# Patient Record
Sex: MALE | Race: WHITE | NOT HISPANIC OR LATINO | Employment: OTHER | URBAN - METROPOLITAN AREA
[De-identification: names, ages, dates, MRNs, and addresses within clinical notes are randomized per-mention and may not be internally consistent; named-entity substitution may affect disease eponyms.]

---

## 2017-01-06 ENCOUNTER — OFFICE VISIT (OUTPATIENT)
Dept: DERMATOLOGY | Facility: CLINIC | Age: 72
End: 2017-01-06
Payer: MEDICARE

## 2017-01-06 VITALS — WEIGHT: 227 LBS | BODY MASS INDEX: 29.13 KG/M2 | HEIGHT: 74 IN

## 2017-01-06 DIAGNOSIS — L57.0 ACTINIC KERATOSES: ICD-10-CM

## 2017-01-06 DIAGNOSIS — L81.4 SOLAR LENTIGO: ICD-10-CM

## 2017-01-06 DIAGNOSIS — D48.9 NEOPLASM OF UNCERTAIN BEHAVIOR: Primary | ICD-10-CM

## 2017-01-06 DIAGNOSIS — L82.1 SEBORRHEIC KERATOSES: ICD-10-CM

## 2017-01-06 DIAGNOSIS — R23.8 VENOUS LAKE OF LIP: ICD-10-CM

## 2017-01-06 PROCEDURE — 1160F RVW MEDS BY RX/DR IN RCRD: CPT | Mod: S$GLB,,, | Performed by: DERMATOLOGY

## 2017-01-06 PROCEDURE — 17003 DESTRUCT PREMALG LES 2-14: CPT | Mod: S$GLB,,, | Performed by: DERMATOLOGY

## 2017-01-06 PROCEDURE — 99203 OFFICE O/P NEW LOW 30 MIN: CPT | Mod: 25,S$GLB,, | Performed by: DERMATOLOGY

## 2017-01-06 PROCEDURE — 1159F MED LIST DOCD IN RCRD: CPT | Mod: S$GLB,,, | Performed by: DERMATOLOGY

## 2017-01-06 PROCEDURE — 99999 PR PBB SHADOW E&M-EST. PATIENT-LVL III: CPT | Mod: PBBFAC,,, | Performed by: DERMATOLOGY

## 2017-01-06 PROCEDURE — 1157F ADVNC CARE PLAN IN RCRD: CPT | Mod: S$GLB,,, | Performed by: DERMATOLOGY

## 2017-01-06 PROCEDURE — 88305 TISSUE EXAM BY PATHOLOGIST: CPT | Performed by: PATHOLOGY

## 2017-01-06 PROCEDURE — 17000 DESTRUCT PREMALG LESION: CPT | Mod: S$GLB,,, | Performed by: DERMATOLOGY

## 2017-01-06 PROCEDURE — 11100 PR BIOPSY OF SKIN LESION: CPT | Mod: 59,S$GLB,, | Performed by: DERMATOLOGY

## 2017-01-06 PROCEDURE — 1126F AMNT PAIN NOTED NONE PRSNT: CPT | Mod: S$GLB,,, | Performed by: DERMATOLOGY

## 2017-01-06 NOTE — PATIENT INSTRUCTIONS
CRYOSURGERY      Your doctor has used a method called cryosurgery to treat your skin condition. Cryosurgery refers to the use of very cold substances to treat a variety of skin conditions such as warts, pre-skin cancers, molluscum contagiosum, sun spots, and several benign growths. The substance we use in cryosurgery is liquid nitrogen and is so cold (-195 degrees Celsius) that is burns when administered.     Following treatment in the office, the skin may immediately burn and become red. You may find the area around the lesion is affected as well. It is sometimes necessary to treat not only the lesion, but a small area of the surrounding normal skin to achieve a good response.     A blister, and even a blood filled blister, may form after treatment.   This is a normal response. If the blister is painful, it is acceptable to sterilize a needle and with rubbing alcohol and gently pop the blister. It is important that you gently wash the area with soap and warm water as the blister fluid may contain wart virus if a wart was treated. Do no remove the roof of the blister.     The area treated can take anywhere from 1-3 weeks to heal. Healing time depends on the kind skin lesion treated, the location, and how aggressively the lesion was treated. It is recommended that the areas treated are covered with Vaseline or bacitracin ointment and a band-aid. If a band-aid is not practical, just ointment applied several times per day will do. Keeping these areas moist will speed the healing time.    Treatment with liquid nitrogen can leave a scar. In dark skin, it may be a light or dark scar, in light skin it may be a white or pink scar. These will generally fade with time, but may never go away completely.     If you have any concerns after your treatment, please feel free to call the office.         [facility]  [facility]  [address] [place] [address]  Dept: [phone]   Shave Biopsy Wound  Care    Your doctor has performed a shave biopsy today.  A band aid and vaseline ointment has been placed over the site.  This should remain in place for 24 hours.  It is recommended that you keep the area dry for the first 24 hours.  After 24 hours, you may remove the band aid and wash the area with warm soap and water and apply Vaseline jelly.  Many patients prefer to use Neosporin or Bacitracin ointment.  This is acceptable; however, know that you can develop an allergy to this medication even if you have used it safely for years.  It is important to keep the area moist.  Letting it dry out and get air slows healing time, and will worsen the scar.  Band aid is optional after first 24 hours.      If you notice increasing redness, tenderness, pain, or yellow drainage at the biopsy site, please notify your doctor.  These are signs of an infection.    If your biopsy site is bleeding, apply firm pressure for 15 minutes straight.  Repeat for another 15 minutes, if it is still bleeding.   If the surgical site continues to bleed, then please contact your doctor.       [facility]  [facility]  [address] [place] [address]  Dept: [phone]

## 2017-01-06 NOTE — PROGRESS NOTES
Subjective:       Patient ID:  Roosevelt Alejandro is a 71 y.o. male who presents for   Chief Complaint   Patient presents with    Mole     Lip, left side, and left buttock, 71 years, no tx     HPI Comments: Initial visit  Describes a long history of occupational and recreational sun exposure with and without hats or sunscreen use. [employer]; [employer]  No h/o skin cancer  Mole on R upper cut lip traumatized with shaving, bleeds often          Mole  - Initial  Affected locations: lips (Left side and left buttock)  Duration: 71 years  Signs / symptoms: asymptomatic  Severity: mild  Timing: constant  Aggravated by: nothing  Relieving factors/Treatments tried: nothing        Review of Systems   Constitutional: Negative for fever and chills.   Skin: Negative for daily sunscreen use, activity-related sunscreen use (protective clothing) and recent sunburn.   Hematologic/Lymphatic: Does not bruise/bleed easily.        Objective:    Physical Exam   Constitutional: He appears well-developed and well-nourished. No distress.   Neurological: He is alert and oriented to person, place, and time. He is not disoriented.   Psychiatric: He has a normal mood and affect.   Skin:   Areas Examined (abnormalities noted in diagram):   Scalp / Hair Palpated and Inspected  Head / Face Inspection Performed  Neck Inspection Performed  Chest / Axilla Inspection Performed  Abdomen Inspection Performed  Genitals / Buttocks / Groin Inspection Performed  Back Inspection Performed  RUE Inspected  LUE Inspection Performed  RLE Inspected  LLE Inspection Performed  Nails and Digits Inspection Performed                       Diagram Legend     Erythematous scaling macule/papule c/w actinic keratosis       Vascular papule c/w angioma      Pigmented verrucoid papule/plaque c/w seborrheic keratosis      Yellow umbilicated papule c/w sebaceous hyperplasia      Irregularly shaped tan macule c/w lentigo     1-2 mm smooth white papules consistent with  Milia      Movable subcutaneous cyst with punctum c/w epidermal inclusion cyst      Subcutaneous movable cyst c/w pilar cyst      Firm pink to brown papule c/w dermatofibroma      Pedunculated fleshy papule(s) c/w skin tag(s)      Evenly pigmented macule c/w junctional nevus     Mildly variegated pigmented, slightly irregular-bordered macule c/w mildly atypical nevus      Flesh colored to evenly pigmented papule c/w intradermal nevus       Pink pearly papule/plaque c/w basal cell carcinoma      Erythematous hyperkeratotic cursted plaque c/w SCC      Surgical scar with no sign of skin cancer recurrence      Open and closed comedones      Inflammatory papules and pustules      Verrucoid papule consistent consistent with wart     Erythematous eczematous patches and plaques     Dystrophic onycholytic nail with subungual debris c/w onychomycosis     Umbilicated papule    Erythematous-base heme-crusted tan verrucoid plaque consistent with inflamed seborrheic keratosis     Erythematous Silvery Scaling Plaque c/w Psoriasis     See annotation          Assessment / Plan:      Pathology Orders:      Normal Orders This Visit    Tissue Specimen To Pathology, Dermatology     Questions:    Directional Terms:  Other(comment)    Clinical information:  R upper cutaneous lip, IDN vs other    Specific Site:  R upper cut lip        Neoplasm of uncertain behavior  -     Tissue Specimen To Pathology, Dermatology  Shave biopsy procedure note:    Shave biopsy performed after verbal consent including risk of infection, scar, recurrence, need for additional treatment of site. Area prepped with alcohol, anesthetized with approximately 1.0cc of 1% lidocaine with epinephrine. Lesional tissue shaved with razor blade. Hemostasis achieved with application of aluminum chloride followed by hyfrecation. No complications. Dressing applied. Wound care explained.    Actinic keratoses  Cryosurgery Procedure Note    Verbal consent from the patient is  obtained and the patient is aware of the precancerous quality and need for treatment of these lesions. Liquid nitrogen cryosurgery is applied to the 5 actinic keratoses, as detailed in the physical exam, to produce a freeze injury. The patient is aware that blisters may form and is instructed on wound care with gentle cleansing and use of vaseline ointment to keep moist until healed. The patient is supplied a handout on cryosurgery and is instructed to call if lesions do not completely resolve.    Seborrheic keratoses  These are benign inherited growths without a malignant potential. Reassurance given to patient. No treatment is necessary.     Venous lake of lip  Reassurance    Solar lentigo  This is a benign hyperpigmented sun induced lesion. Daily sun protection will reduce the number of new lesions. Treatment of these benign lesions are considered cosmetic.    Patient instructed in importance in daily sun protection of at least spf 30. Sun avoidance and topical protection discussed.   Recommend Elta MD (physician office) COTZ sensitive (available online) for daily use on face and neck.  Patient encouraged to wear hat for all outdoor exposure.   Also discussed sun protective clothing.           Return in about 1 year (around 1/6/2018).

## 2017-02-07 ENCOUNTER — DOCUMENTATION ONLY (OUTPATIENT)
Dept: FAMILY MEDICINE | Facility: CLINIC | Age: 72
End: 2017-02-07

## 2017-02-07 NOTE — PROGRESS NOTES
Pre-Visit Chart Review  For Appointment Scheduled on 2/10/17    Health Maintenance Due   Topic Date Due    Zoster Vaccine  11/24/2004

## 2017-02-10 ENCOUNTER — LAB VISIT (OUTPATIENT)
Dept: LAB | Facility: HOSPITAL | Age: 72
End: 2017-02-10
Attending: FAMILY MEDICINE
Payer: MEDICARE

## 2017-02-10 ENCOUNTER — OFFICE VISIT (OUTPATIENT)
Dept: FAMILY MEDICINE | Facility: CLINIC | Age: 72
End: 2017-02-10
Payer: MEDICARE

## 2017-02-10 VITALS
BODY MASS INDEX: 30.38 KG/M2 | RESPIRATION RATE: 14 BRPM | WEIGHT: 236.75 LBS | SYSTOLIC BLOOD PRESSURE: 134 MMHG | HEART RATE: 92 BPM | OXYGEN SATURATION: 95 % | DIASTOLIC BLOOD PRESSURE: 86 MMHG | HEIGHT: 74 IN

## 2017-02-10 DIAGNOSIS — Z00.00 ANNUAL PHYSICAL EXAM: ICD-10-CM

## 2017-02-10 DIAGNOSIS — N40.0 BENIGN NON-NODULAR PROSTATIC HYPERPLASIA WITHOUT LOWER URINARY TRACT SYMPTOMS: Primary | ICD-10-CM

## 2017-02-10 DIAGNOSIS — I10 ESSENTIAL HYPERTENSION: ICD-10-CM

## 2017-02-10 LAB
ALBUMIN SERPL BCP-MCNC: 4.4 G/DL
ALP SERPL-CCNC: 80 U/L
ALT SERPL W/O P-5'-P-CCNC: 18 U/L
ANION GAP SERPL CALC-SCNC: 7 MMOL/L
AST SERPL-CCNC: 18 U/L
BILIRUB SERPL-MCNC: 0.5 MG/DL
BUN SERPL-MCNC: 20 MG/DL
CALCIUM SERPL-MCNC: 9.7 MG/DL
CHLORIDE SERPL-SCNC: 104 MMOL/L
CO2 SERPL-SCNC: 29 MMOL/L
CREAT SERPL-MCNC: 1.3 MG/DL
EST. GFR  (AFRICAN AMERICAN): >60 ML/MIN/1.73 M^2
EST. GFR  (NON AFRICAN AMERICAN): 54.5 ML/MIN/1.73 M^2
GLUCOSE SERPL-MCNC: 116 MG/DL
POTASSIUM SERPL-SCNC: 4.2 MMOL/L
PROT SERPL-MCNC: 7.7 G/DL
SODIUM SERPL-SCNC: 140 MMOL/L

## 2017-02-10 PROCEDURE — 99213 OFFICE O/P EST LOW 20 MIN: CPT | Mod: S$GLB,,, | Performed by: PHYSICIAN ASSISTANT

## 2017-02-10 PROCEDURE — 1157F ADVNC CARE PLAN IN RCRD: CPT | Mod: S$GLB,,, | Performed by: PHYSICIAN ASSISTANT

## 2017-02-10 PROCEDURE — 3075F SYST BP GE 130 - 139MM HG: CPT | Mod: S$GLB,,, | Performed by: PHYSICIAN ASSISTANT

## 2017-02-10 PROCEDURE — 99999 PR PBB SHADOW E&M-EST. PATIENT-LVL III: CPT | Mod: PBBFAC,,, | Performed by: PHYSICIAN ASSISTANT

## 2017-02-10 PROCEDURE — 3079F DIAST BP 80-89 MM HG: CPT | Mod: S$GLB,,, | Performed by: PHYSICIAN ASSISTANT

## 2017-02-10 PROCEDURE — 1159F MED LIST DOCD IN RCRD: CPT | Mod: S$GLB,,, | Performed by: PHYSICIAN ASSISTANT

## 2017-02-10 PROCEDURE — 1126F AMNT PAIN NOTED NONE PRSNT: CPT | Mod: S$GLB,,, | Performed by: PHYSICIAN ASSISTANT

## 2017-02-10 PROCEDURE — 1160F RVW MEDS BY RX/DR IN RCRD: CPT | Mod: S$GLB,,, | Performed by: PHYSICIAN ASSISTANT

## 2017-02-10 RX ORDER — NIFEDIPINE 30 MG/1
30 TABLET, EXTENDED RELEASE ORAL NIGHTLY
Qty: 90 TABLET | Refills: 3 | Status: SHIPPED | OUTPATIENT
Start: 2017-02-10 | End: 2017-11-28 | Stop reason: SDUPTHER

## 2017-02-10 RX ORDER — DOXAZOSIN 2 MG/1
2 TABLET ORAL NIGHTLY
Qty: 90 TABLET | Refills: 3 | Status: SHIPPED | OUTPATIENT
Start: 2017-02-10 | End: 2017-11-28 | Stop reason: SINTOL

## 2017-02-10 NOTE — PATIENT INSTRUCTIONS
Established High Blood Pressure    High blood pressure (hypertension) is a chronic disease. Often health care providers dont know what causes it. But it can be caused by certain health conditions and medicines.  If you have high blood pressure, you may not have any symptoms. If you do have symptoms, they may include headache, dizziness, changes in your vision, chest pain, and shortness of breath. But even without symptoms, high blood pressure thats not treated raises your risk for heart attack and stroke. High blood pressure is a serious health risk and shouldnt be ignored.  A blood pressure reading is made up of two numbers: a higher number over a lower number. The top number is the systolic pressure. The bottom number is the diastolic pressure. A normal blood pressure is less than 120 over less than 80.  High blood pressure is when either the top number is 140 or higher, or the bottom number is 90 or higher. This must be the result when taking your blood pressure a number of times. The blood pressures between normal and high are called prehypertension.  Home care  If you have high blood pressure, you should do what is listed below to lower your blood pressure. If you are taking medicines for high blood pressure, these methods may reduce or end your need for medicines in the future.  · Begin a weight-loss program if you are overweight.  · Cut back on how much salt you get in your diet. Heres how to do this:  ¨ Dont eat foods that have a lot of salt. These include olives, pickles, smoked meats, and salted potato chips.  ¨ Dont add salt to your food at the table.  ¨ Use only small amounts of salt when cooking.  · Begin an exercise program. Talk with your health care provider about the type of exercise program that would be best for you. It doesn't have to be hard. Even brisk walking for 20 minutes 3 times a week is a good form of exercise.  · Dont take medicines that have heart stimulants. This includes many  cold and sinus decongestant pills and sprays, as well as diet pills. Check the warnings about hypertension on the label. Stimulants such as amphetamine or cocaine could be lethal for someone with high blood pressure. Never take these.  · Limit how much caffeine you get in your diet. Switch to caffeine-free products.  · Stop smoking. If you are a long-time smoker, this can be hard. Enroll in a stop-smoking program to make it more likely that you will quit for good.  · Learn how to handle stress. This is an important part of any program to lower blood pressure. Learn about relaxation methods like meditation, yoga, or biofeedback.  · If your provider prescribed medicines, take them exactly as directed. Missing doses may cause your blood pressure get out of control.  · Consider buying an automatic blood pressure machine. You can get one of these at most pharmacies. Use this to watch your blood pressure at home. Give the results to your provider.  Follow-up care  You will need to make regular visits to your health care provider. This is to check your blood pressure and to make changes to your medicines. Make a follow-up appointment as directed.  When to seek medical advice  Call your health care provider right away if any of these occur:  · Chest pain or shortness of breath  · Severe headache  · Throbbing or rushing sound in the ears  · Nosebleed  · Sudden severe pain in your belly (abdomen)  · Extreme drowsiness, confusion, or fainting  · Dizziness or dizziness with a spinning sensation (vertigo)  · Weakness of an arm or leg or one side of the face  · You have problems speaking or seeing   Date Last Reviewed: 11/25/2014  © 0394-7550 [employer]. [address] [place] [address], [place] [address]. All rights reserved. This information is not intended as a substitute for professional medical care. Always follow your healthcare professional's instructions.

## 2017-02-10 NOTE — MR AVS SNAPSHOT
[facility]  [address]  [place] [address]  Phone: [phone]  Fax: [phone]                  Roosevelt Alejandro   2/10/2017 8:00 AM   Office Visit    Description:  Male : 1945   Provider:  Ruth Rich PA-C   Department:  [facility]           Reason for Visit     Follow-up           Diagnoses this Visit        Comments    Benign non-nodular prostatic hyperplasia without lower urinary tract symptoms         Essential hypertension                To Do List           Future Appointments        Provider Department Dept Phone    2/10/2017 10:15 AM LAB, [facility] - Lab [phone]    2017 9:30 AM Greg Johnson MD [facility] [phone]      Goals (5 Years of Data)     None       These Medications        Disp Refills Start End    doxazosin (CARDURA) 2 MG tablet 90 tablet 3 2/10/2017 2/10/2018    Take 1 tablet (2 mg total) by mouth every evening. - Oral    Pharmacy: [facility] - [place] [id number] [facility] Ph #: [phone]       nifedipine 30 MG ORAL TR24 (PROCARDIA-XL) 30 MG (OSM) 24 hr tablet 90 tablet 3 2/10/2017 2/10/2018    Take 1 tablet (30 mg total) by mouth every evening. - Oral    Pharmacy: [facility] - [address] Ph #: [phone]         [facility] On [facility]     [facility] On Call Nurse Care Line -  Assistance  Registered nurses in the [facility] provide clinical advisement, health education, appointment booking, and other advisory services.  Call for this free service at [phone].             Medications           Message regarding Medications     Verify the changes and/or additions to your medication regime listed below are the same as discussed with your clinician today.  If any of these changes or additions are incorrect, please notify your healthcare provider.             Verify that the below list of medications is  "an accurate representation of the medications you are currently taking.  If none reported, the list may be blank. If incorrect, please contact your healthcare provider. Carry this list with you in case of emergency.           Current Medications     carvedilol (COREG) 6.25 MG tablet Take 1 tablet (6.25 mg total) by mouth 2 (two) times daily with meals. Take if blood pressure remains over 140/90    doxazosin (CARDURA) 2 MG tablet Take 1 tablet (2 mg total) by mouth every evening.    nifedipine 30 MG ORAL TR24 (PROCARDIA-XL) 30 MG (OSM) 24 hr tablet Take 1 tablet (30 mg total) by mouth every evening.           Clinical Reference Information           Your Vitals Were     BP Pulse Resp Height Weight SpO2    134/86 92 14 6' 2" (1.88 m) 107.4 kg (236 lb 12.4 oz) 95%    BMI                30.4 kg/m2          Blood Pressure          Most Recent Value    BP  134/86      Allergies as of 2/10/2017     Aspirin    Lisinopril (Bulk)    Sulfa (Sulfonamide Antibiotics)      Immunizations Administered on Date of Encounter - 2/10/2017     None      Language Assistance Services     ATTENTION: Language assistance services are available, free of charge. Please call [phone].      ATENCIÓN: Si habla darrius, tiene a silva disposición servicios gratuitos de asistencia lingüística. Llame al [phone].     YUDELKA Ý: N?u b?n nói Ti?ng Vi?t, có các d?ch v? h? tr? ngôn ng? mi?n phí dành cho b?n. G?i s? [phone].         [facility] complies with applicable Federal civil rights laws and does not discriminate on the basis of race, color, national origin, age, disability, or sex.        "

## 2017-02-10 NOTE — PROGRESS NOTES
Subjective:       Patient ID: Roosevelt Alejandro is a 71 y.o. male.    Chief Complaint: Follow-up (4mth f/u hypertension)    HPI Comments: Mr. Alejandro comes to clinic today for follow up. He had blood work drawn today though the results are not yet available. The patient reports he has been monitoring his blood pressure at home with well controlled readings. The patient is due for the shingles vaccine but he refuses to get this. The patient has no complaints today and reports he has been feeling well.     Review of Systems   Constitutional: Negative for activity change, appetite change and fever.   HENT: Negative for postnasal drip, rhinorrhea and sinus pressure.    Eyes: Negative for visual disturbance.   Respiratory: Negative for cough and shortness of breath.    Cardiovascular: Negative for chest pain.   Gastrointestinal: Negative for abdominal distention and abdominal pain.   Genitourinary: Negative for difficulty urinating and dysuria.   Musculoskeletal: Negative for arthralgias and myalgias.   Neurological: Negative for headaches.   Hematological: Negative for adenopathy.   Psychiatric/Behavioral: The patient is not nervous/anxious.        Objective:      Physical Exam   Constitutional: He is oriented to person, place, and time.   HENT:   Mouth/Throat: Oropharynx is clear and moist. No oropharyngeal exudate.   Eyes: Conjunctivae are normal. Pupils are equal, round, and reactive to light.   Cardiovascular: Normal rate and regular rhythm.    Pulmonary/Chest: Effort normal and breath sounds normal. He has no wheezes.   Abdominal: Soft. Bowel sounds are normal. There is no tenderness.   Musculoskeletal: He exhibits no edema.   Lymphadenopathy:     He has no cervical adenopathy.   Neurological: He is alert and oriented to person, place, and time.   Skin: No erythema.   Psychiatric: His behavior is normal.       Assessment:       1. Obesity (BMI 30.0-34.9)    2. Benign non-nodular prostatic hyperplasia without lower  urinary tract symptoms    3. Essential hypertension        Plan:       Max was seen today for follow-up.    Diagnoses and all orders for this visit:    Benign non-nodular prostatic hyperplasia without lower urinary tract symptoms  -     doxazosin (CARDURA) 2 MG tablet; Take 1 tablet (2 mg total) by mouth every evening.    Essential hypertension  -     nifedipine 30 MG ORAL TR24 (PROCARDIA-XL) 30 MG (OSM) 24 hr tablet; Take 1 tablet (30 mg total) by mouth every evening.  Controlled  Low salt diet  Continue current medication    Patient readiness: acceptance and barriers:none    During the course of the visit the patient was educated and counseled about the following:     Hypertension:   Medication: no change.  Dietary sodium restriction.  Regular aerobic exercise.    Goals: Hypertension: Reduce Blood Pressure    Did patient meet goals/outcomes: Yes    The following self management tools provided: declined    Patient Instructions (the written plan) was given to the patient/family.     Time spent with patient: 30 minutes

## 2017-02-16 LAB — VZV IGG SER IA-ACNC: <0.91 INDEX

## 2017-06-21 ENCOUNTER — DOCUMENTATION ONLY (OUTPATIENT)
Dept: FAMILY MEDICINE | Facility: CLINIC | Age: 72
End: 2017-06-21

## 2017-06-21 NOTE — PROGRESS NOTES
Pre-Visit Chart Review  For Appointment Scheduled on 06/23/2017    Health Maintenance Due   Topic Date Due    Zoster Vaccine  11/24/2004

## 2017-06-23 ENCOUNTER — OFFICE VISIT (OUTPATIENT)
Dept: FAMILY MEDICINE | Facility: CLINIC | Age: 72
End: 2017-06-23
Payer: MEDICARE

## 2017-06-23 VITALS
HEIGHT: 74 IN | SYSTOLIC BLOOD PRESSURE: 130 MMHG | DIASTOLIC BLOOD PRESSURE: 80 MMHG | HEART RATE: 83 BPM | WEIGHT: 227.06 LBS | TEMPERATURE: 97 F | BODY MASS INDEX: 29.14 KG/M2

## 2017-06-23 DIAGNOSIS — R35.1 NOCTURIA: ICD-10-CM

## 2017-06-23 DIAGNOSIS — R73.02 GLUCOSE INTOLERANCE (IMPAIRED GLUCOSE TOLERANCE): ICD-10-CM

## 2017-06-23 DIAGNOSIS — E78.5 HYPERLIPIDEMIA, UNSPECIFIED HYPERLIPIDEMIA TYPE: ICD-10-CM

## 2017-06-23 DIAGNOSIS — J61 PULMONARY ASBESTOSIS: ICD-10-CM

## 2017-06-23 DIAGNOSIS — Z23 NEED FOR ZOSTER VACCINE: Primary | ICD-10-CM

## 2017-06-23 DIAGNOSIS — R97.20 ABNORMAL PSA: ICD-10-CM

## 2017-06-23 PROCEDURE — 99999 PR PBB SHADOW E&M-EST. PATIENT-LVL III: CPT | Mod: PBBFAC,,, | Performed by: FAMILY MEDICINE

## 2017-06-23 PROCEDURE — 1159F MED LIST DOCD IN RCRD: CPT | Mod: S$GLB,,, | Performed by: FAMILY MEDICINE

## 2017-06-23 PROCEDURE — 99214 OFFICE O/P EST MOD 30 MIN: CPT | Mod: S$GLB,,, | Performed by: FAMILY MEDICINE

## 2017-06-23 PROCEDURE — 1126F AMNT PAIN NOTED NONE PRSNT: CPT | Mod: S$GLB,,, | Performed by: FAMILY MEDICINE

## 2017-06-23 NOTE — PATIENT INSTRUCTIONS
Established High Blood Pressure    High blood pressure (hypertension) is a chronic disease. Often health care providers dont know what causes it. But it can be caused by certain health conditions and medicines.  If you have high blood pressure, you may not have any symptoms. If you do have symptoms, they may include headache, dizziness, changes in your vision, chest pain, and shortness of breath. But even without symptoms, high blood pressure thats not treated raises your risk for heart attack and stroke. High blood pressure is a serious health risk and shouldnt be ignored.  A blood pressure reading is made up of two numbers: a higher number over a lower number. The top number is the systolic pressure. The bottom number is the diastolic pressure. A normal blood pressure is less than 120 over less than 80.  High blood pressure is when either the top number is 140 or higher, or the bottom number is 90 or higher. This must be the result when taking your blood pressure a number of times. The blood pressures between normal and high are called prehypertension.  Home care  If you have high blood pressure, you should do what is listed below to lower your blood pressure. If you are taking medicines for high blood pressure, these methods may reduce or end your need for medicines in the future.  · Begin a weight-loss program if you are overweight.  · Cut back on how much salt you get in your diet. Heres how to do this:  ¨ Dont eat foods that have a lot of salt. These include olives, pickles, smoked meats, and salted potato chips.  ¨ Dont add salt to your food at the table.  ¨ Use only small amounts of salt when cooking.  · Begin an exercise program. Talk with your health care provider about the type of exercise program that would be best for you. It doesn't have to be hard. Even brisk walking for 20 minutes 3 times a week is a good form of exercise.  · Dont take medicines that have heart stimulants. This includes many  cold and sinus decongestant pills and sprays, as well as diet pills. Check the warnings about hypertension on the label. Stimulants such as amphetamine or cocaine could be lethal for someone with high blood pressure. Never take these.  · Limit how much caffeine you get in your diet. Switch to caffeine-free products.  · Stop smoking. If you are a long-time smoker, this can be hard. Enroll in a stop-smoking program to make it more likely that you will quit for good.  · Learn how to handle stress. This is an important part of any program to lower blood pressure. Learn about relaxation methods like meditation, yoga, or biofeedback.  · If your provider prescribed medicines, take them exactly as directed. Missing doses may cause your blood pressure get out of control.  · Consider buying an automatic blood pressure machine. You can get one of these at most pharmacies. Use this to watch your blood pressure at home. Give the results to your provider.  Follow-up care  You will need to make regular visits to your health care provider. This is to check your blood pressure and to make changes to your medicines. Make a follow-up appointment as directed.  When to seek medical advice  Call your health care provider right away if any of these occur:  · Chest pain or shortness of breath  · Severe headache  · Throbbing or rushing sound in the ears  · Nosebleed  · Sudden severe pain in your belly (abdomen)  · Extreme drowsiness, confusion, or fainting  · Dizziness or dizziness with a spinning sensation (vertigo)  · Weakness of an arm or leg or one side of the face  · You have problems speaking or seeing   Date Last Reviewed: 11/25/2014  © 1529-5011 [employer]. [address] [place] [address], [place] [address]. All rights reserved. This information is not intended as a substitute for professional medical care. Always follow your healthcare professional's instructions.        A Sample Walking Program  Experts recommend walking  briskly on most days. Aim for a target of 30 minutes on most days, or 150 or more minutes a week. Walking programs can help you reach this goal by slowly increasing the frequency and the amount of  time you walk. Try this walking program:    First week  · Walk 3 times a week.  · Walk for 5 minutes each time.  Second week  · Walk 3 times a week.  · Walk for 10 minutes each time.  Third week  · Walk 3 times a week.  · Walk for 13 minutes each time.  Fourth week  · Walk 3 times a week.  · Walk for 15 minutes each time.  Fifth week  · Walk 4 times a week.  · Walk for 15 minutes each time.  Sixth week and beyond  Gradually increase your minutes of walking each time, and your number of times each week, until you reach 30 minutes, 5-7 days of the week.  Tips for getting the most from your walking program  · Walk briskly. If you can sing, speed up. If you cant talk easily, slow down.  · Choose good walking shoes with padded soles and good arch support.  · Dont use hand or ankle weights. They can cause injuries.  · Walk indoors if the weather is bad. Use a treadmill or walk inside a shopping mall  Before you start walking, check with your healthcare provider if you are new to exercise, over 40, overweight, or a smoker. Also check with your provider  if you have heart disease, high blood pressure, diabetes, arthritis, asthma, or any other health problem that concerns you. Your provider can help you get started and help you stay safe.   Date Last Reviewed: 8/13/2015  © 2184-2854 [employer]. [address] [place] [address], [place] [address]. All rights reserved. This information is not intended as a substitute for professional medical care. Always follow your healthcare professional's instructions.        A Sample Walking Program  Experts recommend walking briskly on most days. Aim for a target of 30 minutes on most days, or 150 or more minutes a week. Walking programs can help you reach this goal by slowly increasing  the frequency and the amount of  time you walk. Try this walking program:    First week  · Walk 3 times a week.  · Walk for 5 minutes each time.  Second week  · Walk 3 times a week.  · Walk for 10 minutes each time.  Third week  · Walk 3 times a week.  · Walk for 13 minutes each time.  Fourth week  · Walk 3 times a week.  · Walk for 15 minutes each time.  Fifth week  · Walk 4 times a week.  · Walk for 15 minutes each time.  Sixth week and beyond  Gradually increase your minutes of walking each time, and your number of times each week, until you reach 30 minutes, 5-7 days of the week.  Tips for getting the most from your walking program  · Walk briskly. If you can sing, speed up. If you cant talk easily, slow down.  · Choose good walking shoes with padded soles and good arch support.  · Dont use hand or ankle weights. They can cause injuries.  · Walk indoors if the weather is bad. Use a treadmill or walk inside a shopping mall  Before you start walking, check with your healthcare provider if you are new to exercise, over 40, overweight, or a smoker. Also check with your provider  if you have heart disease, high blood pressure, diabetes, arthritis, asthma, or any other health problem that concerns you. Your provider can help you get started and help you stay safe.   Date Last Reviewed: 8/13/2015 © 2000-2016 [employer]. [address] [place] [address]. All rights reserved. This information is not intended as a substitute for professional medical care. Always follow your healthcare professional's instructions.        Weight Management: Getting Started  Healthy bodies come in all shapes and sizes. Not all bodies are made to be thin. For some people, a healthy weight is higher than the average weight listed on weight charts. Your healthcare provider can help you decide on a healthy weight for you.    Reasons to lose weight  Losing weight can help with some health problems, such as high blood  pressure, heart disease, diabetes, sleep apnea, and arthritis. You may also feel more energy.  Set your long-term goal  Your goal doesn't even have to be a specific weight. You may decide on a fitness goal (such as being able to walk 10 miles a week), or a health goal (such as lowering your blood pressure). Choose a goal that is measurable and reasonable, so you know when you've reached it. A goal of reaching a BMI of less than 25 is not always reasonable (or possible).   Make an action plan  Habits dont change overnight. Setting your goals too high can leave you feeling discouraged if you cant reach them. Be realistic. Choose one or two small changes you can make now. Set an action plan for how you are going to make these changes. When you can stick to this plan, keep making a few more small changes. Taking small steps will help you stay on the path to success.  Track your progress  Write down your goals. Then, keep a daily record of your progress. Write down what you eat and how active you are. This record lets you look back on how much youve done. It may also help when youre feeling frustrated. Reward yourself for success. Even if you dont reach every goal, give yourself credit for what you do get done.  Get support  Encouragement from others can help make losing weight easier. Ask your family members and friends for support. They may even want to join you. Also look to your healthcare provider, registered dietitian, and  for help. Your local hospital can give you more information about nutrition, exercise, and weight loss.  Date Last Reviewed: 1/31/2016 © 2000-2016 [employer]. [address] [place] [address], [place] [address]. All rights reserved. This information is not intended as a substitute for professional medical care. Always follow your healthcare professional's instructions.

## 2017-07-05 NOTE — PROGRESS NOTES
"[place] 10-year CHD Risk Score: 20% (15 Total Points)   Values used to calculate score:     Age: 69 years -- Points: 12     Total Cholesterol: 195 mg/dL -- Points: 0     HDL Cholesterol: 39 mg/dL -- Points: 2     Systolic BP (treated): 127 mmHg -- Points: 1     The patient is not a smoker. -- Points: 0     The patient has not been diagnosed with diabetes. -- Points: 0  SUBJECTIVE:   Max LUISITO Alejandro is a 71 y.o. male seen Hypertension: Patient here for follow-up of elevated blood pressure. He is exercising and is adherent to low salt diet.  Blood pressure is well controlled at home. Cardiac symptoms none. Patient denies chest pain, chest pressure/discomfort, claudication, dyspnea and exertional chest pressure/discomfort.  Cardiovascular risk factors: advanced age (older than 55 for men, 65 for women), hypertension and sedentary lifestyle. Use of agents associated with hypertension: none. History of target organ damage: none.    OBJECTIVE:   /80 (BP Location: Right arm, Patient Position: Sitting, BP Method: Manual)   Pulse 83   Temp 97.1 °F (36.2 °C) (Oral)   Ht 6' 2" (1.88 m)   Wt 103 kg (227 lb 1.2 oz)   BMI 29.15 kg/m²   Colon polyp.adenoma.  The patient appears alert, well appearing, and in no distress, oriented to person, place, and time, overweight, acyanotic, in no respiratory distress, playful, active and well hydrated.  ENT: ENT exam normal, no neck nodes or sinus tenderness, neck without nodes, pharynx erythematous without exudate and post nasal drip noted  CHEST:clear to auscultation, no wheezes, rales or rhonchi, symmetric air entry, no tachypnea, retractions or cyanosis    ASSESSMENT:   Need for zoster vaccine  -     zoster vaccine live, PF, (ZOSTAVAX, PF,) 19,400 unit/0.65 mL injection; Inject 19,400 Units into the skin once.  Dispense: 1 vial; Refill: 0    Abnormal PSA  -     PROSTATE SPECIFIC ANTIGEN, DIAGNOSTIC; Future    Glucose intolerance (impaired glucose tolerance)  -     " Comprehensive metabolic panel; Future  -     HEMOGLOBIN A1C; Future    Hyperlipidemia, unspecified hyperlipidemia type  -     Lipid panel; Future    Nocturia  -     PROSTATE SPECIFIC ANTIGEN, DIAGNOSTIC; Future    Pulmonary asbestosis          PLAN:   Patient readiness: acceptance and barriers:readiness and social stressors    During the course of the visit the patient was educated and counseled about the following:     Hypertension:   Dietary sodium restriction.  Regular aerobic exercise.  Check blood pressures daily and record.    Goals: Hypertension: Reduce Blood Pressure    Did patient meet goals/outcomes: Yes    The following self management tools provided: blood pressure log  excercise log    Patient Instructions (the written plan) was given to the patient/family.     Time spent with patient: 30 minutes              Additional suggestions to patient: push fluids, rest and return if not improved or if worse.

## 2017-07-19 DIAGNOSIS — M25.532 LEFT WRIST PAIN: Primary | ICD-10-CM

## 2017-07-20 ENCOUNTER — OFFICE VISIT (OUTPATIENT)
Dept: ORTHOPEDICS | Facility: CLINIC | Age: 72
End: 2017-07-20
Payer: MEDICARE

## 2017-07-20 ENCOUNTER — HOSPITAL ENCOUNTER (OUTPATIENT)
Dept: RADIOLOGY | Facility: HOSPITAL | Age: 72
Discharge: HOME | End: 2017-07-20
Attending: ORTHOPAEDIC SURGERY
Payer: MEDICARE

## 2017-07-20 VITALS
HEART RATE: 95 BPM | BODY MASS INDEX: 29.13 KG/M2 | WEIGHT: 227 LBS | HEIGHT: 74 IN | DIASTOLIC BLOOD PRESSURE: 79 MMHG | SYSTOLIC BLOOD PRESSURE: 165 MMHG

## 2017-07-20 DIAGNOSIS — M25.532 LEFT WRIST PAIN: ICD-10-CM

## 2017-07-20 DIAGNOSIS — M65.4 DE QUERVAIN'S TENOSYNOVITIS, LEFT: Primary | ICD-10-CM

## 2017-07-20 PROCEDURE — 99203 OFFICE O/P NEW LOW 30 MIN: CPT | Mod: 25,S$GLB,, | Performed by: ORTHOPAEDIC SURGERY

## 2017-07-20 PROCEDURE — 73110 X-RAY EXAM OF WRIST: CPT | Mod: TC,PN,LT

## 2017-07-20 PROCEDURE — 1159F MED LIST DOCD IN RCRD: CPT | Mod: S$GLB,,, | Performed by: ORTHOPAEDIC SURGERY

## 2017-07-20 PROCEDURE — 99999 PR PBB SHADOW E&M-EST. PATIENT-LVL III: CPT | Mod: PBBFAC,,, | Performed by: ORTHOPAEDIC SURGERY

## 2017-07-20 PROCEDURE — 73110 X-RAY EXAM OF WRIST: CPT | Mod: 26,LT,, | Performed by: RADIOLOGY

## 2017-07-20 PROCEDURE — 1125F AMNT PAIN NOTED PAIN PRSNT: CPT | Mod: S$GLB,,, | Performed by: ORTHOPAEDIC SURGERY

## 2017-07-21 PROCEDURE — 20550 NJX 1 TENDON SHEATH/LIGAMENT: CPT | Mod: LT,S$GLB,, | Performed by: ORTHOPAEDIC SURGERY

## 2017-07-21 RX ORDER — TRIAMCINOLONE ACETONIDE 40 MG/ML
40 INJECTION, SUSPENSION INTRA-ARTICULAR; INTRAMUSCULAR
Status: DISCONTINUED | OUTPATIENT
Start: 2017-07-21 | End: 2017-07-21 | Stop reason: HOSPADM

## 2017-07-21 RX ADMIN — TRIAMCINOLONE ACETONIDE 40 MG: 40 INJECTION, SUSPENSION INTRA-ARTICULAR; INTRAMUSCULAR at 11:07

## 2017-07-21 NOTE — PROCEDURES
L first doral compartmentTendon Sheath  Date/Time: 7/21/2017 11:51 AM  Performed by: KRZYSZTOF ARANGO  Authorized by: KRZYSZTOF ARANGO     Consent Done?: Yes (Verbal)  Timeout: prior to procedure the correct patient, procedure, and site was verified    Indications:  Pain  Site marked: the procedure site was marked    Timeout: prior to procedure the correct patient, procedure, and site was verified    Location:  Wrist  Prep: patient was prepped and draped in usual sterile fashion    Needle size:  22 G  Approach:  Dorsal  Medications:  40 mg triamcinolone acetonide 40 mg/mL  Patient tolerance:  Patient tolerated the procedure well with no immediate complications

## 2017-07-21 NOTE — PROGRESS NOTES
Past Medical History:   Diagnosis Date    Arthritis     back pain    Asbestosis     BPH (benign prostatic hyperplasia)     Hypertension        Past Surgical History:   Procedure Laterality Date    COLONOSCOPY      2009    COLONOSCOPY N/A 1/21/2016    Procedure: COLONOSCOPY;  Surgeon: Toby Maciel MD;  Location: [facility];  Service: Endoscopy;  Laterality: N/A;    EYE SURGERY      cataracts, retinal detachment    PROSTATE BIOPSY      ROTATOR CUFF REPAIR      left       Current Outpatient Prescriptions   Medication Sig    doxazosin (CARDURA) 2 MG tablet Take 1 tablet (2 mg total) by mouth every evening.    nifedipine 30 MG ORAL TR24 (PROCARDIA-XL) 30 MG (OSM) 24 hr tablet Take 1 tablet (30 mg total) by mouth every evening.     No current facility-administered medications for this visit.        Review of patient's allergies indicates:   Allergen Reactions    Aspirin Other (See Comments)     Internal bleeding    Lisinopril (bulk)     Sulfa (sulfonamide antibiotics) Swelling and Rash       Family History   Problem Relation Age of Onset    Cancer Father      lung/ asbestos    Melanoma Neg Hx     Psoriasis Neg Hx     Lupus Neg Hx     Eczema Neg Hx        Social History     Social History    Marital status:      Spouse name: N/A    Number of children: N/A    Years of education: N/A     Occupational History    Not on file.     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  Patient comes  in with left wrist pain.  Patient states it started a couple months above but gotten worse over the last month.  Patient denies an acute injury or trauma.  Pain along the radial aspect of his wrist and into the forearm.  Slowly getting worse.  Symptoms are moderate.  No prior treatment.  Rates the pain as a 5 out of 10.  Tried a brace.      Review of Systems:    Constitution: Negative for chills, fever, and sweats.  Negative for unexplained weight loss.    HENT:  Negative for headaches and blurry vision.    Cardiovascular:Negative for chest pain or irregular heart beat. Negative for hypertension.    Respiratory:  Negative for cough and shortness of breath.    Gastrointestinal: Negative for abdominal pain, heartburn, melena, nausea, and vomitting.    Genitourinary:  Negative bladder incontinence and dysuria.    Musculoskeletal:  See HPI    Neurological: Negative for numbness.    Psychiatric/Behavioral: Negative for depression.  The patient is not nervous/anxious.      Endocrine: Negative for polyuria    Hematologic/Lymphatic: Negative for bleeding problem.  Does not bruise/bleed easily.    Skin: Negative for poor would healing and rash      Physical Examination:    Vital Signs:    Vitals:    07/20/17 1524   BP: (!) 165/79   Pulse: 95       Body mass index is 29.15 kg/m².    This a well-developed, well nourished patient in no acute distress.  They are alert and oriented and cooperative to examination.  Pt. walks without an antalgic gait.      Examination of the left hand and wrist shows no signs of rashes or erythema. Patient has no masses ecchymosis or effusions. Patient has full range of motion of the wrist in flexion and extension as well as ulnar and radial deviation. The patient also has full range of motion of all joints in the hand. There are 2+ radial pulse and intact light touch sensation in all 5 digits. Nontender over the anatomic snuffbox. Negative Tinel's. Negative positive Finkelstein's test.      Examination of the right hand and wrist shows no signs of rashes or erythema. Patient has no masses ecchymosis or effusions. Patient has full range of motion of the wrist in flexion and extension as well as ulnar and radial deviation. The patient also has full range of motion of all joints in the hand. There are 2+ radial pulse and intact light touch sensation in all 5 digits. Nontender over the anatomic snuffbox. Negative Tinel's. Negative Finkelstein's test.     X-rays: X-rays of the left wrist are ordered and reviewed which show no sign of significant arthritis or fracture     Assessment:: Left de Quervain's tenosynovitis    Plan:  I reviewed the diagnosis and the findings with him today.  Reviewed his x-rays.  I offer the patient a cortisone injection which she agreed to.  He will continue to use the brace as needed.  Follow-up if it continues to become an issue.    This note was created using Dragon voice recognition software that occasionally misinterpreted phrases or words.    Consult note is delivered via Epic messaging service.

## 2017-11-27 ENCOUNTER — DOCUMENTATION ONLY (OUTPATIENT)
Dept: FAMILY MEDICINE | Facility: CLINIC | Age: 72
End: 2017-11-27

## 2017-11-27 NOTE — PROGRESS NOTES
Pre-Visit Chart Review  For Appointment Scheduled on 11/28/2017    Health Maintenance Due   Topic Date Due    Zoster Vaccine  11/24/2004    Influenza Vaccine  08/01/2017

## 2017-11-28 ENCOUNTER — OFFICE VISIT (OUTPATIENT)
Dept: FAMILY MEDICINE | Facility: CLINIC | Age: 72
End: 2017-11-28
Payer: MEDICARE

## 2017-11-28 ENCOUNTER — HOSPITAL ENCOUNTER (OUTPATIENT)
Dept: RADIOLOGY | Facility: CLINIC | Age: 72
Discharge: HOME | End: 2017-11-28
Attending: FAMILY MEDICINE
Payer: MEDICARE

## 2017-11-28 VITALS
SYSTOLIC BLOOD PRESSURE: 136 MMHG | DIASTOLIC BLOOD PRESSURE: 77 MMHG | HEIGHT: 74 IN | BODY MASS INDEX: 28.86 KG/M2 | WEIGHT: 224.88 LBS | HEART RATE: 87 BPM | TEMPERATURE: 98 F

## 2017-11-28 DIAGNOSIS — R00.2 HEART PALPITATIONS: ICD-10-CM

## 2017-11-28 DIAGNOSIS — J61 PULMONARY ASBESTOSIS: ICD-10-CM

## 2017-11-28 DIAGNOSIS — I10 ESSENTIAL HYPERTENSION: Primary | ICD-10-CM

## 2017-11-28 DIAGNOSIS — T73.3XXA FATIGUE DUE TO EXCESSIVE EXERTION, INITIAL ENCOUNTER: ICD-10-CM

## 2017-11-28 PROCEDURE — 71020 XR CHEST PA AND LATERAL: CPT | Mod: 26,,, | Performed by: RADIOLOGY

## 2017-11-28 PROCEDURE — 71020 XR CHEST PA AND LATERAL: CPT | Mod: TC,PO

## 2017-11-28 PROCEDURE — 99213 OFFICE O/P EST LOW 20 MIN: CPT | Mod: S$GLB,,, | Performed by: FAMILY MEDICINE

## 2017-11-28 PROCEDURE — 93010 ELECTROCARDIOGRAM REPORT: CPT | Mod: S$GLB,,, | Performed by: INTERNAL MEDICINE

## 2017-11-28 PROCEDURE — 99999 PR PBB SHADOW E&M-EST. PATIENT-LVL III: CPT | Mod: PBBFAC,,, | Performed by: FAMILY MEDICINE

## 2017-11-28 PROCEDURE — 93005 ELECTROCARDIOGRAM TRACING: CPT | Mod: S$GLB,,, | Performed by: FAMILY MEDICINE

## 2017-11-28 RX ORDER — OMEGA-3-ACID ETHYL ESTERS 1 G/1
2 CAPSULE, LIQUID FILLED ORAL 2 TIMES DAILY
Status: ON HOLD | COMMUNITY
End: 2017-12-20 | Stop reason: CLARIF

## 2017-11-28 RX ORDER — NIFEDIPINE 30 MG/1
30 TABLET, EXTENDED RELEASE ORAL NIGHTLY
Qty: 90 TABLET | Refills: 3 | Status: SHIPPED | OUTPATIENT
Start: 2017-11-28 | End: 2018-12-12 | Stop reason: SDUPTHER

## 2017-11-28 RX ORDER — GLUCOSAMINE/CHONDRO SU A 500-400 MG
1 TABLET ORAL 3 TIMES DAILY
COMMUNITY
End: 2019-02-24 | Stop reason: ALTCHOICE

## 2017-11-28 RX ORDER — NIFEDIPINE 30 MG/1
30 TABLET, EXTENDED RELEASE ORAL NIGHTLY
Qty: 90 TABLET | Refills: 3 | Status: SHIPPED | OUTPATIENT
Start: 2017-11-28 | End: 2017-11-28 | Stop reason: SDUPTHER

## 2017-11-28 NOTE — PATIENT INSTRUCTIONS
Taking Your Blood Pressure  Blood pressure is the force of blood against the artery wall as it moves from the heart through the blood vessels. You can take your own blood pressure reading using a digital monitor. Take your readings the same each time, using the same arm. Take readings as often as your healthcare provider instructs.  About blood pressure monitors  Blood pressure monitors are designed for certain ages and cases. You can find monitors for older adults, for pregnant women, and for children. Make sure the one you choose is the right one for your age and situation.  The American Heart Association recommends an automatic cuff monitor that fits on your upper arm (bicep). The cuff should fit your arm size. A cuff thats too large or too small will not give an accurate reading. Measure around your upper arm to find your size.  Monitors that attach to your finger or wrist are not as accurate as monitors for your upper arm.  Ask your healthcare provider for help in choosing a monitor. Bring your monitor to your next provider visit if you need help in using it the correct way.  The steps below are general instructions for using an automatic digital monitor.  Step 1. Relax    · Take your blood pressure at the same time every day, such as in the morning or evening, or at the time your healthcare provider recommends.  · Wait at least a half-hour after smoking, eating, or exercising. Don't drink coffee, tea, soda, or other caffeinated beverages before checking your blood pressure.  · Sit comfortably at a table with both feet on the floor. Do not cross your legs or feet. Place the monitor near you.  · Rest for a few minutes before you begin.  Step 2. Wrap the cuff    · Place your arm on the table, palm up. Your arm should be at the level of your heart. Wrap the cuff around your upper arm, just above your elbow. Its best done on bare skin, not over clothing. Most cuffs will indicate where the brachial artery (the  blood vessel in the middle of the arm at the inner side of the elbow) should line up with the cuff. Look in your monitor's instruction booklet for an illustration. You can also bring your cuff to your healthcare provider and have them show you how to correctly place the cuff.  Step 3. Inflate the cuff    · Push the button that starts the pump.  · The cuff will tighten, then loosen.  · The numbers will change. When they stop changing, your blood pressure reading will appear.  · Take 2 or 3 readings one minute apart.  Step 4. Write down the results of each reading    · Write down your blood pressure numbers for each reading. Note the date and time. Keep your results in one place, such as a notebook. Even if your monitor has a built-in memory, keep a hard copy of the readings.  · Remove the cuff from your arm. Turn off the machine.  · Bring your blood pressure records with your healthcare providers at each visit.  · If you start a new blood pressure medicine, note the day you started the new medicine. Also note the day if you change the dose of your medicine. This information goes on your blood pressure recording sheet. This will help your healthcare provider monitor how well the medicine changes are working.  · Ask your healthcare provider what numbers should prompt you to call him or her. Also ask what numbers should prompt you to get help right away.  Date Last Reviewed: 11/1/2016 © 2000-2017 [employer]. [address] [place] [address]. All rights reserved. This information is not intended as a substitute for professional medical care. Always follow your healthcare professional's instructions.        Degenerative Disk Disease    Spinal disks are gel-filled cushions between the bones, or vertebrae, of the spine. The disks act like shock absorbers. Over time, the disks may break down. This is called degenerative disk disease.  This condition can affect the neck or back. It is one of the most  common causes of low back pain. It is the leading cause of disability in people under age 45 in the [place]. The pain often remains localized to the lower back or neck. Muscle spasm is often present and adds to the pain.  Disk degeneration is a natural part of aging. But it is not painful for most people. It may also occur as a result of repeated minor injuries due to daily activities, sports, or accidents. It may lead to osteoarthritis of the spine. Back pain related to disk disease may come and go. Or it may become chronic and last for months or years. The disk may bulge or rupture. This is called a slipped disk or herniated disk. That can put pressure on a nearby spinal nerve and cause neck or back pain that spreads down one arm or leg.  X-rays or an MRI may help to diagnose this condition. For acute pain, treatment includes anti-inflammatory medicines, muscle relaxants, rest, ice, or heat. Strong prescription pain medicines, called opioids, may be needed for short-term treatment if pain suddenly gets worse. Opioid medicines can be addictive. So they are not advised for long-term pain management. Other types of medicines are preferred. Surgery is generally not used to treat this condition unless there is a complication.    Home care  · For neck pain: Use a comfortable pillow that supports the head and keeps the spine in a neutral position. Your head should not be tilted forward or backward.  · For back pain: Avoid sitting for long periods of time. This puts more stress on the lower back than standing or walking. Starting a regular exercise program to strengthen the supporting muscles of the spine will make it easier to live with degenerative disk disease.  · Apply an ice pack over the injured area for no more than 15 to 20 minutes. Do this every 3 to 6 hours for the first 24 to 48 hours. To make an ice pack, put ice cubes in a plastic bag that seals at the top. Wrap the bag in a clean, thin towel or cloth.  Never put ice or an ice pack directly on the skin. Keep using ice packs to ease pain and swelling as needed. After 48 hours, apply heat (warm shower or warm bath) for 20 minutes several times a day, or switch between ice and heat.   · You may use over-the-counter pain medicine to control pain, unless another pain medicine was prescribed. If you have chronic liver or kidney disease or ever had a stomach ulcer or GI bleeding, talk with your provider before using these medicines.  Follow-up care  Follow up with your healthcare provider, or as directed.  If X-rays, a CT scan or an MRI were done, you will be notified of any new findings that may affect your care.  When to seek medical advice  Contact your healthcare provider right away if any of these occur:  · Increasing back pain  · Your foot drags when you walk, a condition called foot drop  · You have new weakness, numbness, or pain in one or both arms or legs  · Loss of bowel or bladder control  · Numbness or tingling in the buttock or groin area  Date Last Reviewed: 11/23/2015  © 4655-5575 [employer]. [address] [place] [address]. All rights reserved. This information is not intended as a substitute for professional medical care. Always follow your healthcare professional's instructions.        Constipation (Adult)  Constipation means that you have bowel movements that are less frequent than usual. Stools often become very hard and difficult to pass.  Constipation is very common. At some point in life it affects almost everyone. Since everyone's bowel habits are different, what is constipation to one person may not be to another. Your healthcare provider may do tests to diagnose constipation. It depends on what he or she finds when evaluating you.    Symptoms of constipation include:  · Abdominal pain  · Bloating  · Vomiting  · Painful bowel movements  · Itching, swelling, bleeding, or pain around the anus  Causes  Constipation can have  many causes. These include:  · Diet low in fiber  · Too much dairy  · Not drinking enough liquids  · Lack of exercise or physical activity. This is especially true for older adults.  · Changes in lifestyle or daily routine, including pregnancy, aging, work, and travel  · Frequent use or misuse of laxatives  · Ignoring the urge to have a bowel movement or delaying it until later  · Medicines, such as certain prescription pain medicines, iron supplements, antacids, certain antidepressants, and calcium supplements  · Diseases like irritable bowel syndrome, bowel obstructions, stroke, diabetes, thyroid disease, Parkinson disease, hemorrhoids, and colon cancer  Complications  Potential complications of constipation can include:  · Hemorrhoids  · Rectal bleeding from hemorrhoids or anal fissures (skin tears)  · Hernias  · Dependency on laxatives  · Chronic constipation  · Fecal impaction  · Bowel obstruction or perforation  Home care  All treatment should be done after talking with your healthcare provider. This is especially true if you have another medical problems, are taking prescription medicines, or are an older adult. Treatment most often involves lifestyle changes. You may also need medicines. Your healthcare provider will tell you which will work best for you. Follow the advice below to help avoid this problem in the future.  Lifestyle changes  These lifestyle changes can help prevent constipation:  · Diet. Eat a high-fiber diet, with fresh fruit and vegetables, and reduce dairy intake, meats, and processed foods  · Fluids. It's important to get enough fluids each day. Drink plenty of water when you eat more fiber. If you are on diet that limits the amount of fluid you can have, talk about this with your healthcare provider.  · Regular exercise. Check with your healthcare provider first.  Medications  Take any medicines as directed. Some laxatives are safe to use only every now and then. Others can be taken on a  regular basis. Talk with your doctor or pharmacist if you have questions.  Prescription pain medicines can cause constipation. If you are taking this kind of medicine, ask your healthcare provider if you should also take a stool softener.  Medicines you may take to treat constipation include:  · Fiber supplements  · Stool softeners  · Laxatives  · Enemas  · Rectal suppositories  Follow-up care  Follow up with your healthcare provider if symptoms don't get better in the next few days. You may need to have more tests or see a specialist.  Call 911  Call 911 if any of these occur:  · Trouble breathing  · Stiff, rigid abdomen that is severely painful to touch  · Confusion  · Fainting or loss of consciousness  · Rapid heart rate  · Chest pain  When to seek medical advice  Call your healthcare provider right away if any of these occur:  · Fever over 100.4°F (38°C)  · Failure to resume normal bowel movements  · Pain in your abdomen or back gets worse  · Nausea or vomiting  · Swelling in your abdomen  · Blood in the stool  · Black, tarry stool  · Involuntary weight loss  · Weakness  Date Last Reviewed: 12/30/2015  © 8859-1460 [employer]. [address] [place] [address]. All rights reserved. This information is not intended as a substitute for professional medical care. Always follow your healthcare professional's instructions.

## 2017-11-28 NOTE — PROGRESS NOTES
Subjective:       Patient ID: Roosevelt Alejandro is a 72 y.o. male.    Chief Complaint: Hypertension    He feels fatigue and has been constipated, Vague sx, no fevers, no cough, no rashes.      Hypertension   The problem has been waxing and waning since onset. The problem is controlled. Associated symptoms include malaise/fatigue and peripheral edema. Pertinent negatives include no chest pain, headaches, palpitations or shortness of breath. There are no associated agents to hypertension. Risk factors for coronary artery disease include male gender. Past treatments include lifestyle changes. The current treatment provides moderate improvement. There are no compliance problems.      Review of Systems   Constitutional: Positive for fatigue and malaise/fatigue. Negative for unexpected weight change.   Respiratory: Negative for chest tightness and shortness of breath.    Cardiovascular: Negative for chest pain, palpitations and leg swelling.   Gastrointestinal: Negative for abdominal pain.   Musculoskeletal: Positive for arthralgias.   Neurological: Negative for dizziness, syncope, light-headedness and headaches.       Patient Active Problem List   Diagnosis    Hypertension    BPH (benign prostatic hyperplasia)    HTN (hypertension)    DDD (degenerative disc disease), lumbar    Chronic allergic rhinitis    ACE inhibitor-aggravated angioedema    Pulmonary asbestosis    H/O arteriovenous malformation (AVM)       Objective:      Physical Exam   Constitutional: He is oriented to person, place, and time. He appears well-developed and well-nourished. No distress.   HENT:   Head: Normocephalic and atraumatic.   Right Ear: External ear normal.   Left Ear: External ear normal.   Nose: Nose normal.   Mouth/Throat: Oropharynx is clear and moist. No oropharyngeal exudate.   Eyes: Conjunctivae and EOM are normal. Pupils are equal, round, and reactive to light. Right eye exhibits no discharge. Left eye exhibits no discharge. No  scleral icterus.   Neck: Normal range of motion. Neck supple. No JVD present. No tracheal deviation present. No thyromegaly present.   Cardiovascular: Normal rate, normal heart sounds and intact distal pulses.    No murmur heard.  Pulmonary/Chest: Effort normal and breath sounds normal. No respiratory distress. He has no wheezes. He has no rales.   Abdominal: Soft. Bowel sounds are normal. He exhibits no distension and no mass. There is no tenderness. There is no rebound and no guarding.   Musculoskeletal: He exhibits no edema or tenderness.          Lymphadenopathy:     He has no cervical adenopathy.   Neurological: He is alert and oriented to person, place, and time. No cranial nerve deficit. Coordination normal.   Skin: Skin is warm and dry. No rash noted. He is not diaphoretic. No erythema. No pallor.   Psychiatric: He has a normal mood and affect. His behavior is normal. Judgment and thought content normal.   Vitals reviewed.      Lab Results   Component Value Date    WBC 7.80 10/21/2016    HGB 13.9 (L) 10/21/2016    HCT 40.9 10/21/2016     10/21/2016    CHOL 195 10/16/2015    TRIG 110 10/16/2015    HDL 39 (L) 10/16/2015    ALT 18 02/10/2017    AST 18 02/10/2017     02/10/2017    K 4.2 02/10/2017     02/10/2017    CREATININE 1.3 02/10/2017    BUN 20 02/10/2017    CO2 29 02/10/2017    PSA 5.7 (H) 10/25/2013    GLUF 109 07/22/2004    HGBA1C 5.9 01/07/2011     The 10-year ASCVD risk score ([place] ADAM Jr., et al., 2013) is: 29.8%    Values used to calculate the score:      Age: 72 years      Sex: Male      Is Non- : No      Diabetic: No      Tobacco smoker: No      Systolic Blood Pressure: 136 mmHg      Is BP treated: Yes      HDL Cholesterol: 39 mg/dL      Total Cholesterol: 195 mg/dL  CXR clear A/P, ECG NSR.  Assessment:       1. Essential hypertension    2. Pulmonary asbestosis        Plan:       Essential hypertension  -     CBC auto differential; Future; Expected date:  11/28/2017  -     URINALYSIS; Future; Expected date: 11/28/2017  -     Basic metabolic panel; Future; Expected date: 11/28/2017  -     IN OFFICE EKG 12-LEAD (to Muse)  -     NIFEdipine (PROCARDIA-XL) 30 MG (OSM) 24 hr tablet; Take 1 tablet (30 mg total) by mouth every evening.  Dispense: 90 tablet; Refill: 3    Pulmonary asbestosis  -     X-Ray Chest PA And Lateral; Future; Expected date: 11/28/2017      Patient readiness: acceptance and barriers:readiness    During the course of the visit the patient was educated and counseled about the following:     Diabetes:  Discussed general issues about diabetes pathophysiology and management.    Goals: Hypertension: Reduce Blood Pressure and Obesity: Reduce calorie intake and BMI    Did patient meet goals/outcomes: Yes    The following self management tools provided: blood pressure log  excercise log    Patient Instructions (the written plan) was given to the patient/family.     Time spent with patient: 30 minutes          This has been fully explained to the patient, who indicates understanding.

## 2017-11-29 ENCOUNTER — LAB VISIT (OUTPATIENT)
Dept: LAB | Facility: HOSPITAL | Age: 72
End: 2017-11-29
Attending: FAMILY MEDICINE
Payer: MEDICARE

## 2017-11-29 DIAGNOSIS — I10 ESSENTIAL HYPERTENSION: ICD-10-CM

## 2017-11-29 LAB
ANION GAP SERPL CALC-SCNC: 10 MMOL/L
BASOPHILS # BLD AUTO: 0.03 K/UL
BASOPHILS NFR BLD: 0.4 %
BUN SERPL-MCNC: 20 MG/DL
CALCIUM SERPL-MCNC: 9.1 MG/DL
CHLORIDE SERPL-SCNC: 107 MMOL/L
CO2 SERPL-SCNC: 25 MMOL/L
CREAT SERPL-MCNC: 1.2 MG/DL
DIFFERENTIAL METHOD: ABNORMAL
EOSINOPHIL # BLD AUTO: 0.2 K/UL
EOSINOPHIL NFR BLD: 2.5 %
ERYTHROCYTE [DISTWIDTH] IN BLOOD BY AUTOMATED COUNT: 12.4 %
EST. GFR  (AFRICAN AMERICAN): >60 ML/MIN/1.73 M^2
EST. GFR  (NON AFRICAN AMERICAN): 59.6 ML/MIN/1.73 M^2
GLUCOSE SERPL-MCNC: 100 MG/DL
HCT VFR BLD AUTO: 38.3 %
HGB BLD-MCNC: 12.9 G/DL
LYMPHOCYTES # BLD AUTO: 1.5 K/UL
LYMPHOCYTES NFR BLD: 17.2 %
MCH RBC QN AUTO: 32.3 PG
MCHC RBC AUTO-ENTMCNC: 33.7 G/DL
MCV RBC AUTO: 96 FL
MONOCYTES # BLD AUTO: 0.7 K/UL
MONOCYTES NFR BLD: 7.7 %
NEUTROPHILS # BLD AUTO: 6.1 K/UL
NEUTROPHILS NFR BLD: 72.2 %
PLATELET # BLD AUTO: 225 K/UL
PMV BLD AUTO: 10.4 FL
POTASSIUM SERPL-SCNC: 4 MMOL/L
RBC # BLD AUTO: 3.99 M/UL
SODIUM SERPL-SCNC: 142 MMOL/L
WBC # BLD AUTO: 8.42 K/UL

## 2017-11-29 PROCEDURE — 80048 BASIC METABOLIC PNL TOTAL CA: CPT

## 2017-11-29 PROCEDURE — 36415 COLL VENOUS BLD VENIPUNCTURE: CPT | Mod: PO

## 2017-11-29 PROCEDURE — 85025 COMPLETE CBC W/AUTO DIFF WBC: CPT | Mod: PO

## 2017-12-02 ENCOUNTER — HOSPITAL ENCOUNTER (EMERGENCY)
Facility: HOSPITAL | Age: 72
Discharge: HOME | End: 2017-12-02
Attending: EMERGENCY MEDICINE
Payer: MEDICARE

## 2017-12-02 VITALS
HEART RATE: 91 BPM | TEMPERATURE: 97 F | SYSTOLIC BLOOD PRESSURE: 182 MMHG | RESPIRATION RATE: 16 BRPM | BODY MASS INDEX: 27.59 KG/M2 | HEIGHT: 74 IN | OXYGEN SATURATION: 97 % | DIASTOLIC BLOOD PRESSURE: 86 MMHG | WEIGHT: 215 LBS

## 2017-12-02 DIAGNOSIS — R33.8 URINARY RETENTION DUE TO BENIGN PROSTATIC HYPERPLASIA: Primary | ICD-10-CM

## 2017-12-02 DIAGNOSIS — N40.1 URINARY RETENTION DUE TO BENIGN PROSTATIC HYPERPLASIA: Primary | ICD-10-CM

## 2017-12-02 LAB
BILIRUB UR QL STRIP: NEGATIVE
CLARITY UR: CLEAR
COLOR UR: YELLOW
GLUCOSE UR QL STRIP: NEGATIVE
HGB UR QL STRIP: NEGATIVE
KETONES UR QL STRIP: NEGATIVE
LEUKOCYTE ESTERASE UR QL STRIP: NEGATIVE
NITRITE UR QL STRIP: NEGATIVE
PH UR STRIP: 6 [PH] (ref 5–8)
PROT UR QL STRIP: NEGATIVE
SP GR UR STRIP: 1.01 (ref 1–1.03)
URN SPEC COLLECT METH UR: NORMAL
UROBILINOGEN UR STRIP-ACNC: NEGATIVE EU/DL

## 2017-12-02 PROCEDURE — 81003 URINALYSIS AUTO W/O SCOPE: CPT

## 2017-12-02 PROCEDURE — 99284 EMERGENCY DEPT VISIT MOD MDM: CPT | Mod: 25

## 2017-12-02 PROCEDURE — 25000003 PHARM REV CODE 250: Performed by: EMERGENCY MEDICINE

## 2017-12-02 PROCEDURE — 51702 INSERT TEMP BLADDER CATH: CPT

## 2017-12-02 RX ORDER — LIDOCAINE HYDROCHLORIDE 20 MG/ML
JELLY TOPICAL
Status: COMPLETED | OUTPATIENT
Start: 2017-12-02 | End: 2017-12-02

## 2017-12-02 RX ORDER — LIDOCAINE HYDROCHLORIDE 20 MG/ML
JELLY TOPICAL
Status: DISCONTINUED
Start: 2017-12-02 | End: 2017-12-02 | Stop reason: HOSPADM

## 2017-12-02 RX ADMIN — LIDOCAINE HYDROCHLORIDE 10 ML: 20 JELLY TOPICAL at 03:12

## 2017-12-02 NOTE — ED PROVIDER NOTES
Chief complaint:  Urinary Retention (pt reports only able to urinate sm drops since noon today)      HPI:  Roosevelt Alejandro is a 72 y.o. male presenting with gradual onset of difficulty urinating marked by lower abdominal distention and discomfort.  He has history of BPH but denies significant difficulty urinating similar to this in the past.  No recent dysuria or hematuria.  No fever or emesis.  He denies new medications.  No rectal pain.    ROS: As per HPI and below:  No fever, dysuria, hematuria, numbness, weakness.    Review of patient's allergies indicates:   Allergen Reactions    Aspirin Other (See Comments)     Internal bleeding    Lisinopril (bulk)     Sulfa (sulfonamide antibiotics) Swelling and Rash       Discharge Medication List as of 12/2/2017  4:11 AM      CONTINUE these medications which have NOT CHANGED    Details   NIFEdipine (PROCARDIA-XL) 30 MG (OSM) 24 hr tablet Take 1 tablet (30 mg total) by mouth every evening., Starting Tue 11/28/2017, Until Wed 11/28/2018, Normal      FOLIC ACID/MULTIVIT-MIN/LUTEIN (CENTRUM SILVER ORAL) Take by mouth., Historical Med      glucosamine-chondroitin 500-400 mg tablet Take 1 tablet by mouth 3 (three) times daily., Historical Med      omega-3 acid ethyl esters (LOVAZA) 1 gram capsule Take 2 g by mouth 2 (two) times daily., Historical Med             PMH:  As per HPI and below:  Past Medical History:   Diagnosis Date    Arthritis     back pain    Asbestosis     BPH (benign prostatic hyperplasia)     Hypertension      Past Surgical History:   Procedure Laterality Date    COLONOSCOPY      2009    COLONOSCOPY N/A 1/21/2016    Procedure: COLONOSCOPY;  Surgeon: Toby Maciel MD;  Location: [facility];  Service: Endoscopy;  Laterality: N/A;    EYE SURGERY      cataracts, retinal detachment    PROSTATE BIOPSY      ROTATOR CUFF REPAIR      left       Social History     Social History    Marital status:      Spouse name: N/A    Number of children: N/A     Years of education: N/A     Social History Main Topics    Smoking status: Former Smoker     Quit date: 3/30/2000    Smokeless tobacco: Never Used    Alcohol use 0.6 oz/week     1 Cans of beer per week      Comment: rare    Drug use: No    Sexual activity: Yes     Partners: Female     Other Topics Concern    None     Social History Narrative    None       Family History   Problem Relation Age of Onset    Cancer Father      lung/ asbestos    Melanoma Neg Hx     Psoriasis Neg Hx     Lupus Neg Hx     Eczema Neg Hx        Physical Exam:    Vitals:    12/02/17 0334   BP: (!) 182/86   Pulse: 91   Resp: 16   Temp: 97 °F (36.1 °C)     GENERAL:  No apparent distress.  Alert.    HEENT:  Moist mucous membranes.  Normocephalic and atraumatic.    NECK:  No swelling.  Midline trachea.   CARDIOVASCULAR:  Regular rate and rhythm.  2+ radial pulses.    PULMONARY:  Lungs clear to auscultation bilaterally.  No wheezes, rales, or rhonci.    ABDOMEN:  Mild lower abdominal distention with mild bilateral lower quadrant tenderness without guarding.  EXTREMITIES:  Warm and well perfused.  Brisk capillary refill.  No peripheral edema.  NEUROLOGICAL:  Normal mental status.  Appropriate and conversant.  5/5 strength and sensation.  Normal gait.    SKIN:  No rashes or ecchymoses.    BACK:  Atraumatic.  No CVA tenderness to palpation.      Labs Reviewed   URINALYSIS       Discharge Medication List as of 12/2/2017  4:11 AM      CONTINUE these medications which have NOT CHANGED    Details   NIFEdipine (PROCARDIA-XL) 30 MG (OSM) 24 hr tablet Take 1 tablet (30 mg total) by mouth every evening., Starting Tue 11/28/2017, Until Wed 11/28/2018, Normal      FOLIC ACID/MULTIVIT-MIN/LUTEIN (CENTRUM SILVER ORAL) Take by mouth., Historical Med      glucosamine-chondroitin 500-400 mg tablet Take 1 tablet by mouth 3 (three) times daily., Historical Med      omega-3 acid ethyl esters (LOVAZA) 1 gram capsule Take 2 g by mouth 2 (two) times daily.,  Historical Med             Orders Placed This Encounter   Procedures    Urinalysis    Ramires to Wood    Ramires Catheter Care every 12 hours    Nurse to discontinue ramires when patient no longer meets criteria    Post Ramires Catheter Removal Protocol       Imaging Results    None         ED Course        MDM:    72 y.o. male with urinary retention relieved by Ramires catheter insertion with patient asymptomatic following insertion with no further abdominal pain or fullness.  Low suspicion for other emergent intra-abdominal process.  Urinalysis is not consistent with infection.  I do not think antibiotics are indicated.  Ramires catheter to remain in place pending outpatient urology follow-up.  I suspect etiology secondary to BPH.  I do not think other labs are indicated.  I doubt acute kidney injury.  Return precautions reviewed.    Diagnoses:    1. Urinary retention     Gaston Owen MD  12/02/17 0433

## 2017-12-02 NOTE — ED NOTES
Patient identifiers for Roosevelt Alejandro checked and correct.  LOC:  Patient is awake, alert, and aware of environment with an appropriate affect. Patient is oriented x 3 and speaking appropriately.  APPEARANCE:  Patient is slightly uncomfortable and in mild distress. Patient is clean and well groomed, patient's clothing is properly fastened.  SKIN:  The skin is warm and dry. Patient has normal skin turgor and moist mucus membranes. Skin is intact; no bruising or breakdown noted.  MUSCULOSKELETAL:  Patient is moving all extremities well, no obvious deformities noted. Pulses intact.   RESPIRATORY:  Airway is open and patent. Respirations are spontaneous and non-labored with normal effort and rate.  CARDIAC:  Patient has a normal rate and rhythm. No peripheral edema noted. Capillary refill < 3 seconds.  ABDOMEN:  No distention noted but distended over bladder and tympanic with percussion.   NEUROLOGICAL:  PERRL. Facial expression is symmetrical.   Allergies reported:    Review of patient's allergies indicates:   Allergen Reactions    Aspirin Other (See Comments)     Internal bleeding    Lisinopril (bulk)     Sulfa (sulfonamide antibiotics) Swelling and Rash     OTHER NOTES:  Patient here with urinary retention, has voided only scant amount since noon today.

## 2017-12-04 ENCOUNTER — TELEPHONE (OUTPATIENT)
Dept: UROLOGY | Facility: CLINIC | Age: 72
End: 2017-12-04

## 2017-12-04 NOTE — TELEPHONE ENCOUNTER
Returned call. Pt was given a weeks worth of flomax. PA needs to be done. Will notify pt when approved.

## 2017-12-04 NOTE — TELEPHONE ENCOUNTER
----- Message from Chandrika Sanchez sent at 12/4/2017 11:09 AM CST -----  Contact: Wife Edith  Patients wife is requesting the prior authorization of his flowmax get sent insurance asap.  He only has a couple and then he out so please take care of this right away.  Please call [phone] (home).  Thanks    [place] [address] [facility], [place] [address] [place] [address] [address] [facility] [address]  Phone: [phone] Fax: [phone]    [facility] [facility] [place], [place] - [address] [place] [address] [address] [facility] [address]  Phone: [phone] Fax: [phone]

## 2017-12-04 NOTE — TELEPHONE ENCOUNTER
----- Message from So Branham sent at 12/4/2017  8:12 AM CST -----  Contact: wife, Edith Alejandro  Patient needs same day appointment due to remove catherta. Please call patient's wife, Edith Alejandro at [phone]. Thanks!

## 2017-12-04 NOTE — TELEPHONE ENCOUNTER
Spoke with pt/wife. Pt was Saturday in the [facility] due to urinary retention. Pt had ramires cath put in with leg bag. and sent home. Pt to call and have f/u with urology. Pt was to call and make appt with Dr Abdi. Wife called this morning and pt had koolaid colored red urine in leg bag. Ramires cath to stay in until appt. Pt scheduled for 12/11/17. Pt to empty leg bag and drink plenty of water daily.  Pt to call if bleeding gets thicker and develops clots. Pt to take Flomax daily. Flomax 0.4mg po daily to be called into pharmacy. Wife verbalized understanding.

## 2017-12-07 ENCOUNTER — HOSPITAL ENCOUNTER (EMERGENCY)
Facility: HOSPITAL | Age: 72
Discharge: HOME | End: 2017-12-08
Attending: EMERGENCY MEDICINE
Payer: MEDICARE

## 2017-12-07 DIAGNOSIS — N39.0 COMPLICATED UTI (URINARY TRACT INFECTION): ICD-10-CM

## 2017-12-07 DIAGNOSIS — R31.9 HEMATURIA, UNSPECIFIED TYPE: Primary | ICD-10-CM

## 2017-12-07 LAB
AMORPH CRY URNS QL MICRO: ABNORMAL
BACTERIA #/AREA URNS HPF: ABNORMAL /HPF
BILIRUB UR QL STRIP: ABNORMAL
CLARITY UR: ABNORMAL
COLOR UR: ABNORMAL
GLUCOSE UR QL STRIP: ABNORMAL
HGB UR QL STRIP: ABNORMAL
HYALINE CASTS #/AREA URNS LPF: 0 /LPF
KETONES UR QL STRIP: ABNORMAL
LEUKOCYTE ESTERASE UR QL STRIP: ABNORMAL
MICROSCOPIC COMMENT: ABNORMAL
NITRITE UR QL STRIP: ABNORMAL
PH UR STRIP: ABNORMAL [PH] (ref 5–8)
PROT UR QL STRIP: ABNORMAL
RBC #/AREA URNS HPF: >100 /HPF (ref 0–4)
SP GR UR STRIP: ABNORMAL (ref 1–1.03)
SQUAMOUS #/AREA URNS HPF: 1 /HPF
URN SPEC COLLECT METH UR: ABNORMAL
UROBILINOGEN UR STRIP-ACNC: ABNORMAL EU/DL
WBC #/AREA URNS HPF: 20 /HPF (ref 0–5)

## 2017-12-07 PROCEDURE — 25000003 PHARM REV CODE 250

## 2017-12-07 PROCEDURE — 99283 EMERGENCY DEPT VISIT LOW MDM: CPT | Mod: 25

## 2017-12-07 PROCEDURE — 51702 INSERT TEMP BLADDER CATH: CPT

## 2017-12-07 PROCEDURE — 87086 URINE CULTURE/COLONY COUNT: CPT

## 2017-12-07 PROCEDURE — 81000 URINALYSIS NONAUTO W/SCOPE: CPT

## 2017-12-07 RX ORDER — LIDOCAINE HYDROCHLORIDE 20 MG/ML
JELLY TOPICAL
Status: COMPLETED
Start: 2017-12-07 | End: 2017-12-07

## 2017-12-07 RX ADMIN — LIDOCAINE HYDROCHLORIDE 10 ML: 20 JELLY TOPICAL at 10:12

## 2017-12-08 ENCOUNTER — TELEPHONE (OUTPATIENT)
Dept: FAMILY MEDICINE | Facility: CLINIC | Age: 72
End: 2017-12-08

## 2017-12-08 VITALS
HEART RATE: 80 BPM | OXYGEN SATURATION: 97 % | HEIGHT: 74 IN | SYSTOLIC BLOOD PRESSURE: 165 MMHG | RESPIRATION RATE: 16 BRPM | TEMPERATURE: 99 F | BODY MASS INDEX: 26.56 KG/M2 | DIASTOLIC BLOOD PRESSURE: 81 MMHG | WEIGHT: 207 LBS

## 2017-12-08 PROCEDURE — 96365 THER/PROPH/DIAG IV INF INIT: CPT

## 2017-12-08 PROCEDURE — 63600175 PHARM REV CODE 636 W HCPCS: Performed by: EMERGENCY MEDICINE

## 2017-12-08 PROCEDURE — 25000003 PHARM REV CODE 250: Performed by: EMERGENCY MEDICINE

## 2017-12-08 RX ORDER — CEPHALEXIN 500 MG/1
500 CAPSULE ORAL 4 TIMES DAILY
Qty: 20 CAPSULE | Refills: 0 | Status: SHIPPED | OUTPATIENT
Start: 2017-12-08 | End: 2017-12-13

## 2017-12-08 RX ADMIN — CEFTRIAXONE 1 G: 1 INJECTION, POWDER, FOR SOLUTION INTRAMUSCULAR; INTRAVENOUS at 12:12

## 2017-12-08 NOTE — ED NOTES
Patient identifiers for Roosevelt Alejandro checked and correct.  LOC:  Patient is awake, alert, and aware of environment with an appropriate affect. Patient is oriented x 3 and speaking appropriately.  APPEARANCE:  Patient resting comfortably and in no acute distress. Patient is clean and well groomed, patient's clothing is properly fastened.  SKIN:  The skin is warm and dry. Patient has normal skin turgor and moist mucus membranes. Skin is intact; no bruising or breakdown noted.  MUSCULOSKELETAL:  Patient is moving all extremities well, no obvious deformities noted. Pulses intact.   RESPIRATORY:  Airway is open and patent. Respirations are spontaneous and non-labored with normal effort and rate.  CARDIAC:  Patient has a normal rate and rhythm. No peripheral edema noted. Capillary refill < 3 seconds.  ABDOMEN:  No distention noted.  Soft and non-tender upon palpation.  NEUROLOGICAL:  PERRL. Facial expression is symmetrical. Hand grasps are equal bilaterally. Normal sensation in all extremities when touched with finger.  Allergies reported:    Review of patient's allergies indicates:   Allergen Reactions    Aspirin Other (See Comments)     Internal bleeding    Lisinopril (bulk)     Sulfa (sulfonamide antibiotics) Swelling and Rash     OTHER NOTES:  Pt reports passing blood in catheter tonight.

## 2017-12-08 NOTE — ED NOTES
Light red colored urine noted in ramires tubing. Ramires irrigated with 240cc sterile water. 1 small stringy blood clot noted upon return.  Clear return after 240cc irrigation. Will continue to monitor.

## 2017-12-08 NOTE — ED NOTES
200 cc pink colored urine emptied from ramires drainage bag with small blood clot noted. Resp even and unlabored. Family at bedside. Will continue to monitor.

## 2017-12-08 NOTE — TELEPHONE ENCOUNTER
----- Message from Bryce Henry sent at 12/8/2017  9:05 AM CST -----  Contact: Wife- Edith  Calling to change holter and Dr. Johnson appointment for week after next. She said he has not been feeling well. He had a catheter put in at ER and went in again last night for a clot in the urine bag. He does not want to take any more time from work. Please [phone] (home)   Thanks!

## 2017-12-08 NOTE — ED PROVIDER NOTES
Encounter Date: 12/7/2017    SCRIBE #1 NOTE: I, Tri Cervantes, am scribing for, and in the presence of, Dr. Keenan.       History     Chief Complaint   Patient presents with    Hematuria     pt has catheter and has been passing blood in urine x 2 hours; catheter placed last Saturday for urinary retention       12/07/2017  9:43 PM    Chief Complaint: Hematuria      The patient is a 72 y.o. male who presents with intermittent hematuria x a few hours with associated. Pt had ramires catheter placed 12/02/2017 for urinary retention. Pt has an appointment with Dr. Abdi in 4 days. Denies nausea, chills, hx of kidney stones. PMHx includes arthritis, asbestosis, BPH, and hypertension. PSHx includes colonoscopy, prostate biopsy, and colonoscopy.        The history is provided by the patient and the spouse.     Review of patient's allergies indicates:   Allergen Reactions    Aspirin Other (See Comments)     Internal bleeding    Lisinopril (bulk)     Sulfa (sulfonamide antibiotics) Swelling and Rash     Past Medical History:   Diagnosis Date    Arthritis     back pain    Asbestosis     BPH (benign prostatic hyperplasia)     Hypertension      Past Surgical History:   Procedure Laterality Date    COLONOSCOPY      2009    COLONOSCOPY N/A 1/21/2016    Procedure: COLONOSCOPY;  Surgeon: Toby Maciel MD;  Location: [facility];  Service: Endoscopy;  Laterality: N/A;    EYE SURGERY      cataracts, retinal detachment    PROSTATE BIOPSY      ROTATOR CUFF REPAIR      left     Family History   Problem Relation Age of Onset    Cancer Father      lung/ asbestos    Melanoma Neg Hx     Psoriasis Neg Hx     Lupus Neg Hx     Eczema Neg Hx      Social History   Substance Use Topics    Smoking status: Former Smoker     Quit date: 3/30/2000    Smokeless tobacco: Never Used    Alcohol use 0.6 oz/week     1 Cans of beer per week      Comment: rare     Review of Systems   Constitutional: Negative for chills and fever.   HENT:  Negative for congestion.    Eyes: Negative for visual disturbance.   Respiratory: Negative for wheezing.    Cardiovascular: Negative for chest pain.   Gastrointestinal: Negative for abdominal pain and nausea.   Genitourinary: Positive for hematuria. Negative for dysuria.   Musculoskeletal: Negative for joint swelling.   Skin: Negative for rash.   Neurological: Negative for syncope.   Hematological: Does not bruise/bleed easily.   Psychiatric/Behavioral: Negative for confusion.       Physical Exam     Initial Vitals [12/07/17 2132]   BP Pulse Resp Temp SpO2   (!) 174/80 97 16 99.1 °F (37.3 °C) 97 %      MAP       111.33         Physical Exam    Nursing note and vitals reviewed.  Constitutional: He appears well-nourished.   HENT:   Head: Normocephalic and atraumatic.   Eyes: Conjunctivae and EOM are normal.   Neck: Normal range of motion. Neck supple. No thyroid mass present.   Cardiovascular: Normal rate and regular rhythm.   Pulmonary/Chest: Breath sounds normal. No respiratory distress.   Abdominal: Soft. Normal appearance and bowel sounds are normal. There is no tenderness.   Genitourinary:   Genitourinary Comments: Zelaya catheter in place draining pink tinged urine.   Neurological: He is alert and oriented to person, place, and time. He has normal strength. No cranial nerve deficit or sensory deficit.   Skin: Skin is warm and dry. No rash noted. No erythema.   Psychiatric: He has a normal mood and affect. His speech is normal. Cognition and memory are normal.         ED Course   Procedures  Labs Reviewed   URINALYSIS - Abnormal; Notable for the following:        Result Value    Color, UA Red (*)     Appearance, UA Cloudy (*)     All other components within normal limits   URINALYSIS MICROSCOPIC - Abnormal; Notable for the following:     RBC, UA >100 (*)     WBC, UA 20 (*)     Amorphous, UA Many (*)     All other components within normal limits   CULTURE, URINE             Medical Decision Making:   History:   Old  Medical Records: I decided to obtain old medical records.  Initial Assessment:   This patient was interviewed and examined in the presence of family and is noted to be in no acute distress.  Vital signs are stable.  He denies recent fevers or past history of complicated urinary tract infections.  He denies ongoing pain and the Zelaya will be replaced to assess for appropriate drainage.  Urinalysis will be obtained to rule out evidence of infection as a contributing cause.  Clinical Tests:   Lab Tests: Reviewed and Ordered  ED Management:  Patient had an unremarkable period of ED observation and did not require bladder irrigation for improvement in hematuria.  Urinalysis indicates 20 RBCs with occasional bacteria.  No comment is made on the presence of leukocytes or nitrite.  I do think the patient warrants empiric coverage for underlying urinary tract infection and he was provided IV Rocephin.  He'll be discharged with a prescription for Keflex and will be asked to stay well-hydrated and to monitor his Zelaya output closely.  He is asked to return to the ER for any new, concerning, or worsening symptoms, or otherwise see his urologist, Dr. Abdi, at his appointment on Monday.  Patient was agreeable with this plan for follow-up and he was discharged in stable condition.            Scribe Attestation:   Scribe #1: I performed the above scribed service and the documentation accurately describes the services I performed. I attest to the accuracy of the note.    I, Dr. Richard Keenan, personally performed the services described in this documentation. All medical record entries made by the scribe were at my direction and in my presence.  I have reviewed the chart and agree that the record reflects my personal performance and is accurate and complete. Richard Keenan MD.  4:38 AM 12/08/2017          ED Course      Clinical Impression:     1. Hematuria, unspecified type    2. Complicated UTI (urinary tract infection)           Disposition:   Disposition: Discharged  Condition: Stable                        Richard Keenan MD  12/08/17 0438

## 2017-12-09 LAB — BACTERIA UR CULT: NO GROWTH

## 2017-12-10 NOTE — PROGRESS NOTES
[facility] Urology:    Roosevelt Alejandro is a 72 y.o. male who presents for evaluation of urinary retention.    He presented to the emergency department on 12/2/17 with progressive difficulty urinating and abdominal distention was found to be in urinary retention and a Zelaya catheter was placed. UA neg.  History of BPH, on Cardura 2 mg daily prior to ER visit without any voiding difficulty.  He was started on Flomax.  He did return to the ER on 12/7/17 secondary to gross hematuria per Zelaya.  Catheter was manually irrigated and noted to be draining clear pink, and he was discharged with Keflex. Ucx negative.  He did recently see Dr. Johnson on 11/28/17 for constipation. Cr 1.2 at that time with UA neg.    He has previously been followed by Dr. Carrera for elevated PSA and BPH, last seen in June 2015.  PSA 5/8/15 5.2 (34.8% free). Had a reportedly negative prostate biopsy but outside physician in 2012. Noted to be voiding well.  In terms of BPH, he is voiding well and does not need to be on any medication.  PSA 4.2 (37.6% free) 11/14, 4.0 (37.3% free) 5/14  PSA 7.98 on 02/20/13, which came down to 2.87 on 04/22/13 after 20d course of cipro  Biopsy was in [id number] was when psa reportedly 3.1.     He presents today noting that prior to emergency department presentation and catheter placement he did have an AUA symptom score of 12/5 (unhappy)  Individual symptom scores: 2: Emptying, frequency, urgency, weak stream, straining, nocturia; 0: Intermittency  Has been on Flomax since ER discharge and presents today for voiding trial and evaluation.  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  He also has a 40 year history of riding motorcycles  At approximately 10 PM on day of ER presentation, he reported he just couldn't urinate.  Volume of retention exactly unknown, though does note he medially filled up a couple of leg bags full  He is here today with his daughter Sarah, who is a nurse  [facility], just omega3.  Has had some resolution of his constipation        Past Medical History:   Diagnosis Date    Arthritis     back pain    Asbestosis     BPH (benign prostatic hyperplasia)     Hypertension        Past Surgical History:   Procedure Laterality Date    COLONOSCOPY      2009    COLONOSCOPY N/A 1/21/2016    Procedure: COLONOSCOPY;  Surgeon: Toby Maciel MD;  Location[facility];  Service: Endoscopy;  Laterality: N/A;    EYE SURGERY      cataracts, retinal detachment    PROSTATE BIOPSY      ROTATOR CUFF REPAIR      left       Family History   Problem Relation Age of Onset    Cancer Father      lung/ asbestos    Melanoma Neg Hx     Psoriasis Neg Hx     Lupus Neg Hx     Eczema Neg Hx        Social History     Social History    Marital status:      Spouse name: N/A    Number of children: N/A    Years of education: N/A     Occupational History    Not on file.     Social History Main Topics    Smoking status: Former Smoker     Quit date: 3/30/2000    Smokeless tobacco: Never Used    Alcohol use 0.6 oz/week     1 Cans of beer per week      Comment: rare    Drug use: No    Sexual activity: Yes     Partners: Female     Other Topics Concern    Not on file     Social History Narrative    No narrative on file       Review of patient's allergies indicates:   Allergen Reactions    Aspirin Other (See Comments)     Internal bleeding    Lisinopril (bulk)     Sulfa (sulfonamide antibiotics) Swelling and Rash       Medications Reviewed: see MAR    ROS:    Constitutional: denies fevers, chills,  "night sweats, fatigue, malaise  Respiratory: negative for cough, shortness of breath, wheezing, dyspnea.  Cardiovascular: + for high blood pressure, negative for chest pain, varicose veins, ankle swelling, palpitations, syncope.  GI: negative for abdominal pain, heartburn, indigestion, nausea, vomiting, constipation, diarrhea, blood in stool.   Urology: as noted above in HPI  Endocrinology: negative for cold intolerance, excessive thirst, not feeling tired/sluggish, no heat intolerance.   Hematology/Lymph: negative for easy bleeding, easy bruising, swollen glands.  Musculoskeletal: negative for back pain, joint pain, joint swelling, neck pain.  Allergy-Immunology: negative for seasonal allergies, negative for unusual infections.   Skin: negative for boils, breast lumps, hives, itching, rash.   Neurology: negative for, dizziness, headache, tingling/numbness, tremors.   Psych: satisfied with life; negative for, anxiety, depression, suicidal thoughts.     PHYSICAL EXAM:    Vitals:    12/11/17 0923   BP: 133/81   Pulse: 97   Resp: 18     Body mass index is 26.58 kg/m². Weight: 93.9 kg (207 lb 0.2 oz) Height: 6' 2" (188 cm)       General: Alert, cooperative, no distress, appears stated age  Head: Normocephalic, without obvious abnormality, atraumatic  Neck: no masses, no thyromegaly, no lymphadenopathy  Eyes: PERRL, conjunctiva/corneas clear  Lungs: Respirations unlabored, normal effort, no accessory muscle use  CV: Warm and well perfused extremities  Abdomen: Soft, non-tender, no CVA tenderness, no hepatosplenomegaly, no hernia  Penis: phallus normal, well cared for, no plaques or lesions.   Scrotum: no cysts, no lesions, no rash, no hydrocele.   Epididymes: normal, nontender, symmetrical, no masses or cysts.   Testes: normal, both descended, no masses.   Urethra: palpably normal with orthotopic meatus of normal size, 16 Tamazight Zelaya catheter draining clear yellow urine to leg bag   EDMUNDO: normal sphincter tone, no " masses, no hemmorrhoids   PROSTATE: 40g, no nodules, non-tender, symmetrical, round.   Extremities: Extremities normal, atraumatic, no cyanosis or edema  Skin: Normal color, texture, and turgor, no rashes or lesions  Psych: Appropriate, well oriented, normal affect, normal mood  Neuro: Non-focal    Lab Results   Component Value Date    PSA 5.7 (H) 10/25/2013    PSA 7.98 (H) 02/20/2013    PSA 5.57 (H) 05/05/2012       LABS:    Recent Results (from the past 336 hour(s))   Urinalysis Catheterized    Collection Time: 12/07/17 11:00 PM   Result Value Ref Range    Specimen UA Urine, Catheterized     Color, UA Red (A) Yellow, Straw, Afsaneh    Appearance, UA Cloudy (A) Clear    pH, UA SEE COMMENT 5.0 - 8.0    Specific Gravity, UA SEE COMMENT 1.005 - 1.030    Protein, UA SEE COMMENT Negative    Glucose, UA SEE COMMENT Negative    Ketones, UA SEE COMMENT Negative    Bilirubin (UA) SEE COMMENT Negative    Occult Blood UA SEE COMMENT Negative    Nitrite, UA SEE COMMENT Negative    Urobilinogen, UA SEE COMMENT <2.0 EU/dL    Leukocytes, UA SEE COMMENT Negative   Urine culture    Collection Time: 12/07/17 11:00 PM   Result Value Ref Range    Urine Culture, Routine No growth    Urinalysis Microscopic    Collection Time: 12/07/17 11:00 PM   Result Value Ref Range    RBC, UA >100 (H) 0 - 4 /hpf    WBC, UA 20 (H) 0 - 5 /hpf    Bacteria, UA Occasional None-Occ /hpf    Squam Epithel, UA 1 /hpf    Hyaline Casts, UA 0 0-1/lpf /lpf    Amorphous, UA Many (A) None-Moderate    Microscopic Comment SEE COMMENT    POCT Bladder Scan    Collection Time: 12/11/17  6:18 PM   Result Value Ref Range    POC Residual Urine Volume 173 (A) 0 - 100 mL         Assessment/Diagnosis:    1. Urinary retention  POCT Bladder Scan    Case Request Operating Room: TRANSRECTAL ULTRASOUND, CYSTOSCOPY    Place in Outpatient    Vital Signs    2. Gross hematuria  CT Urogram Abd Pelvis W WO       Plans:    As he had been on Flomax with Zelaya catheter decompression for  greater than 1 week and resolution of his constipation, did discuss voiding trial with patient.  Given history of advised fill and pull voiding trial to ensure he could urinate prior to leaving clinic.  We did discuss that even if he passed a voiding trial he should return with any difficulty urinating as this is a supraphysiologic filling.  He did however fail a voiding trial and after the bladder was filled with 180 cc through the Zelaya catheter, of which he could not tolerate more and urine began to reflux, he was only able to void 50 cc with a PVR of 1 73 cc and inability to avoid further.  The Zelaya catheter was then replaced.  He does have an enlarged prostate and is experiencing urinary retention.  He also has been experiencing gross hematuria.  We did discuss etiologies and workup of gross hematuria.  He does have a significant smoking history.  Discussed diagnostic flexible cystoscopy in detail with patient, which has been scheduled at the [facility] for 12/26/17.  Prior to cystoscopy he'll get a CT urogram to evaluate upper tracts.  Cystoscopy will not only evaluate bladder for lower tract sources of gross hematuria, but also level of prostatic obstruction, which I expect given his retention.  At the time of procedure, can also perform transrectal ultrasound to accurately measure prostate volume to assist in further treatment recommendations.  Should only prostatic obstruction be found, with no other concerning findings for gross hematuria, will likely need TURP but can discuss further at that time.  All questions answered at this time and patient and his daughter were agreeable to the treatment plan.

## 2017-12-11 ENCOUNTER — OFFICE VISIT (OUTPATIENT)
Dept: UROLOGY | Facility: CLINIC | Age: 72
End: 2017-12-11
Payer: MEDICARE

## 2017-12-11 VITALS
RESPIRATION RATE: 18 BRPM | HEIGHT: 74 IN | DIASTOLIC BLOOD PRESSURE: 81 MMHG | HEART RATE: 97 BPM | BODY MASS INDEX: 26.56 KG/M2 | SYSTOLIC BLOOD PRESSURE: 133 MMHG | WEIGHT: 207 LBS

## 2017-12-11 DIAGNOSIS — R31.0 GROSS HEMATURIA: ICD-10-CM

## 2017-12-11 DIAGNOSIS — R33.9 URINARY RETENTION: Primary | ICD-10-CM

## 2017-12-11 LAB — POC RESIDUAL URINE VOLUME: 173 ML (ref 0–100)

## 2017-12-11 PROCEDURE — 99999 PR PBB SHADOW E&M-EST. PATIENT-LVL IV: CPT | Mod: PBBFAC,,, | Performed by: UROLOGY

## 2017-12-11 PROCEDURE — 51700 IRRIGATION OF BLADDER: CPT | Mod: S$GLB,,, | Performed by: UROLOGY

## 2017-12-11 PROCEDURE — 99215 OFFICE O/P EST HI 40 MIN: CPT | Mod: 25,S$GLB,, | Performed by: UROLOGY

## 2017-12-11 PROCEDURE — 51798 US URINE CAPACITY MEASURE: CPT | Mod: S$GLB,,, | Performed by: UROLOGY

## 2017-12-11 RX ORDER — TAMSULOSIN HYDROCHLORIDE 0.4 MG/1
1 CAPSULE ORAL DAILY
Refills: 0 | COMMUNITY
Start: 2017-12-04 | End: 2017-12-28 | Stop reason: SDUPTHER

## 2017-12-11 NOTE — PROGRESS NOTES
Pt to have fill and pull done. Pt's leg bag disconnected. 180ml of sterile water inserted into Pt's bladder. Pt could not hold anymore. Pt's 16FR ramires cath with 10ml balloon d'cd. Pt urinated back 50ml of urine. Pt's PVR = 173. Another 16FR ramires cath with 10ml balloon and leg bag was placed into pt's bladder. 10ml of sterile water used to inflate balloon. Statlock in place.Pt tolerated procedure well. Ramires cath patent and draining clear yellow urine. Pt given night time bag and extra statlock.

## 2017-12-12 ENCOUNTER — TELEPHONE (OUTPATIENT)
Dept: FAMILY MEDICINE | Facility: CLINIC | Age: 72
End: 2017-12-12

## 2017-12-12 RX ORDER — LIDOCAINE HYDROCHLORIDE 20 MG/ML
JELLY TOPICAL ONCE
Status: CANCELLED | OUTPATIENT
Start: 2017-12-13 | End: 2017-12-13

## 2017-12-12 RX ORDER — GENTAMICIN SULFATE 40 MG/ML
80 INJECTION, SOLUTION INTRAMUSCULAR; INTRAVENOUS
Status: CANCELLED | OUTPATIENT
Start: 2017-12-26

## 2017-12-12 NOTE — TELEPHONE ENCOUNTER
----- Message from Flory Carter sent at 12/12/2017 10:12 AM CST -----  Patient needs to reschedule his 24 hr holter monitor that is scheduled for tomorrow. Please call back to reschedule at [phone].

## 2017-12-12 NOTE — TELEPHONE ENCOUNTER
Received message from patient requesting to reschedule 24 hour holter monitor that is scheduled for tomorrow 12-13-17.  Call placed to patient to assist with rescheduling. Appointment rescheduled for first available appointment on 1-9-18.  Patient's wife (Edith) states she was under the impression patient's appointment was rescheduled by someone previously to 1-2-18.  No appointment for holter monitor for that date. Writer advised Mrs. Sharma of above. Patient agreed to new rescheduled appointment on 1-9-18.

## 2017-12-18 ENCOUNTER — APPOINTMENT (OUTPATIENT)
Dept: LAB | Facility: HOSPITAL | Age: 72
End: 2017-12-18
Attending: UROLOGY
Payer: MEDICARE

## 2017-12-18 ENCOUNTER — OFFICE VISIT (OUTPATIENT)
Dept: UROLOGY | Facility: CLINIC | Age: 72
End: 2017-12-18
Payer: MEDICARE

## 2017-12-18 VITALS
BODY MASS INDEX: 26.56 KG/M2 | HEART RATE: 100 BPM | SYSTOLIC BLOOD PRESSURE: 136 MMHG | DIASTOLIC BLOOD PRESSURE: 80 MMHG | HEIGHT: 74 IN | WEIGHT: 207 LBS | RESPIRATION RATE: 18 BRPM

## 2017-12-18 DIAGNOSIS — R33.9 URINARY RETENTION: ICD-10-CM

## 2017-12-18 DIAGNOSIS — R31.0 GROSS HEMATURIA: Primary | ICD-10-CM

## 2017-12-18 PROCEDURE — 88305 TISSUE EXAM BY PATHOLOGIST: CPT | Performed by: PATHOLOGY

## 2017-12-18 PROCEDURE — 96372 THER/PROPH/DIAG INJ SC/IM: CPT | Mod: 59,S$GLB,, | Performed by: UROLOGY

## 2017-12-18 PROCEDURE — 88305 TISSUE EXAM BY PATHOLOGIST: CPT | Mod: 26,,, | Performed by: PATHOLOGY

## 2017-12-18 PROCEDURE — 99999 PR PBB SHADOW E&M-EST. PATIENT-LVL III: CPT | Mod: PBBFAC,,, | Performed by: UROLOGY

## 2017-12-18 PROCEDURE — 99215 OFFICE O/P EST HI 40 MIN: CPT | Mod: 25,S$GLB,, | Performed by: UROLOGY

## 2017-12-18 PROCEDURE — 51700 IRRIGATION OF BLADDER: CPT | Mod: S$GLB,,, | Performed by: UROLOGY

## 2017-12-18 RX ORDER — GENTAMICIN SULFATE 40 MG/ML
80 INJECTION, SOLUTION INTRAMUSCULAR; INTRAVENOUS
Status: DISCONTINUED | OUTPATIENT
Start: 2017-12-18 | End: 2017-12-18

## 2017-12-18 RX ADMIN — GENTAMICIN SULFATE 80 MG: 40 INJECTION, SOLUTION INTRAMUSCULAR; INTRAVENOUS at 03:12

## 2017-12-19 ENCOUNTER — TELEPHONE (OUTPATIENT)
Dept: UROLOGY | Facility: CLINIC | Age: 72
End: 2017-12-19

## 2017-12-19 ENCOUNTER — HOSPITAL ENCOUNTER (OUTPATIENT)
Facility: AMBULARY SURGERY CENTER | Age: 72
Discharge: HOME | End: 2017-12-19
Attending: UROLOGY | Admitting: UROLOGY
Payer: MEDICARE

## 2017-12-19 DIAGNOSIS — R33.8 URINARY RETENTION DUE TO BENIGN PROSTATIC HYPERPLASIA: ICD-10-CM

## 2017-12-19 DIAGNOSIS — R33.9 URINARY RETENTION: Primary | ICD-10-CM

## 2017-12-19 DIAGNOSIS — R33.9 URINARY RETENTION: ICD-10-CM

## 2017-12-19 DIAGNOSIS — R58 BLEEDING: ICD-10-CM

## 2017-12-19 DIAGNOSIS — N40.1 URINARY RETENTION DUE TO BENIGN PROSTATIC HYPERPLASIA: ICD-10-CM

## 2017-12-19 PROCEDURE — 52000 CYSTOURETHROSCOPY: CPT | Performed by: UROLOGY

## 2017-12-19 PROCEDURE — 51700 IRRIGATION OF BLADDER: CPT | Mod: 59,,, | Performed by: UROLOGY

## 2017-12-19 PROCEDURE — 51702 INSERT TEMP BLADDER CATH: CPT | Performed by: UROLOGY

## 2017-12-19 PROCEDURE — 52001 CYSTO W/IRRG&EVAC MLT CLOTS: CPT | Performed by: UROLOGY

## 2017-12-19 PROCEDURE — 52000 CYSTOURETHROSCOPY: CPT | Mod: ,,, | Performed by: UROLOGY

## 2017-12-19 RX ORDER — GENTAMICIN SULFATE 40 MG/ML
INJECTION, SOLUTION INTRAMUSCULAR; INTRAVENOUS
Status: DISPENSED
Start: 2017-12-19 | End: 2017-12-20

## 2017-12-19 RX ORDER — CIPROFLOXACIN 500 MG/1
500 TABLET ORAL 2 TIMES DAILY
Qty: 14 TABLET | Refills: 0 | Status: ON HOLD | OUTPATIENT
Start: 2017-12-19 | End: 2018-01-11

## 2017-12-19 RX ORDER — WATER 1 ML/ML
IRRIGANT IRRIGATION
Status: DISCONTINUED | OUTPATIENT
Start: 2017-12-19 | End: 2017-12-19 | Stop reason: HOSPADM

## 2017-12-19 RX ORDER — GENTAMICIN SULFATE 40 MG/ML
80 INJECTION, SOLUTION INTRAMUSCULAR; INTRAVENOUS
Status: DISCONTINUED | OUTPATIENT
Start: 2017-12-26 | End: 2017-12-19 | Stop reason: HOSPADM

## 2017-12-19 RX ORDER — CIPROFLOXACIN 2 MG/ML
400 INJECTION, SOLUTION INTRAVENOUS
Status: CANCELLED | OUTPATIENT
Start: 2017-12-19

## 2017-12-19 RX ORDER — LIDOCAINE HYDROCHLORIDE 20 MG/ML
JELLY TOPICAL ONCE
Status: COMPLETED | OUTPATIENT
Start: 2017-12-19 | End: 2017-12-19

## 2017-12-19 RX ADMIN — LIDOCAINE HYDROCHLORIDE 5 ML: 20 JELLY TOPICAL at 03:12

## 2017-12-19 RX ADMIN — GENTAMICIN SULFATE 80 MG: 40 INJECTION, SOLUTION INTRAMUSCULAR; INTRAVENOUS at 02:12

## 2017-12-19 NOTE — PLAN OF CARE
Dr Abdi spoke with patient and daughter about upcoming procedure. Consents signed. Questions answered. Patient does not want anything to drink at this time.

## 2017-12-19 NOTE — TELEPHONE ENCOUNTER
----- Message from Dominick Nunes sent at 12/19/2017 11:49 AM CST -----  Contact: wife   Wife Veronica want to speak with a nurse regarding patient has a procedure scheduled today and patient is bleeding, please call back at [phone] (home)

## 2017-12-19 NOTE — TELEPHONE ENCOUNTER
Bleeding a lot this morning.  Patient considered going to the ED during the early morning hours.  Not sure if MD wants to proceed with procedure.   Please advise.

## 2017-12-19 NOTE — INTERVAL H&P NOTE
The patient has been examined and the H&P has been reviewed:    Since yesterday, patient did have recurrence of GH in middle of night which he noted to be light pink, and then cleared, but by late morning was dark red again. I did advise he still come in and can irrigate to clear again and evaluate cystoscopically as planned    Procedure risks, benefits and alternative options discussed and understood by patient/family.  He is appropriate candidate for procedure at [facility]          Active Hospital Problems    Diagnosis  POA    Urinary retention [R33.9]  Yes      Resolved Hospital Problems    Diagnosis Date Resolved POA   No resolved problems to display.

## 2017-12-19 NOTE — H&P (VIEW-ONLY)
[facility] Urology Progress Note    Roosevelt Alejandro is a 72 y.o. male with recent urinary retention with Ramires catheter in place who presents with gross hematuria with clots    He presented to the emergency department on 12/2/17 with progressive difficulty urinating and abdominal distention was found to be in urinary retention and a Ramires catheter was placed. UA neg.  History of BPH, on Cardura 2 mg daily prior to ER visit with AUA SS pre-ramires 12/5. He was started on Flomax.  He did return to the ER on 12/7/17 secondary to gross hematuria per Ramires.  Catheter was manually irrigated and noted to be draining clear pink, and he was discharged with Keflex. Ucx negative.    On 12/11/17, he failed fill and pull voiding trial, voiding only 50 cc after bladder filling with postvoid residual 173 cc and inability to pass remainder of urine.  He noted that his gross hematuria did not develop at time of Ramires catheter placement but only day later.  No UTI history, no family history  malignancy, no personal or family history of kidney stones, no blood thinners, no UTI history  He works as a [employer], though previously was a on a [employer] with asbestos exposure and has pulmonary asbestosis.  He quit smoking in 2000 after 40+ years of 3 pack per day smoking since age 13  He also has a 40 year history of riding motorcycles  At approximately 10 PM on day of ER presentation, he reported he just couldn't urinate.  Does have past history of borderline elevated PSA with negative past prostate biopsy and subsequent age-adjusted normal PSAs, all with free PSA greater than 30%    At last visit on 12/11/17, he had a 40 g round enlarged benign EDMUNDO and Ramires catheter was replaced when he failed his voiding trial.  We made plans for cystoscopic evaluation of obstruction with concurrent prostate ultrasound for accurate measurement of prostate to guide future recommendations, suspicious patient may need transurethral resection of  "prostate.  As he also had gross hematuria, cystoscopy would evaluate that and CT urogram was ordered prior to cystoscopy on 12/26/17.  He does have a present today after recurrence of spontaneous gross hematuria last night, which continued into this morning and he started to notice blood clots in the bag.  He denies any catheter related pain.  He denies any strenuous physical activity.  He denies any Zelaya trauma such as pulling, yanking, catching on something.  Presents called in to clinic this morning noting this new onset gross hematuria per his Zelaya catheter requesting evaluation, and was added on to the clinic schedule.      ROS: A comprehensive 10 system review was performed and is negative except as noted above in HPI    PHYSICAL EXAM:    Vitals:    12/18/17 1333   BP: 136/80   Pulse: 100   Resp: 18     Body mass index is 26.58 kg/m². Weight: 93.9 kg (207 lb 0.2 oz) Height: 6' 2" (188 cm)       General: Alert, cooperative, no distress, appears stated age   Head: Normocephalic, without obvious abnormality, atraumatic   Eyes: PERRL, conjunctiva/corneas clear   Lungs: Respirations unlabored   Heart: Warm and well perfused   Abdomen: Soft, nontender, nondistended  : Uncircumcised phallus, normal bilaterally descended testes, 16 Korean Zelaya catheter draining red urine to leg bag with blood clots  Extremities: Extremities normal, atraumatic, no cyanosis or edema   Skin: Skin color, texture, turgor normal, no rashes or lesions   Psych: Appropriate   Neurologic: Non-focal     Procedure: Bladder irrigation/evacuation of clots  Patient's indwelling 16 Korean Zelaya catheter was removed and noted to have a small blood clot at the tip.  With Betadine prep using aseptic technique, a 20 Korean coudé Zelaya was placed which did drain bloody urine without clots.  60 cc catheter tip syringe with sterile water was then used to manually irrigate the bladder after instilling 60 cc into the bladder, and then flushing and " "irrigating with subsequent syringe-fuls.  A significant burden of large dark blood clots was evacuated and approximately 3-4 Liters of manual irrigation were necessary to clear the clot burden.  After a few syringe-fuls of clear urine, some clots were still aspirated within clear irrigation fluid and the syringe after which some resistance was met, and therefore the 20 Icelandic coudé Zelaya was exchanged for a 22 Icelandic straight two-way Zelaya with 15 cc sterile water used to inflate the 30 cc balloon.  During irrigation of clots, one small piece of tissue was aspirated which may have been a discolored fragment, there was suspicious for tissue, so was placed in a formalin cup and sent to pathology as "bladder debris".  Once he remained crystal clear irrigating through the 22 Icelandic Zelaya catheter, it was placed to leg bag which is secured to patient's thigh.  He tolerated this well without pain or complication, and given significant irrigation and manipulation, 80 mg IM gentamicin were administered.  Approximately 1 hour was spent performing bedside manual irrigation and Zelaya catheter changes to get the patient's urine clear      ASSESSMENT   1. Gross hematuria  Tissue Specimen To Pathology, Urology    gentamicin injection 80 mg   2. Urinary retention       Plan    patient's bladder irrigated to clear.  Encouraged increased by mouth hydration.   We will reschedule cystoscopy/transrectal ultrasound to tomorrow afternoon, 12/19/17, at the ASC today for more urgent cystoscopic evaluation in attendance to determine cause of his recurrent gross hematuria since his Zelaya catheter has been placed.  Did advise that if there are findings needing urgent operative intervention, could add on for the following week.  As well, regardless of findings, should keep CT scan appointment on Wednesday, 12/20/17, for CT urogram for upper tract evaluation.  Patient and daughter are agreeable to plan.    "

## 2017-12-19 NOTE — PROGRESS NOTES
[facility] Urology Progress Note    Roosevelt Alejandro is a 72 y.o. male with recent urinary retention with Ramires catheter in place who presents with gross hematuria with clots    He presented to the emergency department on 12/2/17 with progressive difficulty urinating and abdominal distention was found to be in urinary retention and a Ramires catheter was placed. UA neg.  History of BPH, on Cardura 2 mg daily prior to ER visit with AUA SS pre-ramires 12/5. He was started on Flomax.  He did return to the ER on 12/7/17 secondary to gross hematuria per Ramires.  Catheter was manually irrigated and noted to be draining clear pink, and he was discharged with Keflex. Ucx negative.    On 12/11/17, he failed fill and pull voiding trial, voiding only 50 cc after bladder filling with postvoid residual 173 cc and inability to pass remainder of urine.  He noted that his gross hematuria did not develop at time of Ramires catheter placement but only day later.  No UTI history, no family history  malignancy, no personal or family history of kidney stones, no blood thinners, no UTI history  He works as a [employer], though previously was a on a [employer] aircraft [employer] with asbestos exposure and has pulmonary asbestosis.  He quit smoking in 2000 after 40+ years of 3 pack per day smoking since age 13  He also has a 40 year history of riding motorcycles  At approximately 10 PM on day of ER presentation, he reported he just couldn't urinate.  Does have past history of borderline elevated PSA with negative past prostate biopsy and subsequent age-adjusted normal PSAs, all with free PSA greater than 30%    At last visit on 12/11/17, he had a 40 g round enlarged benign EDMUNDO and Ramires catheter was replaced when he failed his voiding trial.  We made plans for cystoscopic evaluation of obstruction with concurrent prostate ultrasound for accurate measurement of prostate to guide future recommendations, suspicious patient may need transurethral resection of  "prostate.  As he also had gross hematuria, cystoscopy would evaluate that and CT urogram was ordered prior to cystoscopy on 12/26/17.  He does have a present today after recurrence of spontaneous gross hematuria last night, which continued into this morning and he started to notice blood clots in the bag.  He denies any catheter related pain.  He denies any strenuous physical activity.  He denies any Zelaya trauma such as pulling, yanking, catching on something.  Presents called in to clinic this morning noting this new onset gross hematuria per his Zelaya catheter requesting evaluation, and was added on to the clinic schedule.      ROS: A comprehensive 10 system review was performed and is negative except as noted above in HPI    PHYSICAL EXAM:    Vitals:    12/18/17 1333   BP: 136/80   Pulse: 100   Resp: 18     Body mass index is 26.58 kg/m². Weight: 93.9 kg (207 lb 0.2 oz) Height: 6' 2" (188 cm)       General: Alert, cooperative, no distress, appears stated age   Head: Normocephalic, without obvious abnormality, atraumatic   Eyes: PERRL, conjunctiva/corneas clear   Lungs: Respirations unlabored   Heart: Warm and well perfused   Abdomen: Soft, nontender, nondistended  : Uncircumcised phallus, normal bilaterally descended testes, 16 Wolof Zelaya catheter draining red urine to leg bag with blood clots  Extremities: Extremities normal, atraumatic, no cyanosis or edema   Skin: Skin color, texture, turgor normal, no rashes or lesions   Psych: Appropriate   Neurologic: Non-focal     Procedure: Bladder irrigation/evacuation of clots  Patient's indwelling 16 Wolof Zelaya catheter was removed and noted to have a small blood clot at the tip.  With Betadine prep using aseptic technique, a 20 Wolof coudé Zelaya was placed which did drain bloody urine without clots.  60 cc catheter tip syringe with sterile water was then used to manually irrigate the bladder after instilling 60 cc into the bladder, and then flushing and " "irrigating with subsequent syringe-fuls.  A significant burden of large dark blood clots was evacuated and approximately 3-4 Liters of manual irrigation were necessary to clear the clot burden.  After a few syringe-fuls of clear urine, some clots were still aspirated within clear irrigation fluid and the syringe after which some resistance was met, and therefore the 20 Nauruan coudé Zelaya was exchanged for a 22 Nauruan straight two-way Zelaya with 15 cc sterile water used to inflate the 30 cc balloon.  During irrigation of clots, one small piece of tissue was aspirated which may have been a discolored fragment, there was suspicious for tissue, so was placed in a formalin cup and sent to pathology as "bladder debris".  Once he remained crystal clear irrigating through the 22 Nauruan Zelaya catheter, it was placed to leg bag which is secured to patient's thigh.  He tolerated this well without pain or complication, and given significant irrigation and manipulation, 80 mg IM gentamicin were administered.  Approximately 1 hour was spent performing bedside manual irrigation and Zelaya catheter changes to get the patient's urine clear      ASSESSMENT   1. Gross hematuria  Tissue Specimen To Pathology, Urology    gentamicin injection 80 mg   2. Urinary retention       Plan    patient's bladder irrigated to clear.  Encouraged increased by mouth hydration.   We will reschedule cystoscopy/transrectal ultrasound to tomorrow afternoon, 12/19/17, at the ASC today for more urgent cystoscopic evaluation in attendance to determine cause of his recurrent gross hematuria since his Zelaya catheter has been placed.  Did advise that if there are findings needing urgent operative intervention, could add on for the following week.  As well, regardless of findings, should keep CT scan appointment on Wednesday, 12/20/17, for CT urogram for upper tract evaluation.  Patient and daughter are agreeable to plan.    "

## 2017-12-20 ENCOUNTER — ANESTHESIA EVENT (OUTPATIENT)
Dept: SURGERY | Facility: HOSPITAL | Age: 72
End: 2017-12-20
Payer: MEDICARE

## 2017-12-20 ENCOUNTER — TELEPHONE (OUTPATIENT)
Dept: UROLOGY | Facility: CLINIC | Age: 72
End: 2017-12-20

## 2017-12-20 ENCOUNTER — HOSPITAL ENCOUNTER (OUTPATIENT)
Dept: RADIOLOGY | Facility: HOSPITAL | Age: 72
Discharge: HOME | End: 2017-12-20
Attending: UROLOGY
Payer: MEDICARE

## 2017-12-20 ENCOUNTER — HOSPITAL ENCOUNTER (OUTPATIENT)
Facility: HOSPITAL | Age: 72
Discharge: HOME | End: 2017-12-22
Attending: INTERNAL MEDICINE | Admitting: INTERNAL MEDICINE
Payer: MEDICARE

## 2017-12-20 ENCOUNTER — HOSPITAL ENCOUNTER (OUTPATIENT)
Dept: PREADMISSION TESTING | Facility: HOSPITAL | Age: 72
Discharge: HOME | End: 2017-12-20
Attending: UROLOGY
Payer: MEDICARE

## 2017-12-20 DIAGNOSIS — N40.0 BENIGN PROSTATIC HYPERPLASIA, UNSPECIFIED WHETHER LOWER URINARY TRACT SYMPTOMS PRESENT: ICD-10-CM

## 2017-12-20 DIAGNOSIS — R33.9 URINARY RETENTION: Primary | ICD-10-CM

## 2017-12-20 DIAGNOSIS — R33.8 URINARY RETENTION DUE TO BENIGN PROSTATIC HYPERPLASIA: ICD-10-CM

## 2017-12-20 DIAGNOSIS — R31.0 GROSS HEMATURIA: ICD-10-CM

## 2017-12-20 DIAGNOSIS — R33.9 URINARY RETENTION: ICD-10-CM

## 2017-12-20 DIAGNOSIS — R58 BLEEDING: ICD-10-CM

## 2017-12-20 DIAGNOSIS — N40.1 URINARY RETENTION DUE TO BENIGN PROSTATIC HYPERPLASIA: ICD-10-CM

## 2017-12-20 DIAGNOSIS — I10 ESSENTIAL HYPERTENSION: ICD-10-CM

## 2017-12-20 DIAGNOSIS — J61 PULMONARY ASBESTOSIS: ICD-10-CM

## 2017-12-20 LAB
ABO + RH BLD: NORMAL
ANION GAP SERPL CALC-SCNC: 13 MMOL/L
APTT BLDCRRT: 30.1 SEC
BASOPHILS # BLD AUTO: 0 K/UL
BASOPHILS NFR BLD: 0.1 %
BLD GP AB SCN CELLS X3 SERPL QL: NORMAL
BUN SERPL-MCNC: 18 MG/DL
CALCIUM SERPL-MCNC: 9.9 MG/DL
CHLORIDE SERPL-SCNC: 101 MMOL/L
CO2 SERPL-SCNC: 20 MMOL/L
COMPLEXED PSA SERPL-MCNC: 28.6 NG/ML
CREAT SERPL-MCNC: 1.2 MG/DL
DIFFERENTIAL METHOD: ABNORMAL
EOSINOPHIL # BLD AUTO: 0.1 K/UL
EOSINOPHIL NFR BLD: 0.6 %
ERYTHROCYTE [DISTWIDTH] IN BLOOD BY AUTOMATED COUNT: 12.8 %
EST. GFR  (AFRICAN AMERICAN): >60 ML/MIN/1.73 M^2
EST. GFR  (NON AFRICAN AMERICAN): 60 ML/MIN/1.73 M^2
GLUCOSE SERPL-MCNC: 118 MG/DL
HCT VFR BLD AUTO: 40.2 %
HGB BLD-MCNC: 13.7 G/DL
INR PPP: 1.1
INR PPP: 1.1
LYMPHOCYTES # BLD AUTO: 1.4 K/UL
LYMPHOCYTES NFR BLD: 9.9 %
MCH RBC QN AUTO: 32.6 PG
MCHC RBC AUTO-ENTMCNC: 34.1 G/DL
MCV RBC AUTO: 96 FL
MONOCYTES # BLD AUTO: 0.4 K/UL
MONOCYTES NFR BLD: 3.1 %
NEUTROPHILS # BLD AUTO: 12.2 K/UL
NEUTROPHILS NFR BLD: 86.3 %
PLATELET # BLD AUTO: 234 K/UL
PMV BLD AUTO: 8.5 FL
POTASSIUM SERPL-SCNC: 4 MMOL/L
PROTHROMBIN TIME: 11 SEC
PROTHROMBIN TIME: 11.2 SEC
RBC # BLD AUTO: 4.21 M/UL
SODIUM SERPL-SCNC: 134 MMOL/L
WBC # BLD AUTO: 14.1 K/UL

## 2017-12-20 PROCEDURE — 99220 PR INITIAL OBSERVATION CARE,LEVL III: CPT | Mod: 57,25,, | Performed by: UROLOGY

## 2017-12-20 PROCEDURE — 99900104 DSU ONLY-NO CHARGE-EA ADD'L HR (STAT)

## 2017-12-20 PROCEDURE — 85610 PROTHROMBIN TIME: CPT

## 2017-12-20 PROCEDURE — 86901 BLOOD TYPING SEROLOGIC RH(D): CPT

## 2017-12-20 PROCEDURE — 71020 XR CHEST PA AND LATERAL: CPT | Mod: TC

## 2017-12-20 PROCEDURE — 71020 XR CHEST PA AND LATERAL: CPT | Mod: 26,,, | Performed by: RADIOLOGY

## 2017-12-20 PROCEDURE — G0379 DIRECT REFER HOSPITAL OBSERV: HCPCS

## 2017-12-20 PROCEDURE — 99900103 DSU ONLY-NO CHARGE-INITIAL HR (STAT)

## 2017-12-20 PROCEDURE — 25500020 PHARM REV CODE 255

## 2017-12-20 PROCEDURE — 74178 CT ABD&PLV WO CNTR FLWD CNTR: CPT | Mod: 26,,, | Performed by: RADIOLOGY

## 2017-12-20 PROCEDURE — 25000003 PHARM REV CODE 250: Performed by: INTERNAL MEDICINE

## 2017-12-20 PROCEDURE — 80048 BASIC METABOLIC PNL TOTAL CA: CPT

## 2017-12-20 PROCEDURE — 63600175 PHARM REV CODE 636 W HCPCS: Performed by: INTERNAL MEDICINE

## 2017-12-20 PROCEDURE — 84153 ASSAY OF PSA TOTAL: CPT

## 2017-12-20 PROCEDURE — 99219 PR INITIAL OBSERVATION CARE,LEVL II: CPT | Mod: ,,, | Performed by: INTERNAL MEDICINE

## 2017-12-20 PROCEDURE — G0378 HOSPITAL OBSERVATION PER HR: HCPCS

## 2017-12-20 PROCEDURE — 85025 COMPLETE CBC W/AUTO DIFF WBC: CPT

## 2017-12-20 PROCEDURE — 85730 THROMBOPLASTIN TIME PARTIAL: CPT

## 2017-12-20 PROCEDURE — 51700 IRRIGATION OF BLADDER: CPT | Mod: ,,, | Performed by: UROLOGY

## 2017-12-20 PROCEDURE — 36415 COLL VENOUS BLD VENIPUNCTURE: CPT

## 2017-12-20 PROCEDURE — 86900 BLOOD TYPING SEROLOGIC ABO: CPT

## 2017-12-20 PROCEDURE — 85610 PROTHROMBIN TIME: CPT | Mod: 91

## 2017-12-20 PROCEDURE — 74178 CT ABD&PLV WO CNTR FLWD CNTR: CPT | Mod: TC

## 2017-12-20 RX ORDER — IBUPROFEN 200 MG
24 TABLET ORAL
Status: DISCONTINUED | OUTPATIENT
Start: 2017-12-20 | End: 2017-12-22 | Stop reason: HOSPADM

## 2017-12-20 RX ORDER — ACETAMINOPHEN 325 MG/1
650 TABLET ORAL EVERY 4 HOURS PRN
Status: DISCONTINUED | OUTPATIENT
Start: 2017-12-20 | End: 2017-12-21

## 2017-12-20 RX ORDER — MORPHINE SULFATE 2 MG/ML
3 INJECTION, SOLUTION INTRAMUSCULAR; INTRAVENOUS EVERY 4 HOURS PRN
Status: DISCONTINUED | OUTPATIENT
Start: 2017-12-20 | End: 2017-12-21

## 2017-12-20 RX ORDER — ACETAMINOPHEN 325 MG/1
650 TABLET ORAL EVERY 6 HOURS PRN
Status: DISCONTINUED | OUTPATIENT
Start: 2017-12-20 | End: 2017-12-21

## 2017-12-20 RX ORDER — HYDROCODONE BITARTRATE AND ACETAMINOPHEN 5; 325 MG/1; MG/1
1 TABLET ORAL EVERY 6 HOURS PRN
Status: DISCONTINUED | OUTPATIENT
Start: 2017-12-20 | End: 2017-12-21

## 2017-12-20 RX ORDER — TAMSULOSIN HYDROCHLORIDE 0.4 MG/1
1 CAPSULE ORAL DAILY
Status: DISCONTINUED | OUTPATIENT
Start: 2017-12-20 | End: 2017-12-22 | Stop reason: HOSPADM

## 2017-12-20 RX ORDER — NIFEDIPINE 30 MG/1
30 TABLET, EXTENDED RELEASE ORAL NIGHTLY
Status: DISCONTINUED | OUTPATIENT
Start: 2017-12-20 | End: 2017-12-22 | Stop reason: HOSPADM

## 2017-12-20 RX ORDER — IBUPROFEN 200 MG
16 TABLET ORAL
Status: DISCONTINUED | OUTPATIENT
Start: 2017-12-20 | End: 2017-12-22 | Stop reason: HOSPADM

## 2017-12-20 RX ORDER — SODIUM CHLORIDE 0.9 % (FLUSH) 0.9 %
5 SYRINGE (ML) INJECTION
Status: DISCONTINUED | OUTPATIENT
Start: 2017-12-20 | End: 2017-12-22 | Stop reason: HOSPADM

## 2017-12-20 RX ORDER — PANTOPRAZOLE SODIUM 40 MG/1
40 TABLET, DELAYED RELEASE ORAL DAILY
Status: DISCONTINUED | OUTPATIENT
Start: 2017-12-21 | End: 2017-12-22 | Stop reason: HOSPADM

## 2017-12-20 RX ORDER — SODIUM CHLORIDE 9 MG/ML
INJECTION, SOLUTION INTRAVENOUS CONTINUOUS
Status: DISCONTINUED | OUTPATIENT
Start: 2017-12-20 | End: 2017-12-21

## 2017-12-20 RX ORDER — GLUCAGON 1 MG
1 KIT INJECTION
Status: DISCONTINUED | OUTPATIENT
Start: 2017-12-20 | End: 2017-12-22 | Stop reason: HOSPADM

## 2017-12-20 RX ORDER — MORPHINE SULFATE 4 MG/ML
3 INJECTION, SOLUTION INTRAMUSCULAR; INTRAVENOUS EVERY 4 HOURS PRN
Status: DISCONTINUED | OUTPATIENT
Start: 2017-12-20 | End: 2017-12-20 | Stop reason: RX

## 2017-12-20 RX ADMIN — Medication 3 MG: at 01:12

## 2017-12-20 RX ADMIN — NIFEDIPINE 30 MG: 30 TABLET, FILM COATED, EXTENDED RELEASE ORAL at 08:12

## 2017-12-20 RX ADMIN — HYDROCODONE BITARTRATE AND ACETAMINOPHEN 1 TABLET: 5; 325 TABLET ORAL at 03:12

## 2017-12-20 RX ADMIN — IOHEXOL 100 ML: 350 INJECTION, SOLUTION INTRAVENOUS at 08:12

## 2017-12-20 RX ADMIN — SODIUM CHLORIDE: 0.9 INJECTION, SOLUTION INTRAVENOUS at 02:12

## 2017-12-20 NOTE — TELEPHONE ENCOUNTER
Lexi at preop stated when she sent him to lab he left telling the tech he had labs done already. Pt came back to get labs done. Lexi stated he only has about 30ml of bloody urine with no clots since ER visit. Pt told her feels like it is not draining properly. Dr Abdi to be advised.

## 2017-12-20 NOTE — TELEPHONE ENCOUNTER
Spoke with Pt's wife. Wife stated she did not see him before he left to do labs and imaging this morning. Pt had to go back and do labs because he left before having them done.  She wanted to Dr Abdi to know before his sx  that when he has received blood transfusion in the past/approx 10yrs ago.  When levels were rechecked his blood levels never changed after 1 pint of blood. Pt was given 3 pints and still never change.

## 2017-12-20 NOTE — NURSING
Pt was a direct admit, ramires catheter to leg bag with hematuria urine noted, VS noted, pt is oriented to room call light within reach bed in lowest position, Dr Abdi consulted and noted will cont to monitor

## 2017-12-20 NOTE — H&P
PCP: Greg Johnson MD    History & Physical    Chief Complaint: Gross hematuria    History of Present Illness:  Patient is a 72 y.o. male admitted to Hospitalist Service from Radiology department admitted directly at directions by Dr. Abdi with complaint of gross hematuria. Patient reportedly has past medical history significant for BPH, history of asbestosis, hypertension and OA. Patient reported, he has been having of and urine retention problems for the past 3 weeks. Patient is closely following with Dr. Abdi. Patient has been having intermittent grossly hematuria and has had 2 ER visits in last 2-3 days. Patient had cystoscopy performed 2 days ago. Zelaya catheter again placed last night in the ER for retention and gross hematuria. Patient is having frequent supra-pubic discomfort. No associated nausea or vomiting or changes in bowel habits reported. No fever or chills present. Patient denied chest pain, shortness of breath, nausea, vomiting, headache, vision changes, focal neuro-deficits, cough or fever.    Past Medical History:   Diagnosis Date    Arthritis     back pain    Asbestosis     BPH (benign prostatic hyperplasia)     Hypertension      Past Surgical History:   Procedure Laterality Date    COLONOSCOPY      2009    COLONOSCOPY N/A 1/21/2016    Procedure: COLONOSCOPY;  Surgeon: Toby Maciel MD;  Location: [facility];  Service: Endoscopy;  Laterality: N/A;    EYE SURGERY      cataracts, retinal detachment    PROSTATE BIOPSY      ROTATOR CUFF REPAIR      left     Family History   Problem Relation Age of Onset    Cancer Father      lung/ asbestos    Melanoma Neg Hx     Psoriasis Neg Hx     Lupus Neg Hx     Eczema Neg Hx      Social History   Substance Use Topics    Smoking status: Former Smoker     Quit date: 3/30/2000    Smokeless tobacco: Never Used    Alcohol use 0.6 oz/week     1 Cans of beer per week      Comment: rare      Review of patient's allergies indicates:   Allergen  Reactions    Aspirin Other (See Comments)     Internal bleeding    Lisinopril (bulk)     Sulfa (sulfonamide antibiotics) Swelling and Rash     PTA Medications   Medication Sig    ciprofloxacin HCl (CIPRO) 500 MG tablet Take 1 tablet (500 mg total) by mouth 2 (two) times daily.    FOLIC ACID/MULTIVIT-MIN/LUTEIN (CENTRUM SILVER ORAL) Take by mouth.    glucosamine-chondroitin 500-400 mg tablet Take 1 tablet by mouth 3 (three) times daily.    NIFEdipine (PROCARDIA-XL) 30 MG (OSM) 24 hr tablet Take 1 tablet (30 mg total) by mouth every evening.    tamsulosin (FLOMAX) 0.4 mg Cp24 Take 1 capsule by mouth once daily.     Review of Systems:  Constitutional: no fever or chills  Eyes: no visual changes  Ears, nose, mouth, throat, and face: no nasal congestion or sore throat  Respiratory: no cough or shorness of breath  Cardiovascular: no chest pain or palpitations  Gastrointestinal: no nausea or vomiting, no abdominal pain or change in bowel habits  Genitourinary: see HPI  Integument/breast: no rash or pruritis  Hematologic/lymphatic: no easy bruising or lymphadenopathy  Musculoskeletal: no arthralgias or myalgias  Neurological: no seizures or tremors.  Behavioral/Psych: no auditory or visual hallucinations  Endocrine: no heat or cold intolerance     OBJECTIVE:     Vital Signs (Most Recent)  Temp: 98.7 °F (37.1 °C) (12/20/17 1229)  Pulse: 99 (12/20/17 1229)  Resp: 18 (12/20/17 1229)  BP: (!) 168/81 (12/20/17 1229)  SpO2: 99 % (12/20/17 1229)    Physical Exam:  General appearance: well developed, appears stated age, In distress  Head: normocephalic, atraumatic  Eyes:  conjunctivae/corneas clear. PERRL.  Nose: Nares normal. Septum midline.  Throat: lips, mucosa, and tongue normal; teeth and gums normal, no throat erythema.  Neck: supple, symmetrical, trachea midline, no JVD and thyroid not enlarged, symmetric, no tenderness/mass/nodules  Lungs:  clear to auscultation bilaterally and normal respiratory effort  Chest wall:  no tenderness  Heart: regular rate and rhythm, S1, S2 normal, no murmur, click, rub or gallop  Abdomen: soft, tenderness sin supra-pubic area, non-distented; bowel sounds normal; no masses,  no organomegaly  Extremities: no cyanosis, clubbing or edema.   Pulses: 2+ and symmetric  Skin: Skin color, texture, turgor normal. No rashes or lesions.  Lymph nodes: Cervical, supraclavicular, and axillary nodes normal.  Neurologic: Normal strength and tone. No focal numbness or weakness. CNII-XII intact.      Laboratory:   CBC:   Recent Labs  Lab 12/20/17  1143   WBC 14.10*   RBC 4.21*   HGB 13.7*   HCT 40.2      MCV 96   MCH 32.6*   MCHC 34.1     CMP:   Recent Labs  Lab 12/20/17  1143   *   CALCIUM 9.9   *   K 4.0   CO2 20*      BUN 18   CREATININE 1.2     Coagulation:   Recent Labs  Lab 12/20/17  1143   LABPROT 11.0   INR 1.1     Hemoglobin A1C   Date Value Ref Range Status   01/07/2011 5.9 4.0 - 6.2 % Final   08/20/2009 5.9 4.0 - 6.2 % Final     Microbiology Results (last 7 days)     ** No results found for the last 168 hours. **        Diagnostic Results:  Chest X-Ray: No acute findings. Calcified pleural plaque formation suggesting prior asbestos exposure.    CT urogram: Significant prostate hypertrophy. There is marked trabeculation and several diverticuli of the bladder with thickening of the bladder wall. The Zelaya balloon has been inflated within the prostate and should be repositioned. Several cysts of each kidney without solid mass, stone or hydronephrosis seen. Diverticulosis coli of the descending and sigmoid colon. Multiple liver cysts. Hiatal hernia and possible esophagitis. Coronary artery calcification.    US bladder: Mild prostate hypertrophy. Trabeculated bladder wall with several bladder diverticuli noted posteriorly. The volume at the present time is 166 cc with internal debris    Assessment/Plan:     Active Hospital Problems    Diagnosis  POA    *Gross hematuria [R31.0]  Yes     Urinary retention [R33.9]  BPH (benign prostatic hyperplasia) [N40.0]  Discussed with Dr. Abdi. Decision to initiate CBI is per Dr. Abdi.  Maintain NPO.  Start IVF hydration.  Follow hemoglobin and hematocrit closely.  Pain control with IV narcotics and antiemetics as needed.    Yes    Pulmonary asbestosis [J61]  Use prn supplemental oxygen.    Yes    HTN (hypertension) [I10]  Chronic problem. Will continue chronic medications and monitor for any changes, adjusting as needed.    Yes            DVT prophylaxis: Use SCD and TEDs. No anticoagulation due to hematuria.    Cresencio Salazar MD  Department of Hospital Medicine   [facility]

## 2017-12-20 NOTE — BRIEF OP NOTE
[facility] Urology  Brief Operative/Discharge Note    Date: 12/19/2017    Staff Surgeon: Alejandro Abdi MD    Pre-Op Diagnosis:   Urinary retention  Gross hematuria    Post-Op Diagnosis:   Urinary retention secondary to enlarged obstructing prostate  Gross hematuria  Bladder tumor vs ramires catheter edema    Procedure(s) Performed:   Cystoscopy, flexible  Manual irrigation of bladder/evacuation of clots  Exchange of ramires catheter     Specimen(s): none    Anesthesia: local, urojet intraurethral instillation 2% lidocaine jelly    Findings:   1. Very large obstructing prostate with high grade obstruction, kissing lateral lobes, enlarged obstructing median lobe with asymm enlargement of L>R obstructing at bladder neck with element of anterior obstruction as well  2. Very hypervascular median lobe with prominent vessels streaking across entire median lobe projection  3. Severely trabeculated bladder with diffuse cellules and moderate scattered diverticulae  4. All clots removed with manual irrigation, no active bleeding, some blood staining in dep. Bladder  5. Posterior bladder wall bullous ramires edema vs. Papillary bladder tumor - difficult to distinguish, but large area on posterior wall extending towards left lateral wall    Estimated Blood Loss: 100cc    Drains: 22fr coude ramires    Complications: none    Disposition: home    Discharge home today status post uncomplicated procedure as above  Diet - resume home diet  Follow up: TURP/TURBT in OR on 12/21/17. CT urogram and PAT preop appt on 12/20/17  Instructions: drink plenty of water, may see blood in urine - light pink/clear red ok if draining well. If dark red/clots should go to ER and will need manual evacuation of all clots before CBI even considered as I was able to clear his clot burden with 3-5L manual irrigation the last 2 days  - stop fish oil  - will repeat ekg with pat visit - will message whiting as reportedly ordered holter monitor for Jan 2018 due to prior  ekg finding. Has never seen cardiology. Advised procedure is urgent given his recurrent GH with clots and near clot retention despite ramires due to obstructive retention  - post ppx abx given, though full week course given so has post TURP/TURBT    Meds:     Medication List      START taking these medications    ciprofloxacin HCl 500 MG tablet  Commonly known as:  CIPRO  Take 1 tablet (500 mg total) by mouth 2 (two) times daily.        CONTINUE taking these medications    CENTRUM SILVER ORAL     glucosamine-chondroitin 500-400 mg tablet     NIFEdipine 30 MG (OSM) 24 hr tablet  Commonly known as:  PROCARDIA-XL  Take 1 tablet (30 mg total) by mouth every evening.     omega-3 acid ethyl esters 1 gram capsule  Commonly known as:  LOVAZA     tamsulosin 0.4 mg Cp24  Commonly known as:  FLOMAX           Where to Get Your Medications      These medications were sent to [facility] [place] [facility] [address]  [facility] [address], [place] [address]    Phone:  [phone]   · ciprofloxacin HCl 500 MG tablet

## 2017-12-20 NOTE — PLAN OF CARE
S/c Dr Abdi re direct admit.  Orders to change ramires to 3-way 22 or 24 fr.  Plug CBI port with cath plug.  Manually irrigate with sterile water until flowing freely.  Instill 60cc of irrigant into bladder and leave then instill 60cc x 2 and remove, leaving initial 60cc in bladder.  Inflate balloon with 10cc only.  Dr Abdi will round after surgery.    1230:  Pt arrived to floor and settled in room.  Dr Salazar made aware of pt arrival.

## 2017-12-20 NOTE — TELEPHONE ENCOUNTER
----- Message from Emilee Dee sent at 12/20/2017 10:39 AM CST -----  Contact: Edith Sharma patient's wife calling regarding blood work. When patient recieves blood and then the levels are check it doesn't reflect that blood was given. Requesting a call back at [phone]. Thanks,

## 2017-12-21 ENCOUNTER — ANESTHESIA (OUTPATIENT)
Dept: SURGERY | Facility: HOSPITAL | Age: 72
End: 2017-12-21
Payer: MEDICARE

## 2017-12-21 ENCOUNTER — SURGERY (OUTPATIENT)
Age: 72
End: 2017-12-21

## 2017-12-21 PROBLEM — R33.8 URINARY RETENTION DUE TO BENIGN PROSTATIC HYPERPLASIA: Status: ACTIVE | Noted: 2017-12-21

## 2017-12-21 PROBLEM — N40.1 URINARY RETENTION DUE TO BENIGN PROSTATIC HYPERPLASIA: Status: ACTIVE | Noted: 2017-12-21

## 2017-12-21 LAB
ANION GAP SERPL CALC-SCNC: 11 MMOL/L
ANION GAP SERPL CALC-SCNC: 8 MMOL/L
BASOPHILS # BLD AUTO: 0 K/UL
BASOPHILS # BLD AUTO: 0 K/UL
BASOPHILS NFR BLD: 0.1 %
BASOPHILS NFR BLD: 0.4 %
BUN SERPL-MCNC: 11 MG/DL
BUN SERPL-MCNC: 14 MG/DL
CALCIUM SERPL-MCNC: 7.8 MG/DL
CALCIUM SERPL-MCNC: 8.7 MG/DL
CHLORIDE SERPL-SCNC: 103 MMOL/L
CHLORIDE SERPL-SCNC: 106 MMOL/L
CO2 SERPL-SCNC: 24 MMOL/L
CO2 SERPL-SCNC: 25 MMOL/L
CREAT SERPL-MCNC: 0.9 MG/DL
CREAT SERPL-MCNC: 1 MG/DL
DIFFERENTIAL METHOD: ABNORMAL
DIFFERENTIAL METHOD: ABNORMAL
EOSINOPHIL # BLD AUTO: 0 K/UL
EOSINOPHIL # BLD AUTO: 0.4 K/UL
EOSINOPHIL NFR BLD: 0.3 %
EOSINOPHIL NFR BLD: 3.9 %
ERYTHROCYTE [DISTWIDTH] IN BLOOD BY AUTOMATED COUNT: 13 %
ERYTHROCYTE [DISTWIDTH] IN BLOOD BY AUTOMATED COUNT: 13 %
EST. GFR  (AFRICAN AMERICAN): >60 ML/MIN/1.73 M^2
EST. GFR  (AFRICAN AMERICAN): >60 ML/MIN/1.73 M^2
EST. GFR  (NON AFRICAN AMERICAN): >60 ML/MIN/1.73 M^2
EST. GFR  (NON AFRICAN AMERICAN): >60 ML/MIN/1.73 M^2
GLUCOSE SERPL-MCNC: 112 MG/DL
GLUCOSE SERPL-MCNC: 136 MG/DL
HCT VFR BLD AUTO: 32.3 %
HCT VFR BLD AUTO: 34.6 %
HGB BLD-MCNC: 11.1 G/DL
HGB BLD-MCNC: 11.8 G/DL
LYMPHOCYTES # BLD AUTO: 0.8 K/UL
LYMPHOCYTES # BLD AUTO: 1.6 K/UL
LYMPHOCYTES NFR BLD: 16.9 %
LYMPHOCYTES NFR BLD: 6.7 %
MCH RBC QN AUTO: 32.4 PG
MCH RBC QN AUTO: 32.6 PG
MCHC RBC AUTO-ENTMCNC: 34 G/DL
MCHC RBC AUTO-ENTMCNC: 34.3 G/DL
MCV RBC AUTO: 95 FL
MCV RBC AUTO: 95 FL
MONOCYTES # BLD AUTO: 0.2 K/UL
MONOCYTES # BLD AUTO: 0.6 K/UL
MONOCYTES NFR BLD: 1.5 %
MONOCYTES NFR BLD: 6.6 %
NEUTROPHILS # BLD AUTO: 10.8 K/UL
NEUTROPHILS # BLD AUTO: 7 K/UL
NEUTROPHILS NFR BLD: 72.2 %
NEUTROPHILS NFR BLD: 91.4 %
PLATELET # BLD AUTO: 172 K/UL
PLATELET # BLD AUTO: 182 K/UL
PMV BLD AUTO: 8.3 FL
PMV BLD AUTO: 8.5 FL
POTASSIUM SERPL-SCNC: 3.7 MMOL/L
POTASSIUM SERPL-SCNC: 3.9 MMOL/L
RBC # BLD AUTO: 3.4 M/UL
RBC # BLD AUTO: 3.63 M/UL
SODIUM SERPL-SCNC: 138 MMOL/L
SODIUM SERPL-SCNC: 139 MMOL/L
WBC # BLD AUTO: 11.9 K/UL
WBC # BLD AUTO: 9.8 K/UL

## 2017-12-21 PROCEDURE — 37000008 HC ANESTHESIA 1ST 15 MINUTES: Performed by: UROLOGY

## 2017-12-21 PROCEDURE — 25000003 PHARM REV CODE 250: Performed by: UROLOGY

## 2017-12-21 PROCEDURE — D9220A PRA ANESTHESIA: Mod: ANES,,, | Performed by: ANESTHESIOLOGY

## 2017-12-21 PROCEDURE — 63600175 PHARM REV CODE 636 W HCPCS: Performed by: UROLOGY

## 2017-12-21 PROCEDURE — 88305 TISSUE EXAM BY PATHOLOGIST: CPT | Performed by: PATHOLOGY

## 2017-12-21 PROCEDURE — 36000707: Performed by: UROLOGY

## 2017-12-21 PROCEDURE — 71000033 HC RECOVERY, INTIAL HOUR: Performed by: UROLOGY

## 2017-12-21 PROCEDURE — 80048 BASIC METABOLIC PNL TOTAL CA: CPT | Mod: 91

## 2017-12-21 PROCEDURE — 76000 FLUOROSCOPY <1 HR PHYS/QHP: CPT | Mod: 26,59,, | Performed by: UROLOGY

## 2017-12-21 PROCEDURE — 36415 COLL VENOUS BLD VENIPUNCTURE: CPT

## 2017-12-21 PROCEDURE — 99225 PR SUBSEQUENT OBSERVATION CARE,LEVEL II: CPT | Mod: ,,, | Performed by: INTERNAL MEDICINE

## 2017-12-21 PROCEDURE — 27201423 OPTIME MED/SURG SUP & DEVICES STERILE SUPPLY: Performed by: UROLOGY

## 2017-12-21 PROCEDURE — C1769 GUIDE WIRE: HCPCS | Performed by: UROLOGY

## 2017-12-21 PROCEDURE — 27200651 HC AIRWAY, LMA: Performed by: NURSE ANESTHETIST, CERTIFIED REGISTERED

## 2017-12-21 PROCEDURE — 63600175 PHARM REV CODE 636 W HCPCS: Performed by: INTERNAL MEDICINE

## 2017-12-21 PROCEDURE — 63600175 PHARM REV CODE 636 W HCPCS: Performed by: NURSE ANESTHETIST, CERTIFIED REGISTERED

## 2017-12-21 PROCEDURE — C1758 CATHETER, URETERAL: HCPCS | Performed by: UROLOGY

## 2017-12-21 PROCEDURE — 25000003 PHARM REV CODE 250: Performed by: ANESTHESIOLOGY

## 2017-12-21 PROCEDURE — G0378 HOSPITAL OBSERVATION PER HR: HCPCS

## 2017-12-21 PROCEDURE — 36000706: Performed by: UROLOGY

## 2017-12-21 PROCEDURE — D9220A PRA ANESTHESIA: Mod: CRNA,,, | Performed by: NURSE ANESTHETIST, CERTIFIED REGISTERED

## 2017-12-21 PROCEDURE — 99900103 DSU ONLY-NO CHARGE-INITIAL HR (STAT): Performed by: UROLOGY

## 2017-12-21 PROCEDURE — 52601 PROSTATECTOMY (TURP): CPT | Mod: 22,,, | Performed by: UROLOGY

## 2017-12-21 PROCEDURE — 80048 BASIC METABOLIC PNL TOTAL CA: CPT

## 2017-12-21 PROCEDURE — 99900104 DSU ONLY-NO CHARGE-EA ADD'L HR (STAT): Performed by: UROLOGY

## 2017-12-21 PROCEDURE — 85025 COMPLETE CBC W/AUTO DIFF WBC: CPT | Mod: 91

## 2017-12-21 PROCEDURE — 25000003 PHARM REV CODE 250: Performed by: NURSE ANESTHETIST, CERTIFIED REGISTERED

## 2017-12-21 PROCEDURE — 71000039 HC RECOVERY, EACH ADD'L HOUR: Performed by: UROLOGY

## 2017-12-21 PROCEDURE — 37000009 HC ANESTHESIA EA ADD 15 MINS: Performed by: UROLOGY

## 2017-12-21 PROCEDURE — 25000003 PHARM REV CODE 250: Performed by: INTERNAL MEDICINE

## 2017-12-21 RX ORDER — FENTANYL CITRATE 50 UG/ML
25 INJECTION, SOLUTION INTRAMUSCULAR; INTRAVENOUS EVERY 5 MIN PRN
Status: DISCONTINUED | OUTPATIENT
Start: 2017-12-21 | End: 2017-12-21 | Stop reason: HOSPADM

## 2017-12-21 RX ORDER — HYDROCODONE BITARTRATE AND ACETAMINOPHEN 5; 325 MG/1; MG/1
1 TABLET ORAL EVERY 6 HOURS PRN
Status: DISCONTINUED | OUTPATIENT
Start: 2017-12-21 | End: 2017-12-22

## 2017-12-21 RX ORDER — PHENYLEPHRINE HYDROCHLORIDE 10 MG/ML
INJECTION INTRAVENOUS
Status: DISCONTINUED | OUTPATIENT
Start: 2017-12-21 | End: 2017-12-21

## 2017-12-21 RX ORDER — ACETAMINOPHEN 10 MG/ML
INJECTION, SOLUTION INTRAVENOUS
Status: DISCONTINUED | OUTPATIENT
Start: 2017-12-21 | End: 2017-12-21

## 2017-12-21 RX ORDER — LIDOCAINE HYDROCHLORIDE 20 MG/ML
JELLY TOPICAL
Status: DISCONTINUED | OUTPATIENT
Start: 2017-12-21 | End: 2017-12-21 | Stop reason: HOSPADM

## 2017-12-21 RX ORDER — LIDOCAINE HCL/PF 100 MG/5ML
SYRINGE (ML) INTRAVENOUS
Status: DISCONTINUED | OUTPATIENT
Start: 2017-12-21 | End: 2017-12-21

## 2017-12-21 RX ORDER — FENTANYL CITRATE 50 UG/ML
INJECTION, SOLUTION INTRAMUSCULAR; INTRAVENOUS
Status: DISCONTINUED | OUTPATIENT
Start: 2017-12-21 | End: 2017-12-21

## 2017-12-21 RX ORDER — PROPOFOL 10 MG/ML
VIAL (ML) INTRAVENOUS
Status: DISCONTINUED | OUTPATIENT
Start: 2017-12-21 | End: 2017-12-21

## 2017-12-21 RX ORDER — SODIUM CHLORIDE 9 MG/ML
INJECTION, SOLUTION INTRAVENOUS CONTINUOUS
Status: DISCONTINUED | OUTPATIENT
Start: 2017-12-21 | End: 2017-12-22 | Stop reason: HOSPADM

## 2017-12-21 RX ORDER — CIPROFLOXACIN 2 MG/ML
400 INJECTION, SOLUTION INTRAVENOUS
Status: COMPLETED | OUTPATIENT
Start: 2017-12-21 | End: 2017-12-21

## 2017-12-21 RX ORDER — MORPHINE SULFATE 2 MG/ML
2 INJECTION, SOLUTION INTRAMUSCULAR; INTRAVENOUS EVERY 4 HOURS PRN
Status: DISCONTINUED | OUTPATIENT
Start: 2017-12-21 | End: 2017-12-22 | Stop reason: HOSPADM

## 2017-12-21 RX ORDER — ACETAMINOPHEN 325 MG/1
650 TABLET ORAL EVERY 4 HOURS PRN
Status: DISCONTINUED | OUTPATIENT
Start: 2017-12-21 | End: 2017-12-22 | Stop reason: HOSPADM

## 2017-12-21 RX ORDER — CIPROFLOXACIN 500 MG/1
500 TABLET ORAL 2 TIMES DAILY
Status: DISCONTINUED | OUTPATIENT
Start: 2017-12-21 | End: 2017-12-22 | Stop reason: HOSPADM

## 2017-12-21 RX ORDER — OXYCODONE HYDROCHLORIDE 5 MG/1
5 TABLET ORAL ONCE AS NEEDED
Status: COMPLETED | OUTPATIENT
Start: 2017-12-21 | End: 2017-12-21

## 2017-12-21 RX ORDER — DEXAMETHASONE SODIUM PHOSPHATE 4 MG/ML
INJECTION, SOLUTION INTRA-ARTICULAR; INTRALESIONAL; INTRAMUSCULAR; INTRAVENOUS; SOFT TISSUE
Status: DISCONTINUED | OUTPATIENT
Start: 2017-12-21 | End: 2017-12-21

## 2017-12-21 RX ORDER — FUROSEMIDE 10 MG/ML
INJECTION INTRAMUSCULAR; INTRAVENOUS
Status: DISCONTINUED | OUTPATIENT
Start: 2017-12-21 | End: 2017-12-21

## 2017-12-21 RX ORDER — ONDANSETRON HYDROCHLORIDE 2 MG/ML
INJECTION, SOLUTION INTRAMUSCULAR; INTRAVENOUS
Status: DISCONTINUED | OUTPATIENT
Start: 2017-12-21 | End: 2017-12-21

## 2017-12-21 RX ORDER — LIDOCAINE HYDROCHLORIDE 10 MG/ML
1 INJECTION, SOLUTION EPIDURAL; INFILTRATION; INTRACAUDAL; PERINEURAL ONCE
Status: COMPLETED | OUTPATIENT
Start: 2017-12-21 | End: 2017-12-21

## 2017-12-21 RX ORDER — MIDAZOLAM HYDROCHLORIDE 1 MG/ML
INJECTION INTRAMUSCULAR; INTRAVENOUS
Status: DISCONTINUED | OUTPATIENT
Start: 2017-12-21 | End: 2017-12-21

## 2017-12-21 RX ADMIN — Medication 3 MG: at 08:12

## 2017-12-21 RX ADMIN — DEXTROSE MONOHYDRATE 1 G: 5 INJECTION, SOLUTION INTRAVENOUS at 11:12

## 2017-12-21 RX ADMIN — FUROSEMIDE 10 MG: 10 INJECTION, SOLUTION INTRAMUSCULAR; INTRAVENOUS at 01:12

## 2017-12-21 RX ADMIN — TAMSULOSIN HYDROCHLORIDE 0.4 MG: 0.4 CAPSULE ORAL at 08:12

## 2017-12-21 RX ADMIN — SODIUM CHLORIDE, SODIUM GLUCONATE, SODIUM ACETATE, POTASSIUM CHLORIDE, MAGNESIUM CHLORIDE, SODIUM PHOSPHATE, DIBASIC, AND POTASSIUM PHOSPHATE: .53; .5; .37; .037; .03; .012; .00082 INJECTION, SOLUTION INTRAVENOUS at 01:12

## 2017-12-21 RX ADMIN — FENTANYL CITRATE 50 MCG: 50 INJECTION, SOLUTION INTRAMUSCULAR; INTRAVENOUS at 02:12

## 2017-12-21 RX ADMIN — ONDANSETRON 4 MG: 2 INJECTION, SOLUTION INTRAMUSCULAR; INTRAVENOUS at 12:12

## 2017-12-21 RX ADMIN — EPHEDRINE SULFATE 25 MG: 50 INJECTION, SOLUTION INTRAMUSCULAR; INTRAVENOUS; SUBCUTANEOUS at 12:12

## 2017-12-21 RX ADMIN — PHENYLEPHRINE HYDROCHLORIDE 200 MCG: 10 INJECTION INTRAVENOUS at 12:12

## 2017-12-21 RX ADMIN — FENTANYL CITRATE 50 MCG: 50 INJECTION, SOLUTION INTRAMUSCULAR; INTRAVENOUS at 01:12

## 2017-12-21 RX ADMIN — FENTANYL CITRATE 50 MCG: 50 INJECTION, SOLUTION INTRAMUSCULAR; INTRAVENOUS at 12:12

## 2017-12-21 RX ADMIN — EPHEDRINE SULFATE 25 MG: 50 INJECTION, SOLUTION INTRAMUSCULAR; INTRAVENOUS; SUBCUTANEOUS at 01:12

## 2017-12-21 RX ADMIN — CIPROFLOXACIN 500 MG: 500 TABLET, FILM COATED ORAL at 09:12

## 2017-12-21 RX ADMIN — LIDOCAINE HYDROCHLORIDE 50 MG: 20 INJECTION, SOLUTION INTRAVENOUS at 12:12

## 2017-12-21 RX ADMIN — LIDOCAINE HYDROCHLORIDE 20 ML: 20 JELLY TOPICAL at 02:12

## 2017-12-21 RX ADMIN — SODIUM CHLORIDE, SODIUM GLUCONATE, SODIUM ACETATE, POTASSIUM CHLORIDE, MAGNESIUM CHLORIDE, SODIUM PHOSPHATE, DIBASIC, AND POTASSIUM PHOSPHATE: .53; .5; .37; .037; .03; .012; .00082 INJECTION, SOLUTION INTRAVENOUS at 02:12

## 2017-12-21 RX ADMIN — OXYCODONE HYDROCHLORIDE 5 MG: 5 TABLET ORAL at 04:12

## 2017-12-21 RX ADMIN — INDIGO CARMINE 5 ML: 8 INJECTION, SOLUTION INTRAMUSCULAR; INTRAVENOUS at 01:12

## 2017-12-21 RX ADMIN — SODIUM CHLORIDE, SODIUM GLUCONATE, SODIUM ACETATE, POTASSIUM CHLORIDE, MAGNESIUM CHLORIDE, SODIUM PHOSPHATE, DIBASIC, AND POTASSIUM PHOSPHATE: .53; .5; .37; .037; .03; .012; .00082 INJECTION, SOLUTION INTRAVENOUS at 10:12

## 2017-12-21 RX ADMIN — DEXAMETHASONE SODIUM PHOSPHATE 4 MG: 4 INJECTION, SOLUTION INTRAMUSCULAR; INTRAVENOUS at 12:12

## 2017-12-21 RX ADMIN — ACETAMINOPHEN 1000 MG: 10 INJECTION, SOLUTION INTRAVENOUS at 01:12

## 2017-12-21 RX ADMIN — LIDOCAINE HYDROCHLORIDE 10 MG: 10 INJECTION, SOLUTION EPIDURAL; INFILTRATION; INTRACAUDAL; PERINEURAL at 11:12

## 2017-12-21 RX ADMIN — SODIUM CHLORIDE, SODIUM GLUCONATE, SODIUM ACETATE, POTASSIUM CHLORIDE, MAGNESIUM CHLORIDE, SODIUM PHOSPHATE, DIBASIC, AND POTASSIUM PHOSPHATE: .53; .5; .37; .037; .03; .012; .00082 INJECTION, SOLUTION INTRAVENOUS at 03:12

## 2017-12-21 RX ADMIN — SODIUM CHLORIDE: 0.9 INJECTION, SOLUTION INTRAVENOUS at 12:12

## 2017-12-21 RX ADMIN — NIFEDIPINE 30 MG: 30 TABLET, FILM COATED, EXTENDED RELEASE ORAL at 09:12

## 2017-12-21 RX ADMIN — PROPOFOL 150 MG: 10 INJECTION, EMULSION INTRAVENOUS at 12:12

## 2017-12-21 RX ADMIN — DEXTROSE MONOHYDRATE 1 G: 5 INJECTION, SOLUTION INTRAVENOUS at 12:12

## 2017-12-21 RX ADMIN — SODIUM CHLORIDE: 0.9 INJECTION, SOLUTION INTRAVENOUS at 09:12

## 2017-12-21 RX ADMIN — CIPROFLOXACIN 400 MG: 2 INJECTION, SOLUTION INTRAVENOUS at 12:12

## 2017-12-21 RX ADMIN — PHENYLEPHRINE HYDROCHLORIDE 200 MCG: 10 INJECTION INTRAVENOUS at 01:12

## 2017-12-21 RX ADMIN — MIDAZOLAM HYDROCHLORIDE 2 MG: 1 INJECTION, SOLUTION INTRAMUSCULAR; INTRAVENOUS at 12:12

## 2017-12-21 NOTE — PLAN OF CARE
Problem: Patient Care Overview  Goal: Plan of Care Review  Outcome: Ongoing (interventions implemented as appropriate)  Pt resting in bed, easily aroused. AAOx3. Dx: gross hematuria w/clots & urinary retention. Plan of care reviewed. Questions answered. Verbalized understanding. IVFs infusing. 3-way ramires intact. Conts light CBI. Clear, yellow urine noted. No clots noted. Manual irrigate ramires as needed. NPO for TURP today per MD. Started on Rocephin IV. No complaints noted at this time. Call light in reach. Bed low. Will cont to monitor.

## 2017-12-21 NOTE — CONSULTS
[facility] Urology Progress Note    Roosevelt Alejandro is a 72 y.o. male who presents for evaluation of urinary retention and gross hematuria.    He presented to the emergency department on 12/2/17 with progressive difficulty urinating and abdominal distention was found to be in urinary retention and a Ramires catheter was placed. UA neg. He did return to the ER on 12/7/17 secondary to gross hematuria per Ramires.  Catheter was manually irrigated and noted to be draining clear pink, and he was discharged with Keflex. Ucx negative.     On 12/11/17, he failed fill and pull voiding trial, voiding only 50 cc after bladder filling with postvoid residual 173 cc and inability to pass remainder of urine. No GH prior to ramires, nor immediately after. No UTI history, no family history  malignancy, no personal or family history of kidney stones, no blood thinners, no UTI history. He quit smoking in 2000 after 40+ years of 3 pack per day smoking since age 13  Does have past history of borderline elevated PSA with negative past prostate biopsy and subsequent age-adjusted normal PSAs, all with free PSA greater than 30%     At timed of failed voiding trial on 12/11/17, he had a 40 g round enlarged benign EDMUNDO and Ramires catheter was replaced and planned CT urogram prior to cystoscopy on 12/26/17. He did then present to clinic Mon 12/18/17 after recurrence of spontaneous gross hematuria in near clot retention requiring significant manual irrigation to clear, so procedure r/s to 12/19 at [facility]. He did have GH recur just prior, and after irrigating was cystoscoped and found to have:    1. Very large obstructing prostate with high grade obstruction, kissing lateral lobes, enlarged obstructing median lobe with asymm enlargement of L>R obstructing at bladder neck with element of anterior obstruction as well  2. Very hypervascular median lobe with prominent vessels streaking across entire median lobe projection - likely source of GH  3. Severely  trabeculated bladder with diffuse cellules and moderate scattered diverticulae  4. Posterior bladder wall bullous ramires edema vs. Papillary bladder tumor - difficult to distinguish, but large area on posterior wall extending towards left lateral wall    Catheter placed draining clear and added on to OR for 12/21 for TURP plus TURBT after extensive discussion. He did go to ER last night requiring ramires change due to clot obstruction and manual irrigation to clear with placement of new ramires.     He presented this morning for scheduled outpt CT urogram and PAT visit to preop and noted to have bloody urine in bag with only minimal output since ER visit and increasing abdominal distention and pain, and therefore was direct admitted for management of his clot retention and to be kept for observation pending turp/tubrt tomorrow      ROS: A comprehensive 10 system review was performed and is negative except as noted above in HPI    PHYSICAL EXAM:    Vitals:    12/20/17 1932   BP: 128/66   Pulse: 82   Resp: 18   Temp: 98.3 °F (36.8 °C)     Body mass index is 36.59 kg/m².       General: Alert, cooperative, no distress, appears stated age   Head: Normocephalic, without obvious abnormality, atraumatic   Eyes: PERRL, conjunctiva/corneas clear   Lungs: Respirations unlabored   Heart: Warm and well perfused   Abdomen: distented firm tttp  : uncirc phallus with 22fr ramires in place with blood at meatus  Extremities: Extremities normal, atraumatic, no cyanosis or edema   Skin: Skin color, texture, turgor normal, no rashes or lesions   Psych: Appropriate   Neurologic: Non-focal       Recent Results (from the past 336 hour(s))   Urinalysis Catheterized    Collection Time: 12/07/17 11:00 PM   Result Value Ref Range    Specimen UA Urine, Catheterized     Color, UA Red (A) Yellow, Straw, Afsaneh    Appearance, UA Cloudy (A) Clear    pH, UA SEE COMMENT 5.0 - 8.0    Specific Gravity, UA SEE COMMENT 1.005 - 1.030    Protein, UA SEE COMMENT  Negative    Glucose, UA SEE COMMENT Negative    Ketones, UA SEE COMMENT Negative    Bilirubin (UA) SEE COMMENT Negative    Occult Blood UA SEE COMMENT Negative    Nitrite, UA SEE COMMENT Negative    Urobilinogen, UA SEE COMMENT <2.0 EU/dL    Leukocytes, UA SEE COMMENT Negative   Urine culture    Collection Time: 12/07/17 11:00 PM   Result Value Ref Range    Urine Culture, Routine No growth    Urinalysis Microscopic    Collection Time: 12/07/17 11:00 PM   Result Value Ref Range    RBC, UA >100 (H) 0 - 4 /hpf    WBC, UA 20 (H) 0 - 5 /hpf    Bacteria, UA Occasional None-Occ /hpf    Squam Epithel, UA 1 /hpf    Hyaline Casts, UA 0 0-1/lpf /lpf    Amorphous, UA Many (A) None-Moderate    Microscopic Comment SEE COMMENT    POCT Bladder Scan    Collection Time: 12/11/17  6:18 PM   Result Value Ref Range    POC Residual Urine Volume 173 (A) 0 - 100 mL   Basic metabolic panel    Collection Time: 12/20/17 11:43 AM   Result Value Ref Range    Sodium 134 (L) 136 - 145 mmol/L    Potassium 4.0 3.5 - 5.1 mmol/L    Chloride 101 95 - 110 mmol/L    CO2 20 (L) 23 - 29 mmol/L    Glucose 118 (H) 70 - 110 mg/dL    BUN, Bld 18 8 - 23 mg/dL    Creatinine 1.2 0.5 - 1.4 mg/dL    Calcium 9.9 8.7 - 10.5 mg/dL    Anion Gap 13 8 - 16 mmol/L    eGFR if African American >60 >60 mL/min/1.73 m^2    eGFR if non African American 60 >60 mL/min/1.73 m^2   CBC auto differential    Collection Time: 12/20/17 11:43 AM   Result Value Ref Range    WBC 14.10 (H) 3.90 - 12.70 K/uL    RBC 4.21 (L) 4.60 - 6.20 M/uL    Hemoglobin 13.7 (L) 14.0 - 18.0 g/dL    Hematocrit 40.2 40.0 - 54.0 %    MCV 96 82 - 98 fL    MCH 32.6 (H) 27.0 - 31.0 pg    MCHC 34.1 32.0 - 36.0 g/dL    RDW 12.8 11.5 - 14.5 %    Platelets 234 150 - 350 K/uL    MPV 8.5 (L) 9.2 - 12.9 fL    Gran # 12.2 (H) 1.8 - 7.7 K/uL    Lymph # 1.4 1.0 - 4.8 K/uL    Mono # 0.4 0.3 - 1.0 K/uL    Eos # 0.1 0.0 - 0.5 K/uL    Baso # 0.00 0.00 - 0.20 K/uL    Gran% 86.3 (H) 38.0 - 73.0 %    Lymph% 9.9 (L) 18.0 - 48.0 %     Mono% 3.1 (L) 4.0 - 15.0 %    Eosinophil% 0.6 0.0 - 8.0 %    Basophil% 0.1 0.0 - 1.9 %    Differential Method Automated    Protime-INR    Collection Time: 12/20/17 11:43 AM   Result Value Ref Range    Prothrombin Time 11.0 9.0 - 12.5 sec    INR 1.1 0.8 - 1.2   Type And Screen Preop    Collection Time: 12/20/17 11:43 AM   Result Value Ref Range    Group & Rh A POS     Indirect Neeta NEG    PT/INR    Collection Time: 12/20/17  2:30 PM   Result Value Ref Range    Prothrombin Time 11.2 9.0 - 12.5 sec    INR 1.1 0.8 - 1.2   PTT    Collection Time: 12/20/17  2:30 PM   Result Value Ref Range    aPTT 30.1 21.0 - 32.0 sec   PSA, Screening    Collection Time: 12/20/17  2:30 PM   Result Value Ref Range    PSA, SCREEN 28.6 (H) 0.00 - 4.00 ng/mL     CT: essentially normal CT urogram with renal cysts and large ~7cm prostate with ramires from ER demonstrating balloon inflated in prostatic urethra.    Procedure: Bladder irrigation/Ramires change  Patient's indwelling 22 Yemeni Ramires catheter was noted to not be draining and only had some bloody urine in bag, so removed, and did have small clot at end.  With Betadine prep using aseptic technique, a 22 [place]i 3-way Ramires was placed which did drain bloody urine without clots. 3rd port plugged.  60 cc catheter tip syringe with sterile water was then used to manually irrigate the bladder after instilling 60 cc into the bladder, and then flushing and irrigating with subsequent syringe-fuls.  Only a few moderate to large clots were irrigated free from bladder after which time urine was draining clear, abdomen less distended, and patient felt well. Ramires placed to urimeter bag and 3rd port used to initiate normal saline continuous bladder irrigation at the slowest trickle rate. He tolerated this well without pain or complication    ASSESSMENT   Urinary retention  Gross hematuria    Plan    Will be kept for observation on CBI going ever so lightly and if he has any recurrence of his GH,  which has been happening at night most often, or poor ramires drainage/clots, CBI will be clamped and he will be manually irrigated as above until draining well again, after which time cbi can be resumed.     Will continue plan for TURP/TURBT in OR tomorrow. Reminded of possibility of staged procedure. Reasonable discharge home tomorrow afternoon if observed with clear urine postop once ramires is removed from traction, though would consider observing until following morning given his recent history.    All questions answered. To OR tomorrow 12/21/17 for TURP/turbt

## 2017-12-21 NOTE — OP NOTE
[facility] Urology Operative Note     Date: 12/19/2017     Staff Surgeon: Alejandro Abdi MD     Pre-Op Diagnosis:   Urinary retention  Gross hematuria     Post-Op Diagnosis:   Urinary retention secondary to enlarged obstructing prostate  Gross hematuria  Bladder tumor vs ramires catheter edema     Procedure(s) Performed:   Cystoscopy, flexible  Manual irrigation of bladder/evacuation of clots  Exchange of ramires catheter          Specimen(s): none     Anesthesia: local, urojet intraurethral instillation 2% lidocaine jelly     Findings:   1. Very large obstructing prostate with high grade obstruction, kissing lateral lobes, enlarged obstructing median lobe with asymm enlargement of L>R obstructing at bladder neck with element of anterior obstruction as well  2. Very hypervascular median lobe with prominent vessels streaking across entire median lobe projection  3. Severely trabeculated bladder with diffuse cellules and moderate scattered diverticulae  4. All clots removed with manual irrigation, no active bleeding, some blood staining in dep. Bladder  5. Posterior bladder wall bullous ramires edema vs. Papillary bladder tumor - difficult to distinguish, but large area on posterior wall extending towards left lateral wall     Estimated Blood Loss: 100cc     Drains: 22fr coude ramires     Complications: none    Indications for procedure:  Mr Alejandro is a 71 yo M with acute urinary retention earlier this month who has since had a few episodes of gross hematuria per ramires, most recently yesterday presenting in near-clot retention requiring significant manual irrigation to clear clot burden and irrigate clear. Discussed moving scheduled cysto/TRUS eval of enlarged prostate 2/2 retention to today to urgently evaluate source of bleeding, so could plan management. He did experience recrurrence of gross hematuria last night, which cleared, and then again had clot passage today and presents with recurrent gross hematuria for planned  cystoscopy.     Procedure in detail:  After appropriate informed consent obtained, patient taken to cystoscopy suite. His Ramires catheter was removed and noted to have a blood clot at the tip.  With Betadine prep using aseptic technique, a 22 [place] Ramires was placed which did drain bloody urine without clots.  60 cc catheter tip syringe with sterile water was then used to manually irrigate the bladder after instilling 60 cc into the bladder, and then flushing and irrigating with subsequent syringe-fuls.  A significant burden of large dark blood clots was evacuated and approximately 4-5 Liters of manual irrigation were necessary to clear the clot burden. Once he remained crystal clear irrigating through the 22 [place] Ramires catheter, it was then removed and he was prepped and draped in standard sterile fashion. 2% xylocaine jelly was instilled into the urethra.     A flexible cystoscope was passed into the bladder via the urethra. Anterior urethra appeared normal without any lesions or narrowings. The prostatic urethra demonstrated significant enlargment of the bilateral lateral lobes, with kissing lateral lobes with high grade obstruction. Prostatic urethra 5-6cm in length.        Systematic inspection of his bladder was performed with findings noted in detail as above, including Posterior wall edema vs papillary tumor, thick trabeculations, moderate cellules and early diverticulae, and sig obstruction from prostate at bladder neck from large hypervascular median lobe and anterior obstructing tissue.   No active bleeding noted from either bladder lesion or prostate though most appeared as if ramires catheter/balloon were intermittently irritating blood vessels on median lobe causing bleeding.  Given cystoscopic findings, and extensive irrigation of bladder prior to cystoscopic eval, deferred prostate transrectal ultrasound volume measurement as would not  recs. A 22fr coude ramires was placed, as though  despite easy passage of 22fr straight foleys, attempt of curved tip to keep catheter balloon pointed away from median lobe. 60 cc cath tip syringe used to flush/irrigate to ensure no issues with drainage, and ramires placed to leg bag.    Disposition: TURP with possible tuRBT 12/21/17.     Given his cystoscopic findings, discussed proceeding with TURP urgently as he has both retention and hematuria from prostatic source, and given his posterior wall bladder findings, would also perform TURBT concurrently to resect these lesions (which may represent papillary urothelial carcinoma vs. Chronic inflammatory change of posterior wall from ramires catheter).      We did discuss risks associated with transurethral resection of the prostate including pain, infection, bleeding, incontinence, retrograde ejaculation, stricture, need for a staged procedure, incidental finding of malignancy. we did discuss the natural history of prostate cancer in men, noting that his psa has been only borderline elevated though age adjusted normal for a man his age, with high free psas consistently above 30%, and therefore would not recommend prostate biopsy (especially given past negative biopsy), but is still possible to incidentally discover low grade prostatic malignancy on TURP specimen in a man his age. Also discussed risks of TURBT including but not limited to risks of endoscopic procedures mentioned above as well as bladder injury/perforation.    We also discussed general postop management noting he would need a Ramires catheter for up to 5-7 days.  Likely more on the 7day range at minimum given extent of surgery. As well, if a staged procedure is required, would likely keep ramires indwelling until next procedure. Goal is to unobstruct bladder, stop bleeding, remove suspicious lesions. If could not safely complete TURP could return for second stage.      Given his likely long-standing obstruction with significant trilobar obstruction and  bladder changes consistent with chronic obstruction, we did discuss the possibility of difficulty spontaneously voiding/emptying immediately following TURP, or transient increasing urgency/frequency.         All questions he and his daughter Sarah had were answered and appropriate informed consent was obtained. No sig PMHx though does have Holter monitor pending for reportedly abnormal EKG as per Dr Johnson. Will go to PAT tomorrow to preop after his scheduled CT urogram, and will repeat EKG, and will message Dr Johnson to see if any further preop recs, as surgery has been scheduled urgently in 2d on 12/21/17.    TURP/TURBT in OR on 12/21/17. CT urogram and PAT preop appt on 12/20/17  Instructed to drink plenty of water and if has recurrence of dark red urine/clots should go to ER and will need manual evacuation of all clots before CBI even considered as I was able to clear his clot burden with 3-5L manual irrigation the last 2 days. Post ppx abx given, though full week course given so has post TURP/TURBT.

## 2017-12-21 NOTE — H&P (VIEW-ONLY)
[facility] Urology Progress Note    Roosevelt Alejandro is a 72 y.o. male who presents for evaluation of urinary retention and gross hematuria.    He presented to the emergency department on 12/2/17 with progressive difficulty urinating and abdominal distention was found to be in urinary retention and a Ramires catheter was placed. UA neg. He did return to the ER on 12/7/17 secondary to gross hematuria per Ramires.  Catheter was manually irrigated and noted to be draining clear pink, and he was discharged with Keflex. Ucx negative.     On 12/11/17, he failed fill and pull voiding trial, voiding only 50 cc after bladder filling with postvoid residual 173 cc and inability to pass remainder of urine. No GH prior to ramires, nor immediately after. No UTI history, no family history  malignancy, no personal or family history of kidney stones, no blood thinners, no UTI history. He quit smoking in 2000 after 40+ years of 3 pack per day smoking since age 13  Does have past history of borderline elevated PSA with negative past prostate biopsy and subsequent age-adjusted normal PSAs, all with free PSA greater than 30%     At timed of failed voiding trial on 12/11/17, he had a 40 g round enlarged benign EDMUNDO and Ramires catheter was replaced and planned CT urogram prior to cystoscopy on 12/26/17. He did then present to clinic Mon 12/18/17 after recurrence of spontaneous gross hematuria in near clot retention requiring significant manual irrigation to clear, so procedure r/s to 12/19 at [facility]. He did have GH recur just prior, and after irrigating was cystoscoped and found to have:    1. Very large obstructing prostate with high grade obstruction, kissing lateral lobes, enlarged obstructing median lobe with asymm enlargement of L>R obstructing at bladder neck with element of anterior obstruction as well  2. Very hypervascular median lobe with prominent vessels streaking across entire median lobe projection - likely source of GH  3. Severely  trabeculated bladder with diffuse cellules and moderate scattered diverticulae  4. Posterior bladder wall bullous ramires edema vs. Papillary bladder tumor - difficult to distinguish, but large area on posterior wall extending towards left lateral wall    Catheter placed draining clear and added on to OR for 12/21 for TURP plus TURBT after extensive discussion. He did go to ER last night requiring ramires change due to clot obstruction and manual irrigation to clear with placement of new ramires.     He presented this morning for scheduled outpt CT urogram and PAT visit to preop and noted to have bloody urine in bag with only minimal output since ER visit and increasing abdominal distention and pain, and therefore was direct admitted for management of his clot retention and to be kept for observation pending turp/tubrt tomorrow      ROS: A comprehensive 10 system review was performed and is negative except as noted above in HPI    PHYSICAL EXAM:    Vitals:    12/20/17 1932   BP: 128/66   Pulse: 82   Resp: 18   Temp: 98.3 °F (36.8 °C)     Body mass index is 36.59 kg/m².       General: Alert, cooperative, no distress, appears stated age   Head: Normocephalic, without obvious abnormality, atraumatic   Eyes: PERRL, conjunctiva/corneas clear   Lungs: Respirations unlabored   Heart: Warm and well perfused   Abdomen: distented firm tttp  : uncirc phallus with 22fr ramires in place with blood at meatus  Extremities: Extremities normal, atraumatic, no cyanosis or edema   Skin: Skin color, texture, turgor normal, no rashes or lesions   Psych: Appropriate   Neurologic: Non-focal       Recent Results (from the past 336 hour(s))   Urinalysis Catheterized    Collection Time: 12/07/17 11:00 PM   Result Value Ref Range    Specimen UA Urine, Catheterized     Color, UA Red (A) Yellow, Straw, Afsaneh    Appearance, UA Cloudy (A) Clear    pH, UA SEE COMMENT 5.0 - 8.0    Specific Gravity, UA SEE COMMENT 1.005 - 1.030    Protein, UA SEE COMMENT  Negative    Glucose, UA SEE COMMENT Negative    Ketones, UA SEE COMMENT Negative    Bilirubin (UA) SEE COMMENT Negative    Occult Blood UA SEE COMMENT Negative    Nitrite, UA SEE COMMENT Negative    Urobilinogen, UA SEE COMMENT <2.0 EU/dL    Leukocytes, UA SEE COMMENT Negative   Urine culture    Collection Time: 12/07/17 11:00 PM   Result Value Ref Range    Urine Culture, Routine No growth    Urinalysis Microscopic    Collection Time: 12/07/17 11:00 PM   Result Value Ref Range    RBC, UA >100 (H) 0 - 4 /hpf    WBC, UA 20 (H) 0 - 5 /hpf    Bacteria, UA Occasional None-Occ /hpf    Squam Epithel, UA 1 /hpf    Hyaline Casts, UA 0 0-1/lpf /lpf    Amorphous, UA Many (A) None-Moderate    Microscopic Comment SEE COMMENT    POCT Bladder Scan    Collection Time: 12/11/17  6:18 PM   Result Value Ref Range    POC Residual Urine Volume 173 (A) 0 - 100 mL   Basic metabolic panel    Collection Time: 12/20/17 11:43 AM   Result Value Ref Range    Sodium 134 (L) 136 - 145 mmol/L    Potassium 4.0 3.5 - 5.1 mmol/L    Chloride 101 95 - 110 mmol/L    CO2 20 (L) 23 - 29 mmol/L    Glucose 118 (H) 70 - 110 mg/dL    BUN, Bld 18 8 - 23 mg/dL    Creatinine 1.2 0.5 - 1.4 mg/dL    Calcium 9.9 8.7 - 10.5 mg/dL    Anion Gap 13 8 - 16 mmol/L    eGFR if African American >60 >60 mL/min/1.73 m^2    eGFR if non African American 60 >60 mL/min/1.73 m^2   CBC auto differential    Collection Time: 12/20/17 11:43 AM   Result Value Ref Range    WBC 14.10 (H) 3.90 - 12.70 K/uL    RBC 4.21 (L) 4.60 - 6.20 M/uL    Hemoglobin 13.7 (L) 14.0 - 18.0 g/dL    Hematocrit 40.2 40.0 - 54.0 %    MCV 96 82 - 98 fL    MCH 32.6 (H) 27.0 - 31.0 pg    MCHC 34.1 32.0 - 36.0 g/dL    RDW 12.8 11.5 - 14.5 %    Platelets 234 150 - 350 K/uL    MPV 8.5 (L) 9.2 - 12.9 fL    Gran # 12.2 (H) 1.8 - 7.7 K/uL    Lymph # 1.4 1.0 - 4.8 K/uL    Mono # 0.4 0.3 - 1.0 K/uL    Eos # 0.1 0.0 - 0.5 K/uL    Baso # 0.00 0.00 - 0.20 K/uL    Gran% 86.3 (H) 38.0 - 73.0 %    Lymph% 9.9 (L) 18.0 - 48.0 %     Mono% 3.1 (L) 4.0 - 15.0 %    Eosinophil% 0.6 0.0 - 8.0 %    Basophil% 0.1 0.0 - 1.9 %    Differential Method Automated    Protime-INR    Collection Time: 12/20/17 11:43 AM   Result Value Ref Range    Prothrombin Time 11.0 9.0 - 12.5 sec    INR 1.1 0.8 - 1.2   Type And Screen Preop    Collection Time: 12/20/17 11:43 AM   Result Value Ref Range    Group & Rh A POS     Indirect Neeta NEG    PT/INR    Collection Time: 12/20/17  2:30 PM   Result Value Ref Range    Prothrombin Time 11.2 9.0 - 12.5 sec    INR 1.1 0.8 - 1.2   PTT    Collection Time: 12/20/17  2:30 PM   Result Value Ref Range    aPTT 30.1 21.0 - 32.0 sec   PSA, Screening    Collection Time: 12/20/17  2:30 PM   Result Value Ref Range    PSA, SCREEN 28.6 (H) 0.00 - 4.00 ng/mL     CT: essentially normal CT urogram with renal cysts and large ~7cm prostate with ramires from ER demonstrating balloon inflated in prostatic urethra.    Procedure: Bladder irrigation/Ramires change  Patient's indwelling 22 [place]adeshi Ramires catheter was noted to not be draining and only had some bloody urine in bag, so removed, and did have small clot at end.  With Betadine prep using aseptic technique, a 22 [place]hi 3-way Ramires was placed which did drain bloody urine without clots. 3rd port plugged.  60 cc catheter tip syringe with sterile water was then used to manually irrigate the bladder after instilling 60 cc into the bladder, and then flushing and irrigating with subsequent syringe-fuls.  Only a few moderate to large clots were irrigated free from bladder after which time urine was draining clear, abdomen less distended, and patient felt well. Ramires placed to urimeter bag and 3rd port used to initiate normal saline continuous bladder irrigation at the slowest trickle rate. He tolerated this well without pain or complication    ASSESSMENT   Urinary retention  Gross hematuria    Plan    Will be kept for observation on CBI going ever so lightly and if he has any recurrence of his GH,  which has been happening at night most often, or poor ramires drainage/clots, CBI will be clamped and he will be manually irrigated as above until draining well again, after which time cbi can be resumed.     Will continue plan for TURP/TURBT in OR tomorrow. Reminded of possibility of staged procedure. Reasonable discharge home tomorrow afternoon if observed with clear urine postop once ramires is removed from traction, though would consider observing until following morning given his recent history.    All questions answered. To OR tomorrow 12/21/17 for TURP/turbt

## 2017-12-21 NOTE — PLAN OF CARE
Pt transferred from room to PreOp with daughter at side via wheelchair.  Pt denies any pain at this time.  Zelaya catheter irrigation system in progress.  Left FA IV flushed with no problems.  Urine = clear with no clots yellow with slight pink tint.

## 2017-12-21 NOTE — BRIEF OP NOTE
[facility]  Brief Operative/Discharge Note    Date: 12/21/2017    Staff Surgeon: Alejandro Abdi MD    Pre-Op Diagnosis:   Gross hematuria  Urinary retention secondary to prostatic obstruction  Ramires edema vs bladder tumor    Post-Op Diagnosis: same    Procedure(s) Performed:   Transurethral resection of prostate, bipolar (modifier 22)    Specimen(s): prostate chips    Anesthesia: General LMA anesthesia    Findings: very large obstructing hypervascular irregular prostate with vascular median lobe. Prostate 50% resected with significant fulguration of bleeding vessels. No clear visualization of ureteral orifices including with indigo carmine/lasix admin. Bladder without gross tumor, though difficult to fully evaluate as resectoscope barely reached into bladder through large prostate.    Estimated Blood Loss: 150-200cc     Drains: 24 fr 3 way ramires    Complications: none    Disposition: pacu to floor  - labs and VANDANA and monitor UOP closely.  - urine draining clear/light pink - will keep cbi clamped and use prn   - ramires on traction 2hrs postop with overdistended balloon. Will remove traction and take 20cc out of balloon 2h postop and observe for GH recurrence and use CBI prn  - lidocaine jelly to glans prn 2/2 prolonged scope compression intraop  - if labs and VANDANA remain normal with good UOP in morning, can DC home with ramires in place and make plans for second stage procedure     none

## 2017-12-21 NOTE — PLAN OF CARE
Pt is out of the room- CM will follow up with completing a discharge assessment.  Ruth Abreu LMSW     12/21/17 6541   Discharge Assessment   Assessment Type Discharge Planning Assessment

## 2017-12-21 NOTE — PLAN OF CARE
12/21/17 1555   Discharge Assessment   Assessment Type Discharge Planning Reassessment   Patient still in surgery. CM will followup to complete discharge planning assessment when patient available.

## 2017-12-21 NOTE — ANESTHESIA POSTPROCEDURE EVALUATION
"Anesthesia Post Evaluation    Patient: Roosevelt Alejandro    Procedure(s) Performed: Procedure(s) (LRB):  PROSTATECTOMY-TRANSURETHRAL (N/A)    Final Anesthesia Type: general  Patient location during evaluation: PACU  Patient participation: Yes- Able to Participate  Level of consciousness: awake and alert and oriented  Post-procedure vital signs: reviewed and stable  Pain management: adequate  Airway patency: patent  PONV status at discharge: No PONV  Anesthetic complications: no      Cardiovascular status: hemodynamically stable and blood pressure returned to baseline  Respiratory status: unassisted, spontaneous ventilation and room air  Hydration status: euvolemic  Follow-up not needed.        Visit Vitals  /68   Pulse 78   Temp 36.7 °C (98.1 °F) (Temporal)   Resp (!) 9   Ht 6' 2" (1.88 m)   Wt 129.3 kg (285 lb)   SpO2 98%   BMI 36.59 kg/m²       Pain/Taj Score: Pain Assessment Performed: Yes (12/21/2017  5:30 PM)  Presence of Pain: denies (12/21/2017  5:30 PM)  Pain Rating Prior to Med Admin: 3 (12/21/2017  4:36 PM)  Pain Rating Post Med Admin: 4 (12/20/2017  4:45 PM)  Taj Score: 10 (12/21/2017  5:30 PM)      "

## 2017-12-21 NOTE — PROGRESS NOTES
Progress Note  Hospital Medicine  Patient Name:Roosevelt Alejandro  MRN:  [id number]  Patient Class: OP- Observation  Admit Date: 12/20/2017  Length of Stay: 0 days  Expected Discharge Date:   Attending Physician: Cresencio Salazar MD  Primary Care Provider:  Greg Johnson MD    SUBJECTIVE:     Principal Problem: Gross hematuria  Initial history of present illness: Patient is a 72 y.o. male admitted to Hospitalist Service from Radiology department admitted directly at directions by Dr. Abdi with complaint of gross hematuria. Patient reportedly has past medical history significant for BPH, history of asbestosis, hypertension and OA. Patient reported, he has been having of and urine retention problems for the past 3 weeks. Patient is closely following with Dr. Abdi. Patient has been having intermittent grossly hematuria and has had 2 ER visits in last 2-3 days. Patient had cystoscopy performed 2 days ago. Zelaya catheter again placed last night in the ER for retention and gross hematuria. Patient is having frequent supra-pubic discomfort. No associated nausea or vomiting or changes in bowel habits reported. No fever or chills present. Patient denied chest pain, shortness of breath, nausea, vomiting, headache, vision changes, focal neuro-deficits, cough or fever.    PMH/PSH/SH/FH/Meds: reviewed.    Symptoms/Review of Systems: Patient is going for TURP. No shortness of breath, cough, chest pain or headache, fever or abdominal pain.     Diet:  NPO  Activity level: Normal.    Pain:  Patient reports no pain.       OBJECTIVE:   Vital Signs (Most Recent):      Temp: 97.5 °F (36.4 °C) (12/21/17 1029)  Pulse: 82 (12/21/17 1029)  Resp: 18 (12/21/17 1029)  BP: (!) 154/73 (12/21/17 1029)  SpO2: 98 % (12/21/17 1029)       Vital Signs Range (Last 24H):  Temp:  [97.5 °F (36.4 °C)-98.7 °F (37.1 °C)]   Pulse:  [72-99]   Resp:  [18-20]   BP: (122-168)/(66-81)   SpO2:  [95 %-99 %]     Weight: 129.3 kg (285 lb)  Body mass index is 36.59  kg/m².    Intake/Output Summary (Last 24 hours) at 12/21/17 1111  Last data filed at 12/21/17 0817   Gross per 24 hour   Intake           406.67 ml   Output             3825 ml   Net         -3418.33 ml     Physical Examination:  General appearance: well developed, appears stated age, In distress  Head: normocephalic, atraumatic  Eyes:  conjunctivae/corneas clear. PERRL.  Nose: Nares normal. Septum midline.  Throat: lips, mucosa, and tongue normal; teeth and gums normal, no throat erythema.  Neck: supple, symmetrical, trachea midline, no JVD and thyroid not enlarged, symmetric, no tenderness/mass/nodules  Lungs:  clear to auscultation bilaterally and normal respiratory effort  Chest wall: no tenderness  Heart: regular rate and rhythm, S1, S2 normal, no murmur, click, rub or gallop  Abdomen: soft, tenderness sin supra-pubic area, non-distented; bowel sounds normal; no masses,  no organomegaly  Extremities: no cyanosis, clubbing or edema.   Pulses: 2+ and symmetric  Skin: Skin color, texture, turgor normal. No rashes or lesions.  Lymph nodes: Cervical, supraclavicular, and axillary nodes normal.  Neurologic: Normal strength and tone. No focal numbness or weakness. CNII-XII intact.         CBC:    Recent Labs  Lab 12/20/17  1143 12/21/17  0528   WBC 14.10* 9.80   RBC 4.21* 3.63*   HGB 13.7* 11.8*   HCT 40.2 34.6*    182   MCV 96 95   MCH 32.6* 32.4*   MCHC 34.1 34.0   BMP    Recent Labs  Lab 12/20/17  1143 12/21/17  0528   * 112*   * 139   K 4.0 3.9    106   CO2 20* 25   BUN 18 14   CREATININE 1.2 1.0   CALCIUM 9.9 8.7      Diagnostic Results:  Microbiology Results (last 7 days)     ** No results found for the last 168 hours. **         Chest X-Ray: No acute findings. Calcified pleural plaque formation suggesting prior asbestos exposure.     CT urogram: Significant prostate hypertrophy. There is marked trabeculation and several diverticuli of the bladder with thickening of the bladder wall.  The Zelaya balloon has been inflated within the prostate and should be repositioned. Several cysts of each kidney without solid mass, stone or hydronephrosis seen. Diverticulosis coli of the descending and sigmoid colon. Multiple liver cysts. Hiatal hernia and possible esophagitis. Coronary artery calcification.     US bladder: Mild prostate hypertrophy. Trabeculated bladder wall with several bladder diverticuli noted posteriorly. The volume at the present time is 166 cc with internal debris    Assessment/Plan:      *Gross hematuria [R31.0]   Yes    Urinary retention [R33.9]  BPH (benign prostatic hyperplasia) [N40.0]  Follow Dr. Fox's recommendations.  Continue IVF hydration.  Follow hemoglobin and hematocrit closely.  Pain control with IV narcotics and antiemetics as needed.   Yes    Pulmonary asbestosis [J61]  Use prn supplemental oxygen.      Yes    HTN (hypertension) [I10]  Chronic problem. Will continue chronic medications and monitor for any changes, adjusting as needed.         VTE Risk Mitigation         Ordered     Low Risk of VTE  Once      12/21/17 1000     Place LAZARO hose  Until discontinued      12/20/17 1315     Place sequential compression device  Until discontinued      12/20/17 1315     Reason for No Pharmacological VTE Prophylaxis  Once      12/20/17 1315        Cresencio Salazar MD  Department of Hospital Medicine   [facility]

## 2017-12-21 NOTE — TRANSFER OF CARE
"Anesthesia Transfer of Care Note    Patient: Roosevelt Alejandro    Procedure(s) Performed: Procedure(s) (LRB):  PROSTATECTOMY-TRANSURETHRAL (N/A)    Patient location: PACU    Anesthesia Type: general    Transport from OR: Transported from OR on 2-3 L/min O2 by NC with adequate spontaneous ventilation    Post pain: adequate analgesia    Post assessment: no apparent anesthetic complications    Post vital signs: stable    Level of consciousness: awake    Nausea/Vomiting: no nausea/vomiting    Complications: none    Transfer of care protocol was followed      Last vitals:   Visit Vitals  BP (!) 154/73 (BP Location: Right arm, Patient Position: Lying)   Pulse 82   Temp 36.4 °C (97.5 °F) (Temporal)   Resp 18   Ht 6' 2" (1.88 m)   Wt 129.3 kg (285 lb)   SpO2 98%   BMI 36.59 kg/m²     "

## 2017-12-21 NOTE — PLAN OF CARE
Pt complained of continued tenderness to left FA IV so D/C IV.  New IV started to Left Hand.  x2 bruising noted to both AC sites

## 2017-12-21 NOTE — INTERVAL H&P NOTE
The patient has been examined and the H&P has been reviewed:    No change to above  Urine clear overnight with light cbi  All questions answered  Proceed with turp/turbt    Anesthesia/Surgery risks, benefits and alternative options discussed and understood by patient/family.          Active Hospital Problems    Diagnosis  POA    *Gross hematuria [R31.0]  Yes    Urinary retention due to benign prostatic hyperplasia [N40.1, R33.8]  Yes    Urinary retention [R33.9]  Yes    Pulmonary asbestosis [J61]  Yes    HTN (hypertension) [I10]  Yes    BPH (benign prostatic hyperplasia) [N40.0]  Yes      Resolved Hospital Problems    Diagnosis Date Resolved POA   No resolved problems to display.

## 2017-12-21 NOTE — ANESTHESIA PREPROCEDURE EVALUATION
12/21/2017  Roosevelt Alejandro is a 72 y.o., male.    Anesthesia Evaluation      I have reviewed the Medications.     Review of Systems  Anesthesia Hx:  No problems with previous Anesthesia   Social:  Former Smoker    Hematology/Oncology:         -- Anemia:   Cardiovascular:   Hypertension    Pulmonary:  Pulmonary Normal asbestosis   Renal/:  Renal/ Normal  hematuria   Hepatic/GI:  Hepatic/GI Normal    Musculoskeletal:   Arthritis     Neurological:  Neurology Normal    Endocrine:  Endocrine Normal        Physical Exam  General:  Obesity    Airway/Jaw/Neck:  Airway Findings: Mouth Opening: Normal General Airway Assessment: Adult  Mallampati: II  Jaw/Neck Findings:  Neck ROM: Extension Decreased, Mild       Chest/Lungs:  Chest/Lungs Findings: Clear to auscultation, Normal Respiratory Rate     Heart/Vascular:  Heart Findings: Rate: Normal  Rhythm: Regular Rhythm  Sounds: Normal  Heart murmur: negative Vascular Findings: Normal (No carotid bruits.)       Mental Status:  Mental Status Findings:  Cooperative, Alert and Oriented         Anesthesia Plan  Type of Anesthesia, risks & benefits discussed:  Anesthesia Type:  general  Patient's Preference:   Intra-op Monitoring Plan:   Intra-op Monitoring Plan Comments:   Post Op Pain Control Plan:   Post Op Pain Control Plan Comments:   Induction:   IV  Beta Blocker:  Patient is not currently on a Beta-Blocker (No further documentation required).       Informed Consent: Patient understands risks and agrees with Anesthesia plan.  Questions answered. Anesthesia consent signed with patient.  ASA Score: 2     Day of Surgery Review of History & Physical:            Ready For Surgery From Anesthesia Perspective.

## 2017-12-22 ENCOUNTER — TELEPHONE (OUTPATIENT)
Dept: UROLOGY | Facility: CLINIC | Age: 72
End: 2017-12-22

## 2017-12-22 VITALS
HEART RATE: 85 BPM | BODY MASS INDEX: 36.57 KG/M2 | HEIGHT: 74 IN | OXYGEN SATURATION: 95 % | RESPIRATION RATE: 20 BRPM | WEIGHT: 285 LBS | TEMPERATURE: 98 F | DIASTOLIC BLOOD PRESSURE: 59 MMHG | SYSTOLIC BLOOD PRESSURE: 127 MMHG

## 2017-12-22 VITALS
SYSTOLIC BLOOD PRESSURE: 140 MMHG | WEIGHT: 207 LBS | RESPIRATION RATE: 19 BRPM | OXYGEN SATURATION: 99 % | HEART RATE: 89 BPM | DIASTOLIC BLOOD PRESSURE: 80 MMHG | HEIGHT: 74 IN | BODY MASS INDEX: 26.56 KG/M2

## 2017-12-22 LAB
ANION GAP SERPL CALC-SCNC: 9 MMOL/L
BASOPHILS # BLD AUTO: 0 K/UL
BASOPHILS NFR BLD: 0.2 %
BUN SERPL-MCNC: 8 MG/DL
CALCIUM SERPL-MCNC: 8.2 MG/DL
CHLORIDE SERPL-SCNC: 107 MMOL/L
CO2 SERPL-SCNC: 25 MMOL/L
CREAT SERPL-MCNC: 0.9 MG/DL
DIFFERENTIAL METHOD: ABNORMAL
EOSINOPHIL # BLD AUTO: 0.1 K/UL
EOSINOPHIL NFR BLD: 1.2 %
ERYTHROCYTE [DISTWIDTH] IN BLOOD BY AUTOMATED COUNT: 12.8 %
EST. GFR  (AFRICAN AMERICAN): >60 ML/MIN/1.73 M^2
EST. GFR  (NON AFRICAN AMERICAN): >60 ML/MIN/1.73 M^2
GLUCOSE SERPL-MCNC: 108 MG/DL
HCT VFR BLD AUTO: 32 %
HGB BLD-MCNC: 11.1 G/DL
LYMPHOCYTES # BLD AUTO: 1.6 K/UL
LYMPHOCYTES NFR BLD: 12.8 %
MCH RBC QN AUTO: 33.5 PG
MCHC RBC AUTO-ENTMCNC: 34.5 G/DL
MCV RBC AUTO: 97 FL
MONOCYTES # BLD AUTO: 0.7 K/UL
MONOCYTES NFR BLD: 5.6 %
NEUTROPHILS # BLD AUTO: 9.8 K/UL
NEUTROPHILS NFR BLD: 80.2 %
PLATELET # BLD AUTO: 177 K/UL
PMV BLD AUTO: 8.5 FL
POTASSIUM SERPL-SCNC: 3.9 MMOL/L
RBC # BLD AUTO: 3.31 M/UL
SODIUM SERPL-SCNC: 141 MMOL/L
WBC # BLD AUTO: 12.3 K/UL

## 2017-12-22 PROCEDURE — 99217 PR OBSERVATION CARE DISCHARGE: CPT | Mod: ,,, | Performed by: INTERNAL MEDICINE

## 2017-12-22 PROCEDURE — 25000003 PHARM REV CODE 250: Performed by: INTERNAL MEDICINE

## 2017-12-22 PROCEDURE — 80048 BASIC METABOLIC PNL TOTAL CA: CPT

## 2017-12-22 PROCEDURE — G0378 HOSPITAL OBSERVATION PER HR: HCPCS

## 2017-12-22 PROCEDURE — 25000003 PHARM REV CODE 250: Performed by: UROLOGY

## 2017-12-22 PROCEDURE — 85025 COMPLETE CBC W/AUTO DIFF WBC: CPT

## 2017-12-22 PROCEDURE — 36415 COLL VENOUS BLD VENIPUNCTURE: CPT

## 2017-12-22 RX ORDER — HYDROCODONE BITARTRATE AND ACETAMINOPHEN 5; 325 MG/1; MG/1
1 TABLET ORAL EVERY 4 HOURS PRN
Status: DISCONTINUED | OUTPATIENT
Start: 2017-12-22 | End: 2017-12-22

## 2017-12-22 RX ORDER — HYDROCODONE BITARTRATE AND ACETAMINOPHEN 5; 325 MG/1; MG/1
1 TABLET ORAL EVERY 6 HOURS PRN
Qty: 15 TABLET | Refills: 0 | Status: ON HOLD | OUTPATIENT
Start: 2017-12-22 | End: 2018-01-11 | Stop reason: HOSPADM

## 2017-12-22 RX ORDER — HYDROCODONE BITARTRATE AND ACETAMINOPHEN 5; 325 MG/1; MG/1
1 TABLET ORAL EVERY 4 HOURS PRN
Status: DISCONTINUED | OUTPATIENT
Start: 2017-12-22 | End: 2017-12-22 | Stop reason: HOSPADM

## 2017-12-22 RX ADMIN — MULTIPLE VITAMINS W/ MINERALS TAB 1 TABLET: TAB at 08:12

## 2017-12-22 RX ADMIN — PANTOPRAZOLE SODIUM 40 MG: 40 TABLET, DELAYED RELEASE ORAL at 08:12

## 2017-12-22 RX ADMIN — HYDROCODONE BITARTRATE AND ACETAMINOPHEN 1 TABLET: 5; 325 TABLET ORAL at 02:12

## 2017-12-22 RX ADMIN — TAMSULOSIN HYDROCHLORIDE 0.4 MG: 0.4 CAPSULE ORAL at 08:12

## 2017-12-22 RX ADMIN — HYDROCODONE BITARTRATE AND ACETAMINOPHEN 1 TABLET: 5; 325 TABLET ORAL at 08:12

## 2017-12-22 RX ADMIN — CIPROFLOXACIN 500 MG: 500 TABLET, FILM COATED ORAL at 08:12

## 2017-12-22 NOTE — PLAN OF CARE
Met with pt to complete his assessment.  Pt's daughter was in the room.  Pt, who is employed, denies having any DME or home health and lives with is wife.  He verified his PCP as Dr. Johnson and insurance as [employer].  Pt's discharge disposition is home with no anticipated needs.      12/22/17 1220   Discharge Assessment   Assessment Type Discharge Planning Assessment   Confirmed/corrected address and phone number on facesheet? Yes   Assessment information obtained from? Patient   Prior to hospitilization cognitive status: Alert/Oriented   Prior to hospitalization functional status: Independent   Current cognitive status: Alert/Oriented   Current Functional Status: Independent   Lives With spouse   Able to Return to Prior Arrangements yes   Is patient able to care for self after discharge? Yes   Who are your caregiver(s) and their phone number(s)? (deliareji Sharma [phone])   Patient's perception of discharge disposition home or selfcare   Readmission Within The Last 30 Days no previous admission in last 30 days   Patient currently being followed by outpatient case management? Yes   If yes, name of outpatient case management following: insurance company assigned oupatient case management   Patient currently receives any other outside agency services? No   Equipment Currently Used at Home none   Do you have any problems affording any of your prescribed medications? No  (pharmacy is [facility] on [address] [facility] [place])   Is the patient taking medications as prescribed? yes   Does the patient have transportation home? Yes   Does the patient receive services at the [facility]? No   Discharge Plan A Home with family   Patient/Family In Agreement With Plan yes

## 2017-12-22 NOTE — OP NOTE
[facility] Operative Report     Date: 12/21/2017     Staff Surgeon: Alejandro Abdi MD     Pre-Op Diagnosis:   Urinary retention secondary to prostatic obstruction  Gross hematuria     Post-Op Diagnosis: same     Procedure(s) Performed:   Transurethral resection of prostate, bipolar (modifier 22)  Flouroscopy <1 hour  Interpretation of flouroscopic images     Specimen(s): prostate chips     Anesthesia: General LMA anesthesia     Findings: very large obstructing hypervascular irregular prostate with vascular median lobe. Prostate 50% resected with significant fulguration of bleeding vessels. Prolonged resection given prostate volume, anatomy relative to trabeculated bladder and significant venous channel bleeding requiring multiple types of electrodes with prolonged fulguration to cauterize, thus yielding Modifier 22 given level of difficulty as well as time spent well beyond usual/expected.  - No clear visualization of ureteral orifices including with indigo carmine/lasix admin. Bladder without gross tumor, though difficult to fully evaluate as resectoscope barely reached into bladder through large prostate.      Estimated Blood Loss: 150-200cc      Drains: 24 fr 3 way ramires     Complications: none    Indications for procedure:  Mr Alejandro is a 71 yo M with acute urinary retention earlier this month who has since had a few episodes of gross hematuria per ramires, most recently yesterday presenting in near-clot retention requiring significant manual irrigation to clear clot burden and irrigate clear x2. At time of irrigating clear for the second time in 2 days, did perform cystoscopic evaluation on 12/19 finding significant prostatomegaly with high grade obstruction as well as moderate obstructing median lobe which was quite hypervascular and seemed to be source of bleeding secondary to contact with ramires balloon. Did also have abnormal appearance of posterior bladder wall likely consistent with ramires edema  though couldn't not rule out bladder tumor in this patient with significant smoking history, though no stigmata of bleeding. Risks and benefits of TURP, largely for resolution of his gross hematuria but also to facilitate resolution of his gross hematuria discussed in detail. Also discussed concurrent TURBT, as well as possibility of needing a staged procedure. Appropriate informed consent obtained after all questions answered.    Procedure in detail:  After appropriate informed consent was obtained, the patient was taken to the cystoscopy suite and placed in the lithotomy position. A WHO-approved time out was performed and preoperative antibiotics (cipro) were given. He was then prepped and draped in the usual sterile fashion.       As I had just performed cystoscopic evaluation in last 48 hours, the 24french resectoscope was then assembled and placed into the patient's bladder using a visual obturator.  Prostatic urethra 6-7+cm long with high grade lateral lobe obstruction and high bladder neck, and the length of the scope maxed out just as reached bladder neck, and with gentle pressure against the phallus was able to pass into the bladder. Bladder neck seen to have obstructing projection of hypervascular median lobe seen at 6'oclock position as well as extending down from 12 o'clock at bladder neck. Scope was able to look down just past median lobe to visualize what appeared to be trigone. Ureteral orifices not clearly visualized to efflux. Bladder signifcantly trabeculated with small cellules and early diverticuli vs deep cellule at posterior bladder base. Could not extend scope beyond this area due to significant obstructing length of prostatic urethra, so difficult to fully evaluate the bladder, but no gross tumors or papillary lesions seen in bladder. The patient's bladder was drained and the visual obturator was exchanged for the bipolar resectoscope loop.      I started by resecting the large median lobe,  first making a central channel from 5 to 7 o'clock including median lobe/bladder neck, and then systematically circumferentially resecting median lobe. The hypervascularity of the prostate was impressive, and despite cutting current on loop, each resection demonstrated hemorrhaging venous channels requiring extensive cauterization with loop beyond normal/expected, some of which was resistant to loop cautery. Did make central channel back to verumontanum but given significance of lateral lobe ingrowth and obstruction, and tissue rebound, this was never a clear channel. Resection focused on more proximal prostatic urethra from median lobe and bladder neck towards mid prostate, again noted to be quite hypervascular with extensive time of open venous channels resistant to loop. Ellik evacuator used to remove prostate chips resected so far so resection could progress.    In attempts to complete resection of median lobe towards bladder neck to facilitate extending channel distally, did have increased depth of resection at bladder neck opening additional venous sinuses. On using cutting current to extend this central channel laterally to facilitate forward progress distally, there was concern for trigonal resection. Unclear if trabeculation near bladder neck or trigone as again, ureteral orifices not clearly visualized in this area and efforts were taken to hook median lobe with loop before resecting to avoid trigonal trauma. Though there was active bleeding in this central area given depth of resection of bladder neck, cautery was not used given this concern.    Button electrode was then used with resectoscope to systematically gain hemostasis with cautery circumferentially at entire periphery of proximal resected portion of prostatic urethra so far. Verumontanum periodically visualized to ensure all resection/cautery was proximal to it. Once the bed of resection was hemostatic, with exception of bladder neck area in  question, with scope sheath beyond this area into bladder, bladder irrigated clear, so resectoscope exchanged for cystoscope with 25 fr sheath. 5 Malay open ended catheter and hydrophilic tipped motion wire were used in attempt to canulate areas suspicious for ureteral orifices under live flouroscopy, noting wired to go laterally meeting resistance and never up to kidney indicating ureter. It did appear that this area in question was most consistent with a trabeculation, though on careful inspection with 12 and 70 degree lens, ureteral orifices not clearly visualized so patient was given 1 ampule IV indigo carmine followed by 10mg IV lasix.     Resectoscope used to continue resecting some more of central distal prostatic urethra to veru including some melany-veru tissue and bladder neck re-check at 30 and 60 minutes later and no blue/purple efflux seen. As resection at this time had extended multiple hours, efforts focused on hemostasis. After ensuring no fragments or clots remained in bladder, button electrode used to cauterize all bleeding vessels seen in area of bladder neck until bed of resection appeared completely hemostatic with water and suction off, both at level of bladder neck, and at level of verumonatnum. Approximately 50% of prostate had been resected, including previously visualized hypervascular obstructing tissue, and was now hemostatic. Decision made at this time to complete procedure and bring back for a second stage procedure to complete TURP, and reevaluate bladder for possible TURBT once bed of resection healed - though again, no gross lesions seen on today's exam. A 24-Croatian 3-way Ramires catheter was placed with 50cc in 30cc balllon, and 60cc cath tip syringe used to irrigate the bladder all of which was clear with punctate cautery debris.  Urine in the ramires bag was clear. His ramires was taped to traction on his thigh with mastasol and silk tape. Generous amount of 2% xylocaine jelly applied to  glans penis given prolonged resection requiring pressure of scope due to its length. Urine draining at that time was clear to light pink. CBI tubing placed to 3rd port of ramires and clamped to allow ramires drainage naturally at this time. Still no excretion of his indigo carmine was seen by procedure end, approximately 2 hours after it's administration.     He tolerated the procedure well without complication and was extubated and taken to PACU in stable condition.     Disposition: He will be observed overnight at minimum. His ramires will remain on traction with overdistended balloon for 2 hours after which time 20cc can be removed from balloon and ramires removed from traction. During this time, will recheck labs to assess renal function and hematocrit, and get renal ultrasound to assess for obstruction and hydro, all while monitoring UOP closely as cbi will be clamped at this time and reserved for prn use later if necessary. If labs and VANDANA remain normal with good UOP in morning, can DC home with ramires in place and make plans for second stage procedure. Close observation in case of need for urgent repeat procedures for gross hematuria, or ancillary procedures for upper tract obstruction. Should none of these occur, DC home tomorrow.

## 2017-12-22 NOTE — TELEPHONE ENCOUNTER
Dear Dr Abdi,  Mr Alejandro has an normal ECG with rare and common PVC's. No serious . He is clear for sedation.

## 2017-12-22 NOTE — NURSING
Pt returned from surgery 1745 transferred by stretcher. Ramires 3 way draining pink urine CBI restarted due to urine becoming darker pink. 20cc removed from ramires per order and retaped to leg tension released. Pt denies any pain.

## 2017-12-22 NOTE — NURSING
Discharge instructions given to patient he verbalized understanding all questions and concerns addressed. Follow up appts scheduled discontinued piv in right hand catheter tip intact. Pt leaving with 3 way catheter with leg bag draining pink clear urine. Relinquished care at 1445 via wheelchair.

## 2017-12-22 NOTE — PLAN OF CARE
Problem: Patient Care Overview  Goal: Plan of Care Review  Outcome: Ongoing (interventions implemented as appropriate)  Pt resting in bed, easily aroused. AAOx3. Dx: gross hematuria w/clots & urinary retention. Plan of care reviewed. Questions answered. Verbalized understanding. IVFs infusing. 3-way ramires intact. Conts light CBI. Clear, pink-tinged urine noted. No clots noted. Manual irrigate ramires as needed. S/P TURP. Conts on Rocephin IV. No complaints noted at this time. Call light in reach. Bed low. Will cont to monitor

## 2017-12-22 NOTE — PROGRESS NOTES
[facility]    Roosevelt Alejandro is a 72 y.o. male  postop day 1 status post TURP with extensive fulguration of hypervascular prostate.    This was a stage I TURP with approximately 50% completion and focus on hemostasis given his recurrent clot retention despite Ramires catheter    As ureteral orifice is not clearly viewed during this prolonged and complex resection, indigo carmine was given within surgery and was never seen to effluxed into the bladder.  Patient had renal ultrasound which was negative for hydronephrosis, normal creatinine which remained stable at 0.9 today, And excellent urine output.     A trickle of CBI was used last night, and clamped at 6 AM.  No recurrence of gross hematuria beyond light pink urine including ambulating with leg bag since CBI was clamped.  Patient has no complaints at this time    H/H stable, creatinine stable and normal, urine flowing well to leg bag      ROS: A comprehensive 10 system review was performed and is negative except as noted above in HPI    PHYSICAL EXAM:    Vitals:    12/22/17 1154   BP: (!) 127/59   Pulse: 85   Resp: 20   Temp: 98 °F (36.7 °C)     Body mass index is 36.59 kg/m².       General: Alert, cooperative, no distress, appears stated age   Head: Normocephalic, without obvious abnormality, atraumatic   Eyes: PERRL, conjunctiva/corneas clear   Lungs: Respirations unlabored   Heart: Warm and well perfused   Abdomen:  soft, nontender, nondistended   : 3 way ramires with 3rd port plugged, draining light clear pink urine to leg bag.  Extremities: Extremities normal, atraumatic, no cyanosis or edema   Skin: Skin color, texture, turgor normal, no rashes or lesions   Psych: Appropriate   Neurologic: Non-focal       Basic metabolic panel    Collection Time: 12/21/17  4:56 PM   Result Value Ref Range    Sodium 138 136 - 145 mmol/L    Potassium 3.7 3.5 - 5.1 mmol/L    Chloride 103 95 - 110 mmol/L    CO2 24 23 - 29 mmol/L    Glucose 136 (H) 70 - 110 mg/dL     BUN, Bld 11 8 - 23 mg/dL    Creatinine 0.9 0.5 - 1.4 mg/dL    Calcium 7.8 (L) 8.7 - 10.5 mg/dL    Anion Gap 11 8 - 16 mmol/L    eGFR if African American >60 >60 mL/min/1.73 m^2    eGFR if non African American >60 >60 mL/min/1.73 m^2   Basic Metabolic Panel (BMP)    Collection Time: 12/22/17  5:31 AM   Result Value Ref Range    Sodium 141 136 - 145 mmol/L    Potassium 3.9 3.5 - 5.1 mmol/L    Chloride 107 95 - 110 mmol/L    CO2 25 23 - 29 mmol/L    Glucose 108 70 - 110 mg/dL    BUN, Bld 8 8 - 23 mg/dL    Creatinine 0.9 0.5 - 1.4 mg/dL    Calcium 8.2 (L) 8.7 - 10.5 mg/dL    Anion Gap 9 8 - 16 mmol/L    eGFR if African American >60 >60 mL/min/1.73 m^2    eGFR if non African American >60 >60 mL/min/1.73 m^2   CBC with Automated Differential    Collection Time: 12/22/17  5:31 AM   Result Value Ref Range    WBC 12.30 3.90 - 12.70 K/uL    RBC 3.31 (L) 4.60 - 6.20 M/uL    Hemoglobin 11.1 (L) 14.0 - 18.0 g/dL    Hematocrit 32.0 (L) 40.0 - 54.0 %    MCV 97 82 - 98 fL    MCH 33.5 (H) 27.0 - 31.0 pg    MCHC 34.5 32.0 - 36.0 g/dL    RDW 12.8 11.5 - 14.5 %    Platelets 177 150 - 350 K/uL    MPV 8.5 (L) 9.2 - 12.9 fL    Gran # 9.8 (H) 1.8 - 7.7 K/uL    Lymph # 1.6 1.0 - 4.8 K/uL    Mono # 0.7 0.3 - 1.0 K/uL    Eos # 0.1 0.0 - 0.5 K/uL    Baso # 0.00 0.00 - 0.20 K/uL    Gran% 80.2 (H) 38.0 - 73.0 %    Lymph% 12.8 (L) 18.0 - 48.0 %    Mono% 5.6 4.0 - 15.0 %    Eosinophil% 1.2 0.0 - 8.0 %    Basophil% 0.2 0.0 - 1.9 %    Differential Method Automated      ASSESSMENT   Gross hematuria  Urinary retention     Plan    Urine clear pink after hours of cbi clamping and amulating  Ok to dc home  Has cipro at home to complete course of postoperativdly  Stage 2 turp booked for 1/11/18  Zelaya to leg bag during day, night bag at night.    For any dark red urine, poor Zelaya catheter drainage, or blood clots, should return immediately

## 2017-12-22 NOTE — PROGRESS NOTES
I was present for postop ultrasound in which kidneys appeared normal without hydronephrosis, though did have large R renal cyst which was known from CT scan. Bladder decompressed around ramires balloon.   Cr 0.9. H/H 11.1/32.3 (from 11.8/34.6 preop)  Patient without pain or complaint  By this time, 2 hours after completion of procedure, CBI had been clamped this whole time and he had made 400+cc of clear light pink urine.  Still no efflux of indigo carmine, though with no evidence of upper tract obstruction and normal renal function and excellent urine output.  Patient had just arrived to floor and staff instructed to take 20cc out of balloon and untape from traction and observe UOP, using sparing CBI if necessary for recurrence of GH to titrate to light pink.  Will reassess in AM, and if continued good UOP with normal renal function and no pain, can be discharged home to return in 2-3 weeks for stage 2 TURP with reevaluation of bladder. Tentatively scheduled 1/11/18.

## 2017-12-22 NOTE — TELEPHONE ENCOUNTER
----- Message from Kallie Wilkes MA sent at 12/22/2017  4:59 PM CST -----  Contact: Veronica (spouse)      ----- Message -----  From: Berta Steward  Sent: 12/22/2017   4:10 PM  To: Jin Martin (spouse) calling to speak with the Nurse about the instructions given to the patient after surgery yesterday. Please advise.   Call back   Thanks!

## 2017-12-23 NOTE — PLAN OF CARE
12/23/17 0909   Final Note   Assessment Type Final Discharge Note   Discharge Disposition Home

## 2017-12-28 RX ORDER — TAMSULOSIN HYDROCHLORIDE 0.4 MG/1
1 CAPSULE ORAL DAILY
Qty: 30 CAPSULE | Refills: 0 | Status: SHIPPED | OUTPATIENT
Start: 2017-12-28 | End: 2018-01-29

## 2018-01-08 ENCOUNTER — TELEPHONE (OUTPATIENT)
Dept: UROLOGY | Facility: CLINIC | Age: 73
End: 2018-01-08

## 2018-01-08 NOTE — TELEPHONE ENCOUNTER
Spoke with Pt's wife. Notified that pt would receive a call the day before surgery to notify time of arrival for Thursday. Wife verbalized understanding.

## 2018-01-09 ENCOUNTER — CLINICAL SUPPORT (OUTPATIENT)
Dept: CARDIOLOGY | Facility: CLINIC | Age: 73
End: 2018-01-09
Attending: FAMILY MEDICINE
Payer: MEDICARE

## 2018-01-09 DIAGNOSIS — R00.2 HEART PALPITATIONS: ICD-10-CM

## 2018-01-09 DIAGNOSIS — I47.9 PAROXYSMAL TACHYCARDIA: Primary | ICD-10-CM

## 2018-01-09 PROCEDURE — 93224 XTRNL ECG REC UP TO 48 HRS: CPT | Mod: S$GLB,,, | Performed by: INTERNAL MEDICINE

## 2018-01-10 ENCOUNTER — ANESTHESIA EVENT (OUTPATIENT)
Dept: SURGERY | Facility: HOSPITAL | Age: 73
End: 2018-01-10
Payer: MEDICARE

## 2018-01-11 ENCOUNTER — HOSPITAL ENCOUNTER (OUTPATIENT)
Facility: HOSPITAL | Age: 73
Discharge: HOME | End: 2018-01-11
Attending: UROLOGY | Admitting: UROLOGY
Payer: MEDICARE

## 2018-01-11 ENCOUNTER — SURGERY (OUTPATIENT)
Age: 73
End: 2018-01-11

## 2018-01-11 ENCOUNTER — ANESTHESIA (OUTPATIENT)
Dept: SURGERY | Facility: HOSPITAL | Age: 73
End: 2018-01-11
Payer: MEDICARE

## 2018-01-11 ENCOUNTER — TELEPHONE (OUTPATIENT)
Dept: UROLOGY | Facility: CLINIC | Age: 73
End: 2018-01-11

## 2018-01-11 DIAGNOSIS — R33.8 URINARY RETENTION DUE TO BENIGN PROSTATIC HYPERPLASIA: Primary | ICD-10-CM

## 2018-01-11 DIAGNOSIS — R33.9 URINARY RETENTION: ICD-10-CM

## 2018-01-11 DIAGNOSIS — N40.1 URINARY RETENTION DUE TO BENIGN PROSTATIC HYPERPLASIA: Primary | ICD-10-CM

## 2018-01-11 PROCEDURE — 27201423 OPTIME MED/SURG SUP & DEVICES STERILE SUPPLY: Performed by: UROLOGY

## 2018-01-11 PROCEDURE — 71000016 HC POSTOP RECOV ADDL HR: Performed by: UROLOGY

## 2018-01-11 PROCEDURE — 99900104 DSU ONLY-NO CHARGE-EA ADD'L HR (STAT): Performed by: UROLOGY

## 2018-01-11 PROCEDURE — 37000009 HC ANESTHESIA EA ADD 15 MINS: Performed by: UROLOGY

## 2018-01-11 PROCEDURE — 71000015 HC POSTOP RECOV 1ST HR: Performed by: UROLOGY

## 2018-01-11 PROCEDURE — 63600175 PHARM REV CODE 636 W HCPCS: Performed by: NURSE ANESTHETIST, CERTIFIED REGISTERED

## 2018-01-11 PROCEDURE — 71000033 HC RECOVERY, INTIAL HOUR: Performed by: UROLOGY

## 2018-01-11 PROCEDURE — 99900103 DSU ONLY-NO CHARGE-INITIAL HR (STAT): Performed by: UROLOGY

## 2018-01-11 PROCEDURE — 52601 PROSTATECTOMY (TURP): CPT | Mod: 22,58,, | Performed by: UROLOGY

## 2018-01-11 PROCEDURE — 36000707: Performed by: UROLOGY

## 2018-01-11 PROCEDURE — 71000039 HC RECOVERY, EACH ADD'L HOUR: Performed by: UROLOGY

## 2018-01-11 PROCEDURE — D9220A PRA ANESTHESIA: Mod: ANES,,, | Performed by: ANESTHESIOLOGY

## 2018-01-11 PROCEDURE — 36000706: Performed by: UROLOGY

## 2018-01-11 PROCEDURE — 25000003 PHARM REV CODE 250: Performed by: NURSE ANESTHETIST, CERTIFIED REGISTERED

## 2018-01-11 PROCEDURE — 63600175 PHARM REV CODE 636 W HCPCS: Performed by: UROLOGY

## 2018-01-11 PROCEDURE — 88305 TISSUE EXAM BY PATHOLOGIST: CPT | Performed by: PATHOLOGY

## 2018-01-11 PROCEDURE — D9220A PRA ANESTHESIA: Mod: CRNA,,, | Performed by: NURSE ANESTHETIST, CERTIFIED REGISTERED

## 2018-01-11 PROCEDURE — 88305 TISSUE EXAM BY PATHOLOGIST: CPT | Mod: 26,,, | Performed by: PATHOLOGY

## 2018-01-11 PROCEDURE — 25000003 PHARM REV CODE 250: Performed by: ANESTHESIOLOGY

## 2018-01-11 PROCEDURE — 37000008 HC ANESTHESIA 1ST 15 MINUTES: Performed by: UROLOGY

## 2018-01-11 RX ORDER — PROPOFOL 10 MG/ML
VIAL (ML) INTRAVENOUS
Status: DISCONTINUED | OUTPATIENT
Start: 2018-01-11 | End: 2018-01-11

## 2018-01-11 RX ORDER — CEFAZOLIN SODIUM 2 G/50ML
2 SOLUTION INTRAVENOUS
Status: DISCONTINUED | OUTPATIENT
Start: 2018-01-11 | End: 2018-01-11 | Stop reason: HOSPADM

## 2018-01-11 RX ORDER — SODIUM CHLORIDE, SODIUM LACTATE, POTASSIUM CHLORIDE, CALCIUM CHLORIDE 600; 310; 30; 20 MG/100ML; MG/100ML; MG/100ML; MG/100ML
INJECTION, SOLUTION INTRAVENOUS CONTINUOUS
Status: DISCONTINUED | OUTPATIENT
Start: 2018-01-11 | End: 2018-01-11

## 2018-01-11 RX ORDER — SUCCINYLCHOLINE CHLORIDE 20 MG/ML
INJECTION INTRAMUSCULAR; INTRAVENOUS
Status: DISCONTINUED | OUTPATIENT
Start: 2018-01-11 | End: 2018-01-11

## 2018-01-11 RX ORDER — METOPROLOL SUCCINATE 25 MG/1
25 TABLET, EXTENDED RELEASE ORAL DAILY
Qty: 30 TABLET | Refills: 2 | Status: SHIPPED | OUTPATIENT
Start: 2018-01-11 | End: 2018-03-30 | Stop reason: SDUPTHER

## 2018-01-11 RX ORDER — LIDOCAINE HYDROCHLORIDE 10 MG/ML
0.5 INJECTION, SOLUTION EPIDURAL; INFILTRATION; INTRACAUDAL; PERINEURAL ONCE
Status: DISCONTINUED | OUTPATIENT
Start: 2018-01-11 | End: 2018-01-11 | Stop reason: HOSPADM

## 2018-01-11 RX ORDER — CIPROFLOXACIN 500 MG/1
500 TABLET ORAL 2 TIMES DAILY
Qty: 10 TABLET | Refills: 0 | Status: SHIPPED | OUTPATIENT
Start: 2018-01-11 | End: 2018-01-18

## 2018-01-11 RX ORDER — ONDANSETRON 2 MG/ML
INJECTION INTRAMUSCULAR; INTRAVENOUS
Status: DISCONTINUED | OUTPATIENT
Start: 2018-01-11 | End: 2018-01-11

## 2018-01-11 RX ORDER — FENTANYL CITRATE 50 UG/ML
25 INJECTION, SOLUTION INTRAMUSCULAR; INTRAVENOUS EVERY 5 MIN PRN
Status: DISCONTINUED | OUTPATIENT
Start: 2018-01-11 | End: 2018-01-11 | Stop reason: HOSPADM

## 2018-01-11 RX ORDER — VECURONIUM BROMIDE FOR INJECTION 1 MG/ML
INJECTION, POWDER, LYOPHILIZED, FOR SOLUTION INTRAVENOUS
Status: DISCONTINUED | OUTPATIENT
Start: 2018-01-11 | End: 2018-01-11

## 2018-01-11 RX ORDER — PHENYLEPHRINE HYDROCHLORIDE 10 MG/ML
INJECTION INTRAVENOUS
Status: DISCONTINUED | OUTPATIENT
Start: 2018-01-11 | End: 2018-01-11

## 2018-01-11 RX ORDER — MIDAZOLAM HYDROCHLORIDE 1 MG/ML
INJECTION, SOLUTION INTRAMUSCULAR; INTRAVENOUS
Status: DISCONTINUED | OUTPATIENT
Start: 2018-01-11 | End: 2018-01-11

## 2018-01-11 RX ORDER — OXYCODONE HYDROCHLORIDE 5 MG/1
5 TABLET ORAL ONCE AS NEEDED
Status: DISCONTINUED | OUTPATIENT
Start: 2018-01-11 | End: 2018-01-11 | Stop reason: HOSPADM

## 2018-01-11 RX ORDER — TRAMADOL HYDROCHLORIDE 50 MG/1
50 TABLET ORAL EVERY 6 HOURS PRN
Qty: 20 TABLET | Refills: 0 | Status: SHIPPED | OUTPATIENT
Start: 2018-01-11 | End: 2018-01-18

## 2018-01-11 RX ORDER — ACETAMINOPHEN 10 MG/ML
INJECTION, SOLUTION INTRAVENOUS
Status: DISCONTINUED | OUTPATIENT
Start: 2018-01-11 | End: 2018-01-11

## 2018-01-11 RX ORDER — GENTAMICIN SULFATE 80 MG/100ML
80 INJECTION, SOLUTION INTRAVENOUS ONCE
Status: COMPLETED | OUTPATIENT
Start: 2018-01-11 | End: 2018-01-11

## 2018-01-11 RX ORDER — LIDOCAINE HYDROCHLORIDE 20 MG/ML
JELLY TOPICAL
Qty: 5 ML | Refills: 0 | Status: SHIPPED | OUTPATIENT
Start: 2018-01-11 | End: 2018-01-18

## 2018-01-11 RX ORDER — GLYCOPYRROLATE 0.2 MG/ML
INJECTION INTRAMUSCULAR; INTRAVENOUS
Status: DISCONTINUED | OUTPATIENT
Start: 2018-01-11 | End: 2018-01-11

## 2018-01-11 RX ORDER — DEXAMETHASONE SODIUM PHOSPHATE 4 MG/ML
INJECTION, SOLUTION INTRA-ARTICULAR; INTRALESIONAL; INTRAMUSCULAR; INTRAVENOUS; SOFT TISSUE
Status: DISCONTINUED | OUTPATIENT
Start: 2018-01-11 | End: 2018-01-11

## 2018-01-11 RX ORDER — SODIUM CHLORIDE, SODIUM LACTATE, POTASSIUM CHLORIDE, CALCIUM CHLORIDE 600; 310; 30; 20 MG/100ML; MG/100ML; MG/100ML; MG/100ML
INJECTION, SOLUTION INTRAVENOUS CONTINUOUS
Status: DISCONTINUED | OUTPATIENT
Start: 2018-01-11 | End: 2018-01-11 | Stop reason: HOSPADM

## 2018-01-11 RX ORDER — NEOSTIGMINE METHYLSULFATE 1 MG/ML
INJECTION, SOLUTION INTRAVENOUS
Status: DISCONTINUED | OUTPATIENT
Start: 2018-01-11 | End: 2018-01-11

## 2018-01-11 RX ORDER — LIDOCAINE HCL/PF 100 MG/5ML
SYRINGE (ML) INTRAVENOUS
Status: DISCONTINUED | OUTPATIENT
Start: 2018-01-11 | End: 2018-01-11

## 2018-01-11 RX ORDER — FENTANYL CITRATE 50 UG/ML
INJECTION, SOLUTION INTRAMUSCULAR; INTRAVENOUS
Status: DISCONTINUED | OUTPATIENT
Start: 2018-01-11 | End: 2018-01-11

## 2018-01-11 RX ADMIN — VECURONIUM BROMIDE FOR INJECTION 2 MG: 1 INJECTION, POWDER, LYOPHILIZED, FOR SOLUTION INTRAVENOUS at 08:01

## 2018-01-11 RX ADMIN — GENTAMICIN SULFATE 80 MG: 80 INJECTION, SOLUTION INTRAVENOUS at 06:01

## 2018-01-11 RX ADMIN — FENTANYL CITRATE 50 MCG: 50 INJECTION, SOLUTION INTRAMUSCULAR; INTRAVENOUS at 07:01

## 2018-01-11 RX ADMIN — PROPOFOL 200 MG: 10 INJECTION, EMULSION INTRAVENOUS at 07:01

## 2018-01-11 RX ADMIN — FENTANYL CITRATE 25 MCG: 50 INJECTION, SOLUTION INTRAMUSCULAR; INTRAVENOUS at 08:01

## 2018-01-11 RX ADMIN — MIDAZOLAM 2 MG: 1 INJECTION INTRAMUSCULAR; INTRAVENOUS at 07:01

## 2018-01-11 RX ADMIN — FENTANYL CITRATE 25 MCG: 50 INJECTION, SOLUTION INTRAMUSCULAR; INTRAVENOUS at 10:01

## 2018-01-11 RX ADMIN — PHENYLEPHRINE HYDROCHLORIDE 100 MCG: 10 INJECTION INTRAVENOUS at 09:01

## 2018-01-11 RX ADMIN — SODIUM CHLORIDE, POTASSIUM CHLORIDE, SODIUM LACTATE AND CALCIUM CHLORIDE: 600; 310; 30; 20 INJECTION, SOLUTION INTRAVENOUS at 09:01

## 2018-01-11 RX ADMIN — FENTANYL CITRATE 50 MCG: 50 INJECTION, SOLUTION INTRAMUSCULAR; INTRAVENOUS at 09:01

## 2018-01-11 RX ADMIN — SODIUM CHLORIDE, POTASSIUM CHLORIDE, SODIUM LACTATE AND CALCIUM CHLORIDE: 600; 310; 30; 20 INJECTION, SOLUTION INTRAVENOUS at 07:01

## 2018-01-11 RX ADMIN — LIDOCAINE HYDROCHLORIDE 100 MG: 20 INJECTION, SOLUTION INTRAVENOUS at 10:01

## 2018-01-11 RX ADMIN — SODIUM CHLORIDE, POTASSIUM CHLORIDE, SODIUM LACTATE AND CALCIUM CHLORIDE: 600; 310; 30; 20 INJECTION, SOLUTION INTRAVENOUS at 06:01

## 2018-01-11 RX ADMIN — FENTANYL CITRATE 25 MCG: 50 INJECTION, SOLUTION INTRAMUSCULAR; INTRAVENOUS at 09:01

## 2018-01-11 RX ADMIN — DEXAMETHASONE SODIUM PHOSPHATE 8 MG: 4 INJECTION, SOLUTION INTRAMUSCULAR; INTRAVENOUS at 07:01

## 2018-01-11 RX ADMIN — ACETAMINOPHEN 1000 MG: 10 INJECTION, SOLUTION INTRAVENOUS at 07:01

## 2018-01-11 RX ADMIN — ONDANSETRON 4 MG: 2 INJECTION, SOLUTION INTRAMUSCULAR; INTRAVENOUS at 07:01

## 2018-01-11 RX ADMIN — VECURONIUM BROMIDE FOR INJECTION 4 MG: 1 INJECTION, POWDER, LYOPHILIZED, FOR SOLUTION INTRAVENOUS at 07:01

## 2018-01-11 RX ADMIN — GLYCOPYRROLATE 0.4 MG: 0.2 INJECTION, SOLUTION INTRAMUSCULAR; INTRAVENOUS at 10:01

## 2018-01-11 RX ADMIN — NEOSTIGMINE METHYLSULFATE 5 MG: 1 INJECTION INTRAVENOUS at 10:01

## 2018-01-11 RX ADMIN — VECURONIUM BROMIDE FOR INJECTION 1 MG: 1 INJECTION, POWDER, LYOPHILIZED, FOR SOLUTION INTRAVENOUS at 09:01

## 2018-01-11 RX ADMIN — VECURONIUM BROMIDE FOR INJECTION 2 MG: 1 INJECTION, POWDER, LYOPHILIZED, FOR SOLUTION INTRAVENOUS at 09:01

## 2018-01-11 RX ADMIN — GLYCOPYRROLATE 0.2 MG: 0.2 INJECTION, SOLUTION INTRAMUSCULAR; INTRAVENOUS at 07:01

## 2018-01-11 RX ADMIN — LIDOCAINE HYDROCHLORIDE 100 MG: 20 INJECTION, SOLUTION INTRAVENOUS at 07:01

## 2018-01-11 RX ADMIN — VECURONIUM BROMIDE FOR INJECTION 2 MG: 1 INJECTION, POWDER, LYOPHILIZED, FOR SOLUTION INTRAVENOUS at 07:01

## 2018-01-11 RX ADMIN — CEFAZOLIN SODIUM 2 G: 2 SOLUTION INTRAVENOUS at 07:01

## 2018-01-11 RX ADMIN — SUCCINYLCHOLINE CHLORIDE 140 MG: 20 INJECTION, SOLUTION INTRAMUSCULAR; INTRAVENOUS at 07:01

## 2018-01-11 NOTE — TELEPHONE ENCOUNTER
Spoke with Pt. Pt's PO appt made for 1/29/18 @ 2pm. Pt reminded about NV on 1/16/18 for 0900. Pt to call/ER if any problems with fever, blood/clots in bag or urine bag not draining at all. Pt stated his urine is koolaid colored in his bag. Pt encouraged to drink plenty of water. Pt verbalized understanding.

## 2018-01-11 NOTE — ANESTHESIA PREPROCEDURE EVALUATION
01/11/2018  Roosevelt Alejandro is a 72 y.o., male.    Anesthesia Evaluation      I have reviewed the Medications.     Review of Systems  Anesthesia Hx:  No problems with previous Anesthesia   Social:  Former Smoker    Hematology/Oncology:         -- Anemia:   Cardiovascular:   Hypertension    Pulmonary:  Pulmonary Normal asbestosis   Renal/:  Renal/ Normal  hematuria   Hepatic/GI:  Hepatic/GI Normal    Musculoskeletal:   Arthritis     Neurological:  Neurology Normal    Endocrine:  Endocrine Normal        Physical Exam  General:  Obesity    Airway/Jaw/Neck:  Airway Findings: Mouth Opening: Normal General Airway Assessment: Adult  Mallampati: II  Jaw/Neck Findings:  Neck ROM: Extension Decreased, Mild       Chest/Lungs:  Chest/Lungs Findings: Clear to auscultation, Normal Respiratory Rate     Heart/Vascular:  Heart Findings: Rate: Normal  Rhythm: Regular Rhythm  Sounds: Normal  Heart murmur: negative Vascular Findings: Normal (No carotid bruits.)       Mental Status:  Mental Status Findings:  Cooperative, Alert and Oriented         Anesthesia Plan  Type of Anesthesia, risks & benefits discussed:  Anesthesia Type:  general  Patient's Preference:   Intra-op Monitoring Plan:   Intra-op Monitoring Plan Comments:   Post Op Pain Control Plan:   Post Op Pain Control Plan Comments:   Induction:   IV  Beta Blocker:  Patient is not currently on a Beta-Blocker (No further documentation required).       Informed Consent: Patient understands risks and agrees with Anesthesia plan.  Questions answered. Anesthesia consent signed with patient.  ASA Score: 2     Day of Surgery Review of History & Physical:            Ready For Surgery From Anesthesia Perspective.

## 2018-01-11 NOTE — H&P (VIEW-ONLY)
[facility] Urology Progress Note    Roosevelt Alejandro is a 72 y.o. male who presents for evaluation of urinary retention and gross hematuria.    He presented to the emergency department on 12/2/17 with progressive difficulty urinating and abdominal distention was found to be in urinary retention and a Ramires catheter was placed. UA neg. He did return to the ER on 12/7/17 secondary to gross hematuria per Ramires.  Catheter was manually irrigated and noted to be draining clear pink, and he was discharged with Keflex. Ucx negative.     On 12/11/17, he failed fill and pull voiding trial, voiding only 50 cc after bladder filling with postvoid residual 173 cc and inability to pass remainder of urine. No GH prior to ramires, nor immediately after. No UTI history, no family history  malignancy, no personal or family history of kidney stones, no blood thinners, no UTI history. He quit smoking in 2000 after 40+ years of 3 pack per day smoking since age 13  Does have past history of borderline elevated PSA with negative past prostate biopsy and subsequent age-adjusted normal PSAs, all with free PSA greater than 30%     At timed of failed voiding trial on 12/11/17, he had a 40 g round enlarged benign EDMUNDO and Ramires catheter was replaced and planned CT urogram prior to cystoscopy on 12/26/17. He did then present to clinic Mon 12/18/17 after recurrence of spontaneous gross hematuria in near clot retention requiring significant manual irrigation to clear, so procedure r/s to 12/19 at [facility]. He did have GH recur just prior, and after irrigating was cystoscoped and found to have:    1. Very large obstructing prostate with high grade obstruction, kissing lateral lobes, enlarged obstructing median lobe with asymm enlargement of L>R obstructing at bladder neck with element of anterior obstruction as well  2. Very hypervascular median lobe with prominent vessels streaking across entire median lobe projection - likely source of GH  3. Severely  trabeculated bladder with diffuse cellules and moderate scattered diverticulae  4. Posterior bladder wall bullous ramires edema vs. Papillary bladder tumor - difficult to distinguish, but large area on posterior wall extending towards left lateral wall    Catheter placed draining clear and added on to OR for 12/21 for TURP plus TURBT after extensive discussion. He did go to ER last night requiring ramires change due to clot obstruction and manual irrigation to clear with placement of new ramires.     He presented this morning for scheduled outpt CT urogram and PAT visit to preop and noted to have bloody urine in bag with only minimal output since ER visit and increasing abdominal distention and pain, and therefore was direct admitted for management of his clot retention and to be kept for observation pending turp/tubrt tomorrow      ROS: A comprehensive 10 system review was performed and is negative except as noted above in HPI    PHYSICAL EXAM:    Vitals:    12/20/17 1932   BP: 128/66   Pulse: 82   Resp: 18   Temp: 98.3 °F (36.8 °C)     Body mass index is 36.59 kg/m².       General: Alert, cooperative, no distress, appears stated age   Head: Normocephalic, without obvious abnormality, atraumatic   Eyes: PERRL, conjunctiva/corneas clear   Lungs: Respirations unlabored   Heart: Warm and well perfused   Abdomen: distented firm tttp  : uncirc phallus with 22fr ramires in place with blood at meatus  Extremities: Extremities normal, atraumatic, no cyanosis or edema   Skin: Skin color, texture, turgor normal, no rashes or lesions   Psych: Appropriate   Neurologic: Non-focal       Recent Results (from the past 336 hour(s))   Urinalysis Catheterized    Collection Time: 12/07/17 11:00 PM   Result Value Ref Range    Specimen UA Urine, Catheterized     Color, UA Red (A) Yellow, Straw, Afsaneh    Appearance, UA Cloudy (A) Clear    pH, UA SEE COMMENT 5.0 - 8.0    Specific Gravity, UA SEE COMMENT 1.005 - 1.030    Protein, UA SEE COMMENT  Negative    Glucose, UA SEE COMMENT Negative    Ketones, UA SEE COMMENT Negative    Bilirubin (UA) SEE COMMENT Negative    Occult Blood UA SEE COMMENT Negative    Nitrite, UA SEE COMMENT Negative    Urobilinogen, UA SEE COMMENT <2.0 EU/dL    Leukocytes, UA SEE COMMENT Negative   Urine culture    Collection Time: 12/07/17 11:00 PM   Result Value Ref Range    Urine Culture, Routine No growth    Urinalysis Microscopic    Collection Time: 12/07/17 11:00 PM   Result Value Ref Range    RBC, UA >100 (H) 0 - 4 /hpf    WBC, UA 20 (H) 0 - 5 /hpf    Bacteria, UA Occasional None-Occ /hpf    Squam Epithel, UA 1 /hpf    Hyaline Casts, UA 0 0-1/lpf /lpf    Amorphous, UA Many (A) None-Moderate    Microscopic Comment SEE COMMENT    POCT Bladder Scan    Collection Time: 12/11/17  6:18 PM   Result Value Ref Range    POC Residual Urine Volume 173 (A) 0 - 100 mL   Basic metabolic panel    Collection Time: 12/20/17 11:43 AM   Result Value Ref Range    Sodium 134 (L) 136 - 145 mmol/L    Potassium 4.0 3.5 - 5.1 mmol/L    Chloride 101 95 - 110 mmol/L    CO2 20 (L) 23 - 29 mmol/L    Glucose 118 (H) 70 - 110 mg/dL    BUN, Bld 18 8 - 23 mg/dL    Creatinine 1.2 0.5 - 1.4 mg/dL    Calcium 9.9 8.7 - 10.5 mg/dL    Anion Gap 13 8 - 16 mmol/L    eGFR if African American >60 >60 mL/min/1.73 m^2    eGFR if non African American 60 >60 mL/min/1.73 m^2   CBC auto differential    Collection Time: 12/20/17 11:43 AM   Result Value Ref Range    WBC 14.10 (H) 3.90 - 12.70 K/uL    RBC 4.21 (L) 4.60 - 6.20 M/uL    Hemoglobin 13.7 (L) 14.0 - 18.0 g/dL    Hematocrit 40.2 40.0 - 54.0 %    MCV 96 82 - 98 fL    MCH 32.6 (H) 27.0 - 31.0 pg    MCHC 34.1 32.0 - 36.0 g/dL    RDW 12.8 11.5 - 14.5 %    Platelets 234 150 - 350 K/uL    MPV 8.5 (L) 9.2 - 12.9 fL    Gran # 12.2 (H) 1.8 - 7.7 K/uL    Lymph # 1.4 1.0 - 4.8 K/uL    Mono # 0.4 0.3 - 1.0 K/uL    Eos # 0.1 0.0 - 0.5 K/uL    Baso # 0.00 0.00 - 0.20 K/uL    Gran% 86.3 (H) 38.0 - 73.0 %    Lymph% 9.9 (L) 18.0 - 48.0 %     Mono% 3.1 (L) 4.0 - 15.0 %    Eosinophil% 0.6 0.0 - 8.0 %    Basophil% 0.1 0.0 - 1.9 %    Differential Method Automated    Protime-INR    Collection Time: 12/20/17 11:43 AM   Result Value Ref Range    Prothrombin Time 11.0 9.0 - 12.5 sec    INR 1.1 0.8 - 1.2   Type And Screen Preop    Collection Time: 12/20/17 11:43 AM   Result Value Ref Range    Group & Rh A POS     Indirect Neeta NEG    PT/INR    Collection Time: 12/20/17  2:30 PM   Result Value Ref Range    Prothrombin Time 11.2 9.0 - 12.5 sec    INR 1.1 0.8 - 1.2   PTT    Collection Time: 12/20/17  2:30 PM   Result Value Ref Range    aPTT 30.1 21.0 - 32.0 sec   PSA, Screening    Collection Time: 12/20/17  2:30 PM   Result Value Ref Range    PSA, SCREEN 28.6 (H) 0.00 - 4.00 ng/mL     CT: essentially normal CT urogram with renal cysts and large ~7cm prostate with ramires from ER demonstrating balloon inflated in prostatic urethra.    Procedure: Bladder irrigation/Ramires change  Patient's indwelling 22 [place] Ramires catheter was noted to not be draining and only had some bloody urine in bag, so removed, and did have small clot at end.  With Betadine prep using aseptic technique, a 22 [place] 3-way Ramires was placed which did drain bloody urine without clots. 3rd port plugged.  60 cc catheter tip syringe with sterile water was then used to manually irrigate the bladder after instilling 60 cc into the bladder, and then flushing and irrigating with subsequent syringe-fuls.  Only a few moderate to large clots were irrigated free from bladder after which time urine was draining clear, abdomen less distended, and patient felt well. Ramires placed to urimeter bag and 3rd port used to initiate normal saline continuous bladder irrigation at the slowest trickle rate. He tolerated this well without pain or complication    ASSESSMENT   Urinary retention  Gross hematuria    Plan    Will be kept for observation on CBI going ever so lightly and if he has any recurrence of his GH,  which has been happening at night most often, or poor ramires drainage/clots, CBI will be clamped and he will be manually irrigated as above until draining well again, after which time cbi can be resumed.     Will continue plan for TURP/TURBT in OR tomorrow. Reminded of possibility of staged procedure. Reasonable discharge home tomorrow afternoon if observed with clear urine postop once ramires is removed from traction, though would consider observing until following morning given his recent history.    All questions answered. To OR tomorrow 12/21/17 for TURP/turbt

## 2018-01-11 NOTE — ANESTHESIA POSTPROCEDURE EVALUATION
Anesthesia Post Evaluation    Patient: Roosevelt Alejandro    Procedure(s) Performed: Procedure(s) (LRB):  PROSTATECTOMY-TRANSURETHRAL (N/A)    Final Anesthesia Type: general  Patient location during evaluation: PACU  Patient participation: Yes- Able to Participate  Level of consciousness: awake and alert  Post-procedure vital signs: reviewed and stable  Pain management: adequate  Airway patency: patent  PONV status at discharge: No PONV  Anesthetic complications: no      Cardiovascular status: hemodynamically stable and blood pressure returned to baseline  Respiratory status: unassisted, spontaneous ventilation and room air  Hydration status: euvolemic  Follow-up not needed.        Visit Vitals  /60 (BP Location: Left arm, Patient Position: Lying)   Pulse 66   Temp 37.1 °C (98.8 °F) (Temporal)   Resp 16   SpO2 96%       Pain/Taj Score: Pain Assessment Performed: Yes (1/11/2018 11:55 AM)  Presence of Pain: denies (1/11/2018 11:55 AM)  Taj Score: 10 (1/11/2018 11:55 AM)

## 2018-01-11 NOTE — DISCHARGE INSTRUCTIONS
Discharge home today status post uncomplicated procedure as above  Diet - resume home diet  Follow up: 1/16/18 Tuesday by 0900 for nurse visit for ramires removal, then 2 weeks later for md visit  Instructions: drink plenty of water, may see blood in urine, take antibiotic as directed, can stop flomax 5-7d after ramires removed if urinating    No Asprin, fish oil,or blood thinners for three days  My place large ramires bag to catheter at bedtime Instructions given from the nurse          Transurethral Resection of the Prostate (TURP)     Excess prostate tissue is removed during a TURP to let urine flow freely through the urethra.   TURP is surgery to treat a benign enlargement of the prostate, or BPH (benign prostatic hyperplasia). This surgery removes prostate tissue to relieve pressure on the urethra. This helps relieve symptoms, such as:  · Urinary obstruction  · Frequent urination  · Decreased urinary stream  TURP is the most common procedure for the treatment of BPH. But certain other procedures also help relieve BPH symptoms. Your health care provider may do one of these instead of TURP. They include TUIP (transurethral incision of the prostate), TUNA (transurethral needle ablation), or laser ablation. If you will have one of these procedures, your healthcare provider can tell you more about it. Your preparation and experience during surgery will be similar to TURP.   Preparing for surgery  Your healthcare provider will tell you how to prepare for your procedure. For instance, you may be asked to stop taking certain medicines a few days before the procedure. You may be asked not to eat or drink anything after the midnight before surgery. Be sure to follow any special instructions youre given.  During the TURP procedure  · You will be given medicine (anesthesia) to keep you from feeling pain during the procedure. It may be given into your spine (epidural). This is not meant to put you to sleep, but it will numb  the area where the surgery is being done. In some cases, general anesthesia is used. This is to keep you sleeping throughout the surgery. The anesthesia provider (anesthesiologist or nurse anesthetist) will talk to you about the pain medicine that is best for you.  · The surgeon inserts a cystoscope (a thin, telescope-like device) into your urethra. This device lets him or her see the blocked part of the urethra.  · A cutting device is inserted through the cystoscope. This is used to remove the excess prostate tissue. The cut pieces of tissue collect in the bladder. These pieces are continuously washed away with fluids during the procedure.   · The tissue pieces are sent to the lab to be sure they are free of cancer.   Possible risks and complications of prostate procedures  · Bleeding  · Infection  · Scarring of the urethra  · Retrograde ejaculation  · Erectile dysfunction (rare)  · Absorption of fluid during the procedure (TURP syndrome)  · Permanent incontinence (very rare)   Retrograde ejaculation  After some surgical treatments, semen may travel into the bladder instead of out of the penis during ejaculation. This side effect is called retrograde ejaculation. As a result, there may be little or no semen when you ejaculate, which can result in infertility. If you are planning to have children, talk to your healthcare provider before having the TURP procedure. Otherwise, this is not harmful to your bladder, and the feeling or orgasm and your erection won't change. Retrograde ejaculation can also be a side effect of certain medicines.  Date Last Reviewed: 1/1/2017 © 2000-2017 [employer]. [address] [place] [address], [place] [address]. All rights reserved. This information is not intended as a substitute for professional medical care. Always follow your healthcare professional's instructions.          General Information:    1.  Do not drink alcoholic beverages including beer for 24 hours or as long as  you are on pain medication..  2.  Do not drive a motor vehicle, operate machinery or power tools, or signs legal papers for 24 hours or as long as you are on pain medication.   3.  You may experience light-headedness, dizziness, and sleepiness following surgery. Please do not stay alone. A responsible adult should be with you for this 24 hour period.  4.  Go home and rest.    5. Progress slowly to a normal diet unless instructed.  Otherwise, begin with liquids such as soft drinks, then soup and crackers working up to solid foods. Drink plenty of nonalcoholic fluids.  6.  Certain anesthetics and pain medications produce nausea and vomiting in certain       individuals. If nausea becomes a problem at home, call you doctor.    7. A nurse will be calling you sometime after surgery. Do not be alarmed. This is our way of finding out how you are doing.    8. Several times every hour while you are awake, take 2-3 deep breaths and cough. If you had stomach surgery hold a pillow or rolled towel firmly against your stomach before you cough. This will help with any pain the cough might cause.  9. Several times every hour while you are awake, pump and flex your feet 5-6 times and do foot circles. This will help prevent blood clots.    10.Call your doctor for severe pain, bleeding, fever, or signs or symptoms of infection (pain, swelling, redness, foul odor, drainage).        Post op instructions for prevention of DVT  What is deep vein thrombosis?  Deep vein thrombosis (DVT) is the medical term for blood clots in the deep veins of the leg.  These blood clots can be dangerous.  A DVT can block a blood vessel and keep blood from getting where it needs to go.  Another problem is that the clot can travel to other parts of the body such as the lungs.  A clot that travels to the lungs is called a pulmonary embolus (PE) and can cause serious problems with breathing which can lead to death.  Am I at risk for DVT/PE?  If you are not very  active, you are at risk of DVT.  Anyone confined to bed, sitting for long periods of time, recovering from surgery, etc. increases the risk of DVT.  Other risk factors are cancer diagnosis, certain medications, estrogen replacement in any form,older age, obesity, pregnancy, smoking, history of clotting disorders, and dehydration.  How will I know if I have a DVT?   Swelling in the lower leg   Pain   Warmth, redness, hardness or bulging of the vein  If you have any of these symptoms, call your doctors office right away.  Some people will not have any symptoms until the clot moves to the lungs.  What are the symptoms of a PE?   Panting, shortness of breath, or trouble breathing   Sharp, knife-like chest pain when you breathe   Coughing or coughing up blood   Rapid heartbeat  If you have any of these symptoms or get worse quickly, call 911 for emergency treatment.  How can I prevent a DVT?   Avoid long periods of inactivity and dont cross your legs--get up and walk around every hour or so.   Stay active--walking after surgery is highly encouraged.  This means you should get out of the house and walk in the neighborhood.  Going up and down stairs will not impair healing (unless advised against such activity by your doctor).     Drink plenty of noncaffeinated, nonalcoholic fluids each day to prevent dehydration.   Wear special support stockings, if they have been advised by your doctor.   If you travel, stop at least once an hour and walk around.   Avoid smoking (assistance with stopping is available through your healthcare provider)  Always notify your doctor if you are not able to follow the post operative instructions that are given to you at the time of discharge.  It may be necessary to prescribe one of the medications available to prevent DVT.    Discharge Instructions: After Your Surgery/Procedure  Youve just had surgery. During surgery you were given medicine called anesthesia to keep you relaxed  "and free of pain. After surgery you may have some pain or nausea. This is common. Here are some tips for feeling better and getting well after surgery.     Stay on schedule with your medication.   Going home  Your doctor or nurse will show you how to take care of yourself when you go home. He or she will also answer your questions. Have an adult family member or friend drive you home.      For your safety we recommend these precaution for the first 24 hours after your procedure:  · Do not drive or use heavy equipment.  · Do not make important decisions or sign legal papers.  · Do not drink alcohol.  · Have someone stay with you, if needed. He or she can watch for problems and help keep you safe.  · Your concentration, balance, coordination, and judgement may be impaired for many hours after anesthesia.  Use caution when ambulating or standing up.     · You may feel weak and "washed out" after anesthesia and surgery.      Subtle residual effects of general anesthesia or sedation with regional / local anesthesia can last more than 24 hours.  Rest for the remainder of the day or longer if your Doctor/Surgeon has advised you to do so.  Although you may feel normal within the first 24 hours, your reflexes and mental ability may be impaired without you realizing it.  You may feel dizzy, lightheaded or sleepy for 24 hours or longer.      Be sure to go to all follow-up visits with your doctor. And rest after your surgery for as long as your doctor tells you to.  Coping with pain  If you have pain after surgery, pain medicine will help you feel better. Take it as told, before pain becomes severe. Also, ask your doctor or pharmacist about other ways to control pain. This might be with heat, ice, or relaxation. And follow any other instructions your surgeon or nurse gives you.  Tips for taking pain medicine  To get the best relief possible, remember these points:  · Pain medicines can upset your stomach. Taking them with a " little food may help.  · Most pain relievers taken by mouth need at least 20 to 30 minutes to start to work.  · Taking medicine on a schedule can help you remember to take it. Try to time your medicine so that you can take it before starting an activity. This might be before you get dressed, go for a walk, or sit down for dinner.  · Constipation is a common side effect of pain medicines. Call your doctor before taking any medicines such as laxatives or stool softeners to help ease constipation. Also ask if you should skip any foods. Drinking lots of fluids and eating foods such as fruits and vegetables that are high in fiber can also help. Remember, do not take laxatives unless your surgeon has prescribed them.  · Drinking alcohol and taking pain medicine can cause dizziness and slow your breathing. It can even be deadly. Do not drink alcohol while taking pain medicine.  · Pain medicine can make you react more slowly to things. Do not drive or run machinery while taking pain medicine.  Your health care provider may tell you to take acetaminophen to help ease your pain. Ask him or her how much you are supposed to take each day. Acetaminophen or other pain relievers may interact with your prescription medicines or other over-the-counter (OTC) drugs. Some prescription medicines have acetaminophen and other ingredients. Using both prescription and OTC acetaminophen for pain can cause you to overdose. Read the labels on your OTC medicines with care. This will help you to clearly know the list of ingredients, how much to take, and any warnings. It may also help you not take too much acetaminophen. If you have questions or do not understand the information, ask your pharmacist or health care provider to explain it to you before you take the OTC medicine.  Managing nausea  Some people have an upset stomach after surgery. This is often because of anesthesia, pain, or pain medicine, or the stress of surgery. These tips will  help you handle nausea and eat healthy foods as you get better. If you were on a special food plan before surgery, ask your doctor if you should follow it while you get better. These tips may help:  · Do not push yourself to eat. Your body will tell you when to eat and how much.  · Start off with clear liquids and soup. They are easier to digest.  · Next try semi-solid foods, such as mashed potatoes, applesauce, and gelatin, as you feel ready.  · Slowly move to solid foods. Dont eat fatty, rich, or spicy foods at first.  · Do not force yourself to have 3 large meals a day. Instead eat smaller amounts more often.  · Take pain medicines with a small amount of solid food, such as crackers or toast, to avoid nausea.     Call your surgeon if  · You still have pain an hour after taking medicine. The medicine may not be strong enough.  · You feel too sleepy, dizzy, or groggy. The medicine may be too strong.  · You have side effects like nausea, vomiting, or skin changes, such as rash, itching, or hives.       If you have obstructive sleep apnea  You were given anesthesia medicine during surgery to keep you comfortable and free of pain. After surgery, you may have more apnea spells because of this medicine and other medicines you were given. The spells may last longer than usual.   At home:  · Keep using the continuous positive airway pressure (CPAP) device when you sleep. Unless your health care provider tells you not to, use it when you sleep, day or night. CPAP is a common device used to treat obstructive sleep apnea.  · Talk with your provider before taking any pain medicine, muscle relaxants, or sedatives. Your provider will tell you about the possible dangers of taking these medicines.  © 8847-7299 [employer]. [address] [place] [address], [place] [address]. All rights reserved. This information is not intended as a substitute for professional medical care. Always follow your healthcare professional's  instructions.    Using Opioids for Pain Management     Your doctor has given instructions for you to take an opioid.  This is a drug for bad pain.  It helps control pain without causing bleeding and kidney problems.  Common opioid names are morphine, hydromorphone, oxycodone, and methadone. These drugs are called narcotics.    There are several safety concerns you need to know.     · It is against the law to give or sell this drug to another person.  You must keep this medicine safely locked.    · You may have side effects from taking this medication.  These include nausea, itching, sweating, sleepiness, a change in your ability to breathe, and depression.  · Do not take alcohol or sleeping pills opioids.    · Long-term opoid use may no longer giver you relief from pain.  It can cause you stomach pain, mental anxiety, and headaches.  Long-term opoid use can potentially lead to unlawful street drug abuse and reduce your ability to stay employed.    · Your body may become opioid tolerant if you need to take more to get relief.    · You must stop taking opioids if you begin having more pain as a result of the medicine.    · Opioid withdrawal occurs when you have to stop taking the drug.  It can cause you to have nausea, vomiting, diarrhea, stomach pain, anxiety, and dilated pupils in your eyes. This condition means you are opioid dependent.    · Addiction is a drug induced brain disease. It means there are changes in how your brain is working.  Children, teens, and young adults under 25 years old are more likely to get addicted to opioids.      · Addiction can happen with repeated opioid use.  It does not happen with short-term use of two weeks or less.       For more information, please speak with your doctor or pharmacist.      We hope your stay was comfortable as you heal now, mend and rest.    For we have enjoyed taking care of you by giving your our best.    And as you get better, by regaining your health and  strength;   We count it as a privilege to have served you and hope your time at [facility] was well spent.      Thank  You!!!

## 2018-01-11 NOTE — PLAN OF CARE
Pt denies any pain at this time.  Zelaya cath draining yellow urine into leg bag secured to left leg.  VSS.  Daughter at bedside and will take all belongings.  Gentamycin complete in PreOp per Dr. Abdi's orders.

## 2018-01-11 NOTE — INTERVAL H&P NOTE
The patient has been examined and the H&P has been reviewed:    Pt Had stage 1 turp on 12/21/17 - 50g bph - returns for stage 2 turp +/- turbt.    Anesthesia/Surgery risks, benefits and alternative options discussed and understood by patient/family. All questions patient and daughter had were answered in preop holding. Informed consent obtained          Active Hospital Problems    Diagnosis  POA    Urinary retention [R33.9]  Yes      Resolved Hospital Problems    Diagnosis Date Resolved POA   No resolved problems to display.

## 2018-01-11 NOTE — BRIEF OP NOTE
[facility] Brief Operative/Discharge Note     Date: 1/11/18     Staff Surgeon: Alejandro Abdi MD     Pre-Op Diagnosis:   BPH with obstruction  Urinary retention secondary to prostate obstruction     Post-Op Diagnosis: same     Procedure(s) Performed:   Transurethral resection of prostate, stage 2, bipolar (modifier 22)     Specimen(s): prostate chips     Anesthesia: GETA     Findings: very large obstructing hypervascular irregular prostate requiring extensive resection despite initial resection of 50g. Approximately 35-45g resected this time as well, including significant anterior tissue. Trabeculated bladder. No bladder tumor/lesion, just ramires edema at bladder neck.     Estimated Blood Loss: 100cc     Drains: 24 fr ramires     Complications: none     Disposition: pacu    Discharge home today status post uncomplicated procedure as above  Diet - resume home diet  Follow up: 1/16/18 Tuesday by 0900 for nurse visit for ramires removal, then 2 weeks later for md visit  Instructions: drink plenty of water, may see blood in urine, take abx as directed, can stop flomax 5-7d after ramires removed if voiding  Meds:     Medication List      START taking these medications    lidocaine HCL 2% 2 % jelly  Commonly known as:  XYLOCAINE  Apply topically as needed. For ramires irritation     traMADol 50 mg tablet  Commonly known as:  ULTRAM  Take 1 tablet (50 mg total) by mouth every 6 (six) hours as needed (pain not relieved by otc agents).        CONTINUE taking these medications    CENTRUM SILVER ORAL     ciprofloxacin HCl 500 MG tablet  Commonly known as:  CIPRO  Take 1 tablet (500 mg total) by mouth 2 (two) times daily.     glucosamine-chondroitin 500-400 mg tablet     NIFEdipine 30 MG (OSM) 24 hr tablet  Commonly known as:  PROCARDIA-XL  Take 1 tablet (30 mg total) by mouth every evening.     tamsulosin 0.4 mg Cp24  Commonly known as:  FLOMAX  take 1 capsule by mouth once daily        STOP taking these medications     hydrocodone-acetaminophen 5-325mg 5-325 mg per tablet  Commonly known as:  NORCO           Where to Get Your Medications      These medications were sent to [facility] - [address]  [address], [place] [address]    Phone:  [phone]   · ciprofloxacin HCl 500 MG tablet  · lidocaine HCL 2% 2 % jelly  · traMADol 50 mg tablet

## 2018-01-11 NOTE — PLAN OF CARE
Pt is awake and calm, urine color clear yellow/a bit of pink in color    His daughter Sarah is at the bedside, discharge instructions given to the patient and the pt daughter    Dr Abdi came to assess the pt and pt is ok to be discharged home per Dr Abdi    Pt is tolerating sips of water    Discussed the big urine bag to change at night   The pt stated that he likes only to use the leg bag/  It is given to the patient in case he decides different at home.  Instructions how to change the bag was given to the daughter and the patient.

## 2018-01-11 NOTE — TRANSFER OF CARE
Anesthesia Transfer of Care Note    Patient: Roosevelt Alejandro    Procedure(s) Performed: Procedure(s) (LRB):  PROSTATECTOMY-TRANSURETHRAL (N/A)    Patient location: PACU    Anesthesia Type: general    Transport from OR: Transported from OR on 2-3 L/min O2 by NC with adequate spontaneous ventilation    Post pain: adequate analgesia    Post assessment: no apparent anesthetic complications and tolerated procedure well    Post vital signs: stable    Nausea/Vomiting: no nausea/vomiting    Complications: none    Transfer of care protocol was followed      Last vitals:   Visit Vitals  /74 (BP Location: Left arm, Patient Position: Lying)   Pulse 79   Temp 36.5 °C (97.7 °F) (Temporal)   Resp 18   SpO2 98%

## 2018-01-12 ENCOUNTER — TELEPHONE (OUTPATIENT)
Dept: FAMILY MEDICINE | Facility: CLINIC | Age: 73
End: 2018-01-12

## 2018-01-12 VITALS
TEMPERATURE: 99 F | HEART RATE: 66 BPM | SYSTOLIC BLOOD PRESSURE: 120 MMHG | RESPIRATION RATE: 18 BRPM | DIASTOLIC BLOOD PRESSURE: 58 MMHG | OXYGEN SATURATION: 96 %

## 2018-01-12 NOTE — OP NOTE
[facility] Urology Brief Operative/Discharge Note     Date: 1/11/18     Staff Surgeon: Alejandro Abdi MD     Pre-Op Diagnosis:   BPH with obstruction  Urinary retention secondary to prostate obstruction     Post-Op Diagnosis: same     Procedure(s) Performed:   Transurethral resection of prostate, stage 2, bipolar (modifier 22)     Specimen(s): prostate chips     Anesthesia: GETA     Findings: very large obstructing hypervascular irregular prostate requiring extensive resection despite initial resection of 50g. Approximately 40-50g resected this time as well, including significant anterior tissue. Trabeculated bladder. No bladder tumor/lesion, just ramires edema at bladder neck.  Modifier 22 should be used secondary to significant size of patient's prostate, as 50 g were resected in stage I procedure, approximately 50 more grams were resected and the stage II procedure.  As well, as he is 3 weeks postoperatively from his initial transurethral resection of prostate, post-TURP changes and inflammation are present for this resection, the combination of that and his significant prostate size required extensive resection and coagulation over multiple hours, well beyond expected for outpatient TURP.     Estimated Blood Loss: 150cc     Drains: 24fr ramires     Complications: none     Indications for procedure:  Mr Alejandro is a 71 yo M who presented with acute urinary retention in early December 2017 who then had a few episodes of gross hematuria per ramires, ultimately leading to clot retention and significant manual irrigation. I did perform cystoscopic evaluation on 12/19 finding significant prostatomegaly with high grade obstruction as well as moderate obstructing median lobe which was quite hypervascular and seemed to be source of bleeding secondary to contact with ramires balloon. Also had abnormal appearance of posterior bladder wall likely consistent with ramires edema without stigmata of bleeding. On 12/21/17 he underwent a  very extensive transurethral resection of prostate, and after extensive resection, only resected approximately half of his prostate as a stage I procedure, the significant volume of 50 g.  Zelaya catheter was left in place and he returns today for a stage II procedure for continued/completion transurethral resection of prostate.  All risks and benefits of TURP discussed in detail, all questions answered, and appropriate informed consent was obtained.     Procedure in detail:  After appropriate informed consent was obtained, the patient was taken back to the cystoscopy suite and placed in the lithotomy position. A WHO-approved time out was performed and preoperative antibiotics (ancef and gentamicin) were given. He was then prepped and draped in the usual sterile fashion after removal of his indwelling Zelaya catheter.     The 24french resectoscope was then assembled and placed into the patient's bladder using a visual obturator. On passage of the scope, his prostatic urethra was still quite long with post-TURP inflammation and sloughing.  From the verumontanum it was a winding channel requiring complete anterior passage of the scope to avoid false passage and traverses high bladder neck into the bladder.  Significant postoperative inflammation was noted as well as continued hypervascularity, especially around the bladder neck and area prior median lobe.  He did have some inflammatory polypoid lesions at the bladder neck, and though his bladder wall was severely trabeculated, no significant mucosal lesions seen on posterior bladder wall or any else in the bladder on cystoscopic surveillance. The patient's bladder was again drained and the visual obturator was exchanged for the bipolar resectoscope loop.     I first started by identify trigone with bilateral ureteral orifices in close proximity to previous bed of resection, though a safe distance from any remaining obstructing tissue. I then resected a central channel  from bed of previous resection more proximally back to the verumontanum including lateral protrusions of obstructing tissue just anterior to the verumontanum. After this, I systematically resected his lateral lobes. He did have moderate amount of anterior obstructing tissue left as well which was carefully resected and loop was used to cauterize all resection sites and spot coagulate any bleeding vessels/sinuses. Ureteral orifices on trigone were visualized intermittently throughout to ensure they were safe from resection. Button electrode was also used for vaporization of tissue of base of prostate to smooth it out, as well as diffusely on coagulation setting at borders of and bed of resection to facilitate hemostasis.     Given the significant size of his prostate, this resection was quite extensive, systematically taking down the right lateral lobe first, then gaining hemostasis with button electrode given the significant bleeding sinuses due to the hypervascularity from irritation from prior TURP and overall contact irritation and inflammation of kissing obstructing lateral lobes.  As found in his stage I procedure, his prostate was also incredibly hypervascular.  Once the right lateral lobe was able to be resected, including obstructing tissue adjacent to the verumontanum, and found to be hemostatic, I was able to take down the left lateral lobe systematically at this time, first with loop electrode, and then again with button electrode for hemostasis and further vaporization of remaining obstructing tissue.     Ellik was used to completely evacuate the prostate chips from the bladder intermittently throughout the case, which did also incite some bleeding requiring extensive fulguration with button electrode.  Upon re-distention of the bladder each time significant rebound of lateral prostatic tissue was noted requiring extensive resection.  Ultimately after many hours of resection and coagulation, completion  of his transurethral resection of his prostate in one stage was able to be completed.       All evacuated prostate chips were then collected and were sent off for pathologic examination, close to 40-50 g in total, filling a specimen cup.  Next, I reinserted the resectoscope into the patient's bladder and filled the bladder.  The flow of irrigation was stopped and we again noted efflux from the ureteral orifices.  The area of resection was then examined and the peripheral margins were fulgurated.  With water and suction off, the bed of resection appeared hemostatic, so with all chips removed from bladder as confirmed on cystoscopy, the resectoscope was removed from the bladder     A 24-[place] Ramires catheter was placed with 50cc in balllon, and 60cc cath tip syringe used to irrigate the bladder all of which was clear, and punctate tissue fragments aspirated.  Urine in the ramires bag was clear. His ramires was taped to traction on his thigh with mastasol and silk tape.     The patient tolerated the procedure well with no complications and was awakened and transferred to the recovery room in stable condition.          Disposition: The ramires will be untaped from traction in 1 hour at which time 20cc will be removed from the balloon leaving 30cc remaining, and ramires will be placed to leg bag.  Patient will be discharged home with ramires in place once criteria are met, and will return for ramires removal and voiding trial next week on Tuesday 1/17/18 by 0900am.  He'll be discharged home in the postoperative course of antibiotics as well as by mouth pain control and lidocaine jelly for his Ramires catheter.

## 2018-01-12 NOTE — TELEPHONE ENCOUNTER
----- Message from Chance Gilliam sent at 1/12/2018  1:55 PM CST -----  Contact: self  [phone]  Patient called stating the recent prescribed rx metoprolol is to lower his blood pressure. Patient asking if he should take this rx along with blood pressure medication.  Thanks!

## 2018-01-15 ENCOUNTER — TELEPHONE (OUTPATIENT)
Dept: FAMILY MEDICINE | Facility: CLINIC | Age: 73
End: 2018-01-15

## 2018-01-15 NOTE — TELEPHONE ENCOUNTER
Dr. Johnson placed a referral to Cardiology for this patient. Can you please contact patient for scheduling? Please advise. Thank you.

## 2018-01-15 NOTE — TELEPHONE ENCOUNTER
Called pt and scheduled for first available. Pt verbalized OK with date and time. Letter also mailed out. No further issues discussed.

## 2018-01-16 ENCOUNTER — CLINICAL SUPPORT (OUTPATIENT)
Dept: UROLOGY | Facility: CLINIC | Age: 73
End: 2018-01-16
Payer: MEDICARE

## 2018-01-16 DIAGNOSIS — R33.9 URINARY RETENTION: Primary | ICD-10-CM

## 2018-01-16 NOTE — PROGRESS NOTES
Patient here today to have his catheter removed post prostatectomy 1/11/18.      28ml saline removed from catheter balloon.   Catheter removed with no difficulty.   Leg bag contains 15ml of orange urine.   Patient instructed to drink 6-8 eight oz glasses on non-cafeinated beverages.  If unable to urinate by 2:30 this afternoon, come back to the clinic to have catheter replaced.

## 2018-01-18 ENCOUNTER — DOCUMENTATION ONLY (OUTPATIENT)
Dept: FAMILY MEDICINE | Facility: CLINIC | Age: 73
End: 2018-01-18

## 2018-01-18 ENCOUNTER — APPOINTMENT (OUTPATIENT)
Dept: LAB | Facility: HOSPITAL | Age: 73
End: 2018-01-18
Attending: FAMILY MEDICINE
Payer: MEDICARE

## 2018-01-18 ENCOUNTER — OFFICE VISIT (OUTPATIENT)
Dept: FAMILY MEDICINE | Facility: CLINIC | Age: 73
End: 2018-01-18
Payer: MEDICARE

## 2018-01-18 VITALS
SYSTOLIC BLOOD PRESSURE: 137 MMHG | BODY MASS INDEX: 26.03 KG/M2 | DIASTOLIC BLOOD PRESSURE: 68 MMHG | HEIGHT: 74 IN | TEMPERATURE: 98 F | WEIGHT: 202.81 LBS

## 2018-01-18 DIAGNOSIS — I10 ESSENTIAL (PRIMARY) HYPERTENSION: ICD-10-CM

## 2018-01-18 DIAGNOSIS — I49.3 FREQUENT UNIFOCAL PVCS: Chronic | ICD-10-CM

## 2018-01-18 LAB — TSH SERPL DL<=0.005 MIU/L-ACNC: 2.34 UIU/ML

## 2018-01-18 PROCEDURE — 84443 ASSAY THYROID STIM HORMONE: CPT

## 2018-01-18 PROCEDURE — 36415 COLL VENOUS BLD VENIPUNCTURE: CPT | Mod: PO

## 2018-01-18 PROCEDURE — 99213 OFFICE O/P EST LOW 20 MIN: CPT | Mod: S$GLB,,, | Performed by: FAMILY MEDICINE

## 2018-01-18 PROCEDURE — 99999 PR PBB SHADOW E&M-EST. PATIENT-LVL III: CPT | Mod: PBBFAC,,, | Performed by: FAMILY MEDICINE

## 2018-01-18 NOTE — PATIENT INSTRUCTIONS
Established High Blood Pressure    High blood pressure (hypertension) is a chronic disease. Often, healthcare providers dont know what causes it. But it can be caused by certain health conditions and medicines.  If you have high blood pressure, you may not have any symptoms. If you do have symptoms, they may include headache, dizziness, changes in your vision, chest pain, and shortness of breath. But even without symptoms, high blood pressure thats not treated raises your risk for heart attack and stroke. High blood pressure is a serious health risk and shouldnt be ignored.  A blood pressure reading is made up of two numbers: a higher number over a lower number. The top number is the systolic pressure. The bottom number is the diastolic pressure. A normal blood pressure is a systolic pressure of  less than 120 over a diastolic pressure of less than 80. You will see your blood pressure readings written together. For example, a person with a systolic pressure of 188 and a diastolic pressure of 78 will have 118/78 written in the medical record.  High blood pressure is when either the top number is 140 or higher, or the bottom number is 90 or higher. This must be the result when taking your blood pressure a number of times. The blood pressures between normal and high are called prehypertension.  Home care  If you have high blood pressure, you should do what is listed below to lower your blood pressure. If you are taking medicines for high blood pressure, these methods may reduce or end your need for medicines in the future.  · Begin a weight-loss program if you are overweight.  · Cut back on how much salt you get in your diet. Heres how to do this:  ¨ Dont eat foods that have a lot of salt. These include olives, pickles, smoked meats, and salted potato chips.  ¨ Dont add salt to your food at the table.  ¨ Use only small amounts of salt when cooking.  · Start an exercise program. Talk with your healthcare  provider about the type of exercise program that would be best for you. It doesn't have to be hard. Even brisk walking for 20 minutes 3 times a week is a good form of exercise.  · Dont take medicines that stimulate the heart. This includes many over-the-counter cold and sinus decongestant pills and sprays, as well as diet pills. Check the warnings about hypertension on the label. Before buying any over-the-counter medicines or supplements, always ask the pharmacist about the product's potential interaction with your high blood pressure and your high blood pressure medicines.  · Stimulants such as amphetamine or cocaine could be deadly for someone with high blood pressure. Never take these.  · Limit how much caffeine you get in your diet. Switch to caffeine-free products.  · Stop smoking. If you are a long-time smoker, this can be hard. Talk to your healthcare provider about medicines and nicotine replacement options to help you. Also, enroll in a stop-smoking program to make it more likely that you will quit for good.  · Learn how to handle stress. This is an important part of any program to lower blood pressure. Learn about relaxation methods like meditation, yoga, or biofeedback.  · If your provider prescribed medicines, take them exactly as directed. Missing doses may cause your blood pressure get out of control.  · If you miss a dose or doses, check with your healthcare provider or pharmacist about what to do.  · Consider buying an automatic blood pressure machine. Ask your provider for a recommendation. You can get one of these at most pharmacies.     The American Heart Association recommends the following guidelines for home blood pressure monitoring:  · Don't smoke or drink coffee for 30 minutes before taking your blood pressure.  · Go to the bathroom before the test.  · Relax for 5 minutes before taking the measurement.  · Sit with your back supported (don't sit on a couch or soft chair); keep your feet on  the floor uncrossed. Place your arm on a solid flat surface (like a table) with the upper part of the arm at heart level. Place the middle of the cuff directly above the eye of the elbow. Check the monitor's instruction manual for an illustration.  · Take multiple readings. When you measure, take 2 to 3 readings one minute apart and record all of the results.  · Take your blood pressure at the same time every day, or as your healthcare provider recommends.  · Record the date, time, and blood pressure reading.  · Take the record with you to your next medical appointment. If your blood pressure monitor has a built-in memory, simply take the monitor with you to your next appointment.  · Call your provider if you have several high readings. Don't be frightened by a single high blood pressure reading, but if you get several high readings, check in with your healthcare provider.  · Note: When blood pressure reaches a systolic (top number) of 180 or higher OR diastolic (bottom number) of 110 or higher, seek emergency medical treatment.  Follow-up care  You will need to see your healthcare provider regularly. This is to check your blood pressure and to make changes to your medicines. Make a follow-up appointment as directed. Bring the record of your home blood pressure readings to the appointment.  When to seek medical advice  Call your healthcare provider right away if any of these occur:  · Blood pressure reaches a systolic (upper number) of 180 or higher OR a diastolic (bottom number) of 110 or higher  · Chest pain or shortness of breath  · Severe headache  · Throbbing or rushing sound in the ears  · Nosebleed  · Sudden severe pain in your belly (abdomen)  · Extreme drowsiness, confusion, or fainting  · Dizziness or spinning sensation (vertigo)  · Weakness of an arm or leg or one side of the face  · You have problems speaking or seeing   Date Last Reviewed: 12/1/2016  © 5305-7630 [employer]. [address] [place] [address]  [place] [address]. All rights reserved. This information is not intended as a substitute for professional medical care. Always follow your healthcare professional's instructions.

## 2018-01-18 NOTE — PROGRESS NOTES
Pre-Visit Chart Review  For Appointment Scheduled on 01/18/2018    Health Maintenance Due   Topic Date Due    Zoster Vaccine  11/24/2004    Influenza Vaccine  08/01/2017

## 2018-01-22 NOTE — PROGRESS NOTES
Subjective:       Patient ID: Roosevelt Alejandro is a 72 y.o. male.    Chief Complaint: Hypertension    He has skipped heart beats, no Vent Tachycardia, no symptoms.      Hypertension   This is a chronic problem. The current episode started more than 1 year ago. The problem has been resolved since onset. The problem is uncontrolled. Pertinent negatives include no anxiety, blurred vision, chest pain, headaches, malaise/fatigue, neck pain, palpitations or shortness of breath.     Review of Systems   Constitutional: Negative for fatigue, malaise/fatigue and unexpected weight change.   Eyes: Negative for blurred vision.   Respiratory: Negative for chest tightness and shortness of breath.    Cardiovascular: Negative for chest pain, palpitations and leg swelling.   Gastrointestinal: Negative for abdominal pain.   Musculoskeletal: Negative for arthralgias and neck pain.   Neurological: Negative for dizziness, syncope, light-headedness and headaches.       Patient Active Problem List   Diagnosis    Hypertension    Benign prostatic hyperplasia    Essential hypertension    DDD (degenerative disc disease), lumbar    Chronic allergic rhinitis    ACE inhibitor-aggravated angioedema    Pulmonary asbestosis    H/O arteriovenous malformation (AVM)    Urinary retention    Gross hematuria    Urinary retention due to benign prostatic hyperplasia    Frequent unifocal PVCs       Objective:      Physical Exam   Constitutional: He is oriented to person, place, and time. He appears well-developed and well-nourished.   Cardiovascular: Normal rate, regular rhythm and normal heart sounds.    Pulmonary/Chest: Effort normal and breath sounds normal.   Musculoskeletal: He exhibits no edema.   Neurological: He is alert and oriented to person, place, and time.   Skin: Skin is warm and dry.   Psychiatric: He has a normal mood and affect.   Nursing note and vitals reviewed.      Lab Results   Component Value Date    WBC 12.30 12/22/2017     HGB 11.1 (L) 12/22/2017    HCT 32.0 (L) 12/22/2017     12/22/2017    CHOL 195 10/16/2015    TRIG 110 10/16/2015    HDL 39 (L) 10/16/2015    ALT 18 02/10/2017    AST 18 02/10/2017     12/22/2017    K 3.9 12/22/2017     12/22/2017    CREATININE 0.9 12/22/2017    BUN 8 12/22/2017    CO2 25 12/22/2017    TSH 2.338 01/18/2018    PSA 28.6 (H) 12/20/2017    INR 1.1 12/20/2017    GLUF 109 07/22/2004    HGBA1C 5.9 01/07/2011     The 10-year ASCVD risk score (Benedicto MEDINA Jr., et al., 2013) is: 30.1%    Values used to calculate the score:      Age: 72 years      Sex: Male      Is Non- : No      Diabetic: No      Tobacco smoker: No      Systolic Blood Pressure: 137 mmHg      Is BP treated: Yes      HDL Cholesterol: 39 mg/dL      Total Cholesterol: 195 mg/dL    Assessment:       1. Frequent unifocal PVCs    2. Essential (primary) hypertension         Plan:       Frequent unifocal PVCs  -     TSH; Future; Expected date: 01/18/2018    Essential (primary) hypertension   -     TSH; Future; Expected date: 01/18/2018      Patient readiness: acceptance and barriers:none    During the course of the visit the patient was educated and counseled about the following:     Hypertension:   Dietary sodium restriction.  Regular aerobic exercise.  Check blood pressures daily and record.    Goals: Hypertension: Reduce Blood Pressure    Did patient meet goals/outcomes: Yes    The following self management tools provided: blood pressure log  excercise log    Patient Instructions (the written plan) was given to the patient/family.     Time spent with patient: 30 minutes    Barriers to medications present (no )    Adverse reactions to current medications (no)    Over the counter medications reviewed (Yes)        20-minute visit. 30 minutes spent counseling patient on diet, exercise, and weight loss.  This has been fully explained to the patient, who indicates understanding.

## 2018-01-28 NOTE — PROGRESS NOTES
[facility] Urology Progress Note    Roosevelt Alejandro is a 72 y.o. male who presents for postop follow up from 2-stage TURP.    He presented with acute urinary retention in early December 2017 and then had a few episodes of gross hematuria per ramires, ultimately leading to clot retention and significant manual irrigation on multiple occasions.   I did perform cystoscopic evaluation on 12/19 finding significant prostatomegaly with high grade obstruction as well as moderate obstructing median lobe which was quite hypervascular and seemed to be source of bleeding secondary to contact with ramires balloon.   Also had abnormal appearance of posterior bladder wall likely consistent with ramires edema without stigmata of bleeding.     On 12/21/17 he underwent a very extensive transurethral resection of prostate, and after extensive resection, only resected approximately half of his prostate as a stage I procedure, and pathology was significant for a resected volume of 50g BPH.    Ramires catheter was left in place and he returned on 1/11/18 stage II procedure for continued/completion transurethral resection of prostate at which time noted to have continued very large obstructing hypervascular irregular prostate requiring extensive resection despite initial resection of 50g. Approximately 40-50g resected this time as well, including significant anterior tissue. Trabeculated bladder. No bladder tumor/lesion, just ramires edema at bladder neck.  Pathology confirms 41 g resected, with areas of coagulative necrosis.  In addition to resected tissue, did have vaporization of some prostatic tissue with button electrode.  His Ramires catheter was removed on 1/16/18.    He returns today noting that he has been urinating well and has no complaints urinating at this time.  Denies hesitancy or intermittency.  Urinated just prior to arrival, and bladder scan postvoid residual is only 48 cc  He has not stopped his Flomax he had  He really has no urgency.   "He did have a little dribble of urine initially after surgery but it resolved with a few days of [place] exercises  Denies gross hematuria or dysuria      ROS: A comprehensive 10 system review was performed and is negative except as noted above in HPI    PHYSICAL EXAM:    Vitals:    01/29/18 1343   BP: (!) 140/80   Pulse: 84   Resp: 18     Body mass index is 25.29 kg/m². Weight: 89.4 kg (197 lb) Height: 6' 2" (188 cm)       General: Alert, cooperative, no distress, appears stated age   Head: Normocephalic, without obvious abnormality, atraumatic   Eyes: PERRL, conjunctiva/corneas clear   Lungs: Respirations unlabored   Heart: Warm and well perfused   Abdomen: soft nt nd  Extremities: Extremities normal, atraumatic, no cyanosis or edema   Skin: Skin color, texture, turgor normal, no rashes or lesions   Psych: Appropriate   Neurologic: Non-focal       Recent Results (from the past 336 hour(s))   POCT Bladder Scan    Collection Time: 01/29/18  1:45 PM   Result Value Ref Range    POC Residual Urine Volume 48 0 - 100 mL       ASSESSMENT   1. Urinary retention  POCT Bladder Scan       Plan    He is doing very well after 2-stage TURP with resected volume of more than 90 g total.  Advised at this time he can discontinue use of his alpha blocker.  Also advised avoiding motorcycle for 4-6 weeks after TURP  RTC 3 months for uroflow/PVR and symptom reassessment    "

## 2018-01-29 ENCOUNTER — OFFICE VISIT (OUTPATIENT)
Dept: UROLOGY | Facility: CLINIC | Age: 73
End: 2018-01-29
Payer: MEDICARE

## 2018-01-29 VITALS
BODY MASS INDEX: 25.28 KG/M2 | WEIGHT: 197 LBS | HEIGHT: 74 IN | DIASTOLIC BLOOD PRESSURE: 80 MMHG | HEART RATE: 84 BPM | RESPIRATION RATE: 18 BRPM | SYSTOLIC BLOOD PRESSURE: 140 MMHG

## 2018-01-29 DIAGNOSIS — R33.9 URINARY RETENTION: Primary | ICD-10-CM

## 2018-01-29 LAB — POC RESIDUAL URINE VOLUME: 48 ML (ref 0–100)

## 2018-01-29 PROCEDURE — 51798 US URINE CAPACITY MEASURE: CPT | Mod: S$GLB,,, | Performed by: UROLOGY

## 2018-01-29 PROCEDURE — 99999 PR PBB SHADOW E&M-EST. PATIENT-LVL III: CPT | Mod: PBBFAC,,, | Performed by: UROLOGY

## 2018-01-29 PROCEDURE — 99024 POSTOP FOLLOW-UP VISIT: CPT | Mod: S$GLB,,, | Performed by: UROLOGY

## 2018-03-05 DIAGNOSIS — I10 HYPERTENSION, UNSPECIFIED TYPE: Primary | ICD-10-CM

## 2018-03-06 ENCOUNTER — OFFICE VISIT (OUTPATIENT)
Dept: CARDIOLOGY | Facility: CLINIC | Age: 73
End: 2018-03-06
Payer: MEDICARE

## 2018-03-06 VITALS
DIASTOLIC BLOOD PRESSURE: 76 MMHG | BODY MASS INDEX: 26.56 KG/M2 | OXYGEN SATURATION: 93 % | SYSTOLIC BLOOD PRESSURE: 132 MMHG | HEART RATE: 100 BPM | HEIGHT: 74 IN | WEIGHT: 207 LBS

## 2018-03-06 DIAGNOSIS — I10 HYPERTENSION, UNSPECIFIED TYPE: ICD-10-CM

## 2018-03-06 DIAGNOSIS — R00.9 ABNORMALITY OF HEART BEAT: ICD-10-CM

## 2018-03-06 DIAGNOSIS — Z01.810 PREOP CARDIOVASCULAR EXAM: ICD-10-CM

## 2018-03-06 DIAGNOSIS — I25.83 CORONARY ATHEROSCLEROSIS DUE TO LIPID RICH PLAQUE (CODE): ICD-10-CM

## 2018-03-06 DIAGNOSIS — I49.3 PVC (PREMATURE VENTRICULAR CONTRACTION): Primary | ICD-10-CM

## 2018-03-06 PROCEDURE — 99999 PR PBB SHADOW E&M-EST. PATIENT-LVL III: CPT | Mod: PBBFAC,,, | Performed by: INTERNAL MEDICINE

## 2018-03-06 PROCEDURE — 3078F DIAST BP <80 MM HG: CPT | Mod: S$GLB,,, | Performed by: INTERNAL MEDICINE

## 2018-03-06 PROCEDURE — 99205 OFFICE O/P NEW HI 60 MIN: CPT | Mod: S$GLB,,, | Performed by: INTERNAL MEDICINE

## 2018-03-06 PROCEDURE — 93000 ELECTROCARDIOGRAM COMPLETE: CPT | Mod: S$GLB,,, | Performed by: INTERNAL MEDICINE

## 2018-03-06 PROCEDURE — 3075F SYST BP GE 130 - 139MM HG: CPT | Mod: S$GLB,,, | Performed by: INTERNAL MEDICINE

## 2018-03-06 NOTE — LETTER
March 6, 2018      Greg Johnson MD  [address]  [place] [address]           [facility] [facility] - Cardiology  [address]  [place] [address]  Phone: [phone]          Patient: Roosevelt Alejandro   MR Number: [id number]   YOB: 1945   Date of Visit: 3/6/2018       Dear Dr. Greg Johnson:    Thank you for referring Roosevelt Alejandro to me for evaluation. Attached you will find relevant portions of my assessment and plan of care.    If you have questions, please do not hesitate to call me. I look forward to following Roosevelt Alejandro along with you.    Sincerely,    Ismael Love MD    Enclosure  CC:  No Recipients    If you would like to receive this communication electronically, please contact [email or web address] or [phone] to request more information on [facility] access.    For providers and/or their staff who would like to refer a patient to [facility], please contact us through our one-stop-shop provider referral line, [facility], at [phone].    If you feel you have received this communication in error or would no longer like to receive these types of communications, please e-mail [email or web address]

## 2018-03-06 NOTE — PROGRESS NOTES
[facility]    CC: PVC  Chief Complaint   Patient presents with    Consult       Patient ID: Roosevelt Alejandro is a 72 y.o. male with a past medical history of hypertension  HPI  he was noticed to have frequent PVCs.  He underwent a Holter monitor the report is dictated below.  Denies any palpitations, exertional chest pain, shortness of breath, orthopnea, PND, syncope or presyncope        Past Medical History:   Diagnosis Date    Arthritis     back pain    Asbestosis     BPH (benign prostatic hyperplasia)     Hypertension      Past Surgical History:   Procedure Laterality Date    COLONOSCOPY      2009    COLONOSCOPY N/A 1/21/2016    Procedure: COLONOSCOPY;  Surgeon: Toby Maciel MD;  Location: [facility];  Service: Endoscopy;  Laterality: N/A;    EYE SURGERY      cataracts, retinal detachment    PROSTATE BIOPSY      PROSTATECTOMY  12/2017    ROTATOR CUFF REPAIR      left     Social History     Social History    Marital status:      Spouse name: N/A    Number of children: N/A    Years of education: N/A     Occupational History    Not on file.     Social History Main Topics    Smoking status: Former Smoker     Quit date: 3/30/2000    Smokeless tobacco: Never Used    Alcohol use 0.6 oz/week     1 Cans of beer per week      Comment: rare    Drug use: No    Sexual activity: Yes     Partners: Female     Other Topics Concern    Not on file     Social History Narrative    No narrative on file     Family History   Problem Relation Age of Onset    Cancer Father      lung/ asbestos    Melanoma Neg Hx     Psoriasis Neg Hx     Lupus Neg Hx     Eczema Neg Hx        Review of patient's allergies indicates:   Allergen Reactions    Aspirin Other (See Comments)     Internal bleeding    Lisinopril (bulk)     Sulfa (sulfonamide antibiotics) Swelling and Rash       Medication List with Changes/Refills   Current Medications    FOLIC ACID/MULTIVIT-MIN/LUTEIN (CENTRUM SILVER ORAL)    Take  "by mouth.    GLUCOSAMINE-CHONDROITIN 500-400 MG TABLET    Take 1 tablet by mouth 3 (three) times daily.    METOPROLOL SUCCINATE (TOPROL-XL) 25 MG 24 HR TABLET    Take 1 tablet (25 mg total) by mouth once daily.    NIFEDIPINE (PROCARDIA-XL) 30 MG (OSM) 24 HR TABLET    Take 1 tablet (30 mg total) by mouth every evening.       Review of Systems  Constitution: Denies chills, fever, and sweats.  HENT: Denies headaches or blurry vision.  Cardiovascular: Denies chest pain or irregular heart beat.  Respiratory: Denies cough or shortness of breath.  Gastrointestinal: Denies abdominal pain, nausea, or vomiting.  Musculoskeletal: Denies muscle cramps.  Neurological: Denies dizziness or focal weakness.  Psychiatric/Behavioral: Normal mental status.  Hematologic/Lymphatic: Denies bleeding problem or easy bruising/bleeding.  Skin: Denies rash or suspicious lesions    Physical Examination  /76 (BP Location: Left arm)   Pulse 100   Ht 6' 2" (1.88 m)   Wt 93.9 kg (207 lb)   SpO2 (!) 93%   BMI 26.58 kg/m²     Constitutional: No acute distress, conversant  HEENT: Sclera anicteric, Pupils equal, round and reactive to light, extraocular motions intact, Oropharynx clear  Neck: No JVD, no carotid bruits  Cardiovascular: regular rate and rhythm, no murmur, rubs or gallops, normal S1/S2  Pulmonary: Clear to auscultation bilaterally  Abdominal: Abdomen soft, nontender, nondistended, positive bowel sounds  Extremities: No lower extremity edema,   Pulses:  Carotid pulses are 2+ on the right side, and 2+ on the left side.  Radial pulses are 2+ on the right side, and 2+ on the left side.     Skin: No ecchymosis, erythema, or ulcers  Psych: Alert and oriented x 3, appropriate affect  Neuro: CNII-XII intact, no focal deficits    Labs:  Most Recent Data  CBC:   Lab Results   Component Value Date    WBC 12.30 12/22/2017    HGB 11.1 (L) 12/22/2017    HCT 32.0 (L) 12/22/2017     12/22/2017    MCV 97 12/22/2017    RDW 12.8 12/22/2017 "     BMP:   Lab Results   Component Value Date     12/22/2017    K 3.9 12/22/2017     12/22/2017    CO2 25 12/22/2017    BUN 8 12/22/2017    CREATININE 0.9 12/22/2017     12/22/2017    CALCIUM 8.2 (L) 12/22/2017     LFTS;   Lab Results   Component Value Date    PROT 7.7 02/10/2017    ALBUMIN 4.4 02/10/2017    BILITOT 0.5 02/10/2017    AST 18 02/10/2017    ALKPHOS 80 02/10/2017    ALT 18 02/10/2017     COAGS:   Lab Results   Component Value Date    INR 1.1 12/20/2017     FLP:   Lab Results   Component Value Date    CHOL 195 10/16/2015    HDL 39 (L) 10/16/2015    LDLCALC 134.0 10/16/2015    TRIG 110 10/16/2015    CHOLHDL 20.0 10/16/2015     CARDIAC: No results found for: TROPONINI, CKMB, BNP    Imaging:  HOLTER    TEST DESCRIPTION   PREDOMINANT RHYTHM  1. Sinus rhythm with heart rates varying between 54 and 131 bpm with an average of 78 bpm.     VENTRICULAR ARRHYTHMIAS  1. There were frequent mostly monomorphic PVCs totalling 836 and averaging 34 per hour.  There were 3 bigeminal cycles.  There were 2 couplets.    2. There were no episodes of ventricular tachycardia.    SUPRA VENTRICULAR ARRHYTHMIAS  1. There were rare PACs totalling 102 and averaging 4 per hour.     2. There were no episodes of sustained supraventricular tachycardia.    SINUS NODE FUNCTION  1. There was no evidence of high grade SA rigoberto block.     AV CONDUCTION  1. There was no evidence of high grade AV block.     2. The longest RR interval was 1205 msec.     DIARY  1. The diary was returned, but not completed    MISCELLANEOUS  1. This was a tape of adequate length (24 hrs).    EKG:   Sinus rhythm.  Occasional PVCs from RVOT outflow    I have personally reviewed these images and echo data    Assessment/Plan:  Roosevelt was seen today for consult.    Diagnoses and all orders for this visit:    PVC (premature ventricular contraction)    Hypertension, unspecified type  -     EKG 12-lead  -     2D Echo w/ Color Flow Doppler; Future  -     NM  Multi Tread Stress Cardiac Component; Future  -     Lipid panel; Future    Abnormality of heart beat  -     2D Echo w/ Color Flow Doppler; Future  -     NM Myocardial Perfusion Spect Multi Exer; Future  -     NM Multi Tread Stress Cardiac Component; Future  -     Lipid panel; Future    Coronary atherosclerosis due to lipid rich plaque (CODE)   -     NM Myocardial Perfusion Spect Multi Exer; Future  -     Lipid panel; Future       we will perform a stress test to rule out any coronary artery disease with this  burden of PVC.  Addition we will get a baseline ejection fraction  No Follow-up on file.          Total duration of face to face visit time 45 minutes.  Total time spent counseling greater than fifty percent of total visit time.  Counseling included discussion regarding imaging findings, diagnosis, possibilities, treatment options, risks and benefits.  The patient had many questions regarding the options and long-term effect which were all answered to my best ability.      Ismael Love MD,MRCP,RPVI,FACC,FSCAI.  Interventional Cardiology   Phone [phone]

## 2018-03-30 DIAGNOSIS — R00.2 HEART PALPITATIONS: ICD-10-CM

## 2018-04-02 RX ORDER — METOPROLOL SUCCINATE 25 MG/1
TABLET, EXTENDED RELEASE ORAL
Qty: 30 TABLET | Refills: 2 | Status: SHIPPED | OUTPATIENT
Start: 2018-04-02 | End: 2018-07-12 | Stop reason: SDUPTHER

## 2018-04-16 ENCOUNTER — OFFICE VISIT (OUTPATIENT)
Dept: FAMILY MEDICINE | Facility: CLINIC | Age: 73
End: 2018-04-16
Payer: MEDICARE

## 2018-04-16 VITALS
BODY MASS INDEX: 27.45 KG/M2 | HEART RATE: 71 BPM | WEIGHT: 213.88 LBS | DIASTOLIC BLOOD PRESSURE: 81 MMHG | SYSTOLIC BLOOD PRESSURE: 156 MMHG | TEMPERATURE: 98 F | HEIGHT: 74 IN

## 2018-04-16 DIAGNOSIS — I10 ESSENTIAL HYPERTENSION: ICD-10-CM

## 2018-04-16 DIAGNOSIS — R73.02 GLUCOSE INTOLERANCE (IMPAIRED GLUCOSE TOLERANCE): Primary | ICD-10-CM

## 2018-04-16 PROBLEM — R31.0 GROSS HEMATURIA: Status: RESOLVED | Noted: 2017-12-20 | Resolved: 2018-04-16

## 2018-04-16 PROBLEM — N40.1 URINARY RETENTION DUE TO BENIGN PROSTATIC HYPERPLASIA: Status: RESOLVED | Noted: 2017-12-21 | Resolved: 2018-04-16

## 2018-04-16 PROBLEM — R33.9 URINARY RETENTION: Status: RESOLVED | Noted: 2017-12-19 | Resolved: 2018-04-16

## 2018-04-16 PROBLEM — R33.8 URINARY RETENTION DUE TO BENIGN PROSTATIC HYPERPLASIA: Status: RESOLVED | Noted: 2017-12-21 | Resolved: 2018-04-16

## 2018-04-16 PROCEDURE — 3079F DIAST BP 80-89 MM HG: CPT | Mod: CPTII,S$GLB,, | Performed by: FAMILY MEDICINE

## 2018-04-16 PROCEDURE — 99999 PR PBB SHADOW E&M-EST. PATIENT-LVL III: CPT | Mod: PBBFAC,,, | Performed by: FAMILY MEDICINE

## 2018-04-16 PROCEDURE — 3077F SYST BP >= 140 MM HG: CPT | Mod: CPTII,S$GLB,, | Performed by: FAMILY MEDICINE

## 2018-04-16 PROCEDURE — 99214 OFFICE O/P EST MOD 30 MIN: CPT | Mod: S$GLB,,, | Performed by: FAMILY MEDICINE

## 2018-04-16 NOTE — PROGRESS NOTES
Subjective:       Patient ID: Roosevelt Alejandro is a 72 y.o. male.    Chief Complaint: Hypertension    He has skipped heart beats, no Vent Tachycardia, no symptoms.      Hypertension   This is a chronic problem. The current episode started more than 1 year ago. The problem has been resolved since onset. The problem is uncontrolled. Pertinent negatives include no anxiety, blurred vision, chest pain, headaches, malaise/fatigue, neck pain, palpitations or shortness of breath.     Review of Systems   Constitutional: Negative for fatigue, malaise/fatigue and unexpected weight change.   Eyes: Negative for blurred vision.   Respiratory: Negative for chest tightness and shortness of breath.    Cardiovascular: Negative for chest pain, palpitations and leg swelling.   Gastrointestinal: Negative for abdominal pain.   Musculoskeletal: Negative for arthralgias and neck pain.   Neurological: Negative for dizziness, syncope, light-headedness and headaches.       Patient Active Problem List   Diagnosis    Hypertension    Essential hypertension    DDD (degenerative disc disease), lumbar    Chronic allergic rhinitis    ACE inhibitor-aggravated angioedema    Pulmonary asbestosis    H/O arteriovenous malformation (AVM)    Frequent unifocal PVCs       Objective:      Physical Exam   Constitutional: He is oriented to person, place, and time. He appears well-developed and well-nourished.   Cardiovascular: Normal rate, regular rhythm and normal heart sounds.    Pulmonary/Chest: Effort normal and breath sounds normal.   Musculoskeletal: He exhibits no edema.   Neurological: He is alert and oriented to person, place, and time.   Skin: Skin is warm and dry.   Psychiatric: He has a normal mood and affect.   Nursing note and vitals reviewed.      Lab Results   Component Value Date    WBC 12.30 12/22/2017    HGB 11.1 (L) 12/22/2017    HCT 32.0 (L) 12/22/2017     12/22/2017    CHOL 195 10/16/2015    TRIG 110 10/16/2015    HDL 39 (L)  10/16/2015    ALT 18 02/10/2017    AST 18 02/10/2017     12/22/2017    K 3.9 12/22/2017     12/22/2017    CREATININE 0.9 12/22/2017    BUN 8 12/22/2017    CO2 25 12/22/2017    TSH 2.338 01/18/2018    PSA 28.6 (H) 12/20/2017    INR 1.1 12/20/2017    GLUF 109 07/22/2004    HGBA1C 5.9 01/07/2011     The 10-year ASCVD risk score ([place] MEDINA Jr., et al., 2013) is: 30.5%    Values used to calculate the score:      Age: 72 years      Sex: Male      Is Non- : No      Diabetic: No      Tobacco smoker: No      Systolic Blood Pressure: 138 mmHg      Is BP treated: Yes      HDL Cholesterol: 39 mg/dL      Total Cholesterol: 195 mg/dL    Assessment:       1. Glucose intolerance (impaired glucose tolerance)    2. Essential hypertension        Plan:       Glucose intolerance (impaired glucose tolerance)  -     Hemoglobin A1c; Future; Expected date: 04/16/2018  -     Comprehensive metabolic panel; Future; Expected date: 04/16/2018  -     CBC auto differential; Future; Expected date: 04/16/2018  -     MICROALBUMIN / CREATININE RATIO URINE; Future; Expected date: 04/16/2018    Essential hypertension      Patient readiness: acceptance and barriers:none    During the course of the visit the patient was educated and counseled about the following:     Hypertension:   Dietary sodium restriction.  Regular aerobic exercise.  Check blood pressures daily and record.    Goals: Hypertension: Reduce Blood Pressure    Did patient meet goals/outcomes: Yes    The following self management tools provided: blood pressure log  excercise log    Patient Instructions (the written plan) was given to the patient/family.     Time spent with patient: 30 minutes    Barriers to medications present (no )    Adverse reactions to current medications (no)    Over the counter medications reviewed (Yes)        20-minute visit. 30 minutes spent counseling patient on diet, exercise, and weight loss.  This has been fully explained to the  patient, who indicates understanding.  Subjective:       Patient ID: Roosevelt Alejandro is a 72 y.o. male.    Chief Complaint: Hypertension    HPI  Review of Systems   Constitutional: Negative for fatigue and unexpected weight change.   Respiratory: Negative for chest tightness and shortness of breath.    Cardiovascular: Negative for chest pain, palpitations and leg swelling.   Gastrointestinal: Negative for abdominal pain.   Musculoskeletal: Negative for arthralgias.   Neurological: Negative for dizziness, syncope, light-headedness and headaches.       Patient Active Problem List   Diagnosis    Hypertension    Benign prostatic hyperplasia    Essential hypertension    DDD (degenerative disc disease), lumbar    Chronic allergic rhinitis    ACE inhibitor-aggravated angioedema    Pulmonary asbestosis    H/O arteriovenous malformation (AVM)    Urinary retention    Gross hematuria    Urinary retention due to benign prostatic hyperplasia    Frequent unifocal PVCs     Past Surgical History:   Procedure Laterality Date    COLONOSCOPY      2009    COLONOSCOPY N/A 1/21/2016    Procedure: COLONOSCOPY;  Surgeon: Toby Maciel MD;  Location: [facility];  Service: Endoscopy;  Laterality: N/A;    EYE SURGERY      cataracts, retinal detachment    PROSTATE BIOPSY      PROSTATECTOMY  12/2017    ROTATOR CUFF REPAIR      left         Objective:      Physical Exam   Constitutional: He is oriented to person, place, and time. He appears well-developed and well-nourished.   Cardiovascular: Normal rate, regular rhythm and normal heart sounds.    Pulmonary/Chest: Effort normal and breath sounds normal.   Musculoskeletal: He exhibits no edema.   Neurological: He is alert and oriented to person, place, and time.   Skin: Skin is warm and dry.   Psychiatric: He has a normal mood and affect.   Nursing note and vitals reviewed.      Lab Results   Component Value Date    WBC 12.30 12/22/2017    HGB 11.1 (L) 12/22/2017    HCT 32.0  (L) 12/22/2017     12/22/2017    CHOL 195 10/16/2015    TRIG 110 10/16/2015    HDL 39 (L) 10/16/2015    ALT 18 02/10/2017    AST 18 02/10/2017     12/22/2017    K 3.9 12/22/2017     12/22/2017    CREATININE 0.9 12/22/2017    BUN 8 12/22/2017    CO2 25 12/22/2017    TSH 2.338 01/18/2018    PSA 28.6 (H) 12/20/2017    INR 1.1 12/20/2017    GLUF 109 07/22/2004    HGBA1C 5.9 01/07/2011     The 10-year ASCVD risk score (Benedicto MEDINA JrAmari, et al., 2013) is: 36.4%    Values used to calculate the score:      Age: 72 years      Sex: Male      Is Non- : No      Diabetic: No      Tobacco smoker: No      Systolic Blood Pressure: 156 mmHg      Is BP treated: Yes      HDL Cholesterol: 39 mg/dL      Total Cholesterol: 195 mg/dL    Assessment:       1. Glucose intolerance (impaired glucose tolerance)    2. Essential hypertension        Plan:       Glucose intolerance (impaired glucose tolerance)  -     Hemoglobin A1c; Future; Expected date: 04/16/2018  -     Comprehensive metabolic panel; Future; Expected date: 04/16/2018  -     CBC auto differential; Future; Expected date: 04/16/2018  -     MICROALBUMIN / CREATININE RATIO URINE; Future; Expected date: 04/16/2018    Essential hypertension      Patient readiness: acceptance and barriers:readiness    During the course of the visit the patient was educated and counseled about the following:     Hypertension:   Dietary sodium restriction.    Goals: Hypertension: Reduce Blood Pressure    Did patient meet goals/outcomes: Yes    The following self management tools provided: blood pressure log  excercise log    Patient Instructions (the written plan) was given to the patient/family.     Time spent with patient: 30 minutes    Barriers to medications present (no )    Adverse reactions to current medications (no)    Over the counter medications reviewed (Yes)        30-minute visit. 20 minutes spent counseling patient on diet, exercise, and weight  loss.  This has been fully explained to the patient, who indicates understanding.

## 2018-04-30 ENCOUNTER — OFFICE VISIT (OUTPATIENT)
Dept: UROLOGY | Facility: CLINIC | Age: 73
End: 2018-04-30
Payer: MEDICARE

## 2018-04-30 VITALS
BODY MASS INDEX: 27.21 KG/M2 | HEART RATE: 84 BPM | DIASTOLIC BLOOD PRESSURE: 78 MMHG | RESPIRATION RATE: 18 BRPM | WEIGHT: 212 LBS | SYSTOLIC BLOOD PRESSURE: 138 MMHG | HEIGHT: 74 IN

## 2018-04-30 DIAGNOSIS — N40.0 BPH WITHOUT OBSTRUCTION/LOWER URINARY TRACT SYMPTOMS: Primary | ICD-10-CM

## 2018-04-30 PROCEDURE — 99999 PR PBB SHADOW E&M-EST. PATIENT-LVL III: CPT | Mod: PBBFAC,,, | Performed by: UROLOGY

## 2018-04-30 PROCEDURE — 99213 OFFICE O/P EST LOW 20 MIN: CPT | Mod: S$GLB,,, | Performed by: UROLOGY

## 2018-04-30 PROCEDURE — 3078F DIAST BP <80 MM HG: CPT | Mod: CPTII,S$GLB,, | Performed by: UROLOGY

## 2018-04-30 PROCEDURE — 3075F SYST BP GE 130 - 139MM HG: CPT | Mod: CPTII,S$GLB,, | Performed by: UROLOGY

## 2018-04-30 NOTE — PROGRESS NOTES
[facility] Urology Progress Note    Roosevelt Alejandro is a 72 y.o. male who presents for BPH follow up    I last saw him 1/29/18 s/p 2-stage TURP.  He presented with acute urinary retention in early December 2017 and then had a few episodes of gross hematuria per ramires, ultimately leading to clot retention and significant manual irrigation on multiple occasions. I did perform cystoscopic evaluation on 12/19 finding significant prostatomegaly with high grade obstruction as well as moderate obstructing median lobe which was quite hypervascular and seemed to be source of bleeding secondary to contact with ramires balloon. Also had abnormal appearance of posterior bladder wall likely consistent with ramires edema without stigmata of bleeding.      12/21/17 transurethral resection of prostate, after which resected only half of his prostate as a stage I procedure, and pathology was significant for a resected volume of 50g BPH.    1/11/18 stage II procedure for continued/completion transurethral resection of prostate at which time noted to have continued very large obstructing hypervascular irregular prostate requiring extensive resection despite initial resection of 50g. Approximately 40-50g resected this time as well, including significant anterior tissue. Trabeculated bladder. No bladder tumor/lesion, just ramires edema at bladder neck.  Pathology confirms 41 g resected, with areas of coagulative necrosis.  In addition to resected tissue, did have vaporization of some prostatic tissue with button electrode.  His Ramires catheter was removed on 1/16/18.  Total resected volume 91g BPH, as well a additional vaporized with button electrode   1/29/18 - urinating well without complaint.  Denies hesitancy or intermittency. Had not stopped flomax. PVR 48cc. No urgency. No hematuria. DCed flomax    He returns today noting:  NTF 1x  No urgency  No dysuria  No hematuria  Has been drinking more water, eliminated soda  Back on motorcycle last week -  "no issues  AUA SS: 9/1 (2: frequency, intermittency; 1: emptying, urgency, weak stream, straining, sleeping)  PVR 81cc      ROS: A comprehensive 10 system review was performed and is negative except as noted above in HPI    PHYSICAL EXAM:    Vitals:    04/30/18 1343   BP: 138/78   Pulse: 84   Resp: 18     Body mass index is 27.22 kg/m². Weight: 96.2 kg (212 lb) Height: 6' 2" (188 cm)       General: Alert, cooperative, no distress, appears stated age   Head: Normocephalic, without obvious abnormality, atraumatic   Eyes: PERRL, conjunctiva/corneas clear   Lungs: Respirations unlabored   Heart: Warm and well perfused   Abdomen: soft, NT ND  Extremities: Extremities normal, atraumatic, no cyanosis or edema   Skin: Skin color, texture, turgor normal, no rashes or lesions   Psych: Appropriate   Neurologic: Non-focal     ASSESSMENT   1. BPH without obstruction/lower urinary tract symptoms         Plan    He has done very well s/p 2-stage TURP for significant bph. Urinary symptoms well controlled now off medications without any signs of obstruction and no recurrent gross hematuria. Will have him RTC in 6 mos and if stable, can resume annual follow up.    "

## 2018-07-12 DIAGNOSIS — R00.2 HEART PALPITATIONS: ICD-10-CM

## 2018-07-12 RX ORDER — METOPROLOL SUCCINATE 25 MG/1
TABLET, EXTENDED RELEASE ORAL
Qty: 30 TABLET | Refills: 0 | Status: SHIPPED | OUTPATIENT
Start: 2018-07-12 | End: 2018-08-09 | Stop reason: SDUPTHER

## 2018-08-09 DIAGNOSIS — R00.2 HEART PALPITATIONS: ICD-10-CM

## 2018-08-09 NOTE — TELEPHONE ENCOUNTER
----- Message from Ed Munoz sent at 8/9/2018  1:49 PM CDT -----  Pt stopped by to request refill on Metoprolol er succinate 25 mg . Pt stated if prescription refill is possible he would like it sent to [facility] pharmacy .

## 2018-08-11 RX ORDER — METOPROLOL SUCCINATE 25 MG/1
25 TABLET, EXTENDED RELEASE ORAL DAILY
Qty: 30 TABLET | Refills: 1 | Status: SHIPPED | OUTPATIENT
Start: 2018-08-11 | End: 2018-08-11 | Stop reason: SDUPTHER

## 2018-08-11 RX ORDER — METOPROLOL SUCCINATE 25 MG/1
25 TABLET, EXTENDED RELEASE ORAL DAILY
Qty: 90 TABLET | Refills: 1 | Status: SHIPPED | OUTPATIENT
Start: 2018-08-11 | End: 2018-10-25 | Stop reason: SDUPTHER

## 2018-08-28 ENCOUNTER — HOSPITAL ENCOUNTER (OUTPATIENT)
Dept: CARDIOLOGY | Facility: HOSPITAL | Age: 73
Discharge: HOME | End: 2018-08-28
Attending: INTERNAL MEDICINE
Payer: MEDICARE

## 2018-08-28 ENCOUNTER — HOSPITAL ENCOUNTER (OUTPATIENT)
Dept: RADIOLOGY | Facility: HOSPITAL | Age: 73
Discharge: HOME | End: 2018-08-28
Attending: INTERNAL MEDICINE
Payer: MEDICARE

## 2018-08-28 VITALS
BODY MASS INDEX: 27.21 KG/M2 | WEIGHT: 212 LBS | HEART RATE: 72 BPM | HEIGHT: 74 IN | SYSTOLIC BLOOD PRESSURE: 141 MMHG | DIASTOLIC BLOOD PRESSURE: 76 MMHG

## 2018-08-28 VITALS — DIASTOLIC BLOOD PRESSURE: 76 MMHG | SYSTOLIC BLOOD PRESSURE: 141 MMHG | HEART RATE: 76 BPM

## 2018-08-28 DIAGNOSIS — I10 HYPERTENSION, UNSPECIFIED TYPE: ICD-10-CM

## 2018-08-28 DIAGNOSIS — R00.9 ABNORMALITY OF HEART BEAT: ICD-10-CM

## 2018-08-28 DIAGNOSIS — Z01.810 PREOP CARDIOVASCULAR EXAM: ICD-10-CM

## 2018-08-28 LAB
AORTIC ROOT ANNULUS: 3.6 CM
AORTIC VALVE CUSP SEPERATION: 2.04 CM
AV MEAN GRADIENT: 2.29 MMHG
AV PEAK GRADIENT: 3.82 MMHG
AV VALVE AREA: 3.59 CM2
BSA FOR ECHO PROCEDURE: 2.24 M2
CV ECHO LV RWT: 0.47 CM
CV STRESS BASE HR: 83
CVPEAKDIABP: 73
CVPEAKSYSBP: 73
CVRESTDIABP: 76
CVRESTSYSBP: 141
DOP CALC AO PEAK VEL: 0.98 M/S
DOP CALC AO VTI: 18.07 CM
DOP CALC LVOT AREA: 3.2 CM2
DOP CALC LVOT DIAMETER: 2.02 CM
DOP CALC LVOT STROKE VOLUME: 64.9 CM3
DOP CALCLVOT PEAK VEL VTI: 20.26 CM
E WAVE DECELERATION TIME: 232.63 MSEC
E/A RATIO: 0.8
E/E' RATIO: 9.29
ECHO LV POSTERIOR WALL: 1.13 CM (ref 0.6–1.1)
FRACTIONAL SHORTENING: 32 % (ref 28–44)
INTERVENTRICULAR SEPTUM: 1.08 CM (ref 0.6–1.1)
IVRT: 0.04 MSEC
LEFT ATRIUM SIZE: 4.21 CM
LEFT INTERNAL DIMENSION IN SYSTOLE: 3.17 CM (ref 2.1–4)
LEFT VENTRICLE MASS INDEX: 84 G/M2
LEFT VENTRICULAR INTERNAL DIMENSION IN DIASTOLE: 4.69 CM (ref 3.5–6)
LEFT VENTRICULAR MASS: 188.09 G
LV LATERAL E/E' RATIO: 8.13
LV SEPTAL E/E' RATIO: 10.83
MV PEAK A VEL: 0.81 M/S
MV PEAK E VEL: 0.65 M/S
MV STENOSIS PRESSURE HALF TIME: 67.46 MS
MV VALVE AREA P 1/2 METHOD: 3.26 CM2
PISA TR MAX VEL: 1.66 M/S
PV PEAK GRADIENT: 3.72 MMHG
PV PEAK VELOCITY: 0.96 CM/S
RA PRESSURE: 3 MMHG
RIGHT VENTRICULAR END-DIASTOLIC DIMENSION: 3.58 CM
STRESS ECHO POST ESTIMATED WORKLOAD: 7 METS
STRESS ECHO POST EXERCISE DUR MIN: 6 MIN
STRESS ECHO POST EXERCISE DUR SEC: 0
TDI LATERAL: 0.08
TDI SEPTAL: 0.06
TDI: 0.07
TR MAX PG: 11.02 MMHG
TRICUSPID ANNULAR PLANE SYSTOLIC EXCURSION: 0.03 CM
TV REST PULMONARY ARTERY PRESSURE: 14.02 MMHG

## 2018-08-28 PROCEDURE — 93016 CV STRESS TEST SUPVJ ONLY: CPT | Mod: ,,, | Performed by: INTERNAL MEDICINE

## 2018-08-28 PROCEDURE — 78452 HT MUSCLE IMAGE SPECT MULT: CPT | Mod: 26,,, | Performed by: INTERNAL MEDICINE

## 2018-08-28 PROCEDURE — 93018 CV STRESS TEST I&R ONLY: CPT | Mod: ,,, | Performed by: INTERNAL MEDICINE

## 2018-08-28 PROCEDURE — C8929 TTE W OR WO FOL WCON,DOPPLER: HCPCS

## 2018-08-28 PROCEDURE — 93306 TTE W/DOPPLER COMPLETE: CPT | Mod: 26,,, | Performed by: INTERNAL MEDICINE

## 2018-08-28 PROCEDURE — A9502 TC99M TETROFOSMIN: HCPCS

## 2018-08-28 PROCEDURE — 93017 CV STRESS TEST TRACING ONLY: CPT

## 2018-08-28 PROCEDURE — 25500020 PHARM REV CODE 255: Performed by: INTERNAL MEDICINE

## 2018-08-28 RX ADMIN — SULFUR HEXAFLUORIDE 5 ML: KIT at 08:08

## 2018-10-25 ENCOUNTER — OFFICE VISIT (OUTPATIENT)
Dept: FAMILY MEDICINE | Facility: CLINIC | Age: 73
End: 2018-10-25
Payer: MEDICARE

## 2018-10-25 ENCOUNTER — LAB VISIT (OUTPATIENT)
Dept: LAB | Facility: HOSPITAL | Age: 73
End: 2018-10-25
Attending: FAMILY MEDICINE
Payer: MEDICARE

## 2018-10-25 VITALS
BODY MASS INDEX: 28.86 KG/M2 | HEART RATE: 73 BPM | WEIGHT: 224.88 LBS | SYSTOLIC BLOOD PRESSURE: 134 MMHG | HEIGHT: 74 IN | TEMPERATURE: 98 F | DIASTOLIC BLOOD PRESSURE: 80 MMHG

## 2018-10-25 DIAGNOSIS — I10 HYPERTENSION, UNSPECIFIED TYPE: Primary | ICD-10-CM

## 2018-10-25 DIAGNOSIS — I10 HYPERTENSION, UNSPECIFIED TYPE: ICD-10-CM

## 2018-10-25 DIAGNOSIS — R00.2 HEART PALPITATIONS: ICD-10-CM

## 2018-10-25 LAB
ALBUMIN SERPL BCP-MCNC: 4.2 G/DL
ALP SERPL-CCNC: 102 U/L
ALT SERPL W/O P-5'-P-CCNC: 12 U/L
ANION GAP SERPL CALC-SCNC: 7 MMOL/L
AST SERPL-CCNC: 21 U/L
BASOPHILS # BLD AUTO: 0.08 K/UL
BASOPHILS NFR BLD: 0.9 %
BILIRUB SERPL-MCNC: 0.5 MG/DL
BUN SERPL-MCNC: 15 MG/DL
CALCIUM SERPL-MCNC: 9.8 MG/DL
CHLORIDE SERPL-SCNC: 106 MMOL/L
CHOLEST SERPL-MCNC: 200 MG/DL
CHOLEST/HDLC SERPL: 5.1 {RATIO}
CO2 SERPL-SCNC: 26 MMOL/L
CREAT SERPL-MCNC: 1.3 MG/DL
DIFFERENTIAL METHOD: ABNORMAL
EOSINOPHIL # BLD AUTO: 0.3 K/UL
EOSINOPHIL NFR BLD: 2.9 %
ERYTHROCYTE [DISTWIDTH] IN BLOOD BY AUTOMATED COUNT: 12.5 %
EST. GFR  (AFRICAN AMERICAN): >60 ML/MIN/1.73 M^2
EST. GFR  (NON AFRICAN AMERICAN): 54.1 ML/MIN/1.73 M^2
GLUCOSE SERPL-MCNC: 109 MG/DL
HCT VFR BLD AUTO: 41.2 %
HDLC SERPL-MCNC: 39 MG/DL
HDLC SERPL: 19.5 %
HGB BLD-MCNC: 13.7 G/DL
IMM GRANULOCYTES # BLD AUTO: 0.08 K/UL
IMM GRANULOCYTES NFR BLD AUTO: 0.9 %
LDLC SERPL CALC-MCNC: 133.6 MG/DL
LYMPHOCYTES # BLD AUTO: 2.1 K/UL
LYMPHOCYTES NFR BLD: 23.8 %
MCH RBC QN AUTO: 32.4 PG
MCHC RBC AUTO-ENTMCNC: 33.3 G/DL
MCV RBC AUTO: 97 FL
MONOCYTES # BLD AUTO: 0.5 K/UL
MONOCYTES NFR BLD: 6.1 %
NEUTROPHILS # BLD AUTO: 5.8 K/UL
NEUTROPHILS NFR BLD: 65.4 %
NONHDLC SERPL-MCNC: 161 MG/DL
NRBC BLD-RTO: 0 /100 WBC
PLATELET # BLD AUTO: 239 K/UL
PMV BLD AUTO: 10.7 FL
POTASSIUM SERPL-SCNC: 4.9 MMOL/L
PROT SERPL-MCNC: 7.6 G/DL
RBC # BLD AUTO: 4.23 M/UL
SODIUM SERPL-SCNC: 139 MMOL/L
TRIGL SERPL-MCNC: 137 MG/DL
WBC # BLD AUTO: 8.85 K/UL

## 2018-10-25 PROCEDURE — 80061 LIPID PANEL: CPT

## 2018-10-25 PROCEDURE — 3075F SYST BP GE 130 - 139MM HG: CPT | Mod: CPTII,,, | Performed by: FAMILY MEDICINE

## 2018-10-25 PROCEDURE — 80053 COMPREHEN METABOLIC PANEL: CPT

## 2018-10-25 PROCEDURE — 99999 PR PBB SHADOW E&M-EST. PATIENT-LVL III: CPT | Mod: PBBFAC,,, | Performed by: FAMILY MEDICINE

## 2018-10-25 PROCEDURE — 90662 IIV NO PRSV INCREASED AG IM: CPT | Mod: PBBFAC,PO

## 2018-10-25 PROCEDURE — 85025 COMPLETE CBC W/AUTO DIFF WBC: CPT

## 2018-10-25 PROCEDURE — 1101F PT FALLS ASSESS-DOCD LE1/YR: CPT | Mod: CPTII,,, | Performed by: FAMILY MEDICINE

## 2018-10-25 PROCEDURE — 36415 COLL VENOUS BLD VENIPUNCTURE: CPT | Mod: PO

## 2018-10-25 PROCEDURE — 3079F DIAST BP 80-89 MM HG: CPT | Mod: CPTII,,, | Performed by: FAMILY MEDICINE

## 2018-10-25 PROCEDURE — 99213 OFFICE O/P EST LOW 20 MIN: CPT | Mod: PBBFAC,PO,25 | Performed by: FAMILY MEDICINE

## 2018-10-25 PROCEDURE — 99214 OFFICE O/P EST MOD 30 MIN: CPT | Mod: S$PBB,,, | Performed by: FAMILY MEDICINE

## 2018-10-25 RX ORDER — METOPROLOL SUCCINATE 25 MG/1
25 TABLET, EXTENDED RELEASE ORAL DAILY
Qty: 90 TABLET | Refills: 3 | Status: SHIPPED | OUTPATIENT
Start: 2018-10-25 | End: 2019-07-17 | Stop reason: SDUPTHER

## 2018-10-25 NOTE — PATIENT INSTRUCTIONS

## 2018-10-25 NOTE — PROGRESS NOTES
Subjective:       Patient ID: Roosevelt Alejandro is a 72 y.o. male.    Chief Complaint: Hypertension    He has skipped heart beats, no Vent Tachycardia, no symptoms.      Hypertension   This is a chronic problem. The current episode started more than 1 year ago. The problem has been resolved since onset. The problem is uncontrolled. Pertinent negatives include no anxiety, blurred vision, chest pain, headaches, malaise/fatigue, neck pain, palpitations or shortness of breath.     Review of Systems   Constitutional: Negative for fatigue, malaise/fatigue and unexpected weight change.   Eyes: Negative for blurred vision.   Respiratory: Negative for chest tightness and shortness of breath.    Cardiovascular: Negative for chest pain, palpitations and leg swelling.   Gastrointestinal: Negative for abdominal pain.   Musculoskeletal: Negative for arthralgias and neck pain.   Neurological: Negative for dizziness, syncope, light-headedness and headaches.       Patient Active Problem List   Diagnosis    Hypertension    Essential hypertension    DDD (degenerative disc disease), lumbar    Chronic allergic rhinitis    ACE inhibitor-aggravated angioedema    Pulmonary asbestosis    H/O arteriovenous malformation (AVM)    Frequent unifocal PVCs       Objective:      Physical Exam   Constitutional: He is oriented to person, place, and time. He appears well-developed and well-nourished.   Cardiovascular: Normal rate, regular rhythm and normal heart sounds.    Pulmonary/Chest: Effort normal and breath sounds normal.   Musculoskeletal: He exhibits no edema.   Neurological: He is alert and oriented to person, place, and time.   Skin: Skin is warm and dry.   Psychiatric: He has a normal mood and affect.   Nursing note and vitals reviewed.      Lab Results   Component Value Date    WBC 12.30 12/22/2017    HGB 11.1 (L) 12/22/2017    HCT 32.0 (L) 12/22/2017     12/22/2017    CHOL 195 10/16/2015    TRIG 110 10/16/2015    HDL 39 (L)  10/16/2015    ALT 18 02/10/2017    AST 18 02/10/2017     12/22/2017    K 3.9 12/22/2017     12/22/2017    CREATININE 0.9 12/22/2017    BUN 8 12/22/2017    CO2 25 12/22/2017    TSH 2.338 01/18/2018    PSA 28.6 (H) 12/20/2017    INR 1.1 12/20/2017    GLUF 109 07/22/2004    HGBA1C 5.9 01/07/2011     The ASCVD Risk score ([place] MEDINA Jr., et al., 2013) failed to calculate for the following reasons:    Cannot find a previous HDL lab    Cannot find a previous total cholesterol lab    Assessment:       1. Hypertension, unspecified type        Plan:       Hypertension, unspecified type  -     Lipid panel; Future; Expected date: 10/25/2018  -     Comprehensive metabolic panel; Future; Expected date: 10/25/2018  -     CBC auto differential; Future; Expected date: 10/25/2018      Patient readiness: acceptance and barriers:none    During the course of the visit the patient was educated and counseled about the following:     Hypertension:   Dietary sodium restriction.  Regular aerobic exercise.  Check blood pressures daily and record.    Goals: Hypertension: Reduce Blood Pressure    Did patient meet goals/outcomes: Yes    The following self management tools provided: blood pressure log  excercise log    Patient Instructions (the written plan) was given to the patient/family.     Time spent with patient: 30 minutes    Barriers to medications present (no )    Adverse reactions to current medications (no)    Over the counter medications reviewed (Yes)        20-minute visit. 30 minutes spent counseling patient on diet, exercise, and weight loss.  This has been fully explained to the patient, who indicates understanding.  Subjective:       Patient ID: Roosevelt Alejandro is a 72 y.o. male.    Chief Complaint: Hypertension    Hypertension   Pertinent negatives include no chest pain, headaches, palpitations or shortness of breath.     Review of Systems   Constitutional: Negative for fatigue and unexpected weight change.    Respiratory: Negative for chest tightness and shortness of breath.    Cardiovascular: Negative for chest pain, palpitations and leg swelling.   Gastrointestinal: Negative for abdominal pain.   Musculoskeletal: Negative for arthralgias.   Neurological: Negative for dizziness, syncope, light-headedness and headaches.       Patient Active Problem List   Diagnosis    Hypertension    Essential hypertension    DDD (degenerative disc disease), lumbar    Chronic allergic rhinitis    ACE inhibitor-aggravated angioedema    Pulmonary asbestosis    H/O arteriovenous malformation (AVM)    Frequent unifocal PVCs     Past Surgical History:   Procedure Laterality Date    COLONOSCOPY      2009    COLONOSCOPY N/A 1/21/2016    Procedure: COLONOSCOPY;  Surgeon: Toby Maciel MD;  Location: [facility];  Service: Endoscopy;  Laterality: N/A;    COLONOSCOPY N/A 1/21/2016    Performed by Toby Maciel MD at [facility]    CYSTOSCOPY N/A 12/19/2017    Per[facility]di [facility] at [facility]    EYE SURGERY      cataracts, retinal detachment    INJECTION-STEROID-EPIDURAL-TRANSFORAMINAL Right 11/7/2014    Performed by Wei [facility]endoza MD at [facility]    PROSTATE BIOPSY      PROSTATECTOMY  12/2017    PROSTATECTOMY-TRANSURETHRAL N/A 1/11/2018    Performed by [facility]di [facility] at [facility] OR    PROSTATECTOMY-TRANSURETHRAL N/A 12/21/2017    Performed by [facility]di [facility] at [facility] OR    ROTATOR CUFF REPAIR      left         Objective:      Physical Exam   Constitutional: He is oriented to person, place, and time. He appears well-developed and well-nourished.   Cardiovascular: Normal rate, regular rhythm and normal heart sounds.   Pulmonary/Chest: Effort normal and breath sounds normal.   Musculoskeletal: He exhibits no edema.   Neurological: He is alert and oriented to person, place, and time.   Skin: Skin is warm and dry.   Psychiatric: He has a normal mood and affect.   Nursing note and vitals reviewed.      Lab  Results   Component Value Date    WBC 12.30 12/22/2017    HGB 11.1 (L) 12/22/2017    HCT 32.0 (L) 12/22/2017     12/22/2017    CHOL 195 10/16/2015    TRIG 110 10/16/2015    HDL 39 (L) 10/16/2015    ALT 18 02/10/2017    AST 18 02/10/2017     12/22/2017    K 3.9 12/22/2017     12/22/2017    CREATININE 0.9 12/22/2017    BUN 8 12/22/2017    CO2 25 12/22/2017    TSH 2.338 01/18/2018    PSA 28.6 (H) 12/20/2017    INR 1.1 12/20/2017    GLUF 109 07/22/2004    HGBA1C 5.9 01/07/2011     The ASCVD Risk score ([place] MEDINA Jr., et al., 2013) failed to calculate for the following reasons:    Cannot find a previous HDL lab    Cannot find a previous total cholesterol lab    Assessment:       1. Hypertension, unspecified type        Plan:       Hypertension, unspecified type  -     Lipid panel; Future; Expected date: 10/25/2018  -     Comprehensive metabolic panel; Future; Expected date: 10/25/2018  -     CBC auto differential; Future; Expected date: 10/25/2018      Patient readiness: acceptance and barriers:readiness    During the course of the visit the patient was educated and counseled about the following:     Hypertension:   Dietary sodium restriction.    Goals: Hypertension: Reduce Blood Pressure    Did patient meet goals/outcomes: Yes    The following self management tools provided: blood pressure log  excercise log    Patient Instructions (the written plan) was given to the patient/family.     Time spent with patient: 30 minutes    Barriers to medications present (no )    Adverse reactions to current medications (no)    Over the counter medications reviewed (Yes)        30-minute visit. 20 minutes spent counseling patient on diet, exercise, and weight loss.  This has been fully explained to the patient, who indicates understanding.

## 2018-10-29 ENCOUNTER — OFFICE VISIT (OUTPATIENT)
Dept: UROLOGY | Facility: CLINIC | Age: 73
End: 2018-10-29
Payer: MEDICARE

## 2018-10-29 VITALS
WEIGHT: 224.88 LBS | BODY MASS INDEX: 28.86 KG/M2 | SYSTOLIC BLOOD PRESSURE: 151 MMHG | HEIGHT: 74 IN | HEART RATE: 80 BPM | RESPIRATION RATE: 18 BRPM | TEMPERATURE: 99 F | DIASTOLIC BLOOD PRESSURE: 81 MMHG

## 2018-10-29 DIAGNOSIS — N40.0 BPH WITHOUT URINARY OBSTRUCTION: Primary | ICD-10-CM

## 2018-10-29 PROCEDURE — 99213 OFFICE O/P EST LOW 20 MIN: CPT | Mod: S$GLB,,, | Performed by: UROLOGY

## 2018-10-29 PROCEDURE — 99999 PR PBB SHADOW E&M-EST. PATIENT-LVL III: CPT | Mod: PBBFAC,,, | Performed by: UROLOGY

## 2018-10-29 PROCEDURE — 3077F SYST BP >= 140 MM HG: CPT | Mod: CPTII,S$GLB,, | Performed by: UROLOGY

## 2018-10-29 PROCEDURE — 1101F PT FALLS ASSESS-DOCD LE1/YR: CPT | Mod: CPTII,S$GLB,, | Performed by: UROLOGY

## 2018-10-29 PROCEDURE — 3079F DIAST BP 80-89 MM HG: CPT | Mod: CPTII,S$GLB,, | Performed by: UROLOGY

## 2018-10-29 NOTE — PROGRESS NOTES
[facility] Urology Progress Note    Roosevelt Alejandro is a 72 y.o. male who presents for BPH follow up    He presented with acute urinary retention in early December 2017 and then had a few episodes of gross hematuria per ramires, ultimately leading to clot retention and significant manual irrigation on multiple occasions. I did perform cystoscopic evaluation on 12/19 finding significant prostatomegaly with high grade obstruction as well as moderate obstructing median lobe which was quite hypervascular and seemed to be source of bleeding secondary to contact with ramires balloon. Also had abnormal appearance of posterior bladder wall likely consistent with ramires edema without stigmata of bleeding.      12/21/17 transurethral resection of prostate, after which resected only half of his prostate as a stage I procedure, and pathology was significant for a resected volume of 50g BPH.    1/11/18 stage II procedure for continued/completion transurethral resection of prostate at which time noted to have continued very large obstructing hypervascular irregular prostate requiring extensive resection despite initial resection of 50g. Approximately 40-50g resected this time as well, including significant anterior tissue. Trabeculated bladder. No bladder tumor/lesion, just ramires edema at bladder neck.  Pathology confirms 41 g resected, with areas of coagulative necrosis.  In addition to resected tissue, did have vaporization of some prostatic tissue with button electrode.  Total resected volume 91g BPH, as well as additional vaporized with button electrode     1/29/18 - urinating well without complaint.  Denies hesitancy or intermittency. Had not stopped flomax. PVR 48cc. No urgency. No hematuria. DCed flomax  5/5/18 - NTF 1x, No urgency, No dysuria, No hematuria. Has been drinking more water, eliminated soda. Back on motorcycle last week - no issues  AUA SS: 9/1 (2: frequency, intermittency; 1: emptying, urgency, weak stream, straining,  "sleeping). PVR 81cc     He returns today noting  Doing great  No issues  No hematuria  pvr 69  aua ss 1/0 delighted  Nocturia 0-1x  Back on [employer]  Working full time        ROS: A comprehensive 10 system review was performed and is negative except as noted above in HPI    PHYSICAL EXAM:    Vitals:    10/29/18 1545   BP: (!) 151/81   Pulse: 80   Resp: 18   Temp: 98.8 °F (37.1 °C)     Body mass index is 28.87 kg/m². Weight: 102 kg (224 lb 13.9 oz) Height: 6' 2" (188 cm)       General: Alert, cooperative, no distress, appears stated age   Head: Normocephalic, without obvious abnormality, atraumatic   Eyes: PERRL, conjunctiva/corneas clear   Lungs: Respirations unlabored   Heart: Warm and well perfused   Abdomen: soft NT ND  Extremities: Extremities normal, atraumatic, no cyanosis or edema   Skin: Skin color, texture, turgor normal, no rashes or lesions   Psych: Appropriate   Neurologic: Non-focal       ASSESSMENT   1. BPH without urinary obstruction         Plan    He doing very well status post TURP with resolution of urinary tract symptoms.  Stable at this time  .  RTC annually      "

## 2018-12-12 DIAGNOSIS — I10 ESSENTIAL HYPERTENSION: ICD-10-CM

## 2018-12-12 RX ORDER — NIFEDIPINE 30 MG/1
30 TABLET, EXTENDED RELEASE ORAL NIGHTLY
Qty: 90 TABLET | Refills: 3 | Status: SHIPPED | OUTPATIENT
Start: 2018-12-12 | End: 2019-07-17 | Stop reason: SDUPTHER

## 2018-12-12 NOTE — TELEPHONE ENCOUNTER
----- Message from Sejal Matos sent at 12/12/2018 12:53 PM CST -----  Contact: self   Patient nee a refill on nifedipine please send to [facility], any questions please call back at [phone] (home)       [facility] - [facility] [address]  [address] [facility] [place] [address]  Phone: [phone] Fax: [phone]

## 2018-12-12 NOTE — TELEPHONE ENCOUNTER
Patient requesting a refill of Nifedipine. Please advise.   LR--11-28-17  LOV--10-25-18 (Elizabeth)  FOV--2-25-19 (Elizabeth)

## 2019-01-02 ENCOUNTER — TELEPHONE (OUTPATIENT)
Dept: FAMILY MEDICINE | Facility: CLINIC | Age: 74
End: 2019-01-02

## 2019-01-02 NOTE — TELEPHONE ENCOUNTER
----- Message from RT Elyssa sent at 1/2/2019  3:35 PM CST -----  Contact: pt    pt , requesting a call back soon from Nurse Andrews concerning his letter he received in mail for an appt with a Gastroenterologist, thanks.

## 2019-01-02 NOTE — TELEPHONE ENCOUNTER
Patient states he received a letter from [facility] stating he needed to schedule an appointment with Gastroenterology. States letter did not specifically come from Dr. Johnson. Requesting to know the reason he needs an appointment with Gastro. Advised patient to bring letter to office and allow staff view/assist patient with regards to this matter. Patient agreed to bring letter to office on tomorrow's date.

## 2019-01-07 ENCOUNTER — TELEPHONE (OUTPATIENT)
Dept: GASTROENTEROLOGY | Facility: CLINIC | Age: 74
End: 2019-01-07

## 2019-01-07 NOTE — TELEPHONE ENCOUNTER
----- Message from Kristie Rey sent at 1/7/2019 10:17 AM CST -----  Contact: wife Edith Alejandro   Type:  Patient Returning Call    Who Called:  Wife Edith   Who Left Message for Patient: nurse   Does the patient know what this is regarding?: Yes,to schedule procedure  Best Call Back Number:  [phone]  Additional Information: patient wife will like to speak to nurse regarding

## 2019-01-07 NOTE — TELEPHONE ENCOUNTER
Spoke with patient wife she states she doesn't know anything about this and she can not schedule it. Instructed to please notify patient that I did return call to schedule and to call me back to schedule.

## 2019-01-07 NOTE — TELEPHONE ENCOUNTER
Patient wife states she was sleeping when we spoke earlier and she cant schedule for him they will call back to schedule the nurse appointment.

## 2019-01-07 NOTE — TELEPHONE ENCOUNTER
"----- Message from Leela Person sent at 1/7/2019  1:19 PM CST -----  Contact: Patient's wife  Name of Who is Calling: Patient's wife      What is the request in detail:  Patient's wife called and said, "she's returning your call and would like you to please call again."     THANKS!      Can the clinic reply by [facility]: No      What Number to Call Back: Patient's wife / # [phone]                                    "

## 2019-01-17 DIAGNOSIS — Z86.010 HISTORY OF COLON POLYPS: Primary | ICD-10-CM

## 2019-01-25 ENCOUNTER — HOSPITAL ENCOUNTER (OUTPATIENT)
Facility: HOSPITAL | Age: 74
Discharge: HOME | End: 2019-01-25
Attending: INTERNAL MEDICINE | Admitting: INTERNAL MEDICINE
Payer: MEDICARE

## 2019-01-25 ENCOUNTER — ANESTHESIA (OUTPATIENT)
Dept: ENDOSCOPY | Facility: HOSPITAL | Age: 74
End: 2019-01-25
Payer: MEDICARE

## 2019-01-25 ENCOUNTER — ANESTHESIA EVENT (OUTPATIENT)
Dept: ENDOSCOPY | Facility: HOSPITAL | Age: 74
End: 2019-01-25
Payer: MEDICARE

## 2019-01-25 DIAGNOSIS — K63.5 POLYP OF COLON, UNSPECIFIED PART OF COLON, UNSPECIFIED TYPE: Primary | ICD-10-CM

## 2019-01-25 DIAGNOSIS — K64.8 INTERNAL HEMORRHOIDS: ICD-10-CM

## 2019-01-25 DIAGNOSIS — K57.30 DIVERTICULOSIS OF LARGE INTESTINE WITHOUT HEMORRHAGE: ICD-10-CM

## 2019-01-25 DIAGNOSIS — Z86.010 HX OF COLONIC POLYPS: ICD-10-CM

## 2019-01-25 PROBLEM — Z86.0100 HX OF COLONIC POLYPS: Status: ACTIVE | Noted: 2019-01-25

## 2019-01-25 PROCEDURE — 45385 COLONOSCOPY W/LESION REMOVAL: CPT | Performed by: INTERNAL MEDICINE

## 2019-01-25 PROCEDURE — 25000003 PHARM REV CODE 250: Performed by: INTERNAL MEDICINE

## 2019-01-25 PROCEDURE — D9220A PRA ANESTHESIA: Mod: PT,ANES,, | Performed by: ANESTHESIOLOGY

## 2019-01-25 PROCEDURE — D9220A PRA ANESTHESIA: ICD-10-PCS | Mod: PT,CRNA,, | Performed by: NURSE ANESTHETIST, CERTIFIED REGISTERED

## 2019-01-25 PROCEDURE — D9220A PRA ANESTHESIA: ICD-10-PCS | Mod: PT,ANES,, | Performed by: ANESTHESIOLOGY

## 2019-01-25 PROCEDURE — 45385 PR COLONOSCOPY,REMV LESN,SNARE: ICD-10-PCS | Mod: PT,,, | Performed by: INTERNAL MEDICINE

## 2019-01-25 PROCEDURE — D9220A PRA ANESTHESIA: Mod: PT,CRNA,, | Performed by: NURSE ANESTHETIST, CERTIFIED REGISTERED

## 2019-01-25 PROCEDURE — 37000009 HC ANESTHESIA EA ADD 15 MINS: Performed by: INTERNAL MEDICINE

## 2019-01-25 PROCEDURE — 45380 COLONOSCOPY AND BIOPSY: CPT | Mod: 59,,, | Performed by: INTERNAL MEDICINE

## 2019-01-25 PROCEDURE — 45380 PR COLONOSCOPY,BIOPSY: ICD-10-PCS | Mod: 59,,, | Performed by: INTERNAL MEDICINE

## 2019-01-25 PROCEDURE — 63600175 PHARM REV CODE 636 W HCPCS: Performed by: NURSE ANESTHETIST, CERTIFIED REGISTERED

## 2019-01-25 PROCEDURE — 88305 TISSUE SPECIMEN TO PATHOLOGY - SURGERY: ICD-10-PCS | Mod: 26,,, | Performed by: PATHOLOGY

## 2019-01-25 PROCEDURE — 45385 COLONOSCOPY W/LESION REMOVAL: CPT | Mod: PT,,, | Performed by: INTERNAL MEDICINE

## 2019-01-25 PROCEDURE — 27201089 HC SNARE, DISP (ANY): Performed by: INTERNAL MEDICINE

## 2019-01-25 PROCEDURE — 37000008 HC ANESTHESIA 1ST 15 MINUTES: Performed by: INTERNAL MEDICINE

## 2019-01-25 PROCEDURE — 45380 COLONOSCOPY AND BIOPSY: CPT | Performed by: INTERNAL MEDICINE

## 2019-01-25 PROCEDURE — 88305 TISSUE EXAM BY PATHOLOGIST: CPT | Mod: 59 | Performed by: PATHOLOGY

## 2019-01-25 PROCEDURE — 27201012 HC FORCEPS, HOT/COLD, DISP: Performed by: INTERNAL MEDICINE

## 2019-01-25 RX ORDER — PROPOFOL 10 MG/ML
VIAL (ML) INTRAVENOUS
Status: DISCONTINUED | OUTPATIENT
Start: 2019-01-25 | End: 2019-01-25

## 2019-01-25 RX ORDER — LIDOCAINE HCL/PF 100 MG/5ML
SYRINGE (ML) INTRAVENOUS
Status: DISCONTINUED | OUTPATIENT
Start: 2019-01-25 | End: 2019-01-25

## 2019-01-25 RX ORDER — SODIUM CHLORIDE 9 MG/ML
INJECTION, SOLUTION INTRAVENOUS CONTINUOUS
Status: DISCONTINUED | OUTPATIENT
Start: 2019-01-25 | End: 2019-01-25 | Stop reason: HOSPADM

## 2019-01-25 RX ORDER — PROPOFOL 10 MG/ML
INJECTION, EMULSION INTRAVENOUS
Status: COMPLETED
Start: 2019-01-25 | End: 2019-01-25

## 2019-01-25 RX ADMIN — PROPOFOL 100 MG: 10 INJECTION, EMULSION INTRAVENOUS at 12:01

## 2019-01-25 RX ADMIN — SODIUM CHLORIDE 1000 ML: 0.9 INJECTION, SOLUTION INTRAVENOUS at 11:01

## 2019-01-25 RX ADMIN — LIDOCAINE HYDROCHLORIDE 100 MG: 20 INJECTION, SOLUTION INTRAVENOUS at 12:01

## 2019-01-25 RX ADMIN — PROPOFOL 50 MG: 10 INJECTION, EMULSION INTRAVENOUS at 01:01

## 2019-01-25 NOTE — H&P
CC: History of colon polyps - last scope 3 years ago    73 year old male with above. States that symptoms are absent, no alleviating/exacerbating factors. No family history of CA. Positive personal history of polyps. No bleeding or weight loss.     ROS:  No headache, no fever/chills, no chest pain/SOB, no nausea/vomiting/diarrhea/constipation/GI bleeding/abdominal pain, no dysuria/hematuria.    VSSAF   Exam:   Alert and oriented x 3; no apparent distress   PERRLA, sclera anicteric  CV: Regular rate/rhythm, normal PMI   Lungs: Clear bilaterally with no wheeze/rales   Abdomen: Soft, NT/ND, normal bowel sounds   Ext: No cyanosis, clubbing     Impression:   As above    Plan:   Proceed with endoscopy. Further recs to follow.

## 2019-01-25 NOTE — PLAN OF CARE
Pt awake, alert and oriented.  No complaints of pain.  Passing air.  Vitals within defined limits.  Ready for discharge.  Meets discharge criteria.

## 2019-01-25 NOTE — [employer] PATIENT INSTRUCTIONS
Discharge Summary/Instructions after an Endoscopic Procedure  Patient Name: Roosevelt Alejandro  Patient MRN: [id number]  Patient YOB: 1945 Friday, January 25, 2019  Toby Haynes MD  RESTRICTIONS:  During your procedure today, you received medications for sedation.  These   medications may affect your judgment, balance and coordination.  Therefore,   for 24 hours, you have the following restrictions:   - DO NOT drive a car, operate machinery, make legal/financial decisions,   sign important papers or drink alcohol.    ACTIVITY:  Today: no heavy lifting, straining or running due to procedural   sedation/anesthesia.  The following day: return to full activity including work.  DIET:  Eat and drink normally unless instructed otherwise.     TREATMENT FOR COMMON SIDE EFFECTS:  - Mild abdominal pain, nausea, belching, bloating or excessive gas:  rest,   eat lightly and use a heating pad.  - Sore Throat: treat with throat lozenges and/or gargle with warm salt   water.  - Because air was used during the procedure, expelling large amounts of air   from your rectum or belching is normal.  - If a bowel prep was taken, you may not have a bowel movement for 1-3 days.    This is normal.  SYMPTOMS TO WATCH FOR AND REPORT TO YOUR PHYSICIAN:  1. Abdominal pain or bloating, other than gas cramps.  2. Chest pain.  3. Back pain.  4. Signs of infection such as: chills or fever occurring within 24 hours   after the procedure.  5. Rectal bleeding, which would show as bright red, maroon, or black stools.   (A tablespoon of blood from the rectum is not serious, especially if   hemorrhoids are present.)  6. Vomiting.  7. Weakness or dizziness.  GO DIRECTLY TO THE NEAREST EMERGENCY ROOM IF YOU HAVE ANY OF THE FOLLOWING:      Difficulty breathing              Chills and/or fever over 101 F   Persistent vomiting and/or vomiting blood   Severe abdominal pain   Severe chest pain   Black, tarry stools   Bleeding- more than one  tablespoon   Any other symptom or condition that you feel may need urgent attention  Your doctor recommends these additional instructions:  If any biopsies were taken, your doctors clinic will contact you in 1 to 2   weeks with any results.  - Patient has a contact number available for emergencies.  The signs and   symptoms of potential delayed complications were discussed with the   patient.  Return to normal activities tomorrow.  Written discharge   instructions were provided to the patient.   - High fiber diet.   - Continue present medications.   - Await pathology results.   - Repeat colonoscopy in 3 years for surveillance.   - Discharge patient to home (ambulatory).   - Return to my office PRN.  For questions, problems or results please call your physician - Toby Haynes MD at Work:  [phone].  [facility], EMERGENCY ROOM PHONE NUMBER: [phone]  IF A COMPLICATION OR EMERGENCY SITUATION ARISES AND YOU ARE UNABLE TO REACH   YOUR PHYSICIAN - GO DIRECTLY TO THE EMERGENCY ROOM.  Toby Haynes MD  1/25/2019 1:37:04 PM  This report has been verified and signed electronically.  [employer]

## 2019-01-25 NOTE — ANESTHESIA PREPROCEDURE EVALUATION
01/25/2019  Roosevelt Alejandro is a 73 y.o., male.    Pre-op Assessment    I have reviewed the Patient Summary Reports.     I have reviewed the Nursing Notes.   I have reviewed the Medications.     Review of Systems  Anesthesia Hx:  No problems with previous Anesthesia    Social:  Former Smoker    Hematology/Oncology:         -- Anemia:   EENT/Dental:   chronic allergic rhinitis   Cardiovascular:   Hypertension    Pulmonary:  Pulmonary Normal asbestosis   Renal/:   BPH hematuria   Hepatic/GI:   Bowel Prep.    Musculoskeletal:   Arthritis     Neurological:  Neurology Normal    Endocrine:  Endocrine Normal        Physical Exam  General:  Obesity    Airway/Jaw/Neck:  Airway Findings: Mouth Opening: Normal General Airway Assessment: Adult  Mallampati: II  Jaw/Neck Findings:  Neck ROM: Extension Decreased, Mild      Dental:  Dental Findings: Upper Dentures, Lower Dentures   Chest/Lungs:  Chest/Lungs Findings: Clear to auscultation, Normal Respiratory Rate     Heart/Vascular:  Heart Findings: Rate: Normal  Rhythm: Regular Rhythm  Sounds: Normal  Vascular Findings: No carotid bruits.        Mental Status:  Mental Status Findings:  Cooperative, Alert and Oriented         Anesthesia Plan  Type of Anesthesia, risks & benefits discussed:  Anesthesia Type:  general  Patient's Preference:   Intra-op Monitoring Plan: standard ASA monitors  Intra-op Monitoring Plan Comments:   Post Op Pain Control Plan:   Post Op Pain Control Plan Comments:   Induction:   IV  Beta Blocker:  Patient is on a Beta-Blocker and has received one dose within the past 24 hours (No further documentation required).       Informed Consent: Patient understands risks and agrees with Anesthesia plan.  Questions answered. Anesthesia consent signed with patient.  ASA Score: 2     Day of Surgery Review of History & Physical: I have interviewed and examined the  patient. I have reviewed the patient's H&P dated:  There are no significant changes.  H&P update referred to the provider.         Ready For Surgery From Anesthesia Perspective.

## 2019-01-25 NOTE — DISCHARGE INSTRUCTIONS
Understanding Colon and Rectal Polyps    The colon (also called the large intestine) is a muscular tube that forms the last part of the digestive tract. It absorbs water and stores food waste. The colon is about 4 to 6 feet long. The rectum is the last 6 inches of the colon. The colon and rectum have a smooth lining composed of millions of cells. Changes in these cells can lead to growths in the colon that can become cancerous and should be removed. Multiple tests are available to screen for colon cancer, but the colonoscopy is the most recommended test. During colonoscopy, these polyps can be removed. How often you need this test depends on many things including your condition, your family history, symptoms, and what the findings were at the previous colonoscopy.   When the colon lining changes  Changes that happen in the cells that line the colon or rectum can lead to growths called polyps. Over a period of years, polyps can turn cancerous. Removing polyps early may prevent cancer from ever forming.  Polyps  Polyps are fleshy clumps of tissue that form on the lining of the colon or rectum. Small polyps are usually benign (not cancerous). However, over time, cells in a polyp can change and become cancerous. Certain types of polyps known as adenomatous polyps are premalignant. The risk for invasive cancer increases with the size of the polyp and certain cell and gene features. This means that they can become cancerous if they're not removed. Hyperplastic polyps are benign. They can grow quite large and not turn cancerous.   Cancer  Almost all colorectal cancers start when polyp cells begin growing abnormally. As a cancerous tumor grows, it may involve more and more of the colon or rectum. In time, cancer can also grow beyond the colon or rectum and spread to nearby organs or to glands called lymph nodes. The cells can also travel to other parts of the body. This is known as metastasis. The earlier a cancerous  tumor is removed, the better the chance of preventing its spread.    Date Last Reviewed: 8/1/2016  © 1270-7244 [employer]. [address] [place] [address], [place] [address]. All rights reserved. This information is not intended as a substitute for professional medical care. Always follow your healthcare professional's instructions.        Hemorrhoids    Hemorrhoids are swollen and inflamed veins inside the rectum and near the anus. The rectum is the last several inches of the colon. The anus is the passage between the rectum and the outside of the body.  Causes  The veins can become swollen due to increased pressure in them. This is most often caused by:  · Chronic constipation or diarrhea  · Straining when having a bowel movement  · Sitting too long on the toilet  · A low-fiber diet  · Pregnancy  Symptoms  · Bleeding from the rectum (this may be noticeable after bowel movements)  · Lump near the anus  · Itching around the anus  · Pain around the anus  There are different types of hemorrhoids. Depending on the type you have and the severity, you may be able to treat yourself at home. In some cases, a procedure may be the best treatment option. Your healthcare provider can tell you more about this, if needed.  Home care  General care  · To get relief from pain or itching, try:  ¨ Topical products. Your healthcare provider may prescribe or recommend creams, ointments, or pads that can be applied to the hemorrhoid. Use these exactly as directed.  ¨ Medicines. Your healthcare provider may recommend stool softeners, suppositories, or laxatives to help manage constipation. Use these exactly as directed.  ¨ Sitz baths. A sitz bath involves sitting in a few inches of warm bath water. Be careful not to make the water so hot that you burn yourself--test it before sitting in it. Soak for about 10 to 15 minutes a few times a day. This may help relieve pain.  Tips to help prevent hemorrhoids  · Eat more fiber. Fiber adds  bulk to stool and absorbs water as it moves through your colon. This makes stool softer and easier to pass.  ¨ Increase the fiber in your diet with more fiber-rich foods. These include fresh fruit, vegetables, and whole grains.  ¨ Take a fiber supplement or bulking agent, if advised to by your provider. These include products such as psyllium or methylcellulose.  · Drink plenty of water, if directed to by your provider. This can help keep stool soft.  · Be more active. Frequent exercise aids digestion and helps prevent constipation. It may also help make bowel movements more regular.  · Dont strain during bowel movements. This can make hemorrhoids more likely. Also, dont sit on the toilet for long periods of time.  Follow-up care  Follow up with your healthcare provider, or as advised. If a culture or imaging tests were done, you will be notified of the results when they are ready. This may take a few days or longer.  When to seek medical advice  Call your healthcare provider right away if any of these occur:  · Increased bleeding from the rectum  · Increased pain around the rectum or anus  · Weakness or dizziness  Call 911  Call 911 or return to the emergency department right away if any of these occur:  · Trouble breathing or swallowing  · Fainting or loss of consciousness  · Unusually fast heart rate  · Vomiting blood  · Large amounts of blood in stool  Date Last Reviewed: 6/22/2015  © 3531-4523 [employer]. [address] [place] [address]. All rights reserved. This information is not intended as a substitute for professional medical care. Always follow your healthcare professional's instructions.        Diverticulosis    Diverticulosis means that small pouches have formed in the wall of your large intestine (colon). Most often, this problem causes no symptoms and is common as people age. But the pouches in the colon are at risk of becoming infected. When this happens, the condition is  called diverticulitis. Although most people with diverticulosis never develop diverticulitis, it is still not uncommon. Rectal bleeding can also occur and in less common situations, a type of colon inflammation called colitis.  While most people do not have symptoms, some people with diverticulosis may have:  · Abdominal cramps and pain  · Bloating  · Constipation  · Change in bowel habits  Causes  The exact cause of diverticulosis (and diverticulitis) has not been proved, but a few things are associated with the condition:  · Low-fiber diet  · Constipation  · Lack of exercise  Your healthcare provider will talk with you about how to manage your condition. Diet changes may be all that are needed to help control diverticulosis and prevent progression to diverticulitis. If you develop diverticulitis, you will likely need other treatments.  Home care  You may be told to take fiber supplements daily. Fiber adds bulk to the stool so that it passes through the colon more easily. Stool softeners may be recommended. You may also be given medications for pain relief. Be sure to take all medications as directed.  In the past, people were told to avoid corn, nuts, and seeds. This is no longer necessary.  Follow these guidelines when caring for yourself at home:  · Eat unprocessed foods that are high in fiber. Whole grains, fruits, and vegetables are good choices.  · Drink 6 to 8 glasses of water every day unless your healthcare provider has you limit how much fluid you should have.  · Watch for changes in your bowel movements. Tell your provider if you notice any changes.  · Begin an exercise program. Ask your provider how to get started. Generally, walking is the best.  · Get plenty of rest and sleep.  Follow-up care  Follow up with your healthcare provider, or as advised. Regular visits may be needed to check on your health. Sometimes special procedures such as colonoscopy, are needed after an episode of diverticulitis or  blooding. Be sure to keep all your appointments.  If a stool sample was taken, or cultures were done, you should be told if they are positive, or if your treatment needs to be changed. You can call as directed for the results.  If X-rays were done, a radiologist will look at them. You will be told if there is a change in your treatment.  If antibiotics were prescribed, be sure to finish them all.  When to seek medical advice  Call your healthcare provider right away if any of these occur:  · Fever of 100.4°F (38°C) or higher, or as directed by your healthcare provider  · Severe cramps in the lower left side of the abdomen or pain that is getting worse  · Tenderness in the lower left side of the abdomen or worsening pain throughout the abdomen  · Diarrhea or constipation that doesn't get better within 24 hours  · Nausea and vomiting  · Bleeding from the rectum  Call 911  Call emergency services if any of the following occur:  · Trouble breathing  · Confusion  · Very drowsy or trouble awakening  · Fainting or loss of consciousness  · Rapid heart rate  · Chest pain  Date Last Reviewed: 12/30/2015 © 2000-2017 [employer]. [address] [place] [address]. All rights reserved. This information is not intended as a substitute for professional medical care. Always follow your healthcare professional's instructions.      Discharge Instructions: After Your Surgery/Procedure  Youve just had surgery. During surgery you were given medicine called anesthesia to keep you relaxed and free of pain. After surgery you may have some pain or nausea. This is common. Here are some tips for feeling better and getting well after surgery.     Stay on schedule with your medication.   Going home  Your doctor or nurse will show you how to take care of yourself when you go home. He or she will also answer your questions. Have an adult family member or friend drive you home.      For your safety we recommend these  "precaution for the first 24 hours after your procedure:  · Do not drive or use heavy equipment.  · Do not make important decisions or sign legal papers.  · Do not drink alcohol.  · Have someone stay with you, if needed. He or she can watch for problems and help keep you safe.  · Your concentration, balance, coordination, and judgement may be impaired for many hours after anesthesia.  Use caution when ambulating or standing up.     · You may feel weak and "washed out" after anesthesia and surgery.      Subtle residual effects of general anesthesia or sedation with regional / local anesthesia can last more than 24 hours.  Rest for the remainder of the day or longer if your Doctor/Surgeon has advised you to do so.  Although you may feel normal within the first 24 hours, your reflexes and mental ability may be impaired without you realizing it.  You may feel dizzy, lightheaded or sleepy for 24 hours or longer.      Be sure to go to all follow-up visits with your doctor. And rest after your surgery for as long as your doctor tells you to.  Coping with pain  If you have pain after surgery, pain medicine will help you feel better. Take it as told, before pain becomes severe. Also, ask your doctor or pharmacist about other ways to control pain. This might be with heat, ice, or relaxation. And follow any other instructions your surgeon or nurse gives you.  Tips for taking pain medicine  To get the best relief possible, remember these points:  · Pain medicines can upset your stomach. Taking them with a little food may help.  · Most pain relievers taken by mouth need at least 20 to 30 minutes to start to work.  · Taking medicine on a schedule can help you remember to take it. Try to time your medicine so that you can take it before starting an activity. This might be before you get dressed, go for a walk, or sit down for dinner.  · Constipation is a common side effect of pain medicines. Call your doctor before taking any " medicines such as laxatives or stool softeners to help ease constipation. Also ask if you should skip any foods. Drinking lots of fluids and eating foods such as fruits and vegetables that are high in fiber can also help. Remember, do not take laxatives unless your surgeon has prescribed them.  · Drinking alcohol and taking pain medicine can cause dizziness and slow your breathing. It can even be deadly. Do not drink alcohol while taking pain medicine.  · Pain medicine can make you react more slowly to things. Do not drive or run machinery while taking pain medicine.  Your health care provider may tell you to take acetaminophen to help ease your pain. Ask him or her how much you are supposed to take each day. Acetaminophen or other pain relievers may interact with your prescription medicines or other over-the-counter (OTC) drugs. Some prescription medicines have acetaminophen and other ingredients. Using both prescription and OTC acetaminophen for pain can cause you to overdose. Read the labels on your OTC medicines with care. This will help you to clearly know the list of ingredients, how much to take, and any warnings. It may also help you not take too much acetaminophen. If you have questions or do not understand the information, ask your pharmacist or health care provider to explain it to you before you take the OTC medicine.  Managing nausea  Some people have an upset stomach after surgery. This is often because of anesthesia, pain, or pain medicine, or the stress of surgery. These tips will help you handle nausea and eat healthy foods as you get better. If you were on a special food plan before surgery, ask your doctor if you should follow it while you get better. These tips may help:  · Do not push yourself to eat. Your body will tell you when to eat and how much.  · Start off with clear liquids and soup. They are easier to digest.  · Next try semi-solid foods, such as mashed potatoes, applesauce, and  gelatin, as you feel ready.  · Slowly move to solid foods. Dont eat fatty, rich, or spicy foods at first.  · Do not force yourself to have 3 large meals a day. Instead eat smaller amounts more often.  · Take pain medicines with a small amount of solid food, such as crackers or toast, to avoid nausea.     Call your surgeon if  · You still have pain an hour after taking medicine. The medicine may not be strong enough.  · You feel too sleepy, dizzy, or groggy. The medicine may be too strong.  · You have side effects like nausea, vomiting, or skin changes, such as rash, itching, or hives.       If you have obstructive sleep apnea  You were given anesthesia medicine during surgery to keep you comfortable and free of pain. After surgery, you may have more apnea spells because of this medicine and other medicines you were given. The spells may last longer than usual.   At home:  · Keep using the continuous positive airway pressure (CPAP) device when you sleep. Unless your health care provider tells you not to, use it when you sleep, day or night. CPAP is a common device used to treat obstructive sleep apnea.  · Talk with your provider before taking any pain medicine, muscle relaxants, or sedatives. Your provider will tell you about the possible dangers of taking these medicines.  © 1829-7575 [employer]. [address] [place] [address], [place] [address]. All rights reserved. This information is not intended as a substitute for professional medical care. Always follow your healthcare professional's instructions.

## 2019-01-26 NOTE — ANESTHESIA POSTPROCEDURE EVALUATION
"Anesthesia Post Evaluation    Patient: Roosevelt Alejandro    Procedure(s) Performed: Procedure(s) (LRB):  COLONOSCOPY (N/A)    Final Anesthesia Type: general  Patient location during evaluation: PACU  Patient participation: Yes- Able to Participate  Level of consciousness: awake and alert  Post-procedure vital signs: reviewed and stable  Pain management: adequate  Airway patency: patent  PONV status at discharge: No PONV  Anesthetic complications: no      Cardiovascular status: hemodynamically stable  Respiratory status: unassisted and room air  Hydration status: euvolemic  Follow-up not needed.        Visit Vitals  /76   Pulse 68   Temp 36.8 °C (98.3 °F)   Resp 16   Ht 6' 2" (1.88 m)   Wt 97.5 kg (215 lb)   SpO2 95%   BMI 27.60 kg/m²       Pain/Taj Score: Taj Score: 10 (1/25/2019  2:00 PM)        "

## 2019-01-28 VITALS
BODY MASS INDEX: 27.59 KG/M2 | HEART RATE: 68 BPM | WEIGHT: 215 LBS | RESPIRATION RATE: 16 BRPM | SYSTOLIC BLOOD PRESSURE: 127 MMHG | DIASTOLIC BLOOD PRESSURE: 76 MMHG | HEIGHT: 74 IN | OXYGEN SATURATION: 95 % | TEMPERATURE: 98 F

## 2019-02-04 ENCOUNTER — PATIENT MESSAGE (OUTPATIENT)
Dept: GASTROENTEROLOGY | Facility: CLINIC | Age: 74
End: 2019-02-04

## 2019-02-24 ENCOUNTER — HOSPITAL ENCOUNTER (EMERGENCY)
Facility: HOSPITAL | Age: 74
Discharge: HOME | End: 2019-02-24
Attending: EMERGENCY MEDICINE
Payer: MEDICARE

## 2019-02-24 VITALS
DIASTOLIC BLOOD PRESSURE: 72 MMHG | BODY MASS INDEX: 26.95 KG/M2 | HEIGHT: 74 IN | OXYGEN SATURATION: 96 % | SYSTOLIC BLOOD PRESSURE: 140 MMHG | RESPIRATION RATE: 20 BRPM | WEIGHT: 210 LBS | TEMPERATURE: 97 F | HEART RATE: 78 BPM

## 2019-02-24 DIAGNOSIS — H81.10 BENIGN PAROXYSMAL POSITIONAL VERTIGO, UNSPECIFIED LATERALITY: Primary | ICD-10-CM

## 2019-02-24 DIAGNOSIS — I63.9 STROKE: ICD-10-CM

## 2019-02-24 PROBLEM — R42 VERTIGO: Status: ACTIVE | Noted: 2019-02-24

## 2019-02-24 LAB
ALBUMIN SERPL BCP-MCNC: 4 G/DL
ALP SERPL-CCNC: 108 U/L
ALT SERPL W/O P-5'-P-CCNC: 11 U/L
ANION GAP SERPL CALC-SCNC: 10 MMOL/L
AST SERPL-CCNC: 15 U/L
BASOPHILS # BLD AUTO: 0 K/UL
BASOPHILS NFR BLD: 0 %
BILIRUB SERPL-MCNC: 0.5 MG/DL
BUN SERPL-MCNC: 17 MG/DL
CALCIUM SERPL-MCNC: 9.6 MG/DL
CHLORIDE SERPL-SCNC: 106 MMOL/L
CHOLEST SERPL-MCNC: 190 MG/DL
CHOLEST/HDLC SERPL: 5.6 {RATIO}
CO2 SERPL-SCNC: 24 MMOL/L
CREAT SERPL-MCNC: 1.1 MG/DL
DIFFERENTIAL METHOD: ABNORMAL
EOSINOPHIL # BLD AUTO: 0.1 K/UL
EOSINOPHIL NFR BLD: 0.8 %
ERYTHROCYTE [DISTWIDTH] IN BLOOD BY AUTOMATED COUNT: 13.5 %
EST. GFR  (AFRICAN AMERICAN): >60 ML/MIN/1.73 M^2
EST. GFR  (NON AFRICAN AMERICAN): >60 ML/MIN/1.73 M^2
GLUCOSE SERPL-MCNC: 132 MG/DL
HCT VFR BLD AUTO: 39 %
HDLC SERPL-MCNC: 34 MG/DL
HDLC SERPL: 17.9 %
HGB BLD-MCNC: 13.2 G/DL
INR PPP: 1.1
LDLC SERPL CALC-MCNC: 130.6 MG/DL
LYMPHOCYTES # BLD AUTO: 1.2 K/UL
LYMPHOCYTES NFR BLD: 11.6 %
MCH RBC QN AUTO: 31.9 PG
MCHC RBC AUTO-ENTMCNC: 33.8 G/DL
MCV RBC AUTO: 95 FL
MONOCYTES # BLD AUTO: 0.4 K/UL
MONOCYTES NFR BLD: 4 %
NEUTROPHILS # BLD AUTO: 8.4 K/UL
NEUTROPHILS NFR BLD: 83.6 %
NONHDLC SERPL-MCNC: 156 MG/DL
PLATELET # BLD AUTO: 247 K/UL
PMV BLD AUTO: 8.2 FL
POCT GLUCOSE: 138 MG/DL (ref 70–110)
POTASSIUM SERPL-SCNC: 4.4 MMOL/L
PROT SERPL-MCNC: 7.4 G/DL
PROTHROMBIN TIME: 10.8 SEC
RBC # BLD AUTO: 4.12 M/UL
SODIUM SERPL-SCNC: 140 MMOL/L
TRIGL SERPL-MCNC: 127 MG/DL
TSH SERPL DL<=0.005 MIU/L-ACNC: 1.25 UIU/ML
WBC # BLD AUTO: 10.1 K/UL

## 2019-02-24 PROCEDURE — G0425 INPT/ED TELECONSULT30: HCPCS | Mod: GT,G0,, | Performed by: PSYCHIATRY & NEUROLOGY

## 2019-02-24 PROCEDURE — 80053 COMPREHEN METABOLIC PANEL: CPT

## 2019-02-24 PROCEDURE — 84443 ASSAY THYROID STIM HORMONE: CPT

## 2019-02-24 PROCEDURE — G0425 PR INPT TELEHEALTH CONSULT 30M: ICD-10-PCS | Mod: GT,G0,, | Performed by: PSYCHIATRY & NEUROLOGY

## 2019-02-24 PROCEDURE — 85025 COMPLETE CBC W/AUTO DIFF WBC: CPT

## 2019-02-24 PROCEDURE — 93005 ELECTROCARDIOGRAM TRACING: CPT

## 2019-02-24 PROCEDURE — 85610 PROTHROMBIN TIME: CPT

## 2019-02-24 PROCEDURE — 82962 GLUCOSE BLOOD TEST: CPT

## 2019-02-24 PROCEDURE — 99284 EMERGENCY DEPT VISIT MOD MDM: CPT | Mod: 25

## 2019-02-24 PROCEDURE — 25000003 PHARM REV CODE 250: Performed by: EMERGENCY MEDICINE

## 2019-02-24 PROCEDURE — 36415 COLL VENOUS BLD VENIPUNCTURE: CPT

## 2019-02-24 PROCEDURE — 80061 LIPID PANEL: CPT

## 2019-02-24 RX ORDER — MECLIZINE HYDROCHLORIDE 25 MG/1
25 TABLET ORAL 3 TIMES DAILY PRN
Qty: 20 TABLET | Refills: 0 | Status: SHIPPED | OUTPATIENT
Start: 2019-02-24 | End: 2019-03-06 | Stop reason: ALTCHOICE

## 2019-02-24 RX ORDER — MECLIZINE HYDROCHLORIDE 25 MG/1
25 TABLET ORAL
Status: COMPLETED | OUTPATIENT
Start: 2019-02-24 | End: 2019-02-24

## 2019-02-24 RX ORDER — DIAZEPAM 5 MG/1
5 TABLET ORAL
Status: COMPLETED | OUTPATIENT
Start: 2019-02-24 | End: 2019-02-24

## 2019-02-24 RX ORDER — DIAZEPAM 10 MG/2ML
5 INJECTION INTRAMUSCULAR
Status: DISCONTINUED | OUTPATIENT
Start: 2019-02-24 | End: 2019-02-24

## 2019-02-24 RX ORDER — DIAZEPAM 2 MG/1
2 TABLET ORAL EVERY 6 HOURS PRN
Qty: 16 TABLET | Refills: 0 | Status: SHIPPED | OUTPATIENT
Start: 2019-02-24 | End: 2019-03-06 | Stop reason: ALTCHOICE

## 2019-02-24 RX ADMIN — DIAZEPAM 5 MG: 5 TABLET ORAL at 12:02

## 2019-02-24 RX ADMIN — MECLIZINE HYDROCHLORIDE 25 MG: 25 TABLET ORAL at 12:02

## 2019-02-24 NOTE — ED NOTES
"Now able to turn head & move about in bed without nausea. "I think the medicine (Antivert) is working".  "

## 2019-02-24 NOTE — ASSESSMENT & PLAN NOTE
No high risk features suggesting central cause in either the history or the physical exam. Vertigo is episodic and triggered by movement, not persistent with movement induced exacerbation, this being the main distinguishing feature from a central positional cause vs. Peripheral. Though the clinical exam is not 100% accurate in these cases, neither is imaging with a low sensitivity on MRI for detecting small lesions in the FN lobe of the cerebellum for example.   Would currently treat as BPPV and currently he is not in the state to return home given his profound symptoms that are easily triggered. MRI can be obtained to r/o infarct however the Se is low (~ 70%).

## 2019-02-24 NOTE — ED NOTES
Dr. Freeman signs off off Stroke Telemedicine consults s/p RN-assisted exam & discussion with ER MD. Family x 2 present

## 2019-02-24 NOTE — ED PROVIDER NOTES
"Encounter Date: 2/24/2019    SCRIBE #1 NOTE: I, Wei East, am scribing for, and in the presence of, Dr. Gutiérrez.       History     Chief Complaint   Patient presents with    Dizziness    Fall     Time seen by provider: 11:55 AM on 02/24/2019      Roosevelt Alejandro is a 73 y.o. male with a PMHx of arthritis, HTN, A-Fib, asbestosis, and BPH who presents to the ED via EMS for further evaluation of dizziness that started 8.5 hours ago (03:30 am). Per EMS, the patient awoke this am and went to use the restroom, when he became dizzy. He relays that this dizziness is more of a room spinning that is worse than quick head movement. He also admits that "If I try to stand up I am in trouble because I will fall into a wall." Paramedic states that the patient then fell down onto his right leg secondary to the dizziness. He denies LOC, headache, change in vision, unilateral weakness, numbness, right lower extremity pain, blood thinner usage, and change in speech. He relays that he used his wife's walker to balance himself back to bed. Patient admits that he had 1 episode of emesis since onset. Patient denies chest pain, abdominal pain, fever, and SOB. He has a PSHx of rotator cuff repair, colonoscopy, and prostate biopsy.       The history is provided by the EMS personnel.     Review of patient's allergies indicates:   Allergen Reactions    Aspirin Other (See Comments)     Internal bleeding    Lisinopril (bulk) Rash    Sulfa (sulfonamide antibiotics) Swelling and Rash     Past Medical History:   Diagnosis Date    Arthritis     back pain    Asbestosis     BPH (benign prostatic hyperplasia)     Hypertension      Past Surgical History:   Procedure Laterality Date    COLONOSCOPY      2009    COLONOSCOPY N/A 1/25/2019    [facility]formed [facility] at [facility]O    COLONOSCOPY N/A 1/21/2016    Performed by [facility]iel [facility] at [facility] ENDO    CYSTOSCOPY N/A 12/19/2017    [facility] "    EYE SURGERY Bilateral     cataracts, retinal detachment    INJECTION-STEROID-EPIDURAL-TRANSFORAMINAL Right 2014    [facility] at [facility] OR    PROSTATE BIOPSY      PROSTATECTOMY  2017    PROSTATECTOMY-TRANSURETHRAL N/A 2018    [facility]    PROSTATECTOMY-TRANSURETHRAL N/A 2017    [facility] at [facility] OR    ROTATOR CUFF REPAIR      left     Family History   Problem Relation Age of Onset    Cancer Father         lung/ asbestos    Melanoma Neg Hx     Psoriasis Neg Hx     Lupus Neg Hx     Eczema Neg Hx      Social History     Tobacco Use    Smoking status: Former Smoker     Last attempt to quit: 3/30/2000     Years since quittin.9    Smokeless tobacco: Never Used   Substance Use Topics    Alcohol use: Yes     Alcohol/week: 0.6 oz     Types: 1 Cans of beer per week     Comment: rare    Drug use: No     Review of Systems   Constitutional: Negative for fever.   HENT: Negative for sore throat.    Eyes: Negative for visual disturbance.   Respiratory: Negative for shortness of breath.    Cardiovascular: Negative for chest pain.   Gastrointestinal: Positive for vomiting. Negative for abdominal pain, diarrhea and nausea.   Genitourinary: Negative for dysuria.   Musculoskeletal: Negative for arthralgias and back pain.   Skin: Negative for rash.   Neurological: Positive for dizziness. Negative for syncope, speech difficulty, weakness, numbness and headaches.   Hematological: Does not bruise/bleed easily.       Physical Exam     Initial Vitals [19 1149]   BP Pulse Resp Temp SpO2   (!) 146/73 83 20 97.2 °F (36.2 °C) (!) 94 %      MAP       --         Physical Exam    Nursing note and vitals reviewed.  Constitutional: He appears well-developed and well-nourished. He is not diaphoretic. No distress.   HENT:   Head: Normocephalic and atraumatic.   Eyes: EOM are normal. Pupils are equal, round, and reactive to light.    Neck: Normal range of motion. Neck supple.   Cardiovascular: Normal rate, regular rhythm, normal heart sounds and intact distal pulses. Exam reveals no gallop and no friction rub.    No murmur heard.  Pulmonary/Chest: Breath sounds normal. No respiratory distress. He has no wheezes. He has no rhonchi. He has no rales.   Abdominal: Soft. Bowel sounds are normal. There is no tenderness. There is no rebound and no guarding.   Musculoskeletal: Normal range of motion.   Neurological: He is alert and oriented to person, place, and time.   Positive Lily-Hallpike test   Skin: Skin is warm.   Psychiatric: He has a normal mood and affect. His behavior is normal. Judgment and thought content normal.         ED Course   Procedures  Labs Reviewed   CBC W/ AUTO DIFFERENTIAL - Abnormal; Notable for the following components:       Result Value    RBC 4.12 (*)     Hemoglobin 13.2 (*)     Hematocrit 39.0 (*)     MCH 31.9 (*)     MPV 8.2 (*)     Gran # (ANC) 8.4 (*)     Gran% 83.6 (*)     Lymph% 11.6 (*)     All other components within normal limits   COMPREHENSIVE METABOLIC PANEL - Abnormal; Notable for the following components:    Glucose 132 (*)     All other components within normal limits   LIPID PANEL - Abnormal; Notable for the following components:    HDL 34 (*)     HDL/Chol Ratio 17.9 (*)     Total Cholesterol/HDL Ratio 5.6 (*)     All other components within normal limits   POCT GLUCOSE - Abnormal; Notable for the following components:    POCT Glucose 138 (*)     All other components within normal limits   PROTIME-INR   TSH   POCT GLUCOSE, HAND-HELD DEVICE     EKG Readings: (Independently Interpreted)   NSR at 80 bpm. Low voltage QRS noted. Normal intervals. No acute ST segment changes       Imaging Results          X-Ray Chest AP Portable (Final result)  Result time 02/24/19 12:33:30    Final result by Brady Diaz MD (02/24/19 12:33:30)                 Impression:      No acute chest disease.      Electronically  signed by: Brady Diaz  Date:    02/24/2019  Time:    12:33             Narrative:    EXAMINATION:  XR CHEST AP PORTABLE    CLINICAL HISTORY:  Stroke;    TECHNIQUE:  Single frontal view of the chest was performed.    COMPARISON:  Chest x-ray 12/20/2017.    FINDINGS:  Mild chronic interstitial change.  No focal consolidation.  Mild elevation right hemidiaphragm.    Heart size is top-normal.  Mediastinal contours unremarkable.    Bony thorax intact.                               CT Head Without Contrast (Final result)  Result time 02/24/19 12:27:27    Final result by Brady Diaz MD (02/24/19 12:27:27)                 Impression:      1. Cortical atrophy with periventricular deep white matter change consistent with chronic small vessel ischemic disease.      Electronically signed by: Brady Diaz  Date:    02/24/2019  Time:    12:27             Narrative:    EXAMINATION:  CT HEAD WITHOUT CONTRAST    CLINICAL HISTORY:  Stroke;    TECHNIQUE:  Low dose axial images were obtained through the head.  Coronal and sagittal reformations were also performed. Contrast was not administered.    COMPARISON:  None.    FINDINGS:  There is no acute hemorrhage or infarction.  There is cortical atrophy.  There are periventricular deep white matter changes consistent with chronic small vessel ischemic disease.    No extra-axial fluid collections.  Ventricles are normal in size, shape and configuration.  The basal cisterns are patent.    The imaged paranasal sinuses and ethmoid air cells are well aerated.    The mastoid air cells and middle ears are normally pneumatized.                                 Medical Decision Making:   History:   Old Medical Records: I decided to obtain old medical records.  Initial Assessment:   73-year-old male presented with a chief complaint of dizziness.  Differential Diagnosis:   My differential diagnosis includes ICH, CVA, TIA, and benign positional vertigo     Clinical Tests:   Lab Tests:  Reviewed and Ordered  Radiological Study: Ordered and Reviewed  Medical Tests: Ordered and Reviewed  ED Management:  The patient was emergently evaluated in the emergency department, his evaluation was significant for an elderly male with a positive Lily-Hallpike maneuver.  I am concerned about a stroke in this patient and he was emergently consult as a vascular Neurology.  The patient's CT scan of his head showed no acute hemorrhages per my independent interpretation.  The patient's labs showed no acute processes.  After evaluation the vascular neurologist, believes that the patient likely has a benign positional vertigo and not a stroke.  The patient was treated with p.o. meclizine and p.o. Valium, with significant improvement in his symptoms. The patient's diagnosis is likely benign positional vertigo.  He is stable for discharge home.  He will be discharged home with p.o. Valium and p.o. meclizine.  He is referred to ENT as an outpatient for follow-up and further care.  Last known normal: 03:30 am  Provider contact time: 11:55 am  Head CT Read by MD: 12:19 pm independently interpreted by me show's no hemorrhage  Neurology consult ordered by ED MD: 12:08 pm  Neurologist returned call/TeleStroke in process: 12:35 pm              Scribe Attestation:   Scribe #1: I performed the above scribed service and the documentation accurately describes the services I performed. I attest to the accuracy of the note.        I, Dr. Brenden Gutiérrez, personally performed the services described in this documentation. All medical record entries made by the scribe were at my direction and in my presence.  I have reviewed the chart and agree that the record reflects my personal performance and is accurate and complete. Brenden Gutiérrez MD.  4:08 PM 02/24/2019          Clinical Impression:       ICD-10-CM ICD-9-CM   1. Benign paroxysmal positional vertigo, unspecified laterality H81.10 386.11   2. Stroke I63.9 434.91         Disposition:    Disposition: Discharged  Condition: Stable                        Brenden Gutiérrez MD  02/24/19 [id number]

## 2019-02-24 NOTE — ED NOTES
Awaiting return of daughter/son-in-law's return for transport home. Somewhat somnolent but denies dizziness with head mov'ts currently. VSS

## 2019-02-24 NOTE — HPI
73M waking up at 330 am, stood up to go to the bathroom and started to have vertigo, fell down. The aforementioned symptoms have never happened before. Triggered by turning his head to the right or the left or if he stands up.. These symptoms have reoccurred every time he turns his head.. There are no other associated symptoms.

## 2019-02-24 NOTE — CONSULTS
[facility]  Vascular Neurology  Comprehensive Stroke Center  Tele-Consultation Note      Inpatient consult to Telemedicine-Stroke  Consult performed by: Octavio Freeman MD  Consult ordered by: Brenden Gutiérrez MD          Consulting Provider: Spoke Physician:: brenden hall  Current Providers  No providers found    Patient Location: [facility]  [facility] hospital nurse at bedside with patient assisting consultant.     Patient information was obtained from patient.       Assessment/Plan:     STROKE DOCUMENTATION     Acute Stroke Times:   Acute Stroke Times   Stroke Team Called Time: 1221  Stroke Team Arrival Time: 1231  CT Interpretation Time: 1231    NIH Scale:  1a. Level of Consciousness: 0-->Alert, keenly responsive  1b. LOC Questions: 0-->Answers both questions correctly  1c. LOC Commands: 0-->Performs both tasks correctly  2. Best Gaze: 0-->Normal  3. Visual: 0-->No visual loss  4. Facial Palsy: 0-->Normal symmetrical movements  5a. Motor Arm, Left: 0-->No drift, limb holds 90 (or 45) degrees for full 10 secs  5b. Motor Arm, Right: 0-->No drift, limb holds 90 (or 45) degrees for full 10 secs  6a. Motor Leg, Left: 0-->No drift, leg holds 30 degree position for full 5 secs  6b. Motor Leg, Right: 0-->No drift, leg holds 30 degree position for full 5 secs  7. Limb Ataxia: 0-->Absent  8. Sensory: 0-->Normal, no sensory loss  9. Best Language: 0-->No aphasia, normal  10. Dysarthria: 0-->Normal  11. Extinction and Inattention (formerly Neglect): 0-->No abnormality  Total (NIH Stroke Scale): 0     Modified [place]    [place] Coma Scale:    ABCD2 Score:    [id number] Score:   HAS -BLED Score:   ICH Score:   Hunt & Hardwick Classification:       Diagnoses:   Vertigo    No high risk features suggesting central cause in either the history or the physical exam. Vertigo is episodic and triggered by movement, not persistent with movement induced exacerbation, this  "being the main distinguishing feature from a central positional cause vs. Peripheral. Though the clinical exam is not 100% accurate in these cases, neither is imaging with a low sensitivity on MRI for detecting small lesions in the FN lobe of the cerebellum for example.   Would currently treat as BPPV and currently he is not in the state to return home given his profound symptoms that are easily triggered. MRI can be obtained to r/o infarct however the Se is low (~ 70%).         Blood pressure (!) 146/73, pulse 83, temperature 97.2 °F (36.2 °C), temperature source Oral, resp. rate 20, height 6' 2" (1.88 m), weight 95.3 kg (210 lb), SpO2 (!) 94 %.  Alteplase Eligible?: No  Alteplase Recommendation: Alteplase not recommended due to Outside of treatment window  and Suspected stroke mimic   Possible Interventional Revascularization Candidate? No; No large vessel occlusion    Disposition Recommendation: pending further studies      Subjective:     History of Present Illness:  73M waking up at 330 am, stood up to go to the bathroom and started to have vertigo, fell down. The aforementioned symptoms have never happened before. Triggered by turning his head to the right or the left or if he stands up.. These symptoms have reoccurred every time he turns his head.. There are no other associated symptoms.    Woke up with symptoms?: yes  Last known normal:    last night    Recent bleeding noted: no  Does the patient take any Blood Thinners? no  Medications: No relevant medications      Past Medical History:   Past Medical History:   Diagnosis Date    Arthritis     back pain    Asbestosis     BPH (benign prostatic hyperplasia)     Hypertension        Past Surgical History: no relevant surgical history and no major surgeries within the last 2 weeks    Family History: no relevant history    Social History: no smoking, no drinking, no drugs    Allergies: Aspirin  Lisinopril (Bulk)  Sulfa (Sulfonamide Antibiotics) No relevant " "allergies    Review of Systems   Constitutional: Negative for appetite change, chills and fever.   HENT: Negative for congestion and sore throat.    Eyes: Negative for discharge and itching.   Respiratory: Negative for apnea and shortness of breath.    Cardiovascular: Negative for chest pain and palpitations.   Gastrointestinal: Negative for abdominal pain and anal bleeding.   Endocrine: Negative for cold intolerance and polydipsia.   Genitourinary: Negative for dysuria and hematuria.   Musculoskeletal: Negative for joint swelling and myalgias.   Skin: Negative for color change and rash.   Neurological: Positive for dizziness. Negative for tremors.   Psychiatric/Behavioral: Negative for hallucinations and self-injury.     Objective:   Vitals: Blood pressure (!) 146/73, pulse 83, temperature 97.2 °F (36.2 °C), temperature source Oral, resp. rate 20, height 6' 2" (1.88 m), weight 95.3 kg (210 lb), SpO2 (!) 94 %.     CT READ: Yes  No hemmorhage. No mass effect. No early infarct signs.     Physical Exam   Constitutional: He appears well-nourished. No distress.   HENT:   Head: Atraumatic.   Right Ear: External ear normal.   Left Ear: External ear normal.   Eyes: Conjunctivae are normal. No scleral icterus.   Neck: Normal range of motion.   Pulmonary/Chest: Effort normal.   Abdominal: He exhibits no distension. There is no guarding.   Musculoskeletal: Normal range of motion. He exhibits no deformity.   Neurological: He is alert.   Skin: Skin is warm and dry.   Psychiatric: He has a normal mood and affect.             Recommended the emergency room physician to have a brief discussion with the patient and/or family if available regarding the risks and benefits of treatment, and to briefly document the occurrence of that discussion in his clinical encounter note.     The attending portion of this evaluation, treatment, and documentation was performed per Octavio Freeman MD via audiovisual.    Billing code:  (non-stroke, " some mimics)    · This patient has neurological symptom(s)/condition/illness, with minimal potential for morbidity and mortality.  · There is a low probability for acute neurological change leading to clinical and possibly life-threatening deterioration requiring highest level of physician preparedness for urgent intervention.  · Care was coordinated with other physicians involved in the patient's care.  · Radiologic studies and laboratory data were reviewed and interpreted, and plan of care was re-assessed based on the results.  · Diagnosis, treatment options and prognosis may have been discussed with the patient and/or family members or caregiver.      In your opinion, this was a: Tier 2    Consult End Time: 12:48 PM     Octavio Freeman MD  Comprehensive Stroke Center  Vascular Neurology   [facility]

## 2019-02-24 NOTE — SUBJECTIVE & OBJECTIVE
"Woke up with symptoms?: yes  Last known normal:    last night    Recent bleeding noted: no  Does the patient take any Blood Thinners? no  Medications: No relevant medications      Past Medical History:   Past Medical History:   Diagnosis Date    Arthritis     back pain    Asbestosis     BPH (benign prostatic hyperplasia)     Hypertension        Past Surgical History: no relevant surgical history and no major surgeries within the last 2 weeks    Family History: no relevant history    Social History: no smoking, no drinking, no drugs    Allergies: Aspirin  Lisinopril (Bulk)  Sulfa (Sulfonamide Antibiotics) No relevant allergies    Review of Systems   Constitutional: Negative for appetite change, chills and fever.   HENT: Negative for congestion and sore throat.    Eyes: Negative for discharge and itching.   Respiratory: Negative for apnea and shortness of breath.    Cardiovascular: Negative for chest pain and palpitations.   Gastrointestinal: Negative for abdominal pain and anal bleeding.   Endocrine: Negative for cold intolerance and polydipsia.   Genitourinary: Negative for dysuria and hematuria.   Musculoskeletal: Negative for joint swelling and myalgias.   Skin: Negative for color change and rash.   Neurological: Positive for dizziness. Negative for tremors.   Psychiatric/Behavioral: Negative for hallucinations and self-injury.     Objective:   Vitals: Blood pressure (!) 146/73, pulse 83, temperature 97.2 °F (36.2 °C), temperature source Oral, resp. rate 20, height 6' 2" (1.88 m), weight 95.3 kg (210 lb), SpO2 (!) 94 %.     CT READ: Yes  No hemmorhage. No mass effect. No early infarct signs.     Physical Exam   Constitutional: He appears well-nourished. No distress.   HENT:   Head: Atraumatic.   Right Ear: External ear normal.   Left Ear: External ear normal.   Eyes: Conjunctivae are normal. No scleral icterus.   Neck: Normal range of motion.   Pulmonary/Chest: Effort normal.   Abdominal: He exhibits no " distension. There is no guarding.   Musculoskeletal: Normal range of motion. He exhibits no deformity.   Neurological: He is alert.   Skin: Skin is warm and dry.   Psychiatric: He has a normal mood and affect.

## 2019-02-24 NOTE — ED NOTES
"Back from CT - denies c/o as long as still and "not moving my head". Family x 2 @ bedside. Dr. Freeman contacted @ this time for telemedicine consult via [facility] (#[phone])  "

## 2019-02-25 ENCOUNTER — TELEPHONE (OUTPATIENT)
Dept: FAMILY MEDICINE | Facility: CLINIC | Age: 74
End: 2019-02-25

## 2019-02-25 NOTE — TELEPHONE ENCOUNTER
----- Message from Ru Perez sent at 2/25/2019 12:59 PM CST -----  Contact: Veronica/Wife  Veronica called in regarding the attached patient ().  Veronica called in and wanted office to know that patient fell yesterday 2/24/19 at home & is still dizzy.  Veronica stated that is why patient fell but the dizziness has not stopped.  Patient did go to the ER at [facility] and was prescribed Meclizine & Diazepam.    Veronica just wanted to let office know.  Patient will be scheduling with local ENT.    Veronica's call back number is [phone])

## 2019-03-06 ENCOUNTER — OFFICE VISIT (OUTPATIENT)
Dept: FAMILY MEDICINE | Facility: CLINIC | Age: 74
End: 2019-03-06
Payer: MEDICARE

## 2019-03-06 VITALS
TEMPERATURE: 98 F | SYSTOLIC BLOOD PRESSURE: 135 MMHG | DIASTOLIC BLOOD PRESSURE: 80 MMHG | BODY MASS INDEX: 27.74 KG/M2 | HEART RATE: 92 BPM | WEIGHT: 216.06 LBS | OXYGEN SATURATION: 95 %

## 2019-03-06 DIAGNOSIS — H81.319 VERTIGO, AURAL, UNSPECIFIED LATERALITY: ICD-10-CM

## 2019-03-06 DIAGNOSIS — I10 ESSENTIAL HYPERTENSION: Primary | ICD-10-CM

## 2019-03-06 PROCEDURE — 99214 PR OFFICE/OUTPT VISIT, EST, LEVL IV, 30-39 MIN: ICD-10-PCS | Mod: S$GLB,,, | Performed by: FAMILY MEDICINE

## 2019-03-06 PROCEDURE — 3075F SYST BP GE 130 - 139MM HG: CPT | Mod: CPTII,S$GLB,, | Performed by: FAMILY MEDICINE

## 2019-03-06 PROCEDURE — 3079F DIAST BP 80-89 MM HG: CPT | Mod: CPTII,S$GLB,, | Performed by: FAMILY MEDICINE

## 2019-03-06 PROCEDURE — 99999 PR PBB SHADOW E&M-EST. PATIENT-LVL III: ICD-10-PCS | Mod: PBBFAC,,, | Performed by: FAMILY MEDICINE

## 2019-03-06 PROCEDURE — 1101F PT FALLS ASSESS-DOCD LE1/YR: CPT | Mod: CPTII,S$GLB,, | Performed by: FAMILY MEDICINE

## 2019-03-06 PROCEDURE — 1101F PR PT FALLS ASSESS DOC 0-1 FALLS W/OUT INJ PAST YR: ICD-10-PCS | Mod: CPTII,S$GLB,, | Performed by: FAMILY MEDICINE

## 2019-03-06 PROCEDURE — 3075F PR MOST RECENT SYSTOLIC BLOOD PRESS GE 130-139MM HG: ICD-10-PCS | Mod: CPTII,S$GLB,, | Performed by: FAMILY MEDICINE

## 2019-03-06 PROCEDURE — 3079F PR MOST RECENT DIASTOLIC BLOOD PRESSURE 80-89 MM HG: ICD-10-PCS | Mod: CPTII,S$GLB,, | Performed by: FAMILY MEDICINE

## 2019-03-06 PROCEDURE — 99214 OFFICE O/P EST MOD 30 MIN: CPT | Mod: S$GLB,,, | Performed by: FAMILY MEDICINE

## 2019-03-06 PROCEDURE — 99999 PR PBB SHADOW E&M-EST. PATIENT-LVL III: CPT | Mod: PBBFAC,,, | Performed by: FAMILY MEDICINE

## 2019-03-06 RX ORDER — MECLIZINE HYDROCHLORIDE 25 MG/1
25 TABLET ORAL 3 TIMES DAILY PRN
Qty: 20 TABLET | Refills: 0 | Status: CANCELLED | OUTPATIENT
Start: 2019-03-06

## 2019-03-06 RX ORDER — DIAZEPAM 2 MG/1
2 TABLET ORAL EVERY 6 HOURS PRN
Qty: 16 TABLET | Refills: 0 | Status: CANCELLED | OUTPATIENT
Start: 2019-03-06 | End: 2019-04-05

## 2019-03-06 RX ORDER — ATORVASTATIN CALCIUM 20 MG/1
20 TABLET, FILM COATED ORAL DAILY
Qty: 90 TABLET | Refills: 3 | Status: SHIPPED | OUTPATIENT
Start: 2019-03-06 | End: 2019-07-17 | Stop reason: SDUPTHER

## 2019-03-06 NOTE — PROGRESS NOTES
Subjective:       Patient ID: Roosevelt Alejandro is a 73 y.o. male.    Chief Complaint: Hypertension    He feels fatigue and has been constipated, Vague sx, no fevers, no cough, no rashes.      Hypertension   The problem has been waxing and waning since onset. The problem is controlled. Associated symptoms include malaise/fatigue and peripheral edema. Pertinent negatives include no chest pain, headaches, palpitations or shortness of breath. There are no associated agents to hypertension. Risk factors for coronary artery disease include male gender. Past treatments include lifestyle changes. The current treatment provides moderate improvement. There are no compliance problems.      Review of Systems   Constitutional: Positive for fatigue and malaise/fatigue. Negative for unexpected weight change.   Respiratory: Negative for chest tightness and shortness of breath.    Cardiovascular: Negative for chest pain, palpitations and leg swelling.   Gastrointestinal: Negative for abdominal pain.   Musculoskeletal: Positive for arthralgias.   Neurological: Negative for dizziness, syncope, light-headedness and headaches.       Patient Active Problem List   Diagnosis    Hypertension    Essential hypertension    DDD (degenerative disc disease), lumbar    Chronic allergic rhinitis    ACE inhibitor-aggravated angioedema    Pulmonary asbestosis    H/O arteriovenous malformation (AVM)    Frequent unifocal PVCs    Hx of colonic polyps    Vertigo    Vertigo, aural, unspecified laterality       Objective:      Physical Exam   Constitutional: He is oriented to person, place, and time. He appears well-developed and well-nourished. No distress.   HENT:   Head: Normocephalic and atraumatic.   Right Ear: External ear normal.   Left Ear: External ear normal.   Nose: Nose normal.   Mouth/Throat: Oropharynx is clear and moist. No oropharyngeal exudate.   Eyes: Conjunctivae and EOM are normal. Pupils are equal, round, and reactive to light.  Right eye exhibits no discharge. Left eye exhibits no discharge. No scleral icterus.   Neck: Normal range of motion. Neck supple. No JVD present. No tracheal deviation present. No thyromegaly present.   Cardiovascular: Normal rate, normal heart sounds and intact distal pulses.    No murmur heard.  Pulmonary/Chest: Effort normal and breath sounds normal. No respiratory distress. He has no wheezes. He has no rales.   Abdominal: Soft. Bowel sounds are normal. He exhibits no distension and no mass. There is no tenderness. There is no rebound and no guarding.   Musculoskeletal: He exhibits no edema or tenderness.          Lymphadenopathy:     He has no cervical adenopathy.   Neurological: He is alert and oriented to person, place, and time. No cranial nerve deficit. Coordination normal.   Skin: Skin is warm and dry. No rash noted. He is not diaphoretic. No erythema. No pallor.   Psychiatric: He has a normal mood and affect. His behavior is normal. Judgment and thought content normal.   Vitals reviewed.      Lab Results   Component Value Date    WBC 10.10 02/24/2019    HGB 13.2 (L) 02/24/2019    HCT 39.0 (L) 02/24/2019     02/24/2019    CHOL 190 02/24/2019    TRIG 127 02/24/2019    HDL 34 (L) 02/24/2019    ALT 11 02/24/2019    AST 15 02/24/2019     02/24/2019    K 4.4 02/24/2019     02/24/2019    CREATININE 1.1 02/24/2019    BUN 17 02/24/2019    CO2 24 02/24/2019    TSH 1.245 02/24/2019    PSA 28.6 (H) 12/20/2017    INR 1.1 02/24/2019    GLUF 109 07/22/2004    HGBA1C 5.9 01/07/2011     The ASCVD Risk score (Weston DC Jr., et al., 2013) failed to calculate for the following reasons:    The patient has a prior MI or stroke diagnosis  CXR clear A/P, ECG NSR.  Assessment:       1. Essential hypertension    2. Vertigo, aural, unspecified laterality        Plan:       Essential hypertension  -     atorvastatin (LIPITOR) 20 MG tablet; Take 1 tablet (20 mg total) by mouth once daily.  Dispense: 90 tablet; Refill:  3  -     Lipid panel; Future; Expected date: 03/06/2019  -     Comprehensive metabolic panel; Future; Expected date: 03/06/2019    Vertigo, aural, unspecified laterality    Other orders  -     Cancel: diazePAM (VALIUM) 2 MG tablet; Take 1 tablet (2 mg total) by mouth every 6 (six) hours as needed (dizziness).  Dispense: 16 tablet; Refill: 0  -     Cancel: meclizine (ANTIVERT) 25 mg tablet; Take 1 tablet (25 mg total) by mouth 3 (three) times daily as needed for Dizziness.  Dispense: 20 tablet; Refill: 0      Patient readiness: acceptance and barriers:readiness    During the course of the visit the patient was educated and counseled about the following:     Diabetes:  Discussed general issues about diabetes pathophysiology and management.    Goals: Hypertension: Reduce Blood Pressure and Obesity: Reduce calorie intake and BMI    Did patient meet goals/outcomes: Yes    The following self management tools provided: blood pressure log  excercise log    Patient Instructions (the written plan) was given to the patient/family.     Time spent with patient: 30 minutes          This has been fully explained to the patient, who indicates understanding.    Subjective:       Patient ID: Roosevelt Alejandro is a 73 y.o. male.    Chief Complaint: Hypertension    Hypertension   The problem has been waxing and waning since onset. The problem is controlled. Associated symptoms include malaise/fatigue and peripheral edema. Pertinent negatives include no chest pain, headaches, palpitations or shortness of breath. There are no associated agents to hypertension. Risk factors for coronary artery disease include male gender. Past treatments include lifestyle changes. The current treatment provides moderate improvement. There are no compliance problems.      Review of Systems   Constitutional: Negative for fatigue and unexpected weight change.   HENT:        Vertigo symptoms 1 week ago but improved.   Respiratory: Negative for chest tightness and  shortness of breath.    Cardiovascular: Negative for chest pain, palpitations and leg swelling.   Gastrointestinal: Negative for abdominal pain.   Musculoskeletal: Negative for arthralgias.   Neurological: Negative for dizziness, syncope, light-headedness and headaches.       Patient Active Problem List   Diagnosis    Hypertension    Essential hypertension    DDD (degenerative disc disease), lumbar    Chronic allergic rhinitis    ACE inhibitor-aggravated angioedema    Pulmonary asbestosis    H/O arteriovenous malformation (AVM)    Frequent unifocal PVCs    Hx of colonic polyps    Vertigo       Objective:      Physical Exam   Constitutional: He is oriented to person, place, and time. He appears well-developed and well-nourished.   Cardiovascular: Normal rate, regular rhythm and normal heart sounds.   Pulmonary/Chest: Effort normal and breath sounds normal.   Musculoskeletal: He exhibits no edema.   Neurological: He is alert and oriented to person, place, and time.   Skin: Skin is warm and dry.   Psychiatric: He has a normal mood and affect.   Nursing note and vitals reviewed.      Lab Results   Component Value Date    WBC 10.10 02/24/2019    HGB 13.2 (L) 02/24/2019    HCT 39.0 (L) 02/24/2019     02/24/2019    CHOL 190 02/24/2019    TRIG 127 02/24/2019    HDL 34 (L) 02/24/2019    ALT 11 02/24/2019    AST 15 02/24/2019     02/24/2019    K 4.4 02/24/2019     02/24/2019    CREATININE 1.1 02/24/2019    BUN 17 02/24/2019    CO2 24 02/24/2019    TSH 1.245 02/24/2019    PSA 28.6 (H) 12/20/2017    INR 1.1 02/24/2019    GLUF 109 07/22/2004    HGBA1C 5.9 01/07/2011     The ASCVD Risk score (Benedicto ADAM Jr., et al., 2013) failed to calculate for the following reasons:    The patient has a prior MI or stroke diagnosis    Assessment:       1. Essential hypertension    2. Vertigo, aural, unspecified laterality        Plan:       Essential hypertension  -     atorvastatin (LIPITOR) 20 MG tablet; Take 1  tablet (20 mg total) by mouth once daily.  Dispense: 90 tablet; Refill: 3  -     Lipid panel; Future; Expected date: 03/06/2019  -     Comprehensive metabolic panel; Future; Expected date: 03/06/2019    Vertigo, aural, unspecified laterality    Other orders  -     Cancel: diazePAM (VALIUM) 2 MG tablet; Take 1 tablet (2 mg total) by mouth every 6 (six) hours as needed (dizziness).  Dispense: 16 tablet; Refill: 0  -     Cancel: meclizine (ANTIVERT) 25 mg tablet; Take 1 tablet (25 mg total) by mouth 3 (three) times daily as needed for Dizziness.  Dispense: 20 tablet; Refill: 0      Patient readiness: acceptance and barriers:none    During the course of the visit the patient was educated and counseled about the following:     Hypertension:

## 2019-03-06 NOTE — PATIENT INSTRUCTIONS
Low-Salt Diet  This diet removes foods that are high in salt. It also limits the amount of salt you use when cooking. It is most often used for people with high blood pressure, edema (fluid retention), and kidney, liver, or heart disease.  Table salt contains the mineral sodium. Your body needs sodium to work normally. But too much sodium can make your health problems worse. Your healthcare provider is recommending a low-salt (also called low-sodium) diet for you. Your total daily allowance of salt is 1,500 to 2,300 milligrams (mg). It is less than 1 teaspoon of table salt. This means you can have only about 500 to 700 mg of sodium at each meal. People with certain health problems should limit salt intake to the lower end of the recommended range.    When you cook, dont add much salt. If you can cook without using salt, even better. Dont add salt to your food at the table.  When shopping, read food labels. Salt is often called sodium on the label. Choose foods that are salt-free, low salt, or very low salt. Note that foods with reduced salt may not lower your salt intake enough.    Beans, potatoes, and pasta  Ok: Dry beans, split peas, lentils, potatoes, rice, macaroni, pasta, spaghetti without added salt  Avoid: Potato chips, tortilla chips, and similar products  Breads and cereals  Ok: Low-sodium breads, rolls, cereals, and cakes; low-salt crackers, matzo crackers  Avoid: Salted crackers, pretzels, popcorn, Polish toast, pancakes, muffins  Dairy  Ok: Milk, chocolate milk, hot chocolate mix, low-salt cheeses, and yogurt  Avoid: Processed cheese and cheese spreads; Roquefort, Camembert, and cottage cheese; buttermilk, instant breakfast drink  Desserts  Ok: Ice cream, frozen yogurt, juice bars, gelatin, cookies and pies, sugar, honey, jelly, hard candy  Avoid: Most pies, cakes and cookies prepared or processed with salt; instant pudding  Drinks  Ok: Tea, coffee, fizzy (carbonated) drinks, juices  Avoid: Flavored  coffees, electrolyte replacement drinks, sports drinks  Meats  Ok: All fresh meat, fish, poultry, low-salt tuna, eggs, egg substitute  Avoid: Smoked, pickled, brine-cured, or salted meats and fish. This includes vick, chipped beef, corned beef, hot dogs, deli meats, ham, kosher meats, salt pork, sausage, canned tuna, salted codfish, smoked salmon, herring, sardines, or anchovies.  Seasonings and spices  Ok: Most seasonings are okay. Good substitutes for salt include: fresh herb blends, hot sauce, lemon, garlic, rosado, vinegar, dry mustard, parsley, cilantro, horseradish, tomato paste, regular margarine, mayonnaise, unsalted butter, cream cheese, vegetable oil, cream, low-salt salad dressing and gravy.  Avoid: Regular ketchup, relishes, pickles, soy sauce, teriyaki sauce, Worcestershire sauce, BBQ sauce, tartar sauce, meat tenderizer, chili sauce, regular gravy, regular salad dressing, salted butter  Soups  Ok: Low-salt soups and broths made with allowed foods  Avoid: Bouillon cubes, soups with smoked or salted meats, regular soup and broth  Vegetables  Ok: Most vegetables are okay; also low-salt tomato and vegetable juices  Avoid: Sauerkraut and other brine-soaked vegetables; pickles and other pickled vegetables; tomato juice, olives  Date Last Reviewed: 8/1/2016 © 2000-2017 [employer]. [address] [place] [address]. All rights reserved. This information is not intended as a substitute for professional medical care. Always follow your healthcare professional's instructions.        Benign Paroxysmal Positional Vertigo     Your health care provider may move your head in certain ways to treat your BPPV.     Benign paroxysmal positional vertigo (BPPV) is a problem with the inner ear. The inner ear contains the vestibular system. This system is what helps you keep your balance. BPPV causes a feeling of spinning. It is a common problem of the vestibular system.  Understanding the vestibular  system  The vestibular system of the ear is made up of very tiny parts. They include the utricle, saccule, and semicircular canals. The utricle is a tiny organ that contains calcium crystals. In some people, the crystals can move into the semicircular canals. When this happens, the system no longer works as it should. This causes BPPV. Benign means it is not life-threatening. Paroxysmal means it happens suddenly. Positional means that it happens when you move your head. Vertigo is a feeling of spinning.  What causes BPPV?  Causes include injury to your head or neck. Other problems with the vestibular system may cause BPPV. In many people, the cause of BPPV is not known.  Symptoms of BPPV  You many have repeated feelings of spinning (vertigo). The vertigo usually lasts less than 1 minute. Some movements, suchas rolling over in bed, can bring on vertigo.  Diagnosing BPPV  Your primary health care provider may diagnose and treat your BPPV. Or you may see an ear, nose, and throat doctor (otolaryngologist). In some cases, you may see a nervous system doctor (neurologist).  The health care provider will ask about your symptoms and your medical history. He or she will examine you. You may have hearing and balance tests. As part of the exam, your health care provider may have you move your head and body in certain ways. If you have BPPV, the movements can bring on vertigo. Your provider will also look for abnormal movements of your eyes. You may have other tests to check your vestibular or nervous systems.  Treatment for BPPV  Your health care provider may try to move the calcium crystals. This is done by having you move your head and neck in certain ways. This treatment is safe and often works well. You may also be told to do these movements at home. You may still have vertigo for a few weeks. Your health care provider will recheck your symptoms, usually in about a month. Special physical therapy may also be part of  treatment. In rare cases surgery may be needed for BPPV that does not go away.     When to call the health care provider  Call your health care provider right away if you have any of these:  · Symptoms that do not go away with treatment  · Symptoms that get worse  · New symptoms   Date Last Reviewed: 3/19/2015  © 3993-5509 [employer]. [address] [place] [address], [place] [address]. All rights reserved. This information is not intended as a substitute for professional medical care. Always follow your healthcare professional's instructions.        Exercise for a Healthier Heart  You may wonder how you can improve the health of your heart. If youre thinking about exercise, youre on the right track. You dont need to become an athlete, but you do need a certain amount of brisk exercise to help strengthen your heart. If you have been diagnosed with a heart condition, your doctor may recommend exercise to help stabilize your condition. To help make exercise a habit, choose safe, fun activities.     Exercise with a friend. When activity is fun, you're more likely to stick with it.     Be sure to check with your healthcare provider before starting an exercise program.   Why exercise?  Exercising regularly offers many healthy rewards. It can help you do all of the following:  · Improve your blood cholesterol level to help prevent further heart trouble  · Lower your blood pressure to help prevent a stroke or heart attack  · Control diabetes, or reduce your risk of getting this disease  · Improve your heart and lung function  · Reach and maintain a healthy weight  · Make your muscles stronger and more limber so you can stay active  · Prevent falls and fractures by slowing the loss of bone mass (osteoporosis)  · Manage stress better  · Reduce your blood pressure  · Improve your sense of self and your body image  Exercise tips  Ease into your routine. Set small goals. Then build on them.  Exercise on most days. Aim  for a total of 150 or more minutes of moderate to  vigorous intensity activity each week. Consider 40 minutes, 3 to 4 times a week. For best results, activity should last for 40 minutes on average. It is OK to work up to the 40 minute period over time. Examples of moderate-intensity activity is walking 1 mile in 15 minutes or 30 to 45 minutes of yard work.  Step up your daily activity level. Along with your exercise program, try being more active throughout the day. Walk instead of drive. Do more household tasks or yard work.  Choose one or more activities you enjoy. Walking is one of the easiest things you can do. You can also try swimming, riding a bike, dancing, or taking an exercise class.  Stop exercising and call your doctor if you:  · Have chest pain or feel dizzy or lightheaded  · Feel burning, tightness, pressure, or heaviness in your chest, neck, shoulders, back, or arms  · Have unusual shortness of breath  · Have increased joint or muscle pain  · Have palpitations or an irregular heartbeat   Date Last Reviewed: 5/1/2016 © 2000-2017 [employer]. [address] [place] [address]. All rights reserved. This information is not intended as a substitute for professional medical care. Always follow your healthcare professional's instructions.        Eating Heart-Healthy Foods  Eating has a big impact on your heart health. In fact, eating healthier can improve several of your heart risks at once. For instance, it helps you manage weight, cholesterol, and blood pressure. Here are ideas to help you make heart-healthy changes without giving up all the foods and flavors you love.  Getting started  · Talk with your health care provider about eating plans, such as the DASH or Mediterranean diet. You may also be referred to a dietitian.  · Change a few things at a time. Give yourself time to get used to a few eating changes before adding more.  · Work to create a tasty, healthy eating plan that  you can stick to for the rest of your life.    Goals for healthy eating  Below are some tips to improve your eating habits:  · Limit saturated fats and trans fats. Saturated fats raise your levels of cholesterol, so keep these fats to a minimum. They are found in foods such as fatty meats, whole milk, cheese, and palm and coconut oils. Avoid trans fats because they lower good cholesterol as well as raise bad cholesterol. Trans fats are most often found in processed foods.  · Reduce sodium (salt) intake. Eating too much salt may increase your blood pressure. Limit your sodium intake to 2,300 milligrams (mg) per day, or less if your health care provider recommends it. Dining out less often and eating fewer processed foods are two great ways to decrease the amount of salt you consume.  · Managing calories. A calorie is a unit of energy. Your body burns calories for fuel, but if you eat more calories than your body burns, the extras are stored as fat. Your health care provider can help you create a diet plan to manage your calories. This will likely include eating healthier foods as well as exercising regularly. To help you track your progress, keep a diary to record what you eat and how often you exercise.  Choose the right foods  Aim to make these foods staples of your diet. If you have diabetes, you may have different recommendations than what is listed here:  · Fruits and vegetable provide plenty of nutrients without a lot of calories. At meals, fill half your plate with these foods. Split the other half of your plate between whole grains and lean protein.  · Whole grains are high in fiber and rich in vitamins and nutrients. Good choices include whole-wheat bread, pasta, and brown rice.  · Lean proteins give you nutrition with less fat. Good choices include fish, skinless chicken, and beans.  · Low-fat or nonfat dairy provides nutrients without a lot of fat. Try low-fat or nonfat milk, cheese, or yogurt.  · Healthy  fats can be good for you in small amounts. These are unsaturated fats, such as olive oil, nuts, and fish. Try to have at least 2 servings per week of fatty fish such as salmon, sardines, mackerel, rainbow trout, and albacore tuna. These contain omega-3 fatty acids, which are good for your heart. Flaxseed is another source of a heart-healthy fat.  More on heart healthy eating    Read food labels  Healthy eating starts at the grocery store. Be sure to pay attention to food labels on packaged foods. Look for products that are high in fiber and protein, and low in saturated fat, cholesterol, and sodium. Avoid products that contain trans fat. And pay close attention to serving size. For instance, if you plan to eat two servings, double all the numbers on the label.  Prepare food right  A key part of healthy cooking is cutting down on added fat and salt. Look on the internet for lower-fat, lower-sodium recipes. Also, try these tips:  · Remove fat from meat and skin from poultry before cooking.  · Skim fat from the surface of soups and sauces.  · Broil, boil, bake, steam, grill, and microwave food without added fats.  · Choose ingredients that spice up your food without adding calories, fat, or sodium. Try these items: horseradish, hot sauce, lemon, mustard, nonfat salad dressings, and vinegar. For salt-free herbs and spices, try basil, cilantro, cinnamon, pepper, and rosemary.  Date Last Reviewed: 6/25/2015  © 5892-6688 [employer]. [address] [place] [address], [place] [address]. All rights reserved. This information is not intended as a substitute for professional medical care. Always follow your healthcare professional's instructions.

## 2019-03-18 ENCOUNTER — TELEPHONE (OUTPATIENT)
Dept: FAMILY MEDICINE | Facility: CLINIC | Age: 74
End: 2019-03-18

## 2019-03-18 DIAGNOSIS — R42 VERTIGO: Primary | ICD-10-CM

## 2019-03-18 RX ORDER — MECLIZINE HYDROCHLORIDE 25 MG/1
25 TABLET ORAL 3 TIMES DAILY PRN
Qty: 30 TABLET | Refills: 0 | Status: SHIPPED | OUTPATIENT
Start: 2019-03-18 | End: 2019-07-26

## 2019-03-18 NOTE — TELEPHONE ENCOUNTER
----- Message from Kristie Rey sent at 3/18/2019 10:27 AM CDT -----  Contact: wife Edith  Patient wife need to speak to nurse regarding patient is back dizzy and need refill for medication patient was taking in hospital on 03/06     Patient was dizzy this past weekend per wife       Patient requesting Diazpam called in until patient can see ENT doctor         Please call to advice [phone] (home)     Please send refill to pharmacy listed below       [facility] [address] [place] [address] & [address] [address]  Phone: [phone] Fax: [phone]      Thank you

## 2019-03-18 NOTE — TELEPHONE ENCOUNTER
I spoke to patient.  Patient had meclizine 25mg when he was in the hospital and is having dizziness again.  He is waiting to see an ENT. And needs a rx to hold him over until his appointment. Patient requesting rx be sent to [facility] on [facility].

## 2019-03-18 NOTE — TELEPHONE ENCOUNTER
Spoke to patient , patient verbalized he already  the RX and he has an appointment with  on 03/29/19

## 2019-05-27 ENCOUNTER — TELEPHONE (OUTPATIENT)
Dept: FAMILY MEDICINE | Facility: CLINIC | Age: 74
End: 2019-05-27

## 2019-05-28 ENCOUNTER — LAB VISIT (OUTPATIENT)
Dept: LAB | Facility: HOSPITAL | Age: 74
End: 2019-05-28
Attending: FAMILY MEDICINE
Payer: MEDICARE

## 2019-05-28 DIAGNOSIS — I10 ESSENTIAL HYPERTENSION: ICD-10-CM

## 2019-05-28 LAB
ALBUMIN SERPL BCP-MCNC: 4.4 G/DL (ref 3.5–5.2)
ALP SERPL-CCNC: 107 U/L (ref 55–135)
ALT SERPL W/O P-5'-P-CCNC: 18 U/L (ref 10–44)
ANION GAP SERPL CALC-SCNC: 8 MMOL/L (ref 8–16)
AST SERPL-CCNC: 18 U/L (ref 10–40)
BILIRUB SERPL-MCNC: 0.9 MG/DL (ref 0.1–1)
BUN SERPL-MCNC: 25 MG/DL (ref 8–23)
CALCIUM SERPL-MCNC: 10 MG/DL (ref 8.7–10.5)
CHLORIDE SERPL-SCNC: 105 MMOL/L (ref 95–110)
CHOLEST SERPL-MCNC: 146 MG/DL (ref 120–199)
CHOLEST/HDLC SERPL: 4.1 {RATIO} (ref 2–5)
CO2 SERPL-SCNC: 25 MMOL/L (ref 23–29)
CREAT SERPL-MCNC: 1.6 MG/DL (ref 0.5–1.4)
EST. GFR  (AFRICAN AMERICAN): 48.3 ML/MIN/1.73 M^2
EST. GFR  (NON AFRICAN AMERICAN): 41.8 ML/MIN/1.73 M^2
GLUCOSE SERPL-MCNC: 111 MG/DL (ref 70–110)
HDLC SERPL-MCNC: 36 MG/DL (ref 40–75)
HDLC SERPL: 24.7 % (ref 20–50)
LDLC SERPL CALC-MCNC: 78.8 MG/DL (ref 63–159)
NONHDLC SERPL-MCNC: 110 MG/DL
POTASSIUM SERPL-SCNC: 3.9 MMOL/L (ref 3.5–5.1)
PROT SERPL-MCNC: 7.7 G/DL (ref 6–8.4)
SODIUM SERPL-SCNC: 138 MMOL/L (ref 136–145)
TRIGL SERPL-MCNC: 156 MG/DL (ref 30–150)

## 2019-05-28 PROCEDURE — 80053 COMPREHEN METABOLIC PANEL: CPT

## 2019-05-28 PROCEDURE — 36415 COLL VENOUS BLD VENIPUNCTURE: CPT | Mod: PO

## 2019-05-28 PROCEDURE — 80061 LIPID PANEL: CPT

## 2019-05-30 ENCOUNTER — OFFICE VISIT (OUTPATIENT)
Dept: FAMILY MEDICINE | Facility: CLINIC | Age: 74
End: 2019-05-30
Payer: MEDICARE

## 2019-05-30 DIAGNOSIS — R73.01 ELEVATED FASTING GLUCOSE: ICD-10-CM

## 2019-05-30 DIAGNOSIS — I10 ESSENTIAL HYPERTENSION: Primary | ICD-10-CM

## 2019-05-30 DIAGNOSIS — R94.4 ABNORMAL KIDNEY FUNCTION STUDY: ICD-10-CM

## 2019-05-30 PROCEDURE — 1101F PR PT FALLS ASSESS DOC 0-1 FALLS W/OUT INJ PAST YR: ICD-10-PCS | Mod: CPTII,S$GLB,, | Performed by: NURSE PRACTITIONER

## 2019-05-30 PROCEDURE — 99999 PR PBB SHADOW E&M-EST. PATIENT-LVL II: ICD-10-PCS | Mod: PBBFAC,,, | Performed by: NURSE PRACTITIONER

## 2019-05-30 PROCEDURE — 99214 PR OFFICE/OUTPT VISIT, EST, LEVL IV, 30-39 MIN: ICD-10-PCS | Mod: S$GLB,,, | Performed by: NURSE PRACTITIONER

## 2019-05-30 PROCEDURE — 1101F PT FALLS ASSESS-DOCD LE1/YR: CPT | Mod: CPTII,S$GLB,, | Performed by: NURSE PRACTITIONER

## 2019-05-30 PROCEDURE — 99214 OFFICE O/P EST MOD 30 MIN: CPT | Mod: S$GLB,,, | Performed by: NURSE PRACTITIONER

## 2019-05-30 PROCEDURE — 99999 PR PBB SHADOW E&M-EST. PATIENT-LVL II: CPT | Mod: PBBFAC,,, | Performed by: NURSE PRACTITIONER

## 2019-05-31 VITALS
SYSTOLIC BLOOD PRESSURE: 140 MMHG | BODY MASS INDEX: 28.23 KG/M2 | HEIGHT: 74 IN | DIASTOLIC BLOOD PRESSURE: 78 MMHG | WEIGHT: 220 LBS | OXYGEN SATURATION: 95 %

## 2019-06-11 NOTE — PROGRESS NOTES
I note some minor lab abnormalities that have been stable over time, these are of doubtful clinical significance.  There is a mild renal insufficiency due to probably mild dehydration. please increase water intake.  Repeat lab work within the next of for 6 months.  Normal cholesterol.The patient is asked to make an attempt to improve diet and exercise patterns to aid in medical management of this problem.

## 2019-07-15 ENCOUNTER — TELEPHONE (OUTPATIENT)
Dept: FAMILY MEDICINE | Facility: CLINIC | Age: 74
End: 2019-07-15

## 2019-07-15 ENCOUNTER — HOSPITAL ENCOUNTER (EMERGENCY)
Facility: HOSPITAL | Age: 74
Discharge: HOME | End: 2019-07-15
Attending: EMERGENCY MEDICINE
Payer: MEDICARE

## 2019-07-15 VITALS
TEMPERATURE: 99 F | RESPIRATION RATE: 20 BRPM | WEIGHT: 220 LBS | HEART RATE: 68 BPM | OXYGEN SATURATION: 98 % | SYSTOLIC BLOOD PRESSURE: 151 MMHG | DIASTOLIC BLOOD PRESSURE: 72 MMHG | BODY MASS INDEX: 28.23 KG/M2 | HEIGHT: 74 IN

## 2019-07-15 DIAGNOSIS — R59.0 RETROPERITONEAL LYMPHADENOPATHY: Primary | ICD-10-CM

## 2019-07-15 LAB
ALBUMIN SERPL BCP-MCNC: 4.2 G/DL (ref 3.5–5.2)
ALP SERPL-CCNC: 120 U/L (ref 55–135)
ALT SERPL W/O P-5'-P-CCNC: 13 U/L (ref 10–44)
ANION GAP SERPL CALC-SCNC: 12 MMOL/L (ref 8–16)
AST SERPL-CCNC: 15 U/L (ref 10–40)
BASOPHILS # BLD AUTO: 0.07 K/UL (ref 0–0.2)
BASOPHILS NFR BLD: 0.8 % (ref 0–1.9)
BILIRUB SERPL-MCNC: 0.5 MG/DL (ref 0.1–1)
BUN SERPL-MCNC: 15 MG/DL (ref 8–23)
CALCIUM SERPL-MCNC: 10 MG/DL (ref 8.7–10.5)
CHLORIDE SERPL-SCNC: 105 MMOL/L (ref 95–110)
CO2 SERPL-SCNC: 24 MMOL/L (ref 23–29)
CREAT SERPL-MCNC: 1.2 MG/DL (ref 0.5–1.4)
DIFFERENTIAL METHOD: ABNORMAL
EOSINOPHIL # BLD AUTO: 0.3 K/UL (ref 0–0.5)
EOSINOPHIL NFR BLD: 3 % (ref 0–8)
ERYTHROCYTE [DISTWIDTH] IN BLOOD BY AUTOMATED COUNT: 12.4 % (ref 11.5–14.5)
EST. GFR  (AFRICAN AMERICAN): >60 ML/MIN/1.73 M^2
EST. GFR  (NON AFRICAN AMERICAN): 59 ML/MIN/1.73 M^2
GLUCOSE SERPL-MCNC: 107 MG/DL (ref 70–110)
HCT VFR BLD AUTO: 40.2 % (ref 40–54)
HGB BLD-MCNC: 13.5 G/DL (ref 14–18)
IMM GRANULOCYTES # BLD AUTO: 0.15 K/UL (ref 0–0.04)
LYMPHOCYTES # BLD AUTO: 1.8 K/UL (ref 1–4.8)
LYMPHOCYTES NFR BLD: 19.9 % (ref 18–48)
MCH RBC QN AUTO: 31.6 PG (ref 27–31)
MCHC RBC AUTO-ENTMCNC: 33.6 G/DL (ref 32–36)
MCV RBC AUTO: 94 FL (ref 82–98)
MONOCYTES # BLD AUTO: 0.6 K/UL (ref 0.3–1)
MONOCYTES NFR BLD: 6.1 % (ref 4–15)
NEUTROPHILS # BLD AUTO: 6.4 K/UL (ref 1.8–7.7)
NEUTROPHILS NFR BLD: 68.6 % (ref 38–73)
NRBC BLD-RTO: 0 /100 WBC
PLATELET # BLD AUTO: 273 K/UL (ref 150–350)
PMV BLD AUTO: 10 FL (ref 9.2–12.9)
POTASSIUM SERPL-SCNC: 4 MMOL/L (ref 3.5–5.1)
PROT SERPL-MCNC: 7.9 G/DL (ref 6–8.4)
RBC # BLD AUTO: 4.27 M/UL (ref 4.6–6.2)
SODIUM SERPL-SCNC: 141 MMOL/L (ref 136–145)
WBC # BLD AUTO: 9.25 K/UL (ref 3.9–12.7)

## 2019-07-15 PROCEDURE — 99285 EMERGENCY DEPT VISIT HI MDM: CPT | Mod: 25

## 2019-07-15 PROCEDURE — 85025 COMPLETE CBC W/AUTO DIFF WBC: CPT

## 2019-07-15 PROCEDURE — 36415 COLL VENOUS BLD VENIPUNCTURE: CPT

## 2019-07-15 PROCEDURE — 80053 COMPREHEN METABOLIC PANEL: CPT

## 2019-07-15 PROCEDURE — 25500020 PHARM REV CODE 255: Performed by: EMERGENCY MEDICINE

## 2019-07-15 RX ADMIN — IOHEXOL 100 ML: 350 INJECTION, SOLUTION INTRAVENOUS at 11:07

## 2019-07-15 NOTE — TELEPHONE ENCOUNTER
----- Message from Heavenly Palma sent at 7/15/2019  2:24 PM CDT -----  Contact: Veronica, wife  Type: Needs Medical Advice    Who Called:  Veronica Phan  Best Call Back Number: [phone]  Additional Information: patient just got back home from [facility]--ER told him that he needed to get in with his PCP this week & be referred to [facility] as patient has swollen, enlarged lymph nodes in his abdomen--patient is currently without power--please advise--thank you

## 2019-07-15 NOTE — ED PROVIDER NOTES
Encounter Date: 7/15/2019    SCRIBE #1 NOTE: RAI Haileyabbey De Leon, bar[place] scribing for, and in the presence of, Dr. Zapata.       History     Chief Complaint   Patient presents with    Melena     started this weekend     Abdominal Pain     lower abd. pain      7/15/2019  10:49 AM     The patient is a 73 y.o. male  who presents with dark stools. Patient c/o intermittent dark stools which started 2 days ago.  He reports 3 dark bowel movements.  He also reports 10 day hx of crampy right lower abdominal pain which only occurs after eating. He has taken pepto bismol for his pain over the last 10 days. He denies any nausea, vomiting, dizziness, lightheadedness, headache, fevers or chills. Pt states he had a previous episode of dark stools 10 years ago and he was hospitalized at [facility]. However, patient is unsure of the diagnosis. No previous abdominal surgeries. Former smoker, quit 20 years ago. No hx of colon cancer or any other cancer. PMHx of arthritis, HTN, BPH, asbestosis, vertigo. Shx of colonscopy, prostatectomy.  He denies any other symptoms aside of dark stools and intermittent right lower quadrant abdominal pain.    The history is provided by the patient.     Review of patient's allergies indicates:   Allergen Reactions    Aspirin Other (See Comments)     Internal bleeding    Lisinopril (bulk) Rash    Sulfa (sulfonamide antibiotics) Swelling and Rash     Past Medical History:   Diagnosis Date    Arthritis     back pain    Asbestosis     BPH (benign prostatic hyperplasia)     Hypertension     Vertigo, aural, unspecified laterality 3/6/2019     Past Surgical History:   Procedure Laterality Date    COLONOSCOPY      2009    COLONOSCOPY N/A 1/25/2019    [facility]    COLONOSCOPY N/A 1/21/2016    [facility]el [facility] at [facility]    CYSTOSCOPY N/A 12/19/2017    [facility]    EYE SURGERY Bilateral     cataracts, retinal detachment     INJECTION-STEROID-EPIDURAL-TRANSFORAMINAL Right 2014    [facility]    PROSTATE BIOPSY      PROSTATECTOMY  2017    PROSTATECTOMY-TRANSURETHRAL N/A 2018    [facility]    PROSTATECTOMY-TRANSURETHRAL N/A 2017    [facility] OR    ROTATOR CUFF REPAIR      left     Family History   Problem Relation Age of Onset    Cancer Father         lung/ asbestos    Melanoma Neg Hx     Psoriasis Neg Hx     Lupus Neg Hx     Eczema Neg Hx      Social History     Tobacco Use    Smoking status: Former Smoker     Last attempt to quit: 3/30/2000     Years since quittin.3    Smokeless tobacco: Never Used   Substance Use Topics    Alcohol use: Yes     Alcohol/week: 0.6 oz     Types: 1 Cans of beer per week     Comment: rare    Drug use: No     Review of Systems   Constitutional: Negative for chills and fever.   Gastrointestinal: Positive for abdominal pain. Negative for nausea and vomiting.        + Dark stools   Neurological: Negative for dizziness, light-headedness and headaches.   All other systems reviewed and are negative.      Physical Exam     Initial Vitals [07/15/19 1022]   BP Pulse Resp Temp SpO2   117/62 89 20 98.6 °F (37 °C) 98 %      MAP       --         Physical Exam    Nursing note and vitals reviewed.  Constitutional: He appears well-developed and well-nourished. He is not diaphoretic. No distress.   HENT:   Head: Normocephalic and atraumatic.   Mouth/Throat: Mucous membranes are normal.   Eyes: EOM are normal.   Neck: Normal range of motion.   Cardiovascular: Normal rate, regular rhythm, normal heart sounds and intact distal pulses. Exam reveals no gallop and no friction rub.    No murmur heard.  Pulmonary/Chest: Breath sounds normal. He has no wheezes. He has no rhonchi. He has no rales.   Abdominal: Soft. He exhibits no distension. There is tenderness (mild RLQ TTP). There is no rebound, no  guarding and no CVA tenderness.   No rebound or guarding very minimal right lower quadrant tenderness   Genitourinary:   Genitourinary Comments: No stool in rectum vault. Unable to test guaiac.   Musculoskeletal: Normal range of motion. He exhibits no edema.   Neurological: He is alert and oriented to person, place, and time. He has normal strength.   Skin: Skin is dry. No rash noted.   Psychiatric: He has a normal mood and affect.         ED Course   Procedures  Labs Reviewed   CBC W/ AUTO DIFFERENTIAL - Abnormal; Notable for the following components:       Result Value    RBC 4.27 (*)     Hemoglobin 13.5 (*)     Mean Corpuscular Hemoglobin 31.6 (*)     Immature Grans (Abs) 0.15 (*)     All other components within normal limits   COMPREHENSIVE METABOLIC PANEL - Abnormal; Notable for the following components:    eGFR if non  59 (*)     All other components within normal limits          Imaging Results          CT Abdomen Pelvis With Contrast (Final result)  Result time 07/15/19 12:48:17    Final result by Brady Henson MD (07/15/19 12:48:17)                 Impression:      Mesenteric and retroperitoneal lymphadenopathy, with numerous necrotic appearing lymph node is within the central mesentery.  Some of these nodes have mildly increased in size, while the largest previously present node has significantly decreased in size as above.  Further workup for neoplastic process such as metastatic disease or lymphoma is recommended.    Normal appendix.  Moderate diverticulosis.  Small hiatal hernia.  Hepatic and renal cysts.  Atherosclerosis.  Evidence for prior asbestos exposure.      Electronically signed by: Brady Henson MD  Date:    07/15/2019  Time:    12:48             Narrative:    EXAMINATION:  CT ABDOMEN PELVIS WITH CONTRAST    CLINICAL HISTORY:  RLQ pain, appendicitis suspected;    TECHNIQUE:  Low dose axial images, sagittal and coronal reformations were obtained from the lung bases to  the pubic symphysis following the IV administration of 100 mL of Omnipaque 350 .  Oral contrast was not given.    COMPARISON:  12/20/2017    FINDINGS:  Within the mesentery of the central abdomen, 3 masses are present, exhibiting central low density, the largest of which measures 2.4 cm (previously 1.8), with the appearance concerning for necrotic lymph nodes.  Previously numerous non necrotic appearing lymph nodes are present in this area, measuring up to 3.3 cm.  This largest node has decreased in size, now measuring 2.0 cm.  There is mild haziness of the mesentery about the nodes. A retroperitoneal lymph node posterior to the inferior vena cava, just above the level of the renal veins has increased in size from 0.9 to 1.8 cm in short axis, having a non the chronic appearance.    Numerous hepatic cysts are present the largest of which is present within the left lobe measuring 3.1 cm without significant change.  The pancreas, spleen, and adrenal glands are unremarkable.  There is no biliary dilatation.    A few patent cysts are present bilaterally.  The largest arises from the lower pole of the right kidney measuring 10 cm, previously 9 cm.    A small hiatal hernia is present.  The small bowel is normal caliber.  The appendix is normal.  Moderate diverticulosis coli is present.    There are postoperative changes of trans urethral resection of the prostate.  Prostate gland is significantly decreased in size.  Numerous bladder diverticula are present in keeping with sequelae of prior bladder outlet obstruction.  There is heavy calcification of the aorta without aneurysm.    There is calcified pleural plaque formation typical of prior asbestos exposure.  There are no suspicious osseous lesions.                                 Medical Decision Making:   History:   Old Medical Records: I decided to obtain old medical records.  Clinical Tests:   Lab Tests: Reviewed and Ordered  Radiological Study: Reviewed and  Ordered  Other:   I have discussed this case with another health care provider.       <> Summary of the Discussion: CT scan discussed with Dr. Henson of Radiology            Scribe Attestation:   Scribe #1: I performed the above scribed service and the documentation accurately describes the services I performed. I attest to the accuracy of the note.    I, Dr. Zapata, personally performed the services described in this documentation. All medical record entries made by the scribe were at my direction and in my presence.  I have reviewed the chart and agree that the record reflects my personal performance and is accurate and complete.2:13 PM 07/15/2019            ED Course as of Jul 15 1401   Mon Jul 15, 2019   1037 BP: 117/62 [EF]   1037 Temp: 98.6 °F (37 °C) [EF]   1037 Temp src: Oral [EF]   1037 Pulse: 89 [EF]   1037 Resp: 20 [EF]   1037 SpO2: 98 % [EF]   1113 Hemoglobin(!): 13.5 [EF]   1253 CT Abdomen Pelvis With Contrast [EF]   1344 73-year-old male presents to the ER with intermittent right lower quadrant pain but only with eating.  Minimal tenderness on exam.  Also dark stools for about a week but he has been taking Pepto-Bismol which is likely causing his dark stools.  Hemoglobin consistent with prior hemoglobins.  I do not think he needs admission for serial hemoglobins.  Stop Pepto-Bismol, return if dark stools persist beyond several days or any other symptoms develop such as weakness or dizziness.  CT demonstrates multiple enlarged abdominal lymph nodes concerning for possible malignancy.  I did discuss this finding with the patient and his daughter who was at the bedside.  See primary care for this.  No indication for admission at this time. Did advise them of chance of cancer, lymphoma etc   [EF]      ED Course User Index  [EF] Law Zapata MD     Clinical Impression:       ICD-10-CM ICD-9-CM   1. Retroperitoneal lymphadenopathy R59.0 785.6         Disposition:   Disposition: Discharged  Condition:  Stable                        Law Zapata MD  07/15/19 6403

## 2019-07-15 NOTE — ED NOTES
Pt awake alert oriented reports black stool started Saturday with RLQ abd pain intermittent for the past week, denies chest pain or sob, denies n/v/d

## 2019-07-17 ENCOUNTER — OFFICE VISIT (OUTPATIENT)
Dept: FAMILY MEDICINE | Facility: CLINIC | Age: 74
End: 2019-07-17
Payer: MEDICARE

## 2019-07-17 ENCOUNTER — TELEPHONE (OUTPATIENT)
Dept: FAMILY MEDICINE | Facility: CLINIC | Age: 74
End: 2019-07-17

## 2019-07-17 VITALS
DIASTOLIC BLOOD PRESSURE: 74 MMHG | HEART RATE: 92 BPM | HEIGHT: 74 IN | TEMPERATURE: 99 F | BODY MASS INDEX: 26.71 KG/M2 | SYSTOLIC BLOOD PRESSURE: 132 MMHG | WEIGHT: 208.13 LBS | OXYGEN SATURATION: 96 %

## 2019-07-17 DIAGNOSIS — J61 PNEUMOCONIOSIS DUE TO ASBESTOS AND OTHER MINERAL FIBERS: ICD-10-CM

## 2019-07-17 DIAGNOSIS — K92.1 BLACK STOOLS: ICD-10-CM

## 2019-07-17 DIAGNOSIS — E66.3 OVERWEIGHT (BMI 25.0-29.9): ICD-10-CM

## 2019-07-17 DIAGNOSIS — R59.0 RETROPERITONEAL LYMPHADENOPATHY: Primary | ICD-10-CM

## 2019-07-17 DIAGNOSIS — R00.2 HEART PALPITATIONS: ICD-10-CM

## 2019-07-17 DIAGNOSIS — I10 ESSENTIAL HYPERTENSION: ICD-10-CM

## 2019-07-17 PROCEDURE — 3078F PR MOST RECENT DIASTOLIC BLOOD PRESSURE < 80 MM HG: ICD-10-PCS | Mod: CPTII,S$GLB,, | Performed by: NURSE PRACTITIONER

## 2019-07-17 PROCEDURE — 3075F PR MOST RECENT SYSTOLIC BLOOD PRESS GE 130-139MM HG: ICD-10-PCS | Mod: CPTII,S$GLB,, | Performed by: NURSE PRACTITIONER

## 2019-07-17 PROCEDURE — 1101F PR PT FALLS ASSESS DOC 0-1 FALLS W/OUT INJ PAST YR: ICD-10-PCS | Mod: CPTII,S$GLB,, | Performed by: NURSE PRACTITIONER

## 2019-07-17 PROCEDURE — 99999 PR PBB SHADOW E&M-EST. PATIENT-LVL IV: ICD-10-PCS | Mod: PBBFAC,,, | Performed by: NURSE PRACTITIONER

## 2019-07-17 PROCEDURE — 99214 PR OFFICE/OUTPT VISIT, EST, LEVL IV, 30-39 MIN: ICD-10-PCS | Mod: S$GLB,,, | Performed by: NURSE PRACTITIONER

## 2019-07-17 PROCEDURE — 99214 OFFICE O/P EST MOD 30 MIN: CPT | Mod: S$GLB,,, | Performed by: NURSE PRACTITIONER

## 2019-07-17 PROCEDURE — 3078F DIAST BP <80 MM HG: CPT | Mod: CPTII,S$GLB,, | Performed by: NURSE PRACTITIONER

## 2019-07-17 PROCEDURE — 1101F PT FALLS ASSESS-DOCD LE1/YR: CPT | Mod: CPTII,S$GLB,, | Performed by: NURSE PRACTITIONER

## 2019-07-17 PROCEDURE — 99999 PR PBB SHADOW E&M-EST. PATIENT-LVL IV: CPT | Mod: PBBFAC,,, | Performed by: NURSE PRACTITIONER

## 2019-07-17 PROCEDURE — 3075F SYST BP GE 130 - 139MM HG: CPT | Mod: CPTII,S$GLB,, | Performed by: NURSE PRACTITIONER

## 2019-07-17 RX ORDER — ATORVASTATIN CALCIUM 20 MG/1
20 TABLET, FILM COATED ORAL DAILY
Qty: 90 TABLET | Refills: 3 | Status: SHIPPED | OUTPATIENT
Start: 2019-07-17 | End: 2020-07-13 | Stop reason: SDUPTHER

## 2019-07-17 RX ORDER — NIFEDIPINE 30 MG/1
30 TABLET, EXTENDED RELEASE ORAL NIGHTLY
Qty: 90 TABLET | Refills: 3 | Status: SHIPPED | OUTPATIENT
Start: 2019-07-17 | End: 2020-07-13 | Stop reason: SDUPTHER

## 2019-07-17 RX ORDER — METOPROLOL SUCCINATE 25 MG/1
25 TABLET, EXTENDED RELEASE ORAL DAILY
Qty: 90 TABLET | Refills: 3 | Status: SHIPPED | OUTPATIENT
Start: 2019-07-17 | End: 2020-07-13 | Stop reason: SDUPTHER

## 2019-07-17 NOTE — TELEPHONE ENCOUNTER
----- Message from Kaylah Gibson sent at 7/17/2019  4:46 PM CDT -----  Contact: self  Type:  Need an appt  eeds Medical Advice    Who Called:  Patient  Best Call Back Number: [phone] (home)   Additional Information: Patient said he is missing calls he needs to schedule an appt

## 2019-07-17 NOTE — PROGRESS NOTES
Subjective:       Patient ID: Roosevelt Alejandro is a 73 y.o. male.    Chief Complaint: ER follow up    Patient presents today for ER follow up. Patient was seen in [facility] ER on 7/15/19 for dark stools for 2 days.  He reports 3 dark bowel movements. He has taken pepto bismol for abdomnal pain over the last 10 days. Patient was told to stop taking pepto bismol, CT of the abdomen demonstrates multiple enlarged abdominal lymph nodes concerning for possible malignancy.      Past Medical History:   Diagnosis Date    Arthritis     back pain    Asbestosis     BPH (benign prostatic hyperplasia)     Hypertension     Vertigo, aural, unspecified laterality 3/6/2019       Review of patient's allergies indicates:   Allergen Reactions    Aspirin Other (See Comments)     Internal bleeding    Lisinopril (bulk) Rash    Sulfa (sulfonamide antibiotics) Swelling and Rash         Current Outpatient Medications:     atorvastatin (LIPITOR) 20 MG tablet, Take 1 tablet (20 mg total) by mouth once daily., Disp: 90 tablet, Rfl: 3    FOLIC ACID/MULTIVIT-MIN/LUTEIN (CENTRUM SILVER ORAL), Take by mouth., Disp: , Rfl:     metoprolol succinate (TOPROL-XL) 25 MG 24 hr tablet, Take 1 tablet (25 mg total) by mouth once daily., Disp: 90 tablet, Rfl: 3    NIFEdipine (PROCARDIA-XL) 30 MG (OSM) 24 hr tablet, Take 1 tablet (30 mg total) by mouth every evening., Disp: 90 tablet, Rfl: 3    meclizine (ANTIVERT) 25 mg tablet, Take 1 tablet (25 mg total) by mouth 3 (three) times daily as needed., Disp: 30 tablet, Rfl: 0    Review of Systems   Constitutional: Negative for unexpected weight change.   HENT: Negative for trouble swallowing.    Eyes: Negative for visual disturbance.   Respiratory: Negative for shortness of breath.    Cardiovascular: Negative for chest pain, palpitations and leg swelling.   Gastrointestinal: Negative for blood in stool.   Genitourinary: Negative for hematuria.   Skin: Negative for rash.  "  Allergic/Immunologic: Negative for immunocompromised state.   Neurological: Negative for headaches.   Hematological: Does not bruise/bleed easily.   Psychiatric/Behavioral: Negative for agitation. The patient is not nervous/anxious.        Objective:      /74 (BP Location: Right arm, Patient Position: Sitting, BP Method: Large (Manual))   Pulse 92   Temp 98.8 °F (37.1 °C) (Oral)   Ht 6' 2" (1.88 m)   Wt 94.4 kg (208 lb 1.8 oz)   SpO2 96%   BMI 26.72 kg/m²   Physical Exam   Constitutional: He is oriented to person, place, and time. He appears well-developed and well-nourished.   Eyes: Pupils are equal, round, and reactive to light. Conjunctivae and EOM are normal.   Neck: Normal range of motion. Neck supple.   Cardiovascular: Normal rate, regular rhythm, normal heart sounds and intact distal pulses.   Pulmonary/Chest: Effort normal and breath sounds normal.   Abdominal: Soft. Bowel sounds are normal.   Musculoskeletal: Normal range of motion.   Neurological: He is alert and oriented to person, place, and time.   Skin: Skin is warm and dry.   Psychiatric: He has a normal mood and affect. His behavior is normal. Judgment and thought content normal.       Assessment:       1. Retroperitoneal lymphadenopathy    2. Black stools    3. Essential hypertension    4. Heart palpitations    5. Overweight (BMI 25.0-29.9)        Plan:       Retroperitoneal lymphadenopathy  -     Ambulatory referral to Hematology / Oncology    Black stools  Patient stopped Pepto Bismol, reports no black stools since ER visit    Essential hypertension  -     atorvastatin (LIPITOR) 20 MG tablet; Take 1 tablet (20 mg total) by mouth once daily.  Dispense: 90 tablet; Refill: 3  -     NIFEdipine (PROCARDIA-XL) 30 MG (OSM) 24 hr tablet; Take 1 tablet (30 mg total) by mouth every evening.  Dispense: 90 tablet; Refill: 3  -     Comprehensive metabolic panel; Future; Expected date: 07/17/2019  -     Lipid panel; Future; Expected date: " "07/17/2019  -     [facility] auto differential; Future; Expected date: 07/17/2019  Good reading today  Continue medication  Low sodium diet  BP Readings from Last 3 Encounters:   07/17/19 132/74   07/15/19 (!) 151/72   05/30/19 (!) 140/78     Heart palpitations  -     metoprolol succinate (TOPROL-XL) 25 MG 24 hr tablet; Take 1 tablet (25 mg total) by mouth once daily.  Dispense: 90 tablet; Refill: 3    Overweight (BMI 25.0-29.9)   Counseled patient on his ideal body weight, health consequences of being obese and current recommendations including weekly exercise and a heart healthy diet.  Current BMI is:Estimated body mass index is 26.72 kg/m² as calculated from the following:    Height as of this encounter: 6' 2" (1.88 m).    Weight as of this encounter: 94.4 kg (208 lb 1.8 oz)..  Patient is aware that ideal BMI < 25 or Weight in (lb) to have BMI = 25: 194.3.            Patient readiness: acceptance and barriers:none    During the course of the visit the patient was educated and counseled about the following:     Hypertension:   Dietary sodium restriction.  Regular aerobic exercise.  Obesity:   General weight loss/lifestyle modification strategies discussed (elicit support from others; identify saboteurs; non-food rewards, etc).  Regular aerobic exercise program discussed.    Goals: Hypertension: Reduce Blood Pressure    Did patient meet goals/outcomes: No    The following self management tools provided: declined    Patient Instructions (the written plan) was given to the patient/family.     Time spent with patient: 45 minutes    Barriers to medications present (no )    Adverse reactions to current medications (no)    Over the counter medications reviewed (Yes)        "

## 2019-07-18 ENCOUNTER — TELEPHONE (OUTPATIENT)
Dept: HEMATOLOGY/ONCOLOGY | Facility: CLINIC | Age: 74
End: 2019-07-18

## 2019-07-18 ENCOUNTER — TELEPHONE (OUTPATIENT)
Dept: FAMILY MEDICINE | Facility: CLINIC | Age: 74
End: 2019-07-18

## 2019-07-18 NOTE — TELEPHONE ENCOUNTER
----- Message from Sylvia Orourke MA sent at 7/18/2019  9:59 AM CDT -----  Contact: pt       ----- Message -----  From: RT Elyssa  Sent: 7/18/2019   9:22 AM  To: [facility] Staff    pt , requesting a call back soon to check the status of his appt to see the oncology doctor, reema.

## 2019-07-18 NOTE — TELEPHONE ENCOUNTER
----- Message from Ru Perez sent at 7/18/2019  1:08 PM CDT -----  Contact: same  Patient called in and stated the office was supposed to make him an appt with Dr. Chun & it has been over 2.5 hours & no one has called him back.  Patient call back number is [phone] ([phone])

## 2019-07-26 ENCOUNTER — OFFICE VISIT (OUTPATIENT)
Dept: HEMATOLOGY/ONCOLOGY | Facility: CLINIC | Age: 74
End: 2019-07-26
Payer: MEDICARE

## 2019-07-26 VITALS
DIASTOLIC BLOOD PRESSURE: 71 MMHG | HEART RATE: 80 BPM | BODY MASS INDEX: 28.16 KG/M2 | HEIGHT: 72 IN | RESPIRATION RATE: 20 BRPM | SYSTOLIC BLOOD PRESSURE: 138 MMHG | WEIGHT: 207.88 LBS | TEMPERATURE: 98 F

## 2019-07-26 DIAGNOSIS — R59.0 ABDOMINAL LYMPHADENOPATHY: ICD-10-CM

## 2019-07-26 PROCEDURE — 3078F DIAST BP <80 MM HG: CPT | Mod: ,,, | Performed by: INTERNAL MEDICINE

## 2019-07-26 PROCEDURE — 99204 OFFICE O/P NEW MOD 45 MIN: CPT | Mod: ,,, | Performed by: INTERNAL MEDICINE

## 2019-07-26 PROCEDURE — 1100F PR PT FALLS ASSESS DOC 2+ FALLS/FALL W/INJURY/YR: ICD-10-PCS | Mod: ,,, | Performed by: INTERNAL MEDICINE

## 2019-07-26 PROCEDURE — 3078F PR MOST RECENT DIASTOLIC BLOOD PRESSURE < 80 MM HG: ICD-10-PCS | Mod: ,,, | Performed by: INTERNAL MEDICINE

## 2019-07-26 PROCEDURE — 1100F PTFALLS ASSESS-DOCD GE2>/YR: CPT | Mod: ,,, | Performed by: INTERNAL MEDICINE

## 2019-07-26 PROCEDURE — 3075F SYST BP GE 130 - 139MM HG: CPT | Mod: ,,, | Performed by: INTERNAL MEDICINE

## 2019-07-26 PROCEDURE — 3288F FALL RISK ASSESSMENT DOCD: CPT | Mod: ,,, | Performed by: INTERNAL MEDICINE

## 2019-07-26 PROCEDURE — 99204 PR OFFICE/OUTPT VISIT, NEW, LEVL IV, 45-59 MIN: ICD-10-PCS | Mod: ,,, | Performed by: INTERNAL MEDICINE

## 2019-07-26 PROCEDURE — 3288F PR FALLS RISK ASSESSMENT DOCUMENTED: ICD-10-PCS | Mod: ,,, | Performed by: INTERNAL MEDICINE

## 2019-07-26 PROCEDURE — 3075F PR MOST RECENT SYSTOLIC BLOOD PRESS GE 130-139MM HG: ICD-10-PCS | Mod: ,,, | Performed by: INTERNAL MEDICINE

## 2019-07-26 NOTE — LETTER
July 26, 2019      Kemar Clement, JEWEL  [address]  [place] [address]           [facility] - Hematology Oncology  [address]  [address]  [place] [address]  Phone: [phone]  Fax: [phone]          Patient: Roosevelt Alejandro   MR Number: [id number]   YOB: 1945   Date of Visit: 7/26/2019       Dear Kemar Clement:    Thank you for referring Roosevelt Alejandro to me for evaluation. Attached you will find relevant portions of my assessment and plan of care.    If you have questions, please do not hesitate to call me. I look forward to following Roosevelt Alejandro along with you.    Sincerely,    Gaston Chun MD    Enclosure  CC:  No Recipients    If you would like to receive this communication electronically, please contact [email or web address] or [phone] to request more information on [employer] access.    For providers and/or their staff who would like to refer a patient to [facility], please contact us through our one-stop-shop provider referral line, [facility], at [phone].    If you feel you have received this communication in error or would no longer like to receive these types of communications, please e-mail [email or web address]

## 2019-07-26 NOTE — ASSESSMENT & PLAN NOTE
Patient has 3 distinct lesions/lymph nodes in the abdomen/RP.   This was seen on imaging done in December of 2017 and one area is smaller and 2 others are enlarged.  Patient has no symptoms of cancer nor does he have any concerning physical exam findings of such.  I discussed with him that I'm unsure of the cause of these lesions but that cancer is in the differential.  I will ask IR to evaluate the scans for needle biopsy and if this is not feasible then will have him see general surgery to see if an open biopsy is possible.  If none of these are possible then I discussed with him that we may be stuck observing this for a while.  He understands and wishes to move forward with this plan.

## 2019-07-26 NOTE — PROGRESS NOTES
INITIAL [facility] HEM/ONC CONSULTATION      Subjective:       Patient ID: Roosevelt Alejandro is a 73 y.o. male.    Chief Complaint: No chief complaint on file.  Abdominal Lymphadenopathy    Mr. Alejandro is a 74yo male referred by Dr. Johnson for abdominal lymphadenopathy.  See report below.     Patient states he developed abdominal pain approximately 2 weeks ago and notes that he passed some dark stools which he states was later attributed to pepto-bismol.  CT scan was done because of this which showed LNs in the abdomen.  Patient gives no prior history of this and overall feels well but did note one episode of Vertigo six months ago which resolved after he was manipulated by ENT.      Patient has no fever, NS or unexplained wt. Loss.  Has felt no lymph nodes enlarged.        CT Findings 7/15/2019:  FINDINGS:  Within the mesentery of the central abdomen, 3 masses are present, exhibiting central low density, the largest of which measures 2.4 cm (previously 1.8), with the appearance concerning for necrotic lymph nodes.      Previously numerous non necrotic appearing lymph nodes are present in this area, measuring up to 3.3 cm.  This largest node has decreased in size, now measuring 2.0 cm.      There is mild haziness of the mesentery about the nodes.     A retroperitoneal lymph node posterior to the inferior vena cava, just above the level of the renal veins has increased in size from 0.9 to 1.8 cm in short axis, having a non the chronic appearance.      Past Medical History:   Diagnosis Date    Arthritis     back pain    Asbestosis     BPH (benign prostatic hyperplasia)     Hypertension     Vertigo, aural, unspecified laterality 3/6/2019       Past Surgical History:   Procedure Laterality Date    COLONOSCOPY      2009    COLONOSCOPY N/A 1/25/2019    [facility]    COLONOSCOPY N/A 1/21/2016    [facility]    CYSTOSCOPY N/A 12/19/2017    Performed by  [facility] at [facility] OR    EYE SURGERY Bilateral     cataracts, retinal detachment    INJECTION-STEROID-EPIDURAL-TRANSFORAMINAL Right 2014    [facility] at [facility]    PROSTATE BIOPSY      PROSTATECTOMY  2017    PROSTATECTOMY-TRANSURETHRAL N/A 2018    [facility] at [facility] OR    PROSTATECTOMY-TRANSURETHRAL N/A 2017    [facility] at [facility] OR    ROTATOR CUFF REPAIR      left       Social History     Socioeconomic History    Marital status:      Spouse name: Not on file    Number of children: Not on file    Years of education: Not on file    Highest education level: Not on file   Occupational History    Not on file   Social Needs    Financial resource strain: Not on file    Food insecurity:     Worry: Not on file     Inability: Not on file    Transportation needs:     Medical: Not on file     Non-medical: Not on file   Tobacco Use    Smoking status: Former Smoker     Last attempt to quit: 3/30/2000     Years since quittin.3    Smokeless tobacco: Never Used   Substance and Sexual Activity    Alcohol use: Yes     Alcohol/week: 0.6 oz     Types: 1 Cans of beer per week     Comment: rare    Drug use: No    Sexual activity: Yes     Partners: Female   Lifestyle    Physical activity:     Days per week: Not on file     Minutes per session: Not on file    Stress: Not on file   Relationships    Social connections:     Talks on phone: Not on file     Gets together: Not on file     Attends Sabianist service: Not on file     Active member of club or organization: Not on file     Attends meetings of clubs or organizations: Not on file     Relationship status: Not on file   Other Topics Concern    Not on file   Social History Narrative    Not on file       Family History   Problem Relation Age of Onset    Cancer Father         lung/ asbestos    Melanoma Neg Hx     Psoriasis Neg Hx     Lupus Neg Hx      Eczema Neg Hx        Review of patient's allergies indicates:   Allergen Reactions    Aspirin Other (See Comments)     Internal bleeding    Lisinopril (bulk) Rash    Sulfa (sulfonamide antibiotics) Swelling and Rash       Current Outpatient Medications:     atorvastatin (LIPITOR) 20 MG tablet, Take 1 tablet (20 mg total) by mouth once daily., Disp: 90 tablet, Rfl: 3    FOLIC ACID/MULTIVIT-MIN/LUTEIN (CENTRUM SILVER ORAL), Take by mouth., Disp: , Rfl:     metoprolol succinate (TOPROL-XL) 25 MG 24 hr tablet, Take 1 tablet (25 mg total) by mouth once daily., Disp: 90 tablet, Rfl: 3    NIFEdipine (PROCARDIA-XL) 30 MG (OSM) 24 hr tablet, Take 1 tablet (30 mg total) by mouth every evening., Disp: 90 tablet, Rfl: 3    All medications and past history have been reviewed.    Review of Systems   Constitutional: Negative for activity change, chills, diaphoresis, fatigue, fever and unexpected weight change.   HENT: Negative for congestion, mouth sores and nosebleeds.    Eyes: Negative for visual disturbance.   Respiratory: Negative for chest tightness and shortness of breath.    Cardiovascular: Negative for chest pain and leg swelling.   Gastrointestinal: Negative for abdominal pain, blood in stool, nausea and vomiting.   Endocrine: Negative for cold intolerance and heat intolerance.   Genitourinary: Negative for difficulty urinating, dysuria and hematuria.   Skin: Negative for rash.   Neurological: Negative for dizziness, seizures, weakness, light-headedness and headaches.   Hematological: Negative for adenopathy. Does not bruise/bleed easily.   Psychiatric/Behavioral: Negative for agitation and behavioral problems.       Objective:        /71   Pulse 80   Temp 98 °F (36.7 °C) (Oral)   Resp 20   Ht 6' (1.829 m)   Wt 94.3 kg (207 lb 14.4 oz)   BMI 28.20 kg/m²     Physical Exam   Constitutional: He is oriented to person, place, and time. He appears well-developed and well-nourished.   HENT:   Head:  Normocephalic and atraumatic.   Right Ear: External ear normal.   Left Ear: External ear normal.   Ears:    Mouth/Throat: Oropharynx is clear and moist.       Eyes: Pupils are equal, round, and reactive to light. Conjunctivae are normal. No scleral icterus.   Neck: Normal range of motion. Neck supple.   Cardiovascular: Normal rate, regular rhythm and normal heart sounds. Exam reveals no gallop and no friction rub.   No murmur heard.  Pulmonary/Chest: Effort normal and breath sounds normal. No respiratory distress. He has no rales. He exhibits no tenderness.   Abdominal: Soft. Bowel sounds are normal. He exhibits no distension and no mass. There is no hepatosplenomegaly. There is no tenderness. There is no rebound and no guarding.   Musculoskeletal: He exhibits no edema.   Lymphadenopathy:        Head (right side): No tonsillar adenopathy present.        Head (left side): No tonsillar adenopathy present.     He has no cervical adenopathy.     He has no axillary adenopathy.        Right: No supraclavicular adenopathy present.        Left: No supraclavicular adenopathy present.   Neurological: He is alert and oriented to person, place, and time.   Psychiatric: He has a normal mood and affect. His behavior is normal. Judgment and thought content normal.   Vitals reviewed.        Lab  Recent Results (from the past 336 hour(s))   CBC W/ AUTO DIFFERENTIAL    Collection Time: 07/15/19 10:42 AM   Result Value Ref Range    WBC 9.25 3.90 - 12.70 K/uL    Hemoglobin 13.5 (L) 14.0 - 18.0 g/dL    Hematocrit 40.2 40.0 - 54.0 %    Platelets 273 150 - 350 K/uL     CMP  Sodium   Date Value Ref Range Status   07/15/2019 141 136 - 145 mmol/L Final     Potassium   Date Value Ref Range Status   07/15/2019 4.0 3.5 - 5.1 mmol/L Final     Chloride   Date Value Ref Range Status   07/15/2019 105 95 - 110 mmol/L Final     CO2   Date Value Ref Range Status   07/15/2019 24 23 - 29 mmol/L Final     Glucose   Date Value Ref Range Status    07/15/2019 107 70 - 110 mg/dL Final     BUN, Bld   Date Value Ref Range Status   07/15/2019 15 8 - 23 mg/dL Final     Creatinine   Date Value Ref Range Status   07/15/2019 1.2 0.5 - 1.4 mg/dL Final     Calcium   Date Value Ref Range Status   07/15/2019 10.0 8.7 - 10.5 mg/dL Final     Total Protein   Date Value Ref Range Status   07/15/2019 7.9 6.0 - 8.4 g/dL Final     Albumin   Date Value Ref Range Status   07/15/2019 4.2 3.5 - 5.2 g/dL Final     Total Bilirubin   Date Value Ref Range Status   07/15/2019 0.5 0.1 - 1.0 mg/dL Final     Comment:     For infants and newborns, interpretation of results should be based  on gestational age, weight and in agreement with clinical  observations.  Premature Infant recommended reference ranges:  Up to 24 hours.............<8.0 mg/dL  Up to 48 hours............<12.0 mg/dL  3-5 days..................<15.0 mg/dL  6-29 days.................<15.0 mg/dL       Alkaline Phosphatase   Date Value Ref Range Status   07/15/2019 120 55 - 135 U/L Final     AST   Date Value Ref Range Status   07/15/2019 15 10 - 40 U/L Final     ALT   Date Value Ref Range Status   07/15/2019 13 10 - 44 U/L Final     Anion Gap   Date Value Ref Range Status   07/15/2019 12 8 - 16 mmol/L Final     eGFR if    Date Value Ref Range Status   07/15/2019 >60 >60 mL/min/1.73 m^2 Final     eGFR if non    Date Value Ref Range Status   07/15/2019 59 (A) >60 mL/min/1.73 m^2 Final     Comment:     Calculation used to obtain the estimated glomerular filtration  rate (eGFR) is the CKD-EPI equation.            Specimen (12h ago, onward)    None                All lab results and imaging results have been reviewed and discussed with the patient.     Assessment:       1. Abdominal lymphadenopathy      Problem List Items Addressed This Visit     Abdominal lymphadenopathy     Patient has 3 distinct lesions/lymph nodes in the abdomen/RP.   This was seen on imaging done in December of 2017 and one  area is smaller and 2 others are enlarged.  Patient has no symptoms of cancer nor does he have any concerning physical exam findings of such.  I discussed with him that I'm unsure of the cause of these lesions but that cancer is in the differential.  I will ask IR to evaluate the scans for needle biopsy and if this is not feasible then will have him see general surgery to see if an open biopsy is possible.  If none of these are possible then I discussed with him that we may be stuck observing this for a while.  He understands and wishes to move forward with this plan.               Cancer Staging  No matching staging information was found for the patient.      Plan:         Follow up in about 4 weeks (around 8/23/2019).       The plan was discussed with the patient and all questions/concerns have been answered to the patient's satisfaction.

## 2019-08-02 ENCOUNTER — TELEPHONE (OUTPATIENT)
Dept: HEMATOLOGY/ONCOLOGY | Facility: CLINIC | Age: 74
End: 2019-08-02

## 2019-08-02 NOTE — TELEPHONE ENCOUNTER
I SPOKE WITH  LISA AND INFORMED HER THAT THE RADIOLOGISTS AT [facility] DO NOT BELIEVE A SAFE BIOPSY CAN BE DONE. THEY ARE RECOMMENDING MR MEZA SEE DR ALONSO TO DO AN EDG FOR BX. SHE WILL CALL THEM FOR AN APPT.

## 2019-08-05 ENCOUNTER — TELEPHONE (OUTPATIENT)
Dept: HEMATOLOGY/ONCOLOGY | Facility: CLINIC | Age: 74
End: 2019-08-05

## 2019-08-05 DIAGNOSIS — R59.0 ABDOMINAL LYMPHADENOPATHY: Primary | ICD-10-CM

## 2019-08-20 ENCOUNTER — ANESTHESIA (OUTPATIENT)
Dept: SURGERY | Facility: HOSPITAL | Age: 74
End: 2019-08-20
Payer: MEDICARE

## 2019-08-20 ENCOUNTER — HOSPITAL ENCOUNTER (OUTPATIENT)
Facility: HOSPITAL | Age: 74
Discharge: HOME | End: 2019-08-20
Attending: INTERNAL MEDICINE | Admitting: INTERNAL MEDICINE
Payer: MEDICARE

## 2019-08-20 ENCOUNTER — ANESTHESIA EVENT (OUTPATIENT)
Dept: SURGERY | Facility: HOSPITAL | Age: 74
End: 2019-08-20
Payer: MEDICARE

## 2019-08-20 VITALS
BODY MASS INDEX: 26.95 KG/M2 | SYSTOLIC BLOOD PRESSURE: 118 MMHG | RESPIRATION RATE: 14 BRPM | DIASTOLIC BLOOD PRESSURE: 87 MMHG | HEART RATE: 70 BPM | OXYGEN SATURATION: 96 % | WEIGHT: 210 LBS | TEMPERATURE: 98 F | HEIGHT: 74 IN

## 2019-08-20 DIAGNOSIS — R59.1 LYMPHADENOPATHY: ICD-10-CM

## 2019-08-20 LAB
ANION GAP SERPL CALC-SCNC: 6 MMOL/L (ref 8–16)
BASOPHILS # BLD AUTO: 0.07 K/UL (ref 0–0.2)
BASOPHILS NFR BLD: 0.8 % (ref 0–1.9)
BUN SERPL-MCNC: 16 MG/DL (ref 8–23)
CALCIUM SERPL-MCNC: 8.7 MG/DL (ref 8.7–10.5)
CHLORIDE SERPL-SCNC: 105 MMOL/L (ref 95–110)
CO2 SERPL-SCNC: 25 MMOL/L (ref 23–29)
CREAT SERPL-MCNC: 1.1 MG/DL (ref 0.5–1.4)
DIFFERENTIAL METHOD: ABNORMAL
EOSINOPHIL # BLD AUTO: 0.3 K/UL (ref 0–0.5)
EOSINOPHIL NFR BLD: 2.8 % (ref 0–8)
ERYTHROCYTE [DISTWIDTH] IN BLOOD BY AUTOMATED COUNT: 12.7 % (ref 11.5–14.5)
EST. GFR  (AFRICAN AMERICAN): >60 ML/MIN/1.73 M^2
EST. GFR  (NON AFRICAN AMERICAN): >60 ML/MIN/1.73 M^2
GLUCOSE SERPL-MCNC: 110 MG/DL (ref 70–110)
HCT VFR BLD AUTO: 38.3 % (ref 40–54)
HGB BLD-MCNC: 12.9 G/DL (ref 14–18)
IMM GRANULOCYTES # BLD AUTO: 0.13 K/UL (ref 0–0.04)
IMM GRANULOCYTES NFR BLD AUTO: 1.4 % (ref 0–0.5)
LYMPHOCYTES # BLD AUTO: 1.5 K/UL (ref 1–4.8)
LYMPHOCYTES NFR BLD: 16.6 % (ref 18–48)
MCH RBC QN AUTO: 32.2 PG (ref 27–31)
MCHC RBC AUTO-ENTMCNC: 33.7 G/DL (ref 32–36)
MCV RBC AUTO: 96 FL (ref 82–98)
MONOCYTES # BLD AUTO: 0.5 K/UL (ref 0.3–1)
MONOCYTES NFR BLD: 5.9 % (ref 4–15)
NEUTROPHILS # BLD AUTO: 6.7 K/UL (ref 1.8–7.7)
NEUTROPHILS NFR BLD: 72.5 % (ref 38–73)
NRBC BLD-RTO: 0 /100 WBC
PLATELET # BLD AUTO: 201 K/UL (ref 150–350)
PMV BLD AUTO: 10.4 FL (ref 9.2–12.9)
POTASSIUM SERPL-SCNC: 3.8 MMOL/L (ref 3.5–5.1)
RBC # BLD AUTO: 4.01 M/UL (ref 4.6–6.2)
SODIUM SERPL-SCNC: 136 MMOL/L (ref 136–145)
WBC # BLD AUTO: 9.19 K/UL (ref 3.9–12.7)

## 2019-08-20 PROCEDURE — 27000671 HC TUBING MICROBORE EXT: Performed by: STUDENT IN AN ORGANIZED HEALTH CARE EDUCATION/TRAINING PROGRAM

## 2019-08-20 PROCEDURE — 80048 BASIC METABOLIC PNL TOTAL CA: CPT

## 2019-08-20 PROCEDURE — 85025 COMPLETE CBC W/AUTO DIFF WBC: CPT

## 2019-08-20 PROCEDURE — 27000675 HC TUBING MICRODRIP: Performed by: STUDENT IN AN ORGANIZED HEALTH CARE EDUCATION/TRAINING PROGRAM

## 2019-08-20 PROCEDURE — 43238 EGD US FINE NEEDLE BX/ASPIR: CPT | Performed by: INTERNAL MEDICINE

## 2019-08-20 PROCEDURE — 27202103: Performed by: STUDENT IN AN ORGANIZED HEALTH CARE EDUCATION/TRAINING PROGRAM

## 2019-08-20 PROCEDURE — 43239 EGD BIOPSY SINGLE/MULTIPLE: CPT | Performed by: INTERNAL MEDICINE

## 2019-08-20 PROCEDURE — 88305 TISSUE EXAM BY PATHOLOGIST: CPT | Mod: TC,59

## 2019-08-20 PROCEDURE — 63600175 PHARM REV CODE 636 W HCPCS: Performed by: INTERNAL MEDICINE

## 2019-08-20 PROCEDURE — 37000009 HC ANESTHESIA EA ADD 15 MINS: Performed by: INTERNAL MEDICINE

## 2019-08-20 PROCEDURE — 27000284 HC CANNULA NASAL: Performed by: STUDENT IN AN ORGANIZED HEALTH CARE EDUCATION/TRAINING PROGRAM

## 2019-08-20 PROCEDURE — 27200043 HC FORCEPS, BIOPSY: Performed by: INTERNAL MEDICINE

## 2019-08-20 PROCEDURE — 27202053 HC NEEDLE BIOPSY EUS: Performed by: INTERNAL MEDICINE

## 2019-08-20 PROCEDURE — 37000008 HC ANESTHESIA 1ST 15 MINUTES: Performed by: INTERNAL MEDICINE

## 2019-08-20 PROCEDURE — 63600175 PHARM REV CODE 636 W HCPCS: Performed by: NURSE ANESTHETIST, CERTIFIED REGISTERED

## 2019-08-20 RX ORDER — SODIUM CHLORIDE 9 MG/ML
INJECTION, SOLUTION INTRAVENOUS CONTINUOUS
Status: DISCONTINUED | OUTPATIENT
Start: 2019-08-20 | End: 2019-08-20 | Stop reason: HOSPADM

## 2019-08-20 RX ORDER — PROPOFOL 10 MG/ML
VIAL (ML) INTRAVENOUS
Status: DISCONTINUED | OUTPATIENT
Start: 2019-08-20 | End: 2019-08-20

## 2019-08-20 RX ORDER — SODIUM CHLORIDE 0.9 % (FLUSH) 0.9 %
2 SYRINGE (ML) INJECTION
Status: DISCONTINUED | OUTPATIENT
Start: 2019-08-20 | End: 2019-08-20 | Stop reason: HOSPADM

## 2019-08-20 RX ADMIN — PROPOFOL 50 MG: 10 INJECTION, EMULSION INTRAVENOUS at 11:08

## 2019-08-20 RX ADMIN — PROPOFOL 50 MG: 10 INJECTION, EMULSION INTRAVENOUS at 10:08

## 2019-08-20 RX ADMIN — SODIUM CHLORIDE: 0.9 INJECTION, SOLUTION INTRAVENOUS at 10:08

## 2019-08-20 RX ADMIN — PROPOFOL 100 MG: 10 INJECTION, EMULSION INTRAVENOUS at 11:08

## 2019-08-20 NOTE — TRANSFER OF CARE
"Anesthesia Transfer of Care Note    Patient: Roosevelt Alejandro    Procedure(s) Performed: Procedure(s) (LRB):  ULTRASOUND, UPPER GI TRACT, ENDOSCOPIC (N/A)    Patient location: GI    Anesthesia Type: MAC    Transport from OR: Transported from OR on room air with adequate spontaneous ventilation    Post pain: adequate analgesia    Post assessment: no apparent anesthetic complications    Post vital signs: stable    Level of consciousness: awake    Nausea/Vomiting: no nausea/vomiting    Complications: none    Transfer of care protocol was followed      Last vitals:   Visit Vitals  /67 (BP Location: Left arm, Patient Position: Lying)   Pulse 77   Temp 36.6 °C (97.9 °F) (Oral)   Resp (!) 22   Ht 6' 2" (1.88 m)   Wt 95.3 kg (210 lb)   SpO2 97%   BMI 26.96 kg/m²     "

## 2019-08-20 NOTE — [employer] PATIENT INSTRUCTIONS
Discharge Summary/Instructions after an Endoscopic Procedure  Patient Name: Roosevelt Alejandro  Patient MRN: [id number]  Patient YOB: 1945 Tuesday, August 20, 2019  Forest Lucio III, MD  RESTRICTIONS:  During your procedure today, you received medications for sedation.  These   medications may affect your judgment, balance and coordination.  Therefore,   for 24 hours, you have the following restrictions:   - DO NOT drive a car, operate machinery, make legal/financial decisions,   sign important papers or drink alcohol.    ACTIVITY:  Today: no heavy lifting, straining or running due to procedural   sedation/anesthesia.  The following day: return to full activity including work.  DIET:  Eat and drink normally unless instructed otherwise.     TREATMENT FOR COMMON SIDE EFFECTS:  - Mild abdominal pain, nausea, belching, bloating or excessive gas:  rest,   eat lightly and use a heating pad.  - Sore Throat: treat with throat lozenges and/or gargle with warm salt   water.  - Because air was used during the procedure, expelling large amounts of air   from your rectum or belching is normal.  - If a bowel prep was taken, you may not have a bowel movement for 1-3 days.    This is normal.  SYMPTOMS TO WATCH FOR AND REPORT TO YOUR PHYSICIAN:  1. Abdominal pain or bloating, other than gas cramps.  2. Chest pain.  3. Back pain.  4. Signs of infection such as: chills or fever occurring within 24 hours   after the procedure.  5. Rectal bleeding, which would show as bright red, maroon, or black stools.   (A tablespoon of blood from the rectum is not serious, especially if   hemorrhoids are present.)  6. Vomiting.  7. Weakness or dizziness.  GO DIRECTLY TO THE NEAREST EMERGENCY ROOM IF YOU HAVE ANY OF THE FOLLOWING:      Difficulty breathing              Chills and/or fever over 101 F   Persistent vomiting and/or vomiting blood   Severe abdominal pain   Severe chest pain   Black, tarry stools   Bleeding- more than one  tablespoon   Any other symptom or condition that you feel may need urgent attention  Your doctor recommends these additional instructions:  If any biopsies were taken, your doctors clinic will contact you in 1 to 2   weeks with any results.  - Discharge patient to home.   - Patient has a contact number available for emergencies.  The signs and   symptoms of potential delayed complications were discussed with the   patient.  Return to normal activities tomorrow.  Written discharge   instructions were provided to the patient.   - Resume regular diet.   - Continue present medications.   - Await pathology results.  For questions, problems or results please call your physician - Forest Lucio III, MD at Work:  [phone].  [facility], EMERGENCY ROOM PHONE NUMBER: [phone]  IF A COMPLICATION OR EMERGENCY SITUATION ARISES AND YOU ARE UNABLE TO REACH   YOUR PHYSICIAN - GO DIRECTLY TO THE EMERGENCY ROOM.  Forest Lucio III, MD  8/20/2019 11:30:44 AM  This report has been verified and signed electronically.  [employer]

## 2019-08-20 NOTE — ANESTHESIA PREPROCEDURE EVALUATION
08/20/2019  Roosevelt Alejandro is a 73 y.o., male.  Patient Active Problem List   Diagnosis    Hypertension    Essential hypertension    DDD (degenerative disc disease), lumbar    Chronic allergic rhinitis    ACE inhibitor-aggravated angioedema    Pulmonary asbestosis    H/O arteriovenous malformation (AVM)    Frequent unifocal PVCs    Hx of colonic polyps    Vertigo    Vertigo, aural, unspecified laterality    Abdominal lymphadenopathy    Lymphadenopathy       Results for orders placed or performed during the hospital encounter of 02/24/19   ECG 12 lead    Collection Time: 02/24/19 12:25 PM    Narrative    Test Reason : I63.9,    Vent. Rate : 080 BPM     Atrial Rate : 080 BPM     P-R Int : 170 ms          QRS Dur : 080 ms      QT Int : 374 ms       P-R-T Axes : 065 045 046 degrees     QTc Int : 431 ms    Normal sinus rhythm  Low voltage QRS  Borderline Abnormal ECG  When compared with ECG of 06-MAR-2018 14:36,  Premature ventricular complexes are no longer Present  Confirmed by Frank NGO, Alejandro DAVIDSON ([id number]) on 2/25/2019 11:12:36 AM    Referred By: [email or web address]   SELF           Confirmed By:Alejandro Marie MD        Lab Results   Component Value Date    WBC 9.19 08/20/2019    HGB 12.9 (L) 08/20/2019    HCT 38.3 (L) 08/20/2019    MCV 96 08/20/2019     08/20/2019     BMP  Lab Results   Component Value Date     07/15/2019    K 4.0 07/15/2019     07/15/2019    CO2 24 07/15/2019    BUN 15 07/15/2019    CREATININE 1.2 07/15/2019    CALCIUM 10.0 07/15/2019    ANIONGAP 12 07/15/2019    ESTGFRAFRICA >60 07/15/2019    EGFRNONAA 59 (A) 07/15/2019           Anesthesia Evaluation    I have reviewed the Patient Summary Reports.     I have reviewed the Medications.     Review of Systems  Anesthesia Hx:  No problems with previous Anesthesia Denies Hx of Anesthetic complications  Denies Family Hx of  Anesthesia complications.   Denies Personal Hx of Anesthesia complications.   Hematology/Oncology:  Hematology Normal   Oncology Normal     EENT/Dental:EENT/Dental Normal   Cardiovascular:   Hypertension    Pulmonary:  Pulmonary Normal    Renal/:  Renal/ Normal     Hepatic/GI:  Hepatic/GI Normal    Musculoskeletal:   Arthritis     Neurological:  Neurology Normal    Endocrine:  Endocrine Normal    Psych:  Psychiatric Normal           Physical Exam  General:  Well nourished    Airway/Jaw/Neck:  Airway Findings: Mouth Opening: Normal Tongue: Normal  General Airway Assessment: Adult  Mallampati: II  TM Distance: Normal, at least 6 cm  Jaw/Neck Findings:  Neck ROM: Normal ROM      Dental:  Dental Findings:   Chest/Lungs:  Chest/Lungs Findings: Clear to auscultation, Normal Respiratory Rate     Heart/Vascular:  Heart Findings: Rate: Normal  Rhythm: Regular Rhythm  Sounds: Normal        Mental Status:  Mental Status Findings:  Alert and Oriented         Anesthesia Plan  Type of Anesthesia, risks & benefits discussed:  Anesthesia Type:  MAC  Patient's Preference:   Intra-op Monitoring Plan: standard ASA monitors  Intra-op Monitoring Plan Comments:   Post Op Pain Control Plan: per primary service following discharge from PACU  Post Op Pain Control Plan Comments:   Induction:    Beta Blocker:  Patient is on a Beta-Blocker and has received one dose within the past 24 hours (No further documentation required).       Informed Consent: Patient understands risks and agrees with Anesthesia plan.  Questions answered. Anesthesia consent signed with patient.  ASA Score: 2     Day of Surgery Review of History & Physical:  There are no significant changes.          Ready For Surgery From Anesthesia Perspective.

## 2019-08-20 NOTE — ANESTHESIA POSTPROCEDURE EVALUATION
Anesthesia Post Evaluation    Patient: Roosevelt Alejandro    Procedure(s) Performed: Procedure(s) (LRB):  ULTRASOUND, UPPER GI TRACT, ENDOSCOPIC (N/A)    Final Anesthesia Type: MAC  Patient location during evaluation: GI PACU  Patient participation: Yes- Able to Participate  Level of consciousness: awake and alert  Post-procedure vital signs: reviewed and stable  Pain management: adequate  Airway patency: patent  PONV status at discharge: No PONV  Anesthetic complications: no      Cardiovascular status: hemodynamically stable  Respiratory status: unassisted, spontaneous ventilation and room air  Hydration status: euvolemic  Follow-up not needed.          Vitals Value Taken Time   /87 8/20/2019 11:50 AM   Temp 36.6 °C (97.9 °F) 8/20/2019 11:35 AM   Pulse 70 8/20/2019 11:50 AM   Resp 14 8/20/2019 11:50 AM   SpO2 96 % 8/20/2019 11:50 AM         No case tracking events are documented in the log.      Pain/Taj Score: No data recorded

## 2019-08-27 ENCOUNTER — TELEPHONE (OUTPATIENT)
Dept: HEMATOLOGY/ONCOLOGY | Facility: CLINIC | Age: 74
End: 2019-08-27

## 2019-08-27 NOTE — TELEPHONE ENCOUNTER
----- Message from Claire Davis sent at 8/26/2019  4:32 PM CDT -----  Dr. Moore wants to have another CT scan on pt.  Pt has an upcoming appt with Dr. ARMENDARIZ, but not sure if Dr. ARMENDARIZ even knows that Dr Moore wants the CT scan done.  Please call and let patient know.     CB# [phone]        Spoke to Max. I let him know that everything was given to Dr. Chun and will let him know what he recommends. Instructed him to plan on keeping the 9/5 appt to discuss with the doctor if he doesn't hear back from us before that date

## 2019-08-29 ENCOUNTER — TELEPHONE (OUTPATIENT)
Dept: HEMATOLOGY/ONCOLOGY | Facility: CLINIC | Age: 74
End: 2019-08-29

## 2019-08-29 NOTE — TELEPHONE ENCOUNTER
----- Message from Claire Davis sent at 8/26/2019  4:32 PM CDT -----  Dr. Moore wants to have another CT scan on pt.  Pt has an upcoming appt with Dr. ARMENDARIZ, but not sure if Dr. ARMENDARIZ even knows that Dr Moore wants the CT scan done.  Please call and let patient know.     # [phone]

## 2019-08-29 NOTE — TELEPHONE ENCOUNTER
I SPOKE WITH PTS WIFE AND INFORMED HER THAT DR LAL DID SPEAK WITH DR ALONSO AND WILL DISCUSS EVERYTHING WITH MR MEZA AT HIS UPCOMING APPT

## 2019-09-05 ENCOUNTER — OFFICE VISIT (OUTPATIENT)
Dept: HEMATOLOGY/ONCOLOGY | Facility: CLINIC | Age: 74
End: 2019-09-05
Payer: MEDICARE

## 2019-09-05 VITALS
WEIGHT: 217.88 LBS | RESPIRATION RATE: 20 BRPM | DIASTOLIC BLOOD PRESSURE: 74 MMHG | SYSTOLIC BLOOD PRESSURE: 135 MMHG | TEMPERATURE: 99 F | HEART RATE: 80 BPM | BODY MASS INDEX: 27.98 KG/M2

## 2019-09-05 DIAGNOSIS — C7A.8 NEUROENDOCRINE CARCINOMA METASTATIC TO INTRA-ABDOMINAL LYMPH NODE: ICD-10-CM

## 2019-09-05 DIAGNOSIS — C7B.8 NEUROENDOCRINE CARCINOMA METASTATIC TO INTRA-ABDOMINAL LYMPH NODE: ICD-10-CM

## 2019-09-05 PROCEDURE — 99214 PR OFFICE/OUTPT VISIT, EST, LEVL IV, 30-39 MIN: ICD-10-PCS | Mod: S$GLB,,, | Performed by: INTERNAL MEDICINE

## 2019-09-05 PROCEDURE — 3078F DIAST BP <80 MM HG: CPT | Mod: S$GLB,,, | Performed by: INTERNAL MEDICINE

## 2019-09-05 PROCEDURE — 3288F PR FALLS RISK ASSESSMENT DOCUMENTED: ICD-10-PCS | Mod: S$GLB,,, | Performed by: INTERNAL MEDICINE

## 2019-09-05 PROCEDURE — 3078F PR MOST RECENT DIASTOLIC BLOOD PRESSURE < 80 MM HG: ICD-10-PCS | Mod: S$GLB,,, | Performed by: INTERNAL MEDICINE

## 2019-09-05 PROCEDURE — 3288F FALL RISK ASSESSMENT DOCD: CPT | Mod: S$GLB,,, | Performed by: INTERNAL MEDICINE

## 2019-09-05 PROCEDURE — 1100F PTFALLS ASSESS-DOCD GE2>/YR: CPT | Mod: S$GLB,,, | Performed by: INTERNAL MEDICINE

## 2019-09-05 PROCEDURE — 3075F PR MOST RECENT SYSTOLIC BLOOD PRESS GE 130-139MM HG: ICD-10-PCS | Mod: S$GLB,,, | Performed by: INTERNAL MEDICINE

## 2019-09-05 PROCEDURE — 3075F SYST BP GE 130 - 139MM HG: CPT | Mod: S$GLB,,, | Performed by: INTERNAL MEDICINE

## 2019-09-05 PROCEDURE — 99214 OFFICE O/P EST MOD 30 MIN: CPT | Mod: S$GLB,,, | Performed by: INTERNAL MEDICINE

## 2019-09-05 PROCEDURE — 1100F PR PT FALLS ASSESS DOC 2+ FALLS/FALL W/INJURY/YR: ICD-10-PCS | Mod: S$GLB,,, | Performed by: INTERNAL MEDICINE

## 2019-09-05 NOTE — ASSESSMENT & PLAN NOTE
Mr. Alejandro has had biopsy done via EUS and the result, while not definitive, is suggestive of a neuroendocrine malignancy.  Review of his CT scan report shows comparison to a scan done in December of 2017 on which the abdominal adenopathy was seen.  The reports states that one of the nodes visualized actually decreased in size while another grew.  I had a long discussion with him today.  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      I offered referral to the neuroendocrine group in [place] with Dr. EVANGELINA Khalil for further assistance in diagnosis and possible therapy choices if this is indeed NE carcinoma.  Mr. Alejandro is not quite sure if he wants to do anything at all with this and wants to think about this.  I will message Dr. Khalil in the meantime to gather his thoughts on referral at this time.      I discussed that, if he does not want therapy currently, following the disease with imaging would be a good idea.  If he does not want referral/treatment then I would scan him again in four months.

## 2019-09-05 NOTE — PROGRESS NOTES
PROGRESS NOTE    Subjective:       Patient ID: Roosevelt Alejandro is a 73 y.o. male.    Chief Complaint:  No chief complaint on file.  abdominal lymphadenopathy follow up.     History of Present Illness:   Roosevelt Alejandro is a 73 y.o. male who presents for follow up of lymphadenopathy.  He underwent EGD/EUS biopsy with Dr. Lucio since last visit.        CT abdomen 7/15/2019:  Mesenteric and retroperitoneal lymphadenopathy, with numerous necrotic appearing lymph node is within the central mesentery.  Some of these nodes have mildly increased in size, while the largest previously present node has significantly decreased in size as above.  Further workup for neoplastic process such as metastatic disease or lymphoma is recommended.    Normal appendix.  Moderate diverticulosis.  Small hiatal hernia.  Hepatic and renal cysts.  Atherosclerosis.  Evidence for prior asbestos exposure.    Family and Social history reviewed and is unchanged from 7/26/2019      ROS:  Review of Systems   Constitutional: Negative for fever and unexpected weight change.   HENT: Negative for nosebleeds.    Respiratory: Negative for chest tightness and shortness of breath.    Cardiovascular: Negative for chest pain.   Gastrointestinal: Negative for abdominal pain and blood in stool.   Genitourinary: Negative for hematuria.   Skin: Negative for rash.   Hematological: Does not bruise/bleed easily.          Current Outpatient Medications:     atorvastatin (LIPITOR) 20 MG tablet, Take 1 tablet (20 mg total) by mouth once daily., Disp: 90 tablet, Rfl: 3    FOLIC ACID/MULTIVIT-MIN/LUTEIN (CENTRUM SILVER ORAL), Take by mouth., Disp: , Rfl:     metoprolol succinate (TOPROL-XL) 25 MG 24 hr tablet, Take 1 tablet (25 mg total) by mouth once daily., Disp: 90 tablet, Rfl: 3    NIFEdipine (PROCARDIA-XL) 30 MG (OSM) 24 hr tablet, Take 1 tablet (30 mg total) by mouth every evening., Disp: 90 tablet, Rfl:  3        Objective:       Physical Examination:     /74   Pulse 80   Temp 98.9 °F (37.2 °C) (Oral)   Resp 20   Wt 98.8 kg (217 lb 14.4 oz)   BMI 27.98 kg/m²     Physical Exam   Constitutional: He is oriented to person, place, and time. He appears well-developed and well-nourished.   HENT:   Head: Normocephalic and atraumatic.   Right Ear: External ear normal.   Left Ear: External ear normal.   Mouth/Throat: Oropharynx is clear and moist.   Eyes: Pupils are equal, round, and reactive to light. Conjunctivae are normal. No scleral icterus.   Neck: Normal range of motion. Neck supple.   Cardiovascular: Normal rate, regular rhythm and normal heart sounds. Exam reveals no gallop and no friction rub.   No murmur heard.  Pulmonary/Chest: Effort normal and breath sounds normal. No respiratory distress. He has no rales. He exhibits no tenderness.   Abdominal: Soft. Bowel sounds are normal. He exhibits no distension and no mass. There is no hepatosplenomegaly. There is no tenderness. There is no rebound and no guarding.   Musculoskeletal: He exhibits no edema.   Lymphadenopathy:        Head (right side): No tonsillar adenopathy present.        Head (left side): No tonsillar adenopathy present.     He has no cervical adenopathy.     He has no axillary adenopathy.        Right: No supraclavicular adenopathy present.        Left: No supraclavicular adenopathy present.   Neurological: He is alert and oriented to person, place, and time.   Psychiatric: He has a normal mood and affect. His behavior is normal. Judgment and thought content normal.   Vitals reviewed.      Labs:   No results found for this or any previous visit (from the past 336 hour(s)).  CMP  Sodium   Date Value Ref Range Status   08/20/2019 136 136 - 145 mmol/L Final     Potassium   Date Value Ref Range Status   08/20/2019 3.8 3.5 - 5.1 mmol/L Final     Chloride   Date Value Ref Range Status   08/20/2019 105 95 - 110 mmol/L Final     CO2   Date Value Ref  Range Status   08/20/2019 25 23 - 29 mmol/L Final     Glucose   Date Value Ref Range Status   08/20/2019 110 70 - 110 mg/dL Final     BUN, Bld   Date Value Ref Range Status   08/20/2019 16 8 - 23 mg/dL Final     Creatinine   Date Value Ref Range Status   08/20/2019 1.1 0.5 - 1.4 mg/dL Final     Calcium   Date Value Ref Range Status   08/20/2019 8.7 8.7 - 10.5 mg/dL Final     Total Protein   Date Value Ref Range Status   07/15/2019 7.9 6.0 - 8.4 g/dL Final     Albumin   Date Value Ref Range Status   07/15/2019 4.2 3.5 - 5.2 g/dL Final     Total Bilirubin   Date Value Ref Range Status   07/15/2019 0.5 0.1 - 1.0 mg/dL Final     Comment:     For infants and newborns, interpretation of results should be based  on gestational age, weight and in agreement with clinical  observations.  Premature Infant recommended reference ranges:  Up to 24 hours.............<8.0 mg/dL  Up to 48 hours............<12.0 mg/dL  3-5 days..................<15.0 mg/dL  6-29 days.................<15.0 mg/dL       Alkaline Phosphatase   Date Value Ref Range Status   07/15/2019 120 55 - 135 U/L Final     AST   Date Value Ref Range Status   07/15/2019 15 10 - 40 U/L Final     ALT   Date Value Ref Range Status   07/15/2019 13 10 - 44 U/L Final     Anion Gap   Date Value Ref Range Status   08/20/2019 6 (L) 8 - 16 mmol/L Final     eGFR if    Date Value Ref Range Status   08/20/2019 >60.0 >60 mL/min/1.73 m^2 Final     eGFR if non    Date Value Ref Range Status   08/20/2019 >60.0 >60 mL/min/1.73 m^2 Final     Comment:     Calculation used to obtain the estimated glomerular filtration  rate (eGFR) is the CKD-EPI equation.        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 Alejandro  has had biopsy done via EUS and the result, while not definitive, is suggestive of a neuroendocrine malignancy.  Review of his CT scan report shows comparison to a scan done in December of 2017 on which the abdominal adenopathy was seen.  The reports states that one of the nodes visualized actually decreased in size while another grew.  I had a long discussion with him today.  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      I offered referral to the neuroendocrine group in [place] with Dr. EVANGELINA Khalil for further assistance in diagnosis and possible therapy choices if this is indeed NE carcinoma.  Mr. Alejandro is not quite sure if he wants to do anything at all with this and wants to think about this.  I will message Dr. Khalil in the meantime to gather his thoughts on referral at this time.      I discussed that, if he does not want therapy currently, following the disease with imaging would be a good idea.  If he does not want referral/treatment then I would scan him again in four months.                 Discussion:     Follow up in about 4 months (around 1/5/2020).      Electronically signed by Gaston Farmer

## 2019-09-10 ENCOUNTER — TELEPHONE (OUTPATIENT)
Dept: NEUROLOGY | Facility: HOSPITAL | Age: 74
End: 2019-09-10

## 2019-09-10 DIAGNOSIS — C7A.8 PRIMARY MALIGNANT NEUROENDOCRINE NEOPLASM OF DUODENUM: Primary | ICD-10-CM

## 2019-09-10 NOTE — TELEPHONE ENCOUNTER
----- Message -----  From: Gaston Farmer MD  Sent: 9/5/2019   2:17 PM  To: MD Gianna Ladd,  I am writing in regard to a patient I may send to you if you feel this is appropriate.  Mr. Alejandro is a 72yo male with abdominal lymphadenopathy.  I saw him and had our GI  Get a biopsy via EUS and the result, while not definitive, is suggestive of a neuroendocrine malignancy.    Review of his CT scan of July 2019 report shows comparison to a scan done in December of 2017 on which the abdominal adenopathy was seen.  The reports states that one of the nodes visualized actually decreased in size while another grew.    Further tissue is requested but our general surgeon has some reservations about being able to get an adequate biopsy.   Let me know what you think.  Much appreciated,     Jak Farmer

## 2019-09-10 NOTE — TELEPHONE ENCOUNTER
New referral from Dr. Chun. Pt with suspected NET.   Pt presented with abd pain x 2 wks and passing of dark stools in which he attributed to pepto- bismol.    CT scan in 2019 showed lymphadenopathy and was compared to a CT from 12/2017. The reports states that one of the nodes decreased in size while one of the nodes grew. EUS w duod bx was performed. Bx was suggestive of net.      Janes sched ga 68 PET/CT and md appointment for eval.

## 2019-10-02 NOTE — PROGRESS NOTES
"NOLANETS:  [facility]  A collaboration between [facility] and [facility]      PATIENT: Roosevelt Alejandro  MRN: [id number]  DATE: 10/3/2019    Subjective:      Chief Complaint: Consult for suspected neuroendocrine tumor.  Referred by Dr. Chun.     Vitals: Blood pressure (!) 170/89, pulse 77, temperature 96.9 °F (36.1 °C), temperature source Oral, height 6' 2" (1.88 m), weight 97.2 kg (214 lb 4.6 oz). --    ECOG Score: 0 - Asymptomatic    Diagnosis:   1. Abdominal lymphadenopathy    2. 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    CT scan in 2019 showed lymphadenopathy and was compared to a CT from 12/2017. The reports states that one of the nodes decreased in size while one of the nodes grew. EUS with duodenal biopsy was performed. Abigail duodenal lymph node Biopsy was suggestive of a neuroendocrine tumor. 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    Vertigo, aural, unspecified laterality 3/6/2019       Past Surgical History:  Past Surgical History:   Procedure Laterality Date    COLONOSCOPY      2009    COLONOSCOPY N/A 1/21/2016    Procedure: COLONOSCOPY;  Surgeon: Toby Maciel MD;  [facility];  Service: Endoscopy;  Laterality: N/A;    COLONOSCOPY N/A 1/25/2019    Procedure: COLONOSCOPY;  Surgeon: Toby Maciel MD;  Location: [facility];  Service: Endoscopy;  " Laterality: N/A;    ENDOSCOPIC ULTRASOUND OF UPPER GASTROINTESTINAL TRACT N/A 8/20/2019    Procedure: ULTRASOUND, UPPER GI TRACT, ENDOSCOPIC;  Surgeon: Forest Lucio III, MD;  Location: [facility];  Service: Endoscopy;  Laterality: N/A;    EYE SURGERY Bilateral     cataracts, retinal detachment    PROSTATE BIOPSY      PROSTATECTOMY  12/2017    ROTATOR CUFF REPAIR      left       Family History:  Family History   Problem Relation Age of Onset    Cancer Father         lung/ asbestos    Melanoma Neg Hx     Psoriasis Neg Hx     Lupus Neg Hx     Eczema Neg Hx        Allergies:  Aspirin; Lisinopril (bulk); and Sulfa (sulfonamide antibiotics)    Medications:  Current Outpatient Medications   Medication Sig    atorvastatin (LIPITOR) 20 MG tablet Take 1 tablet (20 mg total) by mouth once daily.    FOLIC ACID/MULTIVIT-MIN/LUTEIN (CENTRUM SILVER ORAL) Take by mouth.    metoprolol succinate (TOPROL-XL) 25 MG 24 hr tablet Take 1 tablet (25 mg total) by mouth once daily.    NIFEdipine (PROCARDIA-XL) 30 MG (OSM) 24 hr tablet Take 1 tablet (30 mg total) by mouth every evening.     No current facility-administered medications for this visit.         Review of Systems   Constitutional: Negative.  Negative for appetite change, chills, fatigue, fever and unexpected weight change.        Works out 3X/week, inversion, full time [employer]   HENT: Negative.  Negative for congestion, hearing loss and sore throat.    Eyes: Negative.  Negative for pain, discharge and itching.   Respiratory: Negative.  Negative for cough, chest tightness, shortness of breath and wheezing.    Cardiovascular: Negative.  Negative for chest pain, palpitations and leg swelling.   Gastrointestinal: Negative.  Negative for abdominal distention, abdominal pain, blood in stool, constipation, diarrhea, nausea and vomiting.   Endocrine: Negative.  Negative for cold intolerance, heat intolerance and polydipsia.   Genitourinary: Negative.  Negative  for difficulty urinating, dysuria and flank pain.   Musculoskeletal: Negative.  Negative for arthralgias, joint swelling and myalgias.   Skin: Negative.  Negative for color change, pallor and rash.   Allergic/Immunologic: Negative.    Neurological: Negative.  Negative for dizziness, syncope and light-headedness.   Hematological: Negative.  Negative for adenopathy. Does not bruise/bleed easily.   Psychiatric/Behavioral: Negative.  Negative for agitation, confusion, dysphoric mood, hallucinations, sleep disturbance and suicidal ideas. The patient is not nervous/anxious.    All other systems reviewed and are negative.     Objective:      Physical Exam   Constitutional: He is oriented to person, place, and time. He appears well-developed and well-nourished. No distress.   HENT:   Head: Normocephalic and atraumatic.   Eyes: Pupils are equal, round, and reactive to light. EOM are normal. No scleral icterus.   Neck: Normal range of motion. Neck supple. No JVD present. No tracheal deviation present.   Cardiovascular: Normal rate, regular rhythm and intact distal pulses.   Pulmonary/Chest: Effort normal and breath sounds normal. No respiratory distress. He has no wheezes. He has no rales.   Abdominal: Soft. Bowel sounds are normal. He exhibits no distension and no mass. There is no tenderness. There is no rebound and no guarding. No hernia.   Musculoskeletal: Normal range of motion. He exhibits no edema.   Neurological: He is alert and oriented to person, place, and time. He has normal reflexes.   Skin: Skin is warm and dry. No rash noted. He is not diaphoretic. No erythema. No pallor.   Psychiatric: He has a normal mood and affect. His behavior is normal. Judgment and thought content normal.   Nursing note and vitals reviewed.     Assessment:       1. Abdominal lymphadenopathy    2. 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        Lab Data:    Neuroendocrine Labs Latest Ref Rng & Units 8/20/2019   WBC 3.90 - 12.70 K/uL 9.19   HGB 14.0 - 18.0 g/dL 12.9 (L)   HCT 40.0 - 54.0 % 38.3 (L)   PLATLETS 150 - 350 K/uL 201   GLUCOSE 70 - 110 mg/dL 110   BUN 8 - 23 mg/dL 16   CREATININE 0.5 - 1.4 mg/dL 1.1    - 145 mmol/L 136   K 3.5 - 5.1 mmol/L 3.8   CHLORIDE 95 - 110 mmol/L 105   CO2 23 - 29 mmol/L 25   CALCIUM 8.7 - 10.5 mg/dL 8.7     Results of well-controlled immunohistochemical stains are summarized as   follows:      Pan-CK: Positive in ghost cells   CK7: Negative  CK20: Negative  Synaptophysin: Positive in ghost cells  Chromogranin: Positive in 30-40 % of ghost cells  PSA: Negative.    Upper EUS:  Findings:       ENDOSCOPIC FINDING: :       The examined esophagus was endoscopically normal. 5 cm hiatal hernia        from 37-42 cm.       The entire examined stomach was endoscopically normal.       The examined duodenum was endoscopically normal.       ENDOSONOGRAPHIC FINDING: :       Limited mediastinal exam unremarkable       Limited exam of liver unremarkable; benign appearing hepatic cyts       There was no sign of significant endosonographic abnormality in the        entire pancreas. The pancreatic duct was regular in contour.       There was no sign of significant endosonographic abnormality in the        common bile duct. The maximum diameter of the duct was 4 mm.        Shadowing echogenic debris and stones were identified in Gallbladder.       There was no sign of significant endosonographic abnormality in the        ampulla.       One abnormal lymph node was visualized in the peripancreatic region        beyond the uncinate process. It measured 12 mm by 10 mm in maximal        cross-sectional diameter. The node was round, hypoechoic and had        well defined margins. Fine needle biopsy was performed. Color        Doppler imaging was utilized prior to needle puncture to confirm a        lack of significant vascular structures within the  "needle path.        Three passes were made 22 gauge BS Acquire FNB needle using a        transduodenal approach. A visible core of tissue was obtained. Final        cytology results are pending.    Pathology:  08/21/19 C/blg      1.  GASTRIC ANTRUM BIOPSY:  - FOCAL SUPERFICIAL EROSIVE GASTRITIS ARISING IN THE BACKGROUND OF   CHRONIC CHEMICAL   GASTROPATHY.  - NO HELICOBACTER PYLORI-LIKE BACTERIA ARE IDENTIFIED BY ROUTINE STAIN.    2.  PERIDUODENAL LYMPH NODE, CORE NEEDLE BIOPSIES:  - CORE NEEDLE BIOPSY FRAGMENTS OF NECROTIC TISSUE WITH GHOST CELL   OUTLINE AND ARCHITECTURAL   DISTRIBUTION SUGGESTIVE OF GLANDULAR TISSUE.  - NO LYMPH NODE TISSUE IS MICROSCOPICALLY IDENTIFIED.    Note:   A limited panel of immunohistochemical stains will be attempted   for further characterization of necrotic tissue.  Addendum results   with results of special stains will follow.    3.  PERIDUODENAL LYMPH NODE, FLOW CYTOMETRY ANALYSIS:  - PERFORMED FLOW CYTOMETRY ANALYSIS FAILS TO IDENTIFY ANY UNIQUE CELL   POPULATION.  - NO EVIDENCE OF HEMATOPOIETIC MALIGNANCY IS IDENTIFIED.    Note:  Please see flow cytometry report inserted below.                                                                                                                                                                                                                       SPECIMEN AND SOURCE:     1. Antrum  2. Abigail-Duodenal Lymph Node  3. Abigail-Duodenal Lymph Node- Sent For Flow Cytometry    CLINICAL INFORMATION:     Pre-op Diagnosis: Lymphadenopathy [R59.1]    GROSS EXAMINATION:  08/20/19 EB/WPL/blg    The specimen is received in two containers of formalin accompanied by a   requisition all labeled "[facility]."      1.  Labeled as "antrum" on the requisition as well as the container.    The specimen consists of two pieces of pink-tan soft tissue measuring   0.2 and 0.5 cm.  Entirely submitted in a single cassette.    2.  Labeled as "periduodenal lymph node" on " the requisition as well as   the container.  The specimen consists of multiple core biopsies of   pink-tan soft tissue aggregating to 1.0 x 0.7 x 0.1 cm.  Entirely   submitted in a single cassette.    MICROSCOPIC EXAMINATION:    1.  The microscopic examination was done, and the findings support the   final diagnosis.    2.  The microscopic examination was done, and the findings support the   final diagnosis.        FLOW CYTOMETRY     SPECIMEN:  Abigail-duodenal lymph node    ANTIGENS:  CD2, CD3, CD4, CD5, CD7, CD8, CD10, CD16, CD19, CD20,  CD22, Cd23,   CD45, CD56, CD57, FMC-7, KAPPA, LAMBDA    Flow cytometric immunophenotyping of the submitted tissue is performed   using antibodies to the above listed antigens and reveals largely   nonspecific antigen uptake. Cell viability is adequate.     INTERPRETATION:  Immunophenotyping fails to identify any unique cell   populations.        Scans:   CT Abdomen Pelvis With Contrast-7/15/19    CLINICAL HISTORY:  RLQ pain, appendicitis suspected;    TECHNIQUE:  Low dose axial images, sagittal and coronal reformations were obtained from the lung bases to the pubic symphysis following the IV administration of 100 mL of Omnipaque 350 .  Oral contrast was not given.    COMPARISON:  12/20/2017    FINDINGS:  Within the mesentery of the central abdomen, 3 masses are present, exhibiting central low density, the largest of which measures 2.4 cm (previously 1.8), with the appearance concerning for necrotic lymph nodes.  Previously numerous non necrotic appearing lymph nodes are present in this area, measuring up to 3.3 cm.  This largest node has decreased in size, now measuring 2.0 cm.  There is mild haziness of the mesentery about the nodes. A retroperitoneal lymph node posterior to the inferior vena cava, just above the level of the renal veins has increased in size from 0.9 to 1.8 cm in short axis, having a non the chronic appearance.    Numerous hepatic cysts are present the largest of  which is present within the left lobe measuring 3.1 cm without significant change.  The pancreas, spleen, and adrenal glands are unremarkable.  There is no biliary dilatation.    A few patent cysts are present bilaterally.  The largest arises from the lower pole of the right kidney measuring 10 cm, previously 9 cm.    A small hiatal hernia is present.  The small bowel is normal caliber.  The appendix is normal.  Moderate diverticulosis coli is present.    There are postoperative changes of trans urethral resection of the prostate.  Prostate gland is significantly decreased in size.  Numerous bladder diverticula are present in keeping with sequelae of prior bladder outlet obstruction.  There is heavy calcification of the aorta without aneurysm.    There is calcified pleural plaque formation typical of prior asbestos exposure.  There are no suspicious osseous lesions.  Impression: Mesenteric and retroperitoneal lymphadenopathy, with numerous necrotic appearing lymph node is within the central mesentery.  Some of these nodes have mildly increased in size, while the largest previously present node has significantly decreased in size as above.  Further workup for neoplastic process such as metastatic disease or lymphoma is recommended.    Normal appendix.  Moderate diverticulosis.  Small hiatal hernia.  Hepatic and renal cysts.  Atherosclerosis.  Evidence for prior asbestos exposure.         Ga 68 PET/CT scan: 10/3/19            Impression:  CT scan and Gallium scan strongly suggestive of small-bowel primary superior to bladder, neuroendocrine tumor with lymph node metastases.  Silent none syndromic, probably had occult GI bleed from small-bowel tumor.       Plan:         Carcinoid tumor markers today if not already drawn.  Long discussion with patient about prognosis, etiology, and long-term care plan.  Will require an exploratory laparotomy small bowel resection and lymph node dissection with lymphatic mapping,  cholecystectomy, 11/4/19  tentative          SP Khalil MD, FACS  Professor of Surgery, [facility]  Neuroendocrine Surgery, Hepatic/Pancreatic & General Surgery  [address], [address]  [place]  [address]  ph. [phone]; [phone]  fax. [phone]

## 2019-10-03 ENCOUNTER — OFFICE VISIT (OUTPATIENT)
Dept: NEUROLOGY | Facility: HOSPITAL | Age: 74
End: 2019-10-03
Attending: SURGERY
Payer: MEDICARE

## 2019-10-03 ENCOUNTER — HOSPITAL ENCOUNTER (OUTPATIENT)
Dept: RADIOLOGY | Facility: HOSPITAL | Age: 74
Discharge: HOME | End: 2019-10-03
Attending: SURGERY
Payer: MEDICARE

## 2019-10-03 VITALS
HEIGHT: 74 IN | TEMPERATURE: 97 F | SYSTOLIC BLOOD PRESSURE: 170 MMHG | HEART RATE: 77 BPM | WEIGHT: 214.31 LBS | BODY MASS INDEX: 27.5 KG/M2 | DIASTOLIC BLOOD PRESSURE: 89 MMHG

## 2019-10-03 DIAGNOSIS — C7B.8 NEUROENDOCRINE CARCINOMA METASTATIC TO INTRA-ABDOMINAL LYMPH NODE: ICD-10-CM

## 2019-10-03 DIAGNOSIS — C7A.8 NEUROENDOCRINE CARCINOMA METASTATIC TO INTRA-ABDOMINAL LYMPH NODE: ICD-10-CM

## 2019-10-03 DIAGNOSIS — C7A.8 PRIMARY MALIGNANT NEUROENDOCRINE NEOPLASM OF DUODENUM: ICD-10-CM

## 2019-10-03 DIAGNOSIS — C7A.00 MALIGNANT CARCINOID TUMOR OF UNKNOWN PRIMARY SITE: Primary | ICD-10-CM

## 2019-10-03 DIAGNOSIS — R59.0 ABDOMINAL LYMPHADENOPATHY: ICD-10-CM

## 2019-10-03 LAB
EXT 24 HR UR METANEPHRINE: ABNORMAL
EXT 24 HR UR NORMETANEPHRINE: ABNORMAL
EXT 24 HR UR NORMETANEPHRINE: ABNORMAL
EXT 25 HYDROXY VIT D2: ABNORMAL
EXT 25 HYDROXY VIT D3: ABNORMAL
EXT 5 HIAA 24 HR URINE: ABNORMAL
EXT 5 HIAA BLOOD: 93 NG/ML (ref 0–22)
EXT ACTH: ABNORMAL
EXT AFP: ABNORMAL
EXT ALBUMIN: ABNORMAL
EXT ALKALINE PHOSPHATASE: ABNORMAL
EXT ALT: ABNORMAL
EXT AMYLASE: ABNORMAL
EXT ANTI ISLET CELL AB: ABNORMAL
EXT ANTI PARIETAL CELL AB: ABNORMAL
EXT ANTI THYROID AB: ABNORMAL
EXT AST: ABNORMAL
EXT BILIRUBIN DIRECT: ABNORMAL
EXT BILIRUBIN TOTAL: ABNORMAL
EXT BK VIRUS DNA QN PCR: ABNORMAL
EXT BUN: ABNORMAL
EXT C PEPTIDE: ABNORMAL
EXT CA 125: ABNORMAL
EXT CA 19-9: ABNORMAL
EXT CA 27-29: ABNORMAL
EXT CALCITONIN: ABNORMAL
EXT CALCIUM: ABNORMAL
EXT CEA: ABNORMAL
EXT CHLORIDE: ABNORMAL
EXT CHOLESTEROL: ABNORMAL
EXT CHROMOGRANIN A: ABNORMAL
EXT CO2: ABNORMAL
EXT CREATININE UA: ABNORMAL
EXT CREATININE: ABNORMAL
EXT CYCLOSPORONE LEVEL: ABNORMAL
EXT DOPAMINE: ABNORMAL
EXT EBV DNA BY PCR: ABNORMAL
EXT EPINEPHRINE: ABNORMAL
EXT FOLATE: ABNORMAL
EXT FREE T3: ABNORMAL
EXT FREE T4: ABNORMAL
EXT FSH: ABNORMAL
EXT GASTRIN RELEASING PEPTIDE: ABNORMAL
EXT GASTRIN RELEASING PEPTIDE: ABNORMAL
EXT GASTRIN: ABNORMAL
EXT GGT: ABNORMAL
EXT GHRELIN: ABNORMAL
EXT GLUCAGON: ABNORMAL
EXT GLUCOSE: ABNORMAL
EXT GROWTH HORMONE: ABNORMAL
EXT HCV RNA QUANT PCR: ABNORMAL
EXT HDL: ABNORMAL
EXT HEMATOCRIT: ABNORMAL
EXT HEMOGLOBIN A1C: ABNORMAL
EXT HEMOGLOBIN: ABNORMAL
EXT HISTAMINE 24 HR URINE: ABNORMAL
EXT HISTAMINE: ABNORMAL
EXT IGF-1: ABNORMAL
EXT IMMUNKNOW (NON-STIMULATED): ABNORMAL
EXT IMMUNKNOW (STIMULATED): ABNORMAL
EXT INR: ABNORMAL
EXT INSULIN: ABNORMAL
EXT LANREOTIDE LEVEL: ABNORMAL
EXT LDH, TOTAL: ABNORMAL
EXT LDL CHOLESTEROL: ABNORMAL
EXT LIPASE: ABNORMAL
EXT MAGNESIUM: ABNORMAL
EXT METANEPHRINE FREE PLASMA: ABNORMAL
EXT MOTILIN: ABNORMAL
EXT NEUROKININ A CAMB: ABNORMAL
EXT NEUROKININ A ISI: 22 PG/ML (ref 0–40)
EXT NEUROTENSIN: ABNORMAL
EXT NOREPINEPHRINE: ABNORMAL
EXT NORMETANEPHRINE: ABNORMAL
EXT NSE: ABNORMAL
EXT OCTREOTIDE LEVEL: ABNORMAL
EXT PANCREASTATIN CAMB: ABNORMAL
EXT PANCREASTATIN ISI: 264 PG/ML (ref 10–135)
EXT PANCREATIC POLYPEPTIDE: ABNORMAL
EXT PHOSPHORUS: ABNORMAL
EXT PLATELETS: ABNORMAL
EXT POTASSIUM: ABNORMAL
EXT PROGRAF LEVEL: ABNORMAL
EXT PROLACTIN: ABNORMAL
EXT PROTEIN TOTAL: ABNORMAL
EXT PROTEIN UA: ABNORMAL
EXT PT: ABNORMAL
EXT PTH, INTACT: ABNORMAL
EXT PTT: ABNORMAL
EXT RAPAMUNE LEVEL: ABNORMAL
EXT SEROTONIN: 759 NG/ML (ref 56–244)
EXT SODIUM: ABNORMAL
EXT SOMATOSTATIN: ABNORMAL
EXT SUBSTANCE P: ABNORMAL
EXT TRIGLYCERIDES: ABNORMAL
EXT TRYPTASE: ABNORMAL
EXT TSH: ABNORMAL
EXT URIC ACID: ABNORMAL
EXT URINE AMYLASE U/HR: ABNORMAL
EXT URINE AMYLASE U/L: ABNORMAL
EXT VASOACTIVE INTESTINAL POLYPEPTIDE: ABNORMAL
EXT VITAMIN B12: ABNORMAL
EXT VMA 24 HR URINE: ABNORMAL
EXT WBC: ABNORMAL
NEURON SPECIFIC ENOLASE: ABNORMAL

## 2019-10-03 PROCEDURE — A9587 GALLIUM GA-68: HCPCS | Mod: TB

## 2019-10-03 PROCEDURE — 78815 NM PET 68GA DOTATATE WHOLE BODY: ICD-10-PCS | Mod: 26,PI,, | Performed by: RADIOLOGY

## 2019-10-03 PROCEDURE — 78815 PET IMAGE W/CT SKULL-THIGH: CPT | Mod: 26,PI,, | Performed by: RADIOLOGY

## 2019-10-03 PROCEDURE — 78815 PET IMAGE W/CT SKULL-THIGH: CPT | Mod: TC

## 2019-10-03 PROCEDURE — 99215 OFFICE O/P EST HI 40 MIN: CPT | Mod: 25 | Performed by: SURGERY

## 2019-10-03 RX ORDER — ACETAMINOPHEN 500 MG
1000 TABLET ORAL
Status: CANCELLED | OUTPATIENT
Start: 2019-10-03 | End: 2019-10-03

## 2019-10-03 RX ORDER — ONDANSETRON 2 MG/ML
8 INJECTION INTRAMUSCULAR; INTRAVENOUS
Status: CANCELLED | OUTPATIENT
Start: 2019-10-03

## 2019-10-03 RX ORDER — FAMOTIDINE 10 MG/ML
20 INJECTION INTRAVENOUS
Status: CANCELLED | OUTPATIENT
Start: 2019-10-03

## 2019-10-03 RX ORDER — PREGABALIN 75 MG/1
75 CAPSULE ORAL
Status: CANCELLED | OUTPATIENT
Start: 2019-10-03

## 2019-10-03 RX ORDER — ENOXAPARIN SODIUM 100 MG/ML
30 INJECTION SUBCUTANEOUS
Status: CANCELLED | OUTPATIENT
Start: 2019-10-03

## 2019-10-03 RX ORDER — KETOROLAC TROMETHAMINE 15 MG/ML
15 INJECTION, SOLUTION INTRAMUSCULAR; INTRAVENOUS
Status: CANCELLED | OUTPATIENT
Start: 2019-10-03 | End: 2019-10-06

## 2019-10-03 NOTE — PATIENT INSTRUCTIONS
Blood work / Lab work / Tumor markers: due today.  ---  [facility] [address]           Return Clinic Appointment: in 1 month with Dr. Khalil- appointment made                Patient Instructions    Name:   Roosevelt Alejandro          Take a Hibiclens shower twice a day for 3 days prior to surgery, including the morning of surgery.   Gargle with Listerine twice a day for 2 days prior to surgery, including the morning of surgery.      ___Friday November 1, 2019______________     Appointment with Dr. Khalil   Pre [facility] for Hospital Admission - Go to 1st floor of the hospital-admitting desk. You will do paper work, get blood drawn,  get x-rays and see Anesthesia at this time.     _____Sunday November 3, 2019____________   CLEAR LIQUIDS all day   Start bowel prep  Ducolax Laxative tablets - 2 tablets at 12 noon  Magnesium Citrate - 1 bottle at 2 pm   Take antibiotics as prescribed   Do not eat or drink anything after midnight.                   ___Monday, November 4, 2019_______________   Hospital Admission for surgery.  Report to 2nd floor, Same Day Surgery desk at _7:00 am__   Surgery is scheduled for _9:00 am________        [facility] [place] [address] [place]  [address]  [phone]      INFORMATION REGARDING YOUR PROCEDURE      We look forward to serving you and your family and appreciate that you have chosen [facility] for your healthcare needs. Before, during, and after your surgery, you will be cared for by skilled medical professionals. Our surgeons, anesthesiologists, nurses,  and other healthcare professionals will work with you and your family to ensure a safe, smooth and comfortable surgery and recovery.    In order to best meet your pre-admission needs, your surgeon or ordering physicians office will contact our Scheduling Office at [phone] and schedule a Pre-Procedure Appointment.  This should preferably be done 72 hours or greater before your  scheduled procedure date.     During your pre-procedure visit your insurance will be verified for your procedure. You will meet with a Registered Nurse and an Anesthesiologist or Nurse Anesthetist. If tests are required, they will be performed during your visit. Please allow about one and a half hours for this visit.      You will need to bring the following information or items with you to your Pre-Procedure Appointment:    1.  Picture Identification  2.  Insurance Card  3.  Current list of medications to include name and dosage  4.  List of allergies  5.  Orders and any other forms your doctor has given to you  6.  Copies of lab results performed at other facilities. This may include blood work, EKGs or chest xrays.   These results can be faxed to [phone].    The Pre-Op Center Registration Desk is located on the first floor of the hospital ([address] [place] [address]) in the [facility].  The Pre-Operative Center is located on the second floor of the hospital. Someone will walk you from the registration area to the Pre-Operative Center.    Free parking is located in the front of the hospital and medical office building and is easily accessed from [employer] and [employer]. When you arrive, please check in at the main information desk of the hospital.    Please call Outpatient Surgery at [phone] after 12:00pm on the day before your procedure for your arrival time and updated procedure time.      Pre-Op Bathing Instructions    Before surgery, you can play an important role in your own health.    Because skin is not sterile, we need to be sure that your skin is as free of germs as possible. By following the instructions below, you can reduce the number of germs on your skin before surgery.    IMPORTANT: You will need to shower with a special soap called Hibiclens*, available at any pharmacy.  If you are allergic to Chlorhexidine (the antiseptic in Hibiclens), use an antibacterial soap such  as Dial Soap for your preoperative shower.  You will shower with Hibiclens both the night before your surgery and the morning of your surgery.  Do not use Hibiclens on the head, face or genitals to avoid injury to those areas.    STEP #1: THE NIGHT BEFORE YOUR SURGERY     1. Do not shave the area of your body where your surgery will be performed.  2. Shower and wash your hair and body as usual with your normal soap and shampoo.  3. Rinse your hair and body thoroughly after you shower to remove all soap residue.  4. With your hand, apply one packet of Hibiclens soap to the surgical site.   5. Wash the site gently for five (5) minutes. Do not scrub your skin too hard.   6. Do not wash with your regular soap after Hibiclens is used.  7. Rinse your body thoroughly.  8. Pat yourself dry with a clean, soft towel.  9. Do not use lotion, cream, or powder.  10. Wear clean clothes.    STEP #2: THE MORNING OF YOUR SURGERY     1. Repeat Step #1.    * Not to be used by people allergic to Chlorhexidine.

## 2019-10-24 LAB
5-HIAA, PLASMA (NEUROEND): 93 NG/ML
ALBUMIN SERPL-MCNC: 4.8 G/DL (ref 3.6–5.1)
ALBUMIN/GLOB SERPL: 1.7 (CALC) (ref 1–2.5)
ALP SERPL-CCNC: 119 U/L (ref 40–115)
ALT SERPL-CCNC: 15 U/L (ref 9–46)
AST SERPL-CCNC: 16 U/L (ref 10–35)
BASOPHILS # BLD AUTO: 77 CELLS/UL (ref 0–200)
BASOPHILS NFR BLD AUTO: 0.8 %
BILIRUB SERPL-MCNC: 0.6 MG/DL (ref 0.2–1.2)
BUN SERPL-MCNC: 16 MG/DL (ref 7–25)
BUN/CREAT SERPL: 13 (CALC) (ref 6–22)
CALCIUM SERPL-MCNC: 9.9 MG/DL (ref 8.6–10.3)
CHLORIDE SERPL-SCNC: 103 MMOL/L (ref 98–110)
CO2 SERPL-SCNC: 28 MMOL/L (ref 20–32)
CREAT SERPL-MCNC: 1.21 MG/DL (ref 0.7–1.18)
EOSINOPHIL # BLD AUTO: 202 CELLS/UL (ref 15–500)
EOSINOPHIL NFR BLD AUTO: 2.1 %
ERYTHROCYTE [DISTWIDTH] IN BLOOD BY AUTOMATED COUNT: 12.3 % (ref 11–15)
GFRSERPLBLD MDRD-ARVRAT: 59 ML/MIN/1.73M2
GLOBULIN SER CALC-MCNC: 2.9 G/DL (CALC) (ref 1.9–3.7)
GLUCOSE SERPL-MCNC: 103 MG/DL (ref 65–99)
HCT VFR BLD AUTO: 42.3 % (ref 38.5–50)
HGB BLD-MCNC: 14.2 G/DL (ref 13.2–17.1)
LYMPHOCYTES # BLD AUTO: 2381 CELLS/UL (ref 850–3900)
LYMPHOCYTES NFR BLD AUTO: 24.8 %
MCH RBC QN AUTO: 32.5 PG (ref 27–33)
MCHC RBC AUTO-ENTMCNC: 33.6 G/DL (ref 32–36)
MCV RBC AUTO: 96.8 FL (ref 80–100)
MONOCYTES # BLD AUTO: 518 CELLS/UL (ref 200–950)
MONOCYTES NFR BLD AUTO: 5.4 %
NEUROKININ A: 22 PG/ML
NEUTROPHILS # BLD AUTO: 6422 CELLS/UL (ref 1500–7800)
NEUTROPHILS NFR BLD AUTO: 66.9 %
PANCREASTATIN: 264 PG/ML (ref 10–135)
PLATELET # BLD AUTO: 261 THOUSAND/UL (ref 140–400)
PMV BLD REES-ECKER: 10 FL (ref 7.5–12.5)
POTASSIUM SERPL-SCNC: 4.3 MMOL/L (ref 3.5–5.3)
PROT SERPL-MCNC: 7.7 G/DL (ref 6.1–8.1)
RBC # BLD AUTO: 4.37 MILLION/UL (ref 4.2–5.8)
SEROTONIN SER-MCNC: 759 NG/ML (ref 56–244)
SODIUM SERPL-SCNC: 141 MMOL/L (ref 135–146)
WBC # BLD AUTO: 9.6 THOUSAND/UL (ref 3.8–10.8)

## 2019-10-31 ENCOUNTER — ANESTHESIA EVENT (OUTPATIENT)
Dept: SURGERY | Facility: HOSPITAL | Age: 74
DRG: 821 | End: 2019-10-31
Payer: MEDICARE

## 2019-11-01 ENCOUNTER — OFFICE VISIT (OUTPATIENT)
Dept: NEUROLOGY | Facility: HOSPITAL | Age: 74
DRG: 821 | End: 2019-11-01
Attending: SURGERY
Payer: MEDICARE

## 2019-11-01 ENCOUNTER — HOSPITAL ENCOUNTER (OUTPATIENT)
Dept: PREADMISSION TESTING | Facility: HOSPITAL | Age: 74
Discharge: HOME | DRG: 821 | End: 2019-11-01
Attending: SURGERY
Payer: MEDICARE

## 2019-11-01 VITALS
HEART RATE: 85 BPM | BODY MASS INDEX: 28.15 KG/M2 | TEMPERATURE: 98 F | DIASTOLIC BLOOD PRESSURE: 79 MMHG | WEIGHT: 219.38 LBS | HEIGHT: 74 IN | SYSTOLIC BLOOD PRESSURE: 136 MMHG

## 2019-11-01 DIAGNOSIS — C7A.8 NEUROENDOCRINE CARCINOMA METASTATIC TO INTRA-ABDOMINAL LYMPH NODE: Primary | ICD-10-CM

## 2019-11-01 DIAGNOSIS — C7A.00 MALIGNANT CARCINOID TUMOR OF UNKNOWN PRIMARY SITE: ICD-10-CM

## 2019-11-01 DIAGNOSIS — C7B.8 NEUROENDOCRINE CARCINOMA METASTATIC TO INTRA-ABDOMINAL LYMPH NODE: Primary | ICD-10-CM

## 2019-11-01 PROCEDURE — 99214 OFFICE O/P EST MOD 30 MIN: CPT | Mod: 25 | Performed by: SURGERY

## 2019-11-01 RX ORDER — MECLIZINE HYDROCHLORIDE 25 MG/1
TABLET ORAL
Refills: 0 | COMMUNITY
Start: 2019-10-21 | End: 2019-11-25 | Stop reason: ALTCHOICE

## 2019-11-01 NOTE — PATIENT INSTRUCTIONS
Patient Instructions    Name:   Roosevelt Alejandro          Take a Hibiclens shower twice a day for 3 days prior to surgery, including the morning of surgery.   Gargle with Listerine twice a day for 2 days prior to surgery, including the morning of surgery.      _____________11/1/19____     Appointment with   Pre [facility] for Hospital Admission - Go to 1st floor of the hospital-admitting desk. You will do paper work, get blood drawn,  get x-rays and see Anesthesia at this time.     ______11/3/19___________   CLEAR LIQUIDS all day   Start bowel prep  Ducolax Laxative tablets - 2 tablets at 12 noon  Magnesium Citrate - 1 bottle at 2 pm   Take antibiotics as prescribed   Do not eat or drink anything after midnight.                   ________11/4/19__________   Hospital Admission for surgery.  Report to 2nd floor, Same Day Surgery desk at ____700 am____   Surgery is scheduled for __900 am_____        [facility] [address] [place]  [address]  [phone]      INFORMATION REGARDING YOUR PROCEDURE      We look forward to serving you and your family and appreciate that you have chosen [facility] for your healthcare needs. Before, during, and after your surgery, you will be cared for by skilled medical professionals. Our surgeons, anesthesiologists, nurses,  and other healthcare professionals will work with you and your family to ensure a safe, smooth and comfortable surgery and recovery.    In order to best meet your pre-admission needs, your surgeon or ordering physicians office will contact our Scheduling Office at [phone] and schedule a Pre-Procedure Appointment.  This should preferably be done 72 hours or greater before your scheduled procedure date.     During your pre-procedure visit your insurance will be verified for your procedure. You will meet with a Registered Nurse and an Anesthesiologist or Nurse Anesthetist. If tests are required, they will be performed  during your visit. Please allow about one and a half hours for this visit.      You will need to bring the following information or items with you to your Pre-Procedure Appointment:    1.  Picture Identification  2.  Insurance Card  3.  Current list of medications to include name and dosage  4.  List of allergies  5.  Orders and any other forms your doctor has given to you  6.  Copies of lab results performed at other facilities. This may include blood work, EKGs or chest xrays.   These results can be faxed to [phone].    The Pre-Op Center Registration Desk is located on the first floor of the hospital ([address] [place] [address]) in the [facility].  The Pre-Operative Center is located on the second floor of the hospital. Someone will walk you from the registration area to the Pre-Operative Center.    Free parking is located in the front of the hospital and medical office building and is easily accessed from [address] and [employer]. When you arrive, please check in at the main information desk of the hospital.    Please call Outpatient Surgery at [phone] after 12:00pm on the day before your procedure for your arrival time and updated procedure time.      Pre-Op Bathing Instructions    Before surgery, you can play an important role in your own health.    Because skin is not sterile, we need to be sure that your skin is as free of germs as possible. By following the instructions below, you can reduce the number of germs on your skin before surgery.    IMPORTANT: You will need to shower with a special soap called Hibiclens*, available at any pharmacy.  If you are allergic to Chlorhexidine (the antiseptic in Hibiclens), use an antibacterial soap such as Dial Soap for your preoperative shower.  You will shower with Hibiclens both the night before your surgery and the morning of your surgery.  Do not use Hibiclens on the head, face or genitals to avoid injury to those areas.    STEP #1: THE  NIGHT BEFORE YOUR SURGERY     1. Do not shave the area of your body where your surgery will be performed.  2. Shower and wash your hair and body as usual with your normal soap and shampoo.  3. Rinse your hair and body thoroughly after you shower to remove all soap residue.  4. With your hand, apply one packet of Hibiclens soap to the surgical site.   5. Wash the site gently for five (5) minutes. Do not scrub your skin too hard.   6. Do not wash with your regular soap after Hibiclens is used.  7. Rinse your body thoroughly.  8. Pat yourself dry with a clean, soft towel.  9. Do not use lotion, cream, or powder.  10. Wear clean clothes.    STEP #2: THE MORNING OF YOUR SURGERY     1. Repeat Step #1.    * Not to be used by people allergic to Chlorhexidine.

## 2019-11-01 NOTE — H&P (VIEW-ONLY)
"NOLANETS:  [facility]  A collaboration between [facility] and [facility]      PATIENT: Roosevelt Alejandro  MRN: [id number]  DATE: 11/1/2019    Subjective:      Chief Complaint: follow up  consult for suspected neuroendocrine tumor.  Referred by Dr. Chun.     Vitals: Blood pressure 136/79, pulse 85, temperature 97.9 °F (36.6 °C), temperature source Oral, height 6' 2" (1.88 m), weight 99.5 kg (219 lb 5.7 oz). --    ECOG Score: 0 - Asymptomatic    Diagnosis:   1. 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    CT scan in 2019 showed lymphadenopathy and was compared to a CT from 12/2017. The reports states that one of the nodes decreased in size while one of the nodes grew. EUS with duodenal biopsy was performed. Abigail duodenal lymph node Biopsy was suggestive of a neuroendocrine tumor. Works full time [employer].    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    Vertigo, aural, unspecified laterality 3/6/2019       Past Surgical History:  Past Surgical History:   Procedure Laterality Date    COLONOSCOPY      2009    COLONOSCOPY N/A 1/21/2016    Procedure: COLONOSCOPY;  Surgeon: Toby Maciel MD;  Location: [facility] ENDO;  Service: Endoscopy;  Laterality: N/A;    COLONOSCOPY N/A 1/25/2019    Procedure: COLONOSCOPY;  Surgeon: Toby Maciel MD;  Location: [facility] ENDO;  Service: Endoscopy;  Laterality: N/A;    " ENDOSCOPIC ULTRASOUND OF UPPER GASTROINTESTINAL TRACT N/A 8/20/2019    Procedure: ULTRASOUND, UPPER GI TRACT, ENDOSCOPIC;  Surgeon: Forest Lucio III, MD;  Location: [facility];  Service: Endoscopy;  Laterality: N/A;    EYE SURGERY Bilateral     cataracts, retinal detachment    PROSTATE BIOPSY      PROSTATECTOMY  12/2017    ROTATOR CUFF REPAIR      left       Family History:  Family History   Problem Relation Age of Onset    Cancer Father         lung/ asbestos    Melanoma Neg Hx     Psoriasis Neg Hx     Lupus Neg Hx     Eczema Neg Hx        Allergies:  Aspirin; Lisinopril (bulk); and Sulfa (sulfonamide antibiotics)    Medications:  Current Outpatient Medications   Medication Sig    atorvastatin (LIPITOR) 20 MG tablet Take 1 tablet (20 mg total) by mouth once daily.    FOLIC ACID/MULTIVIT-MIN/LUTEIN (CENTRUM SILVER ORAL) Take by mouth.    meclizine (ANTIVERT) 25 mg tablet TK 1 T PO Q 6 H PRF DIZZINESS    metoprolol succinate (TOPROL-XL) 25 MG 24 hr tablet Take 1 tablet (25 mg total) by mouth once daily.    NIFEdipine (PROCARDIA-XL) 30 MG (OSM) 24 hr tablet Take 1 tablet (30 mg total) by mouth every evening.     No current facility-administered medications for this visit.         Review of Systems   Constitutional: Negative.  Negative for appetite change, chills, fatigue, fever and unexpected weight change.        Works out 3X/week, inversion, full time [employer]   HENT: Negative.  Negative for congestion, hearing loss and sore throat.    Eyes: Negative.  Negative for pain, discharge and itching.   Respiratory: Negative.  Negative for cough, chest tightness, shortness of breath and wheezing.    Cardiovascular: Negative.  Negative for chest pain, palpitations and leg swelling.   Gastrointestinal: Negative.  Negative for abdominal distention, abdominal pain, blood in stool, constipation, diarrhea, nausea and vomiting.   Endocrine: Negative.  Negative for cold intolerance, heat intolerance and  polydipsia.   Genitourinary: Negative.  Negative for difficulty urinating, dysuria and flank pain.   Musculoskeletal: Negative.  Negative for arthralgias, joint swelling and myalgias.   Skin: Negative.  Negative for color change, pallor and rash.   Allergic/Immunologic: Negative.    Neurological: Negative.  Negative for dizziness, syncope and light-headedness.   Hematological: Negative.  Negative for adenopathy. Does not bruise/bleed easily.   Psychiatric/Behavioral: Negative.  Negative for agitation, confusion, dysphoric mood, hallucinations, sleep disturbance and suicidal ideas. The patient is not nervous/anxious.    All other systems reviewed and are negative.     Objective:      Physical Exam   Constitutional: He is oriented to person, place, and time. He appears well-developed and well-nourished. No distress.   HENT:   Head: Normocephalic and atraumatic.   Eyes: Pupils are equal, round, and reactive to light. EOM are normal. No scleral icterus.   Neck: Normal range of motion. Neck supple. No JVD present. No tracheal deviation present.   Cardiovascular: Normal rate, regular rhythm and intact distal pulses.   Pulmonary/Chest: Effort normal and breath sounds normal. No respiratory distress. He has no wheezes. He has no rales.   Abdominal: Soft. Bowel sounds are normal. He exhibits no distension and no mass. There is no tenderness. There is no rebound and no guarding. No hernia.   Musculoskeletal: Normal range of motion. He exhibits no edema.   Neurological: He is alert and oriented to person, place, and time. He has normal reflexes.   Skin: Skin is warm and dry. No rash noted. He is not diaphoretic. No erythema. No pallor.   Psychiatric: He has a normal mood and affect. His behavior is normal. Judgment and thought content normal.   Nursing note and vitals reviewed.     Assessment:       1. 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         Lab Data:   Neuroendocrine Labs Latest Ref Rng & Units 10/3/2019   EXT 5 HIAA BLOOD 0 - 22 ng/mL 93 (A)   EXT SEROTONIN 56 - 244 ng/mL 759 (A)   EXT PANCREASTATIN DIONNA 10 - 135 pg/mL 264 (A)   EXT NEUROKININ A DIONNA 0 - 40 pg/mL 22   FOLATE 4.0 - 24.0 ng/ml    VITAMIN B12 210 - 950 pg/ml    TSH 0.400 - 4.000 uIU/mL    WBC 3.8 - 10.8 Thousand/uL 9.6   HGB 13.2 - 17.1 g/dL 14.2   HCT 38.5 - 50.0 % 42.3   PLATLETS 140 - 400 Thousand/uL 261   PT 9.0 - 12.5 sec    PTT 21.0 - 32.0 sec    INR 0.8 - 1.2    GLUCOSE 65 - 99 mg/dL 103 (H)   BUN 7 - 25 mg/dL 16   CREATININE 0.70 - 1.18 mg/dL 1.21 (H)     Neuroendocrine Labs Latest Ref Rng & Units 8/20/2019   WBC 3.90 - 12.70 K/uL 9.19   HGB 14.0 - 18.0 g/dL 12.9 (L)   HCT 40.0 - 54.0 % 38.3 (L)   PLATLETS 150 - 350 K/uL 201   GLUCOSE 70 - 110 mg/dL 110   BUN 8 - 23 mg/dL 16   CREATININE 0.5 - 1.4 mg/dL 1.1    - 145 mmol/L 136   K 3.5 - 5.1 mmol/L 3.8   CHLORIDE 95 - 110 mmol/L 105   CO2 23 - 29 mmol/L 25   CALCIUM 8.7 - 10.5 mg/dL 8.7     Results of well-controlled immunohistochemical stains are summarized as   follows:      Pan-CK: Positive in ghost cells   CK7: Negative  CK20: Negative  Synaptophysin: Positive in ghost cells  Chromogranin: Positive in 30-40 % of ghost cells  PSA: Negative.    Upper EUS:  Findings:       ENDOSCOPIC FINDING: :       The examined esophagus was endoscopically normal. 5 cm hiatal hernia        from 37-42 cm.       The entire examined stomach was endoscopically normal.       The examined duodenum was endoscopically normal.       ENDOSONOGRAPHIC FINDING: :       Limited mediastinal exam unremarkable       Limited exam of liver unremarkable; benign appearing hepatic cyts       There was no sign of significant endosonographic abnormality in the        entire pancreas. The pancreatic duct was regular in contour.       There was no sign of significant endosonographic abnormality in the        common bile duct. The maximum diameter of the duct  was 4 mm.        Shadowing echogenic debris and stones were identified in Gallbladder.       There was no sign of significant endosonographic abnormality in the        ampulla.       One abnormal lymph node was visualized in the peripancreatic region        beyond the uncinate process. It measured 12 mm by 10 mm in maximal        cross-sectional diameter. The node was round, hypoechoic and had        well defined margins. Fine needle biopsy was performed. Color        Doppler imaging was utilized prior to needle puncture to confirm a        lack of significant vascular structures within the needle path.        Three passes were made 22 gauge BS Acquire FNB needle using a        transduodenal approach. A visible core of tissue was obtained. Final        cytology results are pending.    Pathology:  08/21/19 RDC/blg      1.  GASTRIC ANTRUM BIOPSY:  - FOCAL SUPERFICIAL EROSIVE GASTRITIS ARISING IN THE BACKGROUND OF   CHRONIC CHEMICAL   GASTROPATHY.  - NO HELICOBACTER PYLORI-LIKE BACTERIA ARE IDENTIFIED BY ROUTINE STAIN.    2.  PERIDUODENAL LYMPH NODE, CORE NEEDLE BIOPSIES:  - CORE NEEDLE BIOPSY FRAGMENTS OF NECROTIC TISSUE WITH GHOST CELL   OUTLINE AND ARCHITECTURAL   DISTRIBUTION SUGGESTIVE OF GLANDULAR TISSUE.  - NO LYMPH NODE TISSUE IS MICROSCOPICALLY IDENTIFIED.    Note:   A limited panel of immunohistochemical stains will be attempted   for further characterization of necrotic tissue.  Addendum results   with results of special stains will follow.    3.  PERIDUODENAL LYMPH NODE, FLOW CYTOMETRY ANALYSIS:  - PERFORMED FLOW CYTOMETRY ANALYSIS FAILS TO IDENTIFY ANY UNIQUE CELL   POPULATION.  - NO EVIDENCE OF HEMATOPOIETIC MALIGNANCY IS IDENTIFIED.    Note:  Please see flow cytometry report inserted below.                                                                                                                                                                                                                     "   SPECIMEN AND SOURCE:     1. Antrum  2. Abigail-Duodenal Lymph Node  3. Abigail-Duodenal Lymph Node- Sent For Flow Cytometry    CLINICAL INFORMATION:     Pre-op Diagnosis: Lymphadenopathy [R59.1]    GROSS EXAMINATION:  08/20/19 EB/WPL/blg    The specimen is received in two containers of formalin accompanied by a   requisition all labeled "Roosevelt Denise."      1.  Labeled as "antrum" on the requisition as well as the container.    The specimen consists of two pieces of pink-tan soft tissue measuring   0.2 and 0.5 cm.  Entirely submitted in a single cassette.    2.  Labeled as "periduodenal lymph node" on the requisition as well as   the container.  The specimen consists of multiple core biopsies of   pink-tan soft tissue aggregating to 1.0 x 0.7 x 0.1 cm.  Entirely   submitted in a single cassette.    MICROSCOPIC EXAMINATION:    1.  The microscopic examination was done, and the findings support the   final diagnosis.    2.  The microscopic examination was done, and the findings support the   final diagnosis.        FLOW CYTOMETRY     SPECIMEN:  Abigail-duodenal lymph node    ANTIGENS:  CD2, CD3, CD4, CD5, CD7, CD8, CD10, CD16, CD19, CD20,  CD22, Cd23,   CD45, CD56, CD57, FMC-7, KAPPA, LAMBDA    Flow cytometric immunophenotyping of the submitted tissue is performed   using antibodies to the above listed antigens and reveals largely   nonspecific antigen uptake. Cell viability is adequate.     INTERPRETATION:  Immunophenotyping fails to identify any unique cell   populations.        Scans:   CT Abdomen Pelvis With Contrast-7/15/19    CLINICAL HISTORY:  RLQ pain, appendicitis suspected;    TECHNIQUE:  Low dose axial images, sagittal and coronal reformations were obtained from the lung bases to the pubic symphysis following the IV administration of 100 mL of Omnipaque 350 .  Oral contrast was not given.    COMPARISON:  12/20/2017    FINDINGS:  Within the mesentery of the central abdomen, 3 masses are present, exhibiting central " low density, the largest of which measures 2.4 cm (previously 1.8), with the appearance concerning for necrotic lymph nodes.  Previously numerous non necrotic appearing lymph nodes are present in this area, measuring up to 3.3 cm.  This largest node has decreased in size, now measuring 2.0 cm.  There is mild haziness of the mesentery about the nodes. A retroperitoneal lymph node posterior to the inferior vena cava, just above the level of the renal veins has increased in size from 0.9 to 1.8 cm in short axis, having a non the chronic appearance.    Numerous hepatic cysts are present the largest of which is present within the left lobe measuring 3.1 cm without significant change.  The pancreas, spleen, and adrenal glands are unremarkable.  There is no biliary dilatation.    A few patent cysts are present bilaterally.  The largest arises from the lower pole of the right kidney measuring 10 cm, previously 9 cm.    A small hiatal hernia is present.  The small bowel is normal caliber.  The appendix is normal.  Moderate diverticulosis coli is present.    There are postoperative changes of trans urethral resection of the prostate.  Prostate gland is significantly decreased in size.  Numerous bladder diverticula are present in keeping with sequelae of prior bladder outlet obstruction.  There is heavy calcification of the aorta without aneurysm.    There is calcified pleural plaque formation typical of prior asbestos exposure.  There are no suspicious osseous lesions.  Impression: Mesenteric and retroperitoneal lymphadenopathy, with numerous necrotic appearing lymph node is within the central mesentery.  Some of these nodes have mildly increased in size, while the largest previously present node has significantly decreased in size as above.  Further workup for neoplastic process such as metastatic disease or lymphoma is recommended.    Normal appendix.  Moderate diverticulosis.  Small hiatal hernia.  Hepatic and renal cysts.   Atherosclerosis.  Evidence for prior asbestos exposure.         Ga 68 PET/CT scan: 10/3/19            Impression:  Carcinoid tumor markers elevated consistent with small bowel primary.  CT scan and Gallium scan strongly suggestive of small-bowel primary superior to bladder, neuroendocrine tumor with lymph node metastases.  Silent none syndromic, probably had occult GI bleed from small-bowel tumor.       Plan:         Risks/benefits explained and accepted.  All questions answered.    Long discussion with patient about prognosis, etiology, and long-term care plan.  Will require an exploratory laparotomy small bowel resection and lymph node dissection with lymphatic mapping, cholecystectomy, 11/4/19.          SP Khalil MD, FACS  Professor of Surgery, [facility]  Neuroendocrine Surgery, Hepatic/Pancreatic & General Surgery  [address], [address]  [place]  [address]  ph. [phone]; [phone]  fax. [phone]

## 2019-11-01 NOTE — PROGRESS NOTES
"NOLANETS:  [facility]  A collaboration between [facility] and [facility]      PATIENT: Roosevelt Alejandro  MRN: [id number]  DATE: 11/1/2019    Subjective:      Chief Complaint: follow up  consult for suspected neuroendocrine tumor.  Referred by Dr. Chun.     Vitals: Blood pressure 136/79, pulse 85, temperature 97.9 °F (36.6 °C), temperature source Oral, height 6' 2" (1.88 m), weight 99.5 kg (219 lb 5.7 oz). --    ECOG Score: 0 - Asymptomatic    Diagnosis:   1. 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    CT scan in 2019 showed lymphadenopathy and was compared to a CT from 12/2017. The reports states that one of the nodes decreased in size while one of the nodes grew. EUS with duodenal biopsy was performed. Abigail duodenal lymph node Biopsy was suggestive of a neuroendocrine tumor. Works full time [employer]    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    Vertigo, aural, unspecified laterality 3/6/2019       Past Surgical History:  Past Surgical History:   Procedure Laterality Date    COLONOSCOPY      2009    COLONOSCOPY N/A 1/21/2016    Procedure: COLONOSCOPY;  Surgeon: Toby Maciel MD;  Location: [facility] ENDO;  Service: Endoscopy;  Laterality: N/A;    COLONOSCOPY N/A 1/25/2019    Procedure: COLONOSCOPY;  Surgeon: Toby Maciel MD;  Location: [facility] ENDO;  Service: Endoscopy;  Laterality: N/A;    " ENDOSCOPIC ULTRASOUND OF UPPER GASTROINTESTINAL TRACT N/A 8/20/2019    Procedure: ULTRASOUND, UPPER GI TRACT, ENDOSCOPIC;  Surgeon: Forest Lucio III, MD;  Location: [facility];  Service: Endoscopy;  Laterality: N/A;    EYE SURGERY Bilateral     cataracts, retinal detachment    PROSTATE BIOPSY      PROSTATECTOMY  12/2017    ROTATOR CUFF REPAIR      left       Family History:  Family History   Problem Relation Age of Onset    Cancer Father         lung/ asbestos    Melanoma Neg Hx     Psoriasis Neg Hx     Lupus Neg Hx     Eczema Neg Hx        Allergies:  Aspirin; Lisinopril (bulk); and Sulfa (sulfonamide antibiotics)    Medications:  Current Outpatient Medications   Medication Sig    atorvastatin (LIPITOR) 20 MG tablet Take 1 tablet (20 mg total) by mouth once daily.    FOLIC ACID/MULTIVIT-MIN/LUTEIN (CENTRUM SILVER ORAL) Take by mouth.    meclizine (ANTIVERT) 25 mg tablet TK 1 T PO Q 6 H PRF DIZZINESS    metoprolol succinate (TOPROL-XL) 25 MG 24 hr tablet Take 1 tablet (25 mg total) by mouth once daily.    NIFEdipine (PROCARDIA-XL) 30 MG (OSM) 24 hr tablet Take 1 tablet (30 mg total) by mouth every evening.     No current facility-administered medications for this visit.         Review of Systems   Constitutional: Negative.  Negative for appetite change, chills, fatigue, fever and unexpected weight change.        Works out 3X/week, inversion, full time [employer]   HENT: Negative.  Negative for congestion, hearing loss and sore throat.    Eyes: Negative.  Negative for pain, discharge and itching.   Respiratory: Negative.  Negative for cough, chest tightness, shortness of breath and wheezing.    Cardiovascular: Negative.  Negative for chest pain, palpitations and leg swelling.   Gastrointestinal: Negative.  Negative for abdominal distention, abdominal pain, blood in stool, constipation, diarrhea, nausea and vomiting.   Endocrine: Negative.  Negative for cold intolerance, heat intolerance and  polydipsia.   Genitourinary: Negative.  Negative for difficulty urinating, dysuria and flank pain.   Musculoskeletal: Negative.  Negative for arthralgias, joint swelling and myalgias.   Skin: Negative.  Negative for color change, pallor and rash.   Allergic/Immunologic: Negative.    Neurological: Negative.  Negative for dizziness, syncope and light-headedness.   Hematological: Negative.  Negative for adenopathy. Does not bruise/bleed easily.   Psychiatric/Behavioral: Negative.  Negative for agitation, confusion, dysphoric mood, hallucinations, sleep disturbance and suicidal ideas. The patient is not nervous/anxious.    All other systems reviewed and are negative.     Objective:      Physical Exam   Constitutional: He is oriented to person, place, and time. He appears well-developed and well-nourished. No distress.   HENT:   Head: Normocephalic and atraumatic.   Eyes: Pupils are equal, round, and reactive to light. EOM are normal. No scleral icterus.   Neck: Normal range of motion. Neck supple. No JVD present. No tracheal deviation present.   Cardiovascular: Normal rate, regular rhythm and intact distal pulses.   Pulmonary/Chest: Effort normal and breath sounds normal. No respiratory distress. He has no wheezes. He has no rales.   Abdominal: Soft. Bowel sounds are normal. He exhibits no distension and no mass. There is no tenderness. There is no rebound and no guarding. No hernia.   Musculoskeletal: Normal range of motion. He exhibits no edema.   Neurological: He is alert and oriented to person, place, and time. He has normal reflexes.   Skin: Skin is warm and dry. No rash noted. He is not diaphoretic. No erythema. No pallor.   Psychiatric: He has a normal mood and affect. His behavior is normal. Judgment and thought content normal.   Nursing note and vitals reviewed.     Assessment:       1. 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         Lab Data:   Neuroendocrine Labs Latest Ref Rng & Units 10/3/2019   EXT 5 HIAA BLOOD 0 - 22 ng/mL 93 (A)   EXT SEROTONIN 56 - 244 ng/mL 759 (A)   EXT PANCREASTATIN DIONNA 10 - 135 pg/mL 264 (A)   EXT NEUROKININ A DIONNA 0 - 40 pg/mL 22   FOLATE 4.0 - 24.0 ng/ml    VITAMIN B12 210 - 950 pg/ml    TSH 0.400 - 4.000 uIU/mL    WBC 3.8 - 10.8 Thousand/uL 9.6   HGB 13.2 - 17.1 g/dL 14.2   HCT 38.5 - 50.0 % 42.3   PLATLETS 140 - 400 Thousand/uL 261   PT 9.0 - 12.5 sec    PTT 21.0 - 32.0 sec    INR 0.8 - 1.2    GLUCOSE 65 - 99 mg/dL 103 (H)   BUN 7 - 25 mg/dL 16   CREATININE 0.70 - 1.18 mg/dL 1.21 (H)     Neuroendocrine Labs Latest Ref Rng & Units 8/20/2019   WBC 3.90 - 12.70 K/uL 9.19   HGB 14.0 - 18.0 g/dL 12.9 (L)   HCT 40.0 - 54.0 % 38.3 (L)   PLATLETS 150 - 350 K/uL 201   GLUCOSE 70 - 110 mg/dL 110   BUN 8 - 23 mg/dL 16   CREATININE 0.5 - 1.4 mg/dL 1.1    - 145 mmol/L 136   K 3.5 - 5.1 mmol/L 3.8   CHLORIDE 95 - 110 mmol/L 105   CO2 23 - 29 mmol/L 25   CALCIUM 8.7 - 10.5 mg/dL 8.7     Results of well-controlled immunohistochemical stains are summarized as   follows:      Pan-CK: Positive in ghost cells   CK7: Negative  CK20: Negative  Synaptophysin: Positive in ghost cells  Chromogranin: Positive in 30-40 % of ghost cells  PSA: Negative.    Upper EUS:  Findings:       ENDOSCOPIC FINDING: :       The examined esophagus was endoscopically normal. 5 cm hiatal hernia        from 37-42 cm.       The entire examined stomach was endoscopically normal.       The examined duodenum was endoscopically normal.       ENDOSONOGRAPHIC FINDING: :       Limited mediastinal exam unremarkable       Limited exam of liver unremarkable; benign appearing hepatic cyts       There was no sign of significant endosonographic abnormality in the        entire pancreas. The pancreatic duct was regular in contour.       There was no sign of significant endosonographic abnormality in the        common bile duct. The maximum diameter of the duct  was 4 mm.        Shadowing echogenic debris and stones were identified in Gallbladder.       There was no sign of significant endosonographic abnormality in the        ampulla.       One abnormal lymph node was visualized in the peripancreatic region        beyond the uncinate process. It measured 12 mm by 10 mm in maximal        cross-sectional diameter. The node was round, hypoechoic and had        well defined margins. Fine needle biopsy was performed. Color        Doppler imaging was utilized prior to needle puncture to confirm a        lack of significant vascular structures within the needle path.        Three passes were made 22 gauge BS Acquire FNB needle using a        transduodenal approach. A visible core of tissue was obtained. Final        cytology results are pending.    Pathology:  08/21/19 RDC/blg      1.  GASTRIC ANTRUM BIOPSY:  - FOCAL SUPERFICIAL EROSIVE GASTRITIS ARISING IN THE BACKGROUND OF   CHRONIC CHEMICAL   GASTROPATHY.  - NO HELICOBACTER PYLORI-LIKE BACTERIA ARE IDENTIFIED BY ROUTINE STAIN.    2.  PERIDUODENAL LYMPH NODE, CORE NEEDLE BIOPSIES:  - CORE NEEDLE BIOPSY FRAGMENTS OF NECROTIC TISSUE WITH GHOST CELL   OUTLINE AND ARCHITECTURAL   DISTRIBUTION SUGGESTIVE OF GLANDULAR TISSUE.  - NO LYMPH NODE TISSUE IS MICROSCOPICALLY IDENTIFIED.    Note:   A limited panel of immunohistochemical stains will be attempted   for further characterization of necrotic tissue.  Addendum results   with results of special stains will follow.    3.  PERIDUODENAL LYMPH NODE, FLOW CYTOMETRY ANALYSIS:  - PERFORMED FLOW CYTOMETRY ANALYSIS FAILS TO IDENTIFY ANY UNIQUE CELL   POPULATION.  - NO EVIDENCE OF HEMATOPOIETIC MALIGNANCY IS IDENTIFIED.    Note:  Please see flow cytometry report inserted below.                                                                                                                                                                                                                     "   SPECIMEN AND SOURCE:     1. Antrum  2. Abigail-Duodenal Lymph Node  3. Abigail-Duodenal Lymph Node- Sent For Flow Cytometry    CLINICAL INFORMATION:     Pre-op Diagnosis: Lymphadenopathy [R59.1]    GROSS EXAMINATION:  08/20/19 EB/WPL/blg    The specimen is received in two containers of formalin accompanied by a   requisition all labeled "Roosevelt Denise."      1.  Labeled as "antrum" on the requisition as well as the container.    The specimen consists of two pieces of pink-tan soft tissue measuring   0.2 and 0.5 cm.  Entirely submitted in a single cassette.    2.  Labeled as "periduodenal lymph node" on the requisition as well as   the container.  The specimen consists of multiple core biopsies of   pink-tan soft tissue aggregating to 1.0 x 0.7 x 0.1 cm.  Entirely   submitted in a single cassette.    MICROSCOPIC EXAMINATION:    1.  The microscopic examination was done, and the findings support the   final diagnosis.    2.  The microscopic examination was done, and the findings support the   final diagnosis.        FLOW CYTOMETRY     SPECIMEN:  Abigail-duodenal lymph node    ANTIGENS:  CD2, CD3, CD4, CD5, CD7, CD8, CD10, CD16, CD19, CD20,  CD22, Cd23,   CD45, CD56, CD57, FMC-7, KAPPA, LAMBDA    Flow cytometric immunophenotyping of the submitted tissue is performed   using antibodies to the above listed antigens and reveals largely   nonspecific antigen uptake. Cell viability is adequate.     INTERPRETATION:  Immunophenotyping fails to identify any unique cell   populations.        Scans:   CT Abdomen Pelvis With Contrast-7/15/19    CLINICAL HISTORY:  RLQ pain, appendicitis suspected;    TECHNIQUE:  Low dose axial images, sagittal and coronal reformations were obtained from the lung bases to the pubic symphysis following the IV administration of 100 mL of Omnipaque 350 .  Oral contrast was not given.    COMPARISON:  12/20/2017    FINDINGS:  Within the mesentery of the central abdomen, 3 masses are present, exhibiting central " low density, the largest of which measures 2.4 cm (previously 1.8), with the appearance concerning for necrotic lymph nodes.  Previously numerous non necrotic appearing lymph nodes are present in this area, measuring up to 3.3 cm.  This largest node has decreased in size, now measuring 2.0 cm.  There is mild haziness of the mesentery about the nodes. A retroperitoneal lymph node posterior to the inferior vena cava, just above the level of the renal veins has increased in size from 0.9 to 1.8 cm in short axis, having a non the chronic appearance.    Numerous hepatic cysts are present the largest of which is present within the left lobe measuring 3.1 cm without significant change.  The pancreas, spleen, and adrenal glands are unremarkable.  There is no biliary dilatation.    A few patent cysts are present bilaterally.  The largest arises from the lower pole of the right kidney measuring 10 cm, previously 9 cm.    A small hiatal hernia is present.  The small bowel is normal caliber.  The appendix is normal.  Moderate diverticulosis coli is present.    There are postoperative changes of trans urethral resection of the prostate.  Prostate gland is significantly decreased in size.  Numerous bladder diverticula are present in keeping with sequelae of prior bladder outlet obstruction.  There is heavy calcification of the aorta without aneurysm.    There is calcified pleural plaque formation typical of prior asbestos exposure.  There are no suspicious osseous lesions.  Impression: Mesenteric and retroperitoneal lymphadenopathy, with numerous necrotic appearing lymph node is within the central mesentery.  Some of these nodes have mildly increased in size, while the largest previously present node has significantly decreased in size as above.  Further workup for neoplastic process such as metastatic disease or lymphoma is recommended.    Normal appendix.  Moderate diverticulosis.  Small hiatal hernia.  Hepatic and renal cysts.   Atherosclerosis.  Evidence for prior asbestos exposure.         Ga 68 PET/CT scan: 10/3/19            Impression:  Carcinoid tumor markers elevated consistent with small bowel primary.  CT scan and Gallium scan strongly suggestive of small-bowel primary superior to bladder, neuroendocrine tumor with lymph node metastases.  Silent none syndromic, probably had occult GI bleed from small-bowel tumor.       Plan:         Risks/benefits explained and accepted.  All questions answered.    Long discussion with patient about prognosis, etiology, and long-term care plan.  Will require an exploratory laparotomy small bowel resection and lymph node dissection with lymphatic mapping, cholecystectomy, 11/4/19.          SP Khalil MD, FACS  Professor of Surgery, [facility]  Neuroendocrine Surgery, Hepatic/Pancreatic & General Surgery  [address], [address]  [place]  [address]  ph. [phone]; [phone]  fax. [phone]

## 2019-11-01 NOTE — PRE-PROCEDURE INSTRUCTIONS
Called to see if pt was coming for his PAT visit and he did not answer his cell phone after multiple attempts made by me and his wife.  Spoke with Wife and gave her all of the preop instructions including time of arrival of 6:45am.  Pt's Wife verbalized understanding.  Brooke at Dr. Khalil's office notified and states they will send the pt to the [facility] for testing.

## 2019-11-04 ENCOUNTER — HOSPITAL ENCOUNTER (INPATIENT)
Facility: HOSPITAL | Age: 74
LOS: 4 days | Discharge: HOME | DRG: 821 | End: 2019-11-08
Attending: SURGERY | Admitting: SURGERY
Payer: MEDICARE

## 2019-11-04 ENCOUNTER — ANESTHESIA (OUTPATIENT)
Dept: SURGERY | Facility: HOSPITAL | Age: 74
DRG: 821 | End: 2019-11-04
Payer: MEDICARE

## 2019-11-04 DIAGNOSIS — R59.0 ABDOMINAL LYMPHADENOPATHY: ICD-10-CM

## 2019-11-04 DIAGNOSIS — K80.10 CALCULUS OF GALLBLADDER WITH CHRONIC CHOLECYSTITIS WITHOUT OBSTRUCTION: ICD-10-CM

## 2019-11-04 DIAGNOSIS — C7B.8 SECONDARY NEUROENDOCRINE TUMOR OF LIVER: ICD-10-CM

## 2019-11-04 DIAGNOSIS — C7A.012 MALIGNANT CARCINOID TUMOR OF ILEUM: ICD-10-CM

## 2019-11-04 DIAGNOSIS — C7A.8 NEUROENDOCRINE CARCINOMA METASTATIC TO INTRA-ABDOMINAL LYMPH NODE: ICD-10-CM

## 2019-11-04 DIAGNOSIS — C7B.8 NEUROENDOCRINE CARCINOMA METASTATIC TO INTRA-ABDOMINAL LYMPH NODE: ICD-10-CM

## 2019-11-04 DIAGNOSIS — C7A.00 MALIGNANT CARCINOID TUMOR OF UNKNOWN PRIMARY SITE: ICD-10-CM

## 2019-11-04 DIAGNOSIS — K55.1 CHRONIC INTESTINAL ISCHEMIA: ICD-10-CM

## 2019-11-04 LAB
ANION GAP SERPL CALC-SCNC: 13 MMOL/L (ref 8–16)
BASOPHILS # BLD AUTO: 0.01 K/UL (ref 0–0.2)
BASOPHILS NFR BLD: 0.1 % (ref 0–1.9)
BUN SERPL-MCNC: 19 MG/DL (ref 8–23)
CALCIUM SERPL-MCNC: 8.3 MG/DL (ref 8.7–10.5)
CHLORIDE SERPL-SCNC: 109 MMOL/L (ref 95–110)
CO2 SERPL-SCNC: 17 MMOL/L (ref 23–29)
CREAT SERPL-MCNC: 1.3 MG/DL (ref 0.5–1.4)
DIFFERENTIAL METHOD: ABNORMAL
EOSINOPHIL # BLD AUTO: 0 K/UL (ref 0–0.5)
EOSINOPHIL NFR BLD: 0 % (ref 0–8)
ERYTHROCYTE [DISTWIDTH] IN BLOOD BY AUTOMATED COUNT: 12.6 % (ref 11.5–14.5)
EST. GFR  (AFRICAN AMERICAN): >60 ML/MIN/1.73 M^2
EST. GFR  (NON AFRICAN AMERICAN): 54 ML/MIN/1.73 M^2
GLUCOSE SERPL-MCNC: 141 MG/DL (ref 70–110)
GLUCOSE SERPL-MCNC: 162 MG/DL (ref 70–110)
GLUCOSE SERPL-MCNC: 168 MG/DL (ref 70–110)
GLUCOSE SERPL-MCNC: 174 MG/DL (ref 70–110)
HCO3 UR-SCNC: 17.4 MMOL/L (ref 24–28)
HCO3 UR-SCNC: 18.9 MMOL/L (ref 24–28)
HCO3 UR-SCNC: 19.2 MMOL/L (ref 24–28)
HCT VFR BLD AUTO: 32 % (ref 40–54)
HCT VFR BLD CALC: 27 %PCV (ref 36–54)
HCT VFR BLD CALC: 32 %PCV (ref 36–54)
HCT VFR BLD CALC: 36 %PCV (ref 36–54)
HGB BLD-MCNC: 10.7 G/DL (ref 14–18)
HGB BLD-MCNC: 11 G/DL
HGB BLD-MCNC: 12 G/DL
HGB BLD-MCNC: 9 G/DL
LYMPHOCYTES # BLD AUTO: 1 K/UL (ref 1–4.8)
LYMPHOCYTES NFR BLD: 6.8 % (ref 18–48)
MCH RBC QN AUTO: 31.9 PG (ref 27–31)
MCHC RBC AUTO-ENTMCNC: 33.4 G/DL (ref 32–36)
MCV RBC AUTO: 96 FL (ref 82–98)
MONOCYTES # BLD AUTO: 1 K/UL (ref 0.3–1)
MONOCYTES NFR BLD: 6.9 % (ref 4–15)
NEUTROPHILS # BLD AUTO: 12 K/UL (ref 1.8–7.7)
NEUTROPHILS NFR BLD: 86.2 % (ref 38–73)
PCO2 BLDA: 32.6 MMHG (ref 35–45)
PCO2 BLDA: 35.3 MMHG (ref 35–45)
PCO2 BLDA: 37.1 MMHG (ref 35–45)
PH SMN: 7.3 [PH] (ref 7.35–7.45)
PH SMN: 7.31 [PH] (ref 7.35–7.45)
PH SMN: 7.38 [PH] (ref 7.35–7.45)
PLATELET # BLD AUTO: 214 K/UL (ref 150–350)
PMV BLD AUTO: 10.4 FL (ref 9.2–12.9)
PO2 BLDA: 102 MMHG (ref 80–100)
PO2 BLDA: 237 MMHG (ref 80–100)
PO2 BLDA: 85 MMHG (ref 80–100)
POC BE: -6 MMOL/L
POC BE: -7 MMOL/L
POC BE: -9 MMOL/L
POC IONIZED CALCIUM: 1.13 MMOL/L (ref 1.06–1.42)
POC IONIZED CALCIUM: 1.13 MMOL/L (ref 1.06–1.42)
POC IONIZED CALCIUM: 1.17 MMOL/L (ref 1.06–1.42)
POC SATURATED O2: 100 % (ref 95–100)
POC SATURATED O2: 95 % (ref 95–100)
POC SATURATED O2: 98 % (ref 95–100)
POC TCO2: 18 MMOL/L (ref 23–27)
POC TCO2: 20 MMOL/L (ref 23–27)
POC TCO2: 20 MMOL/L (ref 23–27)
POTASSIUM BLD-SCNC: 4.1 MMOL/L (ref 3.5–5.1)
POTASSIUM BLD-SCNC: 4.3 MMOL/L (ref 3.5–5.1)
POTASSIUM BLD-SCNC: 4.4 MMOL/L (ref 3.5–5.1)
POTASSIUM SERPL-SCNC: 4.5 MMOL/L (ref 3.5–5.1)
RBC # BLD AUTO: 3.35 M/UL (ref 4.6–6.2)
SAMPLE: ABNORMAL
SODIUM BLD-SCNC: 139 MMOL/L (ref 136–145)
SODIUM BLD-SCNC: 139 MMOL/L (ref 136–145)
SODIUM BLD-SCNC: 140 MMOL/L (ref 136–145)
SODIUM SERPL-SCNC: 139 MMOL/L (ref 136–145)
WBC # BLD AUTO: 13.94 K/UL (ref 3.9–12.7)

## 2019-11-04 PROCEDURE — 94799 UNLISTED PULMONARY SVC/PX: CPT

## 2019-11-04 PROCEDURE — 36000709 HC OR TIME LEV III EA ADD 15 MIN: Performed by: SURGERY

## 2019-11-04 PROCEDURE — 27000221 HC OXYGEN, UP TO 24 HOURS

## 2019-11-04 PROCEDURE — 88309 TISSUE EXAM BY PATHOLOGIST: CPT | Mod: 26,,, | Performed by: PATHOLOGY

## 2019-11-04 PROCEDURE — 63600175 PHARM REV CODE 636 W HCPCS: Performed by: SURGERY

## 2019-11-04 PROCEDURE — 25000003 PHARM REV CODE 250: Performed by: SURGERY

## 2019-11-04 PROCEDURE — 27201423 OPTIME MED/SURG SUP & DEVICES STERILE SUPPLY: Performed by: SURGERY

## 2019-11-04 PROCEDURE — 25000003 PHARM REV CODE 250: Performed by: NURSE ANESTHETIST, CERTIFIED REGISTERED

## 2019-11-04 PROCEDURE — 88313 SPECIAL STAINS GROUP 2: CPT | Performed by: PATHOLOGY

## 2019-11-04 PROCEDURE — 88305 TISSUE EXAM BY PATHOLOGIST: CPT | Mod: 26,,, | Performed by: PATHOLOGY

## 2019-11-04 PROCEDURE — 36000708 HC OR TIME LEV III 1ST 15 MIN: Performed by: SURGERY

## 2019-11-04 PROCEDURE — 63600175 PHARM REV CODE 636 W HCPCS: Performed by: NURSE ANESTHETIST, CERTIFIED REGISTERED

## 2019-11-04 PROCEDURE — 85025 COMPLETE CBC W/AUTO DIFF WBC: CPT

## 2019-11-04 PROCEDURE — 88331 PATH CONSLTJ SURG 1 BLK 1SPC: CPT | Mod: 26,,, | Performed by: PATHOLOGY

## 2019-11-04 PROCEDURE — 88313 SPECIAL STAINS GROUP 2: CPT | Mod: 26,,, | Performed by: PATHOLOGY

## 2019-11-04 PROCEDURE — 88313 TISSUE SPECIMEN TO PATHOLOGY - SURGERY: ICD-10-PCS | Mod: 26,,, | Performed by: PATHOLOGY

## 2019-11-04 PROCEDURE — 88305 TISSUE SPECIMEN TO PATHOLOGY - SURGERY: ICD-10-PCS | Mod: 26,,, | Performed by: PATHOLOGY

## 2019-11-04 PROCEDURE — S0028 INJECTION, FAMOTIDINE, 20 MG: HCPCS | Performed by: SURGERY

## 2019-11-04 PROCEDURE — 37000009 HC ANESTHESIA EA ADD 15 MINS: Performed by: SURGERY

## 2019-11-04 PROCEDURE — 37000008 HC ANESTHESIA 1ST 15 MINUTES: Performed by: SURGERY

## 2019-11-04 PROCEDURE — C9290 INJ, BUPIVACAINE LIPOSOME: HCPCS | Performed by: SURGERY

## 2019-11-04 PROCEDURE — 88342 TISSUE SPECIMEN TO PATHOLOGY - SURGERY: ICD-10-PCS | Mod: 26,,, | Performed by: PATHOLOGY

## 2019-11-04 PROCEDURE — P9045 ALBUMIN (HUMAN), 5%, 250 ML: HCPCS | Mod: JG | Performed by: NURSE ANESTHETIST, CERTIFIED REGISTERED

## 2019-11-04 PROCEDURE — 88342 IMHCHEM/IMCYTCHM 1ST ANTB: CPT | Mod: 26,,, | Performed by: PATHOLOGY

## 2019-11-04 PROCEDURE — 88341 IMHCHEM/IMCYTCHM EA ADD ANTB: CPT | Mod: 26,,, | Performed by: PATHOLOGY

## 2019-11-04 PROCEDURE — 80048 BASIC METABOLIC PNL TOTAL CA: CPT

## 2019-11-04 PROCEDURE — 88341 PR IHC OR ICC EACH ADD'L SINGLE ANTIBODY  STAINPR: ICD-10-PCS | Mod: 26,,, | Performed by: PATHOLOGY

## 2019-11-04 PROCEDURE — 94761 N-INVAS EAR/PLS OXIMETRY MLT: CPT

## 2019-11-04 PROCEDURE — 88309 TISSUE SPECIMEN TO PATHOLOGY - SURGERY: ICD-10-PCS | Mod: 26,,, | Performed by: PATHOLOGY

## 2019-11-04 PROCEDURE — 88331 TISSUE SPECIMEN TO PATHOLOGY - SURGERY: ICD-10-PCS | Mod: 26,,, | Performed by: PATHOLOGY

## 2019-11-04 PROCEDURE — 20000000 HC ICU ROOM

## 2019-11-04 PROCEDURE — 99900035 HC TECH TIME PER 15 MIN (STAT)

## 2019-11-04 RX ORDER — VECURONIUM BROMIDE FOR INJECTION 1 MG/ML
INJECTION, POWDER, LYOPHILIZED, FOR SOLUTION INTRAVENOUS
Status: DISCONTINUED | OUTPATIENT
Start: 2019-11-04 | End: 2019-11-04

## 2019-11-04 RX ORDER — GLYCOPYRROLATE 0.2 MG/ML
INJECTION INTRAMUSCULAR; INTRAVENOUS
Status: DISCONTINUED | OUTPATIENT
Start: 2019-11-04 | End: 2019-11-04

## 2019-11-04 RX ORDER — ALBUMIN HUMAN 250 G/1000ML
SOLUTION INTRAVENOUS CONTINUOUS PRN
Status: DISCONTINUED | OUTPATIENT
Start: 2019-11-04 | End: 2019-11-04

## 2019-11-04 RX ORDER — KETOROLAC TROMETHAMINE 30 MG/ML
15 INJECTION, SOLUTION INTRAMUSCULAR; INTRAVENOUS
Status: DISCONTINUED | OUTPATIENT
Start: 2019-11-04 | End: 2019-11-04

## 2019-11-04 RX ORDER — MAGNESIUM SULFATE HEPTAHYDRATE 40 MG/ML
2 INJECTION, SOLUTION INTRAVENOUS ONCE
Status: COMPLETED | OUTPATIENT
Start: 2019-11-04 | End: 2019-11-04

## 2019-11-04 RX ORDER — NALOXONE HCL 0.4 MG/ML
0.02 VIAL (ML) INJECTION
Status: DISCONTINUED | OUTPATIENT
Start: 2019-11-04 | End: 2019-11-08 | Stop reason: HOSPADM

## 2019-11-04 RX ORDER — MANNITOL 250 MG/ML
INJECTION, SOLUTION INTRAVENOUS
Status: DISCONTINUED | OUTPATIENT
Start: 2019-11-04 | End: 2019-11-04

## 2019-11-04 RX ORDER — LABETALOL HYDROCHLORIDE 5 MG/ML
5 INJECTION, SOLUTION INTRAVENOUS
Status: DISCONTINUED | OUTPATIENT
Start: 2019-11-04 | End: 2019-11-08 | Stop reason: HOSPADM

## 2019-11-04 RX ORDER — KETOROLAC TROMETHAMINE 30 MG/ML
10 INJECTION, SOLUTION INTRAMUSCULAR; INTRAVENOUS EVERY 6 HOURS
Status: COMPLETED | OUTPATIENT
Start: 2019-11-04 | End: 2019-11-06

## 2019-11-04 RX ORDER — ONDANSETRON 2 MG/ML
INJECTION INTRAMUSCULAR; INTRAVENOUS
Status: DISCONTINUED | OUTPATIENT
Start: 2019-11-04 | End: 2019-11-04

## 2019-11-04 RX ORDER — LIDOCAINE HCL/PF 100 MG/5ML
SYRINGE (ML) INTRAVENOUS
Status: DISCONTINUED | OUTPATIENT
Start: 2019-11-04 | End: 2019-11-04

## 2019-11-04 RX ORDER — ALBUMIN HUMAN 50 G/1000ML
SOLUTION INTRAVENOUS CONTINUOUS PRN
Status: DISCONTINUED | OUTPATIENT
Start: 2019-11-04 | End: 2019-11-04

## 2019-11-04 RX ORDER — METOPROLOL SUCCINATE 25 MG/1
25 TABLET, EXTENDED RELEASE ORAL DAILY
Status: DISCONTINUED | OUTPATIENT
Start: 2019-11-05 | End: 2019-11-08 | Stop reason: HOSPADM

## 2019-11-04 RX ORDER — ROCURONIUM BROMIDE 10 MG/ML
INJECTION, SOLUTION INTRAVENOUS
Status: DISCONTINUED | OUTPATIENT
Start: 2019-11-04 | End: 2019-11-04

## 2019-11-04 RX ORDER — PROPOFOL 10 MG/ML
VIAL (ML) INTRAVENOUS
Status: DISCONTINUED | OUTPATIENT
Start: 2019-11-04 | End: 2019-11-04

## 2019-11-04 RX ORDER — ONDANSETRON 2 MG/ML
4 INJECTION INTRAMUSCULAR; INTRAVENOUS EVERY 6 HOURS PRN
Status: DISCONTINUED | OUTPATIENT
Start: 2019-11-04 | End: 2019-11-08 | Stop reason: HOSPADM

## 2019-11-04 RX ORDER — SODIUM CHLORIDE, SODIUM LACTATE, POTASSIUM CHLORIDE, CALCIUM CHLORIDE 600; 310; 30; 20 MG/100ML; MG/100ML; MG/100ML; MG/100ML
INJECTION, SOLUTION INTRAVENOUS CONTINUOUS PRN
Status: DISCONTINUED | OUTPATIENT
Start: 2019-11-04 | End: 2019-11-04

## 2019-11-04 RX ORDER — ISOSULFAN BLUE 50 MG/5ML
5 INJECTION, SOLUTION SUBCUTANEOUS ONCE
Status: DISCONTINUED | OUTPATIENT
Start: 2019-11-04 | End: 2019-11-05

## 2019-11-04 RX ORDER — ONDANSETRON HYDROCHLORIDE 2 MG/ML
INJECTION, SOLUTION INTRAMUSCULAR; INTRAVENOUS
Status: DISCONTINUED | OUTPATIENT
Start: 2019-11-04 | End: 2019-11-04

## 2019-11-04 RX ORDER — ONDANSETRON 2 MG/ML
8 INJECTION INTRAMUSCULAR; INTRAVENOUS
Status: COMPLETED | OUTPATIENT
Start: 2019-11-04 | End: 2019-11-04

## 2019-11-04 RX ORDER — ISOSULFAN BLUE 50 MG/5ML
INJECTION, SOLUTION SUBCUTANEOUS
Status: DISCONTINUED | OUTPATIENT
Start: 2019-11-04 | End: 2019-11-04 | Stop reason: HOSPADM

## 2019-11-04 RX ORDER — BACITRACIN 50000 [IU]/1
INJECTION, POWDER, FOR SOLUTION INTRAMUSCULAR
Status: DISCONTINUED | OUTPATIENT
Start: 2019-11-04 | End: 2019-11-04 | Stop reason: HOSPADM

## 2019-11-04 RX ORDER — ACETAMINOPHEN 500 MG
1000 TABLET ORAL EVERY 8 HOURS
Status: COMPLETED | OUTPATIENT
Start: 2019-11-05 | End: 2019-11-06

## 2019-11-04 RX ORDER — ACETAMINOPHEN 500 MG
1000 TABLET ORAL
Status: COMPLETED | OUTPATIENT
Start: 2019-11-04 | End: 2019-11-04

## 2019-11-04 RX ORDER — NEOSTIGMINE METHYLSULFATE 1 MG/ML
INJECTION, SOLUTION INTRAVENOUS
Status: DISCONTINUED | OUTPATIENT
Start: 2019-11-04 | End: 2019-11-04

## 2019-11-04 RX ORDER — ALBUTEROL SULFATE 2.5 MG/.5ML
2.5 SOLUTION RESPIRATORY (INHALATION) EVERY 4 HOURS PRN
Status: DISCONTINUED | OUTPATIENT
Start: 2019-11-04 | End: 2019-11-08 | Stop reason: HOSPADM

## 2019-11-04 RX ORDER — FAMOTIDINE 10 MG/ML
20 INJECTION INTRAVENOUS
Status: COMPLETED | OUTPATIENT
Start: 2019-11-04 | End: 2019-11-04

## 2019-11-04 RX ORDER — HYDROMORPHONE HYDROCHLORIDE 1 MG/ML
0.5 INJECTION, SOLUTION INTRAMUSCULAR; INTRAVENOUS; SUBCUTANEOUS ONCE
Status: DISCONTINUED | OUTPATIENT
Start: 2019-11-04 | End: 2019-11-06

## 2019-11-04 RX ORDER — HYDROMORPHONE HCL IN 0.9% NACL 6 MG/30 ML
PATIENT CONTROLLED ANALGESIA SYRINGE INTRAVENOUS CONTINUOUS
Status: DISCONTINUED | OUTPATIENT
Start: 2019-11-04 | End: 2019-11-06

## 2019-11-04 RX ORDER — ACETAMINOPHEN 10 MG/ML
1000 INJECTION, SOLUTION INTRAVENOUS EVERY 8 HOURS
Status: COMPLETED | OUTPATIENT
Start: 2019-11-04 | End: 2019-11-05

## 2019-11-04 RX ORDER — ENOXAPARIN SODIUM 100 MG/ML
30 INJECTION SUBCUTANEOUS
Status: COMPLETED | OUTPATIENT
Start: 2019-11-04 | End: 2019-11-04

## 2019-11-04 RX ORDER — BUPIVACAINE HYDROCHLORIDE 2.5 MG/ML
INJECTION, SOLUTION EPIDURAL; INFILTRATION; INTRACAUDAL
Status: DISCONTINUED | OUTPATIENT
Start: 2019-11-04 | End: 2019-11-04 | Stop reason: HOSPADM

## 2019-11-04 RX ORDER — DEXTROSE MONOHYDRATE, SODIUM CHLORIDE, AND POTASSIUM CHLORIDE 50; 1.49; 4.5 G/1000ML; G/1000ML; G/1000ML
INJECTION, SOLUTION INTRAVENOUS CONTINUOUS
Status: DISCONTINUED | OUTPATIENT
Start: 2019-11-04 | End: 2019-11-07

## 2019-11-04 RX ORDER — SODIUM CHLORIDE 9 MG/ML
INJECTION, SOLUTION INTRAVENOUS CONTINUOUS PRN
Status: DISCONTINUED | OUTPATIENT
Start: 2019-11-04 | End: 2019-11-04

## 2019-11-04 RX ORDER — DIPHENHYDRAMINE HYDROCHLORIDE 50 MG/ML
12.5 INJECTION INTRAMUSCULAR; INTRAVENOUS EVERY 4 HOURS PRN
Status: DISCONTINUED | OUTPATIENT
Start: 2019-11-04 | End: 2019-11-08 | Stop reason: HOSPADM

## 2019-11-04 RX ORDER — HYDRALAZINE HYDROCHLORIDE 20 MG/ML
5 INJECTION INTRAMUSCULAR; INTRAVENOUS
Status: DISCONTINUED | OUTPATIENT
Start: 2019-11-04 | End: 2019-11-08 | Stop reason: HOSPADM

## 2019-11-04 RX ORDER — ONDANSETRON 2 MG/ML
INJECTION INTRAMUSCULAR; INTRAVENOUS
Status: DISPENSED
Start: 2019-11-04 | End: 2019-11-04

## 2019-11-04 RX ORDER — ACETAMINOPHEN 10 MG/ML
INJECTION, SOLUTION INTRAVENOUS
Status: DISCONTINUED | OUTPATIENT
Start: 2019-11-04 | End: 2019-11-04

## 2019-11-04 RX ORDER — PREGABALIN 75 MG/1
75 CAPSULE ORAL
Status: DISCONTINUED | OUTPATIENT
Start: 2019-11-04 | End: 2019-11-04 | Stop reason: HOSPADM

## 2019-11-04 RX ORDER — FENTANYL CITRATE 50 UG/ML
INJECTION, SOLUTION INTRAMUSCULAR; INTRAVENOUS
Status: DISCONTINUED | OUTPATIENT
Start: 2019-11-04 | End: 2019-11-04

## 2019-11-04 RX ORDER — PREGABALIN 75 MG/1
75 CAPSULE ORAL 2 TIMES DAILY
Status: DISCONTINUED | OUTPATIENT
Start: 2019-11-04 | End: 2019-11-08 | Stop reason: HOSPADM

## 2019-11-04 RX ADMIN — SODIUM CHLORIDE, SODIUM LACTATE, POTASSIUM CHLORIDE, AND CALCIUM CHLORIDE: .6; .31; .03; .02 INJECTION, SOLUTION INTRAVENOUS at 09:11

## 2019-11-04 RX ADMIN — ALBUMIN (HUMAN): 12.5 INJECTION, SOLUTION INTRAVENOUS at 11:11

## 2019-11-04 RX ADMIN — ACETAMINOPHEN 1000 MG: 10 INJECTION, SOLUTION INTRAVENOUS at 09:11

## 2019-11-04 RX ADMIN — OCTREOTIDE ACETATE 250 MCG: 1000 INJECTION, SOLUTION INTRAVENOUS; SUBCUTANEOUS at 10:11

## 2019-11-04 RX ADMIN — IRON SUCROSE 100 MG: 20 INJECTION, SOLUTION INTRAVENOUS at 05:11

## 2019-11-04 RX ADMIN — PREGABALIN 75 MG: 75 CAPSULE ORAL at 09:11

## 2019-11-04 RX ADMIN — HYDROCORTISONE SODIUM SUCCINATE 50 MG: 100 INJECTION, POWDER, FOR SOLUTION INTRAMUSCULAR; INTRAVENOUS at 08:11

## 2019-11-04 RX ADMIN — AMPICILLIN AND SULBACTAM 3 G: 2; 1 INJECTION, POWDER, FOR SOLUTION INTRAMUSCULAR; INTRAVENOUS at 03:11

## 2019-11-04 RX ADMIN — ACETAMINOPHEN 1000 MG: 10 INJECTION, SOLUTION INTRAVENOUS at 02:11

## 2019-11-04 RX ADMIN — FAMOTIDINE 20 MG: 10 INJECTION, SOLUTION INTRAVENOUS at 08:11

## 2019-11-04 RX ADMIN — GLYCOPYRROLATE 0.6 MG: 0.2 INJECTION, SOLUTION INTRAMUSCULAR; INTRAVENOUS at 03:11

## 2019-11-04 RX ADMIN — OCTREOTIDE ACETATE 250 MCG/HR: 1000 INJECTION, SOLUTION INTRAVENOUS; SUBCUTANEOUS at 08:11

## 2019-11-04 RX ADMIN — FENTANYL CITRATE 100 MCG: 50 INJECTION, SOLUTION INTRAMUSCULAR; INTRAVENOUS at 09:11

## 2019-11-04 RX ADMIN — CALCIUM CHLORIDE 500 MG: 100 INJECTION, SOLUTION INTRAVENOUS at 10:11

## 2019-11-04 RX ADMIN — ROCURONIUM BROMIDE 20 MG: 10 INJECTION, SOLUTION INTRAVENOUS at 11:11

## 2019-11-04 RX ADMIN — MAGNESIUM SULFATE IN WATER 2 G: 40 INJECTION, SOLUTION INTRAVENOUS at 04:11

## 2019-11-04 RX ADMIN — OCTREOTIDE ACETATE: 500 INJECTION, SOLUTION INTRAVENOUS; SUBCUTANEOUS at 07:11

## 2019-11-04 RX ADMIN — LIDOCAINE HYDROCHLORIDE 75 MG: 20 INJECTION, SOLUTION INTRAVENOUS at 09:11

## 2019-11-04 RX ADMIN — ALBUMIN (HUMAN): 12.5 INJECTION, SOLUTION INTRAVENOUS at 10:11

## 2019-11-04 RX ADMIN — CALCIUM GLUCONATE 1 G: 98 INJECTION, SOLUTION INTRAVENOUS at 04:11

## 2019-11-04 RX ADMIN — VECURONIUM BROMIDE FOR INJECTION 3 MG: 1 INJECTION, POWDER, LYOPHILIZED, FOR SOLUTION INTRAVENOUS at 02:11

## 2019-11-04 RX ADMIN — AMPICILLIN AND SULBACTAM 3 G: 2; 1 INJECTION, POWDER, FOR SOLUTION INTRAMUSCULAR; INTRAVENOUS at 12:11

## 2019-11-04 RX ADMIN — ALBUMIN (HUMAN): 12.5 INJECTION, SOLUTION INTRAVENOUS at 01:11

## 2019-11-04 RX ADMIN — FENTANYL CITRATE 50 MCG: 50 INJECTION, SOLUTION INTRAMUSCULAR; INTRAVENOUS at 12:11

## 2019-11-04 RX ADMIN — PREGABALIN 75 MG: 75 CAPSULE ORAL at 08:11

## 2019-11-04 RX ADMIN — OCTREOTIDE ACETATE 250 MCG/HR: 1000 INJECTION, SOLUTION INTRAVENOUS; SUBCUTANEOUS at 09:11

## 2019-11-04 RX ADMIN — PROPOFOL 150 MG: 10 INJECTION, EMULSION INTRAVENOUS at 09:11

## 2019-11-04 RX ADMIN — CALCIUM GLUCONATE 1 G: 98 INJECTION, SOLUTION INTRAVENOUS at 05:11

## 2019-11-04 RX ADMIN — DEXTROSE MONOHYDRATE, SODIUM CHLORIDE, AND POTASSIUM CHLORIDE: 50; 4.5; 1.49 INJECTION, SOLUTION INTRAVENOUS at 04:11

## 2019-11-04 RX ADMIN — KETOROLAC TROMETHAMINE 10 MG: 30 INJECTION, SOLUTION INTRAMUSCULAR at 11:11

## 2019-11-04 RX ADMIN — ONDANSETRON 8 MG: 2 INJECTION INTRAMUSCULAR; INTRAVENOUS at 07:11

## 2019-11-04 RX ADMIN — Medication: at 05:11

## 2019-11-04 RX ADMIN — KETOROLAC TROMETHAMINE 10 MG: 30 INJECTION, SOLUTION INTRAMUSCULAR at 05:11

## 2019-11-04 RX ADMIN — AMPICILLIN AND SULBACTAM 3 G: 2; 1 INJECTION, POWDER, FOR SOLUTION INTRAMUSCULAR; INTRAVENOUS at 09:11

## 2019-11-04 RX ADMIN — MANNITOL 12.5 G: 250 INJECTION, SOLUTION INTRAVENOUS at 10:11

## 2019-11-04 RX ADMIN — ENOXAPARIN SODIUM 30 MG: 100 INJECTION SUBCUTANEOUS at 08:11

## 2019-11-04 RX ADMIN — ROCURONIUM BROMIDE 50 MG: 10 INJECTION, SOLUTION INTRAVENOUS at 09:11

## 2019-11-04 RX ADMIN — ONDANSETRON 8 MG: 2 INJECTION, SOLUTION INTRAMUSCULAR; INTRAVENOUS at 02:11

## 2019-11-04 RX ADMIN — ROCURONIUM BROMIDE 20 MG: 10 INJECTION, SOLUTION INTRAVENOUS at 10:11

## 2019-11-04 RX ADMIN — ALBUMIN HUMAN: 250 SOLUTION INTRAVENOUS at 11:11

## 2019-11-04 RX ADMIN — SODIUM CHLORIDE: 0.9 INJECTION, SOLUTION INTRAVENOUS at 09:11

## 2019-11-04 RX ADMIN — NEOSTIGMINE METHYLSULFATE 4 MG: 1 INJECTION INTRAVENOUS at 03:11

## 2019-11-04 RX ADMIN — AMPICILLIN SODIUM AND SULBACTAM SODIUM 3 G: 2; 1 INJECTION, POWDER, FOR SOLUTION INTRAMUSCULAR; INTRAVENOUS at 09:11

## 2019-11-04 RX ADMIN — SODIUM CHLORIDE, SODIUM LACTATE, POTASSIUM CHLORIDE, AND CALCIUM CHLORIDE: .6; .31; .03; .02 INJECTION, SOLUTION INTRAVENOUS at 11:11

## 2019-11-04 RX ADMIN — ROCURONIUM BROMIDE 10 MG: 10 INJECTION, SOLUTION INTRAVENOUS at 12:11

## 2019-11-04 RX ADMIN — ACETAMINOPHEN 1000 MG: 500 TABLET ORAL at 08:11

## 2019-11-04 NOTE — PLAN OF CARE
Adult Inpatient Plan of Care  Plan of Care Review  Outcome: Ongoing, Progressing    Vital signs stable since arrival from OR. PCA pump in use for pain management. Surgical site remains clean, dry, and intact; no complications noted. Urine output adequate. Repositioned frequently. Safety precautions in place. Plan of care reviewed with patient and daughter.

## 2019-11-04 NOTE — INTERVAL H&P NOTE
The patient has been examined and the H&P has been reviewed:    I concur with the findings and no changes have occurred since H&P was written.     Patient with no new diagnoses, no new medications. Takes no blood thinners.     To OR for SBR, lymph node mapping/resection, cholecystectomy.    Anesthesia/Surgery risks, benefits and alternative options discussed and understood by patient/family.          Active Hospital Problems    Diagnosis  POA    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 [C7A.00]  Yes      Resolved Hospital Problems   No resolved problems to display.

## 2019-11-04 NOTE — ANESTHESIA PROCEDURE NOTES
Central Line    Diagnosis: Carcinoid Tumor   Patient location during procedure: done in OR  Procedure start time: 11/4/2019 9:21 AM  Timeout: 11/4/2019 9:20 AM  Procedure end time: 11/4/2019 9:28 AM    Staffing  Authorizing Provider: Gaston Dalton MD  Performing Provider: Mily López MD    Staffing  Resident/CRNA: Mily López MD  Performed: resident/CRNA   Anesthesiologist was present at the time of the procedure.  Preanesthetic Checklist  Completed: patient identified, site marked, surgical consent, pre-op evaluation, timeout performed, IV checked, risks and benefits discussed, monitors and equipment checked and anesthesia consent given  Indication   Indication: vascular access     Anesthesia   general anesthesia    Central Line   Skin Prep: skin prepped with ChloraPrep, skin prep agent completely dried prior to procedure  maximum sterile barriers used during central venous catheter insertion  hand hygiene performed prior to central venous catheter insertion  Location: right, internal jugular.   Catheter type: triple lumen  Catheter Size: 7 Fr  Inserted Catheter Length: 15 cm  Ultrasound: vascular probe with ultrasound  Vessel Caliber: large, patent  Vascular Doppler:  not done, compressibility normal  Needle advanced into vessel with real time Ultrasound guidance.  Guidewire confirmed in vessel.  Manometry: none  Insertion Attempts: 1   Securement:line sutured, chlorhexidine patch, sterile dressing applied and blood return through all ports    Post-Procedure   Adverse Events:none    Guidewire

## 2019-11-04 NOTE — OP NOTE
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]     *   terminal ileal carcinoid  Mesenteric vascular encasement  Retroperitoneal adenopathy  Liver metastases  Cholelithiasis/chronic cholecystitis    Procedure(s) (LRB):  LAPAROTOMY, EXPLORATORY (N/A)  EXCISION, INTESTINE (N/A)   EXPLORATION MESENTERIC ARTERY/VEIN  RETROPERITONEAL NODE DISSECTION EXTENSIVE  EXPLORATION L KIDNEY  CHOLECYSTECTOMY  LIVER BIOPSY WEDGE X 3  INTRAPERITONEAL CHEMOTHERAPY  LYMPHATIC MAPPING SENTINEL NODE INJECTION    Anesthesia: General    Description of Procedure:  With the patient supine on the operating table under general endotracheal anesthesia, the abdomen prepped and draped usual sterile manner, under Sandostatin infusion protocol invasive monitoring placed and after time-out the patient was explored through the midline. Exploration revealed cholelithiasis with an enlarged thickened gallbladder, numerous small implants on the surface of the liver 1-2 mm in size consistent with liver metastases as well as multiple small cysts on both lobes of the liver. The primary tumor was found in the terminal ileum approximately 70 cm proximal to the ileocecal valve the bowel was run twice and no additional tumors were identified. There was a matted desmoplastic reaction of lymph nodes and circling and encasing the superior mesenteric artery and vein at the mesenteric root.  There were lymph nodes on preoperative imaging studies in the right and left retroperitoneum in the aortocaval  window posterior to the left and right renal veins and posterior to the duodenum and posterior to the right diaphragmatic janeth.  The small-bowel tumor was injected with Lymphazurin blue lymphatic mapping was performed which indicated that a prior attends distal resection would be required.  The mesentery was scored proximally and distally.  The mesentery was then incised at the mesenteric root after retractors placed and the dissection proceeded to by identifying the superior mesenteric artery and vein at the origin dissecting distally until tumor was encountered in the tumors were slowly dissected off of the mesenteric vessels.  These tumors were individually excised at and were several matted together lymph nodes.  There were also tumors on the posterior side of the mesenteric vessels were also individually excised in the similar manner. Hemostasis was secured with 5 0 Prolene sutures and electrocautery. Once the terminal branches of the mesenteric vessels were encountered which were supplying the portion of bowel to be resected the mesenteric vessels were then transected and oversewn at this level the mesentery was divided with the LigaSure device and then the small bowel was transected with JAKE stapler and a side-to-side stapled anastomosis was performed with another firing of the stapler to close the common enterotomy.  The specimen was removed from the field and portions of the tumors were sent for special stains and studies.  Hemostasis was secured.  Gloves and instruments were changed.  Attention was then turned to the left retroperitoneum where the ligament of Treitz was incised left kidney was explored and there were tumors behind the left renal hilum of the consistent with adenopathy in which was excised.  All gross disease which could be appreciated by inspection and palpation was excised.  Additional tumors were noted along the aorta medial to the left ureter these were also excised using the LigaSure  device and hemoclips.  The area was then packed and attention was then turned to the right side of the abdomen where the Kocher maneuver was maneuver performed the hepatic flexure of the colon was reflected at the right kidney contained a very large exophytic cyst which was left unmolested and the right retroperitoneum was explored in the aortocaval window.  There were tumors noted posterior to the left and right renal vein the vena cava was dissected and elevated and the tumors were dissected out from behind the vena cava individually using the ligature device and small hemoclips.  Several tumors were dust excised.  This area was then packed attention was then turned to the right buttock where the a gastro patent ligament was divided.  A and prep peritoneum was then entered and a tumor was identified by palpation beneath the janeth the janeth was split to allow for dissection and the tumor was excised with the LigaSure device from along the right side of the spine behind the right janeth. Each of these tumor bearing areas were packed with Gel-Foam soaked in 5 FU and marked with hemoclips for x-ray purposes.  He was this was secured in the areas were reperitonealized with 4 0 PDS suture.  The gallbladder was dissected size from the liver in an antegrade fashion until it was held in y the cystic artery and cystic duct.  There was a stone impacted in the cystic duct. Structures were ligated and hemoclipped transected and the gallbladder was removed from the field.  The common bile duct was of normal caliber.  The critical view was obtained prior transection of the cystic artery and duct. Hemostasis was secured in the gallbladder fossa.  Several small tumor with were present on the surface of the liver in addition to several small cysts.  There was a larger cyst in segment 3 of the liver. A wedge biopsy was performed of 1 of these 2 min in liver sent for frozen section and the frozen section biopsy returned possible  carcinoid tumor to additional wedge biopsies were taken for permanent section there were several small tumor with some S the liver but no deep tumors could be detected.  Preoperative imaging studies also did not indicate deep tumors in the liver.  The mesenteric defect was closed posteriorly with 4 0 PDS suture Gel-Foam and hemoclipped markers were placed soaked in 5 FU and then reperitonealized to minimize the likelihood of recurrence of tumor in this area.  There was a good pulsations in the superior mesenteric vessel and its branches.  The bowel was was well perfused.  Anastomosis was secure.  Intestines were placed comfortably back in the abdomen the abdomen is very with antibiotic saline solution aspirated clear.  Hemostasis was secured the biopsy sites with electrocautery and Surgiflo and Surgicel packing.  Next intercostal nerve block was performed the Exparel Marcaine mixture at multi levels.  The muscle and fascia were closed with 1.  PDS running suture the skin was closed with 4 O Monocryl and Dermabond\  Was applied .estimated blood loss less than 200 cc  Sponge and needle counts were correct  At the end the procedure no drains no complications.  The patient was taken the intensive care unit in guarded but stable condition.    Description of the findings of the procedure:  Primary tumor of the terminal ileum with mesenteric vascular encasement, bulky retroperitoneal adenopathy about the hilum of the left kidney posterior to the left and right renal veins and the infrahepatic vena cava the right janeth and multiple small tumor late implants on the surface of both lobes of the liver with multi-cystic liver and a large cyst on the right kidney.    Greater than 98% debulking.    Estimated Blood Loss: 200 mL         Specimens:   Specimen (12h ago, onward)    None          Operation Form for [facility] Database    Name __ _Max A Alejandro                    Date of Birth __1945 _____    Patient Admission Date to  hospital:   11/4/2019    Surgeon(s):  MD SP Elizalde MD                     Other ___________       Length of Operation: * Missing case tracking time(s) *    Type of Operation (check all that apply)     Procedure(s):  LAPAROTOMY, EXPLORATORY (N/A)  EXCISION, INTESTINE (N/A)   EXPLORATION MESENTERIC ARTERY/VEIN  RETROPERITONEAL NODE DISSECTION EXTENSIVE  EXPLORATION L KIDNEY  CHOLECYSTECTOMY  LIVER BIOPSY WEDGE X 3  INTRAPERITONEAL CHEMOTHERAPY  LYMPHATIC MAPPING SENTINEL NODE INJECTION     Gastric Resection                    Small bowel resection                  Colon resection    Hepatic resection                     Pancreatic resection                    Whipple                   Lymph node resection             Cholecystectomy                          Adrenalectomy       Lung resection                          Mesenteric dissection                   Nephrectomy         Thyroidectomy                         RFA                                             Peritoneal stripping    Explorative                               Lysis of Adhesions              Diaphramatic Resection   Other  _____________________________________        Type of vascular encasements (check all that apply)     x SMA       x Renal       x Aorta/Cava     Iliac       Jossie  Jossie Hepatis    Celiac      Was tumor collected?           xYes                 No          If yes, tumor form must be completed by Data staff.                   Neoprobe Used          Yes          x No    Was it helpful in finding the tumor?      Yes        No     Operative Findings  x Vascular Encasement                                       Mesenteric Extension    x Bowel Obstruction - complete or partial    Bowel Ischemia                                                 Carcinomatosis     Location of Primary Tumor                                     Primary Resected     Yes      No   How many primary sites?         % of Tumor Debulked 98                   %of Mesenteric Tumor Debulked____100_____  Was sentinel lymph node done?      Yes       No    Number of [place] LN Sent ______         Number of [place] LN Positive   __        Evidence of lymph obstruction:    Yes      No    Mets to other organs:       Yes          No                                            If yes, Nodes and Metastases form must be completed  Number of tumors found: _____tntc_________   Was tumor left behind? ____liver__________   How much small bowel removed (cm)?  30_______    Seprafilm used:    Yes      x No  Visible ovarian masses:             Yes        No   Visible retroperitoneal nodes:   xYes         No    Preop Sandostatin used:          x Yes        No    Intraop chemotherapy used:     x Yes         No      Gallstones present :  x Yes          No     N/A  Visible liver mets:      x Yes         No      Blood transfusion needed:    Yes        x No  Complications____________________0_____________________    Nodes & Metastases Form    Nodes    Resected   Mets  Tissue Resected?      Solid organ/soft     Cervical   Yes No                     x Liver   Yes xNo     Supraclavicular  Yes No   Bone   Yes No     Hilar           Yes No Brain   Yes No        Mediastinal  Yes No  Lung    Yes No     Auxiliary     Yes No Colon   Yes No  x   Mesenteric xYes No Pancreas  Yes No      Periportal Yes No Appendix  Yes No  x   Retroperitoneal xYes No  Thyroid     Yes No     Celiac Yes No Chest wall  Yes No     Inguinal Yes No  Kidney       Yes   No     Iliac Yes No Breast       Yes   No     Other Yes No Ovary        Yes No   ___________________ Pelvic Floor   Yes   No   Uterus         Yes No   Ureter         Yes   No   Seminal Vesicle YesNo      Spleen     Yes No   L Diaphram   Yes No   R Diaphram   Yes No    Other             Yes No   ____________________

## 2019-11-04 NOTE — PLAN OF CARE
Dr. Danielle at bedside speaking with patient. Notified that patient not given Toradol due to allergy to Aspirin.  Patient states causes internal bleeding.

## 2019-11-04 NOTE — TRANSFER OF CARE
"Anesthesia Transfer of Care Note    Patient: Roosevelt Alejandro    Procedure(s) Performed: Procedure(s) (LRB):  LAPAROTOMY, EXPLORATORY (N/A)  EXCISION, INTESTINE (N/A)    Patient location: ICU    Anesthesia Type: general    Transport from OR: Continuous ECG monitoring in transport. Continuous SpO2 monitoring in transport. Continuos invasive BP monitoring in transport. Continuous CVP monitoring in transport. Transported from OR on 6-10 L/min O2 by face mask with adequate spontaneous ventilation    Post pain: adequate analgesia    Post assessment: no apparent anesthetic complications    Post vital signs: stable    Level of consciousness: responds to stimulation    Nausea/Vomiting: no nausea/vomiting    Complications: none    Transfer of care protocol was followed      Last vitals:   Visit Vitals  /68 (BP Location: Left arm, Patient Position: Lying)   Pulse 86   Temp 37 °C (98.6 °F) (Oral)   Resp 12   Ht 6' 2" (1.88 m)   Wt 97.5 kg (215 lb)   SpO2 100%   BMI 27.60 kg/m²     "

## 2019-11-04 NOTE — ANESTHESIA PROCEDURE NOTES
Arterial    Diagnosis: intraoperative monitoring  Doctor requesting consult: donita    Patient location during procedure: done in OR  Procedure start time: 11/4/2019 9:20 AM  Timeout: 11/4/2019 9:20 AM  Procedure end time: 11/4/2019 9:22 AM    Staffing  Authorizing Provider: Gaston Dalton MD  Performing Provider: Gaston Dalton MD    Anesthesiologist was present at the time of the procedure.    Preanesthetic Checklist  Completed: patient identified, site marked, surgical consent, pre-op evaluation, timeout performed, IV checked, risks and benefits discussed, monitors and equipment checked and anesthesia consent givenArterial  Skin Prep: chlorhexidine gluconate  Orientation: right  Location: radial  Catheter Size: 20 G  Catheter placement by Anatomical landmarks. Heme positive aspiration all ports.Insertion Attempts: 1  Assessment  Dressing: tegaderm  Patient: Tolerated well

## 2019-11-04 NOTE — NURSING
Pt arrived to ICU from OR. Placed on cardiac monitoring. Vital signs stable. Safety precautions in place. Plan of care reviewed with patient and daughter.

## 2019-11-04 NOTE — ANESTHESIA PREPROCEDURE EVALUATION
11/04/2019  Roosevelt Alejandro is a 73 y.o., male presents for ex lap under GA.    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    Vertigo, aural, unspecified laterality 3/6/2019     Past Surgical History:   Procedure Laterality Date    COLONOSCOPY      2009    COLONOSCOPY N/A 1/21/2016    Procedure: COLONOSCOPY;  Surgeon: Toby Maciel MD;  Location: [facility] ENDO;  Service: Endoscopy;  Laterality: N/A;    COLONOSCOPY N/A 1/25/2019    Procedure: COLONOSCOPY;  Surgeon: Toby Maciel MD;  Location: [facility] ENDO;  Service: Endoscopy;  Laterality: N/A;    ENDOSCOPIC ULTRASOUND OF UPPER GASTROINTESTINAL TRACT N/A 8/20/2019    Procedure: ULTRASOUND, UPPER GI TRACT, ENDOSCOPIC;  Surgeon: Forest Lucio III, MD;  Location: [facility] ENDO;  Service: Endoscopy;  Laterality: N/A;    EYE SURGERY Bilateral     cataracts, retinal detachment    PROSTATE BIOPSY      PROSTATECTOMY  12/2017    ROTATOR CUFF REPAIR      left         Anesthesia Evaluation    I have reviewed the Patient Summary Reports.    I have reviewed the Nursing Notes.   I have reviewed the Medications.     Review of Systems  Anesthesia Hx:  No problems with previous Anesthesia Denies Hx of Anesthetic complications  Denies Family Hx of Anesthesia complications.   Denies Personal Hx of Anesthesia complications.   Hematology/Oncology:  Hematology Normal   Oncology Normal     EENT/Dental:EENT/Dental Normal   Cardiovascular:   Hypertension    Pulmonary:  Pulmonary Normal    Renal/:  Renal/ Normal     Hepatic/GI:  Hepatic/GI Normal    Musculoskeletal:   Arthritis     Neurological:  Neurology Normal    Endocrine:  Endocrine Normal    Psych:  Psychiatric Normal           Physical  Exam  General:  Well nourished    Airway/Jaw/Neck:  Airway Findings: Mouth Opening: Normal Tongue: Normal  General Airway Assessment: Adult  Mallampati: II  TM Distance: Normal, at least 6 cm     Eyes/Ears/Nose:  Eyes/Ears/Nose Findings:     Chest/Lungs:  Chest/Lungs Findings: Clear to auscultation, Normal Respiratory Rate     Heart/Vascular:  Heart Findings: Rate: Normal  Rhythm: Regular Rhythm  Sounds: Normal        Mental Status:  Mental Status Findings:  Cooperative, Alert and Oriented       Lab Results   Component Value Date    WBC 9.44 11/01/2019    HGB 14.3 11/01/2019    HCT 42.2 11/01/2019    MCV 95 11/01/2019     11/01/2019       BMP  Lab Results   Component Value Date     11/01/2019    K 4.5 11/01/2019     11/01/2019    CO2 27 11/01/2019    BUN 16 11/01/2019    CREATININE 1.3 11/01/2019    CALCIUM 10.0 11/01/2019    ANIONGAP 10 11/01/2019    ESTGFRAFRICA >60 11/01/2019    EGFRNONAA 54 (A) 11/01/2019           Anesthesia Plan  Type of Anesthesia, risks & benefits discussed:  Anesthesia Type:  general  Patient's Preference:   Intra-op Monitoring Plan: standard ASA monitors  Intra-op Monitoring Plan Comments:   Post Op Pain Control Plan: per primary service following discharge from PACU  Post Op Pain Control Plan Comments:   Induction:   IV  Beta Blocker:         Informed Consent: Patient understands risks and agrees with Anesthesia plan.  Questions answered. Anesthesia consent signed with patient.  ASA Score: 3     Day of Surgery Review of History & Physical:  There are no significant changes.          Ready For Surgery From Anesthesia Perspective.

## 2019-11-05 LAB
ALBUMIN SERPL BCP-MCNC: 3.8 G/DL (ref 3.5–5.2)
ALP SERPL-CCNC: 56 U/L (ref 55–135)
ALT SERPL W/O P-5'-P-CCNC: 67 U/L (ref 10–44)
ANION GAP SERPL CALC-SCNC: 9 MMOL/L (ref 8–16)
AST SERPL-CCNC: 59 U/L (ref 10–40)
BASOPHILS # BLD AUTO: 0.05 K/UL (ref 0–0.2)
BASOPHILS NFR BLD: 0.4 % (ref 0–1.9)
BILIRUB SERPL-MCNC: 1.2 MG/DL (ref 0.1–1)
BUN SERPL-MCNC: 17 MG/DL (ref 8–23)
CALCIUM SERPL-MCNC: 8.1 MG/DL (ref 8.7–10.5)
CHLORIDE SERPL-SCNC: 108 MMOL/L (ref 95–110)
CO2 SERPL-SCNC: 20 MMOL/L (ref 23–29)
CREAT SERPL-MCNC: 1.2 MG/DL (ref 0.5–1.4)
DIFFERENTIAL METHOD: ABNORMAL
EOSINOPHIL # BLD AUTO: 0.2 K/UL (ref 0–0.5)
EOSINOPHIL NFR BLD: 1.5 % (ref 0–8)
ERYTHROCYTE [DISTWIDTH] IN BLOOD BY AUTOMATED COUNT: 12.7 % (ref 11.5–14.5)
EST. GFR  (AFRICAN AMERICAN): >60 ML/MIN/1.73 M^2
EST. GFR  (NON AFRICAN AMERICAN): 59 ML/MIN/1.73 M^2
GLUCOSE SERPL-MCNC: 165 MG/DL (ref 70–110)
HCT VFR BLD AUTO: 33.5 % (ref 40–54)
HGB BLD-MCNC: 11.1 G/DL (ref 14–18)
LYMPHOCYTES # BLD AUTO: 0.7 K/UL (ref 1–4.8)
LYMPHOCYTES NFR BLD: 5.6 % (ref 18–48)
MAGNESIUM SERPL-MCNC: 2.5 MG/DL (ref 1.6–2.6)
MCH RBC QN AUTO: 31.8 PG (ref 27–31)
MCHC RBC AUTO-ENTMCNC: 33.1 G/DL (ref 32–36)
MCV RBC AUTO: 96 FL (ref 82–98)
MONOCYTES # BLD AUTO: 0.9 K/UL (ref 0.3–1)
MONOCYTES NFR BLD: 7 % (ref 4–15)
NEUTROPHILS # BLD AUTO: 10.6 K/UL (ref 1.8–7.7)
NEUTROPHILS NFR BLD: 85.5 % (ref 38–73)
PHOSPHATE SERPL-MCNC: 2.3 MG/DL (ref 2.7–4.5)
PLATELET # BLD AUTO: 199 K/UL (ref 150–350)
PMV BLD AUTO: 10.1 FL (ref 9.2–12.9)
POTASSIUM SERPL-SCNC: 4.4 MMOL/L (ref 3.5–5.1)
PROT SERPL-MCNC: 5.8 G/DL (ref 6–8.4)
RBC # BLD AUTO: 3.49 M/UL (ref 4.6–6.2)
SODIUM SERPL-SCNC: 137 MMOL/L (ref 136–145)
WBC # BLD AUTO: 12.48 K/UL (ref 3.9–12.7)

## 2019-11-05 PROCEDURE — 99900035 HC TECH TIME PER 15 MIN (STAT)

## 2019-11-05 PROCEDURE — 11000001 HC ACUTE MED/SURG PRIVATE ROOM

## 2019-11-05 PROCEDURE — 97161 PT EVAL LOW COMPLEX 20 MIN: CPT

## 2019-11-05 PROCEDURE — 80053 COMPREHEN METABOLIC PANEL: CPT

## 2019-11-05 PROCEDURE — 94761 N-INVAS EAR/PLS OXIMETRY MLT: CPT

## 2019-11-05 PROCEDURE — 25000003 PHARM REV CODE 250: Performed by: SURGERY

## 2019-11-05 PROCEDURE — 63600175 PHARM REV CODE 636 W HCPCS: Performed by: SURGERY

## 2019-11-05 PROCEDURE — 83735 ASSAY OF MAGNESIUM: CPT

## 2019-11-05 PROCEDURE — 94770 HC EXHALED C02 TEST: CPT

## 2019-11-05 PROCEDURE — 84100 ASSAY OF PHOSPHORUS: CPT

## 2019-11-05 PROCEDURE — 97165 OT EVAL LOW COMPLEX 30 MIN: CPT

## 2019-11-05 PROCEDURE — 97530 THERAPEUTIC ACTIVITIES: CPT

## 2019-11-05 PROCEDURE — 85025 COMPLETE CBC W/AUTO DIFF WBC: CPT

## 2019-11-05 PROCEDURE — 27000221 HC OXYGEN, UP TO 24 HOURS

## 2019-11-05 RX ADMIN — KETOROLAC TROMETHAMINE 10 MG: 30 INJECTION, SOLUTION INTRAMUSCULAR at 05:11

## 2019-11-05 RX ADMIN — ACETAMINOPHEN 1000 MG: 500 TABLET ORAL at 09:11

## 2019-11-05 RX ADMIN — KETOROLAC TROMETHAMINE 10 MG: 30 INJECTION, SOLUTION INTRAMUSCULAR at 08:11

## 2019-11-05 RX ADMIN — IRON SUCROSE 100 MG: 20 INJECTION, SOLUTION INTRAVENOUS at 08:11

## 2019-11-05 RX ADMIN — PREGABALIN 75 MG: 75 CAPSULE ORAL at 08:11

## 2019-11-05 RX ADMIN — AMPICILLIN SODIUM AND SULBACTAM SODIUM 3 G: 2; 1 INJECTION, POWDER, FOR SOLUTION INTRAMUSCULAR; INTRAVENOUS at 02:11

## 2019-11-05 RX ADMIN — KETOROLAC TROMETHAMINE 10 MG: 30 INJECTION, SOLUTION INTRAMUSCULAR at 11:11

## 2019-11-05 RX ADMIN — METOPROLOL SUCCINATE 25 MG: 25 TABLET, FILM COATED, EXTENDED RELEASE ORAL at 08:11

## 2019-11-05 RX ADMIN — ACETAMINOPHEN 1000 MG: 10 INJECTION, SOLUTION INTRAVENOUS at 05:11

## 2019-11-05 RX ADMIN — ACETAMINOPHEN 1000 MG: 10 INJECTION, SOLUTION INTRAVENOUS at 01:11

## 2019-11-05 RX ADMIN — DEXTROSE MONOHYDRATE, SODIUM CHLORIDE, AND POTASSIUM CHLORIDE: 50; 4.5; 1.49 INJECTION, SOLUTION INTRAVENOUS at 08:11

## 2019-11-05 RX ADMIN — DEXTROSE MONOHYDRATE, SODIUM CHLORIDE, AND POTASSIUM CHLORIDE: 50; 4.5; 1.49 INJECTION, SOLUTION INTRAVENOUS at 11:11

## 2019-11-05 RX ADMIN — DEXTROSE MONOHYDRATE, SODIUM CHLORIDE, AND POTASSIUM CHLORIDE: 50; 4.5; 1.49 INJECTION, SOLUTION INTRAVENOUS at 12:11

## 2019-11-05 RX ADMIN — AMPICILLIN SODIUM AND SULBACTAM SODIUM 3 G: 2; 1 INJECTION, POWDER, FOR SOLUTION INTRAMUSCULAR; INTRAVENOUS at 08:11

## 2019-11-05 NOTE — PROGRESS NOTES
Surgery Progress Note    S:  Patient taken to the operating room yesterday for small-bowel resection, open cholecystectomy, liver wedge biopsy, and mesenteric tumor debulking secondary to neuroendocrine tumor.  Patient tolerated procedure well.  Postoperatively patient was extubated and kept in the ICU overnight for close hemodynamic monitoring.  He has remained hemodynamically stable.  Pain is controlled with PCA.    O:  Temp:  [96.2 °F (35.7 °C)-98.8 °F (37.1 °C)] 96.2 °F (35.7 °C)  Pulse:  [73-96] 76  Resp:  [11-26] 14  SpO2:  [95 %-100 %] 95 %  BP: (106-150)/(50-75) 108/58  Arterial Line BP: ()/() 127/50     Scheduled Meds:   acetaminophen  1,000 mg Intravenous Q8H    Followed by    acetaminophen  1,000 mg Oral Q8H    HYDROmorphone  0.5 mg Intravenous Once    iron sucrose (VENOFER) IVPB  100 mg Intravenous Daily    isosulfan blue  5 mg Subcutaneous Once    ketorolac  9.999 mg Intravenous Q6H    metoprolol succinate  25 mg Oral Daily    pregabalin  75 mg Oral BID     Continuous Infusions:   dextrose 5 % and 0.45 % NaCl with KCl 20 mEq 100 mL/hr at 11/05/19 0011    hydromorphone in 0.9 % NaCl 6 mg/30 ml      octreotide infusion (50 mcg/mL) 250 mcg/hr (11/04/19 2112)     PRN Meds:.albuterol sulfate, diphenhydrAMINE, hydrALAZINE, labetalol, naloxone, ondansetron    Physical Exam:  Gen: NAD, AAOx3  HEENT: EOMI, no JVD, NGT  CV: RRR  Resp: no shortness of breath  Abd: soft, non-distended, appropriately tender, incision clean dry and intact  Ext: no cyanosis or edema  MSK: normal range of motion, normal strength  Neuro: no focal deficits, normal sensation    Labs:  WBC 12.5  H&H 11/33.5  Platelets 199  Potassium 4.4  Creatinine 1.2  Bilirubin 1.2  AST/ALT 59/67    A/P:  73yM s/p small-bowel resection, open cholecystectomy, liver wedge biopsy, and mesenteric tumor debulking secondary to neuroendocrine tumor    Neuro:  Patient not on any sedation, pain controlled with PCA, IV Tylenol, IV Toradol,  and KAUSHAL analog  CV:  Hemodynamically stable, arterial line removed  Respiratory:  Stable on room air  FEN/GI:  Continue NGT to suction, NPO with IV fluids  Renal:  Good urine output, creatinine 1.2, remove Zelaya catheter  Heme:  H&H stable  Infectious disease:  Afebrile with no evidence of infection, no antibiotics  MSK:  PT and OT to evaluate and treat  Disposition:  Transfer to floor      Otto Bautista MD  [employer] General Surgery

## 2019-11-05 NOTE — PLAN OF CARE
Pt AAO to Self. NAD. Currently denies pain, has not used PCA pump. Breathing even and unlabored at 2L NC. Able to make needs known. Incision CDI, Approximated with no swelling or tenderness. Zelaya draining blue urine from Contrast. No other consults at this time. HR NSR. VS stable. Safety maintained. SR up x3. Call light in reach. Family not present at bedside.

## 2019-11-05 NOTE — ANESTHESIA POSTPROCEDURE EVALUATION
Anesthesia Post Evaluation    Patient: Roosevelt Alejandro    Procedure(s) Performed: Procedure(s) (LRB):  LAPAROTOMY, EXPLORATORY (N/A)  EXCISION, INTESTINE (N/A)  EXPLORATION, KIDNEY  CHOLECYSTECTOMY  BIOPSY, LIVER  CHEMOTHERAPY, INTRAPERITONEAL, HYPERTHERMIC  MAPPING, LYMPH NODE, SENTINEL  EXCISION, LYMPH NODE    Final Anesthesia Type: general  Patient location during evaluation: PACU  Patient participation: Yes- Able to Participate  Level of consciousness: awake  Post-procedure vital signs: reviewed and stable  Pain management: adequate  Airway patency: patent  PONV status at discharge: No PONV  Anesthetic complications: no      Cardiovascular status: blood pressure returned to baseline and hemodynamically stable  Respiratory status: unassisted and room air  Hydration status: euvolemic  Follow-up not needed.          Vitals Value Taken Time   /60 11/5/2019  9:02 AM   Temp 35.7 °C (96.2 °F) 11/5/2019  7:45 AM   Pulse 82 11/5/2019  9:13 AM   Resp 16 11/5/2019  9:13 AM   SpO2 96 % 11/5/2019  9:13 AM   Vitals shown include unvalidated device data.      No case tracking events are documented in the log.      Pain/Taj Score: Pain Rating Prior to Med Admin: 0 (11/5/2019  5:15 AM)

## 2019-11-05 NOTE — PT/OT/SLP EVAL
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  OT 5x/wk to increase phys/fxnl status & maximize potential to achieve established goals for d/c-->home w/ HHOT/PT & DME TBD.    Roosevelt Alejandro is a 73 y.o. 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  He presents with . Performance deficits affecting function: weakness, impaired endurance, impaired self care skills, impaired balance, gait instability, impaired functional mobilty, decreased coordination, decreased ROM, edema, impaired skin, impaired cardiopulmonary response to activity.      Rehab Prognosis: Good; patient would benefit from acute skilled OT services to address these deficits and reach maximum level of function.       Plan:     Patient to be seen 5 x/week to address the above listed problems via therapeutic exercises  · Plan of Care Expires: 12/05/19  · Plan of Care Reviewed with: patient    Subjective     Chief Complaint: abd pain  Patient/Family Comments/goals: return to PLOF    Occupational Profile:  Living Environment: w/ spouse  Previous level of function: (I) w/o DME  Roles and Routines: IADLs, drives; works   Equipment Used at Home:  none  Assistance upon Discharge: spouse    Pain/Comfort:  · Pain Rating 1: 0/10  · Pain Rating Post-Intervention 1: 0/10    Patients cultural, spiritual, Mosque conflicts given the current situation:      Objective:     Communicated with: nsdomingo prior to session.  Patient found HOB elevated with bed alarm,  central line, NG tube, PCA upon OT entry to room.    General Precautions: Standard, fall   Orthopedic Precautions:N/A   Braces: N/A     Occupational Performance:    Bed Mobility:    · Patient completed Rolling/Turning to Right with moderate assistance  · Patient completed Supine to Sit with moderate assistance  · Patient completed Sit to Supine with moderate assistance and with leg lift    Functional Mobility/Transfers:  · Patient completed Sit <> Stand Transfer with contact guard assistance  with  rolling walker   · Functional Mobility: via RW w/in & for extended distance outside room w/ CGA    Activities of Daily Living:  · Upper Body Dressing: moderate assistance pema izquierdo    Cognitive/Visual Perceptual:  Grossly WFL    Physical Exam:  BUEs WFL    Sit balance: F+  Stand balance: F    Endurance: F    AMPAC 6 Click ADL:  AMPAC Total Score: 16    Treatment & Education:  Pt found in supine &agreeable to OT eval/tx this PM. Pt lives w/ spouse & was (I) w/o DME w/ all fxnl tasks. Works as a [employer]. Pt w/o c/o pain & perf the following: sup-->EOB via abd bracing/logrolling L w/ Mod A; standing via RW w/ CGA; amb via RW w/in & for extended dist outside room (~75' total) w/ CGA. Pt returned to supine via reverse logroll w/ abd bracing w/ Mod A for LEs. Edu/tx re: deep breathing, abd bracing, logrolling techs, general safety techs & HEP. Pt verbalized understanding.    Patient left HOB elevated with all lines intact, call button in reach, bed alarm on and nsg notified    GOALS:   Multidisciplinary Problems     Occupational Therapy Goals        Problem: Occupational Therapy Goal    Goal Priority Disciplines Outcome Interventions   Occupational Therapy Goal     OT, PT/OT Ongoing, Progressing    Description:  Goals to be met by: 12/05     Patient will increase functional independence with ADLs by performing:    UE Dressing with Stand-by Assistance.  LE Dressing with Stand-by Assistance.  Grooming while  standing at sink with Stand-by Assistance.  Toileting from toilet with Stand-by Assistance for hygiene and clothing management.   Toilet transfer to toilet with Stand-by Assistance.                      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 ENDO;  Service: Endoscopy;  Laterality: N/A;    COLONOSCOPY N/A 1/25/2019    Procedure: COLONOSCOPY;  Surgeon: Toby Maciel MD;  Location: [facility] ENDO;  Service: Endoscopy;  Laterality: N/A;    ENDOSCOPIC ULTRASOUND OF UPPER GASTROINTESTINAL TRACT N/A 8/20/2019    Procedure: ULTRASOUND, UPPER GI TRACT, ENDOSCOPIC;  Surgeon: Forest Lucio III, MD;  Location: [facility];  Service: Endoscopy;  Laterality: N/A;    EYE SURGERY Bilateral     cataracts, retinal detachment    HYPERTHERMIC INTRAPERITONEAL CHEMOTHERAPY (HIPEC)  11/4/2019    Procedure: CHEMOTHERAPY, INTRAPERITONEAL, HYPERTHERMIC;  Surgeon: SP Khalil MD;  Location: [facility] OR;  Service: General;;    LIVER BIOPSY  11/4/2019    Procedure: BIOPSY, LIVER;  Surgeon: SP Khalil MD;  Location: [facility] OR;  Service: General;;  BIOPSY LIVER WEDGE X3    PROSTATE BIOPSY      PROSTATECTOMY  12/2017    RENAL EXPLORATION  11/4/2019    Procedure: EXPLORATION, KIDNEY;  Surgeon: SP Kahlil MD;  Location: [facility] OR;  Service: General;;    ROTATOR CUFF REPAIR      left    SURGICAL REMOVAL OF LYMPH NODE  11/4/2019    Procedure: EXCISION, LYMPH NODE;   Surgeon: SP Khalil MD;  Location: [facility];  Service: General;;  EXTENSIVE RETROPERITONEAL NODE DISSECTION  EXPLORATION OF MESENTERIC ARTERY/VEIN       Time Tracking:     OT Date of Treatment: 11/05/19  OT Start Time: 1422  OT Stop Time: 1508  OT Total Time (min): 46 min    Billable Minutes:Evaluation 10  Therapeutic Activity 36  Total Time 46    MONTEZ Lainez  11/5/2019

## 2019-11-05 NOTE — PLAN OF CARE
Pt reports he lives with his spouse and was independent with all adls prior to admit. Pt informed Tn his spouse can provide help at home and transportation upon d/c.  Tn gave pt d/c brochure and card and encouraged to call for any needs/concerns.     11/05/19 1031   Discharge Assessment   Assessment Type Discharge Planning Assessment   Confirmed/corrected address and phone number on facesheet? Yes   Assessment information obtained from? Patient   Expected Length of Stay (days) 5   Communicated expected length of stay with patient/caregiver yes   Prior to hospitilization cognitive status: Alert/Oriented   Prior to hospitalization functional status: Independent   Current cognitive status: Alert/Oriented   Current Functional Status: Needs Assistance   Lives With spouse   Able to Return to Prior Arrangements yes   Is patient able to care for self after discharge? Yes   Who are your caregiver(s) and their phone number(s)? Edith ( spouse) [phone]   Patient's perception of discharge disposition home or selfcare   Readmission Within the Last 30 Days no previous admission in last 30 days   Patient currently being followed by outpatient case management? No   Patient currently receives any other outside agency services? No   Equipment Currently Used at Home none   Do you have any problems affording any of your prescribed medications? No   Is the patient taking medications as prescribed? yes   Does the patient have transportation home? Yes   Transportation Anticipated family or friend will provide   Does the patient receive services at the [facility]? No   Discharge Plan A Home with family   Discharge Plan B Home Health   DME Needed Upon Discharge    (TBD)   Patient/Family in Agreement with Plan yes

## 2019-11-05 NOTE — PLAN OF CARE
Patient is currently on room air with acceptable O2 saturations. Will continue to monitor. ETCO2 being monitored.

## 2019-11-05 NOTE — NURSING
Pt. With complaints of pain in nose where NG tube is placed and felt as if a piece broke off and got stuck in nose.  RN tried reasoning with pt. on indications for tube being placed but pt became very agitated.  Patient Took out NG tube and does not want another one to be placed back in.  MD Hoffman notified.

## 2019-11-05 NOTE — PT/OT/SLP EVAL
Physical Therapy Evaluation/Treatment    Patient Name:  Roosevelt Alejandro   MRN:  [id number]    Recommendations:     Discharge Recommendations:  home health PT, home health OT, home with home health   Discharge Equipment Recommendations: shower chair   Barriers to discharge: None    Assessment:     Roosevelt Alejandro is a 73 y.o. 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  He presents with the following impairments/functional limitations:  weakness, impaired endurance, impaired self care skills, impaired functional mobilty, gait instability, impaired skin, edema, decreased ROM, impaired balance Patient evaluated; report to follow; pt amb~120ft with SBA/CGA without AD; recommend shower chair,  PT/OT; will cont with POC..    Rehab Prognosis: Good; patient would benefit from acute skilled PT services to address these deficits and reach maximum level of function.    Recent Surgery: Procedure(s) (LRB):  LAPAROTOMY, EXPLORATORY (N/A)  EXCISION, INTESTINE (N/A)  EXPLORATION, KIDNEY  CHOLECYSTECTOMY  BIOPSY, LIVER  CHEMOTHERAPY, INTRAPERITONEAL, HYPERTHERMIC  MAPPING, LYMPH NODE, SENTINEL  EXCISION, LYMPH NODE 1 Day Post-Op    Plan:     During this hospitalization, patient to be seen 6 x/week to address the identified rehab impairments via gait training, therapeutic activities, therapeutic exercises, neuromuscular re-education and progress toward the following goals:    · Plan of Care Expires:  12/05/19    Subjective     Chief Complaint: none  Patient/Family Comments/goals: return to PLOF  Pain/Comfort:  · Pain Rating 1: 0/10  · Pain Addressed 1: Reposition, Distraction, Cessation of Activity(minimal pain not rated when transitioning from sit to supine in abdomen)  · Pain Rating Post-Intervention 1: 0/10    Patients cultural, spiritual, Confucianist conflicts given the current situation: no    Living Environment:  Pt lives with spouse in [facility]; no ALBA; tub/shower  Prior to admission,  patients level of function was independent without DME, drives, works at [employer].  Equipment used at home: none. Upon discharge, patient will have assistance ?family-pt reports that wife can hardly walk.    Objective:     Communicated with nurse prior to session.  Patient found with bed alarm, central line, NG tube, PCA  upon PT entry to room.    General Precautions: Standard, fall   Orthopedic Precautions:N/A   Braces: N/A     Exams:  · Cognitive Exam:  Patient is oriented to Person, Place, Time, Situation and follows all commands  · Gross Motor Coordination:  slow movement  · Postural Exam:  Patient presented with the following abnormalities:    · -       No postural abnormalities identified  · Sensation:    · -       Intact  · Skin Integrity/Edema:      · -       Skin integrity: Wound surgical midline abdomen  · RLE ROM: WFL  · RLE Strength: WFL  · LLE ROM: WFL  · LLE Strength: WFL    Functional Mobility:  · Bed Mobility:     · Rolling Right: stand by assistance and use of RW  · Scooting: stand by assistance  · Supine to Sit: contact guard assistance, minimum assistance and with increased time and effort  · Sit to Supine: minimum assistance, moderate assistance and with increased time and effort  · Transfers:     · Sit to Stand:  contact guard assistance with no AD  · Gait: Pt amb 120ft with CGA and no AD with fair step length, decreased daksha, upright posture  · Balance: sit ~good; stand ~fair + and amb ~fair        Therapeutic Activities and Exercises:   Patient educated on role of PT/POC, AROM ex for BLEs; effective technique for rolling, sup<>sit and amb without AD with CGA as described above; pt sat at EOB x 10 min; initial /68 HR 80 in sitting; post session: /64 HR 72    AM-PAC 6 CLICK MOBILITY  Total Score:17     Patient left HOB elevated with all lines intact, call button in reach, bed alarm on and nurse notified.    GOALS:   Multidisciplinary Problems     Physical Therapy Goals         Problem: Physical Therapy Goal    Goal Priority Disciplines Outcome Goal Variances Interventions   Physical Therapy Goal     PT, PT/OT Ongoing, Progressing     Description:  Goals to be met by: 2019     Patient will increase functional independence with mobility by performin. Supine to sit with Modified [place]/supervision  2. Sit to supine with Modified [place]/supervision  3. Sit to stand transfer with Supervision and Stand-by Assistance  4. Gait  x 300 feet with Supervision and Stand-by Assistance    5. Lower extremity exercise program x10 reps per handout, with supervision                      History:     Past Medical History:   Diagnosis Date    Arthritis     back pain    Asbestosis     BPH (benign prostatic hyperplasia)     Hypertension     Primary malignant neuroendocrine neoplasm of duodenum 2019    Vertigo, aural, unspecified laterality 3/6/2019       Past Surgical History:   Procedure Laterality Date    CHOLECYSTECTOMY  2019    Procedure: CHOLECYSTECTOMY;  Surgeon: SP Khalil MD;  Location: [facility] OR;  Service: General;;    COLONOSCOPY          COLONOSCOPY N/A 2016    Procedure: COLONOSCOPY;  Surgeon: Toby Maciel MD;  Location: [facility];  Service: Endoscopy;  Laterality: N/A;    COLONOSCOPY N/A 2019    Procedure: COLONOSCOPY;  Surgeon: Toby Maciel MD;  Location: [facility];  Service: Endoscopy;  Laterality: N/A;    ENDOSCOPIC ULTRASOUND OF UPPER GASTROINTESTINAL TRACT N/A 2019    Procedure: ULTRASOUND, UPPER GI TRACT, ENDOSCOPIC;  Surgeon: Forest Lucio III, MD;  Location: [facility] ENDO;  Service: Endoscopy;  Laterality: N/A;    EYE SURGERY Bilateral     cataracts, retinal detachment    HYPERTHERMIC INTRAPERITONEAL CHEMOTHERAPY (HIPEC)  2019    Procedure: CHEMOTHERAPY, INTRAPERITONEAL, HYPERTHERMIC;  Surgeon: SP Khalil MD;  Location: [facility] OR;  Service: General;;    LIVER BIOPSY  2019    Procedure:  BIOPSY, LIVER;  Surgeon: SP Khalil MD;  Location: [facility] OR;  Service: General;;  BIOPSY LIVER WEDGE X3    PROSTATE BIOPSY      PROSTATECTOMY  12/2017    RENAL EXPLORATION  11/4/2019    Procedure: EXPLORATION, KIDNEY;  Surgeon: SP Khalil MD;  Location: [facility] OR;  Service: General;;    ROTATOR CUFF REPAIR      left    SURGICAL REMOVAL OF LYMPH NODE  11/4/2019    Procedure: EXCISION, LYMPH NODE;  Surgeon: SP Khalil MD;  Location: [facility];  Service: General;;  EXTENSIVE RETROPERITONEAL NODE DISSECTION  EXPLORATION OF MESENTERIC ARTERY/VEIN       Time Tracking:     PT Received On: 11/05/19  PT Start Time: 1600     PT Stop Time: 1630  PT Total Time (min): 30 min     Billable Minutes: Evaluation 15 minutes and Therapeutic Activity 15 minutes      Amarilys Pascual, PT  11/05/2019

## 2019-11-05 NOTE — NURSING TRANSFER
Nursing Transfer Note      11/5/2019     Transfer To: 529    Transfer via wheelchair    Transfer with cardiac monitoring    Transported by RN      Medicines sent: IVF, rodolfo drip, PCA     Chart sent with patient: Yes    Upon arrival to floor: RN notified. Cardiac monitor applied, patient oriented to room, call bell in reach and bed in lowest position.

## 2019-11-05 NOTE — PLAN OF CARE
Problem: Occupational Therapy Goal  Goal: Occupational Therapy Goal  Description  Goals to be met by: 12/05     Patient will increase functional independence with ADLs by performing:    UE Dressing with Stand-by Assistance.  LE Dressing with Stand-by Assistance.  Grooming while standing at sink with Stand-by Assistance.  Toileting from toilet with Stand-by Assistance for hygiene and clothing management.   Toilet transfer to toilet with Stand-by Assistance.     Outcome: Ongoing, Progressing   Pt found in supine &agreeable to OT eval/tx this PM. Pt lives w/ spouse & was (I) w/o DME w/ all fxnl tasks. Works as a [employer]. Pt w/o c/o pain & perf the following: sup-->EOB via abd bracing/logrolling L w/ Mod A; standing via RW w/ CGA; amb via RW w/in & for extended dist outside room (~75' total) w/ CGA. Pt returned to supine via reverse logroll w/ abd bracing w/ Mod A for LEs. Edu/tx re: deep breathing, abd bracing, logrolling techs, general safety techs & HEP. Pt verbalized understanding.    Pt presents w/ decreased overall endurance/conditioning, balance/mobility & coordination w/ subsequent decline in (I)/safety w/ BADLs, fxnl mobility & fxnl t/f's.  OT 5x/wk to increase phys/fxnl status & maximize potential to achieve established goals for d/c-->home w/ HHOT/PT & DME TBD.

## 2019-11-06 LAB
ALBUMIN SERPL BCP-MCNC: 3.3 G/DL (ref 3.5–5.2)
ALP SERPL-CCNC: 56 U/L (ref 55–135)
ALT SERPL W/O P-5'-P-CCNC: 44 U/L (ref 10–44)
ANION GAP SERPL CALC-SCNC: 7 MMOL/L (ref 8–16)
AST SERPL-CCNC: 31 U/L (ref 10–40)
BASOPHILS # BLD AUTO: 0.02 K/UL (ref 0–0.2)
BASOPHILS NFR BLD: 0.1 % (ref 0–1.9)
BILIRUB SERPL-MCNC: 0.8 MG/DL (ref 0.1–1)
BUN SERPL-MCNC: 15 MG/DL (ref 8–23)
CALCIUM SERPL-MCNC: 8.3 MG/DL (ref 8.7–10.5)
CHLORIDE SERPL-SCNC: 105 MMOL/L (ref 95–110)
CO2 SERPL-SCNC: 23 MMOL/L (ref 23–29)
CREAT SERPL-MCNC: 1.1 MG/DL (ref 0.5–1.4)
DIFFERENTIAL METHOD: ABNORMAL
EOSINOPHIL # BLD AUTO: 0.6 K/UL (ref 0–0.5)
EOSINOPHIL NFR BLD: 3.5 % (ref 0–8)
ERYTHROCYTE [DISTWIDTH] IN BLOOD BY AUTOMATED COUNT: 12.8 % (ref 11.5–14.5)
EST. GFR  (AFRICAN AMERICAN): >60 ML/MIN/1.73 M^2
EST. GFR  (NON AFRICAN AMERICAN): >60 ML/MIN/1.73 M^2
GLUCOSE SERPL-MCNC: 145 MG/DL (ref 70–110)
HCT VFR BLD AUTO: 31.5 % (ref 40–54)
HGB BLD-MCNC: 10.3 G/DL (ref 14–18)
LYMPHOCYTES # BLD AUTO: 1 K/UL (ref 1–4.8)
LYMPHOCYTES NFR BLD: 6.2 % (ref 18–48)
MAGNESIUM SERPL-MCNC: 1.9 MG/DL (ref 1.6–2.6)
MCH RBC QN AUTO: 32 PG (ref 27–31)
MCHC RBC AUTO-ENTMCNC: 32.7 G/DL (ref 32–36)
MCV RBC AUTO: 98 FL (ref 82–98)
MONOCYTES # BLD AUTO: 1 K/UL (ref 0.3–1)
MONOCYTES NFR BLD: 6.2 % (ref 4–15)
NEUTROPHILS # BLD AUTO: 13.7 K/UL (ref 1.8–7.7)
NEUTROPHILS NFR BLD: 84 % (ref 38–73)
PHOSPHATE SERPL-MCNC: 1.6 MG/DL (ref 2.7–4.5)
PLATELET # BLD AUTO: 189 K/UL (ref 150–350)
PMV BLD AUTO: 10 FL (ref 9.2–12.9)
POTASSIUM SERPL-SCNC: 4 MMOL/L (ref 3.5–5.1)
PROT SERPL-MCNC: 5.8 G/DL (ref 6–8.4)
RBC # BLD AUTO: 3.22 M/UL (ref 4.6–6.2)
SODIUM SERPL-SCNC: 135 MMOL/L (ref 136–145)
WBC # BLD AUTO: 16.38 K/UL (ref 3.9–12.7)

## 2019-11-06 PROCEDURE — 36415 COLL VENOUS BLD VENIPUNCTURE: CPT

## 2019-11-06 PROCEDURE — 85025 COMPLETE CBC W/AUTO DIFF WBC: CPT

## 2019-11-06 PROCEDURE — 63600175 PHARM REV CODE 636 W HCPCS: Performed by: STUDENT IN AN ORGANIZED HEALTH CARE EDUCATION/TRAINING PROGRAM

## 2019-11-06 PROCEDURE — 97110 THERAPEUTIC EXERCISES: CPT

## 2019-11-06 PROCEDURE — 25000003 PHARM REV CODE 250: Performed by: STUDENT IN AN ORGANIZED HEALTH CARE EDUCATION/TRAINING PROGRAM

## 2019-11-06 PROCEDURE — 83735 ASSAY OF MAGNESIUM: CPT

## 2019-11-06 PROCEDURE — 21400001 HC TELEMETRY ROOM

## 2019-11-06 PROCEDURE — 97530 THERAPEUTIC ACTIVITIES: CPT

## 2019-11-06 PROCEDURE — 63600175 PHARM REV CODE 636 W HCPCS: Performed by: SURGERY

## 2019-11-06 PROCEDURE — 97116 GAIT TRAINING THERAPY: CPT

## 2019-11-06 PROCEDURE — 80053 COMPREHEN METABOLIC PANEL: CPT

## 2019-11-06 PROCEDURE — 84100 ASSAY OF PHOSPHORUS: CPT

## 2019-11-06 PROCEDURE — 25000003 PHARM REV CODE 250: Performed by: SURGERY

## 2019-11-06 PROCEDURE — 94761 N-INVAS EAR/PLS OXIMETRY MLT: CPT

## 2019-11-06 RX ORDER — POLYETHYLENE GLYCOL 3350 17 G/17G
17 POWDER, FOR SOLUTION ORAL DAILY
Status: DISCONTINUED | OUTPATIENT
Start: 2019-11-06 | End: 2019-11-06

## 2019-11-06 RX ORDER — POLYETHYLENE GLYCOL 3350 17 G/17G
17 POWDER, FOR SOLUTION ORAL 3 TIMES DAILY PRN
Status: DISCONTINUED | OUTPATIENT
Start: 2019-11-06 | End: 2019-11-07

## 2019-11-06 RX ORDER — AMOXICILLIN 250 MG
1 CAPSULE ORAL 2 TIMES DAILY
Status: DISCONTINUED | OUTPATIENT
Start: 2019-11-06 | End: 2019-11-07

## 2019-11-06 RX ORDER — HYDROMORPHONE HYDROCHLORIDE 2 MG/ML
0.5 INJECTION, SOLUTION INTRAMUSCULAR; INTRAVENOUS; SUBCUTANEOUS EVERY 4 HOURS PRN
Status: DISCONTINUED | OUTPATIENT
Start: 2019-11-06 | End: 2019-11-08 | Stop reason: HOSPADM

## 2019-11-06 RX ORDER — ENOXAPARIN SODIUM 100 MG/ML
40 INJECTION SUBCUTANEOUS EVERY 24 HOURS
Status: DISCONTINUED | OUTPATIENT
Start: 2019-11-06 | End: 2019-11-08 | Stop reason: HOSPADM

## 2019-11-06 RX ORDER — OXYCODONE HYDROCHLORIDE 5 MG/1
5 TABLET ORAL EVERY 4 HOURS PRN
Status: DISCONTINUED | OUTPATIENT
Start: 2019-11-06 | End: 2019-11-08 | Stop reason: HOSPADM

## 2019-11-06 RX ADMIN — DEXTROSE MONOHYDRATE, SODIUM CHLORIDE, AND POTASSIUM CHLORIDE: 50; 4.5; 1.49 INJECTION, SOLUTION INTRAVENOUS at 08:11

## 2019-11-06 RX ADMIN — METOPROLOL SUCCINATE 25 MG: 25 TABLET, FILM COATED, EXTENDED RELEASE ORAL at 08:11

## 2019-11-06 RX ADMIN — ENOXAPARIN SODIUM 40 MG: 100 INJECTION SUBCUTANEOUS at 05:11

## 2019-11-06 RX ADMIN — IRON SUCROSE 100 MG: 20 INJECTION, SOLUTION INTRAVENOUS at 08:11

## 2019-11-06 RX ADMIN — KETOROLAC TROMETHAMINE 10 MG: 30 INJECTION, SOLUTION INTRAMUSCULAR at 05:11

## 2019-11-06 RX ADMIN — ACETAMINOPHEN 1000 MG: 500 TABLET ORAL at 05:11

## 2019-11-06 RX ADMIN — DEXTROSE MONOHYDRATE, SODIUM CHLORIDE, AND POTASSIUM CHLORIDE: 50; 4.5; 1.49 INJECTION, SOLUTION INTRAVENOUS at 06:11

## 2019-11-06 RX ADMIN — PREGABALIN 75 MG: 75 CAPSULE ORAL at 08:11

## 2019-11-06 RX ADMIN — SENNOSIDES, DOCUSATE SODIUM 1 TABLET: 50; 8.6 TABLET, FILM COATED ORAL at 08:11

## 2019-11-06 RX ADMIN — KETOROLAC TROMETHAMINE 10 MG: 30 INJECTION, SOLUTION INTRAMUSCULAR at 12:11

## 2019-11-06 RX ADMIN — ACETAMINOPHEN 1000 MG: 500 TABLET ORAL at 02:11

## 2019-11-06 NOTE — PLAN OF CARE
Problem: Occupational Therapy Goal  Goal: Occupational Therapy Goal  Description  Goals to be met by: 12/05     Patient will increase functional independence with ADLs by performing:    UE Dressing with Stand-by Assistance.  LE Dressing with Stand-by Assistance.  Grooming while standing at sink with Stand-by Assistance.  Toileting from toilet with Stand-by Assistance for hygiene and clothing management.   Toilet transfer to toilet with Stand-by Assistance.     Outcome: Ongoing, Progressing   Pt found in supine this PM & c/o 3/10 abd pain. Pt declined to perf active OT tasks 2/2 signif sleepiness. Edu/tx re: compensatory techs for carryover w/ BADLs at home, general safety techs, HEP, logrolling, abd bracing, deep breathing techs, scar/soft tissue adhesion mgmt. Pt verbalized understanding.     Pt reports increased mobility this date w/ PT. Pt stated he was too fatigued/sleepy to perf active OT tx tasks this date. Cont w/ OT per POC.

## 2019-11-06 NOTE — PT/OT/SLP PROGRESS
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 Pt stated he was too fatigued/sleepy to perf active OT tx tasks this date. Cont w/ OT per POC.    Roosevelt Alejandro is a 73 y.o. 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  He presents with . Performance deficits affecting function are impaired endurance, impaired self care skills, impaired functional mobilty, impaired balance, gait instability, pain, impaired skin, edema, decreased ROM.     Rehab Prognosis:  Good; patient would benefit from acute skilled OT services to address these deficits and reach maximum level of function.       Plan:     Patient to be seen 5 x/week to address the above listed problems via self-care/home management, therapeutic activities, therapeutic exercises  · Plan of Care Expires: 12/05/19  · Plan of Care Reviewed with: patient    Subjective     Pain/Comfort:  · Pain Rating 1: 3/10  · Location - Orientation 1: medial  · Location 1: abdomen  · Pain Addressed 1: Reposition, Distraction, Nurse notified  · Pain Rating Post-Intervention 1: 3/10    Objective:     Communicated with: nsdomingo prior to session.  Patient found supine with bed alarm, central line, peripheral IV upon OT entry to room.    General Precautions: Standard, fall   Orthopedic Precautions:N/A   Braces: N/A     Occupational Performance:     Bed Mobility:    ·      Functional Mobility/Transfers:  ·   · Functional Mobility:     Activities of Daily Living:  ·       AMPAC 6 Click ADL: 16    Treatment & Education:  Pt found in supine this PM & c/o 3/10 abd pain. Pt declined to perf active OT  tasks 2/2 signif sleepiness. Edu/tx re: compensatory techs for carryover w/ BADLs at home, general safety techs, HEP, logrolling, abd bracing, deep breathing techs, scar/soft tissue adhesion mgmt. Pt verbalized understanding.     Patient left supine with all lines intact, call button in reach, bed alarm on and nsg notifiedEducation:      GOALS:   Multidisciplinary Problems     Occupational Therapy Goals        Problem: Occupational Therapy Goal    Goal Priority Disciplines Outcome Interventions   Occupational Therapy Goal     OT, PT/OT Ongoing, Progressing    Description:  Goals to be met by: 12/05     Patient will increase functional independence with ADLs by performing:    UE Dressing with Stand-by Assistance.  LE Dressing with Stand-by Assistance.  Grooming while standing at sink with Stand-by Assistance.  Toileting from toilet with Stand-by Assistance for hygiene and clothing management.   Toilet transfer to toilet with Stand-by Assistance.                      Time Tracking:     OT Date of Treatment: 11/06/19  OT Start Time: 1320  OT Stop Time: 1330  OT Total Time (min): 10 min    Billable Minutes:Therapeutic Activity 10  Total Time 10    MONTEZ Lainez  11/6/2019

## 2019-11-06 NOTE — PLAN OF CARE
Pt. Awake and alert.  No complaints of pain.  IV fluids infusing.  Bladder scan done due to pt. Without voiding within 6 hour time frame. 559 mL was shown on bladder scanner;  called MD Hoffman and gave LPN verbal order to do in and out cath.  Catheter order passed on to night nurse.  Ambulated in chau with PT.  Cardiac monitoring per order.

## 2019-11-06 NOTE — PROGRESS NOTES
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 AFVSS  Patient pulled NG tube last night because it was uncomfortable   No n/v  No f/c, cp/sob  Has not passed flatus/BM  Ambulating  Had an  In and out cath placed for urinary retention 559 ml seen on bladder scan, voided independently later       Physical Exam:  Gen: no acute distress. Alert and oriented x3.  HEENT: normocephalic and atraumatic. EOMI.   Resp: unlabored respirations  CV: regular rate, distal pulses intact  Abd: soft, non-tender, non-distended, surgical incision well approximated C/D/I  Ext: warm and well perfused  MSK: strength grossly intact  Neuro: no focal deficits, normal sensation    Labs reviewed below-   I/O (last 24 hours):          Assessment/Plan: 73 y.o. 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    Removed NG tube last night   Will advance to clear liquid diet   Started on Bowel regimen       Dispo: Soon pending clinical improvement     D'Amico C Johnson, MD   [facility], PGY2  11/06/2019       Inpatient Data     Vitals:   Temp:  [96.2 °F (35.7 °C)-98.6 °F (37 °C)] 98.4 °F (36.9 °C)  Pulse:  [74-93] 75  Resp:  [14-26] 18  SpO2:  [92 %-98 %] 96 %  BP: ()/(50-74) 154/74  Arterial Line BP: (111-131)/(50-51) 127/50 Intake/Output:  11/05 0701 - 11/06 0700  In: 2288.6 [I.V.:1988.6]  Out: 885 [Urine:885]       Recent Labs     11/04/19  1349 11/04/19  1552 11/05/19  0530   WBC  --  13.94* 12.48   HGB  --  10.7* 11.1*   HCT 27* 32.0* 33.5*   PLT  --  214 199   NA  --  139 137   K  --  4.5 4.4   CL  --  109 108   CO2  --  17* 20*   BUN  --  19 17   CREATININE  --  1.3 1.2   BILITOT   --   --  1.2*   AST  --   --  59*   ALT  --   --  67*   ALKPHOS  --   --  56   CALCIUM  --  8.3* 8.1*   ALBUMIN  --   --  3.8   PROT  --   --  5.8*   MG  --   --  2.5   PHOS  --   --  2.3*        Scheduled Meds:   acetaminophen  1,000 mg Oral Q8H    HYDROmorphone  0.5 mg Intravenous Once    iron sucrose (VENOFER) IVPB  100 mg Intravenous Daily    ketorolac  9.999 mg Intravenous Q6H    metoprolol succinate  25 mg Oral Daily    pregabalin  75 mg Oral BID    senna-docusate 8.6-50 mg  1 tablet Oral BID     Continuous Infusions:   dextrose 5 % and 0.45 % NaCl with KCl 20 mEq 100 mL/hr at 11/06/19 0639    hydromorphone in 0.9 % NaCl 6 mg/30 ml      octreotide infusion (50 mcg/mL) 125 mcg/hr (11/05/19 1053)     PRN Meds:albuterol sulfate, diphenhydrAMINE, hydrALAZINE, labetalol, lorazepam, naloxone, ondansetron, polyethylene glycol

## 2019-11-06 NOTE — PROGRESS NOTES
Pharmacy New Medication Education    Patient and/or Caregiver ACCEPTED medication education.    Pharmacy has provided education on the name, indication, and possible side effects of the medication(s) prescribed, using teach-back method.     Learners of pharmacy medication education includes:  patient    Medication Indication Side Effects   enoxaparin Prevent blood clots  bruising and bleeding    pregabalin pain excessive sedation       The following medications have also been discussed, during this admission.     Current Facility-Administered Medications   Medication Frequency    acetaminophen tablet 1,000 mg Q8H    albuterol sulfate nebulizer solution 2.5 mg Q4H PRN    dextrose 5 % and 0.45 % NaCl with KCl 20 mEq infusion Continuous    diphenhydrAMINE injection 12.5 mg Q4H PRN    enoxaparin injection 40 mg Daily    hydrALAZINE injection 5 mg PRN    hydromorphone (PF) injection 0.5 mg Q4H PRN    iron sucrose (VENOFER) 100 mg in sodium chloride 0.9% 100 mL IVPB Daily    labetalol injection 5 mg PRN    lorazepam (ATIVAN) injection 1 mg Q6H PRN    metoprolol succinate (TOPROL-XL) 24 hr tablet 25 mg Daily    naloxone 0.4 mg/mL injection 0.02 mg PRN    ondansetron injection 4 mg Q6H PRN    oxyCODONE immediate release tablet 5 mg Q4H PRN    polyethylene glycol packet 17 g TID PRN    pregabalin capsule 75 mg BID    senna-docusate 8.6-50 mg per tablet 1 tablet BID          Thank you  Britta Arteaga, DwainD

## 2019-11-06 NOTE — PT/OT/SLP PROGRESS
Physical Therapy Treatment    Patient Name:  Roosevelt Alejandro   MRN:  [id number]    Recommendations:     Discharge Recommendations:  home health PT, home health OT, home with home health   Discharge Equipment Recommendations: shower chair   Barriers to discharge: None    Assessment:     Roosevelt Alejandro is a 73 y.o. 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  He presents with the following impairments/functional limitations:  weakness, impaired endurance, impaired self care skills, impaired functional mobilty, gait instability, impaired balance, decreased coordination, edema, impaired skin, decreased ROM. Pt able to increase his ambulation distance this session. Able to perform ambulation training by 2 trials ~130 ft and ~60 ft WITHOUT AD advancing own IV pole requiring CGA. Recommending home with home health upon discharge. Would benefit from continued PT services to increase pt's independent functional mobility to level prior to admit.    Rehab Prognosis: Good; patient would benefit from acute skilled PT services to address these deficits and reach maximum level of function.    Recent Surgery: Procedure(s) (LRB):  LAPAROTOMY, EXPLORATORY (N/A)  EXCISION, INTESTINE (N/A)  EXPLORATION, KIDNEY  CHOLECYSTECTOMY  BIOPSY, LIVER  CHEMOTHERAPY, INTRAPERITONEAL, HYPERTHERMIC  MAPPING, LYMPH NODE, SENTINEL  EXCISION, LYMPH NODE 2 Days Post-Op    Plan:     During this hospitalization, patient to be seen 6 x/week to address the identified rehab impairments via gait training, therapeutic activities, therapeutic exercises, neuromuscular re-education and progress toward the following goals:    · Plan of Care Expires:  12/05/19    Subjective     Chief Complaint:  None expressed  Patient/Family Comments/goals: None expressed  Pain/Comfort:  · Pain Rating 1: (Pain but did not rate)  · Location - Orientation 1: generalized  · Location 1: abdomen  · Pain Addressed 1: Cessation of Activity,  Reposition, Distraction  · Pain Rating Post-Intervention 1: (Pain mainly transitioning from supine to sitting)      Objective:     Communicated with  nurse prior to session.  Patient found supine with bed alarm, central line upon PT entry to room.     General Precautions: Standard, fall   Orthopedic Precautions:N/A   Braces: N/A     Functional Mobility:  · Bed Mobility:     · Rolling Right: stand by assistance  · Scooting: stand by assistance  · Supine to Sit: contact guard assistance, minimum assistance and with increased time needed  · Transfers:     · Sit to Stand:  contact guard assistance with no AD  · Gait:  2 trials ~130 ft and ~60 ft WITHOUT AD with pt advancing own IV pole requiring CGA.       AM-PAC 6 CLICK MOBILITY  Turning over in bed (including adjusting bedclothes, sheets and blankets)?: 4  Sitting down on and standing up from a chair with arms (e.g., wheelchair, bedside commode, etc.): 3  Moving from lying on back to sitting on the side of the bed?: 3  Moving to and from a bed to a chair (including a wheelchair)?: 3  Need to walk in hospital room?: 3  Climbing 3-5 steps with a railing?: 2  Basic Mobility Total Score: 18       Therapeutic Activities and Exercises:   Pt performed 2 ambulation trials WITHOUT AD advancing his IV pole requiring CGA ~130 ft and ~60 ft. Seated therapeutic exercises 1 x 6 reps hip add/abd, 1 x 10 reps LAQ's and heel/toe raises. Needed occasional verbal/tactile cueing to decrease slight trunk flexion while ambulating.     Patient left up in chair with all lines intact, call button in reach, chair alarm on,  nurse notified and  nurse present..    GOALS:   Multidisciplinary Problems     Physical Therapy Goals        Problem: Physical Therapy Goal    Goal Priority Disciplines Outcome Goal Variances Interventions   Physical Therapy Goal     PT, PT/OT Ongoing, Progressing     Description:  Goals to be met by: 12/5/2019     Patient will increase functional independence with mobility  by performin. Supine to sit with Modified [place]/supervision  2. Sit to supine with Modified [place]/supervision  3. Sit to stand transfer with Supervision and Stand-by Assistance  4. Gait  x 300 feet with Supervision and Stand-by Assistance    5. Lower extremity exercise program x10 reps per handout, with supervision                      Time Tracking:     PT Received On: 19  PT Start Time: 0756     PT Stop Time: 0832  PT Total Time (min): 36 min     Billable Minutes: Gait Training  20 and Therapeutic Exercise  16    Treatment Type: Treatment  PT/PTA: PTA     PTA Visit Number: 1     Sarita Stanton, PTA  2019

## 2019-11-06 NOTE — PLAN OF CARE
VN rounds: progress notes reviewed. VN cued into room. No response to VN verbal cues X 2. VN panned camera. Patient appears asleep. Respirations even and unlabored. Will attempt again if time allows.

## 2019-11-06 NOTE — PLAN OF CARE
Problem: Physical Therapy Goal  Goal: Physical Therapy Goal  Description  Goals to be met by: 2019     Patient will increase functional independence with mobility by performin. Supine to sit with Modified [place]/supervision  2. Sit to supine with Modified [place]/supervision  3. Sit to stand transfer with Supervision and Stand-by Assistance  4. Gait  x 300 feet with Supervision and Stand-by Assistance    5. Lower extremity exercise program x10 reps per handout, with supervision     Outcome: Ongoing, Progressing   Pt continues to work toward achieving all established goals.

## 2019-11-07 LAB
ALBUMIN SERPL BCP-MCNC: 3.2 G/DL (ref 3.5–5.2)
ALP SERPL-CCNC: 57 U/L (ref 55–135)
ALT SERPL W/O P-5'-P-CCNC: 34 U/L (ref 10–44)
ANION GAP SERPL CALC-SCNC: 8 MMOL/L (ref 8–16)
AST SERPL-CCNC: 27 U/L (ref 10–40)
BASOPHILS # BLD AUTO: 0.02 K/UL (ref 0–0.2)
BASOPHILS NFR BLD: 0.2 % (ref 0–1.9)
BILIRUB SERPL-MCNC: 0.7 MG/DL (ref 0.1–1)
BUN SERPL-MCNC: 14 MG/DL (ref 8–23)
CALCIUM SERPL-MCNC: 8.6 MG/DL (ref 8.7–10.5)
CHLORIDE SERPL-SCNC: 105 MMOL/L (ref 95–110)
CO2 SERPL-SCNC: 25 MMOL/L (ref 23–29)
CREAT SERPL-MCNC: 1 MG/DL (ref 0.5–1.4)
DIFFERENTIAL METHOD: ABNORMAL
EOSINOPHIL # BLD AUTO: 0.7 K/UL (ref 0–0.5)
EOSINOPHIL NFR BLD: 5.1 % (ref 0–8)
ERYTHROCYTE [DISTWIDTH] IN BLOOD BY AUTOMATED COUNT: 12.8 % (ref 11.5–14.5)
EST. GFR  (AFRICAN AMERICAN): >60 ML/MIN/1.73 M^2
EST. GFR  (NON AFRICAN AMERICAN): >60 ML/MIN/1.73 M^2
GLUCOSE SERPL-MCNC: 132 MG/DL (ref 70–110)
HCT VFR BLD AUTO: 31.6 % (ref 40–54)
HGB BLD-MCNC: 10.5 G/DL (ref 14–18)
LYMPHOCYTES # BLD AUTO: 1.3 K/UL (ref 1–4.8)
LYMPHOCYTES NFR BLD: 9.7 % (ref 18–48)
MAGNESIUM SERPL-MCNC: 1.8 MG/DL (ref 1.6–2.6)
MCH RBC QN AUTO: 32.1 PG (ref 27–31)
MCHC RBC AUTO-ENTMCNC: 33.2 G/DL (ref 32–36)
MCV RBC AUTO: 97 FL (ref 82–98)
MONOCYTES # BLD AUTO: 0.8 K/UL (ref 0.3–1)
MONOCYTES NFR BLD: 6.3 % (ref 4–15)
NEUTROPHILS # BLD AUTO: 10.3 K/UL (ref 1.8–7.7)
NEUTROPHILS NFR BLD: 78.7 % (ref 38–73)
PHOSPHATE SERPL-MCNC: 1.7 MG/DL (ref 2.7–4.5)
PLATELET # BLD AUTO: 196 K/UL (ref 150–350)
PMV BLD AUTO: 10.2 FL (ref 9.2–12.9)
POTASSIUM SERPL-SCNC: 3.8 MMOL/L (ref 3.5–5.1)
PROT SERPL-MCNC: 6 G/DL (ref 6–8.4)
RBC # BLD AUTO: 3.27 M/UL (ref 4.6–6.2)
SODIUM SERPL-SCNC: 138 MMOL/L (ref 136–145)
WBC # BLD AUTO: 13.12 K/UL (ref 3.9–12.7)

## 2019-11-07 PROCEDURE — 25000003 PHARM REV CODE 250: Performed by: STUDENT IN AN ORGANIZED HEALTH CARE EDUCATION/TRAINING PROGRAM

## 2019-11-07 PROCEDURE — 63600175 PHARM REV CODE 636 W HCPCS: Performed by: STUDENT IN AN ORGANIZED HEALTH CARE EDUCATION/TRAINING PROGRAM

## 2019-11-07 PROCEDURE — 94761 N-INVAS EAR/PLS OXIMETRY MLT: CPT

## 2019-11-07 PROCEDURE — 99900035 HC TECH TIME PER 15 MIN (STAT)

## 2019-11-07 PROCEDURE — 97116 GAIT TRAINING THERAPY: CPT

## 2019-11-07 PROCEDURE — 83735 ASSAY OF MAGNESIUM: CPT

## 2019-11-07 PROCEDURE — 80053 COMPREHEN METABOLIC PANEL: CPT

## 2019-11-07 PROCEDURE — 63600175 PHARM REV CODE 636 W HCPCS: Performed by: SURGERY

## 2019-11-07 PROCEDURE — 25000003 PHARM REV CODE 250: Performed by: SURGERY

## 2019-11-07 PROCEDURE — 84100 ASSAY OF PHOSPHORUS: CPT

## 2019-11-07 PROCEDURE — 21400001 HC TELEMETRY ROOM

## 2019-11-07 PROCEDURE — 85025 COMPLETE CBC W/AUTO DIFF WBC: CPT

## 2019-11-07 RX ORDER — AMOXICILLIN 250 MG
2 CAPSULE ORAL 2 TIMES DAILY
Status: DISCONTINUED | OUTPATIENT
Start: 2019-11-07 | End: 2019-11-08 | Stop reason: HOSPADM

## 2019-11-07 RX ORDER — MAGNESIUM SULFATE HEPTAHYDRATE 40 MG/ML
2 INJECTION, SOLUTION INTRAVENOUS ONCE
Status: COMPLETED | OUTPATIENT
Start: 2019-11-07 | End: 2019-11-07

## 2019-11-07 RX ORDER — METRONIDAZOLE 500 MG/1
500 TABLET ORAL EVERY 8 HOURS
Status: DISCONTINUED | OUTPATIENT
Start: 2019-11-07 | End: 2019-11-08

## 2019-11-07 RX ORDER — POLYETHYLENE GLYCOL 3350 17 G/17G
17 POWDER, FOR SOLUTION ORAL 3 TIMES DAILY
Status: DISCONTINUED | OUTPATIENT
Start: 2019-11-07 | End: 2019-11-08 | Stop reason: HOSPADM

## 2019-11-07 RX ORDER — METOCLOPRAMIDE HYDROCHLORIDE 5 MG/5ML
10 SOLUTION ORAL EVERY 6 HOURS
Status: DISCONTINUED | OUTPATIENT
Start: 2019-11-07 | End: 2019-11-08 | Stop reason: HOSPADM

## 2019-11-07 RX ADMIN — POLYETHYLENE GLYCOL 3350 17 G: 17 POWDER, FOR SOLUTION ORAL at 08:11

## 2019-11-07 RX ADMIN — METRONIDAZOLE 500 MG: 500 TABLET ORAL at 09:11

## 2019-11-07 RX ADMIN — POLYETHYLENE GLYCOL 3350 17 G: 17 POWDER, FOR SOLUTION ORAL at 02:11

## 2019-11-07 RX ADMIN — METOCLOPRAMIDE HYDROCHLORIDE 10 MG: 5 SOLUTION ORAL at 11:11

## 2019-11-07 RX ADMIN — ENOXAPARIN SODIUM 40 MG: 100 INJECTION SUBCUTANEOUS at 04:11

## 2019-11-07 RX ADMIN — POLYETHYLENE GLYCOL 3350 17 G: 17 POWDER, FOR SOLUTION ORAL at 09:11

## 2019-11-07 RX ADMIN — METOCLOPRAMIDE HYDROCHLORIDE 10 MG: 5 SOLUTION ORAL at 04:11

## 2019-11-07 RX ADMIN — SENNOSIDES, DOCUSATE SODIUM 2 TABLET: 50; 8.6 TABLET, FILM COATED ORAL at 09:11

## 2019-11-07 RX ADMIN — IRON SUCROSE 100 MG: 20 INJECTION, SOLUTION INTRAVENOUS at 09:11

## 2019-11-07 RX ADMIN — METOPROLOL SUCCINATE 25 MG: 25 TABLET, FILM COATED, EXTENDED RELEASE ORAL at 09:11

## 2019-11-07 RX ADMIN — MAGNESIUM SULFATE IN WATER 2 G: 40 INJECTION, SOLUTION INTRAVENOUS at 09:11

## 2019-11-07 RX ADMIN — METRONIDAZOLE 500 MG: 500 TABLET ORAL at 02:11

## 2019-11-07 RX ADMIN — POTASSIUM PHOSPHATE, MONOBASIC AND POTASSIUM PHOSPHATE, DIBASIC 20 MMOL: 224; 236 INJECTION, SOLUTION, CONCENTRATE INTRAVENOUS at 09:11

## 2019-11-07 RX ADMIN — PREGABALIN 75 MG: 75 CAPSULE ORAL at 09:11

## 2019-11-07 NOTE — PROGRESS NOTES
Pharmacy New Medication Education    Patient and/or Caregiver ACCEPTED medication education.    Pharmacy has provided education on the name, indication, and possible side effects of the medication(s) prescribed, using teach-back method.     Learners of pharmacy medication education includes:  patient    Medication Indication Side Effects   METOCLOPRAMIDE GI motility, nausea, vomiting diarrhea    Polyethylene glycol (Miralax) Stool softener diarrhea        The following medications have also been discussed, during this admission.     Current Facility-Administered Medications   Medication Frequency    albuterol sulfate nebulizer solution 2.5 mg Q4H PRN    diphenhydrAMINE injection 12.5 mg Q4H PRN    enoxaparin injection 40 mg Daily    hydrALAZINE injection 5 mg PRN    hydromorphone (PF) injection 0.5 mg Q4H PRN    iron sucrose (VENOFER) 100 mg in sodium chloride 0.9% 100 mL IVPB Daily    labetalol injection 5 mg PRN    lorazepam (ATIVAN) injection 1 mg Q6H PRN    metoclopramide HCl 5 mg/5 mL solution 10 mg Q6H    metoprolol succinate (TOPROL-XL) 24 hr tablet 25 mg Daily    metroNIDAZOLE tablet 500 mg Q8H    naloxone 0.4 mg/mL injection 0.02 mg PRN    ondansetron injection 4 mg Q6H PRN    oxyCODONE immediate release tablet 5 mg Q4H PRN    polyethylene glycol packet 17 g TID    pregabalin capsule 75 mg BID    senna-docusate 8.6-50 mg per tablet 2 tablet BID          Thank you  Britta Arteaga, DwainD

## 2019-11-07 NOTE — PLAN OF CARE
Patient awake and alert. Scheduled medications administered per MD order. Denies any pain or nausea. Afebrile. Continuous IV fluids infusing. Voiding spontaneously. Patient safety maintained. Will continue to monitor.

## 2019-11-07 NOTE — PROGRESS NOTES
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 AFVSS  Tolerating clear liquid diet   No n/v  No f/c, cp/sob  Not passing flatus or having BM  Ambulating independently       Physical Exam:  Gen: no acute distress. Alert and oriented x3.  HEENT: normocephalic and atraumatic. EOMI.   Resp: unlabored respirations  CV: regular rate, distal pulses intact  Abd: soft, non-tender, non-distended, surgical incision well approximated C/D/I  Ext: warm and well perfused  MSK: strength grossly intact  Neuro: no focal deficits, normal sensation    Labs reviewed below-   WBC 13.12  Mag 1.8  Phos 1.7  I/O (last 24 hours):  UOP: 2000 cc       Assessment/Plan: 73 y.o. 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     - Advance to full liquid diet then advance as tolerated   - Add miralax to bowel regimen   - WBC trending down 13.12  - Continue daily ambulation   - Replace electrolytes - Hypomagnesemia and hypophosphatemia     Diet-  Full liquid   Antibiotics- none   Prophylaxis- lovenox    Dispo: pending clinical improvement     D'Amico C Johnson, MD   [facility] General [facility], PGY2  11/07/2019       Inpatient Data     Vitals:   Temp:  [96.7 °F (35.9 °C)-97.5 °F (36.4 °C)] 97.4 °F (36.3 °C)  Pulse:  [63-83] 74  Resp:  [15-18] 18  SpO2:  [92 %] 92 %  BP: (143-165)/(71-76) 156/73 Intake/Output:  11/06 0701 - 11/07 0700  In: 2522.8 [P.O.:887; I.V.:1635.8]  Out: 2000 [Urine:2000]       Recent Labs     11/04/19  1552 11/05/19  0530 11/06/19  1035 11/07/19  0439   WBC 13.94* 12.48 16.38* 13.12*   HGB 10.7* 11.1* 10.3* 10.5*   HCT 32.0* 33.5* 31.5* 31.6*    199 189 196    137 135*  --    K  4.5 4.4 4.0  --     108 105  --    CO2 17* 20* 23  --    BUN 19 17 15  --    CREATININE 1.3 1.2 1.1  --    BILITOT  --  1.2* 0.8  --    AST  --  59* 31  --    ALT  --  67* 44  --    ALKPHOS  --  56 56  --    CALCIUM 8.3* 8.1* 8.3*  --    ALBUMIN  --  3.8 3.3*  --    PROT  --  5.8* 5.8*  --    MG  --  2.5 1.9  --    PHOS  --  2.3* 1.6*  --         Scheduled Meds:   enoxaparin  40 mg Subcutaneous Daily    iron sucrose (VENOFER) IVPB  100 mg Intravenous Daily    metoprolol succinate  25 mg Oral Daily    polyethylene glycol  17 g Oral TID    pregabalin  75 mg Oral BID    senna-docusate 8.6-50 mg  2 tablet Oral BID     Continuous Infusions:  PRN Meds:albuterol sulfate, diphenhydrAMINE, hydrALAZINE, HYDROmorphone, labetalol, lorazepam, naloxone, ondansetron, oxyCODONE

## 2019-11-07 NOTE — PLAN OF CARE
VN rounds:  VN cued into pt's room with pt's permission.  Pt resting in bed in low position with call bell at bedside.  Fall risk protocol discussed with pt.  VN instructed to call for assistance.  Pt aware and agreeable.  Pt denies pain, anxiety or dyspnea. No acute distress noted.  Allowed time for questions.  Pt's chart, labs and vital signs reviewed.  Will continue to be available and intervene as needed.      11/07/19 8439   Type of Frequent Check   Type Patient Rounds   Safety/Activity   Patient Rounds bed in low position;call light in patient/parent reach;placement of personal items at bedside;visualized patient   Safety Promotion/Fall Prevention assistive device/personal item within reach;commode/urinal/bedpan at bedside;Fall Risk reviewed with patient/family;medications reviewed;side rails raised x 2;instructed to call staff for mobility   Safety Precautions emergency equipment at bedside   Positioning   Body Position supine   Head of Bed (HOB) HOB at 30-45 degrees   Positioning/Transfer Devices traction   Pain/Comfort/Sleep   Preferred Pain Scale number (Numeric Rating Pain Scale)   Comfort/Acceptable Pain Level 0   Pain Rating (0-10): Rest 0   Nausea/Vomiting   Nausea/Vomiting No Nausea and Vomiting   Assessments (Pre/Post)   Level of Consciousness (AVPU) alert

## 2019-11-07 NOTE — PLAN OF CARE
11/07/19 1338   Medicare Message   Important Message from Medicare regarding Discharge Appeal Rights Given to patient/caregiver;Explained to patient/caregiver;Signed/date by patient/caregiver   Date IMM was signed 11/07/19   Time IMM was signed 4801

## 2019-11-07 NOTE — PT/OT/SLP PROGRESS
Physical Therapy Treatment    Patient Name:  Roosevelt Alejandro   MRN:  [id number]    Recommendations:     Discharge Recommendations:  home with home health(pending status)   Discharge Equipment Recommendations: shower chair   Barriers to discharge: decreased mobility    Assessment:     Roosevelt Alejandro is a 73 y.o. 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  He presents with the following impairments/functional limitations:  impaired functional mobilty, impaired skin,pt is ambulating in room and with improved status,pt is Mod I with all mobility and appears ready for discharge,PT to assess tomorrow.    Rehab Prognosis: Good; patient would benefit from acute skilled PT services to address these deficits and reach maximum level of function.    Recent Surgery: Procedure(s) (LRB):  LAPAROTOMY, EXPLORATORY (N/A)  EXCISION, INTESTINE (N/A)  EXPLORATION, KIDNEY  CHOLECYSTECTOMY  BIOPSY, LIVER  CHEMOTHERAPY, INTRAPERITONEAL, HYPERTHERMIC  MAPPING, LYMPH NODE, SENTINEL  EXCISION, LYMPH NODE 3 Days Post-Op    Plan:     During this hospitalization, patient to be seen 6 x/week to address the identified rehab impairments via gait training, therapeutic activities, therapeutic exercises, neuromuscular re-education and progress toward the following goals:    · Plan of Care Expires:  12/05/19    Subjective     Chief Complaint: n/a  Patient/Family Comments/goals: pt hope to leave tomorrow.  Pain/Comfort:  · Pain Rating 1: 0/10      Objective:     Communicated with nsg prior to session.  Patient found standing with   upon PT entry to room.     General Precautions: Standard, fall   Orthopedic Precautions:N/A   Braces: N/A     Functional Mobility:  · Transfers:     · Sit to Stand:  modified independence with no AD  · Gait: amb ~325' w/o AD and Mod I/S  · Balance: good standing balance      AM-PAC 6 CLICK MOBILITY  Turning over in bed (including adjusting bedclothes, sheets and blankets)?:  4  Sitting down on and standing up from a chair with arms (e.g., wheelchair, bedside commode, etc.): 4  Moving from lying on back to sitting on the side of the bed?: 4  Moving to and from a bed to a chair (including a wheelchair)?: 4  Need to walk in hospital room?: 4  Climbing 3-5 steps with a railing?: 3  Basic Mobility Total Score: 23       Therapeutic Activities and Exercises:       Patient left EOB with call button in reach and nsg notified..    GOALS: see general POC  Multidisciplinary Problems     Physical Therapy Goals        Problem: Physical Therapy Goal    Goal Priority Disciplines Outcome Goal Variances Interventions   Physical Therapy Goal     PT, PT/OT Ongoing, Progressing     Description:  Goals to be met by: 2019     Patient will increase functional independence with mobility by performin. Supine to sit with Modified [place]/supervision  MET 19  2. Sit to supine with Modified [place]/supervision  MET 19  3. Sit to stand transfer with Supervision and Stand-by Assistance  MET 19  4. Gait  x 300 feet with Supervision and Stand-by Assistance  MET 19  5. Lower extremity exercise program x10 reps per handout, with supervision                       Time Tracking:     PT Received On: 19  PT Start Time: 1338     PT Stop Time: 1348  PT Total Time (min): 10 min     Billable Minutes: Gait Training 10    Treatment Type: Treatment  PT/PTA: PTA     PTA Visit Number: 2     Sanchez Knox, PTA  2019

## 2019-11-07 NOTE — PT/OT/SLP PROGRESS
Occupational Therapy      Patient Name:  Roosevelt Alejandro   MRN:  [id number]    Patient not seen today secondary to pt reported having just eaten and requested to be seen at a later time. Roosevelt encouragement provided and education on OT role. Will follow-up tomorrow.    PAULO Batista  11/7/2019     I certify that I was present in the room directing the student in service delivery and guiding them using my skilled judgement.  As the cosigning therapist, I have reviewed the students documentation and I am responsible for the treatment, assessment, and plan.  ZOLTAN Nelson

## 2019-11-07 NOTE — PLAN OF CARE
Problem: Physical Therapy Goal  Goal: Physical Therapy Goal  Description  Goals to be met by: 2019     Patient will increase functional independence with mobility by performin. Supine to sit with Modified [place]/supervision  MET 19  2. Sit to supine with Modified [place]/supervision  MET 19  3. Sit to stand transfer with Supervision and Stand-by Assistance  MET 19  4. Gait  x 300 feet with Supervision and Stand-by Assistance  MET 19  5. Lower extremity exercise program x10 reps per handout, with supervision      Outcome: Ongoing, Progressing   Goals 1-4 met

## 2019-11-08 ENCOUNTER — TELEPHONE (OUTPATIENT)
Dept: NEUROLOGY | Facility: HOSPITAL | Age: 74
End: 2019-11-08

## 2019-11-08 ENCOUNTER — TELEPHONE (OUTPATIENT)
Dept: FAMILY MEDICINE | Facility: CLINIC | Age: 74
End: 2019-11-08

## 2019-11-08 VITALS
HEART RATE: 79 BPM | HEIGHT: 74 IN | BODY MASS INDEX: 28.1 KG/M2 | OXYGEN SATURATION: 94 % | RESPIRATION RATE: 20 BRPM | DIASTOLIC BLOOD PRESSURE: 67 MMHG | TEMPERATURE: 98 F | WEIGHT: 218.94 LBS | SYSTOLIC BLOOD PRESSURE: 143 MMHG

## 2019-11-08 LAB
ALBUMIN SERPL BCP-MCNC: 3 G/DL (ref 3.5–5.2)
ALP SERPL-CCNC: 51 U/L (ref 55–135)
ALT SERPL W/O P-5'-P-CCNC: 24 U/L (ref 10–44)
ANION GAP SERPL CALC-SCNC: 8 MMOL/L (ref 8–16)
AST SERPL-CCNC: 19 U/L (ref 10–40)
BASOPHILS # BLD AUTO: 0.03 K/UL (ref 0–0.2)
BASOPHILS NFR BLD: 0.3 % (ref 0–1.9)
BILIRUB SERPL-MCNC: 0.7 MG/DL (ref 0.1–1)
BUN SERPL-MCNC: 14 MG/DL (ref 8–23)
CALCIUM SERPL-MCNC: 8.5 MG/DL (ref 8.7–10.5)
CHLORIDE SERPL-SCNC: 107 MMOL/L (ref 95–110)
CO2 SERPL-SCNC: 23 MMOL/L (ref 23–29)
CREAT SERPL-MCNC: 1 MG/DL (ref 0.5–1.4)
DIFFERENTIAL METHOD: ABNORMAL
EOSINOPHIL # BLD AUTO: 0.9 K/UL (ref 0–0.5)
EOSINOPHIL NFR BLD: 8.3 % (ref 0–8)
ERYTHROCYTE [DISTWIDTH] IN BLOOD BY AUTOMATED COUNT: 12.5 % (ref 11.5–14.5)
EST. GFR  (AFRICAN AMERICAN): >60 ML/MIN/1.73 M^2
EST. GFR  (NON AFRICAN AMERICAN): >60 ML/MIN/1.73 M^2
GLUCOSE SERPL-MCNC: 113 MG/DL (ref 70–110)
HCT VFR BLD AUTO: 30.4 % (ref 40–54)
HGB BLD-MCNC: 10.2 G/DL (ref 14–18)
LYMPHOCYTES # BLD AUTO: 1.5 K/UL (ref 1–4.8)
LYMPHOCYTES NFR BLD: 14.4 % (ref 18–48)
MAGNESIUM SERPL-MCNC: 1.8 MG/DL (ref 1.6–2.6)
MCH RBC QN AUTO: 32.8 PG (ref 27–31)
MCHC RBC AUTO-ENTMCNC: 33.6 G/DL (ref 32–36)
MCV RBC AUTO: 98 FL (ref 82–98)
MONOCYTES # BLD AUTO: 0.9 K/UL (ref 0.3–1)
MONOCYTES NFR BLD: 8.3 % (ref 4–15)
NEUTROPHILS # BLD AUTO: 7.3 K/UL (ref 1.8–7.7)
NEUTROPHILS NFR BLD: 68.7 % (ref 38–73)
PHOSPHATE SERPL-MCNC: 2.6 MG/DL (ref 2.7–4.5)
PLATELET # BLD AUTO: 209 K/UL (ref 150–350)
PMV BLD AUTO: 10.3 FL (ref 9.2–12.9)
POTASSIUM SERPL-SCNC: 3.8 MMOL/L (ref 3.5–5.1)
PROT SERPL-MCNC: 5.8 G/DL (ref 6–8.4)
RBC # BLD AUTO: 3.11 M/UL (ref 4.6–6.2)
SODIUM SERPL-SCNC: 138 MMOL/L (ref 136–145)
WBC # BLD AUTO: 10.63 K/UL (ref 3.9–12.7)

## 2019-11-08 PROCEDURE — 80053 COMPREHEN METABOLIC PANEL: CPT

## 2019-11-08 PROCEDURE — 97110 THERAPEUTIC EXERCISES: CPT

## 2019-11-08 PROCEDURE — 83735 ASSAY OF MAGNESIUM: CPT

## 2019-11-08 PROCEDURE — 63600175 PHARM REV CODE 636 W HCPCS: Performed by: SURGERY

## 2019-11-08 PROCEDURE — 84100 ASSAY OF PHOSPHORUS: CPT

## 2019-11-08 PROCEDURE — 25000003 PHARM REV CODE 250: Performed by: STUDENT IN AN ORGANIZED HEALTH CARE EDUCATION/TRAINING PROGRAM

## 2019-11-08 PROCEDURE — 85025 COMPLETE CBC W/AUTO DIFF WBC: CPT

## 2019-11-08 PROCEDURE — 25000003 PHARM REV CODE 250: Performed by: SURGERY

## 2019-11-08 RX ORDER — TRAMADOL HYDROCHLORIDE 100 MG/1
100 TABLET, EXTENDED RELEASE ORAL DAILY PRN
Qty: 10 TABLET | Refills: 0 | Status: SHIPPED | OUTPATIENT
Start: 2019-11-08 | End: 2019-11-18

## 2019-11-08 RX ORDER — PANTOPRAZOLE SODIUM 40 MG/1
40 TABLET, DELAYED RELEASE ORAL DAILY
Qty: 30 TABLET | Refills: 11 | Status: SHIPPED | OUTPATIENT
Start: 2019-11-08 | End: 2019-11-25 | Stop reason: SDUPTHER

## 2019-11-08 RX ORDER — METOCLOPRAMIDE 10 MG/1
10 TABLET ORAL
Qty: 90 TABLET | Refills: 0 | Status: SHIPPED | OUTPATIENT
Start: 2019-11-08 | End: 2019-11-25

## 2019-11-08 RX ADMIN — METOCLOPRAMIDE HYDROCHLORIDE 10 MG: 5 SOLUTION ORAL at 11:11

## 2019-11-08 RX ADMIN — PREGABALIN 75 MG: 75 CAPSULE ORAL at 08:11

## 2019-11-08 RX ADMIN — METOCLOPRAMIDE HYDROCHLORIDE 10 MG: 5 SOLUTION ORAL at 05:11

## 2019-11-08 RX ADMIN — METOPROLOL SUCCINATE 25 MG: 25 TABLET, FILM COATED, EXTENDED RELEASE ORAL at 08:11

## 2019-11-08 RX ADMIN — METRONIDAZOLE 500 MG: 500 TABLET ORAL at 05:11

## 2019-11-08 RX ADMIN — Medication 1 TABLET: at 11:11

## 2019-11-08 RX ADMIN — IRON SUCROSE 100 MG: 20 INJECTION, SOLUTION INTRAVENOUS at 08:11

## 2019-11-08 NOTE — PROGRESS NOTES
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 AFVSS  Tolerating regular diet   Good urinary output   No n/v  No  f/c, cp/sob  Passing flatus/BM  Ambulating independently       Physical Exam:  Gen: no acute distress. Alert and oriented x3.  HEENT: normocephalic and atraumatic. EOMI.   Resp: unlabored respirations  CV: regular rate, distal pulses intact  Abd: soft, non-tender, non-distended, surgical incision well approximated C/D/I  Ext: warm and well perfused  MSK: strength grossly intact  Neuro: no focal deficits, normal sensation    Labs reviewed below-   Phos 2.6  Mag 1.8  Alb 3.0      Assessment/Plan: 73 y.o. 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 Stable and doing well     Continue to encourage PO intake   Continue to encourage ambulating and up out of bed during the day   Will need follow up with Dr. Khalil in 3-4 weeks   Discharge soon today versus tomorrow     Diet-   regular  Antibiotics- flagyl  Prophylaxis- lovenox    Dispo: soon, pending clinical improvement     D'Amico C Johnson, MD   [facility] General Surgery, PGY2  11/08/2019       Inpatient Data     Vitals:   Temp:  [97.6 °F (36.4 °C)-99 °F (37.2 °C)] 97.9 °F (36.6 °C)  Pulse:  [65-93] 67  Resp:  [16-19] 18  SpO2:  [94 %-95 %] 94 %  BP: (132-151)/(63-70) 132/63 Intake/Output:  11/07 0701 - 11/08 0700  In: 570 [P.O.:570]  Out: 400 [Urine:400]       Recent Labs     11/06/19  1035 11/07/19  0439 11/08/19  0520   WBC 16.38* 13.12* 10.63   HGB 10.3* 10.5* 10.2*   HCT 31.5* 31.6* 30.4*    196 209   * 138  --    K 4.0 3.8  --     105  --    CO2 23 25  --    BUN 15 14  --    CREATININE 1.1 1.0   --    BILITOT 0.8 0.7  --    AST 31 27  --    ALT 44 34  --    ALKPHOS 56 57  --    CALCIUM 8.3* 8.6*  --    ALBUMIN 3.3* 3.2*  --    PROT 5.8* 6.0  --    MG 1.9 1.8  --    PHOS 1.6* 1.7*  --         Scheduled Meds:   enoxaparin  40 mg Subcutaneous Daily    iron sucrose (VENOFER) IVPB  100 mg Intravenous Daily    metoclopramide HCl  10 mg Oral Q6H    metoprolol succinate  25 mg Oral Daily    metroNIDAZOLE  500 mg Oral Q8H    polyethylene glycol  17 g Oral TID    pregabalin  75 mg Oral BID    senna-docusate 8.6-50 mg  2 tablet Oral BID     Continuous Infusions:  PRN Meds:albuterol sulfate, diphenhydrAMINE, hydrALAZINE, HYDROmorphone, labetalol, lorazepam, naloxone, ondansetron, oxyCODONE

## 2019-11-08 NOTE — DISCHARGE SUMMARY
[facility]  Discharge Summary     Patient ID:  Roosevelt Alejandro  [id number]  73 y.o.  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  Patient tolerated procedure well.  Postoperatively patient was extubated and kept in the ICU overnight for close hemodynamic monitoring.  He has remained hemodynamically stable. He was stepped down to the floor. Started working with PT/OT to achieve maximum mobility.  NG tube was removed. Patient was started on CLD and advanced as tolerated. Patient had good urinary output. Was passing flatus and had bowel movements. Discharge plan discussed with patient, all questions answered. Medications sent to [facility] for delivery at bedside. He will need follow up with Dr. Khalil in 3-4 weeks      Consults: PT/OT    Significant Diagnostic Studies:   Recent Labs   Lab 11/06/19  1035 11/07/19  0439 11/08/19  0520   WBC 16.38* 13.12* 10.63   HGB 10.3* 10.5* 10.2*   HCT 31.5* 31.6* 30.4*    196 209     Recent Labs   Lab 11/06/19  1035 11/07/19  0439 11/08/19  0520   * 138 138   K  4.0 3.8 3.8    105 107   CO2 23 25 23   BUN 15 14 14   CREATININE 1.1 1.0 1.0   CALCIUM 8.3* 8.6* 8.5*   PROT 5.8* 6.0 5.8*   BILITOT 0.8 0.7 0.7   ALKPHOS 56 57 51*   ALT 44 34 24   AST 31 27 19     X-Ray Abdomen AP 1 View  Narrative: EXAMINATION:  XR ABDOMEN AP 1 VIEW    CLINICAL HISTORY:  RULE OUT FOREIGN BODY;    TECHNIQUE:  Single AP View of the abdomen was performed.    COMPARISON:  None    FINDINGS:  Enteric tube distal tip within the stomach.  Several surgical clips mid upper abdomen region.  No significant stool retention.  Nonspecific bowel gas pattern.  No bowel dilation.  No organomegaly.  No abnormal calcifications.   The osseous structures appear within normal limits for age.  Impression: As above    Electronically signed by: Pennie Mojica MD  Date:    11/04/2019  Time:    15:33  X-Ray Chest 1 View  Narrative: EXAMINATION:  XR CHEST 1 VIEW    CLINICAL HISTORY:  LINE PLACEMENT;    TECHNIQUE:  Single frontal view of the chest was performed.    COMPARISON:  02/24/2019    FINDINGS:  ET tube distal tip within the mid trachea, right central line distal tip within the SVC.  Pleural plaque calcification noted along the left hemidiaphragm.  The cardiac silhouette is midline.  The pulmonary vascularity is normal.  The lungs are clear.  The right costal diaphragmatic angle is not included in the field of view.  No pneumothorax seen.  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       Discharged Condition: good      Disposition: Home or Self Care    Follow Up/Patient Instructions:     Medications:  Reconciled Home Medications:      Medication List      START taking these medications    iron-vit c-b12-folic acid Tab  Commonly known as:  I-Car-C Plus  Take 1 tablet by mouth once daily.     metoclopramide HCl 10 MG tablet  Commonly known as:  REGLAN  Take 1 tablet (10 mg total) by mouth 3 (three) times daily before meals.     pantoprazole 40 MG tablet  Commonly known as:  PROTONIX  Take 1 tablet (40 mg total) by mouth once daily.     traMADol 100 MG Tb24  Commonly known as:  ULTRAM-ER  Take 1 tablet (100 mg total) by mouth daily as needed.        CONTINUE taking these medications    atorvastatin 20 MG tablet  Commonly known as:  LIPITOR  Take 1 tablet (20 mg total) by mouth once daily.     CENTRUM SILVER ORAL  Take by mouth.     meclizine 25 mg tablet  Commonly known as:  ANTIVERT  TK 1 T PO Q 6 H PRF DIZZINESS     metoprolol succinate 25 MG 24 hr tablet  Commonly known as:  TOPROL-XL  Take 1 tablet (25 mg total) by mouth once daily.     NIFEdipine 30 MG (OSM) 24 hr tablet  Commonly known as:  PROCARDIA-XL  Take 1 tablet (30 mg total) by mouth every evening.        STOP taking these medications    bisacodyl 5 mg EC tablet  Commonly known as:  DULCOLAX     erythromycin base 500 MG tablet  Commonly known as:  E-MYCIN     HIBICLENS 4 % external liquid  Generic drug:  chlorhexidine     Listerine Liqd     magnesium citrate solution     neomycin 500 mg Tab  Commonly known as:  MYCIFRADIN          Discharge Procedure Orders   Diet Adult Regular     Notify your health care provider if you experience any of the following:  temperature >100.4     Notify your health care provider if you experience any of the following:  persistent nausea and vomiting or diarrhea     Notify your  health care provider if you experience any of the following:  severe uncontrolled pain     Notify your health care provider if you experience any of the following:  redness, tenderness, or signs of infection (pain, swelling, redness, odor or green/yellow discharge around incision site)     Notify your health care provider if you experience any of the following:  difficulty breathing or increased cough     Notify your health care provider if you experience any of the following:  persistent dizziness, light-headedness, or visual disturbances     Notify your health care provider if you experience any of the following:  increased confusion or weakness     Activity as tolerated     Follow-up Information     J Duy Khalil MD. Schedule an appointment as soon as possible for a visit in 3 weeks.    Specialty:  General Surgery  Contact information:  [address]  [address]  [facility] [id number] [phone]                 Activity: activity as tolerated  Diet: regular diet  Wound Care: keep wound clean and dry    Follow-up with Dr. Khalil in 3 weeks.    Signed:  D'Amico C Johnson  11/8/2019  10:13 AM

## 2019-11-08 NOTE — PLAN OF CARE
VN reviewed discharge instructions with pt.  AVS printed and handed to pt by bedside nurse.  Reviewed followup appts, medication, diet, and importance of medication compliance.  Reviewed home care instructions, treatment plan, self management and when to seek medical attention.  Allowed time for questions, all questions answered.  Pt verbalized complete understanding of discharge instructions and voices no concerns.      Discharge instructions complete.  Bedside delivery done.   Bedside nurse notified.

## 2019-11-08 NOTE — PLAN OF CARE
Problem: Physical Therapy Goal  Goal: Physical Therapy Goal  Description  Goals to be met by: 2019     Patient will increase functional independence with mobility by performin. Supine to sit with Modified [place]/supervision  MET 19  2. Sit to supine with Modified [place]/supervision  MET 19  3. Sit to stand transfer with Supervision and Stand-by Assistance  MET 19  4. Gait  x 300 feet with Supervision and Stand-by Assistance  MET 19  5. Lower extremity exercise program x10 reps per handout, with supervision - MET 2019       Outcome: Ongoing, Progressing   Patient met goals for d/c; Sudheer with transfers and amb without AD; comfortable with HEP; will d/c therapy services; pt being d/c'd from hospital.

## 2019-11-08 NOTE — PT/OT/SLP PROGRESS
Occupational Therapy      Patient Name:  Roosevelt Alejandro   MRN:  [id number]    Patient not seen today secondary to  Pt declined therapy secondary to discharging home today and reports no OT needs at this time. Will follow-up at later time if pt remains in hospital.    ZOLTAN Nelson  11/8/2019

## 2019-11-08 NOTE — PT/OT/SLP PROGRESS
Physical Therapy Treatment/Discharge Summary    Patient Name:  Roosevelt Alejandro   MRN:  [id number]    Recommendations:     Discharge Recommendations:  home   Discharge Equipment Recommendations: shower chair   Barriers to discharge: None    Assessment:     Roosevelt Alejandro is a 73 y.o. 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  Patient met goals for d/c; Sudheer with transfers and amb without AD; comfortable with HEP; will d/c therapy services; pt being d/c'd from hospital.     Recent Surgery: Procedure(s) (LRB):  LAPAROTOMY, EXPLORATORY (N/A)  EXCISION, INTESTINE (N/A)  EXPLORATION, KIDNEY  CHOLECYSTECTOMY  BIOPSY, LIVER  CHEMOTHERAPY, INTRAPERITONEAL, HYPERTHERMIC  MAPPING, LYMPH NODE, SENTINEL  EXCISION, LYMPH NODE 4 Days Post-Op    Plan:     During this hospitalization, patient to be seen 6 x/week to address the identified rehab impairments via gait training, therapeutic activities, therapeutic exercises, neuromuscular re-education and progress toward the following goals:    · Plan of Care Expires:  12/05/19    Subjective     Pain/Comfort:  · Pain Rating 1: 0/10  · Pain Rating Post-Intervention 1: 0/10      Objective:     Communicated with nurse prior to session.  Patient found with bed alarm, peripheral IV, central line upon PT entry to room.     General Precautions: Standard, fall   Orthopedic Precautions:N/A   Braces: N/A     Functional Mobility:  · Declined       AM-PAC 6 CLICK MOBILITY  Turning over in bed (including adjusting bedclothes, sheets and blankets)?: 4  Sitting down on and standing up from a chair with arms (e.g., wheelchair, bedside commode, etc.): 4  Moving from lying on back to sitting on the side of the bed?: 4  Moving to and from a bed to a chair (including a wheelchair)?: 4  Need to walk in hospital room?: 4  Climbing 3-5 steps with a railing?: 4  Basic Mobility Total Score: 24       Therapeutic Activities and Exercises:   Patient instructed in HEP post  op abdominal sx; pt demonstrated understanding- includes abd bracing, diaphragmatic breathing, trunk pelvic tilts, hip hikes, UE/LE AROM ex; emphasizing pacing self, postural awareness, lying flat 3 x ~5 min during the day to allow tissues to lengthen to optimal lengthen; pt received copy of HEP.    Patient left HOB elevated with all lines intact and nurse notified..    GOALS:   Multidisciplinary Problems     Physical Therapy Goals        Problem: Physical Therapy Goal    Goal Priority Disciplines Outcome Goal Variances Interventions   Physical Therapy Goal     PT, PT/OT Ongoing, Progressing     Description:  Goals to be met by: 2019     Patient will increase functional independence with mobility by performin. Supine to sit with Modified [place]/supervision  MET 19  2. Sit to supine with Modified [place]/supervision  MET 19  3. Sit to stand transfer with Supervision and Stand-by Assistance  MET 19  4. Gait  x 300 feet with Supervision and Stand-by Assistance  MET 19  5. Lower extremity exercise program x10 reps per handout, with supervision - MET 2019                        Time Tracking:     PT Received On: 19  PT Start Time: 954     PT Stop Time:   PT Total Time (min): 50 min (interrupted by staff)    Billable Minutes: Therapeutic Exercise 40 minutes    Treatment Type: Treatment  PT/PTA: PT     PTA Visit Number: 0     Amarilys Pascual, PT  2019

## 2019-11-08 NOTE — PLAN OF CARE
Pt had 2 bowel movements. Denies pain. AAOX4. Pt central line dressing was reinforced. Pt ambulates to the bathroom. Vital signs stable and safety maintained.

## 2019-11-08 NOTE — PLAN OF CARE
Discharge rounds on patient. Discussed followup appointments, blue discharge folder, discharge nurse will go over home medications and reasons for medications and final discharge instructions. All patient/caregiver questions answered. Patient verbalized understanding.    Patient's daughter will provide transportation home. TN instructed patient to call if he has any further questions or concerns.       11/08/19 1252   Final Note   Assessment Type Final Discharge Note   Anticipated Discharge Disposition Home   What phone number can be called within the next 1-3 days to see how you are doing after discharge? [phone]   Hospital Follow Up  Appt(s) scheduled? Yes   Discharge plans and expectations educations in teach back method with documentation complete? Yes   Right Care Referral Info   Post Acute Recommendation No Care   Referral Type None

## 2019-11-08 NOTE — TELEPHONE ENCOUNTER
----- Message from Ziggy Varner sent at 11/8/2019 12:15 PM CST -----  Contact: Laurie gonzalez/ @[facility]  Pt needs to have a hospital follow up appt with Dr Johnson for 2 weeks,pls call pt back and advise  Call Back#[phone] or   Thanks

## 2019-11-11 ENCOUNTER — TELEPHONE (OUTPATIENT)
Dept: NEUROLOGY | Facility: HOSPITAL | Age: 74
End: 2019-11-11

## 2019-11-11 NOTE — TELEPHONE ENCOUNTER
Spoke to pt family member. They stated pt wanted to drive and wanted to know if it was too soon after procedure on 11/4/19. Advised pt to not drive just yet and to follow back up w/in a week or two. Family member stated that pt has been really active so I advised her to check on incision site periodically for dehiscence and/ or inflammation and drainage. Family member verbalized understanding.

## 2019-11-25 ENCOUNTER — OFFICE VISIT (OUTPATIENT)
Dept: FAMILY MEDICINE | Facility: CLINIC | Age: 74
End: 2019-11-25
Payer: MEDICARE

## 2019-11-25 ENCOUNTER — LAB VISIT (OUTPATIENT)
Dept: LAB | Facility: HOSPITAL | Age: 74
End: 2019-11-25
Attending: FAMILY MEDICINE
Payer: MEDICARE

## 2019-11-25 ENCOUNTER — TELEPHONE (OUTPATIENT)
Dept: FAMILY MEDICINE | Facility: CLINIC | Age: 74
End: 2019-11-25

## 2019-11-25 VITALS
HEIGHT: 74 IN | BODY MASS INDEX: 25.75 KG/M2 | RESPIRATION RATE: 18 BRPM | TEMPERATURE: 99 F | OXYGEN SATURATION: 97 % | HEART RATE: 100 BPM | WEIGHT: 200.63 LBS | SYSTOLIC BLOOD PRESSURE: 132 MMHG | DIASTOLIC BLOOD PRESSURE: 82 MMHG

## 2019-11-25 DIAGNOSIS — C7B.8 NEUROENDOCRINE CARCINOMA METASTATIC TO INTRA-ABDOMINAL LYMPH NODE: ICD-10-CM

## 2019-11-25 DIAGNOSIS — R73.02 GLUCOSE INTOLERANCE (IMPAIRED GLUCOSE TOLERANCE): ICD-10-CM

## 2019-11-25 DIAGNOSIS — C7A.012 MALIGNANT CARCINOID TUMOR OF ILEUM: ICD-10-CM

## 2019-11-25 DIAGNOSIS — C7B.8 MESENTERY METASTASIS OF NEUROENDOCRINE TUMOR: ICD-10-CM

## 2019-11-25 DIAGNOSIS — C7A.8 MESENTERY METASTASIS OF NEUROENDOCRINE TUMOR: ICD-10-CM

## 2019-11-25 DIAGNOSIS — R93.3 ABNORMAL FINDINGS ON DIAGNOSTIC IMAGING OF OTHER PARTS OF DIGESTIVE TRACT: ICD-10-CM

## 2019-11-25 DIAGNOSIS — C7A.00 MALIGNANT CARCINOID TUMOR OF UNKNOWN PRIMARY SITE: ICD-10-CM

## 2019-11-25 DIAGNOSIS — D50.0 IRON DEFICIENCY ANEMIA DUE TO CHRONIC BLOOD LOSS: ICD-10-CM

## 2019-11-25 DIAGNOSIS — C7A.8 NEUROENDOCRINE CARCINOMA METASTATIC TO INTRA-ABDOMINAL LYMPH NODE: ICD-10-CM

## 2019-11-25 DIAGNOSIS — I10 ESSENTIAL HYPERTENSION: Primary | ICD-10-CM

## 2019-11-25 DIAGNOSIS — C7B.8 SECONDARY NEUROENDOCRINE TUMOR OF LIVER: ICD-10-CM

## 2019-11-25 DIAGNOSIS — J61 PULMONARY ASBESTOSIS: ICD-10-CM

## 2019-11-25 LAB
ANION GAP SERPL CALC-SCNC: 12 MMOL/L (ref 8–16)
BASOPHILS # BLD AUTO: 0.06 K/UL (ref 0–0.2)
BASOPHILS NFR BLD: 0.7 % (ref 0–1.9)
BUN SERPL-MCNC: 14 MG/DL (ref 8–23)
CALCIUM SERPL-MCNC: 9.8 MG/DL (ref 8.7–10.5)
CHLORIDE SERPL-SCNC: 104 MMOL/L (ref 95–110)
CHOLEST SERPL-MCNC: 104 MG/DL (ref 120–199)
CHOLEST/HDLC SERPL: 3.5 {RATIO} (ref 2–5)
CO2 SERPL-SCNC: 24 MMOL/L (ref 23–29)
CREAT SERPL-MCNC: 1.1 MG/DL (ref 0.5–1.4)
DIFFERENTIAL METHOD: ABNORMAL
EOSINOPHIL # BLD AUTO: 0.8 K/UL (ref 0–0.5)
EOSINOPHIL NFR BLD: 9.4 % (ref 0–8)
ERYTHROCYTE [DISTWIDTH] IN BLOOD BY AUTOMATED COUNT: 13.1 % (ref 11.5–14.5)
EST. GFR  (AFRICAN AMERICAN): >60 ML/MIN/1.73 M^2
EST. GFR  (NON AFRICAN AMERICAN): >60 ML/MIN/1.73 M^2
FERRITIN SERPL-MCNC: 693 NG/ML (ref 20–300)
GLUCOSE SERPL-MCNC: 92 MG/DL (ref 70–110)
HCT VFR BLD AUTO: 36.2 % (ref 40–54)
HDLC SERPL-MCNC: 30 MG/DL (ref 40–75)
HDLC SERPL: 28.8 % (ref 20–50)
HGB BLD-MCNC: 11.8 G/DL (ref 14–18)
IMM GRANULOCYTES # BLD AUTO: ABNORMAL K/UL (ref 0–0.04)
IMM GRANULOCYTES NFR BLD AUTO: ABNORMAL % (ref 0–0.5)
IRON SERPL-MCNC: 40 UG/DL (ref 45–160)
LDLC SERPL CALC-MCNC: 58 MG/DL (ref 63–159)
LYMPHOCYTES # BLD AUTO: 1.3 K/UL (ref 1–4.8)
LYMPHOCYTES NFR BLD: 15.8 % (ref 18–48)
MCH RBC QN AUTO: 32.5 PG (ref 27–31)
MCHC RBC AUTO-ENTMCNC: 32.6 G/DL (ref 32–36)
MCV RBC AUTO: 100 FL (ref 82–98)
MONOCYTES # BLD AUTO: 0.5 K/UL (ref 0.3–1)
MONOCYTES NFR BLD: 6.4 % (ref 4–15)
NEUTROPHILS # BLD AUTO: 5.5 K/UL (ref 1.8–7.7)
NEUTROPHILS NFR BLD: 67.7 % (ref 38–73)
NONHDLC SERPL-MCNC: 74 MG/DL
NRBC BLD-RTO: ABNORMAL /100 WBC
PLATELET # BLD AUTO: 309 K/UL (ref 150–350)
PMV BLD AUTO: 9.9 FL (ref 9.2–12.9)
POTASSIUM SERPL-SCNC: 4.1 MMOL/L (ref 3.5–5.1)
RBC # BLD AUTO: 3.63 M/UL (ref 4.6–6.2)
SATURATED IRON: 15 % (ref 20–50)
SODIUM SERPL-SCNC: 140 MMOL/L (ref 136–145)
TOTAL IRON BINDING CAPACITY: 272 UG/DL (ref 250–450)
TRANSFERRIN SERPL-MCNC: 184 MG/DL (ref 200–375)
TRIGL SERPL-MCNC: 80 MG/DL (ref 30–150)
WBC # BLD AUTO: 8.17 K/UL (ref 3.9–12.7)

## 2019-11-25 PROCEDURE — 1100F PR PT FALLS ASSESS DOC 2+ FALLS/FALL W/INJURY/YR: ICD-10-PCS | Mod: CPTII,S$GLB,, | Performed by: FAMILY MEDICINE

## 2019-11-25 PROCEDURE — 3288F PR FALLS RISK ASSESSMENT DOCUMENTED: ICD-10-PCS | Mod: CPTII,S$GLB,, | Performed by: FAMILY MEDICINE

## 2019-11-25 PROCEDURE — 1126F PR PAIN SEVERITY QUANTIFIED, NO PAIN PRESENT: ICD-10-PCS | Mod: S$GLB,,, | Performed by: FAMILY MEDICINE

## 2019-11-25 PROCEDURE — 3288F FALL RISK ASSESSMENT DOCD: CPT | Mod: CPTII,S$GLB,, | Performed by: FAMILY MEDICINE

## 2019-11-25 PROCEDURE — 36415 COLL VENOUS BLD VENIPUNCTURE: CPT | Mod: PO

## 2019-11-25 PROCEDURE — 99999 PR PBB SHADOW E&M-EST. PATIENT-LVL IV: ICD-10-PCS | Mod: PBBFAC,,, | Performed by: FAMILY MEDICINE

## 2019-11-25 PROCEDURE — 80048 BASIC METABOLIC PNL TOTAL CA: CPT

## 2019-11-25 PROCEDURE — 1126F AMNT PAIN NOTED NONE PRSNT: CPT | Mod: S$GLB,,, | Performed by: FAMILY MEDICINE

## 2019-11-25 PROCEDURE — 3075F SYST BP GE 130 - 139MM HG: CPT | Mod: CPTII,S$GLB,, | Performed by: FAMILY MEDICINE

## 2019-11-25 PROCEDURE — 1159F PR MEDICATION LIST DOCUMENTED IN MEDICAL RECORD: ICD-10-PCS | Mod: S$GLB,,, | Performed by: FAMILY MEDICINE

## 2019-11-25 PROCEDURE — 1100F PTFALLS ASSESS-DOCD GE2>/YR: CPT | Mod: CPTII,S$GLB,, | Performed by: FAMILY MEDICINE

## 2019-11-25 PROCEDURE — 99214 PR OFFICE/OUTPT VISIT, EST, LEVL IV, 30-39 MIN: ICD-10-PCS | Mod: S$GLB,,, | Performed by: FAMILY MEDICINE

## 2019-11-25 PROCEDURE — 3079F DIAST BP 80-89 MM HG: CPT | Mod: CPTII,S$GLB,, | Performed by: FAMILY MEDICINE

## 2019-11-25 PROCEDURE — 85025 COMPLETE CBC W/AUTO DIFF WBC: CPT | Mod: PO

## 2019-11-25 PROCEDURE — 99999 PR PBB SHADOW E&M-EST. PATIENT-LVL IV: CPT | Mod: PBBFAC,,, | Performed by: FAMILY MEDICINE

## 2019-11-25 PROCEDURE — 3079F PR MOST RECENT DIASTOLIC BLOOD PRESSURE 80-89 MM HG: ICD-10-PCS | Mod: CPTII,S$GLB,, | Performed by: FAMILY MEDICINE

## 2019-11-25 PROCEDURE — 82728 ASSAY OF FERRITIN: CPT

## 2019-11-25 PROCEDURE — 83036 HEMOGLOBIN GLYCOSYLATED A1C: CPT

## 2019-11-25 PROCEDURE — 80061 LIPID PANEL: CPT

## 2019-11-25 PROCEDURE — 1159F MED LIST DOCD IN RCRD: CPT | Mod: S$GLB,,, | Performed by: FAMILY MEDICINE

## 2019-11-25 PROCEDURE — 83540 ASSAY OF IRON: CPT

## 2019-11-25 PROCEDURE — 99214 OFFICE O/P EST MOD 30 MIN: CPT | Mod: S$GLB,,, | Performed by: FAMILY MEDICINE

## 2019-11-25 PROCEDURE — 3075F PR MOST RECENT SYSTOLIC BLOOD PRESS GE 130-139MM HG: ICD-10-PCS | Mod: CPTII,S$GLB,, | Performed by: FAMILY MEDICINE

## 2019-11-25 RX ORDER — PANTOPRAZOLE SODIUM 40 MG/1
40 TABLET, DELAYED RELEASE ORAL DAILY
Qty: 30 TABLET | Refills: 11
Start: 2019-11-25 | End: 2024-03-01

## 2019-11-25 NOTE — TELEPHONE ENCOUNTER
I have spoken with his wife.  Lengthy discussion regarding his present condition and recent labs.  He has poor appetite.  There is documentation of weight loss over 25 lb in less than 3 months.  Hospital labs has shown hemoglobin as low as 10.5.  He has improved hemoglobin.  He has low albumin, low calcium, low alkaline phosphatase, low-protein count the suggests he has some intestinal malabsorption.  He was advised to use prenatal vitamins and also use vitamin-C; both medication twice a day.  He was encouraged to eat high iron rich diet.  Patient declined appetite stimulants and or antiemetics. This has been fully explained to the patient and patient's wife who indicates understanding.

## 2019-11-25 NOTE — PATIENT INSTRUCTIONS

## 2019-11-25 NOTE — TELEPHONE ENCOUNTER
Patient's wife (Edith) states the after summary visit patient received today indicates patient is anemic. Mrs. Sharma states patient has never been anemic and to put this information in patient's chart is wrong/false information. Patient's wife states patient was hospitalized 15 years ago for 7 days; states patient's blood count never changed. States she wants this information taken out of the patient's chart. Please advise.

## 2019-11-25 NOTE — TELEPHONE ENCOUNTER
----- Message from Kaley Flanagan sent at 11/25/2019  1:53 PM CST -----  Contact: wife  Type: Needs Medical Advice    Who Called:  patient  Best Call Back Number: [phone]  Additional Information: Wife states she would like Sylvia to return her call to discuss patient's medication.  Sent message to the pod.Thanks!

## 2019-11-25 NOTE — PROGRESS NOTES
Subjective:       Patient ID: Roosevelt Alejandro is a 74 y.o. male.    Chief Complaint: Follow-up (hospital )    Patient taken to the operating room  for small-bowel resection, open cholecystectomy, liver wedge biopsy, and mesenteric tumor debulking secondary to neuroendocrine tumor.  Patient tolerated procedure well.  Postoperatively patient was extubated and kept in the ICU overnight for close hemodynamic monitoring.  He has remained hemodynamically stable    Review of Systems   Constitutional: Positive for activity change, appetite change and fatigue. Negative for unexpected weight change.   Respiratory: Positive for shortness of breath. Negative for chest tightness.    Cardiovascular: Positive for leg swelling. Negative for chest pain and palpitations.   Gastrointestinal: Negative for abdominal pain.   Genitourinary: Positive for flank pain and frequency.   Musculoskeletal: Negative for arthralgias.   Neurological: Positive for weakness. Negative for dizziness, syncope, light-headedness and headaches.   Psychiatric/Behavioral: Positive for behavioral problems and decreased concentration.       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 He  appears well-developed. He has a sickly appearance. No distress.   HENT:   Head: Normocephalic and atraumatic.   Right Ear: External ear normal.   Left Ear: External ear normal.   Nose: Nose normal.   Mouth/Throat: Oropharynx is clear and moist. No oropharyngeal exudate.   Eyes: Pupils are equal, round, and reactive to light. Conjunctivae and EOM are normal. Right eye exhibits no discharge. Left eye exhibits no discharge. No scleral icterus.   Neck: Normal range of motion. Neck supple. No JVD present. No tracheal deviation present. No thyromegaly present.   Cardiovascular: Normal rate, normal heart sounds and intact distal pulses.   No murmur heard.  Pulmonary/Chest: Effort normal and breath sounds normal. No respiratory distress. He has no wheezes. He has no rales.   Abdominal: Soft. Bowel sounds are normal. He exhibits no distension and no mass. There is no tenderness. There is no rebound and no guarding.   Abdominal scar is healing   Musculoskeletal: He exhibits no edema or tenderness.          Lymphadenopathy:     He has no cervical adenopathy.   Neurological: He is alert and oriented to person, place, and time. He has normal strength and normal reflexes. No cranial nerve deficit. Coordination normal.   Skin: Skin is warm and dry. No rash noted. He is not diaphoretic. No erythema. No pallor.   Psychiatric: His behavior is normal. Judgment and thought content normal. His mood appears anxious. His speech is rapid and/or pressured. Cognition and memory are normal.   Vitals reviewed.      Lab Results   Component Value Date    WBC 10.63 11/08/2019    HGB 10.2 (L) 11/08/2019    HCT 30.4 (L) 11/08/2019     11/08/2019    CHOL 146 05/28/2019    TRIG 156 (H) 05/28/2019    HDL 36 (L) 05/28/2019    ALT 24 11/08/2019    AST 19 11/08/2019     11/08/2019    K 3.8 11/08/2019     11/08/2019    CREATININE 1.0 11/08/2019    BUN 14 11/08/2019    CO2 23 11/08/2019    TSH 1.245 02/24/2019    PSA 28.6 (H) 12/20/2017     INR 1.1 02/24/2019    GLUF 109 07/22/2004    HGBA1C 5.9 01/07/2011     The ASCVD Risk score (Benedicto MEDINA Jr., et al., 2013) failed to calculate for the following reasons:    The patient has a prior MI or stroke diagnosis    Assessment:       1. Essential hypertension    2. Mesentery metastasis of neuroendocrine tumor    3. Iron deficiency anemia due to chronic blood loss    4. Glucose intolerance (impaired glucose tolerance)    5. Abnormal findings on diagnostic imaging of other parts of digestive tract         Plan:       Essential hypertension    Mesentery metastasis of neuroendocrine tumor  -     CBC auto differential; Future; Expected date: 11/25/2019  -     Basic metabolic panel; Future; Expected date: 11/25/2019  -     Iron and TIBC; Future; Expected date: 11/25/2019  -     Ferritin; Future; Expected date: 11/25/2019  -     Hemoglobin A1c; Future; Expected date: 11/25/2019    Iron deficiency anemia due to chronic blood loss    Glucose intolerance (impaired glucose tolerance)  -     CBC auto differential; Future; Expected date: 11/25/2019  -     Basic metabolic panel; Future; Expected date: 11/25/2019  -     Iron and TIBC; Future; Expected date: 11/25/2019  -     Ferritin; Future; Expected date: 11/25/2019  -     Hemoglobin A1c; Future; Expected date: 11/25/2019  -     pantoprazole (PROTONIX) 40 MG tablet; Take 1 tablet (40 mg total) by mouth once daily.  Dispense: 30 tablet; Refill: 11  -     Lipid panel; Future; Expected date: 11/25/2019    Abnormal findings on diagnostic imaging of other parts of digestive tract   -     Lipid panel; Future; Expected date: 11/25/2019      Symptom onset has been insidious for a time period of several year(s). Severity is described as mild-moderate. Course of his symptoms over time is insidious  .

## 2019-11-26 LAB
ESTIMATED AVG GLUCOSE: 103 MG/DL (ref 68–131)
HBA1C MFR BLD HPLC: 5.2 % (ref 4–5.6)

## 2019-11-29 ENCOUNTER — TELEPHONE (OUTPATIENT)
Dept: FAMILY MEDICINE | Facility: CLINIC | Age: 74
End: 2019-11-29

## 2019-11-29 NOTE — TELEPHONE ENCOUNTER
----- Message from Shannon Obrien sent at 11/29/2019 10:15 AM CST -----  Contact: Patient  Type:  Test Results    Who Called:  Patient  Name of Test (Lab/Mammo/Etc): Labs  Date of Test:  11/25/19  Ordering Provider: Greg Johnson MD  Where the test was performed:  [facility]  Best Call Back Number:    Additional Information:  Calling to find out the status of lab results.

## 2019-12-01 PROBLEM — H81.319: Status: RESOLVED | Noted: 2019-03-06 | Resolved: 2019-12-01

## 2019-12-01 PROBLEM — R42 VERTIGO: Status: RESOLVED | Noted: 2019-02-24 | Resolved: 2019-12-01

## 2019-12-02 NOTE — TELEPHONE ENCOUNTER
Notes recorded by Huma Padilla MA on 11/26/2019 at 8:33 AM CST  Patient has been notified of his results by   ------

## 2019-12-12 NOTE — PROGRESS NOTES
"Chief Complaint:  6 week post op follow up 11/4/19 . "I want to go back to work"    S/p:   Exploratory Laparotomy bowel resection/liver biopsy/ node dissection for carcinoid with mets.    FINAL PATHOLOGIC DIAGNOSIS    SYNOPTIC REPORT  Procedure: Segmental resection, small intestine  Tumor Site: Small intestine, terminal ileum  Tumor Size: Greatest dimension (centimeters): 1.8 cm  Tumor Focality: Unifocal  Histologic Type and Grade G1: Well-differentiated neuroendocrine tumor  Mitotic Rate: 1.5 mitoses / 10 HPF  Ki-67 Labeling Index: Specify Ki-67 percentage: about 1.1 %  Tumor Extension: Tumor at least invades through the muscularis propria into subserosal tissue  All margins are uninvolved by tumor: Margins examined: proximal, distal, and radial.  Lymphovascular Invasion: Present  Perineural Invasion: Present  Large Mesenteric Masses (>2 cm), not identified  Regional Lymph Nodes Examination (counting lymph nodes from all the 38 parts)  Number of Lymph Nodes Involved: 41  Number of Lymph Nodes Examined: 46  Pathologic Stage Classification (pTNM, AJCC 8th Edition)  Primary Tumor (pT) at least pT3: Invades through the muscularis propria into subserosal tissue  pN2: Extensive rigoberto deposits (12 or greater),  Distant Metastasis (pM) pM1b: Metastasis in at least one extrahepatic site (nonregional lymph node: Aortocaval  node. right inferior aorta node)  NET panel  Primary small bowel tumor (Block 18D)  Ki-67: positive, approximately 1.1% cells staining  Chromogranin: positive, 3+, 100%  Synaptophysin: positive, 3+, 100%  CD31: 12 / HPF  TTF: not applicable  Lymph node metastasis (Block 38A)  Ki-67: positive, approximately 6.5 % cells staining      Chromogranin: positive, 3+, 100%  Synaptophysin: positive, 3+, 100%  CD31: 10 / HPF  TTF: not applicable  Immunostains performed with appropriate positive and negative controls.  Diagnosed by: Itz Valle  (Electronically Signed: 2019-11-13 11:51:58)  Comments  Supplemental  1 " Mesenteric node #1, excision:  - One lymph node, positive for metastatic neuroendocrine tumor (1/1).  2 Mesenteric node #2, excision:  - One lymph node, positive for metastatic neuroendocrine tumor (1/1).  3 Mesenteric node #3, excision:  - Two lymph nodes, positive for metastatic neuroendocrine tumor (2/2).  4 Mesenteric node #4, excision:  - Two lymph nodes, positive for metastatic neuroendocrine tumor (2/2).  5 Mesenteric node #5, excision:  - One lymph node, positive for metastatic neuroendocrine tumor (1/1).  6 Mesenteric node #6, excision:  - One benign lymph node (0/1).  7 Mesenteric node #7, excision:  - One necrotic lymph node, positive for metastatic neuroendocrine tumor (1/1).  8 Mesenteric node #8, excision:  - One necrotic lymph node, positive for metastatic neuroendocrine tumor (1/1).  9 Mesenteric node #9, excision:  - One necrotic lymph node, positive for metastatic neuroendocrine tumor (1/1).  10 Mesenteric node #10, excision:  - One necrotic lymph node, positive for metastatic neuroendocrine tumor (1/1).  - Immunostain for chromogranin (+) and positive control examined.  11 Mesenteric node #11, excision:  - Fibrous tissue, negative for malignancy.  - Immunostain for chromogranin (-) and positive control examined.    12 Mesenteric node #12, excision:  - Three lymph nodes, positive for metastatic neuroendocrine tumor (3/3).  13 Mesenteric node #13, excision:  - One necrotic lymph node, positive for metastatic neuroendocrine tumor (1/1).  14 Mesenteric node #14, excision:  - Fibrousfatty connective tissue, positive for metastatic neuroendocrine tumor.  15 Mesenteric node #15, excision:  - One necrotic lymph node, no viable tissue seen (0/1).  16 Mesenteric node #16, excision:  - One necrotic lymph node, positive for metastatic neuroendocrine tumor (1/1).  17 Mesenteric node #17, excision:  - Three necrotic lymph nodes, positive for metastatic neuroendocrine tumor (3/3).    18 Small Bowel,  resection:  - Well-differentiated neuroendocrine tumor, WHO grade 1, 1.8 cm, at least pT3, pN2, pM1b.  - Ki-67 up to about 1.1 %.  - Mitotic rate: 1.5 mitoses / 10 HPF  - Distal, proximal resection margins and mesentery margin are negative.  - Remaining small bowel are unremarkable.  - Four lymph nodes are positive for metastatic neuroendocrine tumor (4/4).  - Tumor deposit in the fatty tissue seen.  - Perineural invasion and lymphovascular invasion identified.  - See Surgical Cancer Case Summary below.  19 Additional basal mesenteric #1, excision:  - One necrotic lymph node, positive for metastatic neuroendocrine tumor (1/1).  - Fibrousfatty connective tissue, positive for metastatic neuroendocrine tumor.  20 Additional basal mesenteric #2, excision:  - Fibrousfatty connective tissue, positive for metastatic neuroendocrine tumor.  21 Additional basal mesenteric #3, excision:  - Positive for metastatic neuroendocrine tumor (1/1).  22 Additional basal mesenteric #4, excision:  - Benign fibrousfatty connective tissue.  23 Additional basal mesenteric #5, excision:  - Two necrotic lymph node, positive for metastatic neuroendocrine tumor (2/2).  24 Additional basal mesenteric #6, excision:  - Four lymph node, positive for metastatic neuroendocrine tumor (4/4).  25 Left retro aortic node, excision:  - One out of four lymph nodes is positive for metastatic neuroendocrine tumor (1/4).  26 Left melany renal node, excision:  - Two lymph nodes, positive for metastatic neuroendocrine tumor (2/2).  27 Retro caval node #1, excision:  - One lymph node, positive for metastatic neuroendocrine tumor (1/1).  28 Retro duodenal node, excision:  - One lymph node, positive for metastatic neuroendocrine tumor (1/1).  29 Retro caval node #2, excision:  - Ganglioneuroma.  30 Retro peritoneal node #1, excision:  - Ganglioneuroma.  31 Retro peritoneal node #2, excision:  - Ganglioneuroma.  32 Retro caval node #3, excision:  - One lymph node,  positive for metastatic neuroendocrine tumor (1/1).  33 Aorto caval node, excision:  - Two lymph nodes, positive for metastatic neuroendocrine tumor (2/2).  34 Caural node, excision:  - One lymph node, positive for metastatic neuroendocrine tumor (1/1).  35 Gallbladder, cholecystectomy:  - Autolysis artifact and cholecystolithiasis.    36 Liver biopsy:  - Negative for neuroendocrine tumor.  - Immunostain for chromogranin (-) and positive control examined.  - Send for GI consult, the result will be reported as supplement.    Macrosteatosis, 5%  Bile duct hamartoma  Minimal nonspecific portal lymphocytes  Glycogenated nuclei  Trichrome stain portal fibrosis, stage 1 out of 4  Iron stain negative  Iron and trichrome stains with appropriate controls  37 Liver biopsy #2:  - Negative for neuroendocrine tumor.  - Immunostain for chromogranin (-) and positive control examined.  -  38 Right inferior aorta node, excision:  - One lymph node, positive for metastatic neuroendocrine tumor (1/1)      Vital Sign: There were no vitals filed for this visit.  Weight stable    Incision: healing well    Appetite: good    Restrictions: NO Restrictions    Return to clinic: 6 weeks after scans    Begin Lanreotide 120 mg Q mo with Dr Chun  Carcinoid tumor markers today  Ga68 scan and CT in 6 more weeks

## 2019-12-18 ENCOUNTER — OFFICE VISIT (OUTPATIENT)
Dept: NEUROLOGY | Facility: HOSPITAL | Age: 74
End: 2019-12-18
Attending: SURGERY
Payer: MEDICARE

## 2019-12-18 ENCOUNTER — TELEPHONE (OUTPATIENT)
Dept: NEUROLOGY | Facility: HOSPITAL | Age: 74
End: 2019-12-18

## 2019-12-18 VITALS
SYSTOLIC BLOOD PRESSURE: 147 MMHG | HEIGHT: 74 IN | HEART RATE: 74 BPM | TEMPERATURE: 98 F | WEIGHT: 201.38 LBS | DIASTOLIC BLOOD PRESSURE: 83 MMHG | BODY MASS INDEX: 25.84 KG/M2

## 2019-12-18 DIAGNOSIS — C7A.8 NEUROENDOCRINE CARCINOMA METASTATIC TO INTRA-ABDOMINAL LYMPH NODE: Primary | ICD-10-CM

## 2019-12-18 DIAGNOSIS — C7B.8 NEUROENDOCRINE CARCINOMA METASTATIC TO INTRA-ABDOMINAL LYMPH NODE: Primary | ICD-10-CM

## 2019-12-18 PROCEDURE — 99213 OFFICE O/P EST LOW 20 MIN: CPT | Performed by: SURGERY

## 2019-12-18 NOTE — PATIENT INSTRUCTIONS
Return to clinic: 6 weeks after scans- appt made  Labs Carcinoid tumor markers today  Scans Ga68 scan and CT in 6 more weeks  Begin Lanreotide 120 mg Q mo with Dr Chun

## 2019-12-18 NOTE — TELEPHONE ENCOUNTER
----- Message from Desiree Whatley sent at 12/18/2019  4:24 PM CST -----  Contact: daughter [phone]  JPB - Patient's daughter is calling concerning the letter that was suppose to be emailed to her for her dad to return to work. Please call

## 2019-12-19 ENCOUNTER — TELEPHONE (OUTPATIENT)
Dept: NEUROLOGY | Facility: HOSPITAL | Age: 74
End: 2019-12-19

## 2019-12-19 NOTE — TELEPHONE ENCOUNTER
----- Message from Gladys Haywood sent at 12/19/2019  2:59 PM CST -----  Contact: Pt/ [phone]  JPB----DERRICK GONZALEZ calling regarding paper work that needed to be faxed over to [phone] the pt.    Please call and advise.

## 2020-01-03 ENCOUNTER — TELEPHONE (OUTPATIENT)
Dept: NEUROLOGY | Facility: HOSPITAL | Age: 75
End: 2020-01-03

## 2020-01-03 NOTE — TELEPHONE ENCOUNTER
Spoke with Carrie at [facility] - she states that case for CT scans went into physician review this morning.  It may be reviewed today but should have determination by Monday.  Will call back this afternoon to check status.

## 2020-01-06 ENCOUNTER — HOSPITAL ENCOUNTER (OUTPATIENT)
Dept: RADIOLOGY | Facility: HOSPITAL | Age: 75
Discharge: HOME | End: 2020-01-06
Attending: SURGERY
Payer: MEDICARE

## 2020-01-06 DIAGNOSIS — C7A.8 NEUROENDOCRINE CARCINOMA METASTATIC TO INTRA-ABDOMINAL LYMPH NODE: ICD-10-CM

## 2020-01-06 DIAGNOSIS — C7B.8 NEUROENDOCRINE CARCINOMA METASTATIC TO INTRA-ABDOMINAL LYMPH NODE: ICD-10-CM

## 2020-01-06 LAB
5-HIAA, PLASMA (NEUROEND): 21 NG/ML
ALBUMIN SERPL-MCNC: 4.5 G/DL (ref 3.6–5.1)
ALBUMIN/GLOB SERPL: 1.7 (CALC) (ref 1–2.5)
ALP SERPL-CCNC: 89 U/L (ref 40–115)
ALT SERPL-CCNC: 11 U/L (ref 9–46)
AST SERPL-CCNC: 13 U/L (ref 10–35)
BASOPHILS # BLD AUTO: 58 CELLS/UL (ref 0–200)
BASOPHILS NFR BLD AUTO: 0.8 %
BILIRUB SERPL-MCNC: 0.6 MG/DL (ref 0.2–1.2)
BUN SERPL-MCNC: 15 MG/DL (ref 7–25)
BUN/CREAT SERPL: ABNORMAL (CALC) (ref 6–22)
CALCIUM SERPL-MCNC: 9.9 MG/DL (ref 8.6–10.3)
CHLORIDE SERPL-SCNC: 104 MMOL/L (ref 98–110)
CO2 SERPL-SCNC: 31 MMOL/L (ref 20–32)
CREAT SERPL-MCNC: 1.06 MG/DL (ref 0.7–1.18)
EOSINOPHIL # BLD AUTO: 288 CELLS/UL (ref 15–500)
EOSINOPHIL NFR BLD AUTO: 4 %
ERYTHROCYTE [DISTWIDTH] IN BLOOD BY AUTOMATED COUNT: 13.1 % (ref 11–15)
GFRSERPLBLD MDRD-ARVRAT: 68 ML/MIN/1.73M2
GLOBULIN SER CALC-MCNC: 2.7 G/DL (CALC) (ref 1.9–3.7)
GLUCOSE SERPL-MCNC: 113 MG/DL (ref 65–99)
HCT VFR BLD AUTO: 36.8 % (ref 38.5–50)
HGB BLD-MCNC: 12.3 G/DL (ref 13.2–17.1)
LYMPHOCYTES # BLD AUTO: 1562 CELLS/UL (ref 850–3900)
LYMPHOCYTES NFR BLD AUTO: 21.7 %
MCH RBC QN AUTO: 32.4 PG (ref 27–33)
MCHC RBC AUTO-ENTMCNC: 33.4 G/DL (ref 32–36)
MCV RBC AUTO: 96.8 FL (ref 80–100)
MONOCYTES # BLD AUTO: 418 CELLS/UL (ref 200–950)
MONOCYTES NFR BLD AUTO: 5.8 %
NEUROKININ A: 22 PG/ML
NEUTROPHILS # BLD AUTO: 4874 CELLS/UL (ref 1500–7800)
NEUTROPHILS NFR BLD AUTO: 67.7 %
PANCREASTATIN: 153 PG/ML (ref 10–135)
PLATELET # BLD AUTO: 254 THOUSAND/UL (ref 140–400)
PMV BLD REES-ECKER: 10.5 FL (ref 7.5–12.5)
POTASSIUM SERPL-SCNC: 4.1 MMOL/L (ref 3.5–5.3)
PROT SERPL-MCNC: 7.2 G/DL (ref 6.1–8.1)
RBC # BLD AUTO: 3.8 MILLION/UL (ref 4.2–5.8)
SEROTONIN SER-MCNC: 265 NG/ML (ref 56–244)
SODIUM SERPL-SCNC: 141 MMOL/L (ref 135–146)
WBC # BLD AUTO: 7.2 THOUSAND/UL (ref 3.8–10.8)

## 2020-01-06 PROCEDURE — 71260 CT THORAX DX C+: CPT | Mod: TC

## 2020-01-06 PROCEDURE — 71260 CT CHEST ABDOMEN PELVIS W W/O CONTRAST (XPD): ICD-10-PCS | Mod: 26,,, | Performed by: RADIOLOGY

## 2020-01-06 PROCEDURE — 74178 CT CHEST ABDOMEN PELVIS W W/O CONTRAST (XPD): ICD-10-PCS | Mod: 26,,, | Performed by: RADIOLOGY

## 2020-01-06 PROCEDURE — 78815 PET IMAGE W/CT SKULL-THIGH: CPT | Mod: TC,PS

## 2020-01-06 PROCEDURE — A9587 GALLIUM GA-68: HCPCS | Mod: TB

## 2020-01-06 PROCEDURE — 78815 PET IMAGE W/CT SKULL-THIGH: CPT | Mod: 26,PS,, | Performed by: RADIOLOGY

## 2020-01-06 PROCEDURE — 25500020 PHARM REV CODE 255: Performed by: SURGERY

## 2020-01-06 PROCEDURE — 74178 CT ABD&PLV WO CNTR FLWD CNTR: CPT | Mod: 26,,, | Performed by: RADIOLOGY

## 2020-01-06 PROCEDURE — 78815 NM PET 68GA DOTATATE WHOLE BODY: ICD-10-PCS | Mod: 26,PS,, | Performed by: RADIOLOGY

## 2020-01-06 PROCEDURE — 71260 CT THORAX DX C+: CPT | Mod: 26,,, | Performed by: RADIOLOGY

## 2020-01-06 RX ADMIN — IOHEXOL 100 ML: 350 INJECTION, SOLUTION INTRAVENOUS at 03:01

## 2020-01-06 RX ADMIN — IOHEXOL 1000 ML: 12 SOLUTION ORAL at 01:01

## 2020-01-08 ENCOUNTER — OFFICE VISIT (OUTPATIENT)
Dept: HEMATOLOGY/ONCOLOGY | Facility: CLINIC | Age: 75
End: 2020-01-08
Payer: MEDICARE

## 2020-01-08 VITALS
RESPIRATION RATE: 19 BRPM | WEIGHT: 207.38 LBS | TEMPERATURE: 98 F | SYSTOLIC BLOOD PRESSURE: 134 MMHG | HEART RATE: 88 BPM | BODY MASS INDEX: 26.63 KG/M2 | DIASTOLIC BLOOD PRESSURE: 74 MMHG

## 2020-01-08 DIAGNOSIS — C7B.8 NEUROENDOCRINE CARCINOMA METASTATIC TO INTRA-ABDOMINAL LYMPH NODE: ICD-10-CM

## 2020-01-08 DIAGNOSIS — C7A.8 NEUROENDOCRINE CARCINOMA METASTATIC TO INTRA-ABDOMINAL LYMPH NODE: ICD-10-CM

## 2020-01-08 DIAGNOSIS — D50.8 IRON DEFICIENCY ANEMIA SECONDARY TO INADEQUATE DIETARY IRON INTAKE: ICD-10-CM

## 2020-01-08 PROCEDURE — 1101F PR PT FALLS ASSESS DOC 0-1 FALLS W/OUT INJ PAST YR: ICD-10-PCS | Mod: S$GLB,,, | Performed by: INTERNAL MEDICINE

## 2020-01-08 PROCEDURE — 1126F AMNT PAIN NOTED NONE PRSNT: CPT | Mod: S$GLB,,, | Performed by: INTERNAL MEDICINE

## 2020-01-08 PROCEDURE — 3075F PR MOST RECENT SYSTOLIC BLOOD PRESS GE 130-139MM HG: ICD-10-PCS | Mod: S$GLB,,, | Performed by: INTERNAL MEDICINE

## 2020-01-08 PROCEDURE — 99214 PR OFFICE/OUTPT VISIT, EST, LEVL IV, 30-39 MIN: ICD-10-PCS | Mod: S$GLB,,, | Performed by: INTERNAL MEDICINE

## 2020-01-08 PROCEDURE — 1126F PR PAIN SEVERITY QUANTIFIED, NO PAIN PRESENT: ICD-10-PCS | Mod: S$GLB,,, | Performed by: INTERNAL MEDICINE

## 2020-01-08 PROCEDURE — 1101F PT FALLS ASSESS-DOCD LE1/YR: CPT | Mod: S$GLB,,, | Performed by: INTERNAL MEDICINE

## 2020-01-08 PROCEDURE — 3075F SYST BP GE 130 - 139MM HG: CPT | Mod: S$GLB,,, | Performed by: INTERNAL MEDICINE

## 2020-01-08 PROCEDURE — 3078F PR MOST RECENT DIASTOLIC BLOOD PRESSURE < 80 MM HG: ICD-10-PCS | Mod: S$GLB,,, | Performed by: INTERNAL MEDICINE

## 2020-01-08 PROCEDURE — 99214 OFFICE O/P EST MOD 30 MIN: CPT | Mod: S$GLB,,, | Performed by: INTERNAL MEDICINE

## 2020-01-08 PROCEDURE — 1159F MED LIST DOCD IN RCRD: CPT | Mod: S$GLB,,, | Performed by: INTERNAL MEDICINE

## 2020-01-08 PROCEDURE — 1159F PR MEDICATION LIST DOCUMENTED IN MEDICAL RECORD: ICD-10-PCS | Mod: S$GLB,,, | Performed by: INTERNAL MEDICINE

## 2020-01-08 PROCEDURE — 3078F DIAST BP <80 MM HG: CPT | Mod: S$GLB,,, | Performed by: INTERNAL MEDICINE

## 2020-01-08 NOTE — PROGRESS NOTES
PROGRESS NOTE    Subjective:       Patient ID: Roosevelt Alejandro is a 74 y.o. male.    Chief Complaint:  No chief complaint on file.  abdominal lymphadenopathy follow up.     History of Present Illness:   Roosevelt Alejandro is a 74 y.o. male who presents for follow up of lymphadenopathy.  He underwent EGD/EUS biopsy with Dr. Lucio since last visit.     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    Patient underwent surgery 11/4/2019 with Dr. Khalil.  Partial path report:    FINAL PATHOLOGIC DIAGNOSIS     SYNOPTIC REPORT  Procedure: Segmental resection, small intestine  Tumor Site: Small intestine, terminal ileum  Tumor Size: Greatest dimension (centimeters): 1.8 cm  Tumor Focality: Unifocal  Histologic Type and Grade G1: Well-differentiated neuroendocrine tumor  Mitotic Rate: 1.5 mitoses / 10 HPF  Ki-67 Labeling Index: Specify Ki-67 percentage: about 1.1 %  Tumor Extension: Tumor at least invades through the muscularis propria into subserosal tissue  All margins are uninvolved by tumor: Margins examined: proximal, distal, and radial.  Lymphovascular Invasion: Present  Perineural Invasion: Present  Large Mesenteric Masses (>2 cm), not identified  Regional Lymph Nodes Examination (counting lymph nodes from all the 38 parts)  Number of Lymph Nodes Involved: 41  Number of Lymph Nodes Examined: 46  Pathologic Stage Classification (pTNM, AJCC 8th Edition)  Primary Tumor (pT) at least pT3: Invades through the muscularis propria into subserosal tissue  pN2: Extensive rigoberto deposits (12 or greater),  Distant Metastasis (pM) pM1b: Metastasis in at least one extrahepatic site (nonregional lymph node: Aortocaval  node. right inferior aorta node)  NET panel  Primary  small bowel tumor (Block 18D)  Ki-67: positive, approximately 1.1% cells staining  Chromogranin: positive, 3+, 100%  Synaptophysin: positive, 3+, 100%  CD31: 12 / HPF  TTF: not applicable  Lymph node metastasis (Block 38A)  Ki-67: positive, approximately 6.5 % cells staining      CT abdomen 7/15/2019:  Mesenteric and retroperitoneal lymphadenopathy, with numerous necrotic appearing lymph node is within the central mesentery.  Some of these nodes have mildly increased in size, while the largest previously present node has significantly decreased in size as above.  Further workup for neoplastic process such as metastatic disease or lymphoma is recommended.    Normal appendix.  Moderate diverticulosis.  Small hiatal hernia.  Hepatic and renal cysts.  Atherosclerosis.  Evidence for prior asbestos exposure.    Family and Social history reviewed and is unchanged from 7/26/2019      ROS:  Review of Systems   Constitutional: Negative for fever and unexpected weight change.   HENT: Negative for nosebleeds.    Respiratory: Negative for chest tightness and shortness of breath.    Cardiovascular: Negative for chest pain.   Gastrointestinal: Negative for abdominal pain and blood in stool.   Genitourinary: Negative for hematuria.   Skin: Negative for rash.   Hematological: Does not bruise/bleed easily.          Current Outpatient Medications:     atorvastatin (LIPITOR) 20 MG tablet, Take 1 tablet (20 mg total) by mouth once daily., Disp: 90 tablet, Rfl: 3    FOLIC ACID/MULTIVIT-MIN/LUTEIN (CENTRUM SILVER ORAL), Take by mouth., Disp: , Rfl:     metoprolol succinate (TOPROL-XL) 25 MG 24 hr tablet, Take 1 tablet (25 mg total) by mouth once daily., Disp: 90 tablet, Rfl: 3    NIFEdipine (PROCARDIA-XL) 30 MG (OSM) 24 hr tablet, Take 1 tablet (30 mg total) by mouth every evening., Disp: 90 tablet, Rfl: 3    pantoprazole (PROTONIX) 40 MG tablet, Take 1 tablet (40 mg total) by mouth once daily., Disp: 30 tablet, Rfl:  11        Objective:       Physical Examination:     /74   Pulse 88   Temp 98.3 °F (36.8 °C) (Oral)   Resp 19   Wt 94.1 kg (207 lb 6.4 oz)   BMI 26.63 kg/m²     Physical Exam   Constitutional: He is oriented to person, place, and time. He appears well-developed and well-nourished.   HENT:   Head: Normocephalic and atraumatic.   Right Ear: External ear normal.   Left Ear: External ear normal.   Mouth/Throat: Oropharynx is clear and moist.   Eyes: Pupils are equal, round, and reactive to light. Conjunctivae are normal. No scleral icterus.   Neck: Normal range of motion. Neck supple.   Cardiovascular: Normal rate, regular rhythm and normal heart sounds. Exam reveals no gallop and no friction rub.   No murmur heard.  Pulmonary/Chest: Effort normal and breath sounds normal. No respiratory distress. He has no rales. He exhibits no tenderness.   Abdominal: Soft. Bowel sounds are normal. He exhibits no distension and no mass. There is no hepatosplenomegaly. There is no tenderness. There is no rebound and no guarding.   Musculoskeletal: He exhibits no edema.   Lymphadenopathy:        Head (right side): No tonsillar adenopathy present.        Head (left side): No tonsillar adenopathy present.     He has no cervical adenopathy.     He has no axillary adenopathy.        Right: No supraclavicular adenopathy present.        Left: No supraclavicular adenopathy present.   Neurological: He is alert and oriented to person, place, and time.   Psychiatric: He has a normal mood and affect. His behavior is normal. Judgment and thought content normal.   Vitals reviewed.      Labs:   No results found for this or any previous visit (from the past 336 hour(s)).  CMP  Sodium   Date Value Ref Range Status   12/18/2019 141 135 - 146 mmol/L Final     Potassium   Date Value Ref Range Status   12/18/2019 4.1 3.5 - 5.3 mmol/L Final     Chloride   Date Value Ref Range Status   12/18/2019 104 98 - 110 mmol/L Final     CO2   Date Value Ref  Range Status   12/18/2019 31 20 - 32 mmol/L Final     Glucose   Date Value Ref Range Status   12/18/2019 113 (H) 65 - 99 mg/dL Final     Comment:                   Fasting reference interval     For someone without known diabetes, a glucose value  between 100 and 125 mg/dL is consistent with  prediabetes and should be confirmed with a  follow-up test.          BUN, Bld   Date Value Ref Range Status   12/18/2019 15 7 - 25 mg/dL Final     Creatinine   Date Value Ref Range Status   12/18/2019 1.06 0.70 - 1.18 mg/dL Final     Comment:     For patients >49 years of age, the reference limit  for Creatinine is approximately 13% higher for people  identified as -American.          Calcium   Date Value Ref Range Status   12/18/2019 9.9 8.6 - 10.3 mg/dL Final     Total Protein   Date Value Ref Range Status   12/18/2019 7.2 6.1 - 8.1 g/dL Final     Albumin   Date Value Ref Range Status   12/18/2019 4.5 3.6 - 5.1 g/dL Final     Total Bilirubin   Date Value Ref Range Status   12/18/2019 0.6 0.2 - 1.2 mg/dL Final     Alkaline Phosphatase   Date Value Ref Range Status   12/18/2019 89 40 - 115 U/L Final     AST   Date Value Ref Range Status   12/18/2019 13 10 - 35 U/L Final     ALT   Date Value Ref Range Status   12/18/2019 11 9 - 46 U/L Final     Anion Gap   Date Value Ref Range Status   11/25/2019 12 8 - 16 mmol/L Final     eGFR if    Date Value Ref Range Status   12/18/2019 79 > OR = 60 mL/min/1.73m2 Final     eGFR if non    Date Value Ref Range Status   12/18/2019 68 > OR = 60 mL/min/1.73m2 Final     No results found for: CEA  Lab Results   Component Value Date    PSA 28.6 (H) 12/20/2017    PSA 5.7 (H) 10/25/2013    PSA 7.98 (H) 02/20/2013           Assessment/Plan:     Problem List Items Addressed This Visit     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  I  discussed the use of Lanreotide monthly as is suggested by Dr. Khalil.  Will begin this next week.  Patient appears to be doing very well and will continue to see him on a three month basis.  He will see Dr. Khalil again next week.           Relevant Orders    Chromogranin A    Iron deficiency anemia secondary to inadequate dietary iron intake     Will continue to follow with labs on each visit and supplement iron as needed.           Relevant Orders    CBC auto differential    Comprehensive metabolic panel    Ferritin          Discussion:     Follow up in about 3 months (around 4/8/2020).      Electronically signed by Gaston Farmer

## 2020-01-08 NOTE — PATIENT INSTRUCTIONS
Lanreotide injection  What is this medicine?  LANREOTIDE (akbar REE oh tide) is used to reduce blood levels of growth hormone in patients with a condition called acromegaly. It also works to slow or stop tumor growth in patients with gastroenteropancreatic neuroendocrine tumor (GEP-NET).  How should I use this medicine?  This medicine is for injection under the skin. It is given by a health care professional in a hospital or clinic setting.  Contact your pediatrician or health care professional regarding the use of this medicine in children. Special care may be needed.  What side effects may I notice from receiving this medicine?  Side effects that you should report to your doctor or health care professional as soon as possible:  · allergic reactions like skin rash, itching or hives, swelling of the face, lips, or tongue  · changes in blood sugar  · changes in heart rate  · severe stomach pain  Side effects that usually do not require medical attention (report to your doctor or health care professional if they continue or are bothersome):  · diarrhea or constipation  · gas or stomach pain  · nausea, vomiting  · pain, redness, swelling and irritation at site where injected  What may interact with this medicine?  · bromocriptine  · cyclosporine  · medicines for diabetes, including insulin  · medicines for heart disease or hypertension  · quinidine  What if I miss a dose?  It is important not to miss your dose. Call your doctor or health care professional if you are unable to keep an appointment.  Where should I keep my medicine?  This drug is given in a hospital or clinic and will not be stored at home.  What should I tell my health care provider before I take this medicine?  They need to know if you have any of these conditions:  · diabetes  · gallbladder disease  · heart disease  · kidney disease  · liver disease  · an unusual or allergic reaction to lanreotide, other medicines, latex, foods, dyes, or  preservatives  · pregnant or trying to get pregnant  · breast-feeding  What should I watch for while using this medicine?  Visit your doctor or health care professional for regular checks on your progress. Your condition will be monitored carefully while you are receiving this medicine.  This medicine may cause increases or decreases in blood sugar. Signs of high blood sugar include frequent urination, unusual thirst, flushed or dry skin, difficulty breathing, drowsiness, stomach ache, nausea, vomiting or dry mouth. Signs of low blood sugar include chills, cool, pale skin or cold sweats, drowsiness, extreme hunger, fast heartbeat, headache, nausea, nervousness or anxiety, shakiness, trembling, unsteadiness, tiredness, or weakness. Contact your doctor or health care professional right away if you experience any of these symptoms.  NOTE:This sheet is a summary. It may not cover all possible information. If you have questions about this medicine, talk to your doctor, pharmacist, or health care provider. Copyright© 2017 [employer]

## 2020-01-08 NOTE — ASSESSMENT & PLAN NOTE
Patient is s/p debulking surgery and Ga68 PET scan shows residual disease in the MS and mesentery.  I discussed the use of Lanreotide monthly as is suggested by Dr. Khalil.  Will begin this next week.  Patient appears to be doing very well and will continue to see him on a three month basis.  He will see Dr. Khalil again next week.

## 2020-01-13 NOTE — PROGRESS NOTES
"NOLANETS:  [facility]  A collaboration between [facility] and [facility]      PATIENT: Roosevelt Alejandro  MRN: [id number]  DATE: 1/14/2020    Subjective:      Chief Complaint: 6 week follow up    Vitals: Blood pressure (!) 143/82, pulse 75, height 6' 2" (1.88 m), weight 93 kg (205 lb 0.4 oz). --    ECOG Score: 0 - Asymptomatic    Diagnosis:   1. 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 Malignant carcinoid tumor of ileum         Overview/ Interval History: He presented with abdominal pain x 2 wks and passing of dark stools in which he attributed to pepto- bismol.    CT scan in 2019 showed lymphadenopathy and was compared to a CT from 12/2017. The reports states that one of the nodes decreased in size while one of the nodes grew. EUS with duodenal biopsy was performed. Abigail duodenal lymph node Biopsy was suggestive of a neuroendocrine tumor. 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    Vertigo, aural, unspecified laterality 3/6/2019       Past Surgical History:  Past Surgical History:   Procedure Laterality Date    CHOLECYSTECTOMY  11/4/2019    Procedure: CHOLECYSTECTOMY;  Surgeon: SP Khalil MD;  Location: [facility] OR;  Service: General;;    COLONOSCOPY      2009    COLONOSCOPY N/A 1/21/2016    Procedure: COLONOSCOPY;  Surgeon: Toby Maciel MD; "  Location: [facility];  Service: Endoscopy;  Laterality: N/A;    COLONOSCOPY N/A 1/25/2019    Procedure: COLONOSCOPY;  Surgeon: Toby Maciel MD;  Location: [facility];  Service: Endoscopy;  Laterality: N/A;    ENDOSCOPIC ULTRASOUND OF UPPER GASTROINTESTINAL TRACT N/A 8/20/2019    Procedure: ULTRASOUND, UPPER GI TRACT, ENDOSCOPIC;  Surgeon: Forest Lucio III, MD;  Location: [facility] ENDO;  Service: Endoscopy;  Laterality: N/A;    EYE SURGERY Bilateral     cataracts, retinal detachment    HYPERTHERMIC INTRAPERITONEAL CHEMOTHERAPY (HIPEC)  11/4/2019    Procedure: CHEMOTHERAPY, INTRAPERITONEAL, HYPERTHERMIC;  Surgeon: SP Khalil MD;  Location: [facility];  Service: General;;    LIVER BIOPSY  11/4/2019    Procedure: BIOPSY, LIVER;  Surgeon: SP Khalil MD;  Location: [facility];  Service: General;;  BIOPSY LIVER WEDGE X3    PROSTATE BIOPSY      PROSTATECTOMY  12/2017    RENAL EXPLORATION  11/4/2019    Procedure: EXPLORATION, KIDNEY;  Surgeon: SP Khalil MD;  Location: [facility] OR;  Service: General;;    ROTATOR CUFF REPAIR      left    SURGICAL REMOVAL OF LYMPH NODE  11/4/2019    Procedure: EXCISION, LYMPH NODE;  Surgeon: SP Khalil MD;  Location: [facility];  Service: General;;  EXTENSIVE RETROPERITONEAL NODE DISSECTION  EXPLORATION OF MESENTERIC ARTERY/VEIN       Family History:  Family History   Problem Relation Age of Onset    Cancer Father         lung/ asbestos    Melanoma Neg Hx     Psoriasis Neg Hx     Lupus Neg Hx     Eczema Neg Hx        Allergies:  Epinephrine; Aspirin; Lisinopril (bulk); and Sulfa (sulfonamide antibiotics)    Medications:  Current Outpatient Medications   Medication Sig    atorvastatin (LIPITOR) 20 MG tablet Take 1 tablet (20 mg total) by mouth once daily.    FOLIC ACID/MULTIVIT-MIN/LUTEIN (CENTRUM SILVER ORAL) Take by mouth.    metoprolol succinate (TOPROL-XL) 25 MG 24 hr tablet Take 1 tablet (25 mg total) by mouth once daily.    NIFEdipine  (PROCARDIA-XL) 30 MG (OSM) 24 hr tablet Take 1 tablet (30 mg total) by mouth every evening.    pantoprazole (PROTONIX) 40 MG tablet Take 1 tablet (40 mg total) by mouth once daily.     No current facility-administered medications for this visit.         Review of Systems   Constitutional: Negative.  Negative for appetite change, chills, fatigue, fever and unexpected weight change.        Works out 3X/week, inversion, full time [employer]   HENT: Negative.  Negative for congestion, hearing loss and sore throat.    Eyes: Negative.  Negative for pain, discharge and itching.   Respiratory: Negative.  Negative for cough, chest tightness, shortness of breath and wheezing.    Cardiovascular: Negative.  Negative for chest pain, palpitations and leg swelling.   Gastrointestinal: Negative.  Negative for abdominal distention, abdominal pain, blood in stool, constipation, diarrhea, nausea and vomiting.   Endocrine: Negative.  Negative for cold intolerance, heat intolerance and polydipsia.   Genitourinary: Negative.  Negative for difficulty urinating, dysuria and flank pain.   Musculoskeletal: Negative.  Negative for arthralgias, joint swelling and myalgias.   Skin: Negative.  Negative for color change, pallor and rash.   Allergic/Immunologic: Negative.    Neurological: Negative.  Negative for dizziness, syncope and light-headedness.   Hematological: Negative.  Negative for adenopathy. Does not bruise/bleed easily.   Psychiatric/Behavioral: Negative.  Negative for agitation, confusion, dysphoric mood, hallucinations, sleep disturbance and suicidal ideas. The patient is not nervous/anxious.    All other systems reviewed and are negative.     Objective:      Physical Exam   Constitutional: He is oriented to person, place, and time. He appears well-developed and well-nourished. No distress.   HENT:   Head: Normocephalic and atraumatic.   Eyes: Pupils are equal, round, and reactive to light. EOM are normal. No scleral icterus.    Neck: Normal range of motion. Neck supple. No JVD present. No tracheal deviation present.   Cardiovascular: Normal rate, regular rhythm and intact distal pulses.   Pulmonary/Chest: Effort normal and breath sounds normal. No respiratory distress. He has no wheezes. He has no rales.   Abdominal: Soft. Bowel sounds are normal. He exhibits no distension and no mass. There is no tenderness. There is no rebound and no guarding. No hernia.   Musculoskeletal: Normal range of motion. He exhibits no edema.   Neurological: He is alert and oriented to person, place, and time. He has normal reflexes.   Skin: Skin is warm and dry. No rash noted. He is not diaphoretic. No erythema. No pallor.   Psychiatric: He has a normal mood and affect. His behavior is normal. Judgment and thought content normal.   Nursing note and vitals reviewed.     Assessment:       1. 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 Malignant carcinoid tumor of ileum          Pathology- 11/4/19-  FINAL PATHOLOGIC DIAGNOSIS  1 Mesenteric node #1, excision:  - One lymph node, positive for metastatic neuroendocrine tumor (1/1).  2 Mesenteric node #2, excision:  - One lymph node, positive for metastatic neuroendocrine tumor (1/1).  3 Mesenteric node #3, excision:  - Two lymph nodes, positive for metastatic neuroendocrine tumor (2/2).  4 Mesenteric node #4, excision:  - Two lymph nodes, positive for metastatic neuroendocrine tumor (2/2).  5 Mesenteric node #5, excision:  - One lymph node, positive for metastatic neuroendocrine tumor (1/1).  6 Mesenteric node #6, excision:  - One benign lymph node (0/1).  7 Mesenteric node #7, excision:  - One necrotic lymph node, positive for metastatic neuroendocrine tumor (1/1).  8 Mesenteric node #8, excision:  - One necrotic lymph node, positive for metastatic neuroendocrine tumor (1/1).  9 Mesenteric node #9, excision:  - One necrotic lymph node, positive for metastatic neuroendocrine tumor  (1/1).  10 Mesenteric node #10, excision:  - One necrotic lymph node, positive for metastatic neuroendocrine tumor (1/1).  - Immunostain for chromogranin (+) and positive control examined.  11 Mesenteric node #11, excision:  - Fibrous tissue, negative for malignancy.  - Immunostain for chromogranin (-) and positive control examined.  12 Mesenteric node #12, excision:  - Three lymph nodes, positive for metastatic neuroendocrine tumor (3/3).  13 Mesenteric node #13, excision:  - One necrotic lymph node, positive for metastatic neuroendocrine tumor (1/1).  14 Mesenteric node #14, excision:  - Fibrousfatty connective tissue, positive for metastatic neuroendocrine tumor.  15 Mesenteric node #15, excision:  - One necrotic lymph node, no viable tissue seen (0/1).  16 Mesenteric node #16, excision:  - One necrotic lymph node, positive for metastatic neuroendocrine tumor (1/1).  17 Mesenteric node #17, excision:  - Three necrotic lymph nodes, positive for metastatic neuroendocrine tumor (3/3).  18 Small Bowel, resection:  - Well-differentiated neuroendocrine tumor, WHO grade 1, 1.8 cm, at least pT3, pN2, pM1b.  - Ki-67 up to about 1.1 %.  - Mitotic rate: 1.5 mitoses / 10 HPF  - Distal, proximal resection margins and mesentery margin are negative.  - Remaining small bowel are unremarkable.  - Four lymph nodes are positive for metastatic neuroendocrine tumor (4/4).  - Tumor deposit in the fatty tissue seen.  - Perineural invasion and lymphovascular invasion identified.  - See Surgical Cancer Case Summary below.  19 Additional basal mesenteric #1, excision:  - One necrotic lymph node, positive for metastatic neuroendocrine tumor (1/1).  - Fibrousfatty connective tissue, positive for metastatic neuroendocrine tumor.  20 Additional basal mesenteric #2, excision:  - Fibrousfatty connective tissue, positive for metastatic neuroendocrine tumor.  21 Additional basal mesenteric #3, excision:  - Positive for metastatic neuroendocrine  tumor (1/1).  22 Additional basal mesenteric #4, excision:  - Benign fibrousfatty connective tissue.  23 Additional basal mesenteric #5, excision:  - Two necrotic lymph node, positive for metastatic neuroendocrine tumor (2/2).  24 Additional basal mesenteric #6, excision:  - Four lymph node, positive for metastatic neuroendocrine tumor (4/4).  25 Left retro aortic node, excision:  - One out of four lymph nodes is positive for metastatic neuroendocrine tumor (1/4).  26 Left melany renal node, excision:  - Two lymph nodes, positive for metastatic neuroendocrine tumor (2/2).  27 Retro caval node #1, excision:  - One lymph node, positive for metastatic neuroendocrine tumor (1/1).  28 Retro duodenal node, excision:  - One lymph node, positive for metastatic neuroendocrine tumor (1/1).  29 Retro caval node #2, excision:  - Ganglioneuroma.  30 Retro peritoneal node #1, excision:  - Ganglioneuroma.  31 Retro peritoneal node #2, excision:  - Ganglioneuroma.  32 Retro caval node #3, excision:  - One lymph node, positive for metastatic neuroendocrine tumor (1/1).  33 Aorto caval node, excision:  - Two lymph nodes, positive for metastatic neuroendocrine tumor (2/2).  34 Caural node, excision:  - One lymph node, positive for metastatic neuroendocrine tumor (1/1).  35 Gallbladder, cholecystectomy:  - Autolysis artifact and cholecystolithiasis.  36 Liver biopsy:  - Negative for neuroendocrine tumor.  - Immunostain for chromogranin (-) and positive control examined.  - Send for GI consult, the result will be reported as supplement.  37 Liver biopsy #2:  - Negative for neuroendocrine tumor.  - Immunostain for chromogranin (-) and positive control examined.  - Send for GI consult, the result will be reported as supplement.  38 Right inferior aorta node, excision:  - One lymph node, positive for metastatic neuroendocrine tumor (1/1).    SMALL BOWEL NEUROENDOCRINE TUMOR  Procedure: Segmental resection, small intestine  Tumor Site: Small  intestine, terminal ileum  Tumor Size: Greatest dimension (centimeters): 1.8 cm  Tumor Focality: Unifocal  Histologic Type and Grade G1: Well-differentiated neuroendocrine tumor  Mitotic Rate: 1.5 mitoses / 10 HPF  Ki-67 Labeling Index: Specify Ki-67 percentage: about 1.1 %  Tumor Extension: Tumor at least invades through the muscularis propria into subserosal tissue  All margins are uninvolved by tumor: Margins examined: proximal, distal, and radial.  Lymphovascular Invasion: Present  Perineural Invasion: Present  Large Mesenteric Masses (>2 cm), not identified  Regional Lymph Nodes Examination (counting lymph nodes from all the 38 parts)  Number of Lymph Nodes Involved: 41  Number of Lymph Nodes Examined: 46  Pathologic Stage Classification (pTNM, AJCC 8th Edition)  Primary Tumor (pT) at least pT3: Invades through the muscularis propria into subserosal tissue  pN2: Extensive rigoberto deposits (12 or greater),  Distant Metastasis (pM) pM1b: Metastasis in at least one extrahepatic site (nonregional lymph node: Aortocaval  node. right inferior aorta node)  NET panel  Primary small bowel tumor (Block 18D)  Ki-67: positive, approximately 1.1% cells staining  Chromogranin: positive, 3+, 100%  Synaptophysin: positive, 3+, 100%  CD31: 12 / HPF  TTF: not applicable  Lymph node metastasis (Block 38A)  Ki-67: positive, approximately 6.5 % cells staining  Chromogranin: positive, 3+, 100%  Synaptophysin: positive, 3+, 100%  CD31: 10 / HPF  TTF: not applicable  Immunostains performed with appropriate positive and negative controls.      Lab Data:     Neuroendocrine Labs Latest Ref Rng & Units 12/18/2019   5 HIAA BLOOD Up to 22 ng/mL 21   EXT 5 HIAA BLOOD 0 - 22 ng/mL    EXT SEROTONIN 56 - 244 ng/mL    PANCREASTATIN 10 - 135 pg/mL 153   EXT PANCREASTATIN DIONNA 10 - 135 pg/mL    NEUROKININ A UP TO 40 pg/mL 22   EXT NEUROKININ A DIONNA 0 - 40 pg/mL    FOLATE 4.0 - 24.0 ng/ml    VITAMIN B12 210 - 950 pg/ml    TSH 0.400 - 4.000 uIU/mL     WBC 3.8 - 10.8 Thousand/uL 7.2   HGB 13.2 - 17.1 g/dL 12.3 (L)   HCT 38.5 - 50.0 % 36.8 (L)   PLATLETS 140 - 400 Thousand/uL 254   PT 9.0 - 12.5 sec    PTT 21.0 - 32.0 sec    INR 0.8 - 1.2    GLUCOSE 65 - 99 mg/dL 113 (H)   BUN 7 - 25 mg/dL 15   CREATININE 0.70 - 1.18 mg/dL 1.06    - 146 mmol/L 141   K 3.5 - 5.3 mmol/L 4.1   CHLORIDE 98 - 110 mmol/L 104   CO2 20 - 32 mmol/L 31   CALCIUM 8.6 - 10.3 mg/dL 9.9   PROTEIN, TOTAL 6.1 - 8.1 g/dL 7.2   PHOSPHORUS 2.7 - 4.5 mg/dL    ALBUMIN 3.6 - 5.1 g/dL 4.5   TOTAL BILIRUBIN 0.2 - 1.2 mg/dL 0.6   ALK PHOSPHATASE 40 - 115 U/L 89   SGOT (AST) 10 - 35 U/L 13   SGPT (ALT) 9 - 46 U/L 11   TRIGLYCERIDES 30 - 150 mg/dL    CHOLERSTEROL 120 - 199 mg/dL    HDL 40 - 75 mg/dL    LDL 63.0 - 159.0 mg/dL    MG 1.6 - 2.6 mg/dL    24 HR CREATININE CLEARANCE 0 - 400 mg/dl    HEMOGLOBIN A1C 4.0 - 5.6 %    Weight         PSA: Negative    PROP SCAN:                                                                                                                                                                                                         NM PET 68GA DOTATATE WHOLE BODY 1/6/20    CLINICAL HISTORY:  Other malignant neuroendocrine tumors    TECHNIQUE:  6.80 mCi of GA68 Dotatate was administered intravenously in the left hand. After an approximately 60 min distribution time, PET/CT images were acquired from the skull vertex to the mid thigh. Transmission images were acquired to correct for attenuation using a whole body low-dose CT scan without contrast with the arms positioned above the head.    COMPARISON:  GA68 Dotatate PET-CT from 10/03/2019.    FINDINGS:  Quality of the study: Adequate.    Persistent radiotracer avid lymph nodes are again seen involving the mediastinum, mesentery, and retroperitoneum. There is overall decreased burden of somatostatin receptor avid disease in the mesentery and retroperitoneum status post recent tumor debulking surgery, multiple index lesions  are no longer present.    Para-esophageal lymph node re-identified at the level of the left mainstem bronchus takeoff measuring 0.5 cm in short axis with SUV max of 7.5, previously measured 0.3 cm in short axis with SUV of 6.6.    Mesenteric node in the right hemiabdomen demonstrates SUV max 27.6 (axial fused image 197), previously with SUV max of 29.5.  Lesion is difficult to measure on noncontrast CT.    Left para-aortic lymph node measures 0.9 cm in short axis (axial series 2, image 197), previously measured 0.9 cm in short axis with SUV max 18.4.    Physiologic uptake of the tracer is seen within the pituitary, liver, spleen, kidneys, adrenal glands and bowel.    Incidental CT findings: Stable apical prominent centrilobular emphysematous changes.  Stable hepatic and renal cysts.  Persistent mesenteric haziness.  Postoperative changes noted status post small bowel resection, cholecystectomy, and TURP.  Multiple stable small bladder diverticuli.  Sigmoid diverticulosis without evidence of acute diverticulitis.  Small bilateral hydroceles.  Small bilateral fat containing inguinal hernias.      Impression       Persistent somatostatin receptor avid disease involving the mediastinum, mesentery, and retroperitoneum noting decreased burden of disease in the mesentery and retroperitoneum status post recent tumor debulking surgery.     CT CHEST ABDOMEN PELVIS W W/O CONTRAST (XPD) 1/6/20    CLINICAL HISTORY:  restage;Other malignant neuroendocrine tumors    TECHNIQUE:  Low dose axial images were obtained from the thoracic inlet to the pubic symphysis following the administration of 100 cc of Omnipaque 350 intravenous contrast.  Oral contrast was also administered.  Sagittal and coronal reformats were provided.    COMPARISON:  CT abdomen and pelvis with contrast dated 07/15/2019 and PET-CT dated same day.  CT chest without contrast dated 10/21/2016.    FINDINGS:  Chest: Bilateral upper lobe predominant emphysema.   Peripheral lung interstitial changes with scattered, lower lobe predominance and few scattered micro nodules, technically indeterminate.  Peripheral calcified pleural plaques on the left (for exam series 4, image 301).    Structures at the base of the neck are unremarkable.  No pleural or pericardial effusion.  No intrathoracic or hilar adenopathy.  Nonenlarged node at the aortoesophageal recess measuring 4-5 mm corresponding with hypermetabolic node on corresponding PET (series 6, image 34).  The trachea and mainstem bronchi are clear.  Small hiatal hernia.  Coronary calcific atherosclerosis.    Abdomen and pelvis:    Similar distribution of several low-attenuation lesions throughout the hepatic parenchyma, most in keeping with cysts, though some too small for definitive characterization.  Hepatic vasculature is patent.  The gallbladder is absent.  The spleen, and pancreas appear normal.  Adrenal glands appear similar with stable nodular thickening on the left.  Kidneys enhance symmetrically without hydronephrosis.  Bilateral renal cysts.  Postoperative changes of the mesentery and retroperitoneum.  Associated low-density regions at these locations without corresponding PET avidity, suggestive of postoperative fluid collections (for reference at the right and left para-aortic regions measuring 2.1 and 2.7 cm respectively; series 6, image 102 and 125).  Several prominent mesenteric and retroperitoneal nodes, a few which demonstrate corresponding hypermetabolic uptake, for example series 6, image 114 and 117.    Few bladder wall diverticula..  Heterogeneous appearance of the central prostate, likely in keeping with prior TURP.  The GI tract demonstrates colonic diverticulosis.  The appendix is normal.  Small hiatal hernia.  Bilateral fat containing inguinal hernias.  Aortoiliac atherosclerosis.  No aggressive osseous lesion.  Multilevel spine DJD.        Impression       Nonenlarged aorto-esophageal node which  corresponds to hypermetabolic focus on corresponding PET.  Otherwise, no additional convincing intrathoracic metastasis.    Partially calcified pleural plaques on the left as can be seen with asbestos related pleural plaque disease.    Peripheral lung interstitial changes with scattered, lower lobe predominance and few scattered micro nodules, technically indeterminate.  Continued attention at follow-up.    Postsurgical resection changes of the mesentery and retroperitoneum with suspected postoperative collections as above.  Continued attention at follow-up.    Residual prominent to mildly enlarged mesenteric and retroperitoneal nodes, some with corresponding PET avidity, compatible with residual metastatic disease.         Impression:  Stable status post 95% cytoreduction.            Plan:       Lanreotide 120 mg every 28 days  Carcinoid tumor markers q.3 months  Gallium scan CT abdomen pelvis, MRI every 6 months        SP Khalil MD, FACS  Professor of Surgery, [facility]  Neuroendocrine Surgery, Hepatic/Pancreatic & General Surgery  [address], [address]  [place]  [address]  ph. [phone]; [phone]  fax. [phone]

## 2020-01-14 ENCOUNTER — OFFICE VISIT (OUTPATIENT)
Dept: NEUROLOGY | Facility: HOSPITAL | Age: 75
End: 2020-01-14
Attending: SURGERY
Payer: MEDICARE

## 2020-01-14 VITALS
WEIGHT: 205 LBS | BODY MASS INDEX: 26.31 KG/M2 | HEIGHT: 74 IN | SYSTOLIC BLOOD PRESSURE: 143 MMHG | HEART RATE: 75 BPM | DIASTOLIC BLOOD PRESSURE: 82 MMHG

## 2020-01-14 DIAGNOSIS — C7A.8 NEUROENDOCRINE CARCINOMA METASTATIC TO INTRA-ABDOMINAL LYMPH NODE: Primary | ICD-10-CM

## 2020-01-14 DIAGNOSIS — C7A.012 MALIGNANT CARCINOID TUMOR OF ILEUM: ICD-10-CM

## 2020-01-14 DIAGNOSIS — C7B.8 NEUROENDOCRINE CARCINOMA METASTATIC TO INTRA-ABDOMINAL LYMPH NODE: Primary | ICD-10-CM

## 2020-01-14 PROCEDURE — 99213 OFFICE O/P EST LOW 20 MIN: CPT | Performed by: SURGERY

## 2020-01-14 NOTE — PATIENT INSTRUCTIONS
Tumor Markers every 3 months  Return to clinic in 6 months- appt made  Gallium/ CT in 6 months appt made

## 2020-01-20 ENCOUNTER — TELEPHONE (OUTPATIENT)
Dept: INFUSION THERAPY | Facility: HOSPITAL | Age: 75
End: 2020-01-20

## 2020-01-22 ENCOUNTER — INFUSION (OUTPATIENT)
Dept: INFUSION THERAPY | Facility: HOSPITAL | Age: 75
End: 2020-01-22
Attending: INTERNAL MEDICINE
Payer: MEDICARE

## 2020-01-22 VITALS
HEART RATE: 87 BPM | SYSTOLIC BLOOD PRESSURE: 172 MMHG | HEIGHT: 74 IN | DIASTOLIC BLOOD PRESSURE: 85 MMHG | WEIGHT: 209.31 LBS | BODY MASS INDEX: 26.86 KG/M2 | TEMPERATURE: 99 F | RESPIRATION RATE: 18 BRPM

## 2020-01-22 DIAGNOSIS — C7A.8 NEUROENDOCRINE CARCINOMA METASTATIC TO INTRA-ABDOMINAL LYMPH NODE: Primary | ICD-10-CM

## 2020-01-22 DIAGNOSIS — C7B.8 NEUROENDOCRINE CARCINOMA METASTATIC TO INTRA-ABDOMINAL LYMPH NODE: Primary | ICD-10-CM

## 2020-01-22 PROCEDURE — 96372 THER/PROPH/DIAG INJ SC/IM: CPT

## 2020-01-22 PROCEDURE — 63600175 PHARM REV CODE 636 W HCPCS: Mod: JG | Performed by: INTERNAL MEDICINE

## 2020-01-22 RX ORDER — LANREOTIDE ACETATE 120 MG/.5ML
120 INJECTION SUBCUTANEOUS
Status: COMPLETED | OUTPATIENT
Start: 2020-01-22 | End: 2020-01-22

## 2020-01-22 RX ADMIN — LANREOTIDE ACETATE 120 MG: 120 INJECTION SUBCUTANEOUS at 02:01

## 2020-02-19 ENCOUNTER — INFUSION (OUTPATIENT)
Dept: INFUSION THERAPY | Facility: HOSPITAL | Age: 75
End: 2020-02-19
Attending: INTERNAL MEDICINE
Payer: MEDICARE

## 2020-02-19 VITALS
HEIGHT: 74 IN | HEART RATE: 74 BPM | WEIGHT: 214.81 LBS | RESPIRATION RATE: 18 BRPM | BODY MASS INDEX: 27.57 KG/M2 | TEMPERATURE: 98 F | SYSTOLIC BLOOD PRESSURE: 148 MMHG | DIASTOLIC BLOOD PRESSURE: 76 MMHG

## 2020-02-19 DIAGNOSIS — C7B.8 NEUROENDOCRINE CARCINOMA METASTATIC TO INTRA-ABDOMINAL LYMPH NODE: Primary | ICD-10-CM

## 2020-02-19 DIAGNOSIS — C7A.8 NEUROENDOCRINE CARCINOMA METASTATIC TO INTRA-ABDOMINAL LYMPH NODE: Primary | ICD-10-CM

## 2020-02-19 PROCEDURE — 96372 THER/PROPH/DIAG INJ SC/IM: CPT

## 2020-02-19 PROCEDURE — 63600175 PHARM REV CODE 636 W HCPCS: Mod: JG | Performed by: NURSE PRACTITIONER

## 2020-02-19 RX ORDER — LANREOTIDE ACETATE 120 MG/.5ML
120 INJECTION SUBCUTANEOUS
Status: COMPLETED | OUTPATIENT
Start: 2020-02-19 | End: 2020-02-19

## 2020-02-19 RX ADMIN — LANREOTIDE ACETATE 120 MG: 120 INJECTION SUBCUTANEOUS at 02:02

## 2020-03-16 ENCOUNTER — LAB VISIT (OUTPATIENT)
Dept: LAB | Facility: HOSPITAL | Age: 75
End: 2020-03-16
Attending: NURSE PRACTITIONER
Payer: MEDICARE

## 2020-03-16 DIAGNOSIS — I10 ESSENTIAL HYPERTENSION: ICD-10-CM

## 2020-03-16 LAB
ALBUMIN SERPL BCP-MCNC: 4.4 G/DL (ref 3.5–5.2)
ALP SERPL-CCNC: 113 U/L (ref 55–135)
ALT SERPL W/O P-5'-P-CCNC: 18 U/L (ref 10–44)
ANION GAP SERPL CALC-SCNC: 10 MMOL/L (ref 8–16)
AST SERPL-CCNC: 22 U/L (ref 10–40)
BASOPHILS # BLD AUTO: 0.09 K/UL (ref 0–0.2)
BASOPHILS NFR BLD: 1.2 % (ref 0–1.9)
BILIRUB SERPL-MCNC: 1 MG/DL (ref 0.1–1)
BUN SERPL-MCNC: 20 MG/DL (ref 8–23)
CALCIUM SERPL-MCNC: 9.4 MG/DL (ref 8.7–10.5)
CHLORIDE SERPL-SCNC: 102 MMOL/L (ref 95–110)
CHOLEST SERPL-MCNC: 131 MG/DL (ref 120–199)
CHOLEST/HDLC SERPL: 3.4 {RATIO} (ref 2–5)
CO2 SERPL-SCNC: 26 MMOL/L (ref 23–29)
CREAT SERPL-MCNC: 1.4 MG/DL (ref 0.5–1.4)
DIFFERENTIAL METHOD: ABNORMAL
EOSINOPHIL # BLD AUTO: 0.3 K/UL (ref 0–0.5)
EOSINOPHIL NFR BLD: 4.2 % (ref 0–8)
ERYTHROCYTE [DISTWIDTH] IN BLOOD BY AUTOMATED COUNT: 12.6 % (ref 11.5–14.5)
EST. GFR  (AFRICAN AMERICAN): 56.4 ML/MIN/1.73 M^2
EST. GFR  (NON AFRICAN AMERICAN): 48.8 ML/MIN/1.73 M^2
GLUCOSE SERPL-MCNC: 120 MG/DL (ref 70–110)
HCT VFR BLD AUTO: 41.3 % (ref 40–54)
HDLC SERPL-MCNC: 39 MG/DL (ref 40–75)
HDLC SERPL: 29.8 % (ref 20–50)
HGB BLD-MCNC: 13.5 G/DL (ref 14–18)
IMM GRANULOCYTES # BLD AUTO: 0.13 K/UL (ref 0–0.04)
IMM GRANULOCYTES NFR BLD AUTO: 1.7 % (ref 0–0.5)
LDLC SERPL CALC-MCNC: 68.8 MG/DL (ref 63–159)
LYMPHOCYTES # BLD AUTO: 1.8 K/UL (ref 1–4.8)
LYMPHOCYTES NFR BLD: 23.4 % (ref 18–48)
MCH RBC QN AUTO: 32.6 PG (ref 27–31)
MCHC RBC AUTO-ENTMCNC: 32.7 G/DL (ref 32–36)
MCV RBC AUTO: 100 FL (ref 82–98)
MONOCYTES # BLD AUTO: 0.8 K/UL (ref 0.3–1)
MONOCYTES NFR BLD: 10.4 % (ref 4–15)
NEUTROPHILS # BLD AUTO: 4.6 K/UL (ref 1.8–7.7)
NEUTROPHILS NFR BLD: 59.1 % (ref 38–73)
NONHDLC SERPL-MCNC: 92 MG/DL
NRBC BLD-RTO: 0 /100 WBC
PLATELET # BLD AUTO: 219 K/UL (ref 150–350)
PMV BLD AUTO: 10.2 FL (ref 9.2–12.9)
POTASSIUM SERPL-SCNC: 3.9 MMOL/L (ref 3.5–5.1)
PROT SERPL-MCNC: 7.8 G/DL (ref 6–8.4)
RBC # BLD AUTO: 4.14 M/UL (ref 4.6–6.2)
SODIUM SERPL-SCNC: 138 MMOL/L (ref 136–145)
TRIGL SERPL-MCNC: 116 MG/DL (ref 30–150)
WBC # BLD AUTO: 7.78 K/UL (ref 3.9–12.7)

## 2020-03-16 PROCEDURE — 36415 COLL VENOUS BLD VENIPUNCTURE: CPT | Mod: PO

## 2020-03-16 PROCEDURE — 80061 LIPID PANEL: CPT

## 2020-03-16 PROCEDURE — 80053 COMPREHEN METABOLIC PANEL: CPT

## 2020-03-16 PROCEDURE — 85025 COMPLETE CBC W/AUTO DIFF WBC: CPT

## 2020-03-18 ENCOUNTER — INFUSION (OUTPATIENT)
Dept: INFUSION THERAPY | Facility: HOSPITAL | Age: 75
End: 2020-03-18
Attending: INTERNAL MEDICINE
Payer: MEDICARE

## 2020-03-18 VITALS
SYSTOLIC BLOOD PRESSURE: 134 MMHG | OXYGEN SATURATION: 96 % | DIASTOLIC BLOOD PRESSURE: 69 MMHG | RESPIRATION RATE: 16 BRPM | TEMPERATURE: 97 F | HEART RATE: 71 BPM

## 2020-03-18 DIAGNOSIS — C7A.8 NEUROENDOCRINE CARCINOMA METASTATIC TO INTRA-ABDOMINAL LYMPH NODE: Primary | ICD-10-CM

## 2020-03-18 DIAGNOSIS — C7B.8 NEUROENDOCRINE CARCINOMA METASTATIC TO INTRA-ABDOMINAL LYMPH NODE: Primary | ICD-10-CM

## 2020-03-18 PROCEDURE — 96372 THER/PROPH/DIAG INJ SC/IM: CPT

## 2020-03-18 PROCEDURE — 63600175 PHARM REV CODE 636 W HCPCS: Mod: JG | Performed by: INTERNAL MEDICINE

## 2020-03-18 RX ORDER — LANREOTIDE ACETATE 120 MG/.5ML
120 INJECTION SUBCUTANEOUS
Status: COMPLETED | OUTPATIENT
Start: 2020-03-18 | End: 2020-03-18

## 2020-03-18 RX ADMIN — LANREOTIDE ACETATE 120 MG: 120 INJECTION SUBCUTANEOUS at 01:03

## 2020-03-18 NOTE — PLAN OF CARE
Problem: Fatigue  Goal: Improved Activity Tolerance  Outcome: Ongoing, Progressing  Intervention: Promote Energy Conservation  Flowsheets (Taken 3/18/2020 1326)  Fatigue Management: frequent rest breaks encouraged  Sleep/Rest Enhancement: regular sleep/rest pattern promoted  Activity Management: activity encouraged

## 2020-03-20 DIAGNOSIS — N28.9 ACUTE KIDNEY INSUFFICIENCY: Primary | ICD-10-CM

## 2020-04-08 ENCOUNTER — OFFICE VISIT (OUTPATIENT)
Dept: HEMATOLOGY/ONCOLOGY | Facility: CLINIC | Age: 75
End: 2020-04-08
Payer: MEDICARE

## 2020-04-08 DIAGNOSIS — C7B.8 NEUROENDOCRINE CARCINOMA METASTATIC TO INTRA-ABDOMINAL LYMPH NODE: ICD-10-CM

## 2020-04-08 DIAGNOSIS — C7A.8 NEUROENDOCRINE CARCINOMA METASTATIC TO INTRA-ABDOMINAL LYMPH NODE: ICD-10-CM

## 2020-04-08 DIAGNOSIS — D50.8 IRON DEFICIENCY ANEMIA SECONDARY TO INADEQUATE DIETARY IRON INTAKE: ICD-10-CM

## 2020-04-08 PROCEDURE — 99441 PR PHYSICIAN TELEPHONE EVALUATION 5-10 MIN: CPT | Mod: 95,,, | Performed by: INTERNAL MEDICINE

## 2020-04-08 PROCEDURE — 99441 PR PHYSICIAN TELEPHONE EVALUATION 5-10 MIN: ICD-10-PCS | Mod: 95,,, | Performed by: INTERNAL MEDICINE

## 2020-04-08 NOTE — ASSESSMENT & PLAN NOTE
Hb is stable at this time.  Will continue to monitor with labs and discussed this today.  Recheck this again in three months.

## 2020-04-08 NOTE — PROGRESS NOTES
Subjective:       Patient ID: Roosevelt Alejandro is a 74 y.o. male.    Chief Complaint: leading to consultation is: neuroendocrine carcinoma follow up.     HPI:  Patient returns today for a regularly scheduled follow-up visit.  He is doing well today with no new complaints.     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     Patient underwent surgery 11/4/2019 with Dr. Khalil.  Partial path report:     FINAL PATHOLOGIC DIAGNOSIS     SYNOPTIC REPORT  Procedure: Segmental resection, small intestine  Tumor Site: Small intestine, terminal ileum  Tumor Size: Greatest dimension (centimeters): 1.8 cm  Tumor Focality: Unifocal  Histologic Type and Grade G1: Well-differentiated neuroendocrine tumor  Mitotic Rate: 1.5 mitoses / 10 HPF  Ki-67 Labeling Index: Specify Ki-67 percentage: about 1.1 %  Tumor Extension: Tumor at least invades through the muscularis propria into subserosal tissue  All margins are uninvolved by tumor: Margins examined: proximal, distal, and radial.  Lymphovascular Invasion: Present  Perineural Invasion: Present  Large Mesenteric Masses (>2 cm), not identified  Regional Lymph Nodes Examination (counting lymph nodes from all the 38 parts)  Number of Lymph Nodes Involved: 41  Number of Lymph Nodes Examined: 46  Pathologic Stage Classification (pTNM, AJCC 8th Edition)  Primary Tumor (pT) at least pT3: Invades through the muscularis propria into subserosal tissue  pN2: Extensive rigoberto deposits (12 or greater),  Distant Metastasis (pM) pM1b: Metastasis in at least one extrahepatic site (nonregional lymph node: Aortocaval  node. right inferior aorta node)  NET panel  Primary small bowel tumor (Block 18D)  Ki-67: positive, approximately 1.1% cells staining  Chromogranin: positive, 3+, 100%  Synaptophysin:  positive, 3+, 100%  CD31: 12 / HPF  TTF: not applicable  Lymph node metastasis (Block 38A)  Ki-67: positive, approximately 6.5 % cells staining        CT abdomen 7/15/2019:  Mesenteric and retroperitoneal lymphadenopathy, with numerous necrotic appearing lymph node is within the central mesentery.  Some of these nodes have mildly increased in size, while the largest previously present node has significantly decreased in size as above.  Further workup for neoplastic process such as metastatic disease or lymphoma is recommended.    Normal appendix.  Moderate diverticulosis.  Small hiatal hernia.  Hepatic and renal cysts.  Atherosclerosis.  Evidence for prior asbestos exposure.    All medications and past medical and surgical history have been reviewed.     The patient location is: Home  Visit type: Virtual visit with audio only due to covid crisis.     Review of patient's allergies indicates:   Allergen Reactions    Epinephrine      Patient on Sandostatin and Epinephrine contraindicated    Aspirin Other (See Comments)     Internal bleeding    Lisinopril (bulk) Rash    Sulfa (sulfonamide antibiotics) Swelling and Rash       ROS  GEN:   normal without any fever, night sweats or weight loss  HEENT:  normal with no HA's,  changes in vision  CV:   normal with no CP, SOB  PULM:  normal with no SOB, cough  GI:   normal with no abdominal pain, nausea, vomiting  :   normal with no hematuria, dysuria  SKIN:   normal with no rash      Previous FAMHX and SOCHX information reviewed and remains unchanged         Objective:        Physical Exam  Deferred          All lab results and imaging results have been reviewed and discussed with the patient  No results found for this or any previous visit (from the past 336 hour(s)).  CMP  Sodium   Date Value Ref Range Status   03/16/2020 138 136 - 145 mmol/L Final     Potassium   Date Value Ref Range Status   03/16/2020 3.9 3.5 - 5.1 mmol/L Final     Chloride   Date Value Ref Range  Status   03/16/2020 102 95 - 110 mmol/L Final     CO2   Date Value Ref Range Status   03/16/2020 26 23 - 29 mmol/L Final     Glucose   Date Value Ref Range Status   03/16/2020 120 (H) 70 - 110 mg/dL Final     BUN, Bld   Date Value Ref Range Status   03/16/2020 20 8 - 23 mg/dL Final     Creatinine   Date Value Ref Range Status   03/16/2020 1.4 0.5 - 1.4 mg/dL Final     Calcium   Date Value Ref Range Status   03/16/2020 9.4 8.7 - 10.5 mg/dL Final     Total Protein   Date Value Ref Range Status   03/16/2020 7.8 6.0 - 8.4 g/dL Final     Albumin   Date Value Ref Range Status   03/16/2020 4.4 3.5 - 5.2 g/dL Final     Total Bilirubin   Date Value Ref Range Status   03/16/2020 1.0 0.1 - 1.0 mg/dL Final     Comment:     For infants and newborns, interpretation of results should be based  on gestational age, weight and in agreement with clinical  observations.  Premature Infant recommended reference ranges:  Up to 24 hours.............<8.0 mg/dL  Up to 48 hours............<12.0 mg/dL  3-5 days..................<15.0 mg/dL  6-29 days.................<15.0 mg/dL       Alkaline Phosphatase   Date Value Ref Range Status   03/16/2020 113 55 - 135 U/L Final     AST   Date Value Ref Range Status   03/16/2020 22 10 - 40 U/L Final     ALT   Date Value Ref Range Status   03/16/2020 18 10 - 44 U/L Final     Anion Gap   Date Value Ref Range Status   03/16/2020 10 8 - 16 mmol/L Final     eGFR if    Date Value Ref Range Status   03/16/2020 56.4 (A) >60 mL/min/1.73 m^2 Final     eGFR if non    Date Value Ref Range Status   03/16/2020 48.8 (A) >60 mL/min/1.73 m^2 Final     Comment:     Calculation used to obtain the estimated glomerular filtration  rate (eGFR) is the CKD-EPI equation.          Total time spent with patient: 7 minutes.   Assessment:      1. 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 Alejandro is doing well at this time and continues therapy with lanreotide.  Labs look good and we discussed the path moving forward.  He will have scans again in three months and will continue current care until that time.  I will see him again in the end of July to see how things are going and he will also see Dr. Khalil.           Iron deficiency anemia secondary to inadequate dietary iron intake     Hb is stable at this time.  Will continue to monitor with labs and discussed this today.  Recheck this again in three months.          Relevant Orders    CBC auto differential    Ferritin    Comprehensive metabolic panel           Plan:   As Above in Assessment      The plan was discussed with the patient and all questions/concerns have been answered to the patient's satisfaction.          Thank you for allowing me to participate in this pleasant patient's care. Please call with any questions or concerns.

## 2020-04-08 NOTE — Clinical Note
Follow up at the end of July with labs. Make sure he is on schedule for his next lanreotide injection 4/15/2020

## 2020-04-08 NOTE — ASSESSMENT & PLAN NOTE
Mr. Alejandro is doing well at this time and continues therapy with lanreotide.  Labs look good and we discussed the path moving forward.  He will have scans again in three months and will continue current care until that time.  I will see him again in the end of July to see how things are going and he will also see Dr. Khalil.

## 2020-04-15 ENCOUNTER — INFUSION (OUTPATIENT)
Dept: INFUSION THERAPY | Facility: HOSPITAL | Age: 75
End: 2020-04-15
Attending: INTERNAL MEDICINE
Payer: MEDICARE

## 2020-04-15 VITALS
SYSTOLIC BLOOD PRESSURE: 150 MMHG | RESPIRATION RATE: 18 BRPM | HEART RATE: 85 BPM | TEMPERATURE: 98 F | WEIGHT: 212.69 LBS | BODY MASS INDEX: 27.29 KG/M2 | HEIGHT: 74 IN | DIASTOLIC BLOOD PRESSURE: 84 MMHG

## 2020-04-15 DIAGNOSIS — C7A.8 NEUROENDOCRINE CARCINOMA METASTATIC TO INTRA-ABDOMINAL LYMPH NODE: Primary | ICD-10-CM

## 2020-04-15 DIAGNOSIS — C7B.8 NEUROENDOCRINE CARCINOMA METASTATIC TO INTRA-ABDOMINAL LYMPH NODE: Primary | ICD-10-CM

## 2020-04-15 PROCEDURE — 96372 THER/PROPH/DIAG INJ SC/IM: CPT

## 2020-04-15 PROCEDURE — 63600175 PHARM REV CODE 636 W HCPCS: Mod: JG | Performed by: INTERNAL MEDICINE

## 2020-04-15 RX ORDER — LANREOTIDE ACETATE 120 MG/.5ML
120 INJECTION SUBCUTANEOUS
Status: COMPLETED | OUTPATIENT
Start: 2020-04-15 | End: 2020-04-15

## 2020-04-15 RX ADMIN — LANREOTIDE ACETATE 120 MG: 120 INJECTION SUBCUTANEOUS at 02:04

## 2020-04-15 NOTE — PLAN OF CARE
Problem: Fatigue  Goal: Improved Activity Tolerance  Outcome: Ongoing, Progressing  Intervention: Promote Energy Conservation  Flowsheets (Taken 4/15/2020 7640)  Fatigue Management: frequent rest breaks encouraged; paced activity encouraged  Activity Management: activity encouraged

## 2020-05-08 ENCOUNTER — LAB VISIT (OUTPATIENT)
Dept: INFUSION THERAPY | Facility: HOSPITAL | Age: 75
End: 2020-05-08
Attending: NURSE PRACTITIONER
Payer: MEDICARE

## 2020-05-08 DIAGNOSIS — Z03.818 ENCNTR FOR OBS FOR SUSP EXPSR TO OTH BIOLG AGENTS RULED OUT: Primary | ICD-10-CM

## 2020-05-08 DIAGNOSIS — Z03.818 ENCNTR FOR OBS FOR SUSP EXPSR TO OTH BIOLG AGENTS RULED OUT: ICD-10-CM

## 2020-05-08 PROCEDURE — U0002 COVID-19 LAB TEST NON-CDC: HCPCS

## 2020-05-09 LAB — SARS-COV-2 RNA RESP QL NAA+PROBE: NOT DETECTED

## 2020-05-13 ENCOUNTER — INFUSION (OUTPATIENT)
Dept: INFUSION THERAPY | Facility: HOSPITAL | Age: 75
End: 2020-05-13
Attending: INTERNAL MEDICINE
Payer: MEDICARE

## 2020-05-13 VITALS
DIASTOLIC BLOOD PRESSURE: 72 MMHG | RESPIRATION RATE: 18 BRPM | WEIGHT: 216.13 LBS | HEART RATE: 72 BPM | SYSTOLIC BLOOD PRESSURE: 125 MMHG | HEIGHT: 74 IN | BODY MASS INDEX: 27.74 KG/M2 | TEMPERATURE: 99 F

## 2020-05-13 DIAGNOSIS — C7B.8 NEUROENDOCRINE CARCINOMA METASTATIC TO INTRA-ABDOMINAL LYMPH NODE: Primary | ICD-10-CM

## 2020-05-13 DIAGNOSIS — C7A.8 NEUROENDOCRINE CARCINOMA METASTATIC TO INTRA-ABDOMINAL LYMPH NODE: Primary | ICD-10-CM

## 2020-05-13 PROCEDURE — 96372 THER/PROPH/DIAG INJ SC/IM: CPT

## 2020-05-13 PROCEDURE — 63600175 PHARM REV CODE 636 W HCPCS: Mod: JG | Performed by: INTERNAL MEDICINE

## 2020-05-13 RX ORDER — LANREOTIDE ACETATE 120 MG/.5ML
120 INJECTION SUBCUTANEOUS
Status: COMPLETED | OUTPATIENT
Start: 2020-05-13 | End: 2020-05-13

## 2020-05-13 RX ADMIN — LANREOTIDE ACETATE 120 MG: 120 INJECTION SUBCUTANEOUS at 02:05

## 2020-05-15 ENCOUNTER — TELEPHONE (OUTPATIENT)
Dept: FAMILY MEDICINE | Facility: CLINIC | Age: 75
End: 2020-05-15

## 2020-05-19 ENCOUNTER — OFFICE VISIT (OUTPATIENT)
Dept: FAMILY MEDICINE | Facility: CLINIC | Age: 75
End: 2020-05-19
Payer: MEDICARE

## 2020-05-19 VITALS
SYSTOLIC BLOOD PRESSURE: 132 MMHG | RESPIRATION RATE: 16 BRPM | OXYGEN SATURATION: 96 % | HEIGHT: 74 IN | BODY MASS INDEX: 27.45 KG/M2 | WEIGHT: 213.88 LBS | HEART RATE: 72 BPM | TEMPERATURE: 97 F | DIASTOLIC BLOOD PRESSURE: 78 MMHG

## 2020-05-19 DIAGNOSIS — Z00.00 PERIODIC HEALTH ASSESSMENT, GENERAL SCREENING, ADULT: ICD-10-CM

## 2020-05-19 DIAGNOSIS — Z02.6 ENCOUNTER FOR EXAMINATION FOR INSURANCE PURPOSES: Primary | ICD-10-CM

## 2020-05-19 DIAGNOSIS — H53.9 VISUAL CHANGES: ICD-10-CM

## 2020-05-19 PROCEDURE — 1126F AMNT PAIN NOTED NONE PRSNT: CPT | Mod: S$GLB,,, | Performed by: PHYSICIAN ASSISTANT

## 2020-05-19 PROCEDURE — 3075F PR MOST RECENT SYSTOLIC BLOOD PRESS GE 130-139MM HG: ICD-10-PCS | Mod: CPTII,S$GLB,, | Performed by: PHYSICIAN ASSISTANT

## 2020-05-19 PROCEDURE — 99214 PR OFFICE/OUTPT VISIT, EST, LEVL IV, 30-39 MIN: ICD-10-PCS | Mod: S$GLB,,, | Performed by: PHYSICIAN ASSISTANT

## 2020-05-19 PROCEDURE — 3078F PR MOST RECENT DIASTOLIC BLOOD PRESSURE < 80 MM HG: ICD-10-PCS | Mod: CPTII,S$GLB,, | Performed by: PHYSICIAN ASSISTANT

## 2020-05-19 PROCEDURE — 99999 PR PBB SHADOW E&M-EST. PATIENT-LVL III: CPT | Mod: PBBFAC,,, | Performed by: PHYSICIAN ASSISTANT

## 2020-05-19 PROCEDURE — 1159F MED LIST DOCD IN RCRD: CPT | Mod: S$GLB,,, | Performed by: PHYSICIAN ASSISTANT

## 2020-05-19 PROCEDURE — 3075F SYST BP GE 130 - 139MM HG: CPT | Mod: CPTII,S$GLB,, | Performed by: PHYSICIAN ASSISTANT

## 2020-05-19 PROCEDURE — 1101F PT FALLS ASSESS-DOCD LE1/YR: CPT | Mod: CPTII,S$GLB,, | Performed by: PHYSICIAN ASSISTANT

## 2020-05-19 PROCEDURE — 99999 PR PBB SHADOW E&M-EST. PATIENT-LVL III: ICD-10-PCS | Mod: PBBFAC,,, | Performed by: PHYSICIAN ASSISTANT

## 2020-05-19 PROCEDURE — 1126F PR PAIN SEVERITY QUANTIFIED, NO PAIN PRESENT: ICD-10-PCS | Mod: S$GLB,,, | Performed by: PHYSICIAN ASSISTANT

## 2020-05-19 PROCEDURE — 3078F DIAST BP <80 MM HG: CPT | Mod: CPTII,S$GLB,, | Performed by: PHYSICIAN ASSISTANT

## 2020-05-19 PROCEDURE — 99214 OFFICE O/P EST MOD 30 MIN: CPT | Mod: S$GLB,,, | Performed by: PHYSICIAN ASSISTANT

## 2020-05-19 PROCEDURE — 1159F PR MEDICATION LIST DOCUMENTED IN MEDICAL RECORD: ICD-10-PCS | Mod: S$GLB,,, | Performed by: PHYSICIAN ASSISTANT

## 2020-05-19 PROCEDURE — 1101F PR PT FALLS ASSESS DOC 0-1 FALLS W/OUT INJ PAST YR: ICD-10-PCS | Mod: CPTII,S$GLB,, | Performed by: PHYSICIAN ASSISTANT

## 2020-05-19 NOTE — PROGRESS NOTES
Subjective:       Patient ID: Roosevelt Alejandro is a 74 y.o. male.    Chief Complaint: Follow-up (paperwork filled out. no concerns. )    HPI   Pt. Needs yrly physical form for insurance completed  Pt. In for periodic exam  Pt. Feels well  No eye exam in yrs.  Review of Systems   Constitutional: Negative.  Negative for activity change, appetite change, chills, diaphoresis, fatigue, fever and unexpected weight change.   HENT: Negative.    Eyes: Positive for visual disturbance.   Respiratory: Negative.  Negative for cough and shortness of breath.    Cardiovascular: Negative.  Negative for chest pain and leg swelling.   Gastrointestinal: Negative.    Endocrine: Negative.    Genitourinary: Negative.    Musculoskeletal: Negative.    Skin: Negative.  Negative for rash.   Neurological: Negative.        Objective:      Physical Exam   Constitutional: He is oriented to person, place, and time. He appears well-developed and well-nourished. No distress.   HENT:   Head: Normocephalic and atraumatic.   Right Ear: External ear normal.   Left Ear: External ear normal.   Nose: Nose normal.   Mouth/Throat: Oropharynx is clear and moist. No oropharyngeal exudate.   Eyes: Pupils are equal, round, and reactive to light. Conjunctivae and EOM are normal. No scleral icterus.   VA   20 / 30 R  20 / 70 L  20 / 40 both   Neck: Normal range of motion. Neck supple. No tracheal deviation present. No thyromegaly present.   Carotids clear   Cardiovascular: Normal rate, regular rhythm, normal heart sounds and intact distal pulses. Exam reveals no gallop and no friction rub.   No murmur heard.  Pulmonary/Chest: Effort normal and breath sounds normal. No stridor. No respiratory distress. He has no wheezes. He has no rales.   Abdominal: Soft. Bowel sounds are normal. He exhibits no distension and no mass. There is no tenderness. There is no rebound and no guarding. No hernia.   No organomegaly   Musculoskeletal: He exhibits no edema.   Lymphadenopathy:      He has no cervical adenopathy.   Neurological: He is alert and oriented to person, place, and time.   Skin: Skin is warm and dry. No rash noted.   Psychiatric: He has a normal mood and affect. His behavior is normal. Judgment and thought content normal.   Vitals reviewed.      Assessment:       1. Encounter for examination for insurance purposes    2. Periodic health assessment, general screening, adult    3. Visual changes        Plan:       Max was seen today for follow-up.    Diagnoses and all orders for this visit:    Encounter for examination for insurance purposes    Periodic health assessment, general screening, adult    Visual changes    paperwork completed  Pt. Will make appt. With his eye Dr. persaud

## 2020-06-07 DIAGNOSIS — Z03.818 ENCNTR FOR OBS FOR SUSP EXPSR TO OTH BIOLG AGENTS RULED OUT: Primary | ICD-10-CM

## 2020-06-08 ENCOUNTER — LAB VISIT (OUTPATIENT)
Dept: INFUSION THERAPY | Facility: HOSPITAL | Age: 75
End: 2020-06-08
Attending: NURSE PRACTITIONER
Payer: MEDICARE

## 2020-06-08 DIAGNOSIS — Z03.818 ENCNTR FOR OBS FOR SUSP EXPSR TO OTH BIOLG AGENTS RULED OUT: ICD-10-CM

## 2020-06-08 PROCEDURE — U0003 INFECTIOUS AGENT DETECTION BY NUCLEIC ACID (DNA OR RNA); SEVERE ACUTE RESPIRATORY SYNDROME CORONAVIRUS 2 (SARS-COV-2) (CORONAVIRUS DISEASE [COVID-19]), AMPLIFIED PROBE TECHNIQUE, MAKING USE OF HIGH THROUGHPUT TECHNOLOGIES AS DESCRIBED BY CMS-2020-01-R: HCPCS

## 2020-06-09 LAB — SARS-COV-2 RNA RESP QL NAA+PROBE: NOT DETECTED

## 2020-06-10 ENCOUNTER — INFUSION (OUTPATIENT)
Dept: INFUSION THERAPY | Facility: HOSPITAL | Age: 75
End: 2020-06-10
Attending: INTERNAL MEDICINE
Payer: MEDICARE

## 2020-06-10 VITALS
BODY MASS INDEX: 27.51 KG/M2 | SYSTOLIC BLOOD PRESSURE: 135 MMHG | DIASTOLIC BLOOD PRESSURE: 71 MMHG | HEIGHT: 74 IN | RESPIRATION RATE: 18 BRPM | HEART RATE: 74 BPM | WEIGHT: 214.38 LBS | TEMPERATURE: 98 F

## 2020-06-10 DIAGNOSIS — C7B.8 NEUROENDOCRINE CARCINOMA METASTATIC TO INTRA-ABDOMINAL LYMPH NODE: Primary | ICD-10-CM

## 2020-06-10 DIAGNOSIS — C7A.8 NEUROENDOCRINE CARCINOMA METASTATIC TO INTRA-ABDOMINAL LYMPH NODE: Primary | ICD-10-CM

## 2020-06-10 PROCEDURE — 96372 THER/PROPH/DIAG INJ SC/IM: CPT

## 2020-06-10 PROCEDURE — 63600175 PHARM REV CODE 636 W HCPCS: Mod: JG | Performed by: INTERNAL MEDICINE

## 2020-06-10 RX ORDER — LANREOTIDE ACETATE 120 MG/.5ML
120 INJECTION SUBCUTANEOUS
Status: COMPLETED | OUTPATIENT
Start: 2020-06-10 | End: 2020-06-10

## 2020-06-10 RX ADMIN — LANREOTIDE ACETATE 120 MG: 120 INJECTION SUBCUTANEOUS at 02:06

## 2020-06-10 NOTE — PLAN OF CARE
Problem: Infection  Goal: Infection Symptom Resolution  Outcome: Ongoing, Progressing  Intervention: Prevent or Manage Infection  Flowsheets (Taken 6/10/2020 2559)  Infection Management: aseptic technique maintained  Isolation Precautions: protective environment maintained

## 2020-07-08 ENCOUNTER — INFUSION (OUTPATIENT)
Dept: INFUSION THERAPY | Facility: HOSPITAL | Age: 75
End: 2020-07-08
Attending: INTERNAL MEDICINE
Payer: MEDICARE

## 2020-07-08 VITALS
TEMPERATURE: 98 F | WEIGHT: 216.38 LBS | HEART RATE: 59 BPM | SYSTOLIC BLOOD PRESSURE: 132 MMHG | RESPIRATION RATE: 18 BRPM | DIASTOLIC BLOOD PRESSURE: 75 MMHG | BODY MASS INDEX: 27.78 KG/M2

## 2020-07-08 DIAGNOSIS — C7A.8 NEUROENDOCRINE CARCINOMA METASTATIC TO INTRA-ABDOMINAL LYMPH NODE: Primary | ICD-10-CM

## 2020-07-08 DIAGNOSIS — C7B.8 NEUROENDOCRINE CARCINOMA METASTATIC TO INTRA-ABDOMINAL LYMPH NODE: Primary | ICD-10-CM

## 2020-07-08 PROCEDURE — 63600175 PHARM REV CODE 636 W HCPCS: Mod: JG | Performed by: INTERNAL MEDICINE

## 2020-07-08 PROCEDURE — 96372 THER/PROPH/DIAG INJ SC/IM: CPT

## 2020-07-08 RX ORDER — LANREOTIDE ACETATE 120 MG/.5ML
120 INJECTION SUBCUTANEOUS
Status: COMPLETED | OUTPATIENT
Start: 2020-07-08 | End: 2020-07-08

## 2020-07-08 RX ADMIN — LANREOTIDE ACETATE 120 MG: 120 INJECTION SUBCUTANEOUS at 03:07

## 2020-07-13 DIAGNOSIS — I10 ESSENTIAL HYPERTENSION: ICD-10-CM

## 2020-07-13 DIAGNOSIS — R00.2 HEART PALPITATIONS: ICD-10-CM

## 2020-07-13 RX ORDER — ATORVASTATIN CALCIUM 20 MG/1
20 TABLET, FILM COATED ORAL DAILY
Qty: 90 TABLET | Refills: 3 | Status: SHIPPED | OUTPATIENT
Start: 2020-07-13 | End: 2021-04-28

## 2020-07-13 RX ORDER — NIFEDIPINE 30 MG/1
30 TABLET, EXTENDED RELEASE ORAL NIGHTLY
Qty: 90 TABLET | Refills: 3 | Status: SHIPPED | OUTPATIENT
Start: 2020-07-13 | End: 2021-04-28

## 2020-07-13 RX ORDER — METOPROLOL SUCCINATE 25 MG/1
25 TABLET, EXTENDED RELEASE ORAL DAILY
Qty: 90 TABLET | Refills: 3 | Status: SHIPPED | OUTPATIENT
Start: 2020-07-13 | End: 2021-04-28

## 2020-07-16 ENCOUNTER — HOSPITAL ENCOUNTER (OUTPATIENT)
Dept: RADIOLOGY | Facility: HOSPITAL | Age: 75
Discharge: HOME | End: 2020-07-16
Attending: SURGERY
Payer: MEDICARE

## 2020-07-16 DIAGNOSIS — C7A.8 NEUROENDOCRINE CARCINOMA METASTATIC TO INTRA-ABDOMINAL LYMPH NODE: ICD-10-CM

## 2020-07-16 DIAGNOSIS — C7B.8 NEUROENDOCRINE CARCINOMA METASTATIC TO INTRA-ABDOMINAL LYMPH NODE: ICD-10-CM

## 2020-07-16 LAB
CREAT SERPL-MCNC: 1.2 MG/DL (ref 0.5–1.4)
SAMPLE: NORMAL

## 2020-07-16 PROCEDURE — 78815 NM PET 68GA DOTATATE WHOLE BODY: ICD-10-PCS | Mod: 26,PS,, | Performed by: RADIOLOGY

## 2020-07-16 PROCEDURE — 78815 PET IMAGE W/CT SKULL-THIGH: CPT | Mod: 26,PS,, | Performed by: RADIOLOGY

## 2020-07-16 PROCEDURE — A9587 GALLIUM GA-68: HCPCS

## 2020-07-16 PROCEDURE — 74178 CT ABDOMEN PELVIS W WO CONTRAST: ICD-10-PCS | Mod: 26,,, | Performed by: RADIOLOGY

## 2020-07-16 PROCEDURE — 78815 PET IMAGE W/CT SKULL-THIGH: CPT | Mod: TC,PS

## 2020-07-16 PROCEDURE — 25500020 PHARM REV CODE 255: Performed by: SURGERY

## 2020-07-16 PROCEDURE — 74178 CT ABD&PLV WO CNTR FLWD CNTR: CPT | Mod: TC

## 2020-07-16 PROCEDURE — 74178 CT ABD&PLV WO CNTR FLWD CNTR: CPT | Mod: 26,,, | Performed by: RADIOLOGY

## 2020-07-16 RX ADMIN — IOHEXOL 1000 ML: 12 SOLUTION ORAL at 03:07

## 2020-07-16 RX ADMIN — IOHEXOL 100 ML: 350 INJECTION, SOLUTION INTRAVENOUS at 04:07

## 2020-07-17 ENCOUNTER — OFFICE VISIT (OUTPATIENT)
Dept: NEUROLOGY | Facility: HOSPITAL | Age: 75
End: 2020-07-17
Attending: SURGERY
Payer: MEDICARE

## 2020-07-17 VITALS
WEIGHT: 214.31 LBS | DIASTOLIC BLOOD PRESSURE: 78 MMHG | TEMPERATURE: 97 F | BODY MASS INDEX: 27.5 KG/M2 | SYSTOLIC BLOOD PRESSURE: 142 MMHG | HEIGHT: 74 IN | HEART RATE: 68 BPM

## 2020-07-17 DIAGNOSIS — C7B.8 NEUROENDOCRINE CARCINOMA METASTATIC TO INTRA-ABDOMINAL LYMPH NODE: ICD-10-CM

## 2020-07-17 DIAGNOSIS — C7A.012 MALIGNANT CARCINOID TUMOR OF ILEUM: Primary | ICD-10-CM

## 2020-07-17 DIAGNOSIS — C7A.8 NEUROENDOCRINE CARCINOMA METASTATIC TO INTRA-ABDOMINAL LYMPH NODE: ICD-10-CM

## 2020-07-17 PROCEDURE — 99214 OFFICE O/P EST MOD 30 MIN: CPT | Performed by: SURGERY

## 2020-07-17 NOTE — PROGRESS NOTES
"NOLANETS:  [facility]  A collaboration between [facility] and [facility]      PATIENT: Roosevelt Alejandro  MRN: [id number]  DATE: 7/17/2020    Subjective:      Chief Complaint: 6 week follow up    Vitals: Blood pressure (!) 142/78, pulse 68, temperature 97.4 °F (36.3 °C), temperature source Oral, height 6' 2" (1.88 m), weight 97.2 kg (214 lb 4.6 oz). --    ECOG Score: 0 - Asymptomatic    Diagnosis:   1. Malignant carcinoid tumor of ileum    2. 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  Still working for [employer].  Exercises regularly.  No symptoms.    Past history:  He presented with abdominal pain x 2 wks and passing of dark stools in which he attributed to pepto- bismol.    CT scan in 2019 showed lymphadenopathy and was compared to a CT from 12/2017. The reports states that one of the nodes decreased in size while one of the nodes grew. EUS with duodenal biopsy was performed. Abigail duodenal lymph node Biopsy was suggestive of a neuroendocrine tumor. Works full time [employer].    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    Vertigo, aural, unspecified laterality 3/6/2019       Past Surgical History:  Past Surgical History:   Procedure Laterality Date    CHOLECYSTECTOMY  11/4/2019    Procedure: CHOLECYSTECTOMY;  Surgeon: SP Gordillo " MD Simran;  Location: [facility] OR;  Service: General;;    COLONOSCOPY      2009    COLONOSCOPY N/A 1/21/2016    Procedure: COLONOSCOPY;  Surgeon: Toby Maciel MD;  Location: [facility] ENDO;  Service: Endoscopy;  Laterality: N/A;    COLONOSCOPY N/A 1/25/2019    Procedure: COLONOSCOPY;  Surgeon: Toby Maciel MD;  Location: [facility] ENDO;  Service: Endoscopy;  Laterality: N/A;    ENDOSCOPIC ULTRASOUND OF UPPER GASTROINTESTINAL TRACT N/A 8/20/2019    Procedure: ULTRASOUND, UPPER GI TRACT, ENDOSCOPIC;  Surgeon: Forest Lucio III, MD;  Location: [facility] ENDO;  Service: Endoscopy;  Laterality: N/A;    EYE SURGERY Bilateral     cataracts, retinal detachment    HYPERTHERMIC INTRAPERITONEAL CHEMOTHERAPY (HIPEC)  11/4/2019    Procedure: CHEMOTHERAPY, INTRAPERITONEAL, HYPERTHERMIC;  Surgeon: SP Khalil MD;  Location: [facility];  Service: General;;    LIVER BIOPSY  11/4/2019    Procedure: BIOPSY, LIVER;  Surgeon: SP Khalil MD;  Location: [facility] OR;  Service: General;;  BIOPSY LIVER WEDGE X3    PROSTATE BIOPSY      PROSTATECTOMY  12/2017    RENAL EXPLORATION  11/4/2019    Procedure: EXPLORATION, KIDNEY;  Surgeon: SP Khalil MD;  Location: [facility] OR;  Service: General;;    ROTATOR CUFF REPAIR      left    SURGICAL REMOVAL OF LYMPH NODE  11/4/2019    Procedure: EXCISION, LYMPH NODE;  Surgeon: SP Khalil MD;  Location: [facility];  Service: General;;  EXTENSIVE RETROPERITONEAL NODE DISSECTION  EXPLORATION OF MESENTERIC ARTERY/VEIN       Family History:  Family History   Problem Relation Age of Onset    Cancer Father         lung/ asbestos    Melanoma Neg Hx     Psoriasis Neg Hx     Lupus Neg Hx     Eczema Neg Hx        Allergies:  Epinephrine, Aspirin, Lisinopril (bulk), and Sulfa (sulfonamide antibiotics)    Medications:  Current Outpatient Medications   Medication Sig    atorvastatin (LIPITOR) 20 MG tablet Take 1 tablet (20 mg total) by mouth once daily.    FOLIC  ACID/MULTIVIT-MIN/LUTEIN (CENTRUM SILVER ORAL) Take by mouth.    metoprolol succinate (TOPROL-XL) 25 MG 24 hr tablet Take 1 tablet (25 mg total) by mouth once daily.    NIFEdipine (PROCARDIA-XL) 30 MG (OSM) 24 hr tablet Take 1 tablet (30 mg total) by mouth every evening.    pantoprazole (PROTONIX) 40 MG tablet Take 1 tablet (40 mg total) by mouth once daily.     No current facility-administered medications for this visit.         Review of Systems   Constitutional: Negative.  Negative for appetite change, chills, fatigue, fever and unexpected weight change.        Works out 3X/week, inversion, full time [employer]   HENT: Negative.  Negative for congestion, hearing loss and sore throat.    Eyes: Negative.  Negative for pain, discharge and itching.   Respiratory: Negative.  Negative for cough, chest tightness, shortness of breath and wheezing.    Cardiovascular: Negative.  Negative for chest pain, palpitations and leg swelling.   Gastrointestinal: Negative.  Negative for abdominal distention, abdominal pain, blood in stool, constipation, diarrhea, nausea and vomiting.   Endocrine: Negative.  Negative for cold intolerance, heat intolerance and polydipsia.   Genitourinary: Negative.  Negative for difficulty urinating, dysuria and flank pain.   Musculoskeletal: Negative.  Negative for arthralgias, joint swelling and myalgias.   Skin: Negative.  Negative for color change, pallor and rash.   Allergic/Immunologic: Negative.    Neurological: Negative.  Negative for dizziness, syncope and light-headedness.   Hematological: Negative.  Negative for adenopathy. Does not bruise/bleed easily.   Psychiatric/Behavioral: Negative.  Negative for agitation, confusion, dysphoric mood, hallucinations, sleep disturbance and suicidal ideas. The patient is not nervous/anxious.    All other systems reviewed and are negative.     Objective:      Physical Exam  Vitals signs and nursing note reviewed.   Constitutional:       General:  He is not in acute distress.     Appearance: He is well-developed. He is not diaphoretic.   HENT:      Head: Normocephalic and atraumatic.   Eyes:      General: No scleral icterus.     Pupils: Pupils are equal, round, and reactive to light.   Neck:      Musculoskeletal: Normal range of motion and neck supple.      Vascular: No JVD.      Trachea: No tracheal deviation.   Cardiovascular:      Rate and Rhythm: Normal rate and regular rhythm.   Pulmonary:      Effort: Pulmonary effort is normal. No respiratory distress.      Breath sounds: Normal breath sounds. No wheezing or rales.   Abdominal:      General: Bowel sounds are normal. There is no distension.      Palpations: Abdomen is soft. There is no mass.      Tenderness: There is no abdominal tenderness. There is no guarding or rebound.      Hernia: No hernia is present.   Musculoskeletal: Normal range of motion.   Skin:     General: Skin is warm and dry.      Coloration: Skin is not pale.      Findings: No erythema or rash.   Neurological:      Mental Status: He is alert and oriented to person, place, and time.      Deep Tendon Reflexes: Reflexes are normal and symmetric.   Psychiatric:         Behavior: Behavior normal.         Thought Content: Thought content normal.         Judgment: Judgment normal.        Assessment:       1. Malignant carcinoid tumor of ileum    2. 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  2 Mesenteric node #2, excision:  - One lymph node, positive for metastatic neuroendocrine tumor (1/1).  3 Mesenteric node #3, excision:  - Two lymph nodes, positive for metastatic neuroendocrine tumor (2/2).  4 Mesenteric node #4, excision:  - Two lymph nodes, positive for metastatic neuroendocrine tumor (2/2).  5 Mesenteric node #5, excision:  - One lymph node, positive for  metastatic neuroendocrine tumor (1/1).  6 Mesenteric node #6, excision:  - One benign lymph node (0/1).  7 Mesenteric node #7, excision:  - One necrotic lymph node, positive for metastatic neuroendocrine tumor (1/1).  8 Mesenteric node #8, excision:  - One necrotic lymph node, positive for metastatic neuroendocrine tumor (1/1).  9 Mesenteric node #9, excision:  - One necrotic lymph node, positive for metastatic neuroendocrine tumor (1/1).  10 Mesenteric node #10, excision:  - One necrotic lymph node, positive for metastatic neuroendocrine tumor (1/1).  - Immunostain for chromogranin (+) and positive control examined.  11 Mesenteric node #11, excision:  - Fibrous tissue, negative for malignancy.  - Immunostain for chromogranin (-) and positive control examined.  12 Mesenteric node #12, excision:  - Three lymph nodes, positive for metastatic neuroendocrine tumor (3/3).  13 Mesenteric node #13, excision:  - One necrotic lymph node, positive for metastatic neuroendocrine tumor (1/1).  14 Mesenteric node #14, excision:  - Fibrousfatty connective tissue, positive for metastatic neuroendocrine tumor.  15 Mesenteric node #15, excision:  - One necrotic lymph node, no viable tissue seen (0/1).  16 Mesenteric node #16, excision:  - One necrotic lymph node, positive for metastatic neuroendocrine tumor (1/1).  17 Mesenteric node #17, excision:  - Three necrotic lymph nodes, positive for metastatic neuroendocrine tumor (3/3).  18 Small Bowel, resection:  - Well-differentiated neuroendocrine tumor, WHO grade 1, 1.8 cm, at least pT3, pN2, pM1b.  - Ki-67 up to about 1.1 %.  - Mitotic rate: 1.5 mitoses / 10 HPF  - Distal, proximal resection margins and mesentery margin are negative.  - Remaining small bowel are unremarkable.  - Four lymph nodes are positive for metastatic neuroendocrine tumor (4/4).  - Tumor deposit in the fatty tissue seen.  - Perineural invasion and lymphovascular invasion identified.  - See Surgical Cancer Case  Summary below.  19 Additional basal mesenteric #1, excision:  - One necrotic lymph node, positive for metastatic neuroendocrine tumor (1/1).  - Fibrousfatty connective tissue, positive for metastatic neuroendocrine tumor.  20 Additional basal mesenteric #2, excision:  - Fibrousfatty connective tissue, positive for metastatic neuroendocrine tumor.  21 Additional basal mesenteric #3, excision:  - Positive for metastatic neuroendocrine tumor (1/1).  22 Additional basal mesenteric #4, excision:  - Benign fibrousfatty connective tissue.  23 Additional basal mesenteric #5, excision:  - Two necrotic lymph node, positive for metastatic neuroendocrine tumor (2/2).  24 Additional basal mesenteric #6, excision:  - Four lymph node, positive for metastatic neuroendocrine tumor (4/4).  25 Left retro aortic node, excision:  - One out of four lymph nodes is positive for metastatic neuroendocrine tumor (1/4).  26 Left melany renal node, excision:  - Two lymph nodes, positive for metastatic neuroendocrine tumor (2/2).  27 Retro caval node #1, excision:  - One lymph node, positive for metastatic neuroendocrine tumor (1/1).  28 Retro duodenal node, excision:  - One lymph node, positive for metastatic neuroendocrine tumor (1/1).  29 Retro caval node #2, excision:  - Ganglioneuroma.  30 Retro peritoneal node #1, excision:  - Ganglioneuroma.  31 Retro peritoneal node #2, excision:  - Ganglioneuroma.  32 Retro caval node #3, excision:  - One lymph node, positive for metastatic neuroendocrine tumor (1/1).  33 Aorto caval node, excision:  - Two lymph nodes, positive for metastatic neuroendocrine tumor (2/2).  34 Caural node, excision:  - One lymph node, positive for metastatic neuroendocrine tumor (1/1).  35 Gallbladder, cholecystectomy:  - Autolysis artifact and cholecystolithiasis.  36 Liver biopsy:  - Negative for neuroendocrine tumor.  - Immunostain for chromogranin (-) and positive control examined.  - Send for GI consult, the result will  be reported as supplement.  37 Liver biopsy #2:  - Negative for neuroendocrine tumor.  - Immunostain for chromogranin (-) and positive control examined.  - Send for GI consult, the result will be reported as supplement.  38 Right inferior aorta node, excision:  - One lymph node, positive for metastatic neuroendocrine tumor (1/1).    SMALL BOWEL NEUROENDOCRINE TUMOR  Procedure: Segmental resection, small intestine  Tumor Site: Small intestine, terminal ileum  Tumor Size: Greatest dimension (centimeters): 1.8 cm  Tumor Focality: Unifocal  Histologic Type and Grade G1: Well-differentiated neuroendocrine tumor  Mitotic Rate: 1.5 mitoses / 10 HPF  Ki-67 Labeling Index: Specify Ki-67 percentage: about 1.1 %  Tumor Extension: Tumor at least invades through the muscularis propria into subserosal tissue  All margins are uninvolved by tumor: Margins examined: proximal, distal, and radial.  Lymphovascular Invasion: Present  Perineural Invasion: Present  Large Mesenteric Masses (>2 cm), not identified  Regional Lymph Nodes Examination (counting lymph nodes from all the 38 parts)  Number of Lymph Nodes Involved: 41  Number of Lymph Nodes Examined: 46  Pathologic Stage Classification (pTNM, AJCC 8th Edition)  Primary Tumor (pT) at least pT3: Invades through the muscularis propria into subserosal tissue  pN2: Extensive rigoberto deposits (12 or greater),  Distant Metastasis (pM) pM1b: Metastasis in at least one extrahepatic site (nonregional lymph node: Aortocaval  node. right inferior aorta node)  NET panel  Primary small bowel tumor (Block 18D)  Ki-67: positive, approximately 1.1% cells staining  Chromogranin: positive, 3+, 100%  Synaptophysin: positive, 3+, 100%  CD31: 12 / HPF  TTF: not applicable  Lymph node metastasis (Block 38A)  Ki-67: positive, approximately 6.5 % cells staining  Chromogranin: positive, 3+, 100%  Synaptophysin: positive, 3+, 100%  CD31: 10 / HPF  TTF: not applicable  Immunostains performed with appropriate  positive and negative controls.      Lab Data:     Neuroendocrine Labs Latest Ref Rng & Units 12/18/2019   5 HIAA BLOOD Up to 22 ng/mL 21   EXT 5 HIAA BLOOD 0 - 22 ng/mL    EXT SEROTONIN 56 - 244 ng/mL    PANCREASTATIN 10 - 135 pg/mL 153   EXT PANCREASTATIN DIONNA 10 - 135 pg/mL    NEUROKININ A UP TO 40 pg/mL 22   EXT NEUROKININ A DIONNA 0 - 40 pg/mL    FOLATE 4.0 - 24.0 ng/ml    VITAMIN B12 210 - 950 pg/ml    TSH 0.400 - 4.000 uIU/mL    WBC 3.8 - 10.8 Thousand/uL 7.2   HGB 13.2 - 17.1 g/dL 12.3 (L)   HCT 38.5 - 50.0 % 36.8 (L)   PLATLETS 140 - 400 Thousand/uL 254   PT 9.0 - 12.5 sec    PTT 21.0 - 32.0 sec    INR 0.8 - 1.2    GLUCOSE 65 - 99 mg/dL 113 (H)   BUN 7 - 25 mg/dL 15   CREATININE 0.70 - 1.18 mg/dL 1.06    - 146 mmol/L 141   K 3.5 - 5.3 mmol/L 4.1   CHLORIDE 98 - 110 mmol/L 104   CO2 20 - 32 mmol/L 31   CALCIUM 8.6 - 10.3 mg/dL 9.9   PROTEIN, TOTAL 6.1 - 8.1 g/dL 7.2   PHOSPHORUS 2.7 - 4.5 mg/dL    ALBUMIN 3.6 - 5.1 g/dL 4.5   TOTAL BILIRUBIN 0.2 - 1.2 mg/dL 0.6   ALK PHOSPHATASE 40 - 115 U/L 89   SGOT (AST) 10 - 35 U/L 13   SGPT (ALT) 9 - 46 U/L 11   TRIGLYCERIDES 30 - 150 mg/dL    CHOLERSTEROL 120 - 199 mg/dL    HDL 40 - 75 mg/dL    LDL 63.0 - 159.0 mg/dL    MG 1.6 - 2.6 mg/dL    24 HR CREATININE CLEARANCE 0 - 400 mg/dl    HEMOGLOBIN A1C 4.0 - 5.6 %    Weight         PSA: Negative    PROP SCAN:                                                                                                                                                                                                         NM PET 68GA DOTATATE WHOLE BODY 1/6/20    CLINICAL HISTORY:  Other malignant neuroendocrine tumors    TECHNIQUE:  6.80 mCi of GA68 Dotatate was administered intravenously in the left hand. After an approximately 60 min distribution time, PET/CT images were acquired from the skull vertex to the mid thigh. Transmission images were acquired to correct for attenuation using a whole body low-dose CT scan without contrast  with the arms positioned above the head.    COMPARISON:  GA68 Dotatate PET-CT from 10/03/2019.    FINDINGS:  Quality of the study: Adequate.    Persistent radiotracer avid lymph nodes are again seen involving the mediastinum, mesentery, and retroperitoneum. There is overall decreased burden of somatostatin receptor avid disease in the mesentery and retroperitoneum status post recent tumor debulking surgery, multiple index lesions are no longer present.    Para-esophageal lymph node re-identified at the level of the left mainstem bronchus takeoff measuring 0.5 cm in short axis with SUV max of 7.5, previously measured 0.3 cm in short axis with SUV of 6.6.    Mesenteric node in the right hemiabdomen demonstrates SUV max 27.6 (axial fused image 197), previously with SUV max of 29.5.  Lesion is difficult to measure on noncontrast CT.    Left para-aortic lymph node measures 0.9 cm in short axis (axial series 2, image 197), previously measured 0.9 cm in short axis with SUV max 18.4.    Physiologic uptake of the tracer is seen within the pituitary, liver, spleen, kidneys, adrenal glands and bowel.    Incidental CT findings: Stable apical prominent centrilobular emphysematous changes.  Stable hepatic and renal cysts.  Persistent mesenteric haziness.  Postoperative changes noted status post small bowel resection, cholecystectomy, and TURP.  Multiple stable small bladder diverticuli.  Sigmoid diverticulosis without evidence of acute diverticulitis.  Small bilateral hydroceles.  Small bilateral fat containing inguinal hernias.      Impression       Persistent somatostatin receptor avid disease involving the mediastinum, mesentery, and retroperitoneum noting decreased burden of disease in the mesentery and retroperitoneum status post recent tumor debulking surgery.     CT CHEST ABDOMEN PELVIS W W/O CONTRAST (XPD) 1/6/20    CLINICAL HISTORY:  restage;Other malignant neuroendocrine tumors    TECHNIQUE:  Low dose axial images were  obtained from the thoracic inlet to the pubic symphysis following the administration of 100 cc of Omnipaque 350 intravenous contrast.  Oral contrast was also administered.  Sagittal and coronal reformats were provided.    COMPARISON:  CT abdomen and pelvis with contrast dated 07/15/2019 and PET-CT dated same day.  CT chest without contrast dated 10/21/2016.    FINDINGS:  Chest: Bilateral upper lobe predominant emphysema.  Peripheral lung interstitial changes with scattered, lower lobe predominance and few scattered micro nodules, technically indeterminate.  Peripheral calcified pleural plaques on the left (for exam series 4, image 301).    Structures at the base of the neck are unremarkable.  No pleural or pericardial effusion.  No intrathoracic or hilar adenopathy.  Nonenlarged node at the aortoesophageal recess measuring 4-5 mm corresponding with hypermetabolic node on corresponding PET (series 6, image 34).  The trachea and mainstem bronchi are clear.  Small hiatal hernia.  Coronary calcific atherosclerosis.    Abdomen and pelvis:    Similar distribution of several low-attenuation lesions throughout the hepatic parenchyma, most in keeping with cysts, though some too small for definitive characterization.  Hepatic vasculature is patent.  The gallbladder is absent.  The spleen, and pancreas appear normal.  Adrenal glands appear similar with stable nodular thickening on the left.  Kidneys enhance symmetrically without hydronephrosis.  Bilateral renal cysts.  Postoperative changes of the mesentery and retroperitoneum.  Associated low-density regions at these locations without corresponding PET avidity, suggestive of postoperative fluid collections (for reference at the right and left para-aortic regions measuring 2.1 and 2.7 cm respectively; series 6, image 102 and 125).  Several prominent mesenteric and retroperitoneal nodes, a few which demonstrate corresponding hypermetabolic uptake, for example series 6, image  114 and 117.    Few bladder wall diverticula..  Heterogeneous appearance of the central prostate, likely in keeping with prior TURP.  The GI tract demonstrates colonic diverticulosis.  The appendix is normal.  Small hiatal hernia.  Bilateral fat containing inguinal hernias.  Aortoiliac atherosclerosis.  No aggressive osseous lesion.  Multilevel spine DJD.        Impression       Nonenlarged aorto-esophageal node which corresponds to hypermetabolic focus on corresponding PET.  Otherwise, no additional convincing intrathoracic metastasis.    Partially calcified pleural plaques on the left as can be seen with asbestos related pleural plaque disease.    Peripheral lung interstitial changes with scattered, lower lobe predominance and few scattered micro nodules, technically indeterminate.  Continued attention at follow-up.    Postsurgical resection changes of the mesentery and retroperitoneum with suspected postoperative collections as above.  Continued attention at follow-up.    Residual prominent to mildly enlarged mesenteric and retroperitoneal nodes, some with corresponding PET avidity, compatible with residual metastatic disease.         Ga68 7/16/20:      Impression:  Stable, no progression, status post 95% cytoreduction.            Plan:       Continue Lanreotide 120 mg every 28 days  Carcinoid tumor markers q.3 months  Gallium scan CT abdomen pelvis, MRI every 6 months  RTC after restage        SP Khalil MD, FACS  Professor of Surgery, [facility]  Neuroendocrine Surgery, Hepatic/Pancreatic & General Surgery  [address]  [place]  [address]  ph. [phone]; [phone]  fax. [phone]

## 2020-07-17 NOTE — PATIENT INSTRUCTIONS
Tumor markers every 3 months- appt scheduled  Galium/ Ct in 6 months- appt scheduled  Return to clinic in 6 months- appt scheduled

## 2020-07-29 ENCOUNTER — OFFICE VISIT (OUTPATIENT)
Dept: HEMATOLOGY/ONCOLOGY | Facility: CLINIC | Age: 75
End: 2020-07-29
Payer: MEDICARE

## 2020-07-29 VITALS
RESPIRATION RATE: 19 BRPM | HEART RATE: 76 BPM | BODY MASS INDEX: 27.73 KG/M2 | DIASTOLIC BLOOD PRESSURE: 75 MMHG | TEMPERATURE: 98 F | SYSTOLIC BLOOD PRESSURE: 133 MMHG | WEIGHT: 216 LBS

## 2020-07-29 DIAGNOSIS — C7B.8 NEUROENDOCRINE CARCINOMA METASTATIC TO INTRA-ABDOMINAL LYMPH NODE: Primary | ICD-10-CM

## 2020-07-29 DIAGNOSIS — C7A.8 NEUROENDOCRINE CARCINOMA METASTATIC TO INTRA-ABDOMINAL LYMPH NODE: Primary | ICD-10-CM

## 2020-07-29 PROCEDURE — 3078F DIAST BP <80 MM HG: CPT | Mod: S$GLB,,, | Performed by: INTERNAL MEDICINE

## 2020-07-29 PROCEDURE — 1159F MED LIST DOCD IN RCRD: CPT | Mod: S$GLB,,, | Performed by: INTERNAL MEDICINE

## 2020-07-29 PROCEDURE — 1159F PR MEDICATION LIST DOCUMENTED IN MEDICAL RECORD: ICD-10-PCS | Mod: S$GLB,,, | Performed by: INTERNAL MEDICINE

## 2020-07-29 PROCEDURE — 3078F PR MOST RECENT DIASTOLIC BLOOD PRESSURE < 80 MM HG: ICD-10-PCS | Mod: S$GLB,,, | Performed by: INTERNAL MEDICINE

## 2020-07-29 PROCEDURE — 99213 PR OFFICE/OUTPT VISIT, EST, LEVL III, 20-29 MIN: ICD-10-PCS | Mod: S$GLB,,, | Performed by: INTERNAL MEDICINE

## 2020-07-29 PROCEDURE — 3075F PR MOST RECENT SYSTOLIC BLOOD PRESS GE 130-139MM HG: ICD-10-PCS | Mod: S$GLB,,, | Performed by: INTERNAL MEDICINE

## 2020-07-29 PROCEDURE — 3075F SYST BP GE 130 - 139MM HG: CPT | Mod: S$GLB,,, | Performed by: INTERNAL MEDICINE

## 2020-07-29 PROCEDURE — 1126F PR PAIN SEVERITY QUANTIFIED, NO PAIN PRESENT: ICD-10-PCS | Mod: S$GLB,,, | Performed by: INTERNAL MEDICINE

## 2020-07-29 PROCEDURE — 99213 OFFICE O/P EST LOW 20 MIN: CPT | Mod: S$GLB,,, | Performed by: INTERNAL MEDICINE

## 2020-07-29 PROCEDURE — 1126F AMNT PAIN NOTED NONE PRSNT: CPT | Mod: S$GLB,,, | Performed by: INTERNAL MEDICINE

## 2020-07-29 PROCEDURE — 1101F PR PT FALLS ASSESS DOC 0-1 FALLS W/OUT INJ PAST YR: ICD-10-PCS | Mod: S$GLB,,, | Performed by: INTERNAL MEDICINE

## 2020-07-29 PROCEDURE — 1101F PT FALLS ASSESS-DOCD LE1/YR: CPT | Mod: S$GLB,,, | Performed by: INTERNAL MEDICINE

## 2020-07-29 NOTE — ASSESSMENT & PLAN NOTE
Patient is doing well at this time and scans done this month show stable disease.  I will continue to see him on a four months basis and he continues to follow with Dr. duckworth who is planning dotatate pet with his next visit.      Continue Lanreotide as well.

## 2020-07-29 NOTE — PROGRESS NOTES
Subjective:       Patient ID: Roosevelt Alejandro is a 74 y.o. male.    Chief Complaint: leading to consultation is: neuroendocrine carcinoma follow up.     HPI:  Patient returns today for a regularly scheduled follow-up visit.      He is doing well today with no new complaints.     Patient has CT scan 7/16/2020 which shows stable disease.  Remains on lanreotide injections and is tolerating this well.      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     Patient underwent surgery 11/4/2019 with Dr. Khalil.  Partial path report:     FINAL PATHOLOGIC DIAGNOSIS     SYNOPTIC REPORT  Procedure: Segmental resection, small intestine  Tumor Site: Small intestine, terminal ileum  Tumor Size: Greatest dimension (centimeters): 1.8 cm  Tumor Focality: Unifocal  Histologic Type and Grade G1: Well-differentiated neuroendocrine tumor  Mitotic Rate: 1.5 mitoses / 10 HPF  Ki-67 Labeling Index: Specify Ki-67 percentage: about 1.1 %  Tumor Extension: Tumor at least invades through the muscularis propria into subserosal tissue  All margins are uninvolved by tumor: Margins examined: proximal, distal, and radial.  Lymphovascular Invasion: Present  Perineural Invasion: Present  Large Mesenteric Masses (>2 cm), not identified  Regional Lymph Nodes Examination (counting lymph nodes from all the 38 parts)  Number of Lymph Nodes Involved: 41  Number of Lymph Nodes Examined: 46  Pathologic Stage Classification (pTNM, AJCC 8th Edition)  Primary Tumor (pT) at least pT3: Invades through the muscularis propria into subserosal tissue  pN2: Extensive rigoberto deposits (12 or greater),  Distant Metastasis (pM) pM1b: Metastasis in at least one extrahepatic site (nonregional lymph node: Aortocaval  node. right inferior aorta node)  NET panel  Primary small  bowel tumor (Block 18D)  Ki-67: positive, approximately 1.1% cells staining  Chromogranin: positive, 3+, 100%  Synaptophysin: positive, 3+, 100%  CD31: 12 / HPF  TTF: not applicable  Lymph node metastasis (Block 38A)  Ki-67: positive, approximately 6.5 % cells staining        CT abdomen 7/15/2019:  Mesenteric and retroperitoneal lymphadenopathy, with numerous necrotic appearing lymph node is within the central mesentery.  Some of these nodes have mildly increased in size, while the largest previously present node has significantly decreased in size as above.  Further workup for neoplastic process such as metastatic disease or lymphoma is recommended.    Normal appendix.  Moderate diverticulosis.  Small hiatal hernia.  Hepatic and renal cysts.  Atherosclerosis.  Evidence for prior asbestos exposure.    All medications and past medical and surgical history have been reviewed.         Review of patient's allergies indicates:   Allergen Reactions    Epinephrine      Patient on Sandostatin and Epinephrine contraindicated    Aspirin Other (See Comments)     Internal bleeding    Lisinopril (bulk) Rash    Sulfa (sulfonamide antibiotics) Swelling and Rash       ROS  GEN:   normal without any fever, night sweats or weight loss  HEENT:  normal with no HA's,  changes in vision  CV:   normal with no CP, SOB  PULM:  normal with no SOB, cough  GI:   normal with no abdominal pain, nausea, vomiting  :   normal with no hematuria, dysuria  SKIN:   normal with no rash      Previous FAMHX and SOCHX information reviewed and remains unchanged         Objective:        Physical Exam  Deferred          All lab results and imaging results have been reviewed and discussed with the patient  Recent Results (from the past 336 hour(s))   CBC auto differential    Collection Time: 07/17/20  1:05 PM   Result Value Ref Range    WBC 6.9 3.8 - 10.8 Thousand/uL    Hemoglobin 13.2 13.2 - 17.1 g/dL    Hematocrit 39.3 38.5 - 50.0 %    Platelets 236  140 - 400 Thousand/uL     CMP  Sodium   Date Value Ref Range Status   07/17/2020 140 135 - 146 mmol/L Final     Potassium   Date Value Ref Range Status   07/17/2020 4.1 3.5 - 5.3 mmol/L Final     Chloride   Date Value Ref Range Status   07/17/2020 103 98 - 110 mmol/L Final     CO2   Date Value Ref Range Status   07/17/2020 30 20 - 32 mmol/L Final     Glucose   Date Value Ref Range Status   07/17/2020 117 (H) 65 - 99 mg/dL Final     Comment:                   Fasting reference interval     For someone without known diabetes, a glucose value  between 100 and 125 mg/dL is consistent with  prediabetes and should be confirmed with a  follow-up test.          BUN, Bld   Date Value Ref Range Status   07/17/2020 15 7 - 25 mg/dL Final     Creatinine   Date Value Ref Range Status   07/17/2020 1.18 0.70 - 1.18 mg/dL Final     Comment:     For patients >49 years of age, the reference limit  for Creatinine is approximately 13% higher for people  identified as -American.          Calcium   Date Value Ref Range Status   07/17/2020 9.4 8.6 - 10.3 mg/dL Final     Total Protein   Date Value Ref Range Status   07/17/2020 7.1 6.1 - 8.1 g/dL Final     Albumin   Date Value Ref Range Status   07/17/2020 4.5 3.6 - 5.1 g/dL Final     Total Bilirubin   Date Value Ref Range Status   07/17/2020 0.7 0.2 - 1.2 mg/dL Final     Alkaline Phosphatase   Date Value Ref Range Status   07/17/2020 95 35 - 144 U/L Final     AST   Date Value Ref Range Status   07/17/2020 17 10 - 35 U/L Final     ALT   Date Value Ref Range Status   07/17/2020 12 9 - 46 U/L Final     Anion Gap   Date Value Ref Range Status   03/16/2020 10 8 - 16 mmol/L Final     eGFR if    Date Value Ref Range Status   07/17/2020 70 > OR = 60 mL/min/1.73m2 Final     eGFR if non    Date Value Ref Range Status   07/17/2020 60 > OR = 60 mL/min/1.73m2 Final         Assessment:      1. 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  I will continue to see him on a four months basis and he continues to follow with Dr. duckworth who is planning dotatate pet with his next visit.      Continue Lanreotide as well.           Relevant Orders    CBC auto differential    Comprehensive metabolic panel           Plan:   As Above in Assessment      The plan was discussed with the patient and all questions/concerns have been answered to the patient's satisfaction.

## 2020-08-03 LAB
5-HIAA, PLASMA (NEUROEND): 10 NG/ML
ALBUMIN SERPL-MCNC: 4.5 G/DL (ref 3.6–5.1)
ALBUMIN/GLOB SERPL: 1.7 (CALC) (ref 1–2.5)
ALP SERPL-CCNC: 95 U/L (ref 35–144)
ALT SERPL-CCNC: 12 U/L (ref 9–46)
AST SERPL-CCNC: 17 U/L (ref 10–35)
BASOPHILS # BLD AUTO: 69 CELLS/UL (ref 0–200)
BASOPHILS NFR BLD AUTO: 1 %
BILIRUB SERPL-MCNC: 0.7 MG/DL (ref 0.2–1.2)
BUN SERPL-MCNC: 15 MG/DL (ref 7–25)
BUN/CREAT SERPL: ABNORMAL (CALC) (ref 6–22)
CALCIUM SERPL-MCNC: 9.4 MG/DL (ref 8.6–10.3)
CGA SERPL-MCNC: 76 NG/ML (ref 25–140)
CHLORIDE SERPL-SCNC: 103 MMOL/L (ref 98–110)
CO2 SERPL-SCNC: 30 MMOL/L (ref 20–32)
CREAT SERPL-MCNC: 1.18 MG/DL (ref 0.7–1.18)
EOSINOPHIL # BLD AUTO: 221 CELLS/UL (ref 15–500)
EOSINOPHIL NFR BLD AUTO: 3.2 %
ERYTHROCYTE [DISTWIDTH] IN BLOOD BY AUTOMATED COUNT: 12.4 % (ref 11–15)
GFRSERPLBLD MDRD-ARVRAT: 60 ML/MIN/1.73M2
GLOBULIN SER CALC-MCNC: 2.6 G/DL (CALC) (ref 1.9–3.7)
GLUCOSE SERPL-MCNC: 117 MG/DL (ref 65–99)
HCT VFR BLD AUTO: 39.3 % (ref 38.5–50)
HGB BLD-MCNC: 13.2 G/DL (ref 13.2–17.1)
LYMPHOCYTES # BLD AUTO: 1877 CELLS/UL (ref 850–3900)
LYMPHOCYTES NFR BLD AUTO: 27.2 %
MCH RBC QN AUTO: 33.8 PG (ref 27–33)
MCHC RBC AUTO-ENTMCNC: 33.6 G/DL (ref 32–36)
MCV RBC AUTO: 100.5 FL (ref 80–100)
MONOCYTES # BLD AUTO: 462 CELLS/UL (ref 200–950)
MONOCYTES NFR BLD AUTO: 6.7 %
NEUROKININ A: 10 PG/ML
NEUTROPHILS # BLD AUTO: 4271 CELLS/UL (ref 1500–7800)
NEUTROPHILS NFR BLD AUTO: 61.9 %
PANCREASTATIN: 121 PG/ML (ref 10–135)
PLATELET # BLD AUTO: 236 THOUSAND/UL (ref 140–400)
PMV BLD REES-ECKER: 11.4 FL (ref 7.5–12.5)
POTASSIUM SERPL-SCNC: 4.1 MMOL/L (ref 3.5–5.3)
PROT SERPL-MCNC: 7.1 G/DL (ref 6.1–8.1)
RBC # BLD AUTO: 3.91 MILLION/UL (ref 4.2–5.8)
SEROTONIN SER-MCNC: 144 NG/ML (ref 56–244)
SODIUM SERPL-SCNC: 140 MMOL/L (ref 135–146)
WBC # BLD AUTO: 6.9 THOUSAND/UL (ref 3.8–10.8)

## 2020-08-05 ENCOUNTER — INFUSION (OUTPATIENT)
Dept: INFUSION THERAPY | Facility: HOSPITAL | Age: 75
End: 2020-08-05
Attending: INTERNAL MEDICINE
Payer: MEDICARE

## 2020-08-05 VITALS
RESPIRATION RATE: 17 BRPM | HEART RATE: 82 BPM | WEIGHT: 213.31 LBS | DIASTOLIC BLOOD PRESSURE: 71 MMHG | TEMPERATURE: 98 F | HEIGHT: 74 IN | BODY MASS INDEX: 27.38 KG/M2 | SYSTOLIC BLOOD PRESSURE: 135 MMHG

## 2020-08-05 DIAGNOSIS — C7A.8 NEUROENDOCRINE CARCINOMA METASTATIC TO INTRA-ABDOMINAL LYMPH NODE: Primary | ICD-10-CM

## 2020-08-05 DIAGNOSIS — C7B.8 NEUROENDOCRINE CARCINOMA METASTATIC TO INTRA-ABDOMINAL LYMPH NODE: Primary | ICD-10-CM

## 2020-08-05 PROCEDURE — 63600175 PHARM REV CODE 636 W HCPCS: Mod: JG | Performed by: INTERNAL MEDICINE

## 2020-08-05 PROCEDURE — 96372 THER/PROPH/DIAG INJ SC/IM: CPT

## 2020-08-05 RX ORDER — LANREOTIDE ACETATE 120 MG/.5ML
120 INJECTION SUBCUTANEOUS
Status: COMPLETED | OUTPATIENT
Start: 2020-08-05 | End: 2020-08-05

## 2020-08-05 RX ADMIN — LANREOTIDE ACETATE 120 MG: 120 INJECTION SUBCUTANEOUS at 02:08

## 2020-08-19 ENCOUNTER — OFFICE VISIT (OUTPATIENT)
Dept: FAMILY MEDICINE | Facility: CLINIC | Age: 75
End: 2020-08-19
Payer: MEDICARE

## 2020-08-19 VITALS
WEIGHT: 212.94 LBS | RESPIRATION RATE: 12 BRPM | HEIGHT: 73 IN | DIASTOLIC BLOOD PRESSURE: 82 MMHG | BODY MASS INDEX: 28.22 KG/M2 | OXYGEN SATURATION: 96 % | SYSTOLIC BLOOD PRESSURE: 140 MMHG | HEART RATE: 63 BPM | TEMPERATURE: 98 F

## 2020-08-19 DIAGNOSIS — J61 PULMONARY ASBESTOSIS: ICD-10-CM

## 2020-08-19 DIAGNOSIS — C7B.8 MESENTERY METASTASIS OF NEUROENDOCRINE TUMOR: ICD-10-CM

## 2020-08-19 DIAGNOSIS — I10 ESSENTIAL HYPERTENSION: Primary | ICD-10-CM

## 2020-08-19 DIAGNOSIS — C7A.8 MESENTERY METASTASIS OF NEUROENDOCRINE TUMOR: ICD-10-CM

## 2020-08-19 DIAGNOSIS — N28.9 ACUTE KIDNEY INSUFFICIENCY: ICD-10-CM

## 2020-08-19 PROCEDURE — 1159F MED LIST DOCD IN RCRD: CPT | Mod: S$GLB,,, | Performed by: FAMILY MEDICINE

## 2020-08-19 PROCEDURE — 1126F AMNT PAIN NOTED NONE PRSNT: CPT | Mod: S$GLB,,, | Performed by: FAMILY MEDICINE

## 2020-08-19 PROCEDURE — 99213 PR OFFICE/OUTPT VISIT, EST, LEVL III, 20-29 MIN: ICD-10-PCS | Mod: S$GLB,,, | Performed by: FAMILY MEDICINE

## 2020-08-19 PROCEDURE — 1159F PR MEDICATION LIST DOCUMENTED IN MEDICAL RECORD: ICD-10-PCS | Mod: S$GLB,,, | Performed by: FAMILY MEDICINE

## 2020-08-19 PROCEDURE — 99999 PR PBB SHADOW E&M-EST. PATIENT-LVL IV: CPT | Mod: PBBFAC,,, | Performed by: FAMILY MEDICINE

## 2020-08-19 PROCEDURE — 3079F PR MOST RECENT DIASTOLIC BLOOD PRESSURE 80-89 MM HG: ICD-10-PCS | Mod: CPTII,S$GLB,, | Performed by: FAMILY MEDICINE

## 2020-08-19 PROCEDURE — 1101F PR PT FALLS ASSESS DOC 0-1 FALLS W/OUT INJ PAST YR: ICD-10-PCS | Mod: CPTII,S$GLB,, | Performed by: FAMILY MEDICINE

## 2020-08-19 PROCEDURE — 1101F PT FALLS ASSESS-DOCD LE1/YR: CPT | Mod: CPTII,S$GLB,, | Performed by: FAMILY MEDICINE

## 2020-08-19 PROCEDURE — 3077F PR MOST RECENT SYSTOLIC BLOOD PRESSURE >= 140 MM HG: ICD-10-PCS | Mod: CPTII,S$GLB,, | Performed by: FAMILY MEDICINE

## 2020-08-19 PROCEDURE — 3079F DIAST BP 80-89 MM HG: CPT | Mod: CPTII,S$GLB,, | Performed by: FAMILY MEDICINE

## 2020-08-19 PROCEDURE — 3077F SYST BP >= 140 MM HG: CPT | Mod: CPTII,S$GLB,, | Performed by: FAMILY MEDICINE

## 2020-08-19 PROCEDURE — 99999 PR PBB SHADOW E&M-EST. PATIENT-LVL IV: ICD-10-PCS | Mod: PBBFAC,,, | Performed by: FAMILY MEDICINE

## 2020-08-19 PROCEDURE — 99213 OFFICE O/P EST LOW 20 MIN: CPT | Mod: S$GLB,,, | Performed by: FAMILY MEDICINE

## 2020-08-19 PROCEDURE — 1126F PR PAIN SEVERITY QUANTIFIED, NO PAIN PRESENT: ICD-10-PCS | Mod: S$GLB,,, | Performed by: FAMILY MEDICINE

## 2020-08-19 NOTE — PATIENT INSTRUCTIONS
Controlling High Blood Pressure  High blood pressure (hypertension) is often called the silent killer. This is because many people who have it dont know it. High blood pressure is defined as 140/90 mm Hg or higher. Know your blood pressure and remember to check it regularly. Doing so can save your life. Here are some things you can do to help control your blood pressure.    Choose heart-healthy foods  · Select low-salt, low-fat foods. Limit sodium intake to 2,400 mg per day or the amount suggested by your healthcare provider.  · Limit canned, dried, cured, packaged, and fast foods. These can contain a lot of salt.  · Eat 8 to 10 servings of fruits and vegetables every day.  · Choose lean meats, fish, or chicken.  · Eat whole-grain pasta, brown rice, and beans.  · Eat 2 to 3 servings of low-fat or fat-free dairy products.  · Ask your doctor about the DASH eating plan. This plan helps reduce blood pressure.  · When you go to a restaurant, ask that your meal be prepared with no added salt.  Maintain a healthy weight  · Ask your healthcare provider how many calories to eat a day. Then stick to that number.  · Ask your healthcare provider what weight range is healthiest for you. If you are overweight, a weight loss of only 3% to 5% of your body weight can help lower blood pressure. Generally, a good weight loss goal is to lose 10% of your body weight in a year.  · Limit snacks and sweets.  · Get regular exercise.  Get up and get active  · Choose activities you enjoy. Find ones you can do with friends or family. This includes bicycling, dancing, walking, and jogging.  · Park farther away from building entrances.  · Use stairs instead of the elevator.  · When you can, walk or bike instead of driving.  · [place] leaves, garden, or do household repairs.  · Be active at a moderate to vigorous level of physical activity for at least 40 minutes for a minimum of 3 to 4 days a week.   Manage stress  · Make time to relax and enjoy  life. Find time to laugh.  · Communicate your concerns with your loved ones and your healthcare provider.  · Visit with family and friends, and keep up with hobbies.  Limit alcohol and quit smoking  · Men should have no more than 2 drinks per day.  · Women should have no more than 1 drink per day.  · Talk with your healthcare provider about quitting smoking. Smoking significantly increases your risk for heart disease and stroke. Ask your healthcare provider about community smoking cessation programs and other options.  Medicines  If lifestyle changes arent enough, your healthcare provider may prescribe high blood pressure medicine. Take all medicines as prescribed. If you have any questions about your medicines, ask your healthcare provider before stopping or changing them.   Date Last Reviewed: 4/27/2016  © 4028-6591 [employer]. [address] [place] [address], [place] [address]. All rights reserved. This information is not intended as a substitute for professional medical care. Always follow your healthcare professional's instructions.

## 2020-09-02 ENCOUNTER — INFUSION (OUTPATIENT)
Dept: INFUSION THERAPY | Facility: HOSPITAL | Age: 75
End: 2020-09-02
Attending: INTERNAL MEDICINE
Payer: MEDICARE

## 2020-09-02 VITALS
HEART RATE: 81 BPM | DIASTOLIC BLOOD PRESSURE: 76 MMHG | RESPIRATION RATE: 18 BRPM | HEIGHT: 74 IN | BODY MASS INDEX: 27.43 KG/M2 | TEMPERATURE: 97 F | SYSTOLIC BLOOD PRESSURE: 150 MMHG | WEIGHT: 213.75 LBS

## 2020-09-02 DIAGNOSIS — C7A.8 NEUROENDOCRINE CARCINOMA METASTATIC TO INTRA-ABDOMINAL LYMPH NODE: Primary | ICD-10-CM

## 2020-09-02 DIAGNOSIS — C7B.8 NEUROENDOCRINE CARCINOMA METASTATIC TO INTRA-ABDOMINAL LYMPH NODE: Primary | ICD-10-CM

## 2020-09-02 PROCEDURE — 96372 THER/PROPH/DIAG INJ SC/IM: CPT

## 2020-09-02 PROCEDURE — 63600175 PHARM REV CODE 636 W HCPCS: Mod: JG | Performed by: INTERNAL MEDICINE

## 2020-09-02 RX ORDER — LANREOTIDE ACETATE 120 MG/.5ML
120 INJECTION SUBCUTANEOUS
Status: COMPLETED | OUTPATIENT
Start: 2020-09-02 | End: 2020-09-02

## 2020-09-02 RX ADMIN — LANREOTIDE ACETATE 120 MG: 120 INJECTION SUBCUTANEOUS at 02:09

## 2020-09-21 NOTE — PROGRESS NOTES
Subjective:       Patient ID: Roosevelt Alejandro is a 74 y.o. male.    Chief Complaint: Annual Exam    Hypertension  This is a chronic problem. The current episode started more than 1 year ago. The problem has been resolved since onset. The problem is controlled. Pertinent negatives include no chest pain, headaches, palpitations or shortness of breath. There are no associated agents to hypertension. Risk factors for coronary artery disease include sedentary lifestyle, male gender and dyslipidemia. Past treatments include calcium channel blockers and beta blockers. The current treatment provides significant improvement. Compliance problems include exercise.  Hypertensive end-organ damage includes PVD.     Review of Systems   Constitutional: Negative for fatigue and unexpected weight change.   Respiratory: Negative for chest tightness and shortness of breath.    Cardiovascular: Negative for chest pain, palpitations and leg swelling.   Gastrointestinal: Negative for abdominal pain.   Musculoskeletal: Negative for arthralgias.   Neurological: Negative for dizziness, syncope, light-headedness and headaches.       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   Constitutional:        General: He is not in acute distress.     Appearance: He is well-developed. He is not diaphoretic.   HENT:      Head: Normocephalic and atraumatic.      Right Ear: External ear normal.      Left Ear: External ear normal.      Nose: Nose normal.      Mouth/Throat:      Pharynx: No oropharyngeal exudate.   Eyes:      General: No scleral icterus.        Right eye: No discharge.         Left eye: No discharge.      Conjunctiva/sclera: Conjunctivae normal.      Pupils: Pupils are equal, round, and reactive to light.   Neck:      Musculoskeletal: Normal range of motion and neck supple.      Thyroid: No thyromegaly.      Vascular: No JVD.      Trachea: No tracheal deviation.   Cardiovascular:      Rate and Rhythm: Normal rate.      Heart sounds: Normal heart sounds. No murmur.   Pulmonary:      Effort: Pulmonary effort is normal. No respiratory distress.      Breath sounds: Normal breath sounds. No wheezing or rales.   Abdominal:      General: Bowel sounds are normal. There is no distension.      Palpations: Abdomen is soft. There is no mass.      Tenderness: There is no abdominal tenderness. There is no guarding or rebound.   Musculoskeletal:         General: No tenderness.      Comments:        Lymphadenopathy:      Cervical: No cervical adenopathy.   Skin:     General: Skin is warm and dry.      Coloration: Skin is not pale.      Findings: No erythema or rash.   Neurological:      Mental Status: He is alert and oriented to person, place, and time.      Cranial Nerves: No cranial nerve deficit.      Coordination: Coordination normal.   Psychiatric:         Behavior: Behavior normal.         Thought Content: Thought content normal.         Judgment: Judgment normal.         Lab Results   Component Value Date    WBC 6.9 07/17/2020    HGB 13.2 07/17/2020    HCT 39.3 07/17/2020     07/17/2020    CHOL 131 03/16/2020    TRIG 116 03/16/2020    HDL 39 (L) 03/16/2020    ALT 12 07/17/2020    AST 17 07/17/2020    NA  140 07/17/2020    K 4.1 07/17/2020     07/17/2020    CREATININE 1.18 07/17/2020    BUN 15 07/17/2020    CO2 30 07/17/2020    TSH 1.245 02/24/2019    PSA 28.6 (H) 12/20/2017    INR 1.1 02/24/2019    GLUF 109 07/22/2004    HGBA1C 5.2 11/25/2019     The 10-year ASCVD risk score (Benedicto MEDINA Jr., et al., 2013) is: 34.1%    Values used to calculate the score:      Age: 74 years      Sex: Male      Is Non- : No      Diabetic: No      Tobacco smoker: No      Systolic Blood Pressure: 150 mmHg      Is BP treated: Yes      HDL Cholesterol: 39 mg/dL      Total Cholesterol: 131 mg/dL    Assessment:       1. Essential hypertension    2. Mesentery metastasis of neuroendocrine tumor    3. Pulmonary asbestosis    4. Acute kidney insufficiency        Plan:       Essential hypertension    Mesentery metastasis of neuroendocrine tumor    Pulmonary asbestosis    Acute kidney insufficiency      HT  The patient is asked to make an attempt to improve diet and exercise patterns to aid in medical management of this problem.    Discussed health maintenance guidelines appropriate for age.

## 2020-09-30 ENCOUNTER — INFUSION (OUTPATIENT)
Dept: INFUSION THERAPY | Facility: HOSPITAL | Age: 75
End: 2020-09-30
Attending: INTERNAL MEDICINE
Payer: MEDICARE

## 2020-09-30 VITALS
BODY MASS INDEX: 27.06 KG/M2 | DIASTOLIC BLOOD PRESSURE: 77 MMHG | OXYGEN SATURATION: 96 % | HEART RATE: 70 BPM | SYSTOLIC BLOOD PRESSURE: 150 MMHG | TEMPERATURE: 97 F | RESPIRATION RATE: 18 BRPM | HEIGHT: 74 IN | WEIGHT: 210.88 LBS

## 2020-09-30 DIAGNOSIS — C7A.8 NEUROENDOCRINE CARCINOMA METASTATIC TO INTRA-ABDOMINAL LYMPH NODE: Primary | ICD-10-CM

## 2020-09-30 DIAGNOSIS — C7B.8 NEUROENDOCRINE CARCINOMA METASTATIC TO INTRA-ABDOMINAL LYMPH NODE: Primary | ICD-10-CM

## 2020-09-30 PROCEDURE — 96402 CHEMO HORMON ANTINEOPL SQ/IM: CPT

## 2020-09-30 PROCEDURE — 96372 THER/PROPH/DIAG INJ SC/IM: CPT

## 2020-09-30 PROCEDURE — 63600175 PHARM REV CODE 636 W HCPCS: Mod: JG | Performed by: INTERNAL MEDICINE

## 2020-09-30 RX ORDER — LANREOTIDE ACETATE 120 MG/.5ML
120 INJECTION SUBCUTANEOUS
Status: COMPLETED | OUTPATIENT
Start: 2020-09-30 | End: 2020-09-30

## 2020-09-30 RX ORDER — LANREOTIDE ACETATE 120 MG/.5ML
120 INJECTION SUBCUTANEOUS
Status: CANCELLED | OUTPATIENT
Start: 2020-09-30

## 2020-09-30 RX ADMIN — LANREOTIDE ACETATE 120 MG: 120 INJECTION SUBCUTANEOUS at 03:09

## 2020-10-28 ENCOUNTER — INFUSION (OUTPATIENT)
Dept: INFUSION THERAPY | Facility: HOSPITAL | Age: 75
End: 2020-10-28
Attending: INTERNAL MEDICINE
Payer: MEDICARE

## 2020-10-28 VITALS
HEIGHT: 74 IN | DIASTOLIC BLOOD PRESSURE: 69 MMHG | OXYGEN SATURATION: 95 % | WEIGHT: 211.5 LBS | TEMPERATURE: 98 F | RESPIRATION RATE: 18 BRPM | HEART RATE: 88 BPM | BODY MASS INDEX: 27.14 KG/M2 | SYSTOLIC BLOOD PRESSURE: 129 MMHG

## 2020-10-28 DIAGNOSIS — C7A.8 NEUROENDOCRINE CARCINOMA METASTATIC TO INTRA-ABDOMINAL LYMPH NODE: Primary | ICD-10-CM

## 2020-10-28 DIAGNOSIS — C7B.8 NEUROENDOCRINE CARCINOMA METASTATIC TO INTRA-ABDOMINAL LYMPH NODE: Primary | ICD-10-CM

## 2020-10-28 PROCEDURE — 96372 THER/PROPH/DIAG INJ SC/IM: CPT

## 2020-10-28 PROCEDURE — 63600175 PHARM REV CODE 636 W HCPCS: Mod: JG | Performed by: INTERNAL MEDICINE

## 2020-10-28 RX ORDER — LANREOTIDE ACETATE 120 MG/.5ML
120 INJECTION SUBCUTANEOUS
Status: COMPLETED | OUTPATIENT
Start: 2020-10-28 | End: 2020-10-28

## 2020-10-28 RX ADMIN — LANREOTIDE ACETATE 120 MG: 120 INJECTION SUBCUTANEOUS at 08:10

## 2020-10-28 NOTE — PLAN OF CARE
Problem: Infection  Goal: Infection Symptom Resolution  Outcome: Ongoing, Progressing  Intervention: Prevent or Manage Infection  Flowsheets (Taken 10/28/2020 6088)  Infection Management: aseptic technique maintained  Isolation Precautions: protective environment maintained

## 2020-11-25 ENCOUNTER — INFUSION (OUTPATIENT)
Dept: INFUSION THERAPY | Facility: HOSPITAL | Age: 75
End: 2020-11-25
Attending: INTERNAL MEDICINE
Payer: MEDICARE

## 2020-11-25 VITALS
TEMPERATURE: 97 F | HEART RATE: 69 BPM | WEIGHT: 210.69 LBS | RESPIRATION RATE: 18 BRPM | BODY MASS INDEX: 27.04 KG/M2 | HEIGHT: 74 IN | SYSTOLIC BLOOD PRESSURE: 144 MMHG | OXYGEN SATURATION: 96 % | DIASTOLIC BLOOD PRESSURE: 80 MMHG

## 2020-11-25 DIAGNOSIS — C7B.8 NEUROENDOCRINE CARCINOMA METASTATIC TO INTRA-ABDOMINAL LYMPH NODE: Primary | ICD-10-CM

## 2020-11-25 DIAGNOSIS — C7A.8 NEUROENDOCRINE CARCINOMA METASTATIC TO INTRA-ABDOMINAL LYMPH NODE: Primary | ICD-10-CM

## 2020-11-25 PROCEDURE — 63600175 PHARM REV CODE 636 W HCPCS: Mod: JG | Performed by: INTERNAL MEDICINE

## 2020-11-25 PROCEDURE — 96372 THER/PROPH/DIAG INJ SC/IM: CPT

## 2020-11-25 RX ORDER — LANREOTIDE ACETATE 120 MG/.5ML
120 INJECTION SUBCUTANEOUS
Status: COMPLETED | OUTPATIENT
Start: 2020-11-25 | End: 2020-11-25

## 2020-11-25 RX ADMIN — LANREOTIDE ACETATE 120 MG: 120 INJECTION SUBCUTANEOUS at 03:11

## 2020-11-25 NOTE — PLAN OF CARE
Problem: Infection  Goal: Infection Symptom Resolution  Outcome: Ongoing, Progressing  Intervention: Prevent or Manage Infection  Flowsheets (Taken 11/25/2020 9389)  Infection Management: aseptic technique maintained  Isolation Precautions: protective environment maintained

## 2020-11-30 ENCOUNTER — OFFICE VISIT (OUTPATIENT)
Dept: HEMATOLOGY/ONCOLOGY | Facility: CLINIC | Age: 75
End: 2020-11-30
Payer: MEDICARE

## 2020-11-30 VITALS
TEMPERATURE: 98 F | DIASTOLIC BLOOD PRESSURE: 76 MMHG | BODY MASS INDEX: 27.68 KG/M2 | WEIGHT: 215.63 LBS | SYSTOLIC BLOOD PRESSURE: 146 MMHG | RESPIRATION RATE: 19 BRPM | HEART RATE: 80 BPM

## 2020-11-30 DIAGNOSIS — C7B.8 NEUROENDOCRINE CARCINOMA METASTATIC TO INTRA-ABDOMINAL LYMPH NODE: ICD-10-CM

## 2020-11-30 DIAGNOSIS — C7A.8 NEUROENDOCRINE CARCINOMA METASTATIC TO INTRA-ABDOMINAL LYMPH NODE: ICD-10-CM

## 2020-11-30 PROBLEM — C7A.00 MALIGNANT CARCINOID TUMOR OF UNKNOWN PRIMARY SITE: Status: RESOLVED | Noted: 2019-11-01 | Resolved: 2020-11-30

## 2020-11-30 PROCEDURE — 1159F PR MEDICATION LIST DOCUMENTED IN MEDICAL RECORD: ICD-10-PCS | Mod: S$GLB,,, | Performed by: INTERNAL MEDICINE

## 2020-11-30 PROCEDURE — 1101F PT FALLS ASSESS-DOCD LE1/YR: CPT | Mod: S$GLB,,, | Performed by: INTERNAL MEDICINE

## 2020-11-30 PROCEDURE — 99213 PR OFFICE/OUTPT VISIT, EST, LEVL III, 20-29 MIN: ICD-10-PCS | Mod: S$GLB,,, | Performed by: INTERNAL MEDICINE

## 2020-11-30 PROCEDURE — 1126F AMNT PAIN NOTED NONE PRSNT: CPT | Mod: S$GLB,,, | Performed by: INTERNAL MEDICINE

## 2020-11-30 PROCEDURE — 99213 OFFICE O/P EST LOW 20 MIN: CPT | Mod: S$GLB,,, | Performed by: INTERNAL MEDICINE

## 2020-11-30 PROCEDURE — 3288F FALL RISK ASSESSMENT DOCD: CPT | Mod: S$GLB,,, | Performed by: INTERNAL MEDICINE

## 2020-11-30 PROCEDURE — 1101F PR PT FALLS ASSESS DOC 0-1 FALLS W/OUT INJ PAST YR: ICD-10-PCS | Mod: S$GLB,,, | Performed by: INTERNAL MEDICINE

## 2020-11-30 PROCEDURE — 3077F SYST BP >= 140 MM HG: CPT | Mod: S$GLB,,, | Performed by: INTERNAL MEDICINE

## 2020-11-30 PROCEDURE — 3078F PR MOST RECENT DIASTOLIC BLOOD PRESSURE < 80 MM HG: ICD-10-PCS | Mod: S$GLB,,, | Performed by: INTERNAL MEDICINE

## 2020-11-30 PROCEDURE — 1126F PR PAIN SEVERITY QUANTIFIED, NO PAIN PRESENT: ICD-10-PCS | Mod: S$GLB,,, | Performed by: INTERNAL MEDICINE

## 2020-11-30 PROCEDURE — 3078F DIAST BP <80 MM HG: CPT | Mod: S$GLB,,, | Performed by: INTERNAL MEDICINE

## 2020-11-30 PROCEDURE — 3077F PR MOST RECENT SYSTOLIC BLOOD PRESSURE >= 140 MM HG: ICD-10-PCS | Mod: S$GLB,,, | Performed by: INTERNAL MEDICINE

## 2020-11-30 PROCEDURE — 1159F MED LIST DOCD IN RCRD: CPT | Mod: S$GLB,,, | Performed by: INTERNAL MEDICINE

## 2020-11-30 PROCEDURE — 3288F PR FALLS RISK ASSESSMENT DOCUMENTED: ICD-10-PCS | Mod: S$GLB,,, | Performed by: INTERNAL MEDICINE

## 2020-11-30 NOTE — PROGRESS NOTES
Subjective:       Patient ID: Roosevelt Alejandro is a 75 y.o. male.    Chief Complaint: leading to consultation is: neuroendocrine carcinoma follow up.     HPI:  Patient returns today for a regularly scheduled follow-up visit.      He is doing well today with no new complaints. He is working out with weights daily and overall has no new problems.        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     Patient underwent surgery 11/4/2019 with Dr. Khalil.  Partial path report:     FINAL PATHOLOGIC DIAGNOSIS     SYNOPTIC REPORT  Procedure: Segmental resection, small intestine  Tumor Site: Small intestine, terminal ileum  Tumor Size: Greatest dimension (centimeters): 1.8 cm  Tumor Focality: Unifocal  Histologic Type and Grade G1: Well-differentiated neuroendocrine tumor  Mitotic Rate: 1.5 mitoses / 10 HPF  Ki-67 Labeling Index: Specify Ki-67 percentage: about 1.1 %  Tumor Extension: Tumor at least invades through the muscularis propria into subserosal tissue  All margins are uninvolved by tumor: Margins examined: proximal, distal, and radial.  Lymphovascular Invasion: Present  Perineural Invasion: Present  Large Mesenteric Masses (>2 cm), not identified  Regional Lymph Nodes Examination (counting lymph nodes from all the 38 parts)  Number of Lymph Nodes Involved: 41  Number of Lymph Nodes Examined: 46  Pathologic Stage Classification (pTNM, AJCC 8th Edition)  Primary Tumor (pT) at least pT3: Invades through the muscularis propria into subserosal tissue  pN2: Extensive rigoberto deposits (12 or greater),  Distant Metastasis (pM) pM1b: Metastasis in at least one extrahepatic site (nonregional lymph node: Aortocaval  node. right inferior aorta node)  NET panel  Primary small bowel tumor (Block 18D)  Ki-67: positive,  approximately 1.1% cells staining  Chromogranin: positive, 3+, 100%  Synaptophysin: positive, 3+, 100%  CD31: 12 / HPF  TTF: not applicable  Lymph node metastasis (Block 38A)  Ki-67: positive, approximately 6.5 % cells staining        CT abdomen 7/15/2019:  Mesenteric and retroperitoneal lymphadenopathy, with numerous necrotic appearing lymph node is within the central mesentery.  Some of these nodes have mildly increased in size, while the largest previously present node has significantly decreased in size as above.  Further workup for neoplastic process such as metastatic disease or lymphoma is recommended.    Normal appendix.  Moderate diverticulosis.  Small hiatal hernia.  Hepatic and renal cysts.  Atherosclerosis.  Evidence for prior asbestos exposure.    All medications and past medical and surgical history have been reviewed.         Review of patient's allergies indicates:   Allergen Reactions    Epinephrine      Patient on Sandostatin and Epinephrine contraindicated    Aspirin Other (See Comments)     Internal bleeding    Lisinopril (bulk) Rash    Sulfa (sulfonamide antibiotics) Swelling and Rash       ROS  GEN:   normal without any fever, night sweats or weight loss  HEENT:  normal with no HA's,  changes in vision  CV:   normal with no CP, SOB  PULM:  normal with no SOB, cough  GI:   normal with no abdominal pain, nausea, vomiting  :   normal with no hematuria, dysuria  SKIN:   normal with no rash      Previous FAMHX and SOCHX information reviewed and remains unchanged         Objective:        Physical Exam  Deferred          All lab results and imaging results have been reviewed and discussed with the patient  No results found for this or any previous visit (from the past 336 hour(s)).  CMP  Sodium   Date Value Ref Range Status   07/17/2020 140 135 - 146 mmol/L Final     Potassium   Date Value Ref Range Status   07/17/2020 4.1 3.5 - 5.3 mmol/L Final     Chloride   Date Value Ref Range Status    07/17/2020 103 98 - 110 mmol/L Final     CO2   Date Value Ref Range Status   07/17/2020 30 20 - 32 mmol/L Final     Glucose   Date Value Ref Range Status   07/17/2020 117 (H) 65 - 99 mg/dL Final     Comment:                   Fasting reference interval     For someone without known diabetes, a glucose value  between 100 and 125 mg/dL is consistent with  prediabetes and should be confirmed with a  follow-up test.          BUN   Date Value Ref Range Status   07/17/2020 15 7 - 25 mg/dL Final     Creatinine   Date Value Ref Range Status   07/17/2020 1.18 0.70 - 1.18 mg/dL Final     Comment:     For patients >49 years of age, the reference limit  for Creatinine is approximately 13% higher for people  identified as -American.          Calcium   Date Value Ref Range Status   07/17/2020 9.4 8.6 - 10.3 mg/dL Final     Total Protein   Date Value Ref Range Status   07/17/2020 7.1 6.1 - 8.1 g/dL Final     Albumin   Date Value Ref Range Status   07/17/2020 4.5 3.6 - 5.1 g/dL Final     Total Bilirubin   Date Value Ref Range Status   07/17/2020 0.7 0.2 - 1.2 mg/dL Final     Alkaline Phosphatase   Date Value Ref Range Status   07/17/2020 95 35 - 144 U/L Final     AST   Date Value Ref Range Status   07/17/2020 17 10 - 35 U/L Final     ALT   Date Value Ref Range Status   07/17/2020 12 9 - 46 U/L Final     Anion Gap   Date Value Ref Range Status   03/16/2020 10 8 - 16 mmol/L Final     eGFR if    Date Value Ref Range Status   07/17/2020 70 > OR = 60 mL/min/1.73m2 Final     eGFR if non    Date Value Ref Range Status   07/17/2020 60 > OR = 60 mL/min/1.73m2 Final         Assessment:      1. 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  He has no new concerning signs or symptoms at this time and I feel  overall he seems to be doing well.  He will get PET scans in [facility] in Jan 2021 and will have him back with me after to see the outcome of this.  Discussed today.  Continue Lanreotide.                 Plan:   As Above in Assessment      The plan was discussed with the patient and all questions/concerns have been answered to the patient's satisfaction.

## 2020-11-30 NOTE — ASSESSMENT & PLAN NOTE
Patient continues on Lanreotide and is tolerating this well.  He has no new concerning signs or symptoms at this time and I feel overall he seems to be doing well.  He will get PET scans in [facility] in Jan 2021 and will have him back with me after to see the outcome of this.  Discussed today.  Continue Lanreotide.

## 2020-12-07 ENCOUNTER — PATIENT OUTREACH (OUTPATIENT)
Dept: ADMINISTRATIVE | Facility: HOSPITAL | Age: 75
End: 2020-12-07

## 2020-12-07 NOTE — PROGRESS NOTES
Chart review completed 2020.  Care Everywhere updates requested and reviewed.  Immunizations reconciled. Media reports reviewed.  Duplicate HM overrides and  orders removed.  Overdue HM topic chart audit and/or requested.  Overdue lab testing linked to upcoming lab appointments if applies.    [facility], and [employer] reviewed     Health Maintenance Due   Topic Date Due    Hepatitis C Screening  1945    Aspirin/Antiplatelet Therapy  1962    Shingles Vaccine (2 of 3) 2017

## 2020-12-22 RX ORDER — LANREOTIDE ACETATE 120 MG/.5ML
120 INJECTION SUBCUTANEOUS
Status: CANCELLED | OUTPATIENT
Start: 2020-12-23

## 2020-12-23 ENCOUNTER — INFUSION (OUTPATIENT)
Dept: INFUSION THERAPY | Facility: HOSPITAL | Age: 75
End: 2020-12-23
Attending: INTERNAL MEDICINE
Payer: MEDICARE

## 2020-12-23 VITALS
HEIGHT: 74 IN | BODY MASS INDEX: 27.95 KG/M2 | SYSTOLIC BLOOD PRESSURE: 149 MMHG | WEIGHT: 217.81 LBS | DIASTOLIC BLOOD PRESSURE: 80 MMHG | HEART RATE: 71 BPM | TEMPERATURE: 99 F | RESPIRATION RATE: 18 BRPM

## 2020-12-23 DIAGNOSIS — C7B.8 NEUROENDOCRINE CARCINOMA METASTATIC TO INTRA-ABDOMINAL LYMPH NODE: Primary | ICD-10-CM

## 2020-12-23 DIAGNOSIS — C7A.8 NEUROENDOCRINE CARCINOMA METASTATIC TO INTRA-ABDOMINAL LYMPH NODE: Primary | ICD-10-CM

## 2020-12-23 PROCEDURE — 63600175 PHARM REV CODE 636 W HCPCS: Mod: JG | Performed by: INTERNAL MEDICINE

## 2020-12-23 PROCEDURE — 96372 THER/PROPH/DIAG INJ SC/IM: CPT

## 2020-12-23 RX ORDER — LANREOTIDE ACETATE 120 MG/.5ML
120 INJECTION SUBCUTANEOUS
Status: COMPLETED | OUTPATIENT
Start: 2020-12-23 | End: 2020-12-23

## 2020-12-23 RX ADMIN — LANREOTIDE ACETATE 120 MG: 120 INJECTION SUBCUTANEOUS at 02:12

## 2021-01-05 ENCOUNTER — HOSPITAL ENCOUNTER (OUTPATIENT)
Dept: RADIOLOGY | Facility: HOSPITAL | Age: 76
Discharge: HOME | End: 2021-01-05
Attending: SURGERY
Payer: MEDICARE

## 2021-01-05 ENCOUNTER — OFFICE VISIT (OUTPATIENT)
Dept: NEUROLOGY | Facility: HOSPITAL | Age: 76
End: 2021-01-05
Attending: SURGERY
Payer: MEDICARE

## 2021-01-05 VITALS
TEMPERATURE: 98 F | DIASTOLIC BLOOD PRESSURE: 75 MMHG | BODY MASS INDEX: 27.33 KG/M2 | WEIGHT: 212.94 LBS | HEIGHT: 74 IN | HEART RATE: 74 BPM | SYSTOLIC BLOOD PRESSURE: 142 MMHG

## 2021-01-05 DIAGNOSIS — C7B.8 NEUROENDOCRINE CARCINOMA METASTATIC TO INTRA-ABDOMINAL LYMPH NODE: ICD-10-CM

## 2021-01-05 DIAGNOSIS — C7B.8 SECONDARY NEUROENDOCRINE TUMOR OF LIVER: ICD-10-CM

## 2021-01-05 DIAGNOSIS — C7A.8 NEUROENDOCRINE CARCINOMA METASTATIC TO INTRA-ABDOMINAL LYMPH NODE: ICD-10-CM

## 2021-01-05 DIAGNOSIS — C7A.8 NEUROENDOCRINE CARCINOMA METASTATIC TO INTRA-ABDOMINAL LYMPH NODE: Primary | ICD-10-CM

## 2021-01-05 DIAGNOSIS — C7A.012 MALIGNANT CARCINOID TUMOR OF ILEUM: ICD-10-CM

## 2021-01-05 DIAGNOSIS — C7B.8 NEUROENDOCRINE CARCINOMA METASTATIC TO INTRA-ABDOMINAL LYMPH NODE: Primary | ICD-10-CM

## 2021-01-05 PROCEDURE — 78815 NM PET 68GA DOTATATE WHOLE BODY: ICD-10-PCS | Mod: 26,PS,, | Performed by: RADIOLOGY

## 2021-01-05 PROCEDURE — 74178 CT ABDOMEN PELVIS W WO CONTRAST: ICD-10-PCS | Mod: 26,,, | Performed by: RADIOLOGY

## 2021-01-05 PROCEDURE — A9698 NON-RAD CONTRAST MATERIALNOC: HCPCS | Performed by: SURGERY

## 2021-01-05 PROCEDURE — 74178 CT ABD&PLV WO CNTR FLWD CNTR: CPT | Mod: 26,,, | Performed by: RADIOLOGY

## 2021-01-05 PROCEDURE — 99214 OFFICE O/P EST MOD 30 MIN: CPT | Mod: 25 | Performed by: SURGERY

## 2021-01-05 PROCEDURE — 25500020 PHARM REV CODE 255: Performed by: SURGERY

## 2021-01-05 PROCEDURE — 78815 PET IMAGE W/CT SKULL-THIGH: CPT | Mod: 26,PS,, | Performed by: RADIOLOGY

## 2021-01-05 PROCEDURE — 74178 CT ABD&PLV WO CNTR FLWD CNTR: CPT | Mod: TC

## 2021-01-05 PROCEDURE — 78815 PET IMAGE W/CT SKULL-THIGH: CPT | Mod: TC,PS

## 2021-01-05 RX ADMIN — IOHEXOL 100 ML: 350 INJECTION, SOLUTION INTRAVENOUS at 02:01

## 2021-01-05 RX ADMIN — IOHEXOL 1000 ML: 12 SOLUTION ORAL at 01:01

## 2021-01-20 ENCOUNTER — INFUSION (OUTPATIENT)
Dept: INFUSION THERAPY | Facility: HOSPITAL | Age: 76
End: 2021-01-20
Attending: INTERNAL MEDICINE
Payer: MEDICARE

## 2021-01-20 VITALS
WEIGHT: 214.81 LBS | BODY MASS INDEX: 27.57 KG/M2 | TEMPERATURE: 98 F | HEART RATE: 72 BPM | HEIGHT: 74 IN | DIASTOLIC BLOOD PRESSURE: 71 MMHG | SYSTOLIC BLOOD PRESSURE: 134 MMHG

## 2021-01-20 DIAGNOSIS — C7A.8 NEUROENDOCRINE CARCINOMA METASTATIC TO INTRA-ABDOMINAL LYMPH NODE: Primary | ICD-10-CM

## 2021-01-20 DIAGNOSIS — C7B.8 NEUROENDOCRINE CARCINOMA METASTATIC TO INTRA-ABDOMINAL LYMPH NODE: Primary | ICD-10-CM

## 2021-01-20 PROCEDURE — 96372 THER/PROPH/DIAG INJ SC/IM: CPT

## 2021-01-20 PROCEDURE — 63600175 PHARM REV CODE 636 W HCPCS: Mod: JG | Performed by: INTERNAL MEDICINE

## 2021-01-20 RX ORDER — LANREOTIDE ACETATE 120 MG/.5ML
120 INJECTION SUBCUTANEOUS
Status: COMPLETED | OUTPATIENT
Start: 2021-01-20 | End: 2021-01-20

## 2021-01-20 RX ADMIN — LANREOTIDE ACETATE 120 MG: 120 INJECTION SUBCUTANEOUS at 02:01

## 2021-01-22 ENCOUNTER — PATIENT MESSAGE (OUTPATIENT)
Dept: ADMINISTRATIVE | Facility: OTHER | Age: 76
End: 2021-01-22

## 2021-01-26 ENCOUNTER — OFFICE VISIT (OUTPATIENT)
Dept: HEMATOLOGY/ONCOLOGY | Facility: CLINIC | Age: 76
End: 2021-01-26
Payer: MEDICARE

## 2021-01-26 VITALS
WEIGHT: 214 LBS | BODY MASS INDEX: 27.46 KG/M2 | RESPIRATION RATE: 18 BRPM | SYSTOLIC BLOOD PRESSURE: 155 MMHG | HEART RATE: 78 BPM | DIASTOLIC BLOOD PRESSURE: 81 MMHG | HEIGHT: 74 IN

## 2021-01-26 DIAGNOSIS — C7A.8 NEUROENDOCRINE CARCINOMA METASTATIC TO INTRA-ABDOMINAL LYMPH NODE: ICD-10-CM

## 2021-01-26 DIAGNOSIS — C7B.8 NEUROENDOCRINE CARCINOMA METASTATIC TO INTRA-ABDOMINAL LYMPH NODE: ICD-10-CM

## 2021-01-26 PROCEDURE — 3288F PR FALLS RISK ASSESSMENT DOCUMENTED: ICD-10-PCS | Mod: S$GLB,,, | Performed by: INTERNAL MEDICINE

## 2021-01-26 PROCEDURE — 99213 PR OFFICE/OUTPT VISIT, EST, LEVL III, 20-29 MIN: ICD-10-PCS | Mod: S$GLB,,, | Performed by: INTERNAL MEDICINE

## 2021-01-26 PROCEDURE — 1126F AMNT PAIN NOTED NONE PRSNT: CPT | Mod: S$GLB,,, | Performed by: INTERNAL MEDICINE

## 2021-01-26 PROCEDURE — 1126F PR PAIN SEVERITY QUANTIFIED, NO PAIN PRESENT: ICD-10-PCS | Mod: S$GLB,,, | Performed by: INTERNAL MEDICINE

## 2021-01-26 PROCEDURE — 3288F FALL RISK ASSESSMENT DOCD: CPT | Mod: S$GLB,,, | Performed by: INTERNAL MEDICINE

## 2021-01-26 PROCEDURE — 3077F PR MOST RECENT SYSTOLIC BLOOD PRESSURE >= 140 MM HG: ICD-10-PCS | Mod: S$GLB,,, | Performed by: INTERNAL MEDICINE

## 2021-01-26 PROCEDURE — 3077F SYST BP >= 140 MM HG: CPT | Mod: S$GLB,,, | Performed by: INTERNAL MEDICINE

## 2021-01-26 PROCEDURE — 1159F PR MEDICATION LIST DOCUMENTED IN MEDICAL RECORD: ICD-10-PCS | Mod: S$GLB,,, | Performed by: INTERNAL MEDICINE

## 2021-01-26 PROCEDURE — 3079F PR MOST RECENT DIASTOLIC BLOOD PRESSURE 80-89 MM HG: ICD-10-PCS | Mod: S$GLB,,, | Performed by: INTERNAL MEDICINE

## 2021-01-26 PROCEDURE — 1101F PR PT FALLS ASSESS DOC 0-1 FALLS W/OUT INJ PAST YR: ICD-10-PCS | Mod: S$GLB,,, | Performed by: INTERNAL MEDICINE

## 2021-01-26 PROCEDURE — 3079F DIAST BP 80-89 MM HG: CPT | Mod: S$GLB,,, | Performed by: INTERNAL MEDICINE

## 2021-01-26 PROCEDURE — 1159F MED LIST DOCD IN RCRD: CPT | Mod: S$GLB,,, | Performed by: INTERNAL MEDICINE

## 2021-01-26 PROCEDURE — 99213 OFFICE O/P EST LOW 20 MIN: CPT | Mod: S$GLB,,, | Performed by: INTERNAL MEDICINE

## 2021-01-26 PROCEDURE — 1101F PT FALLS ASSESS-DOCD LE1/YR: CPT | Mod: S$GLB,,, | Performed by: INTERNAL MEDICINE

## 2021-02-17 ENCOUNTER — INFUSION (OUTPATIENT)
Dept: INFUSION THERAPY | Facility: HOSPITAL | Age: 76
End: 2021-02-17
Attending: INTERNAL MEDICINE
Payer: MEDICARE

## 2021-02-17 VITALS
HEART RATE: 73 BPM | HEIGHT: 74 IN | TEMPERATURE: 97 F | BODY MASS INDEX: 27.39 KG/M2 | SYSTOLIC BLOOD PRESSURE: 141 MMHG | RESPIRATION RATE: 20 BRPM | WEIGHT: 213.38 LBS | DIASTOLIC BLOOD PRESSURE: 70 MMHG

## 2021-02-17 DIAGNOSIS — C7B.8 NEUROENDOCRINE CARCINOMA METASTATIC TO INTRA-ABDOMINAL LYMPH NODE: Primary | ICD-10-CM

## 2021-02-17 DIAGNOSIS — C7A.8 NEUROENDOCRINE CARCINOMA METASTATIC TO INTRA-ABDOMINAL LYMPH NODE: Primary | ICD-10-CM

## 2021-02-17 PROCEDURE — 96372 THER/PROPH/DIAG INJ SC/IM: CPT

## 2021-02-17 PROCEDURE — 63600175 PHARM REV CODE 636 W HCPCS: Mod: JG | Performed by: INTERNAL MEDICINE

## 2021-02-17 RX ORDER — LANREOTIDE ACETATE 120 MG/.5ML
120 INJECTION SUBCUTANEOUS
Status: COMPLETED | OUTPATIENT
Start: 2021-02-17 | End: 2021-02-17

## 2021-02-17 RX ADMIN — LANREOTIDE ACETATE 120 MG: 120 INJECTION SUBCUTANEOUS at 02:02

## 2021-03-17 ENCOUNTER — INFUSION (OUTPATIENT)
Dept: INFUSION THERAPY | Facility: HOSPITAL | Age: 76
End: 2021-03-17
Attending: INTERNAL MEDICINE
Payer: MEDICARE

## 2021-03-17 VITALS
HEART RATE: 70 BPM | OXYGEN SATURATION: 98 % | BODY MASS INDEX: 27.21 KG/M2 | WEIGHT: 212 LBS | HEIGHT: 74 IN | RESPIRATION RATE: 18 BRPM | TEMPERATURE: 98 F | SYSTOLIC BLOOD PRESSURE: 132 MMHG | DIASTOLIC BLOOD PRESSURE: 75 MMHG

## 2021-03-17 DIAGNOSIS — C7A.8 NEUROENDOCRINE CARCINOMA METASTATIC TO INTRA-ABDOMINAL LYMPH NODE: Primary | ICD-10-CM

## 2021-03-17 DIAGNOSIS — C7B.8 NEUROENDOCRINE CARCINOMA METASTATIC TO INTRA-ABDOMINAL LYMPH NODE: Primary | ICD-10-CM

## 2021-03-17 PROCEDURE — 63600175 PHARM REV CODE 636 W HCPCS: Mod: JG | Performed by: INTERNAL MEDICINE

## 2021-03-17 PROCEDURE — 96372 THER/PROPH/DIAG INJ SC/IM: CPT

## 2021-03-17 RX ORDER — LANREOTIDE ACETATE 120 MG/.5ML
120 INJECTION SUBCUTANEOUS
Status: COMPLETED | OUTPATIENT
Start: 2021-03-17 | End: 2021-03-17

## 2021-03-17 RX ADMIN — LANREOTIDE ACETATE 120 MG: 120 INJECTION SUBCUTANEOUS at 07:03

## 2021-04-09 ENCOUNTER — LAB VISIT (OUTPATIENT)
Dept: LAB | Facility: HOSPITAL | Age: 76
End: 2021-04-09
Attending: SURGERY
Payer: MEDICARE

## 2021-04-09 DIAGNOSIS — C7B.8 NEUROENDOCRINE CARCINOMA METASTATIC TO INTRA-ABDOMINAL LYMPH NODE: ICD-10-CM

## 2021-04-09 DIAGNOSIS — C7B.8 SECONDARY NEUROENDOCRINE TUMOR OF LIVER: ICD-10-CM

## 2021-04-09 DIAGNOSIS — C7A.8 NEUROENDOCRINE CARCINOMA METASTATIC TO INTRA-ABDOMINAL LYMPH NODE: ICD-10-CM

## 2021-04-09 LAB
ALBUMIN SERPL BCP-MCNC: 4.2 G/DL (ref 3.5–5.2)
ALP SERPL-CCNC: 95 U/L (ref 55–135)
ALT SERPL W/O P-5'-P-CCNC: 17 U/L (ref 10–44)
ANION GAP SERPL CALC-SCNC: 8 MMOL/L (ref 8–16)
AST SERPL-CCNC: 18 U/L (ref 10–40)
BASOPHILS # BLD AUTO: 0.07 K/UL (ref 0–0.2)
BASOPHILS NFR BLD: 1 % (ref 0–1.9)
BILIRUB SERPL-MCNC: 0.8 MG/DL (ref 0.1–1)
BUN SERPL-MCNC: 16 MG/DL (ref 8–23)
CALCIUM SERPL-MCNC: 9 MG/DL (ref 8.7–10.5)
CHLORIDE SERPL-SCNC: 105 MMOL/L (ref 95–110)
CO2 SERPL-SCNC: 25 MMOL/L (ref 23–29)
CREAT SERPL-MCNC: 1.2 MG/DL (ref 0.5–1.4)
DIFFERENTIAL METHOD: ABNORMAL
EOSINOPHIL # BLD AUTO: 0.2 K/UL (ref 0–0.5)
EOSINOPHIL NFR BLD: 2.9 % (ref 0–8)
ERYTHROCYTE [DISTWIDTH] IN BLOOD BY AUTOMATED COUNT: 12.5 % (ref 11.5–14.5)
EST. GFR  (AFRICAN AMERICAN): >60 ML/MIN/1.73 M^2
EST. GFR  (NON AFRICAN AMERICAN): 58.4 ML/MIN/1.73 M^2
GLUCOSE SERPL-MCNC: 131 MG/DL (ref 70–110)
HCT VFR BLD AUTO: 39.2 % (ref 40–54)
HGB BLD-MCNC: 13.2 G/DL (ref 14–18)
IMM GRANULOCYTES # BLD AUTO: 0.06 K/UL (ref 0–0.04)
IMM GRANULOCYTES NFR BLD AUTO: 0.8 % (ref 0–0.5)
LYMPHOCYTES # BLD AUTO: 1.4 K/UL (ref 1–4.8)
LYMPHOCYTES NFR BLD: 20 % (ref 18–48)
MCH RBC QN AUTO: 33.3 PG (ref 27–31)
MCHC RBC AUTO-ENTMCNC: 33.7 G/DL (ref 32–36)
MCV RBC AUTO: 99 FL (ref 82–98)
MONOCYTES # BLD AUTO: 0.5 K/UL (ref 0.3–1)
MONOCYTES NFR BLD: 7.6 % (ref 4–15)
NEUTROPHILS # BLD AUTO: 4.8 K/UL (ref 1.8–7.7)
NEUTROPHILS NFR BLD: 67.7 % (ref 38–73)
NRBC BLD-RTO: 0 /100 WBC
PLATELET # BLD AUTO: 199 K/UL (ref 150–450)
PMV BLD AUTO: 10.3 FL (ref 9.2–12.9)
POTASSIUM SERPL-SCNC: 3.8 MMOL/L (ref 3.5–5.1)
PROT SERPL-MCNC: 7.3 G/DL (ref 6–8.4)
RBC # BLD AUTO: 3.96 M/UL (ref 4.6–6.2)
SODIUM SERPL-SCNC: 138 MMOL/L (ref 136–145)
WBC # BLD AUTO: 7.14 K/UL (ref 3.9–12.7)

## 2021-04-09 PROCEDURE — 83519 RIA NONANTIBODY: CPT | Performed by: SURGERY

## 2021-04-09 PROCEDURE — 85025 COMPLETE CBC W/AUTO DIFF WBC: CPT | Performed by: SURGERY

## 2021-04-09 PROCEDURE — 86316 IMMUNOASSAY TUMOR OTHER: CPT | Performed by: SURGERY

## 2021-04-09 PROCEDURE — 84260 ASSAY OF SEROTONIN: CPT | Performed by: SURGERY

## 2021-04-09 PROCEDURE — 86316 IMMUNOASSAY TUMOR OTHER: CPT | Mod: 91 | Performed by: SURGERY

## 2021-04-09 PROCEDURE — 82542 COL CHROMOTOGRAPHY QUAL/QUAN: CPT | Performed by: SURGERY

## 2021-04-09 PROCEDURE — 80053 COMPREHEN METABOLIC PANEL: CPT | Performed by: SURGERY

## 2021-04-12 LAB — CGA SERPL-MCNC: 34 NG/ML (ref 0–103)

## 2021-04-13 LAB — SEROTONIN: 135 NG/ML

## 2021-04-21 ENCOUNTER — INFUSION (OUTPATIENT)
Dept: INFUSION THERAPY | Facility: HOSPITAL | Age: 76
End: 2021-04-21
Attending: INTERNAL MEDICINE
Payer: MEDICARE

## 2021-04-21 VITALS
WEIGHT: 215.88 LBS | RESPIRATION RATE: 18 BRPM | HEIGHT: 74 IN | DIASTOLIC BLOOD PRESSURE: 67 MMHG | BODY MASS INDEX: 27.7 KG/M2 | TEMPERATURE: 98 F | HEART RATE: 74 BPM | OXYGEN SATURATION: 97 % | SYSTOLIC BLOOD PRESSURE: 138 MMHG

## 2021-04-21 DIAGNOSIS — C7B.8 NEUROENDOCRINE CARCINOMA METASTATIC TO INTRA-ABDOMINAL LYMPH NODE: Primary | ICD-10-CM

## 2021-04-21 DIAGNOSIS — C7A.8 NEUROENDOCRINE CARCINOMA METASTATIC TO INTRA-ABDOMINAL LYMPH NODE: Primary | ICD-10-CM

## 2021-04-21 PROCEDURE — 96372 THER/PROPH/DIAG INJ SC/IM: CPT

## 2021-04-21 PROCEDURE — 63600175 PHARM REV CODE 636 W HCPCS: Mod: JG | Performed by: INTERNAL MEDICINE

## 2021-04-21 RX ORDER — LANREOTIDE ACETATE 120 MG/.5ML
120 INJECTION SUBCUTANEOUS
Status: COMPLETED | OUTPATIENT
Start: 2021-04-21 | End: 2021-04-21

## 2021-04-21 RX ADMIN — LANREOTIDE ACETATE 120 MG: 120 INJECTION SUBCUTANEOUS at 03:04

## 2021-04-24 LAB — 5OH-INDOLEACETATE SERPL-MCNC: 17 NG/ML

## 2021-04-28 DIAGNOSIS — R00.2 HEART PALPITATIONS: ICD-10-CM

## 2021-04-28 DIAGNOSIS — I10 ESSENTIAL HYPERTENSION: ICD-10-CM

## 2021-04-28 LAB
NEUROKININ A SERPL-MCNC: 6 PG/ML (ref 0–40)
PANCREASTATIN SERPL-MCNC: 157 PG/ML (ref 10–135)

## 2021-04-28 RX ORDER — ATORVASTATIN CALCIUM 20 MG/1
20 TABLET, FILM COATED ORAL DAILY
Qty: 90 TABLET | Refills: 1 | Status: SHIPPED | OUTPATIENT
Start: 2021-04-28 | End: 2021-10-07

## 2021-04-28 RX ORDER — METOPROLOL SUCCINATE 25 MG/1
25 TABLET, EXTENDED RELEASE ORAL DAILY
Qty: 90 TABLET | Refills: 1 | Status: SHIPPED | OUTPATIENT
Start: 2021-04-28 | End: 2021-10-07

## 2021-04-28 RX ORDER — NIFEDIPINE 30 MG/1
30 TABLET, EXTENDED RELEASE ORAL NIGHTLY
Qty: 90 TABLET | Refills: 1 | Status: SHIPPED | OUTPATIENT
Start: 2021-04-28 | End: 2021-10-07

## 2021-05-20 ENCOUNTER — INFUSION (OUTPATIENT)
Dept: INFUSION THERAPY | Facility: HOSPITAL | Age: 76
End: 2021-05-20
Attending: INTERNAL MEDICINE
Payer: MEDICARE

## 2021-05-20 VITALS
TEMPERATURE: 97 F | DIASTOLIC BLOOD PRESSURE: 77 MMHG | SYSTOLIC BLOOD PRESSURE: 144 MMHG | WEIGHT: 214.06 LBS | BODY MASS INDEX: 27.47 KG/M2 | RESPIRATION RATE: 18 BRPM | HEIGHT: 74 IN | HEART RATE: 65 BPM

## 2021-05-20 DIAGNOSIS — C7B.8 NEUROENDOCRINE CARCINOMA METASTATIC TO INTRA-ABDOMINAL LYMPH NODE: Primary | ICD-10-CM

## 2021-05-20 DIAGNOSIS — C7A.8 NEUROENDOCRINE CARCINOMA METASTATIC TO INTRA-ABDOMINAL LYMPH NODE: Primary | ICD-10-CM

## 2021-05-20 PROCEDURE — 96372 THER/PROPH/DIAG INJ SC/IM: CPT

## 2021-05-20 PROCEDURE — 63600175 PHARM REV CODE 636 W HCPCS: Mod: JG | Performed by: INTERNAL MEDICINE

## 2021-05-20 RX ORDER — LANREOTIDE ACETATE 120 MG/.5ML
120 INJECTION SUBCUTANEOUS
Status: COMPLETED | OUTPATIENT
Start: 2021-05-20 | End: 2021-05-20

## 2021-05-20 RX ADMIN — LANREOTIDE ACETATE 120 MG: 120 INJECTION SUBCUTANEOUS at 10:05

## 2021-05-25 ENCOUNTER — OFFICE VISIT (OUTPATIENT)
Dept: HEMATOLOGY/ONCOLOGY | Facility: CLINIC | Age: 76
End: 2021-05-25
Payer: MEDICARE

## 2021-05-25 VITALS
HEART RATE: 75 BPM | BODY MASS INDEX: 27.08 KG/M2 | SYSTOLIC BLOOD PRESSURE: 150 MMHG | WEIGHT: 211 LBS | DIASTOLIC BLOOD PRESSURE: 76 MMHG | HEIGHT: 74 IN | RESPIRATION RATE: 18 BRPM

## 2021-05-25 DIAGNOSIS — C7A.8 NEUROENDOCRINE CARCINOMA METASTATIC TO INTRA-ABDOMINAL LYMPH NODE: ICD-10-CM

## 2021-05-25 DIAGNOSIS — C7B.8 NEUROENDOCRINE CARCINOMA METASTATIC TO INTRA-ABDOMINAL LYMPH NODE: ICD-10-CM

## 2021-05-25 PROCEDURE — 3288F PR FALLS RISK ASSESSMENT DOCUMENTED: ICD-10-PCS | Mod: S$GLB,,, | Performed by: INTERNAL MEDICINE

## 2021-05-25 PROCEDURE — 1126F AMNT PAIN NOTED NONE PRSNT: CPT | Mod: S$GLB,,, | Performed by: INTERNAL MEDICINE

## 2021-05-25 PROCEDURE — 99213 PR OFFICE/OUTPT VISIT, EST, LEVL III, 20-29 MIN: ICD-10-PCS | Mod: S$GLB,,, | Performed by: INTERNAL MEDICINE

## 2021-05-25 PROCEDURE — 1159F PR MEDICATION LIST DOCUMENTED IN MEDICAL RECORD: ICD-10-PCS | Mod: S$GLB,,, | Performed by: INTERNAL MEDICINE

## 2021-05-25 PROCEDURE — 1159F MED LIST DOCD IN RCRD: CPT | Mod: S$GLB,,, | Performed by: INTERNAL MEDICINE

## 2021-05-25 PROCEDURE — 3288F FALL RISK ASSESSMENT DOCD: CPT | Mod: S$GLB,,, | Performed by: INTERNAL MEDICINE

## 2021-05-25 PROCEDURE — 1101F PR PT FALLS ASSESS DOC 0-1 FALLS W/OUT INJ PAST YR: ICD-10-PCS | Mod: S$GLB,,, | Performed by: INTERNAL MEDICINE

## 2021-05-25 PROCEDURE — 99213 OFFICE O/P EST LOW 20 MIN: CPT | Mod: S$GLB,,, | Performed by: INTERNAL MEDICINE

## 2021-05-25 PROCEDURE — 1101F PT FALLS ASSESS-DOCD LE1/YR: CPT | Mod: S$GLB,,, | Performed by: INTERNAL MEDICINE

## 2021-05-25 PROCEDURE — 1126F PR PAIN SEVERITY QUANTIFIED, NO PAIN PRESENT: ICD-10-PCS | Mod: S$GLB,,, | Performed by: INTERNAL MEDICINE

## 2021-05-27 ENCOUNTER — TELEPHONE (OUTPATIENT)
Dept: FAMILY MEDICINE | Facility: CLINIC | Age: 76
End: 2021-05-27

## 2021-06-17 ENCOUNTER — INFUSION (OUTPATIENT)
Dept: INFUSION THERAPY | Facility: HOSPITAL | Age: 76
End: 2021-06-17
Attending: INTERNAL MEDICINE
Payer: MEDICARE

## 2021-06-17 VITALS
SYSTOLIC BLOOD PRESSURE: 159 MMHG | WEIGHT: 214.38 LBS | RESPIRATION RATE: 17 BRPM | HEART RATE: 74 BPM | OXYGEN SATURATION: 95 % | HEIGHT: 74 IN | DIASTOLIC BLOOD PRESSURE: 76 MMHG | TEMPERATURE: 98 F | BODY MASS INDEX: 27.51 KG/M2

## 2021-06-17 DIAGNOSIS — C7A.8 NEUROENDOCRINE CARCINOMA METASTATIC TO INTRA-ABDOMINAL LYMPH NODE: Primary | ICD-10-CM

## 2021-06-17 DIAGNOSIS — C7B.8 NEUROENDOCRINE CARCINOMA METASTATIC TO INTRA-ABDOMINAL LYMPH NODE: Primary | ICD-10-CM

## 2021-06-17 PROCEDURE — 63600175 PHARM REV CODE 636 W HCPCS: Mod: JG | Performed by: NURSE PRACTITIONER

## 2021-06-17 PROCEDURE — 96372 THER/PROPH/DIAG INJ SC/IM: CPT

## 2021-06-17 RX ORDER — LANREOTIDE ACETATE 120 MG/.5ML
120 INJECTION SUBCUTANEOUS
Status: COMPLETED | OUTPATIENT
Start: 2021-06-17 | End: 2021-06-17

## 2021-06-17 RX ADMIN — LANREOTIDE ACETATE 120 MG: 120 INJECTION SUBCUTANEOUS at 03:06

## 2021-07-06 ENCOUNTER — HOSPITAL ENCOUNTER (OUTPATIENT)
Dept: RADIOLOGY | Facility: HOSPITAL | Age: 76
Discharge: HOME | End: 2021-07-06
Attending: SURGERY
Payer: MEDICARE

## 2021-07-06 DIAGNOSIS — C7A.8 NEUROENDOCRINE CARCINOMA METASTATIC TO INTRA-ABDOMINAL LYMPH NODE: ICD-10-CM

## 2021-07-06 DIAGNOSIS — C7B.8 NEUROENDOCRINE CARCINOMA METASTATIC TO INTRA-ABDOMINAL LYMPH NODE: ICD-10-CM

## 2021-07-06 DIAGNOSIS — C7B.8 SECONDARY NEUROENDOCRINE TUMOR OF LIVER: ICD-10-CM

## 2021-07-06 LAB
CREAT SERPL-MCNC: 1.2 MG/DL (ref 0.5–1.4)
SAMPLE: NORMAL
SITE: NORMAL

## 2021-07-06 PROCEDURE — 78815 NM PET 68GA DOTATATE WHOLE BODY: ICD-10-PCS | Mod: 26,PS,, | Performed by: RADIOLOGY

## 2021-07-06 PROCEDURE — 25500020 PHARM REV CODE 255: Performed by: SURGERY

## 2021-07-06 PROCEDURE — A9698 NON-RAD CONTRAST MATERIALNOC: HCPCS | Performed by: SURGERY

## 2021-07-06 PROCEDURE — 74183 MRI ABD W/O CNTR FLWD CNTR: CPT | Mod: TC

## 2021-07-06 PROCEDURE — 74177 CT ABD & PELVIS W/CONTRAST: CPT | Mod: TC

## 2021-07-06 PROCEDURE — 78815 PET IMAGE W/CT SKULL-THIGH: CPT | Mod: 26,PS,, | Performed by: RADIOLOGY

## 2021-07-06 PROCEDURE — 74177 CT ABDOMEN PELVIS WITH CONTRAST: ICD-10-PCS | Mod: 26,,, | Performed by: RADIOLOGY

## 2021-07-06 PROCEDURE — 74183 MRI ABDOMEN W WO CONTRAST: ICD-10-PCS | Mod: 26,,, | Performed by: RADIOLOGY

## 2021-07-06 PROCEDURE — 74183 MRI ABD W/O CNTR FLWD CNTR: CPT | Mod: 26,,, | Performed by: RADIOLOGY

## 2021-07-06 PROCEDURE — 78815 PET IMAGE W/CT SKULL-THIGH: CPT | Mod: TC,PS

## 2021-07-06 PROCEDURE — 74177 CT ABD & PELVIS W/CONTRAST: CPT | Mod: 26,,, | Performed by: RADIOLOGY

## 2021-07-06 PROCEDURE — A9585 GADOBUTROL INJECTION: HCPCS | Performed by: SURGERY

## 2021-07-06 RX ORDER — GADOBUTROL 604.72 MG/ML
10 INJECTION INTRAVENOUS
Status: COMPLETED | OUTPATIENT
Start: 2021-07-06 | End: 2021-07-06

## 2021-07-06 RX ADMIN — IOHEXOL 1000 ML: 12 SOLUTION ORAL at 01:07

## 2021-07-06 RX ADMIN — GADOBUTROL 10 ML: 604.72 INJECTION INTRAVENOUS at 12:07

## 2021-07-06 RX ADMIN — IOHEXOL 100 ML: 350 INJECTION, SOLUTION INTRAVENOUS at 01:07

## 2021-07-15 ENCOUNTER — INFUSION (OUTPATIENT)
Dept: INFUSION THERAPY | Facility: HOSPITAL | Age: 76
End: 2021-07-15
Attending: INTERNAL MEDICINE
Payer: MEDICARE

## 2021-07-15 VITALS
OXYGEN SATURATION: 94 % | DIASTOLIC BLOOD PRESSURE: 78 MMHG | TEMPERATURE: 98 F | WEIGHT: 210.13 LBS | RESPIRATION RATE: 18 BRPM | HEART RATE: 83 BPM | HEIGHT: 74 IN | SYSTOLIC BLOOD PRESSURE: 159 MMHG | BODY MASS INDEX: 26.97 KG/M2

## 2021-07-15 DIAGNOSIS — C7A.8 NEUROENDOCRINE CARCINOMA METASTATIC TO INTRA-ABDOMINAL LYMPH NODE: Primary | ICD-10-CM

## 2021-07-15 DIAGNOSIS — C7B.8 NEUROENDOCRINE CARCINOMA METASTATIC TO INTRA-ABDOMINAL LYMPH NODE: Primary | ICD-10-CM

## 2021-07-15 PROCEDURE — 96372 THER/PROPH/DIAG INJ SC/IM: CPT

## 2021-07-15 PROCEDURE — 63600175 PHARM REV CODE 636 W HCPCS: Mod: JG | Performed by: NURSE PRACTITIONER

## 2021-07-15 RX ORDER — LANREOTIDE ACETATE 120 MG/.5ML
120 INJECTION SUBCUTANEOUS
Status: COMPLETED | OUTPATIENT
Start: 2021-07-15 | End: 2021-07-15

## 2021-07-15 RX ADMIN — LANREOTIDE ACETATE 120 MG: 120 INJECTION SUBCUTANEOUS at 02:07

## 2021-08-12 ENCOUNTER — INFUSION (OUTPATIENT)
Dept: INFUSION THERAPY | Facility: HOSPITAL | Age: 76
End: 2021-08-12
Attending: INTERNAL MEDICINE
Payer: MEDICARE

## 2021-08-12 VITALS
BODY MASS INDEX: 27.53 KG/M2 | RESPIRATION RATE: 18 BRPM | SYSTOLIC BLOOD PRESSURE: 154 MMHG | WEIGHT: 214.5 LBS | HEIGHT: 74 IN | TEMPERATURE: 97 F | DIASTOLIC BLOOD PRESSURE: 85 MMHG | HEART RATE: 70 BPM

## 2021-08-12 DIAGNOSIS — C7B.8 NEUROENDOCRINE CARCINOMA METASTATIC TO INTRA-ABDOMINAL LYMPH NODE: Primary | ICD-10-CM

## 2021-08-12 DIAGNOSIS — C7A.8 NEUROENDOCRINE CARCINOMA METASTATIC TO INTRA-ABDOMINAL LYMPH NODE: Primary | ICD-10-CM

## 2021-08-12 PROCEDURE — 96372 THER/PROPH/DIAG INJ SC/IM: CPT

## 2021-08-12 PROCEDURE — 63600175 PHARM REV CODE 636 W HCPCS: Mod: JG | Performed by: INTERNAL MEDICINE

## 2021-08-12 RX ORDER — LANREOTIDE ACETATE 120 MG/.5ML
120 INJECTION SUBCUTANEOUS
Status: COMPLETED | OUTPATIENT
Start: 2021-08-12 | End: 2021-08-12

## 2021-08-12 RX ADMIN — LANREOTIDE ACETATE 120 MG: 120 INJECTION SUBCUTANEOUS at 02:08

## 2021-09-09 ENCOUNTER — INFUSION (OUTPATIENT)
Dept: INFUSION THERAPY | Facility: HOSPITAL | Age: 76
End: 2021-09-09
Attending: INTERNAL MEDICINE
Payer: MEDICARE

## 2021-09-09 VITALS
SYSTOLIC BLOOD PRESSURE: 139 MMHG | HEART RATE: 73 BPM | HEIGHT: 74 IN | RESPIRATION RATE: 18 BRPM | WEIGHT: 206.81 LBS | DIASTOLIC BLOOD PRESSURE: 65 MMHG | TEMPERATURE: 98 F | BODY MASS INDEX: 26.54 KG/M2

## 2021-09-09 DIAGNOSIS — C7B.8 NEUROENDOCRINE CARCINOMA METASTATIC TO INTRA-ABDOMINAL LYMPH NODE: Primary | ICD-10-CM

## 2021-09-09 DIAGNOSIS — C7A.8 NEUROENDOCRINE CARCINOMA METASTATIC TO INTRA-ABDOMINAL LYMPH NODE: Primary | ICD-10-CM

## 2021-09-09 PROCEDURE — 96401 CHEMO ANTI-NEOPL SQ/IM: CPT

## 2021-09-09 PROCEDURE — 63600175 PHARM REV CODE 636 W HCPCS: Mod: JG | Performed by: NURSE PRACTITIONER

## 2021-09-09 RX ORDER — LANREOTIDE ACETATE 120 MG/.5ML
120 INJECTION SUBCUTANEOUS
Status: COMPLETED | OUTPATIENT
Start: 2021-09-09 | End: 2021-09-09

## 2021-09-09 RX ADMIN — LANREOTIDE ACETATE 120 MG: 120 INJECTION SUBCUTANEOUS at 02:09

## 2021-09-22 ENCOUNTER — OFFICE VISIT (OUTPATIENT)
Dept: HEMATOLOGY/ONCOLOGY | Facility: CLINIC | Age: 76
End: 2021-09-22
Payer: MEDICARE

## 2021-09-22 VITALS
WEIGHT: 208 LBS | HEIGHT: 74 IN | DIASTOLIC BLOOD PRESSURE: 75 MMHG | BODY MASS INDEX: 26.69 KG/M2 | RESPIRATION RATE: 18 BRPM | SYSTOLIC BLOOD PRESSURE: 133 MMHG | HEART RATE: 68 BPM

## 2021-09-22 DIAGNOSIS — C7B.8 NEUROENDOCRINE CARCINOMA METASTATIC TO INTRA-ABDOMINAL LYMPH NODE: ICD-10-CM

## 2021-09-22 DIAGNOSIS — C7A.8 NEUROENDOCRINE CARCINOMA METASTATIC TO INTRA-ABDOMINAL LYMPH NODE: ICD-10-CM

## 2021-09-22 PROCEDURE — 99213 PR OFFICE/OUTPT VISIT, EST, LEVL III, 20-29 MIN: ICD-10-PCS | Mod: S$GLB,,, | Performed by: INTERNAL MEDICINE

## 2021-09-22 PROCEDURE — 1160F PR REVIEW ALL MEDS BY PRESCRIBER/CLIN PHARMACIST DOCUMENTED: ICD-10-PCS | Mod: S$GLB,,, | Performed by: INTERNAL MEDICINE

## 2021-09-22 PROCEDURE — 1101F PR PT FALLS ASSESS DOC 0-1 FALLS W/OUT INJ PAST YR: ICD-10-PCS | Mod: S$GLB,,, | Performed by: INTERNAL MEDICINE

## 2021-09-22 PROCEDURE — 1126F PR PAIN SEVERITY QUANTIFIED, NO PAIN PRESENT: ICD-10-PCS | Mod: S$GLB,,, | Performed by: INTERNAL MEDICINE

## 2021-09-22 PROCEDURE — 3288F PR FALLS RISK ASSESSMENT DOCUMENTED: ICD-10-PCS | Mod: S$GLB,,, | Performed by: INTERNAL MEDICINE

## 2021-09-22 PROCEDURE — 3075F SYST BP GE 130 - 139MM HG: CPT | Mod: S$GLB,,, | Performed by: INTERNAL MEDICINE

## 2021-09-22 PROCEDURE — 3078F DIAST BP <80 MM HG: CPT | Mod: S$GLB,,, | Performed by: INTERNAL MEDICINE

## 2021-09-22 PROCEDURE — 3078F PR MOST RECENT DIASTOLIC BLOOD PRESSURE < 80 MM HG: ICD-10-PCS | Mod: S$GLB,,, | Performed by: INTERNAL MEDICINE

## 2021-09-22 PROCEDURE — 1159F PR MEDICATION LIST DOCUMENTED IN MEDICAL RECORD: ICD-10-PCS | Mod: S$GLB,,, | Performed by: INTERNAL MEDICINE

## 2021-09-22 PROCEDURE — 1160F RVW MEDS BY RX/DR IN RCRD: CPT | Mod: S$GLB,,, | Performed by: INTERNAL MEDICINE

## 2021-09-22 PROCEDURE — 1101F PT FALLS ASSESS-DOCD LE1/YR: CPT | Mod: S$GLB,,, | Performed by: INTERNAL MEDICINE

## 2021-09-22 PROCEDURE — 1126F AMNT PAIN NOTED NONE PRSNT: CPT | Mod: S$GLB,,, | Performed by: INTERNAL MEDICINE

## 2021-09-22 PROCEDURE — 3288F FALL RISK ASSESSMENT DOCD: CPT | Mod: S$GLB,,, | Performed by: INTERNAL MEDICINE

## 2021-09-22 PROCEDURE — 3075F PR MOST RECENT SYSTOLIC BLOOD PRESS GE 130-139MM HG: ICD-10-PCS | Mod: S$GLB,,, | Performed by: INTERNAL MEDICINE

## 2021-09-22 PROCEDURE — 99213 OFFICE O/P EST LOW 20 MIN: CPT | Mod: S$GLB,,, | Performed by: INTERNAL MEDICINE

## 2021-09-22 PROCEDURE — 1159F MED LIST DOCD IN RCRD: CPT | Mod: S$GLB,,, | Performed by: INTERNAL MEDICINE

## 2021-09-28 ENCOUNTER — LAB VISIT (OUTPATIENT)
Dept: LAB | Facility: HOSPITAL | Age: 76
End: 2021-09-28
Attending: INTERNAL MEDICINE
Payer: MEDICARE

## 2021-09-28 DIAGNOSIS — C7B.8 NEUROENDOCRINE CARCINOMA METASTATIC TO INTRA-ABDOMINAL LYMPH NODE: ICD-10-CM

## 2021-09-28 DIAGNOSIS — C7A.8 NEUROENDOCRINE CARCINOMA METASTATIC TO INTRA-ABDOMINAL LYMPH NODE: ICD-10-CM

## 2021-09-28 LAB
ALBUMIN SERPL BCP-MCNC: 4.2 G/DL (ref 3.5–5.2)
ALBUMIN SERPL BCP-MCNC: 4.2 G/DL (ref 3.5–5.2)
ALP SERPL-CCNC: 83 U/L (ref 55–135)
ALP SERPL-CCNC: 83 U/L (ref 55–135)
ALT SERPL W/O P-5'-P-CCNC: 12 U/L (ref 10–44)
ALT SERPL W/O P-5'-P-CCNC: 12 U/L (ref 10–44)
ANION GAP SERPL CALC-SCNC: 10 MMOL/L (ref 8–16)
ANION GAP SERPL CALC-SCNC: 10 MMOL/L (ref 8–16)
AST SERPL-CCNC: 15 U/L (ref 10–40)
AST SERPL-CCNC: 15 U/L (ref 10–40)
BASOPHILS # BLD AUTO: 0.08 K/UL (ref 0–0.2)
BASOPHILS # BLD AUTO: 0.08 K/UL (ref 0–0.2)
BASOPHILS NFR BLD: 1.2 % (ref 0–1.9)
BASOPHILS NFR BLD: 1.2 % (ref 0–1.9)
BILIRUB SERPL-MCNC: 0.7 MG/DL (ref 0.1–1)
BILIRUB SERPL-MCNC: 0.7 MG/DL (ref 0.1–1)
BUN SERPL-MCNC: 19 MG/DL (ref 8–23)
BUN SERPL-MCNC: 19 MG/DL (ref 8–23)
CALCIUM SERPL-MCNC: 9 MG/DL (ref 8.7–10.5)
CALCIUM SERPL-MCNC: 9 MG/DL (ref 8.7–10.5)
CHLORIDE SERPL-SCNC: 105 MMOL/L (ref 95–110)
CHLORIDE SERPL-SCNC: 105 MMOL/L (ref 95–110)
CO2 SERPL-SCNC: 23 MMOL/L (ref 23–29)
CO2 SERPL-SCNC: 23 MMOL/L (ref 23–29)
CREAT SERPL-MCNC: 1.1 MG/DL (ref 0.5–1.4)
CREAT SERPL-MCNC: 1.1 MG/DL (ref 0.5–1.4)
DIFFERENTIAL METHOD: ABNORMAL
DIFFERENTIAL METHOD: ABNORMAL
EOSINOPHIL # BLD AUTO: 0.2 K/UL (ref 0–0.5)
EOSINOPHIL # BLD AUTO: 0.2 K/UL (ref 0–0.5)
EOSINOPHIL NFR BLD: 2.6 % (ref 0–8)
EOSINOPHIL NFR BLD: 2.6 % (ref 0–8)
ERYTHROCYTE [DISTWIDTH] IN BLOOD BY AUTOMATED COUNT: 12.9 % (ref 11.5–14.5)
ERYTHROCYTE [DISTWIDTH] IN BLOOD BY AUTOMATED COUNT: 12.9 % (ref 11.5–14.5)
EST. GFR  (AFRICAN AMERICAN): >60 ML/MIN/1.73 M^2
EST. GFR  (AFRICAN AMERICAN): >60 ML/MIN/1.73 M^2
EST. GFR  (NON AFRICAN AMERICAN): >60 ML/MIN/1.73 M^2
EST. GFR  (NON AFRICAN AMERICAN): >60 ML/MIN/1.73 M^2
GLUCOSE SERPL-MCNC: 120 MG/DL (ref 70–110)
GLUCOSE SERPL-MCNC: 120 MG/DL (ref 70–110)
HCT VFR BLD AUTO: 37.6 % (ref 40–54)
HCT VFR BLD AUTO: 37.6 % (ref 40–54)
HGB BLD-MCNC: 12.7 G/DL (ref 14–18)
HGB BLD-MCNC: 12.7 G/DL (ref 14–18)
IMM GRANULOCYTES # BLD AUTO: 0.07 K/UL (ref 0–0.04)
IMM GRANULOCYTES # BLD AUTO: 0.07 K/UL (ref 0–0.04)
IMM GRANULOCYTES NFR BLD AUTO: 1.1 % (ref 0–0.5)
IMM GRANULOCYTES NFR BLD AUTO: 1.1 % (ref 0–0.5)
LYMPHOCYTES # BLD AUTO: 1.4 K/UL (ref 1–4.8)
LYMPHOCYTES # BLD AUTO: 1.4 K/UL (ref 1–4.8)
LYMPHOCYTES NFR BLD: 22.1 % (ref 18–48)
LYMPHOCYTES NFR BLD: 22.1 % (ref 18–48)
MCH RBC QN AUTO: 33.4 PG (ref 27–31)
MCH RBC QN AUTO: 33.4 PG (ref 27–31)
MCHC RBC AUTO-ENTMCNC: 33.8 G/DL (ref 32–36)
MCHC RBC AUTO-ENTMCNC: 33.8 G/DL (ref 32–36)
MCV RBC AUTO: 99 FL (ref 82–98)
MCV RBC AUTO: 99 FL (ref 82–98)
MONOCYTES # BLD AUTO: 0.5 K/UL (ref 0.3–1)
MONOCYTES # BLD AUTO: 0.5 K/UL (ref 0.3–1)
MONOCYTES NFR BLD: 8.1 % (ref 4–15)
MONOCYTES NFR BLD: 8.1 % (ref 4–15)
NEUTROPHILS # BLD AUTO: 4.2 K/UL (ref 1.8–7.7)
NEUTROPHILS # BLD AUTO: 4.2 K/UL (ref 1.8–7.7)
NEUTROPHILS NFR BLD: 64.9 % (ref 38–73)
NEUTROPHILS NFR BLD: 64.9 % (ref 38–73)
NRBC BLD-RTO: 0 /100 WBC
NRBC BLD-RTO: 0 /100 WBC
PLATELET # BLD AUTO: 212 K/UL (ref 150–450)
PLATELET # BLD AUTO: 212 K/UL (ref 150–450)
PMV BLD AUTO: 10.4 FL (ref 9.2–12.9)
PMV BLD AUTO: 10.4 FL (ref 9.2–12.9)
POTASSIUM SERPL-SCNC: 3.9 MMOL/L (ref 3.5–5.1)
POTASSIUM SERPL-SCNC: 3.9 MMOL/L (ref 3.5–5.1)
PROT SERPL-MCNC: 7.1 G/DL (ref 6–8.4)
PROT SERPL-MCNC: 7.1 G/DL (ref 6–8.4)
RBC # BLD AUTO: 3.8 M/UL (ref 4.6–6.2)
RBC # BLD AUTO: 3.8 M/UL (ref 4.6–6.2)
SODIUM SERPL-SCNC: 138 MMOL/L (ref 136–145)
SODIUM SERPL-SCNC: 138 MMOL/L (ref 136–145)
WBC # BLD AUTO: 6.42 K/UL (ref 3.9–12.7)
WBC # BLD AUTO: 6.42 K/UL (ref 3.9–12.7)

## 2021-09-28 PROCEDURE — 36415 COLL VENOUS BLD VENIPUNCTURE: CPT | Mod: PO | Performed by: INTERNAL MEDICINE

## 2021-09-28 PROCEDURE — 85025 COMPLETE CBC W/AUTO DIFF WBC: CPT | Performed by: INTERNAL MEDICINE

## 2021-09-28 PROCEDURE — 80053 COMPREHEN METABOLIC PANEL: CPT | Performed by: INTERNAL MEDICINE

## 2021-10-07 ENCOUNTER — PATIENT MESSAGE (OUTPATIENT)
Dept: ADMINISTRATIVE | Facility: HOSPITAL | Age: 76
End: 2021-10-07

## 2021-10-07 ENCOUNTER — INFUSION (OUTPATIENT)
Dept: INFUSION THERAPY | Facility: HOSPITAL | Age: 76
End: 2021-10-07
Attending: INTERNAL MEDICINE
Payer: MEDICARE

## 2021-10-07 VITALS
RESPIRATION RATE: 18 BRPM | SYSTOLIC BLOOD PRESSURE: 135 MMHG | DIASTOLIC BLOOD PRESSURE: 77 MMHG | HEART RATE: 70 BPM | TEMPERATURE: 98 F

## 2021-10-07 DIAGNOSIS — C7A.8 NEUROENDOCRINE CARCINOMA METASTATIC TO INTRA-ABDOMINAL LYMPH NODE: Primary | ICD-10-CM

## 2021-10-07 DIAGNOSIS — C7B.8 NEUROENDOCRINE CARCINOMA METASTATIC TO INTRA-ABDOMINAL LYMPH NODE: Primary | ICD-10-CM

## 2021-10-07 PROCEDURE — 63600175 PHARM REV CODE 636 W HCPCS: Mod: JG | Performed by: NURSE PRACTITIONER

## 2021-10-07 PROCEDURE — 96372 THER/PROPH/DIAG INJ SC/IM: CPT

## 2021-10-07 RX ORDER — LANREOTIDE ACETATE 120 MG/.5ML
120 INJECTION SUBCUTANEOUS
Status: COMPLETED | OUTPATIENT
Start: 2021-10-07 | End: 2021-10-07

## 2021-10-07 RX ADMIN — LANREOTIDE ACETATE 120 MG: 120 INJECTION SUBCUTANEOUS at 02:10

## 2021-11-04 ENCOUNTER — INFUSION (OUTPATIENT)
Dept: INFUSION THERAPY | Facility: HOSPITAL | Age: 76
End: 2021-11-04
Attending: INTERNAL MEDICINE
Payer: MEDICARE

## 2021-11-04 VITALS
BODY MASS INDEX: 26.71 KG/M2 | OXYGEN SATURATION: 97 % | TEMPERATURE: 97 F | WEIGHT: 208.13 LBS | SYSTOLIC BLOOD PRESSURE: 138 MMHG | HEART RATE: 74 BPM | DIASTOLIC BLOOD PRESSURE: 63 MMHG | RESPIRATION RATE: 18 BRPM | HEIGHT: 74 IN

## 2021-11-04 DIAGNOSIS — C7B.8 NEUROENDOCRINE CARCINOMA METASTATIC TO INTRA-ABDOMINAL LYMPH NODE: Primary | ICD-10-CM

## 2021-11-04 DIAGNOSIS — C7A.8 NEUROENDOCRINE CARCINOMA METASTATIC TO INTRA-ABDOMINAL LYMPH NODE: Primary | ICD-10-CM

## 2021-11-04 PROCEDURE — 96372 THER/PROPH/DIAG INJ SC/IM: CPT

## 2021-11-04 PROCEDURE — 63600175 PHARM REV CODE 636 W HCPCS: Mod: JG | Performed by: NURSE PRACTITIONER

## 2021-11-04 RX ORDER — LANREOTIDE ACETATE 120 MG/.5ML
120 INJECTION SUBCUTANEOUS
Status: COMPLETED | OUTPATIENT
Start: 2021-11-04 | End: 2021-11-04

## 2021-11-04 RX ADMIN — LANREOTIDE ACETATE 120 MG: 120 INJECTION SUBCUTANEOUS at 02:11

## 2021-12-02 ENCOUNTER — INFUSION (OUTPATIENT)
Dept: INFUSION THERAPY | Facility: HOSPITAL | Age: 76
End: 2021-12-02
Attending: INTERNAL MEDICINE
Payer: MEDICARE

## 2021-12-02 VITALS
OXYGEN SATURATION: 96 % | TEMPERATURE: 97 F | HEART RATE: 60 BPM | BODY MASS INDEX: 27.35 KG/M2 | WEIGHT: 213.13 LBS | DIASTOLIC BLOOD PRESSURE: 64 MMHG | RESPIRATION RATE: 18 BRPM | HEIGHT: 74 IN | SYSTOLIC BLOOD PRESSURE: 129 MMHG

## 2021-12-02 DIAGNOSIS — C7B.8 NEUROENDOCRINE CARCINOMA METASTATIC TO INTRA-ABDOMINAL LYMPH NODE: Primary | ICD-10-CM

## 2021-12-02 DIAGNOSIS — C7A.8 NEUROENDOCRINE CARCINOMA METASTATIC TO INTRA-ABDOMINAL LYMPH NODE: Primary | ICD-10-CM

## 2021-12-02 PROCEDURE — 96372 THER/PROPH/DIAG INJ SC/IM: CPT

## 2021-12-02 PROCEDURE — 63600175 PHARM REV CODE 636 W HCPCS: Mod: JG | Performed by: NURSE PRACTITIONER

## 2021-12-02 RX ORDER — LANREOTIDE ACETATE 120 MG/.5ML
120 INJECTION SUBCUTANEOUS
Status: COMPLETED | OUTPATIENT
Start: 2021-12-02 | End: 2021-12-02

## 2021-12-02 RX ADMIN — LANREOTIDE ACETATE 120 MG: 120 INJECTION SUBCUTANEOUS at 02:12

## 2021-12-30 ENCOUNTER — INFUSION (OUTPATIENT)
Dept: INFUSION THERAPY | Facility: HOSPITAL | Age: 76
End: 2021-12-30
Attending: INTERNAL MEDICINE
Payer: MEDICARE

## 2021-12-30 VITALS
HEART RATE: 75 BPM | DIASTOLIC BLOOD PRESSURE: 66 MMHG | BODY MASS INDEX: 27.05 KG/M2 | WEIGHT: 210.69 LBS | OXYGEN SATURATION: 95 % | RESPIRATION RATE: 18 BRPM | TEMPERATURE: 98 F | SYSTOLIC BLOOD PRESSURE: 133 MMHG

## 2021-12-30 DIAGNOSIS — C7A.8 NEUROENDOCRINE CARCINOMA METASTATIC TO INTRA-ABDOMINAL LYMPH NODE: Primary | ICD-10-CM

## 2021-12-30 DIAGNOSIS — C7B.8 NEUROENDOCRINE CARCINOMA METASTATIC TO INTRA-ABDOMINAL LYMPH NODE: Primary | ICD-10-CM

## 2021-12-30 PROCEDURE — 96372 THER/PROPH/DIAG INJ SC/IM: CPT

## 2021-12-30 PROCEDURE — 63600175 PHARM REV CODE 636 W HCPCS: Mod: JG | Performed by: INTERNAL MEDICINE

## 2021-12-30 RX ORDER — LANREOTIDE ACETATE 120 MG/.5ML
120 INJECTION SUBCUTANEOUS
Status: COMPLETED | OUTPATIENT
Start: 2021-12-30 | End: 2021-12-30

## 2021-12-30 RX ADMIN — LANREOTIDE ACETATE 120 MG: 120 INJECTION SUBCUTANEOUS at 03:12

## 2022-01-01 ENCOUNTER — PATIENT MESSAGE (OUTPATIENT)
Dept: HEMATOLOGY/ONCOLOGY | Facility: CLINIC | Age: 77
End: 2022-01-01
Payer: MEDICARE

## 2022-01-26 ENCOUNTER — OFFICE VISIT (OUTPATIENT)
Dept: HEMATOLOGY/ONCOLOGY | Facility: CLINIC | Age: 77
End: 2022-01-26
Payer: MEDICARE

## 2022-01-26 VITALS
SYSTOLIC BLOOD PRESSURE: 144 MMHG | WEIGHT: 210 LBS | TEMPERATURE: 98 F | DIASTOLIC BLOOD PRESSURE: 80 MMHG | HEART RATE: 74 BPM | BODY MASS INDEX: 26.96 KG/M2

## 2022-01-26 DIAGNOSIS — C7A.8 NEUROENDOCRINE CARCINOMA METASTATIC TO INTRA-ABDOMINAL LYMPH NODE: ICD-10-CM

## 2022-01-26 DIAGNOSIS — C7B.8 NEUROENDOCRINE CARCINOMA METASTATIC TO INTRA-ABDOMINAL LYMPH NODE: ICD-10-CM

## 2022-01-26 PROCEDURE — 3288F FALL RISK ASSESSMENT DOCD: CPT | Mod: S$GLB,,, | Performed by: INTERNAL MEDICINE

## 2022-01-26 PROCEDURE — 1160F PR REVIEW ALL MEDS BY PRESCRIBER/CLIN PHARMACIST DOCUMENTED: ICD-10-PCS | Mod: S$GLB,,, | Performed by: INTERNAL MEDICINE

## 2022-01-26 PROCEDURE — 1159F PR MEDICATION LIST DOCUMENTED IN MEDICAL RECORD: ICD-10-PCS | Mod: S$GLB,,, | Performed by: INTERNAL MEDICINE

## 2022-01-26 PROCEDURE — 99213 OFFICE O/P EST LOW 20 MIN: CPT | Mod: S$GLB,,, | Performed by: INTERNAL MEDICINE

## 2022-01-26 PROCEDURE — 99213 PR OFFICE/OUTPT VISIT, EST, LEVL III, 20-29 MIN: ICD-10-PCS | Mod: S$GLB,,, | Performed by: INTERNAL MEDICINE

## 2022-01-26 PROCEDURE — 1101F PT FALLS ASSESS-DOCD LE1/YR: CPT | Mod: S$GLB,,, | Performed by: INTERNAL MEDICINE

## 2022-01-26 PROCEDURE — 1159F MED LIST DOCD IN RCRD: CPT | Mod: S$GLB,,, | Performed by: INTERNAL MEDICINE

## 2022-01-26 PROCEDURE — 3079F DIAST BP 80-89 MM HG: CPT | Mod: S$GLB,,, | Performed by: INTERNAL MEDICINE

## 2022-01-26 PROCEDURE — 3077F SYST BP >= 140 MM HG: CPT | Mod: S$GLB,,, | Performed by: INTERNAL MEDICINE

## 2022-01-26 PROCEDURE — 3288F PR FALLS RISK ASSESSMENT DOCUMENTED: ICD-10-PCS | Mod: S$GLB,,, | Performed by: INTERNAL MEDICINE

## 2022-01-26 PROCEDURE — 1126F PR PAIN SEVERITY QUANTIFIED, NO PAIN PRESENT: ICD-10-PCS | Mod: S$GLB,,, | Performed by: INTERNAL MEDICINE

## 2022-01-26 PROCEDURE — 1160F RVW MEDS BY RX/DR IN RCRD: CPT | Mod: S$GLB,,, | Performed by: INTERNAL MEDICINE

## 2022-01-26 PROCEDURE — 3077F PR MOST RECENT SYSTOLIC BLOOD PRESSURE >= 140 MM HG: ICD-10-PCS | Mod: S$GLB,,, | Performed by: INTERNAL MEDICINE

## 2022-01-26 PROCEDURE — 1126F AMNT PAIN NOTED NONE PRSNT: CPT | Mod: S$GLB,,, | Performed by: INTERNAL MEDICINE

## 2022-01-26 PROCEDURE — 3079F PR MOST RECENT DIASTOLIC BLOOD PRESSURE 80-89 MM HG: ICD-10-PCS | Mod: S$GLB,,, | Performed by: INTERNAL MEDICINE

## 2022-01-26 PROCEDURE — 1101F PR PT FALLS ASSESS DOC 0-1 FALLS W/OUT INJ PAST YR: ICD-10-PCS | Mod: S$GLB,,, | Performed by: INTERNAL MEDICINE

## 2022-01-26 NOTE — PROGRESS NOTES
Subjective:       Patient ID: Roosevelt Alejandro is a 76 y.o. male.    Chief Complaint: leading to consultation is: neuroendocrine carcinoma follow up.     HPI:  Patient returns today for a regularly scheduled follow-up visit.      Mr. Alejandro is doing well today.  No new complaints.     CT, MRI of abd/p and dotatate PET all stable 7/6/2021 1/5/2021 Dotatate PET:  Stable size and distribution of somatostatin receptor avid disease in the mediastinum and abdomen, multiple index lesions demonstrate increased avidity as detailed above.  No new lesions identified.         Patient underwent surgery 11/4/2019 with Dr. Khalil.  Partial path report:     FINAL PATHOLOGIC DIAGNOSIS     SYNOPTIC REPORT  Procedure: Segmental resection, small intestine  Tumor Site: Small intestine, terminal ileum  Tumor Size: Greatest dimension (centimeters): 1.8 cm  Tumor Focality: Unifocal  Histologic Type and Grade G1: Well-differentiated neuroendocrine tumor  Mitotic Rate: 1.5 mitoses / 10 HPF  Ki-67 Labeling Index: Specify Ki-67 percentage: about 1.1 %  Tumor Extension: Tumor at least invades through the muscularis propria into subserosal tissue  All margins are uninvolved by tumor: Margins examined: proximal, distal, and radial.  Lymphovascular Invasion: Present  Perineural Invasion: Present  Large Mesenteric Masses (>2 cm), not identified  Regional Lymph Nodes Examination (counting lymph nodes from all the 38 parts)  Number of Lymph Nodes Involved: 41  Number of Lymph Nodes Examined: 46  Pathologic Stage Classification (pTNM, AJCC 8th Edition)  Primary Tumor (pT) at least pT3: Invades through the muscularis propria into subserosal tissue  pN2: Extensive rigoberto deposits (12 or greater),  Distant Metastasis (pM) pM1b: Metastasis in at least one extrahepatic site (nonregional lymph node: Aortocaval  node. right inferior aorta node)  NET panel  Primary small bowel tumor (Block 18D)  Ki-67: positive, approximately 1.1% cells  staining  Chromogranin: positive, 3+, 100%  Synaptophysin: positive, 3+, 100%  CD31: 12 / HPF  TTF: not applicable  Lymph node metastasis (Block 38A)  Ki-67: positive, approximately 6.5 % cells staining        CT abdomen 7/15/2019:  Mesenteric and retroperitoneal lymphadenopathy, with numerous necrotic appearing lymph node is within the central mesentery.  Some of these nodes have mildly increased in size, while the largest previously present node has significantly decreased in size as above.  Further workup for neoplastic process such as metastatic disease or lymphoma is recommended.    Normal appendix.  Moderate diverticulosis.  Small hiatal hernia.  Hepatic and renal cysts.  Atherosclerosis.  Evidence for prior asbestos exposure.    All medications and past medical and surgical history have been reviewed.         Review of patient's allergies indicates:   Allergen Reactions    Epinephrine      Patient on Sandostatin and Epinephrine contraindicated    Aspirin Other (See Comments)     Internal bleeding    Lisinopril (bulk) Rash    Sulfa (sulfonamide antibiotics) Swelling and Rash       ROS  GEN:   normal without any fever, night sweats or weight loss  HEENT:  normal with no HA's,  changes in vision  CV:   normal with no CP, SOB  PULM:  normal with no SOB, cough  GI:   normal with no abdominal pain, nausea, vomiting  :   normal with no hematuria, dysuria  SKIN:   normal with no rash      Previous FAMHX and SOCHX information reviewed and remains unchanged         Objective:        GEN: no apparent distress, comfortable; AAOx3  HEAD: atraumatic and normocephalic  EYES: no pallor, no icterus, PERRLA  ENT: external ears WNL; no nasal discharge  NECK: no visible masses, trachea midline  CHEST: Normal respiratory effort  ABDOM: Visibly Flat  SKIN: no visible rashes  NEURO: grossly intact; motor/sensory WNL; AAOx3; no tremors  PSYCH: normal mood, affect and behavior                All lab results and imaging results  have been reviewed and discussed with the patient  No results found for this or any previous visit (from the past 336 hour(s)).  CMP  Sodium   Date Value Ref Range Status   09/28/2021 138 136 - 145 mmol/L Final   09/28/2021 138 136 - 145 mmol/L Final     Potassium   Date Value Ref Range Status   09/28/2021 3.9 3.5 - 5.1 mmol/L Final   09/28/2021 3.9 3.5 - 5.1 mmol/L Final     Chloride   Date Value Ref Range Status   09/28/2021 105 95 - 110 mmol/L Final   09/28/2021 105 95 - 110 mmol/L Final     CO2   Date Value Ref Range Status   09/28/2021 23 23 - 29 mmol/L Final   09/28/2021 23 23 - 29 mmol/L Final     Glucose   Date Value Ref Range Status   09/28/2021 120 (H) 70 - 110 mg/dL Final   09/28/2021 120 (H) 70 - 110 mg/dL Final     BUN   Date Value Ref Range Status   09/28/2021 19 8 - 23 mg/dL Final   09/28/2021 19 8 - 23 mg/dL Final     Creatinine   Date Value Ref Range Status   09/28/2021 1.1 0.5 - 1.4 mg/dL Final   09/28/2021 1.1 0.5 - 1.4 mg/dL Final     Calcium   Date Value Ref Range Status   09/28/2021 9.0 8.7 - 10.5 mg/dL Final   09/28/2021 9.0 8.7 - 10.5 mg/dL Final     Total Protein   Date Value Ref Range Status   09/28/2021 7.1 6.0 - 8.4 g/dL Final   09/28/2021 7.1 6.0 - 8.4 g/dL Final     Albumin   Date Value Ref Range Status   09/28/2021 4.2 3.5 - 5.2 g/dL Final   09/28/2021 4.2 3.5 - 5.2 g/dL Final     Total Bilirubin   Date Value Ref Range Status   09/28/2021 0.7 0.1 - 1.0 mg/dL Final     Comment:     For infants and newborns, interpretation of results should be based  on gestational age, weight and in agreement with clinical  observations.    Premature Infant recommended reference ranges:  Up to 24 hours.............<8.0 mg/dL  Up to 48 hours............<12.0 mg/dL  3-5 days..................<15.0 mg/dL  6-29 days.................<15.0 mg/dL     09/28/2021 0.7 0.1 - 1.0 mg/dL Final     Comment:     For infants and newborns, interpretation of results should be based  on gestational age, weight and in  agreement with clinical  observations.    Premature Infant recommended reference ranges:  Up to 24 hours.............<8.0 mg/dL  Up to 48 hours............<12.0 mg/dL  3-5 days..................<15.0 mg/dL  6-29 days.................<15.0 mg/dL       Alkaline Phosphatase   Date Value Ref Range Status   09/28/2021 83 55 - 135 U/L Final   09/28/2021 83 55 - 135 U/L Final     AST   Date Value Ref Range Status   09/28/2021 15 10 - 40 U/L Final   09/28/2021 15 10 - 40 U/L Final     ALT   Date Value Ref Range Status   09/28/2021 12 10 - 44 U/L Final   09/28/2021 12 10 - 44 U/L Final     Anion Gap   Date Value Ref Range Status   09/28/2021 10 8 - 16 mmol/L Final   09/28/2021 10 8 - 16 mmol/L Final     eGFR if    Date Value Ref Range Status   09/28/2021 >60.0 >60 mL/min/1.73 m^2 Final   09/28/2021 >60.0 >60 mL/min/1.73 m^2 Final     eGFR if non    Date Value Ref Range Status   09/28/2021 >60.0 >60 mL/min/1.73 m^2 Final     Comment:     Calculation used to obtain the estimated glomerular filtration  rate (eGFR) is the CKD-EPI equation.      09/28/2021 >60.0 >60 mL/min/1.73 m^2 Final     Comment:     Calculation used to obtain the estimated glomerular filtration  rate (eGFR) is the CKD-EPI equation.            Assessment:      1. 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  Will message Dr. Khalil about his next visit as patient is not sure of follow up.  Will continue Lanreo and continue to see him every four months.          Relevant Orders    CBC Auto Differential    Comprehensive Metabolic Panel    Chromogranin A           Plan:   As Above in Assessment      The plan was discussed with the patient and all questions/concerns have been answered to the patient's satisfaction.

## 2022-01-26 NOTE — ASSESSMENT & PLAN NOTE
Patient doing well on Lanreotide and last scans show stable disease.  Will message Dr. Khalil about his next visit as patient is not sure of follow up.  Will continue Lanreo and continue to see him every four months.

## 2022-01-27 ENCOUNTER — INFUSION (OUTPATIENT)
Dept: INFUSION THERAPY | Facility: HOSPITAL | Age: 77
End: 2022-01-27
Attending: INTERNAL MEDICINE
Payer: MEDICARE

## 2022-01-27 VITALS
HEART RATE: 79 BPM | SYSTOLIC BLOOD PRESSURE: 142 MMHG | TEMPERATURE: 99 F | DIASTOLIC BLOOD PRESSURE: 71 MMHG | RESPIRATION RATE: 18 BRPM | WEIGHT: 209 LBS | BODY MASS INDEX: 26.83 KG/M2 | OXYGEN SATURATION: 97 %

## 2022-01-27 DIAGNOSIS — C7B.8 NEUROENDOCRINE CARCINOMA METASTATIC TO INTRA-ABDOMINAL LYMPH NODE: Primary | ICD-10-CM

## 2022-01-27 DIAGNOSIS — C7A.8 NEUROENDOCRINE CARCINOMA METASTATIC TO INTRA-ABDOMINAL LYMPH NODE: Primary | ICD-10-CM

## 2022-01-27 PROCEDURE — 96372 THER/PROPH/DIAG INJ SC/IM: CPT

## 2022-01-27 PROCEDURE — 63600175 PHARM REV CODE 636 W HCPCS: Mod: JG | Performed by: INTERNAL MEDICINE

## 2022-01-27 RX ORDER — LANREOTIDE ACETATE 120 MG/.5ML
120 INJECTION SUBCUTANEOUS
Status: COMPLETED | OUTPATIENT
Start: 2022-01-27 | End: 2022-01-27

## 2022-01-27 RX ADMIN — LANREOTIDE ACETATE 120 MG: 120 INJECTION SUBCUTANEOUS at 03:01

## 2022-02-23 RX ORDER — LANREOTIDE ACETATE 120 MG/.5ML
120 INJECTION SUBCUTANEOUS
Status: CANCELLED | OUTPATIENT
Start: 2022-02-24 | End: 2022-02-24

## 2022-02-24 ENCOUNTER — INFUSION (OUTPATIENT)
Dept: INFUSION THERAPY | Facility: HOSPITAL | Age: 77
End: 2022-02-24
Attending: INTERNAL MEDICINE
Payer: MEDICARE

## 2022-02-24 VITALS
OXYGEN SATURATION: 97 % | RESPIRATION RATE: 18 BRPM | SYSTOLIC BLOOD PRESSURE: 130 MMHG | DIASTOLIC BLOOD PRESSURE: 69 MMHG | HEIGHT: 74 IN | WEIGHT: 206.13 LBS | TEMPERATURE: 97 F | HEART RATE: 76 BPM | BODY MASS INDEX: 26.45 KG/M2

## 2022-02-24 DIAGNOSIS — C7A.8 NEUROENDOCRINE CARCINOMA METASTATIC TO INTRA-ABDOMINAL LYMPH NODE: Primary | ICD-10-CM

## 2022-02-24 DIAGNOSIS — C7B.8 NEUROENDOCRINE CARCINOMA METASTATIC TO INTRA-ABDOMINAL LYMPH NODE: Primary | ICD-10-CM

## 2022-02-24 PROCEDURE — 96372 THER/PROPH/DIAG INJ SC/IM: CPT

## 2022-02-24 PROCEDURE — 63600175 PHARM REV CODE 636 W HCPCS: Mod: JG | Performed by: NURSE PRACTITIONER

## 2022-02-24 RX ORDER — LANREOTIDE ACETATE 120 MG/.5ML
120 INJECTION SUBCUTANEOUS
Status: COMPLETED | OUTPATIENT
Start: 2022-02-24 | End: 2022-02-24

## 2022-02-24 RX ADMIN — LANREOTIDE ACETATE 120 MG: 120 INJECTION SUBCUTANEOUS at 02:02

## 2022-02-24 NOTE — PLAN OF CARE
Problem: Fatigue  Goal: Improved Activity Tolerance  Outcome: Ongoing, Progressing  Intervention: Promote Energy Conservation  Flowsheets (Taken 2/24/2022 1436)  Fatigue Management:   frequent rest breaks encouraged   fatigue-related activity identified   paced activity encouraged  Sleep/Rest Enhancement:   regular sleep/rest pattern promoted   relaxation techniques promoted

## 2022-03-24 ENCOUNTER — INFUSION (OUTPATIENT)
Dept: INFUSION THERAPY | Facility: HOSPITAL | Age: 77
End: 2022-03-24
Attending: INTERNAL MEDICINE
Payer: MEDICARE

## 2022-03-24 VITALS
WEIGHT: 207.63 LBS | OXYGEN SATURATION: 98 % | HEART RATE: 65 BPM | SYSTOLIC BLOOD PRESSURE: 130 MMHG | HEIGHT: 74 IN | DIASTOLIC BLOOD PRESSURE: 70 MMHG | BODY MASS INDEX: 26.65 KG/M2 | RESPIRATION RATE: 17 BRPM | TEMPERATURE: 97 F

## 2022-03-24 DIAGNOSIS — C7B.8 NEUROENDOCRINE CARCINOMA METASTATIC TO INTRA-ABDOMINAL LYMPH NODE: Primary | ICD-10-CM

## 2022-03-24 DIAGNOSIS — C7A.8 NEUROENDOCRINE CARCINOMA METASTATIC TO INTRA-ABDOMINAL LYMPH NODE: Primary | ICD-10-CM

## 2022-03-24 PROCEDURE — 96372 THER/PROPH/DIAG INJ SC/IM: CPT

## 2022-03-24 PROCEDURE — 63600175 PHARM REV CODE 636 W HCPCS: Mod: JG | Performed by: NURSE PRACTITIONER

## 2022-03-24 RX ORDER — LANREOTIDE ACETATE 120 MG/.5ML
120 INJECTION SUBCUTANEOUS
Status: COMPLETED | OUTPATIENT
Start: 2022-03-24 | End: 2022-03-24

## 2022-03-24 RX ADMIN — LANREOTIDE ACETATE 120 MG: 120 INJECTION SUBCUTANEOUS at 02:03

## 2022-03-25 ENCOUNTER — LAB VISIT (OUTPATIENT)
Dept: LAB | Facility: HOSPITAL | Age: 77
End: 2022-03-25
Attending: INTERNAL MEDICINE
Payer: MEDICARE

## 2022-03-25 DIAGNOSIS — C7B.8 NEUROENDOCRINE CARCINOMA METASTATIC TO INTRA-ABDOMINAL LYMPH NODE: ICD-10-CM

## 2022-03-25 DIAGNOSIS — C7A.8 NEUROENDOCRINE CARCINOMA METASTATIC TO INTRA-ABDOMINAL LYMPH NODE: ICD-10-CM

## 2022-03-25 LAB
ALBUMIN SERPL BCP-MCNC: 4.3 G/DL (ref 3.5–5.2)
ALP SERPL-CCNC: 87 U/L (ref 55–135)
ALT SERPL W/O P-5'-P-CCNC: 17 U/L (ref 10–44)
ANION GAP SERPL CALC-SCNC: 9 MMOL/L (ref 8–16)
AST SERPL-CCNC: 18 U/L (ref 10–40)
BASOPHILS # BLD AUTO: 0.07 K/UL (ref 0–0.2)
BASOPHILS NFR BLD: 1 % (ref 0–1.9)
BILIRUB SERPL-MCNC: 0.9 MG/DL (ref 0.1–1)
BUN SERPL-MCNC: 14 MG/DL (ref 8–23)
CALCIUM SERPL-MCNC: 10.1 MG/DL (ref 8.7–10.5)
CHLORIDE SERPL-SCNC: 104 MMOL/L (ref 95–110)
CO2 SERPL-SCNC: 28 MMOL/L (ref 23–29)
CREAT SERPL-MCNC: 1.1 MG/DL (ref 0.5–1.4)
DIFFERENTIAL METHOD: ABNORMAL
EOSINOPHIL # BLD AUTO: 0.2 K/UL (ref 0–0.5)
EOSINOPHIL NFR BLD: 2.6 % (ref 0–8)
ERYTHROCYTE [DISTWIDTH] IN BLOOD BY AUTOMATED COUNT: 12.4 % (ref 11.5–14.5)
EST. GFR  (AFRICAN AMERICAN): >60 ML/MIN/1.73 M^2
EST. GFR  (NON AFRICAN AMERICAN): >60 ML/MIN/1.73 M^2
GLUCOSE SERPL-MCNC: 105 MG/DL (ref 70–110)
HCT VFR BLD AUTO: 40.7 % (ref 40–54)
HGB BLD-MCNC: 13.3 G/DL (ref 14–18)
IMM GRANULOCYTES # BLD AUTO: 0.09 K/UL (ref 0–0.04)
IMM GRANULOCYTES NFR BLD AUTO: 1.3 % (ref 0–0.5)
LYMPHOCYTES # BLD AUTO: 1.8 K/UL (ref 1–4.8)
LYMPHOCYTES NFR BLD: 25.9 % (ref 18–48)
MCH RBC QN AUTO: 32.5 PG (ref 27–31)
MCHC RBC AUTO-ENTMCNC: 32.7 G/DL (ref 32–36)
MCV RBC AUTO: 100 FL (ref 82–98)
MONOCYTES # BLD AUTO: 0.9 K/UL (ref 0.3–1)
MONOCYTES NFR BLD: 12.5 % (ref 4–15)
NEUTROPHILS # BLD AUTO: 3.9 K/UL (ref 1.8–7.7)
NEUTROPHILS NFR BLD: 56.7 % (ref 38–73)
NRBC BLD-RTO: 0 /100 WBC
PLATELET # BLD AUTO: 211 K/UL (ref 150–450)
PMV BLD AUTO: 10.4 FL (ref 9.2–12.9)
POTASSIUM SERPL-SCNC: 4.4 MMOL/L (ref 3.5–5.1)
PROT SERPL-MCNC: 7.5 G/DL (ref 6–8.4)
RBC # BLD AUTO: 4.09 M/UL (ref 4.6–6.2)
SODIUM SERPL-SCNC: 141 MMOL/L (ref 136–145)
WBC # BLD AUTO: 6.9 K/UL (ref 3.9–12.7)

## 2022-03-25 PROCEDURE — 80053 COMPREHEN METABOLIC PANEL: CPT | Performed by: INTERNAL MEDICINE

## 2022-03-25 PROCEDURE — 85025 COMPLETE CBC W/AUTO DIFF WBC: CPT | Performed by: INTERNAL MEDICINE

## 2022-03-25 PROCEDURE — 36415 COLL VENOUS BLD VENIPUNCTURE: CPT | Mod: PO | Performed by: INTERNAL MEDICINE

## 2022-03-25 PROCEDURE — 86316 IMMUNOASSAY TUMOR OTHER: CPT | Performed by: INTERNAL MEDICINE

## 2022-03-29 LAB — CGA SERPL-MCNC: 66 NG/ML

## 2022-04-21 ENCOUNTER — INFUSION (OUTPATIENT)
Dept: INFUSION THERAPY | Facility: HOSPITAL | Age: 77
End: 2022-04-21
Attending: INTERNAL MEDICINE
Payer: MEDICARE

## 2022-04-21 VITALS
BODY MASS INDEX: 26.72 KG/M2 | WEIGHT: 208.19 LBS | OXYGEN SATURATION: 97 % | SYSTOLIC BLOOD PRESSURE: 129 MMHG | TEMPERATURE: 97 F | RESPIRATION RATE: 18 BRPM | HEART RATE: 77 BPM | DIASTOLIC BLOOD PRESSURE: 64 MMHG | HEIGHT: 74 IN

## 2022-04-21 DIAGNOSIS — C7B.8 NEUROENDOCRINE CARCINOMA METASTATIC TO INTRA-ABDOMINAL LYMPH NODE: Primary | ICD-10-CM

## 2022-04-21 DIAGNOSIS — C7A.8 NEUROENDOCRINE CARCINOMA METASTATIC TO INTRA-ABDOMINAL LYMPH NODE: Primary | ICD-10-CM

## 2022-04-21 PROCEDURE — 96372 THER/PROPH/DIAG INJ SC/IM: CPT

## 2022-04-21 PROCEDURE — 63600175 PHARM REV CODE 636 W HCPCS: Mod: JG | Performed by: NURSE PRACTITIONER

## 2022-04-21 RX ORDER — LANREOTIDE ACETATE 120 MG/.5ML
120 INJECTION SUBCUTANEOUS
Status: COMPLETED | OUTPATIENT
Start: 2022-04-21 | End: 2022-04-21

## 2022-04-21 RX ADMIN — LANREOTIDE ACETATE 120 MG: 120 INJECTION SUBCUTANEOUS at 03:04

## 2022-04-21 NOTE — PLAN OF CARE
Problem: Fatigue  Goal: Improved Activity Tolerance  Outcome: Ongoing, Progressing  Intervention: Promote Improved Energy  Flowsheets (Taken 4/21/2022 9277)  Fatigue Management: frequent rest breaks encouraged  Sleep/Rest Enhancement:   regular sleep/rest pattern promoted   relaxation techniques promoted  Activity Management: Ambulated -L4

## 2022-05-19 ENCOUNTER — INFUSION (OUTPATIENT)
Dept: INFUSION THERAPY | Facility: HOSPITAL | Age: 77
End: 2022-05-19
Attending: INTERNAL MEDICINE
Payer: MEDICARE

## 2022-05-19 VITALS
WEIGHT: 204.69 LBS | BODY MASS INDEX: 26.27 KG/M2 | TEMPERATURE: 97 F | DIASTOLIC BLOOD PRESSURE: 67 MMHG | HEART RATE: 74 BPM | OXYGEN SATURATION: 96 % | HEIGHT: 74 IN | SYSTOLIC BLOOD PRESSURE: 138 MMHG | RESPIRATION RATE: 18 BRPM

## 2022-05-19 DIAGNOSIS — C7B.8 NEUROENDOCRINE CARCINOMA METASTATIC TO INTRA-ABDOMINAL LYMPH NODE: Primary | ICD-10-CM

## 2022-05-19 DIAGNOSIS — C7A.8 NEUROENDOCRINE CARCINOMA METASTATIC TO INTRA-ABDOMINAL LYMPH NODE: Primary | ICD-10-CM

## 2022-05-19 PROCEDURE — 96372 THER/PROPH/DIAG INJ SC/IM: CPT

## 2022-05-19 PROCEDURE — 63600175 PHARM REV CODE 636 W HCPCS: Mod: JG | Performed by: NURSE PRACTITIONER

## 2022-05-19 RX ORDER — LANREOTIDE ACETATE 120 MG/.5ML
120 INJECTION SUBCUTANEOUS
Status: COMPLETED | OUTPATIENT
Start: 2022-05-19 | End: 2022-05-19

## 2022-05-19 RX ADMIN — LANREOTIDE ACETATE 120 MG: 120 INJECTION SUBCUTANEOUS at 01:05

## 2022-05-19 NOTE — PLAN OF CARE
Problem: Fatigue  Goal: Improved Activity Tolerance  Outcome: Ongoing, Progressing  Intervention: Promote Improved Energy  Flowsheets (Taken 5/19/2022 1333)  Fatigue Management:   frequent rest breaks encouraged   fatigue-related activity identified   paced activity encouraged  Sleep/Rest Enhancement:   regular sleep/rest pattern promoted   relaxation techniques promoted

## 2022-05-24 NOTE — PROGRESS NOTES
Subjective:       Patient ID: Roosevelt Alejandro is a 76 y.o. male.    Chief Complaint: leading to consultation is: neuroendocrine carcinoma follow up.     HPI:  Patient returns today for a regularly scheduled follow-up visit.      Mr. Alejandro is doing well today. Feeling weak and tired over the last few days.      CT, MRI of abd/p and dotatate PET all stable 7/6/2021 1/5/2021 Dotatate PET:  Stable size and distribution of somatostatin receptor avid disease in the mediastinum and abdomen, multiple index lesions demonstrate increased avidity as detailed above.  No new lesions identified.         Patient underwent surgery 11/4/2019 with Dr. Khalil.  Partial path report:     FINAL PATHOLOGIC DIAGNOSIS     SYNOPTIC REPORT  Procedure: Segmental resection, small intestine  Tumor Site: Small intestine, terminal ileum  Tumor Size: Greatest dimension (centimeters): 1.8 cm  Tumor Focality: Unifocal  Histologic Type and Grade G1: Well-differentiated neuroendocrine tumor  Mitotic Rate: 1.5 mitoses / 10 HPF  Ki-67 Labeling Index: Specify Ki-67 percentage: about 1.1 %  Tumor Extension: Tumor at least invades through the muscularis propria into subserosal tissue  All margins are uninvolved by tumor: Margins examined: proximal, distal, and radial.  Lymphovascular Invasion: Present  Perineural Invasion: Present  Large Mesenteric Masses (>2 cm), not identified  Regional Lymph Nodes Examination (counting lymph nodes from all the 38 parts)  Number of Lymph Nodes Involved: 41  Number of Lymph Nodes Examined: 46  Pathologic Stage Classification (pTNM, AJCC 8th Edition)  Primary Tumor (pT) at least pT3: Invades through the muscularis propria into subserosal tissue  pN2: Extensive rigoberto deposits (12 or greater),  Distant Metastasis (pM) pM1b: Metastasis in at least one extrahepatic site (nonregional lymph node: Aortocaval  node. right inferior aorta node)  NET panel  Primary small bowel tumor (Block 18D)  Ki-67: positive, approximately  1.1% cells staining  Chromogranin: positive, 3+, 100%  Synaptophysin: positive, 3+, 100%  CD31: 12 / HPF  TTF: not applicable  Lymph node metastasis (Block 38A)  Ki-67: positive, approximately 6.5 % cells staining        CT abdomen 7/15/2019:  Mesenteric and retroperitoneal lymphadenopathy, with numerous necrotic appearing lymph node is within the central mesentery.  Some of these nodes have mildly increased in size, while the largest previously present node has significantly decreased in size as above.  Further workup for neoplastic process such as metastatic disease or lymphoma is recommended.    Normal appendix.  Moderate diverticulosis.  Small hiatal hernia.  Hepatic and renal cysts.  Atherosclerosis.  Evidence for prior asbestos exposure.    All medications and past medical and surgical history have been reviewed.         Review of patient's allergies indicates:   Allergen Reactions    Epinephrine      Patient on Sandostatin and Epinephrine contraindicated    Aspirin Other (See Comments)     Internal bleeding    Lisinopril (bulk) Rash    Sulfa (sulfonamide antibiotics) Swelling and Rash       ROS  GEN:   normal without any fever, night sweats or weight loss  HEENT:  normal with no HA's,  changes in vision  CV:   normal with no CP, SOB  PULM:  normal with no SOB, cough  GI:   normal with no abdominal pain, nausea, vomiting  :   normal with no hematuria, dysuria  SKIN:   normal with no rash      Previous FAMHX and SOCHX information reviewed and remains unchanged         Objective:        GEN: no apparent distress, comfortable; AAOx3  HEAD: atraumatic and normocephalic  EYES: no pallor, no icterus, PERRLA  ENT: external ears WNL; no nasal discharge  NECK: no visible masses, trachea midline  CHEST: Normal respiratory effort  ABDOM: Visibly Flat  SKIN: no visible rashes  NEURO: grossly intact; motor/sensory WNL; AAOx3; no tremors  PSYCH: normal mood, affect and behavior                All lab results and  imaging results have been reviewed and discussed with the patient  No results found for this or any previous visit (from the past 336 hour(s)).  CMP  Sodium   Date Value Ref Range Status   03/25/2022 141 136 - 145 mmol/L Final     Potassium   Date Value Ref Range Status   03/25/2022 4.4 3.5 - 5.1 mmol/L Final     Chloride   Date Value Ref Range Status   03/25/2022 104 95 - 110 mmol/L Final     CO2   Date Value Ref Range Status   03/25/2022 28 23 - 29 mmol/L Final     Glucose   Date Value Ref Range Status   03/25/2022 105 70 - 110 mg/dL Final     BUN   Date Value Ref Range Status   03/25/2022 14 8 - 23 mg/dL Final     Creatinine   Date Value Ref Range Status   03/25/2022 1.1 0.5 - 1.4 mg/dL Final     Calcium   Date Value Ref Range Status   03/25/2022 10.1 8.7 - 10.5 mg/dL Final     Total Protein   Date Value Ref Range Status   03/25/2022 7.5 6.0 - 8.4 g/dL Final     Albumin   Date Value Ref Range Status   03/25/2022 4.3 3.5 - 5.2 g/dL Final     Total Bilirubin   Date Value Ref Range Status   03/25/2022 0.9 0.1 - 1.0 mg/dL Final     Comment:     For infants and newborns, interpretation of results should be based  on gestational age, weight and in agreement with clinical  observations.    Premature Infant recommended reference ranges:  Up to 24 hours.............<8.0 mg/dL  Up to 48 hours............<12.0 mg/dL  3-5 days..................<15.0 mg/dL  6-29 days.................<15.0 mg/dL       Alkaline Phosphatase   Date Value Ref Range Status   03/25/2022 87 55 - 135 U/L Final     AST   Date Value Ref Range Status   03/25/2022 18 10 - 40 U/L Final     ALT   Date Value Ref Range Status   03/25/2022 17 10 - 44 U/L Final     Anion Gap   Date Value Ref Range Status   03/25/2022 9 8 - 16 mmol/L Final     eGFR if    Date Value Ref Range Status   03/25/2022 >60.0 >60 mL/min/1.73 m^2 Final     eGFR if non    Date Value Ref Range Status   03/25/2022 >60.0 >60 mL/min/1.73 m^2 Final     Comment:      Calculation used to obtain the estimated glomerular filtration  rate (eGFR) is the CKD-EPI equation.            Assessment:      1. 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  Will arrange a dotatate pet and CT scan to be done in about 6 weeks.  Will have him back with me after this is done. If new issues arise then will refer back to Dr. Khalil.             Relevant Orders    NM PET 68Ga DOTATATE, Vertex to Thigh    Comprehensive Metabolic Panel    CBC Auto Differential    CT Abdomen Pelvis With Contrast           Plan:   As Above in Assessment      The plan was discussed with the patient and all questions/concerns have been answered to the patient's satisfaction.

## 2022-05-25 ENCOUNTER — OFFICE VISIT (OUTPATIENT)
Dept: HEMATOLOGY/ONCOLOGY | Facility: CLINIC | Age: 77
End: 2022-05-25
Payer: MEDICARE

## 2022-05-25 VITALS
SYSTOLIC BLOOD PRESSURE: 132 MMHG | HEIGHT: 74 IN | HEART RATE: 79 BPM | TEMPERATURE: 98 F | BODY MASS INDEX: 26.18 KG/M2 | WEIGHT: 204 LBS | DIASTOLIC BLOOD PRESSURE: 71 MMHG | RESPIRATION RATE: 18 BRPM

## 2022-05-25 DIAGNOSIS — C7B.8 NEUROENDOCRINE CARCINOMA METASTATIC TO INTRA-ABDOMINAL LYMPH NODE: ICD-10-CM

## 2022-05-25 DIAGNOSIS — C7A.8 NEUROENDOCRINE CARCINOMA METASTATIC TO INTRA-ABDOMINAL LYMPH NODE: ICD-10-CM

## 2022-05-25 PROCEDURE — 3288F PR FALLS RISK ASSESSMENT DOCUMENTED: ICD-10-PCS | Mod: CPTII,S$GLB,, | Performed by: INTERNAL MEDICINE

## 2022-05-25 PROCEDURE — 99213 OFFICE O/P EST LOW 20 MIN: CPT | Mod: S$GLB,,, | Performed by: INTERNAL MEDICINE

## 2022-05-25 PROCEDURE — 1101F PR PT FALLS ASSESS DOC 0-1 FALLS W/OUT INJ PAST YR: ICD-10-PCS | Mod: CPTII,S$GLB,, | Performed by: INTERNAL MEDICINE

## 2022-05-25 PROCEDURE — 3078F PR MOST RECENT DIASTOLIC BLOOD PRESSURE < 80 MM HG: ICD-10-PCS | Mod: CPTII,S$GLB,, | Performed by: INTERNAL MEDICINE

## 2022-05-25 PROCEDURE — 1159F PR MEDICATION LIST DOCUMENTED IN MEDICAL RECORD: ICD-10-PCS | Mod: CPTII,S$GLB,, | Performed by: INTERNAL MEDICINE

## 2022-05-25 PROCEDURE — 1101F PT FALLS ASSESS-DOCD LE1/YR: CPT | Mod: CPTII,S$GLB,, | Performed by: INTERNAL MEDICINE

## 2022-05-25 PROCEDURE — 3078F DIAST BP <80 MM HG: CPT | Mod: CPTII,S$GLB,, | Performed by: INTERNAL MEDICINE

## 2022-05-25 PROCEDURE — 1159F MED LIST DOCD IN RCRD: CPT | Mod: CPTII,S$GLB,, | Performed by: INTERNAL MEDICINE

## 2022-05-25 PROCEDURE — 1126F AMNT PAIN NOTED NONE PRSNT: CPT | Mod: CPTII,S$GLB,, | Performed by: INTERNAL MEDICINE

## 2022-05-25 PROCEDURE — 1126F PR PAIN SEVERITY QUANTIFIED, NO PAIN PRESENT: ICD-10-PCS | Mod: CPTII,S$GLB,, | Performed by: INTERNAL MEDICINE

## 2022-05-25 PROCEDURE — 3288F FALL RISK ASSESSMENT DOCD: CPT | Mod: CPTII,S$GLB,, | Performed by: INTERNAL MEDICINE

## 2022-05-25 PROCEDURE — 3075F PR MOST RECENT SYSTOLIC BLOOD PRESS GE 130-139MM HG: ICD-10-PCS | Mod: CPTII,S$GLB,, | Performed by: INTERNAL MEDICINE

## 2022-05-25 PROCEDURE — 3075F SYST BP GE 130 - 139MM HG: CPT | Mod: CPTII,S$GLB,, | Performed by: INTERNAL MEDICINE

## 2022-05-25 PROCEDURE — 99213 PR OFFICE/OUTPT VISIT, EST, LEVL III, 20-29 MIN: ICD-10-PCS | Mod: S$GLB,,, | Performed by: INTERNAL MEDICINE

## 2022-05-25 NOTE — ASSESSMENT & PLAN NOTE
Patient is doing ok but is due for imaging.  Will arrange a dotatate pet and CT scan to be done in about 6 weeks.  Will have him back with me after this is done. If new issues arise then will refer back to Dr. Khalil.

## 2022-05-27 ENCOUNTER — LAB VISIT (OUTPATIENT)
Dept: LAB | Facility: HOSPITAL | Age: 77
End: 2022-05-27
Attending: INTERNAL MEDICINE
Payer: MEDICARE

## 2022-05-27 DIAGNOSIS — C7B.8 NEUROENDOCRINE CARCINOMA METASTATIC TO INTRA-ABDOMINAL LYMPH NODE: ICD-10-CM

## 2022-05-27 DIAGNOSIS — C7A.8 NEUROENDOCRINE CARCINOMA METASTATIC TO INTRA-ABDOMINAL LYMPH NODE: ICD-10-CM

## 2022-05-27 LAB
ALBUMIN SERPL BCP-MCNC: 4.6 G/DL (ref 3.5–5.2)
ALP SERPL-CCNC: 77 U/L (ref 55–135)
ALT SERPL W/O P-5'-P-CCNC: 20 U/L (ref 10–44)
ANION GAP SERPL CALC-SCNC: 12 MMOL/L (ref 8–16)
AST SERPL-CCNC: 20 U/L (ref 10–40)
BASOPHILS # BLD AUTO: 0.12 K/UL (ref 0–0.2)
BASOPHILS NFR BLD: 1.1 % (ref 0–1.9)
BILIRUB SERPL-MCNC: 1 MG/DL (ref 0.1–1)
BUN SERPL-MCNC: 18 MG/DL (ref 8–23)
CALCIUM SERPL-MCNC: 9 MG/DL (ref 8.7–10.5)
CHLORIDE SERPL-SCNC: 102 MMOL/L (ref 95–110)
CO2 SERPL-SCNC: 24 MMOL/L (ref 23–29)
CREAT SERPL-MCNC: 1.3 MG/DL (ref 0.5–1.4)
DIFFERENTIAL METHOD: ABNORMAL
EOSINOPHIL # BLD AUTO: 0.2 K/UL (ref 0–0.5)
EOSINOPHIL NFR BLD: 1.8 % (ref 0–8)
ERYTHROCYTE [DISTWIDTH] IN BLOOD BY AUTOMATED COUNT: 12.4 % (ref 11.5–14.5)
EST. GFR  (AFRICAN AMERICAN): >60 ML/MIN/1.73 M^2
EST. GFR  (NON AFRICAN AMERICAN): 52.6 ML/MIN/1.73 M^2
GLUCOSE SERPL-MCNC: 117 MG/DL (ref 70–110)
HCT VFR BLD AUTO: 38.5 % (ref 40–54)
HGB BLD-MCNC: 13.2 G/DL (ref 14–18)
IMM GRANULOCYTES # BLD AUTO: 0.21 K/UL (ref 0–0.04)
IMM GRANULOCYTES NFR BLD AUTO: 1.9 % (ref 0–0.5)
LYMPHOCYTES # BLD AUTO: 1.8 K/UL (ref 1–4.8)
LYMPHOCYTES NFR BLD: 15.9 % (ref 18–48)
MCH RBC QN AUTO: 33.8 PG (ref 27–31)
MCHC RBC AUTO-ENTMCNC: 34.3 G/DL (ref 32–36)
MCV RBC AUTO: 99 FL (ref 82–98)
MONOCYTES # BLD AUTO: 0.8 K/UL (ref 0.3–1)
MONOCYTES NFR BLD: 7.2 % (ref 4–15)
NEUTROPHILS # BLD AUTO: 7.9 K/UL (ref 1.8–7.7)
NEUTROPHILS NFR BLD: 72.1 % (ref 38–73)
NRBC BLD-RTO: 0 /100 WBC
PLATELET # BLD AUTO: 197 K/UL (ref 150–450)
PMV BLD AUTO: 10.6 FL (ref 9.2–12.9)
POTASSIUM SERPL-SCNC: 3.7 MMOL/L (ref 3.5–5.1)
PROT SERPL-MCNC: 7.4 G/DL (ref 6–8.4)
RBC # BLD AUTO: 3.9 M/UL (ref 4.6–6.2)
SODIUM SERPL-SCNC: 138 MMOL/L (ref 136–145)
WBC # BLD AUTO: 10.99 K/UL (ref 3.9–12.7)

## 2022-05-27 PROCEDURE — 80053 COMPREHEN METABOLIC PANEL: CPT | Performed by: INTERNAL MEDICINE

## 2022-05-27 PROCEDURE — 85025 COMPLETE CBC W/AUTO DIFF WBC: CPT | Performed by: INTERNAL MEDICINE

## 2022-05-27 PROCEDURE — 36415 COLL VENOUS BLD VENIPUNCTURE: CPT | Performed by: INTERNAL MEDICINE

## 2022-05-31 ENCOUNTER — PATIENT MESSAGE (OUTPATIENT)
Dept: ADMINISTRATIVE | Facility: HOSPITAL | Age: 77
End: 2022-05-31
Payer: MEDICARE

## 2022-06-16 ENCOUNTER — INFUSION (OUTPATIENT)
Dept: INFUSION THERAPY | Facility: HOSPITAL | Age: 77
End: 2022-06-16
Attending: INTERNAL MEDICINE
Payer: MEDICARE

## 2022-06-16 VITALS
RESPIRATION RATE: 18 BRPM | WEIGHT: 207 LBS | TEMPERATURE: 98 F | SYSTOLIC BLOOD PRESSURE: 146 MMHG | HEIGHT: 74 IN | HEART RATE: 81 BPM | DIASTOLIC BLOOD PRESSURE: 71 MMHG | BODY MASS INDEX: 26.56 KG/M2 | OXYGEN SATURATION: 97 %

## 2022-06-16 DIAGNOSIS — C7B.8 NEUROENDOCRINE CARCINOMA METASTATIC TO INTRA-ABDOMINAL LYMPH NODE: Primary | ICD-10-CM

## 2022-06-16 DIAGNOSIS — C7A.8 NEUROENDOCRINE CARCINOMA METASTATIC TO INTRA-ABDOMINAL LYMPH NODE: Primary | ICD-10-CM

## 2022-06-16 PROCEDURE — 96372 THER/PROPH/DIAG INJ SC/IM: CPT

## 2022-06-16 PROCEDURE — 63600175 PHARM REV CODE 636 W HCPCS: Mod: JG | Performed by: NURSE PRACTITIONER

## 2022-06-16 RX ORDER — LANREOTIDE ACETATE 120 MG/.5ML
120 INJECTION SUBCUTANEOUS
Status: COMPLETED | OUTPATIENT
Start: 2022-06-16 | End: 2022-06-16

## 2022-06-16 RX ADMIN — LANREOTIDE ACETATE 120 MG: 120 INJECTION SUBCUTANEOUS at 03:06

## 2022-06-16 NOTE — PLAN OF CARE
Problem: Activity Intolerance  Goal: Enhanced Capacity and Energy  Intervention: Optimize Activity Tolerance  Flowsheets (Taken 6/16/2022 1534)  Self-Care Promotion: independence encouraged  Activity Management: Ambulated -L4

## 2022-07-14 ENCOUNTER — INFUSION (OUTPATIENT)
Dept: INFUSION THERAPY | Facility: HOSPITAL | Age: 77
End: 2022-07-14
Attending: INTERNAL MEDICINE
Payer: MEDICARE

## 2022-07-14 VITALS
HEART RATE: 80 BPM | RESPIRATION RATE: 18 BRPM | OXYGEN SATURATION: 95 % | HEIGHT: 74 IN | SYSTOLIC BLOOD PRESSURE: 131 MMHG | BODY MASS INDEX: 26.73 KG/M2 | DIASTOLIC BLOOD PRESSURE: 68 MMHG | WEIGHT: 208.31 LBS | TEMPERATURE: 98 F

## 2022-07-14 DIAGNOSIS — C7B.8 NEUROENDOCRINE CARCINOMA METASTATIC TO INTRA-ABDOMINAL LYMPH NODE: Primary | ICD-10-CM

## 2022-07-14 DIAGNOSIS — C7A.8 NEUROENDOCRINE CARCINOMA METASTATIC TO INTRA-ABDOMINAL LYMPH NODE: Primary | ICD-10-CM

## 2022-07-14 PROCEDURE — 96372 THER/PROPH/DIAG INJ SC/IM: CPT

## 2022-07-14 PROCEDURE — 63600175 PHARM REV CODE 636 W HCPCS: Mod: JG | Performed by: NURSE PRACTITIONER

## 2022-07-14 RX ORDER — LANREOTIDE ACETATE 120 MG/.5ML
120 INJECTION SUBCUTANEOUS
Status: COMPLETED | OUTPATIENT
Start: 2022-07-14 | End: 2022-07-14

## 2022-07-14 RX ADMIN — LANREOTIDE ACETATE 120 MG: 120 INJECTION SUBCUTANEOUS at 02:07

## 2022-07-18 ENCOUNTER — HOSPITAL ENCOUNTER (OUTPATIENT)
Dept: RADIOLOGY | Facility: HOSPITAL | Age: 77
Discharge: HOME | End: 2022-07-18
Attending: INTERNAL MEDICINE
Payer: MEDICARE

## 2022-07-18 DIAGNOSIS — C7B.8 NEUROENDOCRINE CARCINOMA METASTATIC TO INTRA-ABDOMINAL LYMPH NODE: ICD-10-CM

## 2022-07-18 DIAGNOSIS — C7A.8 NEUROENDOCRINE CARCINOMA METASTATIC TO INTRA-ABDOMINAL LYMPH NODE: ICD-10-CM

## 2022-07-18 LAB
CREAT SERPL-MCNC: 1.2 MG/DL (ref 0.5–1.4)
SAMPLE: NORMAL

## 2022-07-18 PROCEDURE — 25500020 PHARM REV CODE 255: Mod: PO | Performed by: INTERNAL MEDICINE

## 2022-07-18 PROCEDURE — 82565 ASSAY OF CREATININE: CPT | Mod: PO

## 2022-07-18 RX ADMIN — IOHEXOL 100 ML: 350 INJECTION, SOLUTION INTRAVENOUS at 12:07

## 2022-07-27 NOTE — PROGRESS NOTES
Subjective:       Patient ID: Roosevelt Alejandro is a 76 y.o. male.    Chief Complaint: leading to consultation is: neuroendocrine carcinoma follow up.     HPI:  Patient returns today for a regularly scheduled follow-up visit.      Mr. Alejandro is doing well today. Feeling weak and tired over the last few days.      CT, MRI of abd/p and dotatate PET all stable 7/6/2021 1/5/2021 Dotatate PET:  Stable size and distribution of somatostatin receptor avid disease in the mediastinum and abdomen, multiple index lesions demonstrate increased avidity as detailed above.  No new lesions identified.         Patient underwent surgery 11/4/2019 with Dr. Khalil.  Partial path report:     FINAL PATHOLOGIC DIAGNOSIS     SYNOPTIC REPORT  Procedure: Segmental resection, small intestine  Tumor Site: Small intestine, terminal ileum  Tumor Size: Greatest dimension (centimeters): 1.8 cm  Tumor Focality: Unifocal  Histologic Type and Grade G1: Well-differentiated neuroendocrine tumor  Mitotic Rate: 1.5 mitoses / 10 HPF  Ki-67 Labeling Index: Specify Ki-67 percentage: about 1.1 %  Tumor Extension: Tumor at least invades through the muscularis propria into subserosal tissue  All margins are uninvolved by tumor: Margins examined: proximal, distal, and radial.  Lymphovascular Invasion: Present  Perineural Invasion: Present  Large Mesenteric Masses (>2 cm), not identified  Regional Lymph Nodes Examination (counting lymph nodes from all the 38 parts)  Number of Lymph Nodes Involved: 41  Number of Lymph Nodes Examined: 46  Pathologic Stage Classification (pTNM, AJCC 8th Edition)  Primary Tumor (pT) at least pT3: Invades through the muscularis propria into subserosal tissue  pN2: Extensive rigoberto deposits (12 or greater),  Distant Metastasis (pM) pM1b: Metastasis in at least one extrahepatic site (nonregional lymph node: Aortocaval  node. right inferior aorta node)  NET panel  Primary small bowel tumor (Block 18D)  Ki-67: positive, approximately  1.1% cells staining  Chromogranin: positive, 3+, 100%  Synaptophysin: positive, 3+, 100%  CD31: 12 / HPF  TTF: not applicable  Lymph node metastasis (Block 38A)  Ki-67: positive, approximately 6.5 % cells staining        CT abdomen 7/15/2019:  Mesenteric and retroperitoneal lymphadenopathy, with numerous necrotic appearing lymph node is within the central mesentery.  Some of these nodes have mildly increased in size, while the largest previously present node has significantly decreased in size as above.  Further workup for neoplastic process such as metastatic disease or lymphoma is recommended.    Normal appendix.  Moderate diverticulosis.  Small hiatal hernia.  Hepatic and renal cysts.  Atherosclerosis.  Evidence for prior asbestos exposure.    All medications and past medical and surgical history have been reviewed.         Review of patient's allergies indicates:   Allergen Reactions    Epinephrine      Patient on Sandostatin and Epinephrine contraindicated    Aspirin Other (See Comments)     Internal bleeding    Lisinopril (bulk) Rash    Sulfa (sulfonamide antibiotics) Swelling and Rash       ROS  GEN:   normal without any fever, night sweats or weight loss  HEENT:  normal with no HA's,  changes in vision  CV:   normal with no CP, SOB  PULM:  normal with no SOB, cough  GI:   normal with no abdominal pain, nausea, vomiting  :   normal with no hematuria, dysuria  SKIN:   normal with no rash      Previous FAMHX and SOCHX information reviewed and remains unchanged         Objective:        GEN: no apparent distress, comfortable; AAOx3  HEAD: atraumatic and normocephalic  EYES: no pallor, no icterus, PERRLA  ENT: external ears WNL; no nasal discharge  NECK: no visible masses, trachea midline  CHEST: Normal respiratory effort  ABDOM: Visibly Flat  SKIN: no visible rashes  NEURO: grossly intact; motor/sensory WNL; AAOx3; no tremors  PSYCH: normal mood, affect and behavior                All lab results and  imaging results have been reviewed and discussed with the patient  No results found for this or any previous visit (from the past 336 hour(s)).  CMP  Sodium   Date Value Ref Range Status   05/27/2022 138 136 - 145 mmol/L Final     Potassium   Date Value Ref Range Status   05/27/2022 3.7 3.5 - 5.1 mmol/L Final     Chloride   Date Value Ref Range Status   05/27/2022 102 95 - 110 mmol/L Final     CO2   Date Value Ref Range Status   05/27/2022 24 23 - 29 mmol/L Final     Glucose   Date Value Ref Range Status   05/27/2022 117 (H) 70 - 110 mg/dL Final     BUN   Date Value Ref Range Status   05/27/2022 18 8 - 23 mg/dL Final     Creatinine   Date Value Ref Range Status   05/27/2022 1.3 0.5 - 1.4 mg/dL Final     Calcium   Date Value Ref Range Status   05/27/2022 9.0 8.7 - 10.5 mg/dL Final     Total Protein   Date Value Ref Range Status   05/27/2022 7.4 6.0 - 8.4 g/dL Final     Albumin   Date Value Ref Range Status   05/27/2022 4.6 3.5 - 5.2 g/dL Final     Total Bilirubin   Date Value Ref Range Status   05/27/2022 1.0 0.1 - 1.0 mg/dL Final     Comment:     For infants and newborns, interpretation of results should be based  on gestational age, weight and in agreement with clinical  observations.    Premature Infant recommended reference ranges:  Up to 24 hours.............<8.0 mg/dL  Up to 48 hours............<12.0 mg/dL  3-5 days..................<15.0 mg/dL  6-29 days.................<15.0 mg/dL       Alkaline Phosphatase   Date Value Ref Range Status   05/27/2022 77 55 - 135 U/L Final     AST   Date Value Ref Range Status   05/27/2022 20 10 - 40 U/L Final     ALT   Date Value Ref Range Status   05/27/2022 20 10 - 44 U/L Final     Anion Gap   Date Value Ref Range Status   05/27/2022 12 8 - 16 mmol/L Final     eGFR if    Date Value Ref Range Status   05/27/2022 >60.0 >60 mL/min/1.73 m^2 Final     eGFR if non    Date Value Ref Range Status   05/27/2022 52.6 (A) >60 mL/min/1.73 m^2 Final      Comment:     Calculation used to obtain the estimated glomerular filtration  rate (eGFR) is the CKD-EPI equation.            Assessment:      1. 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  CT scan shows minimal growth of the adenopathy but I don't think this is of major concern currently.  Will continue Lanreotide and routine follow up.  Will see him again in three months.                    Plan:   As Above in Assessment      The plan was discussed with the patient and all questions/concerns have been answered to the patient's satisfaction.

## 2022-07-28 ENCOUNTER — OFFICE VISIT (OUTPATIENT)
Dept: HEMATOLOGY/ONCOLOGY | Facility: CLINIC | Age: 77
End: 2022-07-28
Payer: MEDICARE

## 2022-07-28 VITALS
DIASTOLIC BLOOD PRESSURE: 69 MMHG | SYSTOLIC BLOOD PRESSURE: 150 MMHG | HEIGHT: 74 IN | BODY MASS INDEX: 27.08 KG/M2 | HEART RATE: 70 BPM | TEMPERATURE: 98 F | RESPIRATION RATE: 18 BRPM | WEIGHT: 211 LBS

## 2022-07-28 DIAGNOSIS — C7B.8 NEUROENDOCRINE CARCINOMA METASTATIC TO INTRA-ABDOMINAL LYMPH NODE: ICD-10-CM

## 2022-07-28 DIAGNOSIS — C7A.8 NEUROENDOCRINE CARCINOMA METASTATIC TO INTRA-ABDOMINAL LYMPH NODE: ICD-10-CM

## 2022-07-28 PROCEDURE — 1101F PT FALLS ASSESS-DOCD LE1/YR: CPT | Mod: CPTII,S$GLB,, | Performed by: INTERNAL MEDICINE

## 2022-07-28 PROCEDURE — 3288F FALL RISK ASSESSMENT DOCD: CPT | Mod: CPTII,S$GLB,, | Performed by: INTERNAL MEDICINE

## 2022-07-28 PROCEDURE — 1101F PR PT FALLS ASSESS DOC 0-1 FALLS W/OUT INJ PAST YR: ICD-10-PCS | Mod: CPTII,S$GLB,, | Performed by: INTERNAL MEDICINE

## 2022-07-28 PROCEDURE — 3078F PR MOST RECENT DIASTOLIC BLOOD PRESSURE < 80 MM HG: ICD-10-PCS | Mod: CPTII,S$GLB,, | Performed by: INTERNAL MEDICINE

## 2022-07-28 PROCEDURE — 3077F PR MOST RECENT SYSTOLIC BLOOD PRESSURE >= 140 MM HG: ICD-10-PCS | Mod: CPTII,S$GLB,, | Performed by: INTERNAL MEDICINE

## 2022-07-28 PROCEDURE — 3077F SYST BP >= 140 MM HG: CPT | Mod: CPTII,S$GLB,, | Performed by: INTERNAL MEDICINE

## 2022-07-28 PROCEDURE — 1160F RVW MEDS BY RX/DR IN RCRD: CPT | Mod: CPTII,S$GLB,, | Performed by: INTERNAL MEDICINE

## 2022-07-28 PROCEDURE — 99213 PR OFFICE/OUTPT VISIT, EST, LEVL III, 20-29 MIN: ICD-10-PCS | Mod: S$GLB,,, | Performed by: INTERNAL MEDICINE

## 2022-07-28 PROCEDURE — 3288F PR FALLS RISK ASSESSMENT DOCUMENTED: ICD-10-PCS | Mod: CPTII,S$GLB,, | Performed by: INTERNAL MEDICINE

## 2022-07-28 PROCEDURE — 1159F MED LIST DOCD IN RCRD: CPT | Mod: CPTII,S$GLB,, | Performed by: INTERNAL MEDICINE

## 2022-07-28 PROCEDURE — 1126F PR PAIN SEVERITY QUANTIFIED, NO PAIN PRESENT: ICD-10-PCS | Mod: CPTII,S$GLB,, | Performed by: INTERNAL MEDICINE

## 2022-07-28 PROCEDURE — 99213 OFFICE O/P EST LOW 20 MIN: CPT | Mod: S$GLB,,, | Performed by: INTERNAL MEDICINE

## 2022-07-28 PROCEDURE — 1126F AMNT PAIN NOTED NONE PRSNT: CPT | Mod: CPTII,S$GLB,, | Performed by: INTERNAL MEDICINE

## 2022-07-28 PROCEDURE — 1160F PR REVIEW ALL MEDS BY PRESCRIBER/CLIN PHARMACIST DOCUMENTED: ICD-10-PCS | Mod: CPTII,S$GLB,, | Performed by: INTERNAL MEDICINE

## 2022-07-28 PROCEDURE — 1159F PR MEDICATION LIST DOCUMENTED IN MEDICAL RECORD: ICD-10-PCS | Mod: CPTII,S$GLB,, | Performed by: INTERNAL MEDICINE

## 2022-07-28 PROCEDURE — 3078F DIAST BP <80 MM HG: CPT | Mod: CPTII,S$GLB,, | Performed by: INTERNAL MEDICINE

## 2022-07-28 NOTE — ASSESSMENT & PLAN NOTE
Patient is doing well.  CT scan shows minimal growth of the adenopathy but I don't think this is of major concern currently.  Will continue Lanreotide and routine follow up.  Will see him again in three months.

## 2022-08-05 NOTE — TELEPHONE ENCOUNTER
----- Message from Chandrika Sanchez sent at 1/7/2019  9:26 AM CST -----  Contact: self  Patient received his recall letter to get his colonoscopy scheduled for after 1/27.  Please call the patients wife Edith back to get him scheduled at [phone].  Thank you!   No acute change in patient's condition.  Neutropenic/thrombocytopoenic precautions maintained.  Fair appetite noted.  VSS.  A&OX4.  Continue with current plan of care. Treat.  Notify MD of change in patient's status.

## 2022-08-10 DIAGNOSIS — R00.2 HEART PALPITATIONS: ICD-10-CM

## 2022-08-10 DIAGNOSIS — I10 ESSENTIAL HYPERTENSION: ICD-10-CM

## 2022-08-10 RX ORDER — ATORVASTATIN CALCIUM 20 MG/1
20 TABLET, FILM COATED ORAL DAILY
Qty: 90 TABLET | Refills: 0 | Status: CANCELLED | OUTPATIENT
Start: 2022-08-10

## 2022-08-10 RX ORDER — NIFEDIPINE 30 MG/1
30 TABLET, EXTENDED RELEASE ORAL NIGHTLY
Qty: 90 TABLET | Refills: 0 | Status: CANCELLED | OUTPATIENT
Start: 2022-08-10

## 2022-08-10 RX ORDER — METOPROLOL SUCCINATE 25 MG/1
25 TABLET, EXTENDED RELEASE ORAL DAILY
Qty: 90 TABLET | Refills: 0 | Status: CANCELLED | OUTPATIENT
Start: 2022-08-10

## 2022-08-11 ENCOUNTER — INFUSION (OUTPATIENT)
Dept: INFUSION THERAPY | Facility: HOSPITAL | Age: 77
End: 2022-08-11
Attending: INTERNAL MEDICINE
Payer: MEDICARE

## 2022-08-11 VITALS
HEART RATE: 77 BPM | TEMPERATURE: 98 F | DIASTOLIC BLOOD PRESSURE: 71 MMHG | HEIGHT: 74 IN | RESPIRATION RATE: 18 BRPM | BODY MASS INDEX: 26.84 KG/M2 | OXYGEN SATURATION: 96 % | SYSTOLIC BLOOD PRESSURE: 132 MMHG | WEIGHT: 209.13 LBS

## 2022-08-11 DIAGNOSIS — C7B.8 NEUROENDOCRINE CARCINOMA METASTATIC TO INTRA-ABDOMINAL LYMPH NODE: Primary | ICD-10-CM

## 2022-08-11 DIAGNOSIS — C7A.8 NEUROENDOCRINE CARCINOMA METASTATIC TO INTRA-ABDOMINAL LYMPH NODE: Primary | ICD-10-CM

## 2022-08-11 PROCEDURE — 63600175 PHARM REV CODE 636 W HCPCS: Mod: JG | Performed by: NURSE PRACTITIONER

## 2022-08-11 PROCEDURE — 96372 THER/PROPH/DIAG INJ SC/IM: CPT

## 2022-08-11 RX ORDER — LANREOTIDE ACETATE 120 MG/.5ML
120 INJECTION SUBCUTANEOUS
Status: COMPLETED | OUTPATIENT
Start: 2022-08-11 | End: 2022-08-11

## 2022-08-11 RX ADMIN — LANREOTIDE ACETATE 120 MG: 120 INJECTION SUBCUTANEOUS at 02:08

## 2022-08-11 NOTE — TELEPHONE ENCOUNTER
No new care gaps identified.  Health system Embedded Care Gaps. Reference number: [id number]. 8/10/2022   7:57:53 PM CDT

## 2022-08-24 ENCOUNTER — OFFICE VISIT (OUTPATIENT)
Dept: FAMILY MEDICINE | Facility: CLINIC | Age: 77
End: 2022-08-24
Payer: MEDICARE

## 2022-08-24 ENCOUNTER — LAB VISIT (OUTPATIENT)
Dept: LAB | Facility: HOSPITAL | Age: 77
End: 2022-08-24
Attending: PHYSICIAN ASSISTANT
Payer: MEDICARE

## 2022-08-24 VITALS
WEIGHT: 205.69 LBS | RESPIRATION RATE: 17 BRPM | HEIGHT: 74 IN | TEMPERATURE: 98 F | BODY MASS INDEX: 26.4 KG/M2 | SYSTOLIC BLOOD PRESSURE: 132 MMHG | HEART RATE: 70 BPM | OXYGEN SATURATION: 96 % | DIASTOLIC BLOOD PRESSURE: 66 MMHG

## 2022-08-24 DIAGNOSIS — R00.2 HEART PALPITATIONS: ICD-10-CM

## 2022-08-24 DIAGNOSIS — Z12.11 COLON CANCER SCREENING: ICD-10-CM

## 2022-08-24 DIAGNOSIS — I10 ESSENTIAL HYPERTENSION: Primary | ICD-10-CM

## 2022-08-24 DIAGNOSIS — E78.5 HYPERLIPIDEMIA, UNSPECIFIED HYPERLIPIDEMIA TYPE: ICD-10-CM

## 2022-08-24 DIAGNOSIS — R73.9 HYPERGLYCEMIA: ICD-10-CM

## 2022-08-24 DIAGNOSIS — C7B.8 NEUROENDOCRINE CARCINOMA METASTATIC TO INTRA-ABDOMINAL LYMPH NODE: ICD-10-CM

## 2022-08-24 DIAGNOSIS — Z86.010 HISTORY OF COLON POLYPS: ICD-10-CM

## 2022-08-24 DIAGNOSIS — C7A.8 NEUROENDOCRINE CARCINOMA METASTATIC TO INTRA-ABDOMINAL LYMPH NODE: ICD-10-CM

## 2022-08-24 LAB
CHOLEST SERPL-MCNC: 103 MG/DL (ref 120–199)
CHOLEST/HDLC SERPL: 3.2 {RATIO} (ref 2–5)
ESTIMATED AVG GLUCOSE: 120 MG/DL (ref 68–131)
HBA1C MFR BLD: 5.8 % (ref 4–5.6)
HDLC SERPL-MCNC: 32 MG/DL (ref 40–75)
HDLC SERPL: 31.1 % (ref 20–50)
LDLC SERPL CALC-MCNC: 60 MG/DL (ref 63–159)
NONHDLC SERPL-MCNC: 71 MG/DL
TRIGL SERPL-MCNC: 55 MG/DL (ref 30–150)

## 2022-08-24 PROCEDURE — 3075F PR MOST RECENT SYSTOLIC BLOOD PRESS GE 130-139MM HG: ICD-10-PCS | Mod: CPTII,S$GLB,, | Performed by: PHYSICIAN ASSISTANT

## 2022-08-24 PROCEDURE — 1101F PR PT FALLS ASSESS DOC 0-1 FALLS W/OUT INJ PAST YR: ICD-10-PCS | Mod: CPTII,S$GLB,, | Performed by: PHYSICIAN ASSISTANT

## 2022-08-24 PROCEDURE — 1126F PR PAIN SEVERITY QUANTIFIED, NO PAIN PRESENT: ICD-10-PCS | Mod: CPTII,S$GLB,, | Performed by: PHYSICIAN ASSISTANT

## 2022-08-24 PROCEDURE — 1160F PR REVIEW ALL MEDS BY PRESCRIBER/CLIN PHARMACIST DOCUMENTED: ICD-10-PCS | Mod: CPTII,S$GLB,, | Performed by: PHYSICIAN ASSISTANT

## 2022-08-24 PROCEDURE — 3288F PR FALLS RISK ASSESSMENT DOCUMENTED: ICD-10-PCS | Mod: CPTII,S$GLB,, | Performed by: PHYSICIAN ASSISTANT

## 2022-08-24 PROCEDURE — 3288F FALL RISK ASSESSMENT DOCD: CPT | Mod: CPTII,S$GLB,, | Performed by: PHYSICIAN ASSISTANT

## 2022-08-24 PROCEDURE — 1160F RVW MEDS BY RX/DR IN RCRD: CPT | Mod: CPTII,S$GLB,, | Performed by: PHYSICIAN ASSISTANT

## 2022-08-24 PROCEDURE — 1159F PR MEDICATION LIST DOCUMENTED IN MEDICAL RECORD: ICD-10-PCS | Mod: CPTII,S$GLB,, | Performed by: PHYSICIAN ASSISTANT

## 2022-08-24 PROCEDURE — 36415 COLL VENOUS BLD VENIPUNCTURE: CPT | Mod: PO | Performed by: PHYSICIAN ASSISTANT

## 2022-08-24 PROCEDURE — 99999 PR PBB SHADOW E&M-EST. PATIENT-LVL III: ICD-10-PCS | Mod: PBBFAC,,, | Performed by: PHYSICIAN ASSISTANT

## 2022-08-24 PROCEDURE — 99999 PR PBB SHADOW E&M-EST. PATIENT-LVL III: CPT | Mod: PBBFAC,,, | Performed by: PHYSICIAN ASSISTANT

## 2022-08-24 PROCEDURE — 1159F MED LIST DOCD IN RCRD: CPT | Mod: CPTII,S$GLB,, | Performed by: PHYSICIAN ASSISTANT

## 2022-08-24 PROCEDURE — 80061 LIPID PANEL: CPT | Performed by: PHYSICIAN ASSISTANT

## 2022-08-24 PROCEDURE — 99214 OFFICE O/P EST MOD 30 MIN: CPT | Mod: S$GLB,,, | Performed by: PHYSICIAN ASSISTANT

## 2022-08-24 PROCEDURE — 1101F PT FALLS ASSESS-DOCD LE1/YR: CPT | Mod: CPTII,S$GLB,, | Performed by: PHYSICIAN ASSISTANT

## 2022-08-24 PROCEDURE — 1126F AMNT PAIN NOTED NONE PRSNT: CPT | Mod: CPTII,S$GLB,, | Performed by: PHYSICIAN ASSISTANT

## 2022-08-24 PROCEDURE — 3078F PR MOST RECENT DIASTOLIC BLOOD PRESSURE < 80 MM HG: ICD-10-PCS | Mod: CPTII,S$GLB,, | Performed by: PHYSICIAN ASSISTANT

## 2022-08-24 PROCEDURE — 83036 HEMOGLOBIN GLYCOSYLATED A1C: CPT | Performed by: PHYSICIAN ASSISTANT

## 2022-08-24 PROCEDURE — 99214 PR OFFICE/OUTPT VISIT, EST, LEVL IV, 30-39 MIN: ICD-10-PCS | Mod: S$GLB,,, | Performed by: PHYSICIAN ASSISTANT

## 2022-08-24 PROCEDURE — 3078F DIAST BP <80 MM HG: CPT | Mod: CPTII,S$GLB,, | Performed by: PHYSICIAN ASSISTANT

## 2022-08-24 PROCEDURE — 3075F SYST BP GE 130 - 139MM HG: CPT | Mod: CPTII,S$GLB,, | Performed by: PHYSICIAN ASSISTANT

## 2022-08-24 RX ORDER — METOPROLOL SUCCINATE 25 MG/1
25 TABLET, EXTENDED RELEASE ORAL DAILY
Qty: 90 TABLET | Refills: 3 | Status: SHIPPED | OUTPATIENT
Start: 2022-08-24 | End: 2023-03-20 | Stop reason: SDUPTHER

## 2022-08-24 RX ORDER — ATORVASTATIN CALCIUM 20 MG/1
20 TABLET, FILM COATED ORAL DAILY
Qty: 90 TABLET | Refills: 3 | Status: SHIPPED | OUTPATIENT
Start: 2022-08-24 | End: 2023-03-09 | Stop reason: SDUPTHER

## 2022-08-24 RX ORDER — NIFEDIPINE 30 MG/1
30 TABLET, EXTENDED RELEASE ORAL NIGHTLY
Qty: 90 TABLET | Refills: 3 | Status: SHIPPED | OUTPATIENT
Start: 2022-08-24 | End: 2023-03-06

## 2022-08-24 NOTE — PROGRESS NOTES
Subjective:       Patient ID: Roosevelt Alejandro is a 76 y.o. male.    Chief Complaint: Annual Exam    Mr. Alejandro is a 76 year old male with HTN and HLD. He is currently under the care of Dr. Chun due to neuroendocrine carcinoma. The patient is due to update labs. He is also due to update colonoscopy. The patient reports overall he is feeling well and he has no complaints.     Review of patient's allergies indicates:   Allergen Reactions    Epinephrine      Patient on Sandostatin and Epinephrine contraindicated    Aspirin Other (See Comments)     Internal bleeding    Lisinopril (bulk) Rash    Sulfa (sulfonamide antibiotics) Swelling and Rash         Current Outpatient Medications:     FOLIC ACID/MULTIVIT-MIN/LUTEIN (CENTRUM SILVER ORAL), Take 1 tablet by mouth once daily. , Disp: , Rfl:     atorvastatin (LIPITOR) 20 MG tablet, Take 1 tablet (20 mg total) by mouth once daily., Disp: 90 tablet, Rfl: 3    metoprolol succinate (TOPROL-XL) 25 MG 24 hr tablet, Take 1 tablet (25 mg total) by mouth once daily., Disp: 90 tablet, Rfl: 3    NIFEdipine (PROCARDIA-XL) 30 MG (OSM) 24 hr tablet, Take 1 tablet (30 mg total) by mouth every evening., Disp: 90 tablet, Rfl: 3    pantoprazole (PROTONIX) 40 MG tablet, Take 1 tablet (40 mg total) by mouth once daily., Disp: 30 tablet, Rfl: 11    Lab Results   Component Value Date    WBC 10.99 05/27/2022    HGB 13.2 (L) 05/27/2022    HCT 38.5 (L) 05/27/2022     05/27/2022    CHOL 131 03/16/2020    TRIG 116 03/16/2020    HDL 39 (L) 03/16/2020    ALT 20 05/27/2022    AST 20 05/27/2022     05/27/2022    K 3.7 05/27/2022     05/27/2022    CREATININE 1.3 05/27/2022    BUN 18 05/27/2022    CO2 24 05/27/2022    TSH 1.245 02/24/2019    PSA 28.6 (H) 12/20/2017    INR 1.1 02/24/2019    GLUF 109 07/22/2004    HGBA1C 5.2 11/25/2019       Review of Systems   Constitutional: Negative for activity change, appetite change and fever.   HENT: Negative for postnasal drip,  rhinorrhea and sinus pressure.    Eyes: Negative for visual disturbance.   Respiratory: Negative for cough and shortness of breath.    Cardiovascular: Negative for chest pain.   Gastrointestinal: Negative for abdominal distention and abdominal pain.   Genitourinary: Negative for difficulty urinating and dysuria.   Musculoskeletal: Negative for arthralgias and myalgias.   Neurological: Negative for headaches.   Hematological: Negative for adenopathy.   Psychiatric/Behavioral: The patient is not nervous/anxious.        Objective:      Physical Exam  Constitutional:       Appearance: Normal appearance.   HENT:      Head: Normocephalic and atraumatic.      Comments: No sinus tenderness  Eyes:      Conjunctiva/sclera: Conjunctivae normal.   Cardiovascular:      Rate and Rhythm: Normal rate and regular rhythm.   Pulmonary:      Effort: Pulmonary effort is normal. No respiratory distress.      Breath sounds: Normal breath sounds. No wheezing.   Abdominal:      General: There is no distension.      Palpations: There is no mass.      Tenderness: There is no abdominal tenderness.   Musculoskeletal:      Right lower leg: No edema.      Left lower leg: No edema.   Lymphadenopathy:      Cervical: No cervical adenopathy.   Skin:     Findings: No erythema.   Neurological:      Mental Status: He is alert and oriented to person, place, and time.   Psychiatric:         Behavior: Behavior normal.         Assessment:       1. Essential hypertension    2. 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 Colon cancer screening    4. History of colon polyps    5. Heart palpitations    6. Hyperlipidemia, unspecified hyperlipidemia type    7. Hyperglycemia        Plan:       Max was seen today for annual exam.    Diagnoses and all orders for this visit:    Essential hypertension  -     NIFEdipine (PROCARDIA-XL) 30 MG (OSM) 24 hr tablet; Take 1 tablet (30 mg total) by mouth every evening.  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 Chun  Colon cancer screening  -     Case Request Endoscopy: COLONOSCOPY    History of colon polyps  -     Case Request Endoscopy: COLONOSCOPY    Heart palpitations  -     metoprolol succinate (TOPROL-XL) 25 MG 24 hr tablet; Take 1 tablet (25 mg total) by mouth once daily.    Hyperlipidemia, unspecified hyperlipidemia type  -     atorvastatin (LIPITOR) 20 MG tablet; Take 1 tablet (20 mg total) by mouth once daily.  -     Lipid Panel; Future    Hyperglycemia  -     Hemoglobin A1C; Future      Patient will be notified when labs return and further recommendations will be given at that time.   Establish care with Dr. Guillaume in 6- 12 months.

## 2022-08-24 NOTE — PATIENT INSTRUCTIONS
Hi Max,     If you are due for any health screening(s) below please notify me so we can arrange them to be ordered and scheduled to maintain your health. Most healthy patients complete it. Don't lose out on improving your health.       Blood Pressure <= 139/89: NO

## 2022-08-26 ENCOUNTER — TELEPHONE (OUTPATIENT)
Dept: GASTROENTEROLOGY | Facility: CLINIC | Age: 77
End: 2022-08-26
Payer: MEDICARE

## 2022-08-26 ENCOUNTER — PATIENT MESSAGE (OUTPATIENT)
Dept: GASTROENTEROLOGY | Facility: CLINIC | Age: 77
End: 2022-08-26
Payer: MEDICARE

## 2022-09-08 ENCOUNTER — INFUSION (OUTPATIENT)
Dept: INFUSION THERAPY | Facility: HOSPITAL | Age: 77
End: 2022-09-08
Attending: INTERNAL MEDICINE
Payer: MEDICARE

## 2022-09-08 VITALS
SYSTOLIC BLOOD PRESSURE: 133 MMHG | RESPIRATION RATE: 17 BRPM | TEMPERATURE: 97 F | WEIGHT: 204.63 LBS | DIASTOLIC BLOOD PRESSURE: 74 MMHG | HEART RATE: 68 BPM | OXYGEN SATURATION: 96 % | HEIGHT: 74 IN | BODY MASS INDEX: 26.26 KG/M2

## 2022-09-08 DIAGNOSIS — C7A.8 NEUROENDOCRINE CARCINOMA METASTATIC TO INTRA-ABDOMINAL LYMPH NODE: Primary | ICD-10-CM

## 2022-09-08 DIAGNOSIS — C7B.8 NEUROENDOCRINE CARCINOMA METASTATIC TO INTRA-ABDOMINAL LYMPH NODE: Primary | ICD-10-CM

## 2022-09-08 PROCEDURE — 63600175 PHARM REV CODE 636 W HCPCS: Mod: JG | Performed by: NURSE PRACTITIONER

## 2022-09-08 PROCEDURE — 96372 THER/PROPH/DIAG INJ SC/IM: CPT

## 2022-09-08 RX ORDER — LANREOTIDE ACETATE 120 MG/.5ML
120 INJECTION SUBCUTANEOUS
Status: COMPLETED | OUTPATIENT
Start: 2022-09-08 | End: 2022-09-08

## 2022-09-08 RX ADMIN — LANREOTIDE ACETATE 120 MG: 120 INJECTION SUBCUTANEOUS at 02:09

## 2022-10-06 ENCOUNTER — INFUSION (OUTPATIENT)
Dept: INFUSION THERAPY | Facility: HOSPITAL | Age: 77
End: 2022-10-06
Attending: INTERNAL MEDICINE
Payer: MEDICARE

## 2022-10-06 VITALS
HEIGHT: 74 IN | TEMPERATURE: 98 F | RESPIRATION RATE: 18 BRPM | BODY MASS INDEX: 26.95 KG/M2 | HEART RATE: 77 BPM | SYSTOLIC BLOOD PRESSURE: 128 MMHG | WEIGHT: 210 LBS | DIASTOLIC BLOOD PRESSURE: 65 MMHG

## 2022-10-06 DIAGNOSIS — C7A.8 NEUROENDOCRINE CARCINOMA METASTATIC TO INTRA-ABDOMINAL LYMPH NODE: Primary | ICD-10-CM

## 2022-10-06 DIAGNOSIS — C7B.8 NEUROENDOCRINE CARCINOMA METASTATIC TO INTRA-ABDOMINAL LYMPH NODE: Primary | ICD-10-CM

## 2022-10-06 PROCEDURE — 63600175 PHARM REV CODE 636 W HCPCS: Mod: JG | Performed by: NURSE PRACTITIONER

## 2022-10-06 PROCEDURE — 96372 THER/PROPH/DIAG INJ SC/IM: CPT

## 2022-10-06 RX ORDER — LANREOTIDE ACETATE 120 MG/.5ML
120 INJECTION SUBCUTANEOUS
Status: COMPLETED | OUTPATIENT
Start: 2022-10-06 | End: 2022-10-06

## 2022-10-06 RX ADMIN — LANREOTIDE ACETATE 120 MG: 120 INJECTION SUBCUTANEOUS at 02:10

## 2022-10-06 NOTE — PLAN OF CARE
Problem: Fatigue  Goal: Improved Activity Tolerance  Intervention: Promote Improved Energy  Flowsheets (Taken 10/6/2022 1445)  Fatigue Management:   frequent rest breaks encouraged   paced activity encouraged

## 2022-11-03 ENCOUNTER — INFUSION (OUTPATIENT)
Dept: INFUSION THERAPY | Facility: HOSPITAL | Age: 77
End: 2022-11-03
Attending: INTERNAL MEDICINE
Payer: MEDICARE

## 2022-11-03 VITALS
RESPIRATION RATE: 18 BRPM | DIASTOLIC BLOOD PRESSURE: 74 MMHG | HEART RATE: 75 BPM | SYSTOLIC BLOOD PRESSURE: 152 MMHG | HEIGHT: 74 IN | WEIGHT: 205.5 LBS | BODY MASS INDEX: 26.37 KG/M2 | OXYGEN SATURATION: 96 % | TEMPERATURE: 97 F

## 2022-11-03 DIAGNOSIS — C7B.8 NEUROENDOCRINE CARCINOMA METASTATIC TO INTRA-ABDOMINAL LYMPH NODE: Primary | ICD-10-CM

## 2022-11-03 DIAGNOSIS — C7A.8 NEUROENDOCRINE CARCINOMA METASTATIC TO INTRA-ABDOMINAL LYMPH NODE: Primary | ICD-10-CM

## 2022-11-03 PROCEDURE — 63600175 PHARM REV CODE 636 W HCPCS: Mod: JG | Performed by: NURSE PRACTITIONER

## 2022-11-03 PROCEDURE — 96372 THER/PROPH/DIAG INJ SC/IM: CPT

## 2022-11-03 RX ORDER — LANREOTIDE ACETATE 120 MG/.5ML
120 INJECTION SUBCUTANEOUS
Status: COMPLETED | OUTPATIENT
Start: 2022-11-03 | End: 2022-11-03

## 2022-11-03 RX ADMIN — LANREOTIDE ACETATE 120 MG: 120 INJECTION SUBCUTANEOUS at 03:11

## 2022-11-21 NOTE — PLAN OF CARE
Problem: Fatigue  Goal: Improved Activity Tolerance  Outcome: Ongoing, Progressing  Intervention: Promote Improved Energy  Flowsheets (Taken 8/11/2022 1437)  Fatigue Management:   frequent rest breaks encouraged   fatigue-related activity identified   paced activity encouraged  Sleep/Rest Enhancement:   regular sleep/rest pattern promoted   relaxation techniques promoted      The ABCs of the Annual Wellness Visit  Subsequent Medicare Wellness Visit    Chief Complaint   Patient presents with   • Nasal Congestion     For months, cant sleep at night       Subjective    History of Present Illness:  Bhargav Youngblood is a 65 y.o. male who presents for a Subsequent Medicare Wellness Visit.    The following portions of the patient's history were reviewed and   updated as appropriate: allergies, current medications, past family history, past medical history, past social history, past surgical history and problem list.    Compared to one year ago, the patient feels his physical   health is the same.    Compared to one year ago, the patient feels his mental   health is the same.    Recent Hospitalizations:  He was not admitted to the hospital during the last year.       Current Medical Providers:  Patient Care Team:  Deepa Garcia MD as PCP - General    Outpatient Medications Prior to Visit   Medication Sig Dispense Refill   • albuterol (PROVENTIL) (2.5 MG/3ML) 0.083% nebulizer solution Take 2.5 mg by nebulization Every 4 (Four) Hours As Needed for Wheezing or Shortness of Air. J44.9, COPD 450 each 1   • albuterol sulfate  (90 Base) MCG/ACT inhaler Inhale 2 puffs Every 4 (Four) Hours As Needed for Wheezing or Shortness of Air. 18 g 3   • finasteride (PROSCAR) 5 MG tablet Take 1 tablet by mouth Daily. 90 tablet 1   • Fluticasone-Salmeterol (ADVAIR/WIXELA) 250-50 MCG/ACT DISKUS Inhale 1 puff 2 (Two) Times a Day. 120 each 0   • gabapentin (NEURONTIN) 300 MG capsule TAKE ONE CAPSULE BY MOUTH EVERY NIGHT AT BEDTIME 30 capsule 0   • loratadine (CLARITIN) 10 MG tablet Take 1 tablet by mouth Daily. 90 tablet 4   • montelukast (SINGULAIR) 10 MG tablet Take 1 tablet by mouth Every Evening. 90 tablet 0   • naproxen (NAPROSYN) 500 MG tablet Take one tablet bu mouth twice a day as needed 60 tablet 0   • omeprazole (priLOSEC) 20 MG capsule Take 1 capsule by mouth Daily. 90 capsule 0   • pravastatin  (PRAVACHOL) 80 MG tablet Take 1 tablet by mouth every night at bedtime. 90 tablet 0   • tamsulosin (FLOMAX) 0.4 MG capsule 24 hr capsule Take 1 capsule by mouth Daily. 30 capsule 0   • temazepam (RESTORIL) 30 MG capsule Take 1 capsule by mouth every night at bedtime. 30 capsule 0   • Tudorza Pressair 400 MCG/ACT aerosol powder  powder for inhalation INHALE ONE PUFF BY MOUTH TWICE A DAY 1 each 10   • PARoxetine (PAXIL) 20 MG tablet Take 1 tablet by mouth Every Morning. 90 tablet 0     No facility-administered medications prior to visit.       No opioid medication identified on active medication list. I have reviewed chart for other potential  high risk medication/s and harmful drug interactions in the elderly.          Aspirin is not on active medication list.  Aspirin use is not indicated based on review of current medical condition/s. Risk of harm outweighs potential benefits.  .    Patient Active Problem List   Diagnosis   • Arthritis   • Chronic obstructive pulmonary disease (HCC)   • Depression   • Dyspnea on exertion   • Encounter for immunization   • Encounter for general adult medical examination without abnormal findings   • Encounter for screening for malignant neoplasm of prostate   • Enlarged prostate without lower urinary tract symptoms (luts)   • Fasting hyperglycemia   • Fatigue   • Gastroesophageal reflux disease   • Hay fever   • Herpes zoster   • Hyperlipidemia   • Insomnia   • Lumbar disc disorder   • Generalized anxiety disorder   • Peripheral venous insufficiency   • Sleep apnea   • Tobacco user   • Varicose veins of lower extremity with inflammation   • Neuropathy   • FH: diabetes mellitus   • Chest pain   • Screening for prostate cancer   • Acute non-recurrent maxillary sinusitis   • Medicare annual wellness visit, subsequent   • Non-seasonal allergic rhinitis     Advance Care Planning  Advance Directive is not on file.  ACP discussion was held with the patient during this visit. Patient does  "not have an advance directive, information provided.           Objective    Vitals:    11/21/22 1446   BP: 123/75   BP Location: Left arm   Patient Position: Sitting   Cuff Size: Large Adult   Pulse: 81   Temp: 97.1 °F (36.2 °C)   TempSrc: Infrared   SpO2: 95%   Weight: 98.4 kg (217 lb)   Height: 175.3 cm (69\")     Estimated body mass index is 32.05 kg/m² as calculated from the following:    Height as of this encounter: 175.3 cm (69\").    Weight as of this encounter: 98.4 kg (217 lb).    BMI cannot be calculated due to outdated height or weight values.  Please input a current height/weight in Vitals and re-renter BMIFOLLOWUP in Note to pull in correct documentation based on BMI range.  BMI is >= 30 and <35. (Class 1 Obesity). The following options were offered after discussion;: exercise counseling/recommendations    Does the patient have evidence of cognitive impairment? No    Physical Exam  Vitals and nursing note reviewed. Exam conducted with a chaperone present.   Constitutional:       General: He is not in acute distress.     Appearance: He is well-developed.   HENT:      Head: Normocephalic.      Nose: Congestion present.   Eyes:      General: Lids are normal.      Conjunctiva/sclera: Conjunctivae normal.   Neck:      Thyroid: No thyroid mass or thyromegaly.      Trachea: Trachea normal.   Cardiovascular:      Rate and Rhythm: Normal rate and regular rhythm.      Heart sounds: Normal heart sounds.   Pulmonary:      Effort: Pulmonary effort is normal.      Breath sounds: Normal breath sounds.   Musculoskeletal:      Cervical back: Normal range of motion.   Lymphadenopathy:      Cervical: No cervical adenopathy.   Skin:     General: Skin is warm and dry.   Neurological:      Mental Status: He is alert and oriented to person, place, and time. Mental status is at baseline.   Psychiatric:         Attention and Perception: He is attentive.         Mood and Affect: Mood normal.         Speech: Speech normal.         " Behavior: Behavior normal.       Lab Results   Component Value Date    TRIG 783 (H) 11/21/2022    HDL 34 (L) 11/21/2022     (H) 11/21/2022    VLDL 140 (H) 11/21/2022            HEALTH RISK ASSESSMENT    Smoking Status:  Social History     Tobacco Use   Smoking Status Former   • Types: Cigarettes   • Passive exposure: Never   Smokeless Tobacco Former     Alcohol Consumption:  Social History     Substance and Sexual Activity   Alcohol Use No     Fall Risk Screen:    STEADI Fall Risk Assessment was completed, and patient is at LOW risk for falls.Assessment completed on:11/21/2022    Depression Screening:  PHQ-2/PHQ-9 Depression Screening 11/21/2022   Little Interest or Pleasure in Doing Things 0-->not at all   Feeling Down, Depressed or Hopeless 0-->not at all   PHQ-9: Brief Depression Severity Measure Score 0       Health Habits and Functional and Cognitive Screening:  Functional & Cognitive Status 11/21/2022   Do you have difficulty preparing food and eating? No   Do you have difficulty bathing yourself, getting dressed or grooming yourself? No   Do you have difficulty using the toilet? No   Do you have difficulty moving around from place to place? No   Do you have trouble with steps or getting out of a bed or a chair? No   Current Diet Well Balanced Diet   Dental Exam Other   Eye Exam Not up to date   Do you need help using the phone?  No   Are you deaf or do you have serious difficulty hearing?  No   Do you need help with transportation? No   Do you need help shopping? No   Do you need help preparing meals?  No   Do you need help with housework?  No   Do you need help with laundry? No   Do you need help taking your medications? No   Do you ever drive or ride in a car without wearing a seat belt? No   Have you felt unusual stress, anger or loneliness in the last month? No   Who do you live with? Spouse   If you need help, do you have trouble finding someone available to you? No       Age-appropriate Screening  Schedule:  Refer to the list below for future screening recommendations based on patient's age, sex and/or medical conditions. Orders for these recommended tests are listed in the plan section. The patient has been provided with a written plan.    Health Maintenance   Topic Date Due   • TDAP/TD VACCINES (1 - Tdap) Never done   • ZOSTER VACCINE (1 of 2) Never done   • LIPID PANEL  11/21/2023   • INFLUENZA VACCINE  Completed              Assessment & Plan   CMS Preventative Services Quick Reference  Risk Factors Identified During Encounter  Obesity/Overweight   The above risks/problems have been discussed with the patient.  Follow up actions/plans if indicated are seen below in the Assessment/Plan Section.  Pertinent information has been shared with the patient in the After Visit Summary.    Diagnoses and all orders for this visit:    1. Medicare annual wellness visit, subsequent (Primary)    2. Non-seasonal allergic rhinitis, unspecified trigger  -     mometasone (Nasonex) 50 MCG/ACT nasal spray; 2 sprays into the nostril(s) as directed by provider Daily.  Dispense: 17 g; Refill: 12  -     Ambulatory Referral to Allergy    3. Neuropathy  -     pregabalin (Lyrica) 75 MG capsule; Take 1 capsule by mouth 2 (Two) Times a Day.  Dispense: 180 capsule; Refill: 1    4. Hyperlipidemia, unspecified hyperlipidemia type  -     Comprehensive Metabolic Panel  -     Lipid Panel    5. Encounter for screening for malignant neoplasm of prostate  -     PSA Screen    6. Need for vaccination  -     Fluzone High-Dose 65+yrs ([id number])    Other orders  -     PARoxetine (Paxil) 30 MG tablet; Take 1 tablet by mouth Every Morning.  Dispense: 90 tablet; Refill: 3        Follow Up:   Return in about 1 year (around 11/21/2023) for Medicare Wellness.     An After Visit Summary and PPPS were made available to the patient.

## 2022-12-01 ENCOUNTER — INFUSION (OUTPATIENT)
Dept: INFUSION THERAPY | Facility: HOSPITAL | Age: 77
End: 2022-12-01
Attending: INTERNAL MEDICINE
Payer: MEDICARE

## 2022-12-01 VITALS
BODY MASS INDEX: 26.65 KG/M2 | SYSTOLIC BLOOD PRESSURE: 135 MMHG | DIASTOLIC BLOOD PRESSURE: 76 MMHG | HEART RATE: 90 BPM | OXYGEN SATURATION: 96 % | TEMPERATURE: 98 F | WEIGHT: 207.63 LBS | HEIGHT: 74 IN | RESPIRATION RATE: 18 BRPM

## 2022-12-01 DIAGNOSIS — C7A.8 NEUROENDOCRINE CARCINOMA METASTATIC TO INTRA-ABDOMINAL LYMPH NODE: Primary | ICD-10-CM

## 2022-12-01 DIAGNOSIS — C7B.8 NEUROENDOCRINE CARCINOMA METASTATIC TO INTRA-ABDOMINAL LYMPH NODE: Primary | ICD-10-CM

## 2022-12-01 PROCEDURE — 63600175 PHARM REV CODE 636 W HCPCS: Mod: JG | Performed by: NURSE PRACTITIONER

## 2022-12-01 PROCEDURE — 96372 THER/PROPH/DIAG INJ SC/IM: CPT

## 2022-12-01 RX ORDER — LANREOTIDE ACETATE 120 MG/.5ML
120 INJECTION SUBCUTANEOUS
Status: COMPLETED | OUTPATIENT
Start: 2022-12-01 | End: 2022-12-01

## 2022-12-01 RX ADMIN — LANREOTIDE ACETATE 120 MG: 120 INJECTION SUBCUTANEOUS at 03:12

## 2022-12-29 ENCOUNTER — INFUSION (OUTPATIENT)
Dept: INFUSION THERAPY | Facility: HOSPITAL | Age: 77
End: 2022-12-29
Attending: INTERNAL MEDICINE
Payer: MEDICARE

## 2022-12-29 VITALS
TEMPERATURE: 98 F | HEIGHT: 74 IN | RESPIRATION RATE: 18 BRPM | BODY MASS INDEX: 26.49 KG/M2 | OXYGEN SATURATION: 96 % | DIASTOLIC BLOOD PRESSURE: 79 MMHG | SYSTOLIC BLOOD PRESSURE: 140 MMHG | HEART RATE: 74 BPM | WEIGHT: 206.38 LBS

## 2022-12-29 DIAGNOSIS — C7B.8 NEUROENDOCRINE CARCINOMA METASTATIC TO INTRA-ABDOMINAL LYMPH NODE: Primary | ICD-10-CM

## 2022-12-29 DIAGNOSIS — C7A.8 NEUROENDOCRINE CARCINOMA METASTATIC TO INTRA-ABDOMINAL LYMPH NODE: Primary | ICD-10-CM

## 2022-12-29 PROCEDURE — 63600175 PHARM REV CODE 636 W HCPCS: Mod: JG | Performed by: NURSE PRACTITIONER

## 2022-12-29 PROCEDURE — 96372 THER/PROPH/DIAG INJ SC/IM: CPT

## 2022-12-29 RX ORDER — LANREOTIDE ACETATE 120 MG/.5ML
120 INJECTION SUBCUTANEOUS
Status: COMPLETED | OUTPATIENT
Start: 2022-12-29 | End: 2022-12-29

## 2022-12-29 RX ADMIN — LANREOTIDE ACETATE 120 MG: 120 INJECTION SUBCUTANEOUS at 02:12

## 2023-01-26 ENCOUNTER — INFUSION (OUTPATIENT)
Dept: INFUSION THERAPY | Facility: HOSPITAL | Age: 78
End: 2023-01-26
Attending: INTERNAL MEDICINE
Payer: MEDICARE

## 2023-01-26 VITALS
RESPIRATION RATE: 18 BRPM | BODY MASS INDEX: 26.81 KG/M2 | HEIGHT: 74 IN | OXYGEN SATURATION: 97 % | DIASTOLIC BLOOD PRESSURE: 73 MMHG | WEIGHT: 208.88 LBS | TEMPERATURE: 98 F | SYSTOLIC BLOOD PRESSURE: 132 MMHG | HEART RATE: 71 BPM

## 2023-01-26 DIAGNOSIS — C7A.8 NEUROENDOCRINE CARCINOMA METASTATIC TO INTRA-ABDOMINAL LYMPH NODE: Primary | ICD-10-CM

## 2023-01-26 DIAGNOSIS — C7B.8 NEUROENDOCRINE CARCINOMA METASTATIC TO INTRA-ABDOMINAL LYMPH NODE: Primary | ICD-10-CM

## 2023-01-26 PROCEDURE — 63600175 PHARM REV CODE 636 W HCPCS: Mod: JG | Performed by: NURSE PRACTITIONER

## 2023-01-26 PROCEDURE — 96401 CHEMO ANTI-NEOPL SQ/IM: CPT

## 2023-01-26 RX ORDER — LANREOTIDE ACETATE 120 MG/.5ML
120 INJECTION SUBCUTANEOUS
Status: COMPLETED | OUTPATIENT
Start: 2023-01-26 | End: 2023-01-26

## 2023-01-26 RX ADMIN — LANREOTIDE ACETATE 120 MG: 120 INJECTION SUBCUTANEOUS at 03:01

## 2023-02-23 ENCOUNTER — INFUSION (OUTPATIENT)
Dept: INFUSION THERAPY | Facility: HOSPITAL | Age: 78
End: 2023-02-23
Attending: INTERNAL MEDICINE
Payer: MEDICARE

## 2023-02-23 VITALS
HEART RATE: 71 BPM | SYSTOLIC BLOOD PRESSURE: 140 MMHG | BODY MASS INDEX: 26.8 KG/M2 | TEMPERATURE: 98 F | WEIGHT: 208.81 LBS | DIASTOLIC BLOOD PRESSURE: 74 MMHG | HEIGHT: 74 IN | RESPIRATION RATE: 18 BRPM | OXYGEN SATURATION: 95 %

## 2023-02-23 DIAGNOSIS — C7A.8 NEUROENDOCRINE CARCINOMA METASTATIC TO INTRA-ABDOMINAL LYMPH NODE: Primary | ICD-10-CM

## 2023-02-23 DIAGNOSIS — C7B.8 NEUROENDOCRINE CARCINOMA METASTATIC TO INTRA-ABDOMINAL LYMPH NODE: Primary | ICD-10-CM

## 2023-02-23 PROCEDURE — 63600175 PHARM REV CODE 636 W HCPCS: Mod: JG | Performed by: NURSE PRACTITIONER

## 2023-02-23 PROCEDURE — 96372 THER/PROPH/DIAG INJ SC/IM: CPT

## 2023-02-23 RX ORDER — LANREOTIDE ACETATE 120 MG/.5ML
120 INJECTION SUBCUTANEOUS
Status: COMPLETED | OUTPATIENT
Start: 2023-02-23 | End: 2023-02-23

## 2023-02-23 RX ADMIN — LANREOTIDE ACETATE 120 MG: 120 INJECTION SUBCUTANEOUS at 02:02

## 2023-02-23 NOTE — PLAN OF CARE
Problem: Fatigue  Goal: Improved Activity Tolerance  Outcome: Ongoing, Progressing  Intervention: Promote Improved Energy  Flowsheets (Taken 2/23/2023 1009)  Fatigue Management:   fatigue-related activity identified   paced activity encouraged   frequent rest breaks encouraged  Sleep/Rest Enhancement:   noise level reduced   relaxation techniques promoted   regular sleep/rest pattern promoted  Activity Management: Up in chair - L3

## 2023-02-28 ENCOUNTER — OFFICE VISIT (OUTPATIENT)
Dept: FAMILY MEDICINE | Facility: CLINIC | Age: 78
End: 2023-02-28
Payer: MEDICARE

## 2023-02-28 VITALS
TEMPERATURE: 99 F | SYSTOLIC BLOOD PRESSURE: 124 MMHG | RESPIRATION RATE: 16 BRPM | HEIGHT: 74 IN | OXYGEN SATURATION: 97 % | WEIGHT: 207.25 LBS | HEART RATE: 74 BPM | DIASTOLIC BLOOD PRESSURE: 60 MMHG | BODY MASS INDEX: 26.6 KG/M2

## 2023-02-28 DIAGNOSIS — Z00.00 HEALTHCARE MAINTENANCE: Primary | ICD-10-CM

## 2023-02-28 DIAGNOSIS — Z00.00 ENCOUNTER FOR PREVENTIVE HEALTH EXAMINATION: ICD-10-CM

## 2023-02-28 DIAGNOSIS — C7B.8 NEUROENDOCRINE CARCINOMA METASTATIC TO INTRA-ABDOMINAL LYMPH NODE: ICD-10-CM

## 2023-02-28 DIAGNOSIS — J61 PULMONARY ASBESTOSIS: ICD-10-CM

## 2023-02-28 DIAGNOSIS — C7A.8 NEUROENDOCRINE CARCINOMA METASTATIC TO INTRA-ABDOMINAL LYMPH NODE: ICD-10-CM

## 2023-02-28 DIAGNOSIS — Z12.11 COLON CANCER SCREENING: ICD-10-CM

## 2023-02-28 DIAGNOSIS — R73.03 PREDIABETES: ICD-10-CM

## 2023-02-28 DIAGNOSIS — D53.9 MACROCYTIC ANEMIA: ICD-10-CM

## 2023-02-28 DIAGNOSIS — R79.9 ABNORMAL FINDING OF BLOOD CHEMISTRY, UNSPECIFIED: ICD-10-CM

## 2023-02-28 DIAGNOSIS — I10 ESSENTIAL HYPERTENSION: ICD-10-CM

## 2023-02-28 PROCEDURE — 1101F PR PT FALLS ASSESS DOC 0-1 FALLS W/OUT INJ PAST YR: ICD-10-PCS | Mod: CPTII,S$GLB,, | Performed by: STUDENT IN AN ORGANIZED HEALTH CARE EDUCATION/TRAINING PROGRAM

## 2023-02-28 PROCEDURE — 1126F PR PAIN SEVERITY QUANTIFIED, NO PAIN PRESENT: ICD-10-PCS | Mod: CPTII,S$GLB,, | Performed by: STUDENT IN AN ORGANIZED HEALTH CARE EDUCATION/TRAINING PROGRAM

## 2023-02-28 PROCEDURE — 1126F AMNT PAIN NOTED NONE PRSNT: CPT | Mod: CPTII,S$GLB,, | Performed by: STUDENT IN AN ORGANIZED HEALTH CARE EDUCATION/TRAINING PROGRAM

## 2023-02-28 PROCEDURE — 3074F SYST BP LT 130 MM HG: CPT | Mod: CPTII,S$GLB,, | Performed by: STUDENT IN AN ORGANIZED HEALTH CARE EDUCATION/TRAINING PROGRAM

## 2023-02-28 PROCEDURE — 99214 PR OFFICE/OUTPT VISIT, EST, LEVL IV, 30-39 MIN: ICD-10-PCS | Mod: S$GLB,,, | Performed by: STUDENT IN AN ORGANIZED HEALTH CARE EDUCATION/TRAINING PROGRAM

## 2023-02-28 PROCEDURE — 1159F MED LIST DOCD IN RCRD: CPT | Mod: CPTII,S$GLB,, | Performed by: STUDENT IN AN ORGANIZED HEALTH CARE EDUCATION/TRAINING PROGRAM

## 2023-02-28 PROCEDURE — 3288F FALL RISK ASSESSMENT DOCD: CPT | Mod: CPTII,S$GLB,, | Performed by: STUDENT IN AN ORGANIZED HEALTH CARE EDUCATION/TRAINING PROGRAM

## 2023-02-28 PROCEDURE — 1159F PR MEDICATION LIST DOCUMENTED IN MEDICAL RECORD: ICD-10-PCS | Mod: CPTII,S$GLB,, | Performed by: STUDENT IN AN ORGANIZED HEALTH CARE EDUCATION/TRAINING PROGRAM

## 2023-02-28 PROCEDURE — 1101F PT FALLS ASSESS-DOCD LE1/YR: CPT | Mod: CPTII,S$GLB,, | Performed by: STUDENT IN AN ORGANIZED HEALTH CARE EDUCATION/TRAINING PROGRAM

## 2023-02-28 PROCEDURE — 3074F PR MOST RECENT SYSTOLIC BLOOD PRESSURE < 130 MM HG: ICD-10-PCS | Mod: CPTII,S$GLB,, | Performed by: STUDENT IN AN ORGANIZED HEALTH CARE EDUCATION/TRAINING PROGRAM

## 2023-02-28 PROCEDURE — 3078F PR MOST RECENT DIASTOLIC BLOOD PRESSURE < 80 MM HG: ICD-10-PCS | Mod: CPTII,S$GLB,, | Performed by: STUDENT IN AN ORGANIZED HEALTH CARE EDUCATION/TRAINING PROGRAM

## 2023-02-28 PROCEDURE — 3288F PR FALLS RISK ASSESSMENT DOCUMENTED: ICD-10-PCS | Mod: CPTII,S$GLB,, | Performed by: STUDENT IN AN ORGANIZED HEALTH CARE EDUCATION/TRAINING PROGRAM

## 2023-02-28 PROCEDURE — 99999 PR PBB SHADOW E&M-EST. PATIENT-LVL III: ICD-10-PCS | Mod: PBBFAC,,, | Performed by: STUDENT IN AN ORGANIZED HEALTH CARE EDUCATION/TRAINING PROGRAM

## 2023-02-28 PROCEDURE — 99214 OFFICE O/P EST MOD 30 MIN: CPT | Mod: S$GLB,,, | Performed by: STUDENT IN AN ORGANIZED HEALTH CARE EDUCATION/TRAINING PROGRAM

## 2023-02-28 PROCEDURE — 99999 PR PBB SHADOW E&M-EST. PATIENT-LVL III: CPT | Mod: PBBFAC,,, | Performed by: STUDENT IN AN ORGANIZED HEALTH CARE EDUCATION/TRAINING PROGRAM

## 2023-02-28 PROCEDURE — 3078F DIAST BP <80 MM HG: CPT | Mod: CPTII,S$GLB,, | Performed by: STUDENT IN AN ORGANIZED HEALTH CARE EDUCATION/TRAINING PROGRAM

## 2023-02-28 NOTE — PATIENT INSTRUCTIONS
Vince Albert, Please read below to learn more on healthy habits.  Most of my patients read it.       If you are due for any health screening(s) below please notify me so we can arrange them to be ordered and scheduled. Most healthy patients at your age complete them, but you are free to accept or refuse.     If you can't do it, I'll definitely understand. If you can, I'd certainly appreciate it!     Tests to Keep You Healthy    Colon Cancer Screening: ORDERED BUT NOT SCHEDULED  Last Blood Pressure <= 139/89 (2/28/2023): Yes         Colon Cancer Screening    Of cancers affecting both men and women, colorectal cancer is the third leading cancer killer in the [place]. But it doesnt have to be. Screening can prevent colorectal cancer or find it at an early stage when treatment often leads to a cure.    A colonoscopy is the preferred test for detecting colon cancer. It is needed only once every 10 years if results are negative. While sedated, a flexible, lighted tube with a tiny camera is inserted into the rectum and advanced through the colon to look for cancers. An alternative screening test that is used at home and returned to the lab may also be used. It detects hidden blood in bowel movements which could indicate cancer in the colon. If results are positive, you will need a colonoscopy to determine if the blood is a sign of cancer. This type of follow up (diagnostic) colonoscopy usually requires additional copays as required by your insurance provider. Please contact your PCP if you have any questions.    Although you are still overdue for this important screening, due to the COVID-19 pandemic, we recommend scheduling it in the near future.                    Table of Contents:     Cancer prevention  Explanation on the different components of your blood work and interpretation  Frequently asked questions   Blood pressure log   E-Visits  E-Consults     Cancer Prevention    Why should you choose to get screened for  "cancer? One simple reason is because you are important. You matter and deserve to have the best health so you can fulfill your great potential.     Colon Cancer screening - Most colon cancers can be prevented by screening. In most cases a polyp in the colon can grow and is not cancerous at first, but it can become cancerous years later. A polyp is like a seed that can grow into a weed if it is left in the soil. If the seed is detected and removed, no weed sprouts. A FIT test/Cologuard test and colonoscopy can detect precancerous polyps and lead to the prevention of cancer. A polyp is just like a seed that can be removed before the roots take hold. A colonoscopy can remove these polyps and eliminate the chance that these polyps turn into cancer.     Cervical Cancer screening- A PAP smear can detect precancerous cells that can become cervical cancer and can lead to procedures to remove them. It is very important to get a PAP smear as investing these few minutes can help prevent a lot of trouble down the road. If you want to do the most you can do to spend more time with your family and friends', cancer screenings can help with that goal.     Breast Cancer screening- Mammograms can detect breast cancer before it has spread and early detection allows the ability to remove the lesion prior to any spread. It is similar to a small rotten spot on fruit. If the spoiled part is removed quickly it can preserve the rest of the fruit, but if it is left alone it will corrupt the whole peach.     Smoking Cessation- "Smoking is like a chimney. The tar builds up and causes a back draft which leads to a cough. Smoking is like sitting in a car with a blocked exhaust system." Smoking can increase your risk for lung, mouth, throat, nose, esophagus, bladder, kidney, ureter, pancreas, stomach, liver, cervix, ovary, bowel, and leukemia [2]. If you have smoked in the past, you might meet the criteria for a CT lung cancer screening.     Lung " "Cancer Screening - "The U.S. Preventive Services Task Force (USPSTF) recommendsexternal icon yearly lung cancer screening with LDCT for people who--have a 20 pack-year or more smoking history, and smoke now or have quit within the past 15 years, and are between 50 and 80 years old. A pack-year is smoking an average of one pack of cigarettes per day for one year. For example, a person could have a 20 pack-year history by smoking one pack a day for 20 years or two packs a day for 10 years." [3]    Hypertension - Common high blood pressure meds may lower colorectal cancer risk-GEOVANNY, July 6, 2020 - Hypertension Journal Report Medications commonly prescribed to treat high blood pressure may also reduce patients' colorectal cancer risk, according to new research published today in Hypertension, an American Heart Association journal." [4].     Low HbA1c - "Higher HbA1c levels (within both the non?diabetic and diabetic ranges) were associated with the risk of colorectal cancer (Model 2; P linear?=?0.009), especially for colon cancer." [5].    A healthy diet, exercise, limitation of alcohol use, certain infections, avoidance of radiation/environmental toxins, and staying lean lowers your cancer risk [6].     I want to empower you to make informed choices for your health. I have a listed below the most common blood components that we check for when you get blood work. I discuss what each component measures along with common reasons for why they can be abnormal. If you are interested in understanding your blood work better, please read the lab explanation attached below.     Explanation of Lab Results    Please note: This information is included as a reference to help you better understand your lab results and is not be used for diagnosis.     Lipid Panel:  Cholesterol is a measure of cardiac risk and stroke risk. A lipid panel measures total cholesterol and provides readings which are broken down into 3 subsections: " Triglycerides, HDL and LDL.    Total Cholesterol: Your total blood cholesterol is a measure of LDL cholesterol, HDL cholesterol, and other lipid components.  If your individual lipid level components are in the normal range and you have an elevated total cholesterol level we are generally not to concerned since we primarily look at the LDL cholesterol which is considered the bad cholesterol which can lead to heart attacks and strokes.  Your calculated cardiac risk is the most important factor in deciding if you would benefit from a cholesterol-lowering medication that the total cholesterol level.    Triglycerides are most diet and weight sensitive. They are affected easily by lifestyle changes. Ideally, this reading should be less than 150, although if the remainder of the cholesterol panel is within normal limits, we will tolerate upwards of 250-300. For the most part, if triglycerides are the only part of your cholesterol panel reading as 'abnormal', it is best to lose weight and modify your diet, including less saturated fat and less simple sugars. We only start medication to lower this is the level is greater than 500 since this can increase ones risk for pancreatitis. This is not the cholesterol type that leads to heart attacks.     HDL is your 'good cholesterol.' Ideally, the reading should be over 40 and is particularly helpful when it is greater than 60. Research indicates that high HDL is extremely protective; and if your HDL level is greater than 60, we tolerate far worse LDL or triglyceride levels. For the most part, HDL is responsive to aerobic activity and ideal weight. We do not have medicines that increase the HDL reading very easily.    LDL is your 'bad cholesterol.' Our goals depend on how many cardiac risk factors you have.     High LDL: If you are diabetic or have had a heart attack, we like the LDL level to be less than 100. If you have 2 cardiac risk factors (such as diabetes, hypertension,  family history, a low HDL and smoking), we like your LDL to be less than 130. If you have 0-2 cardiac risk factors, we like your LDL level to be less than 160, but we may not necessarily initiate medications to 190 unless you cannot lower it. The latest guidelines recommend to consider taking a statin only if your ASCVD cardiac risk score is at least greater than 7.5% and a strong recommendation if your risk score is greater than 10%.     Low LDL: Normal or low LDL is considered good and having an LDL below the normal range is not of a concern.     Complete Blood Count (CBC):    White blood cells: These are infection fighting cells. Mild fluctuations may represent minor illness at the time of blood draw. Marked fluctuations can represent immune diseases. This can also be elevated from smoking.     High WBC: Elevation in wbc can commonly be caused by an infection, steroid use, or any cause of inflammation such as many rheumatologic diseases, surgery, or trauma.      Low WBC: Many different things can cause this such as infections, medications, rheumatologic disorders, malignancies, nutritional deficiencies, and a normal variant in some individuals. If this occurs it is common practice to rule out a few viruses such as HIV, Hep C, B12, folate along with a a peripheral blood smear to look for abnormalities. If you are otherwise healthy with no concerning symptoms and the workup is negative we usually monitor it with yearly blood work.  If there are other abnormal cell line abnormalities sometimes we refer to hematology to further evaluate.    Hemoglobin: A key indicator for anemia. Ranges depend on age. If you are significantly out of this range, we may need to talk with you.    High Hemoglobin: This can be elevated in some blood disorders, sleep apnea, chronic lung disease, kidney disease, testosterone use, dehydration, smoking, along with some other rare causes.     Low Hemoglobin: Many things can lead to this but  the most common cause is an iron deficiency in females who lose blood during their periods, decreased absorption due to antacids, poor diet, sickle cell, anemia of chronic disease, malignancy, bleeding from the gut, along with some other less common causes. If you are a young female who has periods it is usually assumed that this is from that blood loss unless other symptoms or abnormal blood work is present. If you are above 45 and have an iron deficiency it is always recommended to get a colonoscopy to ensure that there is no lesion causing blood loss and to exclude malignancy.     Hematocrit: Another way of looking at anemia, much like your hemoglobin. If you are significantly out of this range, we may need to talk with you.    MCV: This looks at the size of your red blood cells.     High MCV:  A large number may indicate a B-12 deficiency, folate deficiency, alcohol use, liver disease, medication-induced phenomenon, or a need for further workup.    Low MCV: A small number may imply iron anemia or genetic disorder such as alpha-thalassemia. We may check iron studies called to see if you are low.    MCH: Much like MCV above.    Platelets: These are clotting factors. We tolerate a broad range in this. If your numbers are less than 100, we may need to work it up.     High Platelet Count: If your numbers are elevated, this could represent ongoing inflammation within the body which can be caused by any infection, illness, iron deficiency, or other malignancy. We usually recheck it to monitor for improvement and if it does not resolve will work it up with more blood work.     Low Platelet Count: Many things can cause this such as infections, alcohol use, medications, liver disease, vitamin deficiencies, aspleenia, and some other rare blood disorders. If it persists many times we refer to hematology if a cause is not identified.     Iron:  This is not a reliable blood marker since this fluctuates throughout the day and  if you are fasting many times your iron level in your blood is low but this does not necessarily mean you have a deficiency.  There are more reliable ways to measure your iron stores and these are discussed below.    Transferrin:  Many things can cause an abnormal result and this is not as important as some other labs mentioned below.    TIBC:  This is the total iron-binding capacity and this measures the total amount of iron that can be bound by proteins in the blood.  If you have an elevated TIBC this is a good marker that you could be low on iron and this is useful blood marker.  A simple way to think of this is seats in a car. The TIBC is the number of open seats that iron can sit in. If you have a high TIBC (number of open seats) that means you have a low iron level.     Ferritin:  A low ferritin is almost always caused from an iron deficiency.  A normal ferritin level does not need necessarily exclude an iron deficiency since many inflammatory conditions such as kidney disease, diabetes, arthritis, and obesity can raise this level hiding an iron deficiency.  If you are healthy with no inflammatory conditions this is a very reliable test for detecting an iron deficiency since nothing else lowers your ferritin level except a low iron level. Keep in mind that you can have an iron deficiency if your ferritin level is normal but your TIBC is elevated.  If you have a low iron level the next step is always to identify why you have a low iron level.    CMP (Comprehensive Metabolic Panel):  Broadly, this is a test of organ function including the kidneys, liver, and electrolyte levels.    Glucose: This is primarily looking for diabetes. We like your blood glucose level to be less than 100 when fasting. Readings of 100-125 indicate what we call 'pre-diabetes' or 'glucose intolerance.' This does not necessarily indicate diabetes, but we may check another test called a hemoglobin A1C for confirmation. This level puts you  at great risk for becoming a true diabetic and we would encourage the reduction of simple sugars and processed white flour as well as appropriate weight loss. If this number is greater than 125, it is likely you are diabetic; we will get an additional hemoglobin A1C test and will likely schedule you for an appointment. If you notice that your blood glucose is above 125 and we have not scheduled a follow-up appointment, please call us. Patients who are known diabetics can have readings greater than 125.    Urea Nitrogen: This is a kidney function and maybe elevated because of mild dehydration or because of excessive muscle breakdown from aggressive exercise habits.    Creatinine: This also is a kidney function test. It may be mildly elevated if you have particularly large muscle bulk or taking a supplement like creatine. It is related to the GFR. It is a muscle product that we track to look at your kidney function. If this is elevated and new, we may need to talk with you. If you have had this mildly elevated in the past, it is likely that we will just track it to ensure that it does not worsen quickly. Some medications may gently worsen this, namely blood pressure pills called ace inhibitors. To some degree, we will permit levels of up to 1.6.    Sodium: This is essentially the concentration of salt within the body. This may be mildly low because of dehydration, overhydration, diuretics, .    Potassium: This is an electrolyte that can cause muscular cramping or cardiac difficulties. It is sometimes lowered by diuretic medications.    Chloride: For the most part, this is only relevant if the other electrolytes are abnormal.    CO2: This is a function of acid balance within the body. For the most part, mild abnormalities are not important and may represent a starvation or dehydration state when blood was drawn. Many medications can change this level as well such as diuretics, acetazolamide, calcium carbonate,  laxatives, aspirin, and many others.    High CO2: Many things can cause this which includes dehydration, sleep apnea, and COPD.     Low CO2: Many things can lead to a low level and if you are generally healthy we are usually  not concerned. Diarrhea, renal disease, diabetes, and many medications can cause this.     Anion Gap: Only relevant if your CO2 is abnormal.    Calcium: This is not related to dietary intake of calcium. It may fluctuate gently based on the amount of protein within your body. If it is above 10.9, we may need to do additional testing. Many times if low the albumin level is low and once the corrected calcium level is calculated the calcium is in a normal range.     Total protein: This looks at protein within the body. Markedly elevated levels can represent an immune response and may require further workup.    Albumin: This may be elevated because of particularly high level of fitness. If it is markedly suppressed, it may represent organ dysfunction, particularly in the liver or kidneys.    AST and ALT: These are enzymes in the liver and if elevated can indicate liver damage.     Elevated AST/ALT: Many things can caused elevated liver enzymes and the most common reason is viral infections, alcohol use, medications, supplements, autoimmune, along with some other rare causes. One of the most common reasons for a mild elevation in liver enzymes (less than 3 times the upper limit) is fatty liver disease. Many individuals who have extra fat in their diet store this in the liver and this can build up and cause a mild elevation. This is usually diagnosed with a liver ultrasound and exclusion of other causes. The only treatment for this is diet and exercise along with avoidance of liver toxic medications and alcohol. It is standard of care to rule our viral hepatitis and get imaging of the liver if elevated. This is monitored and if I feel concerned will refer to hepatology.     Alk Phos: Part of liver  function or bones    High Alk Phos: elevated levels may indicate a liver injury or obstruction of bile flow. Elevated levels can also be seen in vitamin D deficiency, drug induced, or bone disorders.     Low Alk Phos: In most cases having a low Alkaline Phosphatase enzyme activity is not due to any disease and is simply a normal variant.  Having an elevated Alk Phos is more concerning and associated with many diseases and thus I would not be overly concerned with a low level which is seen in many health individuals. The reasons for a low serum alkaline phosphatase activity were reviewed in a 1-yr retrospective study and in this study it was found that no cause was found in most cases.  Low activity values were recorded in several individuals in the absence of any obvious cause. This would suggest that the definition of the lower limit of the reference range for alkaline phosphatase is arbitrary, thus limiting the use of low serum activity as a marker of disease.  In some cases micronutrients like Zinc (Zn) and Magnesium (Mg) are causes of low ALP activity and you can take zinc or magnesium supplements. Unfortunately, the blood work to measure zinc and magnesium levels are unreliable and not very accurate since it does not test for the intracellular zinc or magnesium levels.     Julius MULLERD, Chet JH, Allen SILVEIRA. Clinical significance of a low serum alkaline phosphatase. Neth J Med. 1992 Feb;40(1-2):9-14. PMID: [id number].  Faustino PASCUAL. S, Hussein B, Heavenly I, Behera S, Faustino S. Low Alkaline Phosphatase (ALP) In Adult Population an Indicator of Zinc (Zn) and Magnesium (Mg) Deficiency. Curr Res Nutr Food Sci 2017;5(3). doi : http://dx.[email or web address]    Total Bilirubin: This is also part of liver function.    High T Bili: If you have right upper quadrant pain an elevation in total bilirubin can be caused by a gallbladder stone that is blocking the biliary tract that leads to your gastrointestinal tract. If you  have fever and right upper quadrant pain this can sometimes be elevated when your gallbladder is infected and most individuals have nausea with vomiting associated with it. If you have no symptoms and are otherwise healthy this can be caused by Gilbert syndrome which is a benign normal variant. There are other causes such as some anemias but there would be abnormal blood counts.     Low T Bili: Nothing to be concerned about.     Thyroid Stimulating Hormone (TSH): This reading is an indicator of your thyroid function. The thyroid regulates energy levels throughout the entire body, affecting almost every organ system. This is an inverse relationship so a high number actually presents a low thyroid. If this is abnormal, we will often check an additional lab called a Free T4 to evaluate this more carefully. Borderline elevations, those of 5-10, can be watched or worked up further. Please do not take the supplement Biotin for at least a week prior to getting your TSH checked since this can lead to false measurement levels.     Prostate Specific Antigen (PSA): (Men only.) This is a prostate cancer screening test, and is no longer a routine screening test. Levels are truly a function of age. Being less than 4 is typical for someone more in their 60s. If you are young, it should probably be less than 3. A higher PSA result does not necessarily mean that you have cancer, but may indicate a need for a discussion with your provider. Options include observation to look at rate of rise or prostate biopsy. Not only is the absolute value important, but how much it has changed from previous years. Please ensure that there is not a dramatic rise from previous years.    Please Note: This information is included as a reference to help you better understand your lab results and is not be used for diagnosis.    Frequently asked questions    When should I take my blood pressure medication?    The latest studies show that taking your  "blood pressure medication at night is the best time. A recent study showed that this prevents more heart attacks and strokes. See the answer below from [place].     "Q. I've taken blood pressure medicines every morning for many years, and they keep my pressure under control. Recently, my doctor recommended taking them at bedtime, instead. Does that make sense?    A. It actually does make sense -- based on recent research. For many years, there have been at least three theoretical reasons for taking blood pressure medicines before bedtime. First, a body system that strongly affects blood pressure, called the renin-angiotensin system, has its peak activity during sleep. Second, circadian rhythms cause differences in the body chemistry at night compared with daytime. Third, most heart attacks occur in the morning, before medicines taken in the morning have a chance to "kick in."" [6].     When should I take my cholesterol medication?    It used to be recommended to take your cholesterol medication at night since the original statins that lowered cholesterol did not last 24 hours and most cholesterol synthesis is done at night. The long acting statins such as atorvastatin and rosuvastatin last 24 hours so they can be taken any time during the day. Simvastatin, pravastatin, fluvastatin, and lovastatin are shorter acting and should be taken at bed time.     Can supplements affect my blood work?    Yes they can. A very important supplement to not take for at least a week prior to your blood work is biotin. "Biotin supplement use is common and can lead to the false measurement of thyroid hormone in commonly used assays." [8.]    What are conditions that should not be addressed during a virtual visit?    There are some conditions that should not be evaluated via a virtual visit since optimal care is impossible. Chest pain, shortness of breath, lung conditions, abdominal pain, and any neurological complaint such as headache, " dizziness, numbness ect.         When will I get commentary of my blood work?    I review all blood work that you get and I will send out commentary on this via [employer] within 72 hours. In most cases you will get a message from me sooner, but many times not all of the blood work is completed thus I usually wait until all results have returned. If there is a critical abnormality you should be contacted the same day you got blood work.     How frequently do I need to have visits to get controlled substances?    It is standard protocol to have a visit every 3 months if you are taking scheduled substances such as ADHD medications, psychiatric medications, and pain medications. This is to ensure your safety and monitor for any side effects.     When should I bring prior to a visit if I want to lose weight?    I recommend that you make a food diary for a week and fill out what you ate each day. You can bring this form in to your visit and I can look over it to suggest changes that you can make.     Which over the counter medications should I avoid if I have decreased kidney function?    NSAIDs which includes ibuprofen (Advil, Motrin, Nuprin), naproxen (Aleve), meloxicam (Mobic) and diclofenac(Zorvolex, Voltaren) and ketorolac (Toradol) can damage your kidneys if you take this long term.Tylenol does not affect your kidney and thus is safe as long as you don't have liver disease.     Is there an [facility]?     Yes! The [facility] is located on the first floor of the [facility]. The address is listed below. You can get curbside pickup if you call their number at [phone]. One of the many benefits of using the [facility] pharmacy is that the pharmacists can contact me directly if a cheaper alternative is available to save you money. They also see your note to know more about what the medication was prescribed for. I recommend this pharmacy since communication with me is quick in case any confusion arises on  your medications.     [address] [facility].  [address]  [place] [address]  Phone: [phone]    Hours:  Monday - Friday  8:00 a.m. - 5:30 a.m.    Why is it not in my best interest to call in order to get an antibiotic?    Medicine is a complex field and many times the correct diagnosis is critical in order to provide the correct care. One of the most important goals of a healthcare provider is to ensure that no dangerous condition is masquerading as a mild illness. Specific questions are very important to obtain during an examination that provide a wealth of information to understand your illness. Health care providers are trained to investigate for signs that can be dangerous to your health. Messaging or calling the office in order to get an antibiotic can be very dangerous.     For example, many urinary tract infections can lead to an infection in the kidney that can result in a serious blood stream infection that can lead to hospitalization if not recognized. A cough can be caused by many different things and not necessarily an infection. It is not uncommon that one assumes a cough is from an infection when it is actually caused by a blood clot in the lungs. This can lead to death. Determining your risk can only be performed after a thorough history and examination. A few sentences through e-mail is not enough.     What are some common symptoms that should be evaluated by the emergency department and not by phone or e-mail?    This does not include every symptom, but common examples of symptoms that should prompt one to go to the emergency department are chest pain, chest pressure, shortness of breath, difficulty breathing, abdominal pain, weakness or numbness or an extremity, sudden weakness or drooping on one side of the body, speaking difficulty, unusual or bad headache (particularly if it started suddenly), head injury, confusion, seizure, passing out, lightheaded, pain in arm or jaw, suddenly not able to speak,  see, walk, or move, dizziness, neck stiffness, suicidal thoughts, testicular pain, cuts and wounds, severe pain, along with many others. This is not an inclusive list.     Outside Records    It is common to have an colonoscopy, mammogram, PAP smear, or eye exam done outside the [facility] system. Many times we do not get the records automatically sent to us. Please call your provider's office to notify them to fax us your records so that we can have the most up to date information. Your provider will review your outside results in order to provide you with the complete care that you deserve. We appreciate that you decided to choose us to be serving on your healthcare team and the more information we have about your health is essential.     If I have a psychiatric crisis what should I do?    If you ever feel that there is a risk of a harm to yourself we recommend to go to the emergency room. There is a National Suicide Prevention lifeline number 7-894-121-TALK which route you to the nearest crisis center. There is also a suicide hotline 0-604-ZQWGAXY (1-853.544.7278).    988 has been designated as the new three-digit dialing code that will route callers to the National Suicide Prevention Lifeline (now known as the 988 Suicide & Crisis Lifeline), and is now active across the [place].    When people call, text, or chat 178, they will be connected to trained counselors that are part of the existing Lifeline network. These trained counselors will listen, understand how their problems are affecting them, provide support, and connect them to resources if necessary.    The previous Lifeline phone number (1-115.731.6767) will always remain available to people in emotional distress or suicidal crisis.    The [facility] [place] network of over 200 crisis centers has been in operation since 2005, and has been proven to be effective. Its the counselors at these local crisis centers who answer the contacts the Lifeline receives every  day. Numerous studies have shown that callers feel less suicidal, less depressed, less overwhelmed and more hopeful after speaking with a Lifeline counselor.     E-Visits    E-Visits are currently available for three conditions: Urinary tract infections, Sinus symptoms, and rashes.     If you have one of these infections or rash you can fill out a questionnaire that will be sent to your provider who can review and send an appropriate medication. No visit is needed.      What is an E-Visit?    An E-Visit is a way to get care for certain conditions without needing to schedule a virtual visit or to come into the clinic. We'll ask you some clinically based questions about yourself and your symptoms and your provider will evaluate, make a diagnosis and respond with a care plan with recommendations including testing and medications as indicated, just as they would in an in-person setting.  If it becomes clear that you need to be seen virtually or in-person after the E-Visit, we can arrange that.         Should I use an E-Visit?    E-Visits should be used only for non-urgent medical conditions. If you need urgent medical care, please contact your clinic by phone, schedule a same-day appointment online or find a nearby [facility]. For serious medical emergencies, please call 911 immediately.         What to expect during an E-Visit   Once you have agreed to an E-Visit, you will be prompted to complete the E-Visit clinical questionnaire in [employer], which can take 10-20 minutes to complete. You may also be asked to confirm your medications.     Since this is a medical evaluation, it is billable to your insurance. Because this is a billable visit, you may also be asked for your insurance details and to enter your credit card information, just as you would for any other visit or co-pay. Much of this is stored in your account, so it should be easy to access or update if needed. You may receive a bill for this service,  "including any applicable copay amount, after the service is rendered.     Please allow up to 24 business hours for a reply. At any point in the E-Visit process, if you have not received a response within 72 hours, please call your clinic.        To initiate the E-Visit process in [facility], click here.       E-Consults    E-Consults are available when you need to have a specialists opinion on one thing and your PCP agrees to send an E-Consult to answer your question within 48 hours. This not only saves you time but money as well since a visit is not always needed.          Patient Education       Checking Your Blood Pressure at Home   The Basics   Written by the doctors and editors at [facility]   How is blood pressure measured? -- Blood pressure is usually measured with a device that goes around your upper arm. This is often done in a doctor's office. But some people also check their blood pressure themselves, at home or at work.  Blood pressure is explained with 2 numbers. For instance, your blood pressure might be "140 over 90." The first (top) number is the pressure inside your arteries when your heart is chano. The second (bottom) number is the pressure inside your arteries when your heart is relaxed. The table shows how doctors and nurses define high and normal blood pressure (table 1).  If your blood pressure gets too high, it puts you at risk for heart attack, stroke, and kidney disease. High blood pressure does not usually cause symptoms. But it can be serious.  What is a home blood pressure meter? -- A home blood pressure meter (or "monitor") is a device you can use to check your blood pressure yourself. It has a cuff that goes around your upper arm (figure 1). Some devices have a cuff that goes around your wrist instead. But doctors aren't sure if these work as well. The meter also has a small screen, or dial, that shows your blood pressure numbers.  There are also special meters you can wear for a " day or 2. These are different because they automatically check your blood pressure throughout the day and night, even while you are sleeping. If your doctor thinks you should use one of these devices, they will talk to you about how to wear it.  Why do I need to check my blood pressure at home? -- If your doctor knows or suspects that you have high blood pressure, they might want you to check it at home. There are a few reasons for this. Your doctor might want to look at:  Whether your blood pressure measures the same at home as it did in the doctor's office  How well your blood pressure medicines are working  Changes in your blood pressure, for example, if it goes up and down  People who check their own blood pressure at home usually do better at keeping it low.  How do I choose a home blood pressure meter? -- When choosing a home blood pressure meter, you will probably want to think about:  Cost - Some devices cost more than others. You should also check to see if your insurance will help pay for your device.  Size - It's important to make sure the cuff fits your arm comfortably. Your doctor or nurse can help you with this.  How easy it is to use - You should make sure you understand how to use the device. You also need to be able to read the numbers on the screen.  You do not need a prescription to buy a home blood pressure meter. You can buy them at most pharmacies or over the internet. Your doctor or nurse can help you choose the right device for you.  How do I check my blood pressure at home? -- Once you have a home blood pressure meter, your doctor or nurse should check it to make sure it fits you and works correctly.  When it's time to check your blood pressure:  Go to the bathroom and empty your bladder first. Having a full bladder can temporarily increase your blood pressure, making the results inaccurate.  Sit in a chair with your feet flat on the ground.  Try to breathe normally and stay calm.  Attach  the cuff to your arm. Place the cuff directly on your skin, not over your clothing. The cuff should be tight enough to not slip down, but not uncomfortably tight.  Sit and relax for about 3 to 5 minutes with the cuff on.  Follow the directions that came with your device to start measuring your blood pressure. This might involve squeezing the bulb at the end of the tube to inflate the cuff (fill it with air). With some monitors, you just need to press a button to inflate the cuff. When the cuff fills with air, it feels like someone is squeezing your arm, but it should not hurt. Then you will slowly deflate the cuff (let the air out of it), or it will deflate by itself. The screen or dial will show your blood pressure numbers.  Stay seated and relax for 1 minute, then measure your blood pressure again.  How often should I check my blood pressure? -- It depends. Different people need to follow different schedules. Your doctor or nurse will tell you how often to check your blood pressure, and when. Some people need to check their blood pressure twice a day, in the morning and evening.  Your doctor or nurse will probably tell you to keep track of your blood pressure for at least a few days (table 2). Then they will look at the numbers. The reason for this is that it's normal for your blood pressure to change a bit from day to day. For example, the numbers might change depending on whether you recently had caffeine, just exercised, or feel stressed. Checking your blood pressure over several days - or longer - will give your doctor or nurse a better idea of what is average for you.  How should I keep track of my blood pressure? -- Some blood pressure meters will record your numbers for you, or send them to your computer or smartphone. If yours does not do this, you will need to write them down. Your doctor or nurse can help you figure out the best way to keep track of the numbers.  What if my blood pressure is high? --  "Your doctor or nurse will tell you what to do if your blood pressure is high when you check it at home. If you get a number that is higher than normal, measure it again to see if it is still high. If it is very high (above a certain number, which your doctor or nurse will tell you to watch out for), you should call your doctor right away.  If your blood pressure is only a little high, your doctor or nurse might tell you to keep checking it for a few more days or weeks, and then call if it does not go back down. Then they can help you decide what to do next.  All topics are updated as new evidence becomes available and our peer review process is complete.  This topic retrieved from [employer] on: Sep 21, 2021.  Topic 151644 Version 4.0  Release: 29.4.2 - C29.263  © 2021 UpToDate, Inc. and/or its affiliates. All rights reserved.  table 1: Definition of normal and high blood pressure  Level  Top number  Bottom number    High 130 or above 80 or above   Elevated 120 to 129 79 or below   Normal 119 or below 79 or below   These definitions are from the American College of Cardiology/American Heart Association. Other expert groups might use slightly different definitions.  "Elevated blood pressure" is a term doctor or nurses use as a warning. It means you do not yet have high blood pressure, but your blood pressure is not as low as it should be for good health.  Graphic 48181 Version 6.0  figure 1: Using a home blood pressure meter     This is an example of a person using a home blood pressure meter.  Graphic 646043 Version 1.0    Consumer Information Use and Disclaimer   This information is not specific medical advice and does not replace information you receive from your health care provider. This is only a brief summary of general information. It does NOT include all information about conditions, illnesses, injuries, tests, procedures, treatments, therapies, discharge instructions or life-style choices that may apply to " you. You must talk with your health care provider for complete information about your health and treatment options. This information should not be used to decide whether or not to accept your health care provider's advice, instructions or recommendations. Only your health care provider has the knowledge and training to provide advice that is right for you. The use of this information is governed by the [employer] End User License Agreement, available at https://www.[employer]/en/solutions/[employer]/about/amy.The use of [employer] content is governed by the [employer] Terms of Use. ©2021 UpToDate, Inc. All rights reserved.  Copyright   © 2021 UpToDate, Inc. and/or its affiliates. All rights reserved.      Daily Blood Pressure Log    Please print this form to assist you in keeping track of your blood pressure at home.      Name:  Date of Birth:       Average Blood Pressure:           Date: Time  (a.m.) Blood  Pressure: Pulse  Rate: Time  (p.m.) Blood  Pressure : Pulse  Rate: Comments:   Sample 8:37 127/83 84    Stressful morning                                                                                                                                                                                                     Wishing you good health,     Roque Guillaume MD     References:  1-https://www.[employer]/speakers/eva_vertes  2-https://www.[employer].com.au/news/mseqr-yqp-67-cancers-that-can-re-lyiflx-nj-smoking/  3-https://www.cdc.gov/cancer/lung/basic_info/screening.htm  4-https://newsroom.heart.org/news/common-hypertension-medications-may-reduce-colorectal-cancer-risk  5-Abyo A, Igor M, Sawada N, et al. High hemoglobin A1c levels within the non-diabetic range are associated with the risk of all cancers. Int J Cancer. 2016;138(7):7859-9844.  doi:10.1002/ijc.44973  6-https://www.[facility].[place].edu/newsletter_article/the-10-commandments-of-cancer-prevention  7-https://www.[facility].[place].edu/diseases-and-conditions/should-i-take-blood-pressure-medications-at-night  8-Ryder RODRIGEZ et al 2018 Prevalence of biotin supplement usage in outpatients and plasma biotin concentrations in patients presenting to the emergency department. Clin Biochem. Epub 2018 Jul 20. PMID: [id number].

## 2023-02-28 NOTE — PROGRESS NOTES
[facility] Note    Subjective:    The HPI and pertinent ROS is included in the Diagnostic Impression Remarks section at the end of the note. Please see below for further details. Chief complaint is at end of note.     Roosevelt is a pleasant intelligent patient who is here for evaluation.     Modified Medications    No medications on file       Data reviewed 274}  Previous medical records reviewed and summarized in plan section at end of note.      If you are due for any health screening(s) below please notify me so we can arrange them to be ordered and scheduled. Most healthy patients at your age complete them, but you are free to accept or refuse. If you can't do it, I'll definitely understand. If you can, I'd certainly appreciate it!     Tests to Keep You Healthy    Colon Cancer Screening: ORDERED BUT NOT SCHEDULED  Last Blood Pressure <= 139/89 (2/28/2023): Yes      The following portions of the patient's history were reviewed and updated as appropriate: allergies, current medications, past family history, past medical history, past social history, past surgical history and problem list.    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  He  has a past surgical history that includes Eye surgery (Bilateral); Rotator cuff repair; Colonoscopy; Prostate biopsy; Colonoscopy (N/A, 1/21/2016); Prostatectomy (12/2017); Colonoscopy (N/A, 1/25/2019); Endoscopic ultrasound of upper gastrointestinal tract (N/A, 8/20/2019); Renal exploration (11/4/2019); Cholecystectomy (11/4/2019); Liver biopsy (11/4/2019); Hyperthermic intraperitoneal chemotherapy (HIPEC) (11/4/2019); and Surgical removal of lymph node (11/4/2019).    He  reports that he quit smoking about 22 years ago. His smoking use included cigarettes. He has been exposed to  "tobacco smoke. He has never used smokeless tobacco. He reports that he does not currently use alcohol after a past usage of about 1.0 standard drink per week. He reports that he does not use drugs.  He family history includes Asbestos in his father; Cancer in his father; Lung cancer in his father; No Known Problems in his daughter.    Review of patient's allergies indicates:   Allergen Reactions    Epinephrine      Patient on Sandostatin and Epinephrine contraindicated    Aspirin Other (See Comments)     Internal bleeding    Lisinopril (bulk) Rash    Sulfa (sulfonamide antibiotics) Swelling and Rash       Tobacco Use: Medium Risk    Smoking Tobacco Use: Former    Smokeless Tobacco Use: Never    Passive Exposure: Past     Physical Examination  General appearance: alert, cooperative, no distress  Neck: no thyromegaly, no neck stiffness  Lungs: clear to auscultation, no wheezes, rales or rhonchi, symmetric air entry  Heart: normal rate, regular rhythm, normal S1, S2, no murmurs, rubs, clicks or gallops  Abdomen: soft, nontender, nondistended, no rigidity, rebound, or guarding.   Back: no point tenderness over spine  Extremities: peripheral pulses normal, no unilateral leg swelling or calf tenderness   Neurological:alert, oriented, normal speech, no new focal findings or movement disorder noted from baseline    BP Readings from Last 3 Encounters:   02/28/23 124/60   02/23/23 (!) 140/74   01/26/23 132/73     Wt Readings from Last 3 Encounters:   02/28/23 94 kg (207 lb 3.7 oz)   02/23/23 94.7 kg (208 lb 12.8 oz)   01/26/23 94.8 kg (208 lb 14.4 oz)     /60 (BP Location: Right arm, Patient Position: Sitting, BP Method: Large (Manual))   Pulse 74   Temp 98.9 °F (37.2 °C) (Oral)   Resp 16   Ht 6' 2" (1.88 m)   Wt 94 kg (207 lb 3.7 oz)   SpO2 97%   BMI 26.61 kg/m²    274}  Laboratory: I have reviewed old labs below:    274}    Lab Results   Component Value Date    WBC 10.99 05/27/2022    HGB 13.2 (L) 05/27/2022    " "HCT 38.5 (L) 05/27/2022    MCV 99 (H) 05/27/2022     05/27/2022     05/27/2022    K 3.7 05/27/2022     05/27/2022    CALCIUM 9.0 05/27/2022    PHOS 2.6 (L) 11/08/2019    CO2 24 05/27/2022     (H) 05/27/2022    BUN 18 05/27/2022    CREATININE 1.3 05/27/2022    ANIONGAP 12 05/27/2022    PROT 7.4 05/27/2022    ALBUMIN 4.6 05/27/2022    BILITOT 1.0 05/27/2022    ALKPHOS 77 05/27/2022    ALT 20 05/27/2022    AST 20 05/27/2022    INR 1.1 02/24/2019    CHOL 103 (L) 08/24/2022    TRIG 55 08/24/2022    HDL 32 (L) 08/24/2022    LDLCALC 60.0 (L) 08/24/2022    TSH 1.245 02/24/2019    PSA 28.6 (H) 12/20/2017    GLUF 109 07/22/2004    HGBA1C 5.8 (H) 08/24/2022     Lab reviewed by me: Particular labs of significance that I will monitor, workup, or treat to improve are mentioned below in diagnostic impression remarks.    Imaging/EKG: I have reviewed the pertinent results and my findings are noted in remarks.  274}    CC:   Chief Complaint   Patient presents with    Establish Care    Hypertension        274}    Assessment/Plan  Roosevelt Alejandro is a 77 y.o. male who presents to clinic with:  1. Healthcare maintenance    2. Essential hypertension    3. 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 Prediabetes    5. Macrocytic anemia    6. Encounter for preventive health examination    7. Abnormal finding of blood chemistry, unspecified    8. Pulmonary asbestosis    9. Colon cancer screening       274}  Diagnostic Impression Remarks + HPI     Documentation entered by me for this encounter may have been done in part using speech-recognition technology. Although I have made an effort to ensure accuracy, "sound like" errors may exist and should be interpreted in context.     Healthcare maintenance-doing well is being treated for neuroendocrine tumors no symptoms and levels have been doing well   Hypertension well controlled continue current meds   Neuroendocrine carcinoma-getting treatment " monitor blood work symptoms   Pulmonary asbestos as lung issues currently was exposed in the past monitor   Macrocytic anemia will check B12 folate monitor  Pre diabetes recheck blood work recommend healthy diet exercise   Health maintenance-recommend colon cancer screening will put in orders for colonoscopy      This is the extent of this pleasant patient's concerns at this present time. He did not feel chest pain upon exertion, dyspnea, nausea, vomiting, diaphoresis, or syncope. No pleuritic chest pain, unilateral leg swelling, calf tenderness, or calf pain. Negative for unintentional weight loss night sweats and fevers. Max will return to clinic in a few months for further workup and reassessment or sooner as needed. He was instructed to call the clinic or go to the emergency department or urgent care immediately if his symptoms do not improve, worsens, or if any new symptoms develop. As we discussed that symptoms could worsen over the next 24 hours he was advised that if any increased swelling, pain, or numbness arise to go immediately to the ED. Patient knows to call any time if an emergency arises. Shared decision making occurred and he verbalized understanding in agreement with this plan. I discussed imaging findings, diagnosis, possibilities, treatment options, medications, risks, and benefits. He had many questions regarding the options and long-term effects. All questions were answered. He expressed understanding after counseling regarding the diagnosis and recommendations. He was capable and demonstrated competence with understanding of these options. Shared decision making was performed resulting in him choosing the current treatment plan. Patient handout was given with instructions and recommendations. Advised the patient that if they become pregnant to alert us immediately to assess for medication changes. I also discussed the importance of close follow up to discuss labs, change or modify his  medications if needed, monitor side effects, and further evaluation of medical problems.     Additional workup planned: see labs ordered below.    See below for labs and meds ordered with associated diagnosis      1. Healthcare maintenance    2. Essential hypertension  - Comprehensive Metabolic Panel; Future    3. 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 Prediabetes  - Hemoglobin A1C; Future    5. Macrocytic anemia  - Vitamin B12 Deficiency Panel; Future  - Folate; Future  - CBC Auto Differential; Future  - TSH; Future    6. Encounter for preventive health examination  - Lipid Panel; Future    7. Abnormal finding of blood chemistry, unspecified  - Lipid Panel; Future  - CYSTATIN C, SERUM; Future    8. Pulmonary asbestosis    9. Colon cancer screening  - Case Request Endoscopy: COLONOSCOPY      Roque Guillaume MD   274}    If you are due for any health screening(s) below please notify me so we can arrange them to be ordered and scheduled. Most healthy patients at your age complete them, but you are free to accept or refuse.     If you can't do it, I'll definitely understand. If you can, I'd certainly appreciate it!   Tests to Keep You Healthy    Colon Cancer Screening: ORDERED BUT NOT SCHEDULED  Last Blood Pressure <= 139/89 (2/28/2023): Yes

## 2023-03-01 ENCOUNTER — TELEPHONE (OUTPATIENT)
Dept: GASTROENTEROLOGY | Facility: CLINIC | Age: 78
End: 2023-03-01
Payer: MEDICARE

## 2023-03-01 ENCOUNTER — PATIENT MESSAGE (OUTPATIENT)
Dept: GASTROENTEROLOGY | Facility: CLINIC | Age: 78
End: 2023-03-01
Payer: MEDICARE

## 2023-03-01 NOTE — TELEPHONE ENCOUNTER
Colonoscopy 5/17 at 1130am arrive for 1030am instructions reviewed and patient states understanding. Copy mailed and address verified.

## 2023-03-02 ENCOUNTER — LAB VISIT (OUTPATIENT)
Dept: LAB | Facility: HOSPITAL | Age: 78
End: 2023-03-02
Attending: STUDENT IN AN ORGANIZED HEALTH CARE EDUCATION/TRAINING PROGRAM
Payer: MEDICARE

## 2023-03-02 DIAGNOSIS — D53.9 MACROCYTIC ANEMIA: ICD-10-CM

## 2023-03-02 DIAGNOSIS — R79.9 ABNORMAL FINDING OF BLOOD CHEMISTRY, UNSPECIFIED: ICD-10-CM

## 2023-03-02 DIAGNOSIS — I10 ESSENTIAL HYPERTENSION: ICD-10-CM

## 2023-03-02 DIAGNOSIS — Z00.00 ENCOUNTER FOR PREVENTIVE HEALTH EXAMINATION: ICD-10-CM

## 2023-03-02 DIAGNOSIS — R73.03 PREDIABETES: ICD-10-CM

## 2023-03-02 PROCEDURE — 83036 HEMOGLOBIN GLYCOSYLATED A1C: CPT | Performed by: STUDENT IN AN ORGANIZED HEALTH CARE EDUCATION/TRAINING PROGRAM

## 2023-03-02 PROCEDURE — 82607 VITAMIN B-12: CPT | Performed by: STUDENT IN AN ORGANIZED HEALTH CARE EDUCATION/TRAINING PROGRAM

## 2023-03-02 PROCEDURE — 84443 ASSAY THYROID STIM HORMONE: CPT | Performed by: STUDENT IN AN ORGANIZED HEALTH CARE EDUCATION/TRAINING PROGRAM

## 2023-03-02 PROCEDURE — 82610 CYSTATIN C: CPT | Performed by: STUDENT IN AN ORGANIZED HEALTH CARE EDUCATION/TRAINING PROGRAM

## 2023-03-02 PROCEDURE — 82746 ASSAY OF FOLIC ACID SERUM: CPT | Performed by: STUDENT IN AN ORGANIZED HEALTH CARE EDUCATION/TRAINING PROGRAM

## 2023-03-02 PROCEDURE — 80053 COMPREHEN METABOLIC PANEL: CPT | Performed by: STUDENT IN AN ORGANIZED HEALTH CARE EDUCATION/TRAINING PROGRAM

## 2023-03-02 PROCEDURE — 86340 INTRINSIC FACTOR ANTIBODY: CPT | Performed by: STUDENT IN AN ORGANIZED HEALTH CARE EDUCATION/TRAINING PROGRAM

## 2023-03-02 PROCEDURE — 85025 COMPLETE CBC W/AUTO DIFF WBC: CPT | Performed by: STUDENT IN AN ORGANIZED HEALTH CARE EDUCATION/TRAINING PROGRAM

## 2023-03-02 PROCEDURE — 82941 ASSAY OF GASTRIN: CPT | Performed by: STUDENT IN AN ORGANIZED HEALTH CARE EDUCATION/TRAINING PROGRAM

## 2023-03-02 PROCEDURE — 36415 COLL VENOUS BLD VENIPUNCTURE: CPT | Mod: PO | Performed by: STUDENT IN AN ORGANIZED HEALTH CARE EDUCATION/TRAINING PROGRAM

## 2023-03-02 PROCEDURE — 80061 LIPID PANEL: CPT | Performed by: STUDENT IN AN ORGANIZED HEALTH CARE EDUCATION/TRAINING PROGRAM

## 2023-03-03 LAB
ALBUMIN SERPL BCP-MCNC: 4.1 G/DL (ref 3.5–5.2)
ALP SERPL-CCNC: 75 U/L (ref 55–135)
ALT SERPL W/O P-5'-P-CCNC: 14 U/L (ref 10–44)
ANION GAP SERPL CALC-SCNC: 8 MMOL/L (ref 8–16)
AST SERPL-CCNC: 18 U/L (ref 10–40)
BASOPHILS # BLD AUTO: 0.09 K/UL (ref 0–0.2)
BASOPHILS NFR BLD: 1.4 % (ref 0–1.9)
BILIRUB SERPL-MCNC: 0.6 MG/DL (ref 0.1–1)
BUN SERPL-MCNC: 15 MG/DL (ref 8–23)
CALCIUM SERPL-MCNC: 9.3 MG/DL (ref 8.7–10.5)
CHLORIDE SERPL-SCNC: 108 MMOL/L (ref 95–110)
CHOLEST SERPL-MCNC: 100 MG/DL (ref 120–199)
CHOLEST/HDLC SERPL: 2.9 {RATIO} (ref 2–5)
CO2 SERPL-SCNC: 25 MMOL/L (ref 23–29)
CREAT SERPL-MCNC: 1.3 MG/DL (ref 0.5–1.4)
DIFFERENTIAL METHOD: ABNORMAL
EOSINOPHIL # BLD AUTO: 0.2 K/UL (ref 0–0.5)
EOSINOPHIL NFR BLD: 2.9 % (ref 0–8)
ERYTHROCYTE [DISTWIDTH] IN BLOOD BY AUTOMATED COUNT: 12.8 % (ref 11.5–14.5)
EST. GFR  (NO RACE VARIABLE): 56.2 ML/MIN/1.73 M^2
ESTIMATED AVG GLUCOSE: 120 MG/DL (ref 68–131)
FOLATE SERPL-MCNC: 14.6 NG/ML (ref 4–24)
GLUCOSE SERPL-MCNC: 80 MG/DL (ref 70–110)
HBA1C MFR BLD: 5.8 % (ref 4–5.6)
HCT VFR BLD AUTO: 37.6 % (ref 40–54)
HDLC SERPL-MCNC: 35 MG/DL (ref 40–75)
HDLC SERPL: 35 % (ref 20–50)
HGB BLD-MCNC: 12.2 G/DL (ref 14–18)
IMM GRANULOCYTES # BLD AUTO: 0.11 K/UL (ref 0–0.04)
IMM GRANULOCYTES NFR BLD AUTO: 1.7 % (ref 0–0.5)
LDLC SERPL CALC-MCNC: 48.2 MG/DL (ref 63–159)
LYMPHOCYTES # BLD AUTO: 1.7 K/UL (ref 1–4.8)
LYMPHOCYTES NFR BLD: 25 % (ref 18–48)
MCH RBC QN AUTO: 33.7 PG (ref 27–31)
MCHC RBC AUTO-ENTMCNC: 32.4 G/DL (ref 32–36)
MCV RBC AUTO: 104 FL (ref 82–98)
MONOCYTES # BLD AUTO: 0.6 K/UL (ref 0.3–1)
MONOCYTES NFR BLD: 8.4 % (ref 4–15)
NEUTROPHILS # BLD AUTO: 4 K/UL (ref 1.8–7.7)
NEUTROPHILS NFR BLD: 60.6 % (ref 38–73)
NONHDLC SERPL-MCNC: 65 MG/DL
NRBC BLD-RTO: 0 /100 WBC
PLATELET # BLD AUTO: 198 K/UL (ref 150–450)
PMV BLD AUTO: 12.2 FL (ref 9.2–12.9)
POTASSIUM SERPL-SCNC: 4.5 MMOL/L (ref 3.5–5.1)
PROT SERPL-MCNC: 6.9 G/DL (ref 6–8.4)
RBC # BLD AUTO: 3.62 M/UL (ref 4.6–6.2)
SODIUM SERPL-SCNC: 141 MMOL/L (ref 136–145)
TRIGL SERPL-MCNC: 84 MG/DL (ref 30–150)
TSH SERPL DL<=0.005 MIU/L-ACNC: 2.66 UIU/ML (ref 0.4–4)
WBC # BLD AUTO: 6.64 K/UL (ref 3.9–12.7)

## 2023-03-04 LAB
ANNOTATION COMMENT IMP: NORMAL
CYSTATIN C SERPL-MCNC: 1.31 MG/L (ref 0.67–1.21)
GASTRIN P FAST SERPL-MCNC: <10 PG/ML
GFR/BSA.PRED SERPLBLD CYS-BASED-ARV: 51 ML/MIN/BSA
IF BLOCK AB SER QL: NEGATIVE
VIT B12 SERPL-MCNC: 143 NG/L (ref 180–914)

## 2023-03-06 ENCOUNTER — TELEPHONE (OUTPATIENT)
Dept: FAMILY MEDICINE | Facility: CLINIC | Age: 78
End: 2023-03-06
Payer: MEDICARE

## 2023-03-06 ENCOUNTER — PATIENT MESSAGE (OUTPATIENT)
Dept: FAMILY MEDICINE | Facility: CLINIC | Age: 78
End: 2023-03-06
Payer: MEDICARE

## 2023-03-06 DIAGNOSIS — E53.8 B12 DEFICIENCY: Primary | ICD-10-CM

## 2023-03-06 DIAGNOSIS — I10 ESSENTIAL HYPERTENSION: Primary | ICD-10-CM

## 2023-03-06 RX ORDER — LANOLIN ALCOHOL/MO/W.PET/CERES
1000 CREAM (GRAM) TOPICAL DAILY
Qty: 90 TABLET | Refills: 3 | Status: SHIPPED | OUTPATIENT
Start: 2023-03-06 | End: 2023-04-10

## 2023-03-06 RX ORDER — NIFEDIPINE 30 MG/1
30 TABLET, EXTENDED RELEASE ORAL NIGHTLY
Qty: 90 TABLET | Refills: 3
Start: 2023-03-06 | End: 2023-09-21

## 2023-03-06 NOTE — PROGRESS NOTES
Noted.  Appears that he had an allergy to lisinopril so I would recommend he continue the Procardia and I will send in Jardiance which is a medication that is known to help prevent chronic kidney disease he can take in addition his current blood pressure medications.

## 2023-03-09 DIAGNOSIS — E78.5 HYPERLIPIDEMIA, UNSPECIFIED HYPERLIPIDEMIA TYPE: ICD-10-CM

## 2023-03-09 RX ORDER — ATORVASTATIN CALCIUM 20 MG/1
20 TABLET, FILM COATED ORAL DAILY
Qty: 90 TABLET | Refills: 3 | Status: SHIPPED | OUTPATIENT
Start: 2023-03-09 | End: 2024-02-15

## 2023-03-09 NOTE — TELEPHONE ENCOUNTER
No new care gaps identified.  [facility] Embedded Care Gaps. Reference number: [id number]. 3/09/2023   11:09:04 AM CST

## 2023-03-09 NOTE — TELEPHONE ENCOUNTER
----- Message from Natalie Fair sent at 3/9/2023 10:15 AM CST -----  Contact: [phone]  Type:  RX Refill Request    Who Called:  Pt   Refill or New Rx:  refill  RX Name and Strength:  atorvastatin (LIPITOR) 20 MG tablet  How is the patient currently taking it? (ex. 1XDay):  1xday   Is this a 30 day or 90 day RX:  90  Preferred Pharmacy with phone number:    [facility] - [place], [place] - [address]  [address] [address]  Phone: [phone] Fax: [phone]    Local or Mail Order:  Mail   Ordering Provider:  Ruth Rich PA-C  Best Call Back Number:  [phone]    Additional Information:  Pt requesting refill on metoprolol succinate (TOPROL-XL) 25 MG 24 hr tablet and NIFEdipine (PROCARDIA-XL) 30 MG (OSM) 24 hr tablet also

## 2023-03-20 DIAGNOSIS — R00.2 HEART PALPITATIONS: ICD-10-CM

## 2023-03-20 RX ORDER — METOPROLOL SUCCINATE 25 MG/1
25 TABLET, EXTENDED RELEASE ORAL DAILY
Qty: 90 TABLET | Refills: 3 | Status: SHIPPED | OUTPATIENT
Start: 2023-03-20 | End: 2024-02-15

## 2023-03-20 NOTE — TELEPHONE ENCOUNTER
No new care gaps identified.  [facility] Embedded Care Gaps. Reference number: [id number]. 3/20/2023   1:39:01 PM CDT

## 2023-03-20 NOTE — TELEPHONE ENCOUNTER
----- Message from Helga Zapata sent at 3/20/2023 12:02 PM CDT -----  Contact: pt  Type:  RX Refill Request    Who Called:   pt  Refill or New Rx:  refill  RX Name and Strength:    metoprolol succinate (TOPROL-XL) 25 MG 24 hr tablet    How is the patient currently taking it? (ex. 1XDay):  as ordered  Is this a 30 day or 90 day RX:  90  Preferred Pharmacy with phone number:    [facility] [place], [place] - [address] [address]  Phone: [phone] Fax: [phone]    [facility] - [facility] [address]  [address] [address]  Phone: [phone] Fax: [phone]      Local or Mail Order:  mail  Ordering Provider:  reyna Mtz Call Back Number:  [phone]  Additional Information:

## 2023-03-23 ENCOUNTER — INFUSION (OUTPATIENT)
Dept: INFUSION THERAPY | Facility: HOSPITAL | Age: 78
End: 2023-03-23
Attending: INTERNAL MEDICINE
Payer: MEDICARE

## 2023-03-23 ENCOUNTER — OFFICE VISIT (OUTPATIENT)
Dept: HEMATOLOGY/ONCOLOGY | Facility: CLINIC | Age: 78
End: 2023-03-23
Payer: MEDICARE

## 2023-03-23 VITALS
DIASTOLIC BLOOD PRESSURE: 66 MMHG | WEIGHT: 210 LBS | RESPIRATION RATE: 16 BRPM | SYSTOLIC BLOOD PRESSURE: 133 MMHG | HEART RATE: 91 BPM | HEIGHT: 74 IN | BODY MASS INDEX: 26.95 KG/M2 | TEMPERATURE: 98 F

## 2023-03-23 VITALS
TEMPERATURE: 98 F | SYSTOLIC BLOOD PRESSURE: 133 MMHG | DIASTOLIC BLOOD PRESSURE: 66 MMHG | WEIGHT: 204.19 LBS | RESPIRATION RATE: 18 BRPM | HEIGHT: 74 IN | HEART RATE: 91 BPM | BODY MASS INDEX: 26.21 KG/M2

## 2023-03-23 DIAGNOSIS — C7A.8 NEUROENDOCRINE CARCINOMA METASTATIC TO INTRA-ABDOMINAL LYMPH NODE: Primary | ICD-10-CM

## 2023-03-23 DIAGNOSIS — C7B.8 NEUROENDOCRINE CARCINOMA METASTATIC TO INTRA-ABDOMINAL LYMPH NODE: Primary | ICD-10-CM

## 2023-03-23 PROCEDURE — 99213 OFFICE O/P EST LOW 20 MIN: CPT | Mod: S$GLB,,, | Performed by: NURSE PRACTITIONER

## 2023-03-23 PROCEDURE — 1126F AMNT PAIN NOTED NONE PRSNT: CPT | Mod: CPTII,S$GLB,, | Performed by: NURSE PRACTITIONER

## 2023-03-23 PROCEDURE — 99213 PR OFFICE/OUTPT VISIT, EST, LEVL III, 20-29 MIN: ICD-10-PCS | Mod: S$GLB,,, | Performed by: NURSE PRACTITIONER

## 2023-03-23 PROCEDURE — 1159F PR MEDICATION LIST DOCUMENTED IN MEDICAL RECORD: ICD-10-PCS | Mod: CPTII,S$GLB,, | Performed by: NURSE PRACTITIONER

## 2023-03-23 PROCEDURE — 1159F MED LIST DOCD IN RCRD: CPT | Mod: CPTII,S$GLB,, | Performed by: NURSE PRACTITIONER

## 2023-03-23 PROCEDURE — 1126F PR PAIN SEVERITY QUANTIFIED, NO PAIN PRESENT: ICD-10-PCS | Mod: CPTII,S$GLB,, | Performed by: NURSE PRACTITIONER

## 2023-03-23 PROCEDURE — 3288F FALL RISK ASSESSMENT DOCD: CPT | Mod: CPTII,S$GLB,, | Performed by: NURSE PRACTITIONER

## 2023-03-23 PROCEDURE — 3075F PR MOST RECENT SYSTOLIC BLOOD PRESS GE 130-139MM HG: ICD-10-PCS | Mod: CPTII,S$GLB,, | Performed by: NURSE PRACTITIONER

## 2023-03-23 PROCEDURE — 63600175 PHARM REV CODE 636 W HCPCS: Mod: JZ,JG | Performed by: NURSE PRACTITIONER

## 2023-03-23 PROCEDURE — 3078F PR MOST RECENT DIASTOLIC BLOOD PRESSURE < 80 MM HG: ICD-10-PCS | Mod: CPTII,S$GLB,, | Performed by: NURSE PRACTITIONER

## 2023-03-23 PROCEDURE — 1160F PR REVIEW ALL MEDS BY PRESCRIBER/CLIN PHARMACIST DOCUMENTED: ICD-10-PCS | Mod: CPTII,S$GLB,, | Performed by: NURSE PRACTITIONER

## 2023-03-23 PROCEDURE — 1101F PT FALLS ASSESS-DOCD LE1/YR: CPT | Mod: CPTII,S$GLB,, | Performed by: NURSE PRACTITIONER

## 2023-03-23 PROCEDURE — 3075F SYST BP GE 130 - 139MM HG: CPT | Mod: CPTII,S$GLB,, | Performed by: NURSE PRACTITIONER

## 2023-03-23 PROCEDURE — 3078F DIAST BP <80 MM HG: CPT | Mod: CPTII,S$GLB,, | Performed by: NURSE PRACTITIONER

## 2023-03-23 PROCEDURE — 1101F PR PT FALLS ASSESS DOC 0-1 FALLS W/OUT INJ PAST YR: ICD-10-PCS | Mod: CPTII,S$GLB,, | Performed by: NURSE PRACTITIONER

## 2023-03-23 PROCEDURE — 3288F PR FALLS RISK ASSESSMENT DOCUMENTED: ICD-10-PCS | Mod: CPTII,S$GLB,, | Performed by: NURSE PRACTITIONER

## 2023-03-23 PROCEDURE — 1160F RVW MEDS BY RX/DR IN RCRD: CPT | Mod: CPTII,S$GLB,, | Performed by: NURSE PRACTITIONER

## 2023-03-23 PROCEDURE — 96372 THER/PROPH/DIAG INJ SC/IM: CPT

## 2023-03-23 RX ORDER — LANREOTIDE ACETATE 120 MG/.5ML
120 INJECTION SUBCUTANEOUS
Status: COMPLETED | OUTPATIENT
Start: 2023-03-23 | End: 2023-03-23

## 2023-03-23 RX ADMIN — LANREOTIDE ACETATE 120 MG: 120 INJECTION SUBCUTANEOUS at 01:03

## 2023-03-23 NOTE — PROGRESS NOTES
Subjective:       Patient ID: Roosevelt Alejandro is a 77 y.o. male.    Chief Complaint: leading to consultation is: neuroendocrine carcinoma follow up.     HPI:  Patient returns today for a regularly scheduled follow-up visit.  He missed his appt in Oct 2022 and has not had Chromogranin A or imaging since July 22022. Will get up to date albs and imaging now.    Mr. Alejandro is doing well today. Labs by PCP showed B12 def anemia and he is on a 12 supplement and tolerating it well. He has a Coloscopy scheduled for 5/12/2023 with Dr. Maciel.    CT all stable 7/2022 1/5/2021 Dotatate PET:  Stable size and distribution of somatostatin receptor avid disease in the mediastinum and abdomen, multiple index lesions demonstrate increased avidity as detailed above.  No new lesions identified.         Patient underwent surgery 11/4/2019 with Dr. Khalil.  Partial path report:     FINAL PATHOLOGIC DIAGNOSIS     SYNOPTIC REPORT  Procedure: Segmental resection, small intestine  Tumor Site: Small intestine, terminal ileum  Tumor Size: Greatest dimension (centimeters): 1.8 cm  Tumor Focality: Unifocal  Histologic Type and Grade G1: Well-differentiated neuroendocrine tumor  Mitotic Rate: 1.5 mitoses / 10 HPF  Ki-67 Labeling Index: Specify Ki-67 percentage: about 1.1 %  Tumor Extension: Tumor at least invades through the muscularis propria into subserosal tissue  All margins are uninvolved by tumor: Margins examined: proximal, distal, and radial.  Lymphovascular Invasion: Present  Perineural Invasion: Present  Large Mesenteric Masses (>2 cm), not identified  Regional Lymph Nodes Examination (counting lymph nodes from all the 38 parts)  Number of Lymph Nodes Involved: 41  Number of Lymph Nodes Examined: 46  Pathologic Stage Classification (pTNM, AJCC 8th Edition)  Primary Tumor (pT) at least pT3: Invades through the muscularis propria into subserosal tissue  pN2: Extensive rigoberto deposits (12 or greater),  Distant Metastasis (pM) pM1b:  Metastasis in at least one extrahepatic site (nonregional lymph node: Aortocaval  node. right inferior aorta node)  NET panel  Primary small bowel tumor (Block 18D)  Ki-67: positive, approximately 1.1% cells staining  Chromogranin: positive, 3+, 100%  Synaptophysin: positive, 3+, 100%  CD31: 12 / HPF  TTF: not applicable  Lymph node metastasis (Block 38A)  Ki-67: positive, approximately 6.5 % cells staining        CT abdomen 7/15/2019:  Mesenteric and retroperitoneal lymphadenopathy, with numerous necrotic appearing lymph node is within the central mesentery.  Some of these nodes have mildly increased in size, while the largest previously present node has significantly decreased in size as above.  Further workup for neoplastic process such as metastatic disease or lymphoma is recommended.    Normal appendix.  Moderate diverticulosis.  Small hiatal hernia.  Hepatic and renal cysts.  Atherosclerosis.  Evidence for prior asbestos exposure.    All medications and past medical and surgical history have been reviewed.         Review of patient's allergies indicates:   Allergen Reactions    Epinephrine      Patient on Sandostatin and Epinephrine contraindicated    Aspirin Other (See Comments)     Internal bleeding    Lisinopril (bulk) Rash    Sulfa (sulfonamide antibiotics) Swelling and Rash       ROS  Review of Systems   Constitutional:  Positive for malaise/fatigue. Negative for fever.   Respiratory:  Negative for cough and shortness of breath.    Cardiovascular:  Negative for chest pain, palpitations and leg swelling.   Gastrointestinal:  Negative for abdominal pain, constipation, diarrhea, nausea and vomiting.   Genitourinary:  Negative for dysuria.   Musculoskeletal:  Negative for myalgias.   Skin:  Negative for rash.   Neurological:  Negative for dizziness.   Psychiatric/Behavioral:  Negative for depression.          Previous FAMHX and SOCHX information reviewed and remains unchanged         Objective:         Physical Exam  Constitutional:       Appearance: Normal appearance.   HENT:      Head: Normocephalic and atraumatic.      Right Ear: External ear normal.      Left Ear: External ear normal.      Nose: Nose normal.      Mouth/Throat:      Mouth: Mucous membranes are moist.   Eyes:      Pupils: Pupils are equal, round, and reactive to light.   Cardiovascular:      Rate and Rhythm: Normal rate and regular rhythm.      Heart sounds: Normal heart sounds.   Pulmonary:      Effort: Pulmonary effort is normal.      Breath sounds: Normal breath sounds.   Abdominal:      General: Abdomen is flat.   Musculoskeletal:      Right lower leg: No edema.      Left lower leg: No edema.   Skin:     General: Skin is warm and dry.   Neurological:      General: No focal deficit present.      Mental Status: He is alert and oriented to person, place, and time.   Psychiatric:         Mood and Affect: Mood normal.         Behavior: Behavior normal.         Thought Content: Thought content normal.         Judgment: Judgment normal.                     All lab results and imaging results have been reviewed and discussed with the patient  No results found for this or any previous visit (from the past 336 hour(s)).  CMP  Sodium   Date Value Ref Range Status   03/02/2023 141 136 - 145 mmol/L Final     Potassium   Date Value Ref Range Status   03/02/2023 4.5 3.5 - 5.1 mmol/L Final     Chloride   Date Value Ref Range Status   03/02/2023 108 95 - 110 mmol/L Final     CO2   Date Value Ref Range Status   03/02/2023 25 23 - 29 mmol/L Final     Glucose   Date Value Ref Range Status   03/02/2023 80 70 - 110 mg/dL Final     BUN   Date Value Ref Range Status   03/02/2023 15 8 - 23 mg/dL Final     Creatinine   Date Value Ref Range Status   03/02/2023 1.3 0.5 - 1.4 mg/dL Final     Calcium   Date Value Ref Range Status   03/02/2023 9.3 8.7 - 10.5 mg/dL Final     Total Protein   Date Value Ref Range Status   03/02/2023 6.9 6.0 - 8.4 g/dL Final     Albumin    Date Value Ref Range Status   03/02/2023 4.1 3.5 - 5.2 g/dL Final     Total Bilirubin   Date Value Ref Range Status   03/02/2023 0.6 0.1 - 1.0 mg/dL Final     Comment:     For infants and newborns, interpretation of results should be based  on gestational age, weight and in agreement with clinical  observations.    Premature Infant recommended reference ranges:  Up to 24 hours.............<8.0 mg/dL  Up to 48 hours............<12.0 mg/dL  3-5 days..................<15.0 mg/dL  6-29 days.................<15.0 mg/dL       Alkaline Phosphatase   Date Value Ref Range Status   03/02/2023 75 55 - 135 U/L Final     AST   Date Value Ref Range Status   03/02/2023 18 10 - 40 U/L Final     ALT   Date Value Ref Range Status   03/02/2023 14 10 - 44 U/L Final     Anion Gap   Date Value Ref Range Status   03/02/2023 8 8 - 16 mmol/L Final     eGFR if    Date Value Ref Range Status   05/27/2022 >60.0 >60 mL/min/1.73 m^2 Final     eGFR if non    Date Value Ref Range Status   05/27/2022 52.6 (A) >60 mL/min/1.73 m^2 Final     Comment:     Calculation used to obtain the estimated glomerular filtration  rate (eGFR) is the CKD-EPI equation.            Assessment:      1. 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 B12 def Anemia- Patent started on B12 pill tolertaing this well. Colonoscopy scheduled 5/12/2023.    Plan:   As Above in Assessment      The plan was discussed with the patient and all questions/concerns have been answered to the patient's satisfaction.    Electronically signed  by Shilpa Brown, MARY,  APRN, AGNP-C, OCN

## 2023-04-03 ENCOUNTER — HOSPITAL ENCOUNTER (OUTPATIENT)
Dept: RADIOLOGY | Facility: HOSPITAL | Age: 78
Discharge: HOME | End: 2023-04-03
Attending: NURSE PRACTITIONER
Payer: MEDICARE

## 2023-04-03 DIAGNOSIS — C7B.8 NEUROENDOCRINE CARCINOMA METASTATIC TO INTRA-ABDOMINAL LYMPH NODE: ICD-10-CM

## 2023-04-03 DIAGNOSIS — C7A.8 NEUROENDOCRINE CARCINOMA METASTATIC TO INTRA-ABDOMINAL LYMPH NODE: ICD-10-CM

## 2023-04-03 PROCEDURE — 25500020 PHARM REV CODE 255: Mod: PO | Performed by: NURSE PRACTITIONER

## 2023-04-03 PROCEDURE — 74177 CT ABD & PELVIS W/CONTRAST: CPT | Mod: TC,PO

## 2023-04-03 RX ADMIN — IOHEXOL 100 ML: 350 INJECTION, SOLUTION INTRAVENOUS at 11:04

## 2023-04-09 ENCOUNTER — PATIENT MESSAGE (OUTPATIENT)
Dept: FAMILY MEDICINE | Facility: CLINIC | Age: 78
End: 2023-04-09
Payer: MEDICARE

## 2023-04-09 DIAGNOSIS — E53.8 B12 DEFICIENCY: Primary | ICD-10-CM

## 2023-04-10 RX ORDER — CYANOCOBALAMIN 1000 UG/ML
1000 INJECTION, SOLUTION INTRAMUSCULAR; SUBCUTANEOUS
Qty: 3 ML | Refills: 3 | Status: SHIPPED | OUTPATIENT
Start: 2023-04-10 | End: 2024-03-01

## 2023-04-10 NOTE — TELEPHONE ENCOUNTER
No new care gaps identified.  [facility] Embedded Care Gaps. Reference number: [id number]. 4/10/2023   4:16:13 PM CDT

## 2023-04-12 ENCOUNTER — PATIENT MESSAGE (OUTPATIENT)
Dept: ENDOSCOPY | Facility: HOSPITAL | Age: 78
End: 2023-04-12
Payer: MEDICARE

## 2023-04-20 ENCOUNTER — INFUSION (OUTPATIENT)
Dept: INFUSION THERAPY | Facility: HOSPITAL | Age: 78
End: 2023-04-20
Attending: INTERNAL MEDICINE
Payer: MEDICARE

## 2023-04-20 VITALS
HEART RATE: 67 BPM | SYSTOLIC BLOOD PRESSURE: 132 MMHG | DIASTOLIC BLOOD PRESSURE: 64 MMHG | HEIGHT: 74 IN | BODY MASS INDEX: 26.18 KG/M2 | TEMPERATURE: 97 F | WEIGHT: 204 LBS | RESPIRATION RATE: 18 BRPM | OXYGEN SATURATION: 95 %

## 2023-04-20 DIAGNOSIS — C7A.8 NEUROENDOCRINE CARCINOMA METASTATIC TO INTRA-ABDOMINAL LYMPH NODE: Primary | ICD-10-CM

## 2023-04-20 DIAGNOSIS — C7B.8 NEUROENDOCRINE CARCINOMA METASTATIC TO INTRA-ABDOMINAL LYMPH NODE: Primary | ICD-10-CM

## 2023-04-20 PROCEDURE — 96372 THER/PROPH/DIAG INJ SC/IM: CPT

## 2023-04-20 PROCEDURE — 63600175 PHARM REV CODE 636 W HCPCS: Mod: JZ,JG | Performed by: NURSE PRACTITIONER

## 2023-04-20 RX ORDER — LANREOTIDE ACETATE 120 MG/.5ML
120 INJECTION SUBCUTANEOUS
Status: COMPLETED | OUTPATIENT
Start: 2023-04-20 | End: 2023-04-20

## 2023-04-20 RX ADMIN — LANREOTIDE ACETATE 120 MG: 120 INJECTION SUBCUTANEOUS at 02:04

## 2023-04-20 NOTE — PLAN OF CARE
Problem: Fall Injury Risk  Goal: Absence of Fall and Fall-Related Injury  Outcome: Ongoing, Progressing  Intervention: Identify and Manage Contributors  Flowsheets (Taken 4/20/2023 1443)  Self-Care Promotion: safe use of adaptive equipment encouraged  Medication Review/Management: medications reviewed  Intervention: Promote Injury-Free Environment  Flowsheets (Taken 4/20/2023 1443)  Safety Promotion/Fall Prevention: assistive device/personal item within reach

## 2023-05-17 ENCOUNTER — ANESTHESIA EVENT (OUTPATIENT)
Dept: ENDOSCOPY | Facility: HOSPITAL | Age: 78
End: 2023-05-17
Payer: MEDICARE

## 2023-05-17 ENCOUNTER — ANESTHESIA (OUTPATIENT)
Dept: ENDOSCOPY | Facility: HOSPITAL | Age: 78
End: 2023-05-17
Payer: MEDICARE

## 2023-05-17 ENCOUNTER — HOSPITAL ENCOUNTER (OUTPATIENT)
Facility: HOSPITAL | Age: 78
Discharge: HOME | End: 2023-05-17
Attending: INTERNAL MEDICINE | Admitting: INTERNAL MEDICINE
Payer: MEDICARE

## 2023-05-17 DIAGNOSIS — K64.8 INTERNAL HEMORRHOIDS: Primary | ICD-10-CM

## 2023-05-17 DIAGNOSIS — K57.90 DIVERTICULOSIS: ICD-10-CM

## 2023-05-17 DIAGNOSIS — K63.5 POLYP OF COLON, UNSPECIFIED PART OF COLON, UNSPECIFIED TYPE: ICD-10-CM

## 2023-05-17 DIAGNOSIS — Z86.010 HISTORY OF COLON POLYPS: ICD-10-CM

## 2023-05-17 PROCEDURE — 27201012 HC FORCEPS, HOT/COLD, DISP: Performed by: INTERNAL MEDICINE

## 2023-05-17 PROCEDURE — 63600175 PHARM REV CODE 636 W HCPCS: Performed by: NURSE ANESTHETIST, CERTIFIED REGISTERED

## 2023-05-17 PROCEDURE — 88305 TISSUE EXAM BY PATHOLOGIST: ICD-10-PCS | Mod: 26,,, | Performed by: STUDENT IN AN ORGANIZED HEALTH CARE EDUCATION/TRAINING PROGRAM

## 2023-05-17 PROCEDURE — D9220A PRA ANESTHESIA: ICD-10-PCS | Mod: PT,CRNA,, | Performed by: NURSE ANESTHETIST, CERTIFIED REGISTERED

## 2023-05-17 PROCEDURE — 25000003 PHARM REV CODE 250: Performed by: NURSE ANESTHETIST, CERTIFIED REGISTERED

## 2023-05-17 PROCEDURE — 37000009 HC ANESTHESIA EA ADD 15 MINS: Performed by: INTERNAL MEDICINE

## 2023-05-17 PROCEDURE — 37000008 HC ANESTHESIA 1ST 15 MINUTES: Performed by: INTERNAL MEDICINE

## 2023-05-17 PROCEDURE — 25000003 PHARM REV CODE 250: Performed by: INTERNAL MEDICINE

## 2023-05-17 PROCEDURE — D9220A PRA ANESTHESIA: ICD-10-PCS | Mod: PT,ANES,, | Performed by: ANESTHESIOLOGY

## 2023-05-17 PROCEDURE — D9220A PRA ANESTHESIA: Mod: PT,CRNA,, | Performed by: NURSE ANESTHETIST, CERTIFIED REGISTERED

## 2023-05-17 PROCEDURE — 45380 PR COLONOSCOPY,BIOPSY: ICD-10-PCS | Mod: PT,,, | Performed by: INTERNAL MEDICINE

## 2023-05-17 PROCEDURE — D9220A PRA ANESTHESIA: Mod: PT,ANES,, | Performed by: ANESTHESIOLOGY

## 2023-05-17 PROCEDURE — 88305 TISSUE EXAM BY PATHOLOGIST: CPT | Mod: 26,,, | Performed by: STUDENT IN AN ORGANIZED HEALTH CARE EDUCATION/TRAINING PROGRAM

## 2023-05-17 PROCEDURE — 45380 COLONOSCOPY AND BIOPSY: CPT | Mod: PT,,, | Performed by: INTERNAL MEDICINE

## 2023-05-17 PROCEDURE — 45380 COLONOSCOPY AND BIOPSY: CPT | Mod: PT | Performed by: INTERNAL MEDICINE

## 2023-05-17 PROCEDURE — 88305 TISSUE EXAM BY PATHOLOGIST: CPT | Performed by: STUDENT IN AN ORGANIZED HEALTH CARE EDUCATION/TRAINING PROGRAM

## 2023-05-17 RX ORDER — LIDOCAINE HYDROCHLORIDE 10 MG/ML
INJECTION, SOLUTION EPIDURAL; INFILTRATION; INTRACAUDAL; PERINEURAL
Status: DISCONTINUED | OUTPATIENT
Start: 2023-05-17 | End: 2023-05-17

## 2023-05-17 RX ORDER — PROPOFOL 10 MG/ML
VIAL (ML) INTRAVENOUS
Status: DISCONTINUED | OUTPATIENT
Start: 2023-05-17 | End: 2023-05-17

## 2023-05-17 RX ORDER — SODIUM CHLORIDE 9 MG/ML
INJECTION, SOLUTION INTRAVENOUS CONTINUOUS
Status: DISCONTINUED | OUTPATIENT
Start: 2023-05-17 | End: 2023-05-17 | Stop reason: HOSPADM

## 2023-05-17 RX ADMIN — PROPOFOL 50 MG: 10 INJECTION, EMULSION INTRAVENOUS at 12:05

## 2023-05-17 RX ADMIN — PROPOFOL 100 MG: 10 INJECTION, EMULSION INTRAVENOUS at 11:05

## 2023-05-17 RX ADMIN — LIDOCAINE HYDROCHLORIDE 50 MG: 10 INJECTION, SOLUTION EPIDURAL; INFILTRATION; INTRACAUDAL; PERINEURAL at 11:05

## 2023-05-17 RX ADMIN — SODIUM CHLORIDE: 0.9 INJECTION, SOLUTION INTRAVENOUS at 11:05

## 2023-05-17 NOTE — ANESTHESIA PREPROCEDURE EVALUATION
05/17/2023  Roosevelt Alejandro is a 77 y.o., male.    Pre-op Assessment    I have reviewed the Patient Summary Reports.    I have reviewed the Nursing Notes.       Review of Systems  Anesthesia Hx:  No problems with previous Anesthesia    Hematology/Oncology:        Current/Recent Cancer. chemotherapy and surgery   Cardiovascular:   Hypertension, well controlled    Hepatic/GI:   Bowel Prep. Gastric CA on chemo   Musculoskeletal:   Arthritis         Physical Exam  General:  Well nourished                   Anesthesia Plan  Type of Anesthesia, risks & benefits discussed:  Anesthesia Type:  general, Gen Natural Airway    Patient's Preference:   Plan Factors:          Intra-op Monitoring Plan:   Intra-op Monitoring Plan Comments:   Post Op Pain Control Plan:   Post Op Pain Control Plan Comments:     Induction:   IV  Beta Blocker:  Patient is not currently on a Beta-Blocker (No further documentation required).       Informed Consent: Informed consent signed with the Patient and all parties understand the risks and agree with anesthesia plan.  All questions answered.  Anesthesia consent signed with patient.  ASA Score: 3     Day of Surgery Review of History & Physical:              Ready For Surgery From Anesthesia Perspective.           Physical Exam  General: Well nourished          Anesthesia Plan  Type of Anesthesia, risks & benefits discussed:    Anesthesia Type: general, Gen Natural Airway  Induction:  IV  Informed Consent: Informed consent signed with the Patient and all parties understand the risks and agree with anesthesia plan.  All questions answered.   ASA Score: 3    Ready For Surgery From Anesthesia Perspective.       .

## 2023-05-17 NOTE — TRANSFER OF CARE
Anesthesia Transfer of Care Note    Patient: Roosevelt Alejandro    Procedure(s) Performed: Procedure(s) (LRB):  COLONOSCOPY (N/A)    Patient location: PACU    Anesthesia Type: general    Transport from OR: Transported from OR on room air with adequate spontaneous ventilation    Post pain: adequate analgesia    Post assessment: no apparent anesthetic complications and tolerated procedure well    Post vital signs: stable    Level of consciousness: awake, alert and oriented    Nausea/Vomiting: no nausea/vomiting    Complications: none    Transfer of care protocol was followed      Last vitals:   Visit Vitals  BP (!) 149/72 (BP Location: Left arm, Patient Position: Lying)   Pulse 72   Temp 36.4 °C (97.5 °F) (Skin)   Resp 16   SpO2 97%

## 2023-05-17 NOTE — PROVATION PATIENT INSTRUCTIONS
Discharge Summary/Instructions after an Endoscopic Procedure  Patient Name: Roosevelt Alejandro  Patient MRN: [id number]  Patient YOB: 1945  Wednesday, May 17, 2023  Toby Haynes MD  Dear patient,  As a result of recent federal legislation (The Federal Cures Act), you may   receive lab or pathology results from your procedure in your [facility]   account before your physician is able to contact you. Your physician or   their representative will relay the results to you with their   recommendations at their soonest availability.  Thank you,  RESTRICTIONS:  During your procedure today, you received medications for sedation.  These   medications may affect your judgment, balance and coordination.  Therefore,   for 24 hours, you have the following restrictions:   - DO NOT drive a car, operate machinery, make legal/financial decisions,   sign important papers or drink alcohol.    ACTIVITY:  Today: no heavy lifting, straining or running due to procedural   sedation/anesthesia.  The following day: return to full activity including work.  DIET:  Eat and drink normally unless instructed otherwise.     TREATMENT FOR COMMON SIDE EFFECTS:  - Mild abdominal pain, nausea, belching, bloating or excessive gas:  rest,   eat lightly and use a heating pad.  - Sore Throat: treat with throat lozenges and/or gargle with warm salt   water.  - Because air was used during the procedure, expelling large amounts of air   from your rectum or belching is normal.  - If a bowel prep was taken, you may not have a bowel movement for 1-3 days.    This is normal.  SYMPTOMS TO WATCH FOR AND REPORT TO YOUR PHYSICIAN:  1. Abdominal pain or bloating, other than gas cramps.  2. Chest pain.  3. Back pain.  4. Signs of infection such as: chills or fever occurring within 24 hours   after the procedure.  5. Rectal bleeding, which would show as bright red, maroon, or black stools.   (A tablespoon of blood from the rectum is not serious, especially if    hemorrhoids are present.)  6. Vomiting.  7. Weakness or dizziness.  GO DIRECTLY TO THE NEAREST EMERGENCY ROOM IF YOU HAVE ANY OF THE FOLLOWING:      Difficulty breathing              Chills and/or fever over 101 F   Persistent vomiting and/or vomiting blood   Severe abdominal pain   Severe chest pain   Black, tarry stools   Bleeding- more than one tablespoon   Any other symptom or condition that you feel may need urgent attention  Your doctor recommends these additional instructions:  If any biopsies were taken, your doctors clinic will contact you in 1 to 2   weeks with any results.  - Patient has a contact number available for emergencies.  The signs and   symptoms of potential delayed complications were discussed with the   patient.  Return to normal activities tomorrow.  Written discharge   instructions were provided to the patient.   - High fiber diet.   - Continue present medications.   - Await pathology results.   - Repeat colonoscopy in 5 years for surveillance.   - Discharge patient to home (ambulatory).   - Return to GI office PRN.  For questions, problems or results please call your physician - Toby Haynes MD at Work:  [phone].  [facility], EMERGENCY ROOM PHONE NUMBER: [phone]  IF A COMPLICATION OR EMERGENCY SITUATION ARISES AND YOU ARE UNABLE TO REACH   YOUR PHYSICIAN - GO DIRECTLY TO THE EMERGENCY ROOM.  Toby Haynes MD  5/17/2023 12:15:00 PM  This report has been verified and signed electronically.  Dear patient,  As a result of recent federal legislation (The Federal Cures Act), you may   receive lab or pathology results from your procedure in your [facility]   account before your physician is able to contact you. Your physician or   their representative will relay the results to you with their   recommendations at their soonest availability.  Thank you,  PROVATION

## 2023-05-17 NOTE — PLAN OF CARE
Vss, duy po fluids, denies pain, ambulates easily. IV removed, catheter intact. Discharge instructions provided and states understanding. States ready to go home.

## 2023-05-17 NOTE — ANESTHESIA POSTPROCEDURE EVALUATION
Anesthesia Post Evaluation    Patient: Roosevelt Alejandro    Procedure(s) Performed: Procedure(s) (LRB):  COLONOSCOPY (N/A)    Final Anesthesia Type: general      Patient location during evaluation: PACU  Patient participation: Yes- Able to Participate  Level of consciousness: awake and alert and oriented  Post-procedure vital signs: reviewed and stable  Pain management: adequate  Airway patency: patent    PONV status at discharge: No PONV  Anesthetic complications: no      Cardiovascular status: blood pressure returned to baseline  Respiratory status: unassisted, spontaneous ventilation and room air  Hydration status: euvolemic  Follow-up not needed.          Vitals Value Taken Time   /57 05/17/23 1241   Temp 36.6 °C (97.8 °F) 05/17/23 1240   Pulse 60 05/17/23 1242   Resp 16 05/17/23 1240   SpO2 96 % 05/17/23 1242   Vitals shown include unvalidated device data.      Event Time   Out of Recovery 12:49:06         Pain/Taj Score: Taj Score: 10 (5/17/2023 12:40 PM)

## 2023-05-17 NOTE — H&P
CC: History of colon polyps - last scope 2019    77 year old male with above. States that symptoms are absent, no alleviating/exacerbating factors. No family history of colorectal CA. Positive personal history of polyps. No bleeding or weight loss.     ROS:  No headache, no fever/chills, no chest pain/SOB, no nausea/vomiting/diarrhea/constipation/GI bleeding/abdominal pain, no dysuria/hematuria.    VSSAF   Exam:   Alert and oriented x 3; no apparent distress   PERRLA, sclera anicteric  CV: Regular rate/rhythm, normal PMI   Lungs: Clear bilaterally with no wheeze/rales   Abdomen: Soft, NT/ND, normal bowel sounds   Ext: No cyanosis, clubbing     Impression:   As above    Plan:   Proceed with endoscopy. Further recs to follow.

## 2023-05-18 ENCOUNTER — TELEPHONE (OUTPATIENT)
Dept: INFUSION THERAPY | Facility: HOSPITAL | Age: 78
End: 2023-05-18

## 2023-05-18 VITALS
RESPIRATION RATE: 16 BRPM | SYSTOLIC BLOOD PRESSURE: 116 MMHG | DIASTOLIC BLOOD PRESSURE: 57 MMHG | TEMPERATURE: 98 F | HEART RATE: 58 BPM | OXYGEN SATURATION: 95 %

## 2023-05-18 NOTE — TELEPHONE ENCOUNTER
Spoke with patient's wife regarding patient's missed Lanreotide appt. Per wife, patient must have forgotten appointment and is outside cutting grass. Patient rescheduled for 5/19/2023.

## 2023-05-19 ENCOUNTER — INFUSION (OUTPATIENT)
Dept: INFUSION THERAPY | Facility: HOSPITAL | Age: 78
End: 2023-05-19
Attending: INTERNAL MEDICINE
Payer: MEDICARE

## 2023-05-19 VITALS
HEART RATE: 67 BPM | OXYGEN SATURATION: 97 % | HEIGHT: 74 IN | TEMPERATURE: 98 F | SYSTOLIC BLOOD PRESSURE: 121 MMHG | DIASTOLIC BLOOD PRESSURE: 61 MMHG | BODY MASS INDEX: 26.04 KG/M2 | RESPIRATION RATE: 17 BRPM | WEIGHT: 202.88 LBS

## 2023-05-19 DIAGNOSIS — C7A.8 NEUROENDOCRINE CARCINOMA METASTATIC TO INTRA-ABDOMINAL LYMPH NODE: Primary | ICD-10-CM

## 2023-05-19 DIAGNOSIS — C7B.8 NEUROENDOCRINE CARCINOMA METASTATIC TO INTRA-ABDOMINAL LYMPH NODE: Primary | ICD-10-CM

## 2023-05-19 PROCEDURE — 63600175 PHARM REV CODE 636 W HCPCS: Mod: JZ,JG | Performed by: NURSE PRACTITIONER

## 2023-05-19 PROCEDURE — 96372 THER/PROPH/DIAG INJ SC/IM: CPT

## 2023-05-19 RX ORDER — LANREOTIDE ACETATE 120 MG/.5ML
120 INJECTION SUBCUTANEOUS
Status: COMPLETED | OUTPATIENT
Start: 2023-05-19 | End: 2023-05-19

## 2023-05-19 RX ADMIN — LANREOTIDE ACETATE 120 MG: 120 INJECTION SUBCUTANEOUS at 02:05

## 2023-05-19 NOTE — PLAN OF CARE
Problem: Activity Intolerance  Goal: Enhanced Capacity and Energy  Outcome: Ongoing, Progressing  Intervention: Optimize Activity Tolerance  Flowsheets (Taken 5/19/2023 1521)  Self-Care Promotion: independence encouraged  Activity Management: Ambulated -L4  Environmental Support: calm environment promoted

## 2023-05-22 LAB
FINAL PATHOLOGIC DIAGNOSIS: NORMAL
GROSS: NORMAL
Lab: NORMAL
MICROSCOPIC EXAM: NORMAL

## 2023-06-15 ENCOUNTER — INFUSION (OUTPATIENT)
Dept: INFUSION THERAPY | Facility: HOSPITAL | Age: 78
End: 2023-06-15
Attending: INTERNAL MEDICINE
Payer: MEDICARE

## 2023-06-15 VITALS
WEIGHT: 206.69 LBS | RESPIRATION RATE: 18 BRPM | BODY MASS INDEX: 26.53 KG/M2 | TEMPERATURE: 98 F | OXYGEN SATURATION: 95 % | HEART RATE: 71 BPM | HEIGHT: 74 IN | SYSTOLIC BLOOD PRESSURE: 135 MMHG | DIASTOLIC BLOOD PRESSURE: 70 MMHG

## 2023-06-15 DIAGNOSIS — C7B.8 NEUROENDOCRINE CARCINOMA METASTATIC TO INTRA-ABDOMINAL LYMPH NODE: Primary | ICD-10-CM

## 2023-06-15 DIAGNOSIS — C7A.8 NEUROENDOCRINE CARCINOMA METASTATIC TO INTRA-ABDOMINAL LYMPH NODE: Primary | ICD-10-CM

## 2023-06-15 PROCEDURE — 63600175 PHARM REV CODE 636 W HCPCS: Mod: JZ,JG | Performed by: NURSE PRACTITIONER

## 2023-06-15 PROCEDURE — 96372 THER/PROPH/DIAG INJ SC/IM: CPT

## 2023-06-15 RX ORDER — LANREOTIDE ACETATE 120 MG/.5ML
120 INJECTION SUBCUTANEOUS
Status: COMPLETED | OUTPATIENT
Start: 2023-06-15 | End: 2023-06-15

## 2023-06-15 RX ADMIN — LANREOTIDE ACETATE 120 MG: 120 INJECTION SUBCUTANEOUS at 03:06

## 2023-06-15 NOTE — PLAN OF CARE
Problem: Fall Injury Risk  Goal: Absence of Fall and Fall-Related Injury  Outcome: Ongoing, Progressing  Intervention: Identify and Manage Contributors  Flowsheets (Taken 6/15/2023 1456)  Self-Care Promotion: independence encouraged  Medication Review/Management: medications reviewed  Intervention: Promote Injury-Free Environment  Flowsheets (Taken 6/15/2023 1456)  Safety Promotion/Fall Prevention: medications reviewed

## 2023-07-13 ENCOUNTER — INFUSION (OUTPATIENT)
Dept: INFUSION THERAPY | Facility: HOSPITAL | Age: 78
End: 2023-07-13
Attending: INTERNAL MEDICINE
Payer: MEDICARE

## 2023-07-13 VITALS
BODY MASS INDEX: 26.85 KG/M2 | SYSTOLIC BLOOD PRESSURE: 130 MMHG | HEIGHT: 74 IN | OXYGEN SATURATION: 96 % | RESPIRATION RATE: 18 BRPM | HEART RATE: 71 BPM | DIASTOLIC BLOOD PRESSURE: 76 MMHG | WEIGHT: 209.19 LBS | TEMPERATURE: 98 F

## 2023-07-13 DIAGNOSIS — C7A.8 NEUROENDOCRINE CARCINOMA METASTATIC TO INTRA-ABDOMINAL LYMPH NODE: Primary | ICD-10-CM

## 2023-07-13 DIAGNOSIS — C7B.8 NEUROENDOCRINE CARCINOMA METASTATIC TO INTRA-ABDOMINAL LYMPH NODE: Primary | ICD-10-CM

## 2023-07-13 PROCEDURE — 63600175 PHARM REV CODE 636 W HCPCS: Mod: JZ,JG | Performed by: NURSE PRACTITIONER

## 2023-07-13 PROCEDURE — 96372 THER/PROPH/DIAG INJ SC/IM: CPT

## 2023-07-13 RX ORDER — LANREOTIDE ACETATE 120 MG/.5ML
120 INJECTION SUBCUTANEOUS
Status: COMPLETED | OUTPATIENT
Start: 2023-07-13 | End: 2023-07-13

## 2023-07-13 RX ADMIN — LANREOTIDE ACETATE 120 MG: 120 INJECTION SUBCUTANEOUS at 02:07

## 2023-07-13 NOTE — PLAN OF CARE
Problem: Infection  Goal: Absence of Infection Signs and Symptoms  Outcome: Ongoing, Progressing  Intervention: Prevent or Manage Infection  Flowsheets (Taken 7/13/2023 1510)  Infection Management: aseptic technique maintained

## 2023-08-10 ENCOUNTER — INFUSION (OUTPATIENT)
Dept: INFUSION THERAPY | Facility: HOSPITAL | Age: 78
End: 2023-08-10
Attending: INTERNAL MEDICINE
Payer: MEDICARE

## 2023-08-10 VITALS
SYSTOLIC BLOOD PRESSURE: 125 MMHG | BODY MASS INDEX: 26.82 KG/M2 | WEIGHT: 209 LBS | HEIGHT: 74 IN | TEMPERATURE: 98 F | RESPIRATION RATE: 18 BRPM | DIASTOLIC BLOOD PRESSURE: 63 MMHG | OXYGEN SATURATION: 96 % | HEART RATE: 79 BPM

## 2023-08-10 DIAGNOSIS — C7B.8 NEUROENDOCRINE CARCINOMA METASTATIC TO INTRA-ABDOMINAL LYMPH NODE: Primary | ICD-10-CM

## 2023-08-10 DIAGNOSIS — C7A.8 NEUROENDOCRINE CARCINOMA METASTATIC TO INTRA-ABDOMINAL LYMPH NODE: Primary | ICD-10-CM

## 2023-08-10 PROCEDURE — 63600175 PHARM REV CODE 636 W HCPCS: Mod: JZ,JG | Performed by: NURSE PRACTITIONER

## 2023-08-10 PROCEDURE — 96372 THER/PROPH/DIAG INJ SC/IM: CPT

## 2023-08-10 RX ORDER — LANREOTIDE ACETATE 120 MG/.5ML
120 INJECTION SUBCUTANEOUS
Status: COMPLETED | OUTPATIENT
Start: 2023-08-10 | End: 2023-08-10

## 2023-08-10 RX ADMIN — LANREOTIDE ACETATE 120 MG: 120 INJECTION SUBCUTANEOUS at 02:08

## 2023-08-10 NOTE — PLAN OF CARE
Problem: Infection  Goal: Absence of Infection Signs and Symptoms  Outcome: Ongoing, Progressing  Intervention: Prevent or Manage Infection  Flowsheets (Taken 8/10/2023 0281)  Infection Management: aseptic technique maintained  Isolation Precautions: contact

## 2023-08-23 ENCOUNTER — PATIENT MESSAGE (OUTPATIENT)
Dept: FAMILY MEDICINE | Facility: CLINIC | Age: 78
End: 2023-08-23
Payer: MEDICARE

## 2023-09-07 ENCOUNTER — INFUSION (OUTPATIENT)
Dept: INFUSION THERAPY | Facility: HOSPITAL | Age: 78
End: 2023-09-07
Attending: INTERNAL MEDICINE
Payer: MEDICARE

## 2023-09-07 VITALS — OXYGEN SATURATION: 96 %

## 2023-09-07 DIAGNOSIS — C7B.8 NEUROENDOCRINE CARCINOMA METASTATIC TO INTRA-ABDOMINAL LYMPH NODE: Primary | ICD-10-CM

## 2023-09-07 DIAGNOSIS — C7A.8 NEUROENDOCRINE CARCINOMA METASTATIC TO INTRA-ABDOMINAL LYMPH NODE: Primary | ICD-10-CM

## 2023-09-07 PROCEDURE — 96372 THER/PROPH/DIAG INJ SC/IM: CPT

## 2023-09-07 PROCEDURE — 63600175 PHARM REV CODE 636 W HCPCS: Mod: JZ,JG | Performed by: NURSE PRACTITIONER

## 2023-09-07 RX ORDER — LANREOTIDE ACETATE 120 MG/.5ML
120 INJECTION SUBCUTANEOUS
Status: COMPLETED | OUTPATIENT
Start: 2023-09-07 | End: 2023-09-07

## 2023-09-07 RX ADMIN — LANREOTIDE ACETATE 120 MG: 120 INJECTION SUBCUTANEOUS at 03:09

## 2023-09-21 DIAGNOSIS — I10 ESSENTIAL HYPERTENSION: ICD-10-CM

## 2023-09-21 DIAGNOSIS — R73.03 PREDIABETES: Primary | ICD-10-CM

## 2023-09-21 RX ORDER — NIFEDIPINE 30 MG/1
30 TABLET, EXTENDED RELEASE ORAL NIGHTLY
Qty: 90 TABLET | Refills: 1 | Status: SHIPPED | OUTPATIENT
Start: 2023-09-21

## 2023-09-21 RX ORDER — EMPAGLIFLOZIN 10 MG/1
10 TABLET, FILM COATED ORAL
Qty: 90 TABLET | Refills: 2 | Status: SHIPPED | OUTPATIENT
Start: 2023-09-21

## 2023-09-21 NOTE — TELEPHONE ENCOUNTER
Refill Decision Note   Roosevelt Alejandro  is requesting a refill authorization.  Brief Assessment and Rationale for Refill:  Approve     Medication Therapy Plan:       Medication Reconciliation Completed: No   Comments:     No Care Gaps recommended.     Note composed:10:05 AM 09/21/2023

## 2023-09-21 NOTE — TELEPHONE ENCOUNTER
Refill Routing Note   Medication(s) are not appropriate for processing by [facility] for the following reason(s):      Required labs outdated    [facility] action(s):  Defer Care Due:  None identified            Appointments  past 12m or future 3m with PCP    Date Provider   Last Visit   2/28/2023 Roque Guillaume MD   Next Visit   Visit date not found Roque Guillaume MD   ED visits in past 90 days: 0        Note composed:8:37 AM 09/21/2023

## 2023-09-21 NOTE — TELEPHONE ENCOUNTER
No care due was identified.  [facility] Embedded Care Due Messages. Reference number: [id number].   9/21/2023 2:24:52 AM CDT

## 2023-09-25 ENCOUNTER — LAB VISIT (OUTPATIENT)
Dept: LAB | Facility: HOSPITAL | Age: 78
End: 2023-09-25
Attending: STUDENT IN AN ORGANIZED HEALTH CARE EDUCATION/TRAINING PROGRAM
Payer: MEDICARE

## 2023-09-25 DIAGNOSIS — R73.03 PREDIABETES: ICD-10-CM

## 2023-09-25 LAB
ESTIMATED AVG GLUCOSE: 114 MG/DL (ref 68–131)
HBA1C MFR BLD: 5.6 % (ref 4–5.6)

## 2023-09-25 PROCEDURE — 36415 COLL VENOUS BLD VENIPUNCTURE: CPT | Mod: PO | Performed by: STUDENT IN AN ORGANIZED HEALTH CARE EDUCATION/TRAINING PROGRAM

## 2023-09-25 PROCEDURE — 83036 HEMOGLOBIN GLYCOSYLATED A1C: CPT | Performed by: STUDENT IN AN ORGANIZED HEALTH CARE EDUCATION/TRAINING PROGRAM

## 2023-10-05 ENCOUNTER — INFUSION (OUTPATIENT)
Dept: INFUSION THERAPY | Facility: HOSPITAL | Age: 78
End: 2023-10-05
Attending: INTERNAL MEDICINE
Payer: MEDICARE

## 2023-10-05 VITALS
SYSTOLIC BLOOD PRESSURE: 136 MMHG | HEART RATE: 75 BPM | WEIGHT: 207 LBS | TEMPERATURE: 98 F | HEIGHT: 74 IN | OXYGEN SATURATION: 95 % | DIASTOLIC BLOOD PRESSURE: 70 MMHG | BODY MASS INDEX: 26.56 KG/M2 | RESPIRATION RATE: 18 BRPM

## 2023-10-05 DIAGNOSIS — C7A.8 NEUROENDOCRINE CARCINOMA METASTATIC TO INTRA-ABDOMINAL LYMPH NODE: Primary | ICD-10-CM

## 2023-10-05 DIAGNOSIS — C7B.8 NEUROENDOCRINE CARCINOMA METASTATIC TO INTRA-ABDOMINAL LYMPH NODE: Primary | ICD-10-CM

## 2023-10-05 PROCEDURE — 63600175 PHARM REV CODE 636 W HCPCS: Mod: JZ,JG | Performed by: NURSE PRACTITIONER

## 2023-10-05 PROCEDURE — 96372 THER/PROPH/DIAG INJ SC/IM: CPT

## 2023-10-05 RX ORDER — LANREOTIDE ACETATE 120 MG/.5ML
120 INJECTION SUBCUTANEOUS
Status: COMPLETED | OUTPATIENT
Start: 2023-10-05 | End: 2023-10-05

## 2023-10-05 RX ADMIN — LANREOTIDE ACETATE 120 MG: 120 INJECTION SUBCUTANEOUS at 02:10

## 2023-11-02 ENCOUNTER — INFUSION (OUTPATIENT)
Dept: INFUSION THERAPY | Facility: HOSPITAL | Age: 78
End: 2023-11-02
Attending: INTERNAL MEDICINE
Payer: MEDICARE

## 2023-11-02 VITALS
SYSTOLIC BLOOD PRESSURE: 143 MMHG | BODY MASS INDEX: 27.7 KG/M2 | WEIGHT: 215.81 LBS | DIASTOLIC BLOOD PRESSURE: 72 MMHG | HEIGHT: 74 IN | HEART RATE: 77 BPM | RESPIRATION RATE: 17 BRPM | OXYGEN SATURATION: 99 % | TEMPERATURE: 97 F

## 2023-11-02 DIAGNOSIS — C7B.8 NEUROENDOCRINE CARCINOMA METASTATIC TO INTRA-ABDOMINAL LYMPH NODE: Primary | ICD-10-CM

## 2023-11-02 DIAGNOSIS — C7A.8 NEUROENDOCRINE CARCINOMA METASTATIC TO INTRA-ABDOMINAL LYMPH NODE: Primary | ICD-10-CM

## 2023-11-02 PROCEDURE — 96372 THER/PROPH/DIAG INJ SC/IM: CPT

## 2023-11-02 PROCEDURE — 63600175 PHARM REV CODE 636 W HCPCS: Mod: JZ,JG | Performed by: NURSE PRACTITIONER

## 2023-11-02 PROCEDURE — 96402 CHEMO HORMON ANTINEOPL SQ/IM: CPT

## 2023-11-02 RX ORDER — LANREOTIDE ACETATE 120 MG/.5ML
120 INJECTION SUBCUTANEOUS
Status: COMPLETED | OUTPATIENT
Start: 2023-11-02 | End: 2023-11-02

## 2023-11-02 RX ADMIN — LANREOTIDE ACETATE 120 MG: 120 INJECTION SUBCUTANEOUS at 02:11

## 2023-11-02 NOTE — PLAN OF CARE
Problem: Fatigue  Goal: Improved Activity Tolerance  Intervention: Promote Improved Energy  Flowsheets (Taken 11/2/2023 6924)  Fatigue Management: fatigue-related activity identified  Activity Management: Ambulated -L4

## 2023-11-30 ENCOUNTER — INFUSION (OUTPATIENT)
Dept: INFUSION THERAPY | Facility: HOSPITAL | Age: 78
End: 2023-11-30
Attending: INTERNAL MEDICINE
Payer: MEDICARE

## 2023-11-30 VITALS
SYSTOLIC BLOOD PRESSURE: 133 MMHG | HEART RATE: 77 BPM | TEMPERATURE: 98 F | HEIGHT: 74 IN | BODY MASS INDEX: 26.95 KG/M2 | WEIGHT: 210 LBS | DIASTOLIC BLOOD PRESSURE: 66 MMHG | RESPIRATION RATE: 18 BRPM | OXYGEN SATURATION: 96 %

## 2023-11-30 DIAGNOSIS — C7A.8 NEUROENDOCRINE CARCINOMA METASTATIC TO INTRA-ABDOMINAL LYMPH NODE: Primary | ICD-10-CM

## 2023-11-30 DIAGNOSIS — C7B.8 NEUROENDOCRINE CARCINOMA METASTATIC TO INTRA-ABDOMINAL LYMPH NODE: Primary | ICD-10-CM

## 2023-11-30 PROCEDURE — 63600175 PHARM REV CODE 636 W HCPCS: Mod: JZ,JG | Performed by: NURSE PRACTITIONER

## 2023-11-30 PROCEDURE — 96372 THER/PROPH/DIAG INJ SC/IM: CPT

## 2023-11-30 RX ORDER — LANREOTIDE ACETATE 120 MG/.5ML
120 INJECTION SUBCUTANEOUS
Status: COMPLETED | OUTPATIENT
Start: 2023-11-30 | End: 2023-11-30

## 2023-11-30 RX ADMIN — LANREOTIDE ACETATE 120 MG: 120 INJECTION SUBCUTANEOUS at 02:11

## 2023-11-30 NOTE — PLAN OF CARE
Problem: Fatigue  Goal: Improved Activity Tolerance  Outcome: Ongoing, Progressing  Intervention: Promote Improved Energy  Flowsheets (Taken 11/30/2023 1502)  Fatigue Management: frequent rest breaks encouraged  Sleep/Rest Enhancement: regular sleep/rest pattern promoted

## 2023-12-27 RX ORDER — LANREOTIDE ACETATE 120 MG/.5ML
120 INJECTION SUBCUTANEOUS
Status: CANCELLED | OUTPATIENT
Start: 2023-12-28 | End: 2023-12-28

## 2023-12-28 ENCOUNTER — INFUSION (OUTPATIENT)
Dept: INFUSION THERAPY | Facility: HOSPITAL | Age: 78
End: 2023-12-28
Attending: INTERNAL MEDICINE
Payer: MEDICARE

## 2023-12-28 VITALS
HEART RATE: 78 BPM | RESPIRATION RATE: 16 BRPM | DIASTOLIC BLOOD PRESSURE: 69 MMHG | SYSTOLIC BLOOD PRESSURE: 140 MMHG | TEMPERATURE: 98 F

## 2023-12-28 DIAGNOSIS — C7A.8 NEUROENDOCRINE CARCINOMA METASTATIC TO INTRA-ABDOMINAL LYMPH NODE: Primary | ICD-10-CM

## 2023-12-28 DIAGNOSIS — C7B.8 NEUROENDOCRINE CARCINOMA METASTATIC TO INTRA-ABDOMINAL LYMPH NODE: Primary | ICD-10-CM

## 2023-12-28 PROCEDURE — 63600175 PHARM REV CODE 636 W HCPCS: Mod: JZ,JG | Performed by: NURSE PRACTITIONER

## 2023-12-28 PROCEDURE — 96372 THER/PROPH/DIAG INJ SC/IM: CPT

## 2023-12-28 RX ORDER — LANREOTIDE ACETATE 120 MG/.5ML
120 INJECTION SUBCUTANEOUS
Status: COMPLETED | OUTPATIENT
Start: 2023-12-28 | End: 2023-12-28

## 2023-12-28 RX ADMIN — LANREOTIDE ACETATE 120 MG: 120 INJECTION SUBCUTANEOUS at 03:12

## 2024-01-24 RX ORDER — LANREOTIDE ACETATE 120 MG/.5ML
120 INJECTION SUBCUTANEOUS
Status: CANCELLED | OUTPATIENT
Start: 2024-01-25 | End: 2024-01-25

## 2024-01-25 ENCOUNTER — INFUSION (OUTPATIENT)
Dept: INFUSION THERAPY | Facility: HOSPITAL | Age: 79
End: 2024-01-25
Attending: INTERNAL MEDICINE
Payer: MEDICARE

## 2024-01-25 VITALS
WEIGHT: 211.69 LBS | SYSTOLIC BLOOD PRESSURE: 124 MMHG | BODY MASS INDEX: 27.17 KG/M2 | DIASTOLIC BLOOD PRESSURE: 63 MMHG | HEIGHT: 74 IN | OXYGEN SATURATION: 96 % | HEART RATE: 78 BPM | TEMPERATURE: 98 F | RESPIRATION RATE: 18 BRPM

## 2024-01-25 DIAGNOSIS — C7B.8 NEUROENDOCRINE CARCINOMA METASTATIC TO INTRA-ABDOMINAL LYMPH NODE: Primary | ICD-10-CM

## 2024-01-25 DIAGNOSIS — C7A.8 NEUROENDOCRINE CARCINOMA METASTATIC TO INTRA-ABDOMINAL LYMPH NODE: Primary | ICD-10-CM

## 2024-01-25 PROCEDURE — 63600175 PHARM REV CODE 636 W HCPCS: Mod: JZ,JG | Performed by: NURSE PRACTITIONER

## 2024-01-25 PROCEDURE — 96372 THER/PROPH/DIAG INJ SC/IM: CPT

## 2024-01-25 RX ORDER — LANREOTIDE ACETATE 120 MG/.5ML
120 INJECTION SUBCUTANEOUS
Status: COMPLETED | OUTPATIENT
Start: 2024-01-25 | End: 2024-01-25

## 2024-01-25 RX ADMIN — LANREOTIDE ACETATE 120 MG: 120 INJECTION SUBCUTANEOUS at 02:01

## 2024-02-15 DIAGNOSIS — E78.5 HYPERLIPIDEMIA, UNSPECIFIED HYPERLIPIDEMIA TYPE: ICD-10-CM

## 2024-02-15 DIAGNOSIS — R00.2 HEART PALPITATIONS: ICD-10-CM

## 2024-02-15 RX ORDER — METOPROLOL SUCCINATE 25 MG/1
25 TABLET, EXTENDED RELEASE ORAL
Qty: 90 TABLET | Refills: 0 | Status: SHIPPED | OUTPATIENT
Start: 2024-02-15 | End: 2024-04-29

## 2024-02-15 RX ORDER — ATORVASTATIN CALCIUM 20 MG/1
20 TABLET, FILM COATED ORAL
Qty: 90 TABLET | Refills: 0 | Status: SHIPPED | OUTPATIENT
Start: 2024-02-15 | End: 2024-04-29

## 2024-02-15 NOTE — TELEPHONE ENCOUNTER
Care Due:                  Date            Visit Type   Department     Provider  --------------------------------------------------------------------------------                                EP -                              PRIMARY      [facility]  Last Visit: 02-      CARE ([facility])   MEDICINE       Roque Guillaume  Next Visit: None Scheduled  None         None Found                                                            Last  Test          Frequency    Reason                     Performed    Due Date  --------------------------------------------------------------------------------    CMP.........  12 months..  atorvastatin,              03- 02-                             empagliflozin............    HBA1C.......  6 months...  empagliflozin............  09- 03-    Lipid Panel.  12 months..  atorvastatin.............  03- 02-    [facility] Embedded Care Due Messages. Reference number: [id number].   2/15/2024 2:00:05 AM CST

## 2024-02-16 RX ORDER — LANOLIN ALCOHOL/MO/W.PET/CERES
1000 CREAM (GRAM) TOPICAL
Qty: 90 TABLET | Refills: 3 | Status: SHIPPED | OUTPATIENT
Start: 2024-02-16 | End: 2024-03-01

## 2024-02-16 NOTE — TELEPHONE ENCOUNTER
Refill Routing Note   Medication(s) are not appropriate for processing by [facility] for the following reason(s):        Outside of protocol    [facility] action(s):  Approve  Route     Requires labs : Yes             Appointments  past 12m or future 3m with PCP    Date Provider   Last Visit   2/28/2023 Roque Guillaume MD   Next Visit   Visit date not found Roque Guillaume MD   ED visits in past 90 days: 0        Note composed:8:59 PM 02/15/2024

## 2024-02-21 RX ORDER — LANREOTIDE ACETATE 120 MG/.5ML
120 INJECTION SUBCUTANEOUS
Status: CANCELLED | OUTPATIENT
Start: 2024-02-22 | End: 2024-02-22

## 2024-02-22 ENCOUNTER — INFUSION (OUTPATIENT)
Dept: INFUSION THERAPY | Facility: HOSPITAL | Age: 79
End: 2024-02-22
Attending: INTERNAL MEDICINE
Payer: MEDICARE

## 2024-02-22 VITALS
HEART RATE: 92 BPM | OXYGEN SATURATION: 95 % | WEIGHT: 210.63 LBS | DIASTOLIC BLOOD PRESSURE: 74 MMHG | TEMPERATURE: 97 F | SYSTOLIC BLOOD PRESSURE: 129 MMHG | BODY MASS INDEX: 27.03 KG/M2 | RESPIRATION RATE: 18 BRPM | HEIGHT: 74 IN

## 2024-02-22 DIAGNOSIS — C7B.8 NEUROENDOCRINE CARCINOMA METASTATIC TO INTRA-ABDOMINAL LYMPH NODE: Primary | ICD-10-CM

## 2024-02-22 DIAGNOSIS — C7A.8 NEUROENDOCRINE CARCINOMA METASTATIC TO INTRA-ABDOMINAL LYMPH NODE: Primary | ICD-10-CM

## 2024-02-22 PROCEDURE — 63600175 PHARM REV CODE 636 W HCPCS: Mod: JZ,JG | Performed by: NURSE PRACTITIONER

## 2024-02-22 PROCEDURE — 96372 THER/PROPH/DIAG INJ SC/IM: CPT

## 2024-02-22 RX ORDER — LANREOTIDE ACETATE 120 MG/.5ML
120 INJECTION SUBCUTANEOUS
Status: COMPLETED | OUTPATIENT
Start: 2024-02-22 | End: 2024-02-22

## 2024-02-22 RX ADMIN — LANREOTIDE ACETATE 120 MG: 120 INJECTION SUBCUTANEOUS at 02:02

## 2024-02-23 ENCOUNTER — OFFICE VISIT (OUTPATIENT)
Dept: ORTHOPEDICS | Facility: CLINIC | Age: 79
End: 2024-02-23
Payer: MEDICARE

## 2024-02-23 VITALS — HEIGHT: 74 IN | BODY MASS INDEX: 26.95 KG/M2 | WEIGHT: 210 LBS

## 2024-02-23 DIAGNOSIS — M54.50 ACUTE BILATERAL LOW BACK PAIN WITHOUT SCIATICA: ICD-10-CM

## 2024-02-23 DIAGNOSIS — M47.816 LUMBAR FACET ARTHROPATHY: ICD-10-CM

## 2024-02-23 DIAGNOSIS — M51.36 DDD (DEGENERATIVE DISC DISEASE), LUMBAR: Primary | ICD-10-CM

## 2024-02-23 DIAGNOSIS — M43.16 SPONDYLOLISTHESIS OF LUMBAR REGION: ICD-10-CM

## 2024-02-23 PROCEDURE — 1159F MED LIST DOCD IN RCRD: CPT | Mod: CPTII,S$GLB,, | Performed by: PHYSICIAN ASSISTANT

## 2024-02-23 PROCEDURE — 96372 THER/PROPH/DIAG INJ SC/IM: CPT | Mod: S$GLB,,, | Performed by: PHYSICIAN ASSISTANT

## 2024-02-23 PROCEDURE — 3288F FALL RISK ASSESSMENT DOCD: CPT | Mod: CPTII,S$GLB,, | Performed by: PHYSICIAN ASSISTANT

## 2024-02-23 PROCEDURE — 99203 OFFICE O/P NEW LOW 30 MIN: CPT | Mod: 25,S$GLB,, | Performed by: PHYSICIAN ASSISTANT

## 2024-02-23 PROCEDURE — 1101F PT FALLS ASSESS-DOCD LE1/YR: CPT | Mod: CPTII,S$GLB,, | Performed by: PHYSICIAN ASSISTANT

## 2024-02-23 PROCEDURE — 1125F AMNT PAIN NOTED PAIN PRSNT: CPT | Mod: CPTII,S$GLB,, | Performed by: PHYSICIAN ASSISTANT

## 2024-02-23 PROCEDURE — 1160F RVW MEDS BY RX/DR IN RCRD: CPT | Mod: CPTII,S$GLB,, | Performed by: PHYSICIAN ASSISTANT

## 2024-02-23 RX ORDER — TRIAMCINOLONE ACETONIDE 40 MG/ML
40 INJECTION, SUSPENSION INTRA-ARTICULAR; INTRAMUSCULAR
Status: COMPLETED | OUTPATIENT
Start: 2024-02-23 | End: 2024-02-23

## 2024-02-23 RX ADMIN — TRIAMCINOLONE ACETONIDE 40 MG: 40 INJECTION, SUSPENSION INTRA-ARTICULAR; INTRAMUSCULAR at 09:02

## 2024-02-23 NOTE — PROGRESS NOTES
"Subjective:       Patient ID: Roosevelt Alejandro is a 78 y.o. male.    Chief Complaint: Pain of the Spine (Patient is here with complaints of Lumbar pain x 2 weeks, no known injury. Denies numbness & tingling, prevents him from sleeping, Hx of Sciatica injections with Dr. Mendoza that helped)      History of Present Illness    Prior to meeting with the patient I reviewed the medical chart in [facility]. This included reviewing the previous progress notes from our office, review of the patient's last appointment with their primary care provider, review of any visits to the emergency room, and review of any pain management appointments or procedures.   Patient is here for initial evaluation chief complaint bilateral lumbar paraspinal low back pain without any known injury or incident.  Started about 2 weeks ago.  Denies any lower extremity radiculopathy.    Current Medications  Current Outpatient Medications   Medication Sig Dispense Refill    atorvastatin (LIPITOR) 20 MG tablet TAKE 1 TABLET ONE TIME DAILY 90 tablet 0    cyanocobalamin (VITAMIN B-12) 1000 MCG tablet TAKE 1 TABLET ONE TIME DAILY 90 tablet 3    cyanocobalamin 1,000 mcg/mL injection Inject 1 mL (1,000 mcg total) into the muscle every 30 days. 3 mL 3    empagliflozin (JARDIANCE) 10 mg tablet TAKE 1 TABLET (10 MG TOTAL) BY MOUTH ONCE DAILY. 90 tablet 2    FOLIC ACID/MULTIVIT-MIN/LUTEIN (CENTRUM SILVER ORAL) Take 1 tablet by mouth once daily.       metoprolol succinate (TOPROL-XL) 25 MG 24 hr tablet TAKE 1 TABLET BY MOUTH ONCE DAILY 90 tablet 0    needle, disp, 25 gauge 25 gauge x 1" Ndle Use 1 every 30 days. 3 each 3    NIFEdipine (PROCARDIA-XL) 30 MG (OSM) 24 hr tablet TAKE 1 TABLET EVERY EVENING 90 tablet 1    pantoprazole (PROTONIX) 40 MG tablet Take 1 tablet (40 mg total) by mouth once daily. 30 tablet 11     No current facility-administered medications for this visit.       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    Secondary neuroendocrine tumor of liver     Vertigo, aural, unspecified laterality 03/06/2019       Surgical History  Past Surgical History:   Procedure Laterality Date    CHOLECYSTECTOMY  11/4/2019    Procedure: CHOLECYSTECTOMY;  Surgeon: SP Khalil MD;  Location: [facility] OR;  Service: General;;    COLONOSCOPY      2009    COLONOSCOPY N/A 1/21/2016    Procedure: COLONOSCOPY;  Surgeon: Toby Maciel MD;  Location[facility];  Service: Endoscopy;  Laterality: N/A;    COLONOSCOPY N/A 1/25/2019    Procedure: COLONOSCOPY;  Surgeon: Toby Maciel MD;  Location: [facility] ENDO;  Service: Endoscopy;  Laterality: N/A;    COLONOSCOPY N/A 5/17/2023    Procedure: COLONOSCOPY;  Surgeon: Toby Maciel MD;  Location: [facility];  Service: Endoscopy;  Laterality: N/A;  lm wife/ppm    ENDOSCOPIC ULTRASOUND OF UPPER GASTROINTESTINAL TRACT N/A 8/20/2019    Procedure: ULTRASOUND, UPPER GI TRACT, ENDOSCOPIC;  Surgeon: Forest Lucio III, MD;  Location: [facility];  Service: Endoscopy;  Laterality: N/A;    EYE SURGERY Bilateral     cataracts, retinal detachment    HYPERTHERMIC INTRAPERITONEAL CHEMOTHERAPY (HIPEC)  11/4/2019    Procedure: CHEMOTHERAPY, INTRAPERITONEAL, HYPERTHERMIC;  Surgeon: SP Khalil MD;  Location: [facility] OR;  Service: General;;    LIVER BIOPSY  11/4/2019    Procedure: BIOPSY, LIVER;  Surgeon: SP Khalil MD;  Location: [facility] OR;  Service: General;;  BIOPSY LIVER WEDGE X3    PROSTATE BIOPSY      PROSTATECTOMY  12/2017    RENAL  EXPLORATION  2019    Procedure: EXPLORATION, KIDNEY;  Surgeon: SP Khalil MD;  Location: [facility] OR;  Service: General;;    ROTATOR CUFF REPAIR      left    SURGICAL REMOVAL OF LYMPH NODE  2019    Procedure: EXCISION, LYMPH NODE;  Surgeon: SP Khalil MD;  Location: [facility] OR;  Service: General;;  EXTENSIVE RETROPERITONEAL NODE DISSECTION  EXPLORATION OF MESENTERIC ARTERY/VEIN       Family History:   Family History   Problem Relation Age of Onset    Cancer Father         lung/ asbestos    Asbestos Father     Lung cancer Father     No Known Problems Daughter     Melanoma Neg Hx     Psoriasis Neg Hx     Lupus Neg Hx     Eczema Neg Hx        Social History:   Social History     Socioeconomic History    Marital status:    Tobacco Use    Smoking status: Former     Current packs/day: 0.00     Types: Cigarettes     Quit date: 3/30/2000     Years since quittin.9     Passive exposure: Past    Smokeless tobacco: Never   Substance and Sexual Activity    Alcohol use: Not Currently     Alcohol/week: 1.0 standard drink of alcohol     Types: 1 Cans of beer per week     Comment: rare    Drug use: No    Sexual activity: Yes     Partners: Female       Hospitalization/Major Diagnostic Procedure:     Review of Systems     General/Constitutional:  Chills denies. Fatigue denies. Fever denies. Weight gain denies. Weight loss denies.    Respiratory:  Shortness of breath denies.    Cardiovascular:  Chest pain denies.    Gastrointestinal:  Constipation denies. Diarrhea denies. Nausea denies. Vomiting denies.     Hematology:  Easy bruising denies. Prolonged bleeding denies.     Genitourinary:  Frequent urination denies. Pain in lower back denies. Painful urination denies.     Musculoskeletal:  See HPI for details    Skin:  Rash denies.    Neurologic:  Dizziness denies. Gait abnormalities denies. Seizures denies. Tingling/Numbess denies.    Psychiatric:  Anxiety denies. Depressed mood denies.     Objective:    Vital Signs: There were no vitals filed for this visit.     Physical Exam      General Examination:     Constitutional: The patient is alert and oriented to lace person and time. Mood is pleasant.     Head/Face: Normal facial features normal eyebrows    Eyes: Normal extraocular motion bilaterally    Lungs: Respirations are equal and unlabored    Gait is coordinated.    Cardiovascular: There are no swelling or varicosities present.    Lymphatic: Negative for adenopathy    Skin: Normal    Neurological: Level of consciousness normal. Oriented to place person and time and situation    Psychiatric: Oriented to time place person and situation    Lumbar exam demonstrates a mild antalgic gait with a lumbar flexed posture.  Mild bilateral lumbar paraspinal tenderness palpation with muscle spasm.  Lumbar flexion about 70% of normal and extension is moderately limited secondary to pain and guarding.  Bilateral lower extremities demonstrate full active range of motion, equal symmetric DTRs that are hypoactive, normal strength and negative straight leg raise maneuver.    XRAY Report/ Interpretation :  Lumbar AP and lateral x-rays taken in the office today reviewed the patient demonstrate multilevel degenerative disc disease and facet sclerosis with multilevel of mild spondylolisthesis.    AP pelvis x-ray taken in the office today reviewed the patient demonstrates no significant abnormality  Assessment:       1. DDD (degenerative disc disease), lumbar    2. Acute bilateral low back pain without sciatica    3. Spondylolisthesis of lumbar region    4. Lumbar facet arthropathy        Plan:       Max was seen today for pain.    Diagnoses and all orders for this visit:    DDD (degenerative disc disease), lumbar  -     X-Ray Lumbar Spine Ap And Lateral  -     X-Ray Pelvis Routine AP    Acute bilateral low back pain without sciatica  -     triamcinolone acetonide injection 40 mg    Spondylolisthesis of lumbar region    Lumbar facet  arthropathy         Follow up if symptoms worsen or fail to improve.    Patient has some pre-existing lumbar degenerative disc disease and facet arthropathy.  In some way he caused some irritation and inflammation of the pre-existing degenerative issues causing pain.  Today he was given Kenalog 40 mg IM to hopefully help resolve his current symptoms.  If he has not significantly improved in 2 weeks I have instructed him and his daughter to call me and I will refer him to physical therapy.    Treatment options were discussed with regards to the nature of the medical condition. Conservative pain intervention and surgical options were discussed in detail. The probability of success of each separate treatment option was discussed. The patient expressed a clear understanding of the treatment options. With regards to surgery, the procedure risk, benefits, complications, and outcomes were discussed. No guarantees were given with regards to surgical outcome.   The risk of complications, morbidity, and mortality of patient management decisions have been made at the time of this visit. These are associated with the patient's problems, diagnostic procedures and treatment options. This includes the possible management options selected and those considered but not selected by the patient after shared medical decision making we discussed with the patient.   This note was created using Dragon voice recognition software that occasionally misinterpreted phrases or words.

## 2024-03-01 ENCOUNTER — OFFICE VISIT (OUTPATIENT)
Dept: FAMILY MEDICINE | Facility: CLINIC | Age: 79
End: 2024-03-01
Payer: MEDICARE

## 2024-03-01 ENCOUNTER — LAB VISIT (OUTPATIENT)
Dept: LAB | Facility: HOSPITAL | Age: 79
End: 2024-03-01
Attending: STUDENT IN AN ORGANIZED HEALTH CARE EDUCATION/TRAINING PROGRAM
Payer: MEDICARE

## 2024-03-01 VITALS
OXYGEN SATURATION: 91 % | BODY MASS INDEX: 25.18 KG/M2 | HEIGHT: 74 IN | RESPIRATION RATE: 17 BRPM | HEART RATE: 106 BPM | DIASTOLIC BLOOD PRESSURE: 72 MMHG | TEMPERATURE: 98 F | WEIGHT: 196.19 LBS | SYSTOLIC BLOOD PRESSURE: 126 MMHG

## 2024-03-01 DIAGNOSIS — Z00.00 HEALTHCARE MAINTENANCE: ICD-10-CM

## 2024-03-01 DIAGNOSIS — R41.3 MEMORY LOSS: Primary | ICD-10-CM

## 2024-03-01 DIAGNOSIS — R74.8 ABNORMAL LEVELS OF OTHER SERUM ENZYMES: ICD-10-CM

## 2024-03-01 DIAGNOSIS — R79.9 ABNORMAL FINDING OF BLOOD CHEMISTRY, UNSPECIFIED: ICD-10-CM

## 2024-03-01 DIAGNOSIS — R41.82 ALTERED MENTAL STATUS, UNSPECIFIED ALTERED MENTAL STATUS TYPE: ICD-10-CM

## 2024-03-01 DIAGNOSIS — E53.8 B12 DEFICIENCY: ICD-10-CM

## 2024-03-01 DIAGNOSIS — R73.03 PREDIABETES: ICD-10-CM
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
## 2024-03-01 DIAGNOSIS — J61 PULMONARY ASBESTOSIS: ICD-10-CM

## 2024-03-01 DIAGNOSIS — N18.31 CHRONIC KIDNEY DISEASE, STAGE 3A: ICD-10-CM

## 2024-03-01 DIAGNOSIS — C7B.8 NEUROENDOCRINE CARCINOMA METASTATIC TO INTRA-ABDOMINAL LYMPH NODE: ICD-10-CM

## 2024-03-01 DIAGNOSIS — R41.3 MEMORY LOSS: ICD-10-CM

## 2024-03-01 DIAGNOSIS — C7A.8 NEUROENDOCRINE CARCINOMA METASTATIC TO INTRA-ABDOMINAL LYMPH NODE: ICD-10-CM

## 2024-03-01 DIAGNOSIS — I10 ESSENTIAL HYPERTENSION: ICD-10-CM

## 2024-03-01 LAB
ALBUMIN SERPL BCP-MCNC: 4.2 G/DL (ref 3.5–5.2)
ALP SERPL-CCNC: 70 U/L (ref 55–135)
ALT SERPL W/O P-5'-P-CCNC: 12 U/L (ref 10–44)
ANION GAP SERPL CALC-SCNC: 13 MMOL/L (ref 8–16)
AST SERPL-CCNC: 18 U/L (ref 10–40)
BASOPHILS # BLD AUTO: 0.16 K/UL (ref 0–0.2)
BASOPHILS NFR BLD: 1.4 % (ref 0–1.9)
BILIRUB SERPL-MCNC: 1 MG/DL (ref 0.1–1)
BUN SERPL-MCNC: 40 MG/DL (ref 8–23)
CALCIUM SERPL-MCNC: 10 MG/DL (ref 8.7–10.5)
CHLORIDE SERPL-SCNC: 106 MMOL/L (ref 95–110)
CHOLEST SERPL-MCNC: 131 MG/DL (ref 120–199)
CHOLEST/HDLC SERPL: 4 {RATIO} (ref 2–5)
CO2 SERPL-SCNC: 22 MMOL/L (ref 23–29)
CREAT SERPL-MCNC: 1.4 MG/DL (ref 0.5–1.4)
DIFFERENTIAL METHOD BLD: ABNORMAL
EOSINOPHIL # BLD AUTO: 0.3 K/UL (ref 0–0.5)
EOSINOPHIL NFR BLD: 2.2 % (ref 0–8)
ERYTHROCYTE [DISTWIDTH] IN BLOOD BY AUTOMATED COUNT: 12.5 % (ref 11.5–14.5)
EST. GFR  (NO RACE VARIABLE): 51.1 ML/MIN/1.73 M^2
ESTIMATED AVG GLUCOSE: 117 MG/DL (ref 68–131)
GLUCOSE SERPL-MCNC: 119 MG/DL (ref 70–110)
HBA1C MFR BLD: 5.7 % (ref 4–5.6)
HCT VFR BLD AUTO: 39.1 % (ref 40–54)
HDLC SERPL-MCNC: 33 MG/DL (ref 40–75)
HDLC SERPL: 25.2 % (ref 20–50)
HGB BLD-MCNC: 13 G/DL (ref 14–18)
IMM GRANULOCYTES # BLD AUTO: 0.3 K/UL (ref 0–0.04)
IMM GRANULOCYTES NFR BLD AUTO: 2.6 % (ref 0–0.5)
LDLC SERPL CALC-MCNC: 83 MG/DL (ref 63–159)
LYMPHOCYTES # BLD AUTO: 1.5 K/UL (ref 1–4.8)
LYMPHOCYTES NFR BLD: 12.4 % (ref 18–48)
MAGNESIUM SERPL-MCNC: 2.2 MG/DL (ref 1.6–2.6)
MCH RBC QN AUTO: 36.7 PG (ref 27–31)
MCHC RBC AUTO-ENTMCNC: 33.2 G/DL (ref 32–36)
MCV RBC AUTO: 111 FL (ref 82–98)
MONOCYTES # BLD AUTO: 0.9 K/UL (ref 0.3–1)
MONOCYTES NFR BLD: 7.5 % (ref 4–15)
NEUTROPHILS # BLD AUTO: 8.6 K/UL (ref 1.8–7.7)
NEUTROPHILS NFR BLD: 73.9 % (ref 38–73)
NONHDLC SERPL-MCNC: 98 MG/DL
NRBC BLD-RTO: 0 /100 WBC
PLATELET # BLD AUTO: 442 K/UL (ref 150–450)
PMV BLD AUTO: 13.6 FL (ref 9.2–12.9)
POTASSIUM SERPL-SCNC: 4.2 MMOL/L (ref 3.5–5.1)
PROT SERPL-MCNC: 8 G/DL (ref 6–8.4)
RBC # BLD AUTO: 3.54 M/UL (ref 4.6–6.2)
SODIUM SERPL-SCNC: 141 MMOL/L (ref 136–145)
TRIGL SERPL-MCNC: 75 MG/DL (ref 30–150)
TSH SERPL DL<=0.005 MIU/L-ACNC: 3.74 UIU/ML (ref 0.4–4)
WBC # BLD AUTO: 11.66 K/UL (ref 3.9–12.7)

## 2024-03-01 PROCEDURE — 1126F AMNT PAIN NOTED NONE PRSNT: CPT | Mod: CPTII,S$GLB,, | Performed by: STUDENT IN AN ORGANIZED HEALTH CARE EDUCATION/TRAINING PROGRAM

## 2024-03-01 PROCEDURE — 85025 COMPLETE CBC W/AUTO DIFF WBC: CPT | Performed by: STUDENT IN AN ORGANIZED HEALTH CARE EDUCATION/TRAINING PROGRAM

## 2024-03-01 PROCEDURE — 99214 OFFICE O/P EST MOD 30 MIN: CPT | Mod: S$GLB,,, | Performed by: STUDENT IN AN ORGANIZED HEALTH CARE EDUCATION/TRAINING PROGRAM

## 2024-03-01 PROCEDURE — 84443 ASSAY THYROID STIM HORMONE: CPT | Performed by: STUDENT IN AN ORGANIZED HEALTH CARE EDUCATION/TRAINING PROGRAM

## 2024-03-01 PROCEDURE — G2211 COMPLEX E/M VISIT ADD ON: HCPCS | Mod: S$GLB,,, | Performed by: STUDENT IN AN ORGANIZED HEALTH CARE EDUCATION/TRAINING PROGRAM

## 2024-03-01 PROCEDURE — 1100F PTFALLS ASSESS-DOCD GE2>/YR: CPT | Mod: CPTII,S$GLB,, | Performed by: STUDENT IN AN ORGANIZED HEALTH CARE EDUCATION/TRAINING PROGRAM

## 2024-03-01 PROCEDURE — 84425 ASSAY OF VITAMIN B-1: CPT | Performed by: STUDENT IN AN ORGANIZED HEALTH CARE EDUCATION/TRAINING PROGRAM

## 2024-03-01 PROCEDURE — 80053 COMPREHEN METABOLIC PANEL: CPT | Performed by: STUDENT IN AN ORGANIZED HEALTH CARE EDUCATION/TRAINING PROGRAM

## 2024-03-01 PROCEDURE — 3074F SYST BP LT 130 MM HG: CPT | Mod: CPTII,S$GLB,, | Performed by: STUDENT IN AN ORGANIZED HEALTH CARE EDUCATION/TRAINING PROGRAM

## 2024-03-01 PROCEDURE — 80061 LIPID PANEL: CPT | Performed by: STUDENT IN AN ORGANIZED HEALTH CARE EDUCATION/TRAINING PROGRAM

## 2024-03-01 PROCEDURE — 83036 HEMOGLOBIN GLYCOSYLATED A1C: CPT | Performed by: STUDENT IN AN ORGANIZED HEALTH CARE EDUCATION/TRAINING PROGRAM

## 2024-03-01 PROCEDURE — 3078F DIAST BP <80 MM HG: CPT | Mod: CPTII,S$GLB,, | Performed by: STUDENT IN AN ORGANIZED HEALTH CARE EDUCATION/TRAINING PROGRAM

## 2024-03-01 PROCEDURE — 36415 COLL VENOUS BLD VENIPUNCTURE: CPT | Mod: PO | Performed by: STUDENT IN AN ORGANIZED HEALTH CARE EDUCATION/TRAINING PROGRAM

## 2024-03-01 PROCEDURE — 3288F FALL RISK ASSESSMENT DOCD: CPT | Mod: CPTII,S$GLB,, | Performed by: STUDENT IN AN ORGANIZED HEALTH CARE EDUCATION/TRAINING PROGRAM

## 2024-03-01 PROCEDURE — 83735 ASSAY OF MAGNESIUM: CPT | Performed by: STUDENT IN AN ORGANIZED HEALTH CARE EDUCATION/TRAINING PROGRAM

## 2024-03-01 PROCEDURE — 99999 PR PBB SHADOW E&M-EST. PATIENT-LVL III: CPT | Mod: PBBFAC,,, | Performed by: STUDENT IN AN ORGANIZED HEALTH CARE EDUCATION/TRAINING PROGRAM

## 2024-03-01 PROCEDURE — 1159F MED LIST DOCD IN RCRD: CPT | Mod: CPTII,S$GLB,, | Performed by: STUDENT IN AN ORGANIZED HEALTH CARE EDUCATION/TRAINING PROGRAM

## 2024-03-01 PROCEDURE — 84207 ASSAY OF VITAMIN B-6: CPT | Performed by: STUDENT IN AN ORGANIZED HEALTH CARE EDUCATION/TRAINING PROGRAM

## 2024-03-01 RX ORDER — DONEPEZIL HYDROCHLORIDE 5 MG/1
5 TABLET, FILM COATED ORAL NIGHTLY
Qty: 30 TABLET | Refills: 4 | Status: SHIPPED | OUTPATIENT
Start: 2024-03-01 | End: 2025-03-01

## 2024-03-01 RX ORDER — SYRINGE WITH NEEDLE, 1 ML 27GX1/2"
1 SYRINGE, EMPTY DISPOSABLE MISCELLANEOUS
Qty: 12 EACH | Refills: 1 | Status: SHIPPED | OUTPATIENT
Start: 2024-03-01

## 2024-03-01 RX ORDER — CYANOCOBALAMIN 1000 UG/ML
1000 INJECTION, SOLUTION INTRAMUSCULAR; SUBCUTANEOUS
Qty: 1 ML | Refills: 11 | Status: SHIPPED | OUTPATIENT
Start: 2024-03-01 | End: 2025-03-01

## 2024-03-01 NOTE — PROGRESS NOTES
[facility] Note    Subjective:    Roosevelt is a pleasant intelligent patient who is here for evaluation.     History of Present Illness  The patient presents for evaluation of memory loss and confusion. He is accompanied by an adult female.    He has had some memory loss and confusion for about a week and a half. The confusion occurs at night. He retired at the beginning of last year and had some memory loss here and there, but the adult female thinks that it has worsened since he retired. The memory loss been going on for a while and the confusion is new. He had 3 falls in 1 night and cut up his fingers in the back of his arms. He did not hit his head. He denies any headache, pain with urination, fevers, infection signs, or cough. He has not started any new medications before the simple falls happened. He has not had any low blood pressure. He has been staying in bed quite a bit and the adult female is not sure if it is due to staying in bed, depression, or his back hurting. He received a Kenalog injection at Dr. Alcazar's office last week and his back is feeling better now. He was calling for help to get out of bed and was trying to reassure him that he was fine. He is not taking any B12 currently. He has not noticed his balance being off. He denies any pain anywhere. He has lost 14 pounds. He has not been eating much. He denies any headaches, dizziness, sweating, or feeling jittery.         Modified Medications    No medications on file       Data reviewed 274}  Previous medical records reviewed and summarized in plan section at end of note.      If you are due for any health screening(s) below please notify me so we can arrange them to be ordered and scheduled. Most healthy patients at your age complete them, but you are free to accept or refuse. If you can't do it, I'll definitely understand. If you can, I'd certainly appreciate it!     All of your core healthy metrics are met.      The following  portions of the patient's history were reviewed and updated as appropriate: allergies, current medications, past family history, past medical history, past social history, past surgical history and problem list.    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  He  has a past surgical history that includes Eye surgery (Bilateral); Rotator cuff repair; Colonoscopy; Prostate biopsy; Colonoscopy (N/A, 1/21/2016); Prostatectomy (12/2017); Colonoscopy (N/A, 1/25/2019); Endoscopic ultrasound of upper gastrointestinal tract (N/A, 8/20/2019); Renal exploration (11/4/2019); Cholecystectomy (11/4/2019); Liver biopsy (11/4/2019); Hyperthermic intraperitoneal chemotherapy (HIPEC) (11/4/2019); Surgical removal of lymph node (11/4/2019); and Colonoscopy (N/A, 5/17/2023).    He  reports that he quit smoking about 23 years ago. His smoking use included cigarettes. He has been exposed to tobacco smoke. He has never used smokeless tobacco. He reports that he does not currently use alcohol after a past usage of about 1.0 standard drink of alcohol per week. He reports that he does not use drugs.  He family history includes Asbestos in his father; Cancer in his father; Lung cancer in his father; No Known Problems in his daughter.    Review of patient's allergies indicates:   Allergen Reactions    Epinephrine      Patient on Sandostatin and Epinephrine contraindicated    Aspirin Other (See Comments)     Internal bleeding    Lisinopril (bulk) Rash    Sulfa (sulfonamide antibiotics) Swelling and Rash       Tobacco Use: Medium Risk (3/1/2024)    Patient History     Smoking Tobacco Use: Former     Smokeless Tobacco Use: Never     Passive Exposure: Past     Physical Examination  Physical  "Exam  Clear. No crackles or wheezing.  Sinus rhythm. No murmurs or rubs.     General appearance: alert, cooperative, no distress  Neck: no thyromegaly, no neck stiffness  Lungs: clear to auscultation, no wheezes, rales or rhonchi, symmetric air entry  Heart: normal rate, regular rhythm, normal S1, S2, no murmurs, rubs, clicks or gallops  Abdomen: soft, nontender, nondistended, no rigidity, rebound, or guarding.   Back: no point tenderness over spine  Extremities: peripheral pulses normal, no unilateral leg swelling or calf tenderness   Neurological:alert, oriented, normal speech, no new focal findings or movement disorder noted from baseline  Neuro:               MS: alert, awake, oriented x3. Speech and thought process intact.  CN: PERRLA. Pupils constricted normally to light. Pupils constricted when looking at a near object, dilated when looking at a distant object, converged when my finger moved towards the nose.  Visual fields intact. EOMI. Sensation to light touch in all trigeminal divisions intact. Able to closed eyes tightly and smile without asymmetry. Hearing intact. No uvula deviation. 5/5 shoulder shrugs. Tongue able to protrude bilaterally without difficulty.   Motor: 5/5 all ext strength.  Sensation: to light touch intact throughout.   Coordination: Intact finger-to-nose, walked with normal gait across room.             BP Readings from Last 3 Encounters:   03/01/24 126/72   02/22/24 129/74   01/25/24 124/63     Wt Readings from Last 3 Encounters:   03/01/24 89 kg (196 lb 3.4 oz)   02/23/24 95.3 kg (210 lb)   02/22/24 95.5 kg (210 lb 9.6 oz)     /72 (BP Location: Right arm, Patient Position: Sitting, BP Method: Large (Manual))   Pulse 106   Temp 97.9 °F (36.6 °C) (Oral)   Resp 17   Ht 6' 2" (1.88 m)   Wt 89 kg (196 lb 3.4 oz)   SpO2 (!) 91%   BMI 25.19 kg/m²    274}  Laboratory: I have reviewed old labs below:    274}    Lab Results   Component Value Date    WBC 6.64 03/02/2023    HGB 12.2 " (L) 03/02/2023    HCT 37.6 (L) 03/02/2023     (H) 03/02/2023     03/02/2023     03/02/2023    K 4.5 03/02/2023     03/02/2023    CALCIUM 9.3 03/02/2023    PHOS 2.6 (L) 11/08/2019    CO2 25 03/02/2023    GLU 80 03/02/2023    BUN 15 03/02/2023    CREATININE 1.3 03/02/2023    EGFRNORACEVR 56.2 (A) 03/02/2023    ANIONGAP 8 03/02/2023    PROT 6.9 03/02/2023    ALBUMIN 4.1 03/02/2023    BILITOT 0.6 03/02/2023    ALKPHOS 75 03/02/2023    ALT 14 03/02/2023    AST 18 03/02/2023    INR 1.1 02/24/2019    CHOL 100 (L) 03/02/2023    TRIG 84 03/02/2023    HDL 35 (L) 03/02/2023    LDLCALC 48.2 (L) 03/02/2023    TSH 2.657 03/02/2023    PSA 28.6 (H) 12/20/2017    GLUF 109 07/22/2004    HGBA1C 5.6 09/25/2023      Lab reviewed by me: Particular labs of significance that I will monitor, workup, or treat to improve are mentioned below in diagnostic impression remarks.    Results  Laboratory Studies  Labs were reviewed with the patient.       Imaging/EKG: I have reviewed the pertinent results and my findings are noted in remarks.  274}    CC:   Chief Complaint   Patient presents with    Memory Loss    Fall    Altered Mental Status        274}    Assessment/Plan  Roosevelt Alejandro is a 78 y.o. male who presents to clinic with:  1. Memory loss    2. Fall, initial encounter    3. Healthcare maintenance    4. Essential hypertension    5. Prediabetes    6. Chronic kidney disease, stage 3a    7. Pulmonary asbestosis    8. 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 B12 deficiency    10. Altered mental status, unspecified altered mental status type    11. Abnormal finding of blood chemistry, unspecified    12. Abnormal levels of other serum enzymes       274}    Assessment & Plan  1. Memory loss.  He on B12 want does not taking this recommended start B12 injections put a referral for Neurology. I will obtain blood work and urinalysis. I will send in a prescription for Aricept.  If he develops  "any symptoms, he will let us know.    2. Hypertension.  His blood pressure is well controlled. He will continue his current medications     The patient's chronic kidney disease stage 3 was monitored, evaluated, addressed and/or treated. Recommend to avoid NSAIDs and renally dose medications. ACE/ARB inhibitor if indicated and optimize blood pressure and A1c to goal.     Pulmonary asbestosis stable no shortness a breath monitor     Altered mental status-patient does not have altered mental that is currently check urinalysis blood work  No fever signs of infection at this time reports no alcohol use    Neuroendocrine carcinoma-stable follow-up with specialist monitor continue treatment     Fall-no head trauma focal neurological deficit         This note was generated with the assistance of ambient listening technology. Verbal consent was obtained by the patient and accompanying visitor(s) for the recording of patient appointment to facilitate this note. I attest to having reviewed and edited the generated note for accuracy, though some syntax or spelling errors may persist. Please contact the author of this note for any clarification.      1. Memory loss  - Ambulatory referral/consult to Neurology; Future  - donepeziL (ARICEPT) 5 MG tablet; Take 1 tablet (5 mg total) by mouth every evening.  Dispense: 30 tablet; Refill: 4  - VITAMIN B1; Future    2. Fall, initial encounter    3. Healthcare maintenance    4. Essential hypertension    5. Prediabetes    6. Chronic kidney disease, stage 3a    7. Pulmonary asbestosis    8. 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 B12 deficiency  - syringe with needle 1 mL 25 gauge x 1" Syrg; 1 each by Misc.(Non-Drug; Combo Route) route every 28 days.  Dispense: 12 each; Refill: 1  - cyanocobalamin 1,000 mcg/mL injection; Inject 1 mL (1,000 mcg total) into the muscle every 28 days.  Dispense: 1 mL; Refill: 11    10. Altered mental status, unspecified altered " mental status type  - POCT Urinalysis(Instrument); Future  - Urinalysis Microscopic  - CBC Auto Differential; Future  - Comprehensive Metabolic Panel; Future  - Hemoglobin A1C; Future  - Lipid Panel; Future  - TSH; Future  - VITAMIN B6; Future  - POCT Urinalysis(Instrument)    11. Abnormal finding of blood chemistry, unspecified  - CBC Auto Differential; Future  - Comprehensive Metabolic Panel; Future  - Hemoglobin A1C; Future  - Lipid Panel; Future  - TSH; Future  - VITAMIN B6; Future  - Magnesium; Future    12. Abnormal levels of other serum enzymes  - VITAMIN B6; Future      Roque Guillaume MD   274}    If you are due for any health screening(s) below please notify me so we can arrange them to be ordered and scheduled. Most healthy patients at your age complete them, but you are free to accept or refuse.     If you can't do it, I'll definitely understand. If you can, I'd certainly appreciate it!   All of your core healthy metrics are met.

## 2024-03-04 ENCOUNTER — HOSPITAL ENCOUNTER (EMERGENCY)
Facility: HOSPITAL | Age: 79
Discharge: PSYCHIATRIC OR SUBSTANCE USE FACILITY | End: 2024-03-04
Attending: EMERGENCY MEDICINE
Payer: MEDICARE

## 2024-03-04 VITALS
SYSTOLIC BLOOD PRESSURE: 137 MMHG | BODY MASS INDEX: 25.15 KG/M2 | OXYGEN SATURATION: 95 % | HEIGHT: 74 IN | HEART RATE: 73 BPM | RESPIRATION RATE: 19 BRPM | WEIGHT: 196 LBS | TEMPERATURE: 99 F | DIASTOLIC BLOOD PRESSURE: 65 MMHG

## 2024-03-04 DIAGNOSIS — R44.3 HALLUCINATIONS: Primary | ICD-10-CM

## 2024-03-04 LAB
ALBUMIN SERPL BCP-MCNC: 4.1 G/DL (ref 3.5–5.2)
ALP SERPL-CCNC: 54 U/L (ref 55–135)
ALT SERPL W/O P-5'-P-CCNC: 11 U/L (ref 10–44)
AMPHET+METHAMPHET UR QL: NEGATIVE
ANION GAP SERPL CALC-SCNC: 7 MMOL/L (ref 8–16)
APAP SERPL-MCNC: 0.1 UG/ML (ref 10–20)
AST SERPL-CCNC: 15 U/L (ref 10–40)
BACTERIA #/AREA URNS HPF: NORMAL /HPF
BARBITURATES UR QL SCN>200 NG/ML: NEGATIVE
BASOPHILS NFR BLD: 2 % (ref 0–1.9)
BENZODIAZ UR QL SCN>200 NG/ML: NEGATIVE
BILIRUB SERPL-MCNC: 0.8 MG/DL (ref 0.1–1)
BILIRUB UR QL STRIP: NEGATIVE
BUN SERPL-MCNC: 40 MG/DL (ref 8–23)
BZE UR QL SCN: NEGATIVE
CALCIUM SERPL-MCNC: 9.4 MG/DL (ref 8.7–10.5)
CANNABINOIDS UR QL SCN: NEGATIVE
CHLORIDE SERPL-SCNC: 106 MMOL/L (ref 95–110)
CLARITY UR: CLEAR
CO2 SERPL-SCNC: 26 MMOL/L (ref 23–29)
COLOR UR: YELLOW
CREAT SERPL-MCNC: 1.6 MG/DL (ref 0.5–1.4)
CREAT UR-MCNC: 131.4 MG/DL (ref 23–375)
DIFFERENTIAL METHOD BLD: ABNORMAL
EOSINOPHIL NFR BLD: 3 % (ref 0–8)
ERYTHROCYTE [DISTWIDTH] IN BLOOD BY AUTOMATED COUNT: 12.3 % (ref 11.5–14.5)
EST. GFR  (NO RACE VARIABLE): 43.6 ML/MIN/1.73 M^2
ETHANOL SERPL-MCNC: <10 MG/DL
GLUCOSE SERPL-MCNC: 117 MG/DL (ref 70–110)
GLUCOSE UR QL STRIP: ABNORMAL
HCT VFR BLD AUTO: 35.5 % (ref 40–54)
HGB BLD-MCNC: 11.9 G/DL (ref 14–18)
HGB UR QL STRIP: NEGATIVE
HYALINE CASTS #/AREA URNS LPF: 1 /LPF
IMM GRANULOCYTES # BLD AUTO: ABNORMAL K/UL (ref 0–0.04)
IMM GRANULOCYTES NFR BLD AUTO: ABNORMAL % (ref 0–0.5)
KETONES UR QL STRIP: ABNORMAL
LEUKOCYTE ESTERASE UR QL STRIP: NEGATIVE
LYMPHOCYTES NFR BLD: 13 % (ref 18–48)
MCH RBC QN AUTO: 36 PG (ref 27–31)
MCHC RBC AUTO-ENTMCNC: 33.5 G/DL (ref 32–36)
MCV RBC AUTO: 107 FL (ref 82–98)
MICROSCOPIC COMMENT: NORMAL
MONOCYTES NFR BLD: 1 % (ref 4–15)
NEUTROPHILS NFR BLD: 81 % (ref 38–73)
NITRITE UR QL STRIP: NEGATIVE
NRBC BLD-RTO: 0 /100 WBC
OPIATES UR QL SCN: NEGATIVE
PCP UR QL SCN>25 NG/ML: NEGATIVE
PH UR STRIP: 5 [PH] (ref 5–8)
PLATELET # BLD AUTO: 353 K/UL (ref 150–450)
PLATELET BLD QL SMEAR: ABNORMAL
PMV BLD AUTO: 12.7 FL (ref 9.2–12.9)
POTASSIUM SERPL-SCNC: 4.4 MMOL/L (ref 3.5–5.1)
PROT SERPL-MCNC: 7.2 G/DL (ref 6–8.4)
PROT UR QL STRIP: NEGATIVE
RBC # BLD AUTO: 3.31 M/UL (ref 4.6–6.2)
RBC #/AREA URNS HPF: 1 /HPF (ref 0–4)
SALICYLATES SERPL-MCNC: <1.5 MG/DL (ref 15–30)
SODIUM SERPL-SCNC: 139 MMOL/L (ref 136–145)
SP GR UR STRIP: 1.02 (ref 1–1.03)
TOXICOLOGY INFORMATION: NORMAL
TSH SERPL DL<=0.005 MIU/L-ACNC: 2.31 UIU/ML (ref 0.34–5.6)
URN SPEC COLLECT METH UR: ABNORMAL
UROBILINOGEN UR STRIP-ACNC: NEGATIVE EU/DL
WBC # BLD AUTO: 8.6 K/UL (ref 3.9–12.7)
WBC #/AREA URNS HPF: 1 /HPF (ref 0–5)
YEAST URNS QL MICRO: NORMAL

## 2024-03-04 PROCEDURE — 80053 COMPREHEN METABOLIC PANEL: CPT | Performed by: EMERGENCY MEDICINE

## 2024-03-04 PROCEDURE — G0427 INPT/ED TELECONSULT70: HCPCS | Mod: 95,GC,, | Performed by: PSYCHIATRY & NEUROLOGY

## 2024-03-04 PROCEDURE — 81001 URINALYSIS AUTO W/SCOPE: CPT | Performed by: EMERGENCY MEDICINE

## 2024-03-04 PROCEDURE — 85007 BL SMEAR W/DIFF WBC COUNT: CPT | Performed by: EMERGENCY MEDICINE

## 2024-03-04 PROCEDURE — 84443 ASSAY THYROID STIM HORMONE: CPT | Performed by: EMERGENCY MEDICINE

## 2024-03-04 PROCEDURE — 82077 ASSAY SPEC XCP UR&BREATH IA: CPT | Performed by: EMERGENCY MEDICINE

## 2024-03-04 PROCEDURE — 80179 DRUG ASSAY SALICYLATE: CPT | Performed by: EMERGENCY MEDICINE

## 2024-03-04 PROCEDURE — 96360 HYDRATION IV INFUSION INIT: CPT

## 2024-03-04 PROCEDURE — 80307 DRUG TEST PRSMV CHEM ANLYZR: CPT | Performed by: EMERGENCY MEDICINE

## 2024-03-04 PROCEDURE — 99285 EMERGENCY DEPT VISIT HI MDM: CPT | Mod: 25

## 2024-03-04 PROCEDURE — 80143 DRUG ASSAY ACETAMINOPHEN: CPT | Performed by: EMERGENCY MEDICINE

## 2024-03-04 PROCEDURE — 25000003 PHARM REV CODE 250: Performed by: EMERGENCY MEDICINE

## 2024-03-04 PROCEDURE — 85027 COMPLETE CBC AUTOMATED: CPT | Performed by: EMERGENCY MEDICINE

## 2024-03-04 RX ORDER — HYDROMORPHONE HYDROCHLORIDE 1 MG/ML
0.5 INJECTION, SOLUTION INTRAMUSCULAR; INTRAVENOUS; SUBCUTANEOUS
Status: DISCONTINUED | OUTPATIENT
Start: 2024-03-04 | End: 2024-03-04

## 2024-03-04 RX ORDER — ALPRAZOLAM 0.5 MG/1
0.5 TABLET ORAL
Status: COMPLETED | OUTPATIENT
Start: 2024-03-04 | End: 2024-03-04

## 2024-03-04 RX ADMIN — ALPRAZOLAM 0.5 MG: 0.5 TABLET ORAL at 06:03

## 2024-03-04 RX ADMIN — SODIUM CHLORIDE 500 ML: 9 INJECTION, SOLUTION INTRAVENOUS at 06:03
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
## 2024-03-04 NOTE — ED PROVIDER NOTES
Encounter Date: 3/4/2024       History     Chief Complaint   Patient presents with    Mental Health Problem     Patient with a history of progressive dementia.  Patient here on order of protective custody.  It alleges patient is paranoid, has access.  He thinks some eyes detaining him at his house.  Patient with dementia and unable to give clear history of present illness.      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    Secondary neuroendocrine tumor of liver     Vertigo, aural, unspecified laterality 03/06/2019     Past Surgical History:   Procedure Laterality Date    CHOLECYSTECTOMY  11/4/2019    Procedure: CHOLECYSTECTOMY;  Surgeon: SP Khalil MD;  Location: [facility];  Service: General;;    COLONOSCOPY      2009    COLONOSCOPY N/A 1/21/2016    Procedure: COLONOSCOPY;  Surgeon: Toby Maciel MD;  Location: [facility];  Service: Endoscopy;  Laterality: N/A;    COLONOSCOPY N/A 1/25/2019    Procedure: COLONOSCOPY;  Surgeon: Toby Maciel MD;  Location: [facility];  Service: Endoscopy;  Laterality: N/A;    COLONOSCOPY N/A 5/17/2023    Procedure: COLONOSCOPY;  Surgeon: Toby Maciel MD;  Location: [facility];  Service: Endoscopy;  Laterality: N/A;  lm wife/ppm    ENDOSCOPIC ULTRASOUND OF UPPER GASTROINTESTINAL TRACT N/A 8/20/2019    Procedure: ULTRASOUND, UPPER GI TRACT, ENDOSCOPIC;  Surgeon: Forest Lucio III, MD;  Location: [facility];  Service: Endoscopy;  Laterality: N/A;    EYE SURGERY Bilateral     cataracts, retinal  detachment    HYPERTHERMIC INTRAPERITONEAL CHEMOTHERAPY (HIPEC)  2019    Procedure: CHEMOTHERAPY, INTRAPERITONEAL, HYPERTHERMIC;  Surgeon: SP Khalil MD;  Location: [facility] OR;  Service: General;;    LIVER BIOPSY  2019    Procedure: BIOPSY, LIVER;  Surgeon: SP Khalil MD;  Location: [facility] OR;  Service: General;;  BIOPSY LIVER WEDGE X3    PROSTATE BIOPSY      PROSTATECTOMY  2017    RENAL EXPLORATION  2019    Procedure: EXPLORATION, KIDNEY;  Surgeon: SP Khalil MD;  Location: [facility] OR;  Service: General;;    ROTATOR CUFF REPAIR      left    SURGICAL REMOVAL OF LYMPH NODE  2019    Procedure: EXCISION, LYMPH NODE;  Surgeon: SP Khalil MD;  Location: [facility];  Service: General;;  EXTENSIVE RETROPERITONEAL NODE DISSECTION  EXPLORATION OF MESENTERIC ARTERY/VEIN     Family History   Problem Relation Age of Onset    Cancer Father         lung/ asbestos    Asbestos Father     Lung cancer Father     No Known Problems Daughter     Melanoma Neg Hx     Psoriasis Neg Hx     Lupus Neg Hx     Eczema Neg Hx      Social History     Tobacco Use    Smoking status: Former     Current packs/day: 0.00     Types: Cigarettes     Quit date: 3/30/2000     Years since quittin.9     Passive exposure: Past    Smokeless tobacco: Never   Substance Use Topics    Alcohol use: Not Currently     Alcohol/week: 1.0 standard drink of alcohol     Types: 1 Cans of beer per week     Comment: rare    Drug use: No     Review of Systems   Constitutional:  Negative for chills and fever.   HENT:  Negative for sore throat.    Eyes:  Negative for photophobia and visual disturbance.   Respiratory:  Negative for shortness of breath.    Cardiovascular:  Negative for chest pain.   Gastrointestinal:  Negative for abdominal pain and vomiting.   Genitourinary:  Negative for dysuria.   Musculoskeletal:  Negative for joint swelling.   Skin:  Negative for rash.   Neurological:  Negative for weakness and  headaches.   Psychiatric/Behavioral:  Positive for confusion and hallucinations.        Physical Exam     Initial Vitals [03/04/24 1225]   BP Pulse Resp Temp SpO2   (!) 154/70 74 18 98.4 °F (36.9 °C) 95 %      MAP       --         Physical Exam    Nursing note and vitals reviewed.  Constitutional: He is not diaphoretic. No distress.   HENT:   Head: Normocephalic and atraumatic.   Eyes: Conjunctivae are normal.   Neck:   Normal range of motion.  Cardiovascular:  Regular rhythm.           Pulmonary/Chest: Breath sounds normal.   Abdominal: Abdomen is soft. There is no abdominal tenderness.   Musculoskeletal:         General: Normal range of motion.      Cervical back: Normal range of motion.     Neurological: He has normal strength. No cranial nerve deficit or sensory deficit.   Skin: No rash noted.   Psychiatric:   Poor historian.  Unable to give any relevant details about history of present illness.  Calm and cooperative.         ED Course   Procedures  Labs Reviewed   CBC W/ AUTO DIFFERENTIAL - Abnormal; Notable for the following components:       Result Value    RBC 3.31 (*)     Hemoglobin 11.9 (*)     Hematocrit 35.5 (*)      (*)     MCH 36.0 (*)     All other components within normal limits   COMPREHENSIVE METABOLIC PANEL - Abnormal; Notable for the following components:    Glucose 117 (*)     BUN 40 (*)     Creatinine 1.6 (*)     Alkaline Phosphatase 54 (*)     eGFR 43.6 (*)     Anion Gap 7 (*)     All other components within normal limits   URINALYSIS, REFLEX TO URINE CULTURE - Abnormal; Notable for the following components:    Glucose, UA 4+ (*)     Ketones, UA Trace (*)     All other components within normal limits    Narrative:     Specimen Source->Urine   ACETAMINOPHEN LEVEL - Abnormal; Notable for the following components:    Acetaminophen (Tylenol), Serum 0.1 (*)     All other components within normal limits   SALICYLATE LEVEL - Abnormal; Notable for the following components:    Salicylate Lvl  <1.5 (*)     All other components within normal limits   DRUG SCREEN PANEL, URINE EMERGENCY    Narrative:     Specimen Source->Urine   ALCOHOL,MEDICAL (ETHANOL)   TSH   URINALYSIS MICROSCOPIC    Narrative:     Specimen Source->Urine          Imaging Results              CT Head Without Contrast (Final result)  Result time 03/04/24 15:30:36      Final result by Elizabeth Lindsay MD (03/04/24 15:30:36)                   Narrative:    CMS MANDATED QUALITY DATA - CT RADIATION  436    All CT scans at this facility utilize dose modulation, iterative reconstruction, and/or weight based dosing when appropriate to reduce radiation dose to as low as reasonably achievable.  CT head without contrast    Clinical data: Mental status change    COMPARISON: 2/24/2019    FINDINGS: Noninfusion images were obtained from the skull base to the vertex. There is no intracranial mass, hemorrhage, or midline shift. Ventricles and sulci are mildly prominent. There are no pathologic extra-axial fluid collections. There is no evidence of cortical ischemic change. Changes of mild chronic microvascular white matter disease are noted. The gray-white differentiation is maintained. Cerebellum and brainstem are normal. Orbits are unremarkable. The calvarium is intact.    IMPRESSION:    1. No acute intracranial abnormalities.    Stable chronic periventricular small vessel disease    Electronically signed by:  Elizabeth Lindsay MD  03/04/2024 03:30 PM CST Workstation: [id number]                                     Medications   ALPRAZolam tablet 0.5 mg (has no administration in time range)   sodium chloride 0.9% bolus 500 mL 500 mL (has no administration in time range)     Medical Decision Making  Patient presents with dementia now with paranoid delusions.  Patient is gravely disabled.  Here CBC CMP unremarkable.  No evidence infection.  CT of the head obtained due to no previous neuro imaging.  CT head unremarkable.  Tele psychiatry consulted.    Leticia recommends physician emergency certificate and psychiatric placement.  Patient is medically clear for transfer.    Amount and/or Complexity of Data Reviewed  Labs: ordered. Decision-making details documented in ED Course.  Radiology: ordered. Decision-making details documented in ED Course.    Risk  Prescription drug management.                  Medically cleared for psychiatry placement: 3/4/2024  4:36 PM                   Clinical Impression:  Final diagnoses:  [R44.3] Hallucinations (Primary)          ED Disposition Condition    Transfer to Psych Facility Stable          ED Prescriptions    None       Follow-up Information    None          Forest Baker MD  03/04/24 7268

## 2024-03-05 ENCOUNTER — PATIENT MESSAGE (OUTPATIENT)
Dept: FAMILY MEDICINE | Facility: CLINIC | Age: 79
End: 2024-03-05
Payer: MEDICARE

## 2024-03-06 ENCOUNTER — TELEPHONE (OUTPATIENT)
Dept: NEUROLOGY | Facility: CLINIC | Age: 79
End: 2024-03-06
Payer: MEDICARE

## 2024-03-06 NOTE — TELEPHONE ENCOUNTER
Spoke to the pt's daughter, gave her the phone numbers to main campus neurology and outside providers in hopes of a sooner appt for new onset dementia. She will contact us via the portal if she would like to schedule an appt.

## 2024-03-07 LAB
PYRIDOXAL SERPL-MCNC: 21 UG/L (ref 5–50)
VIT B1 BLD-MCNC: 68 UG/L (ref 38–122)

## 2024-03-14 ENCOUNTER — TELEPHONE (OUTPATIENT)
Dept: PSYCHIATRY | Facility: CLINIC | Age: 79
End: 2024-03-14
Payer: MEDICARE

## 2024-03-14 NOTE — TELEPHONE ENCOUNTER
Pt's daughter Cyndy called requesting to schedule appt. Pt is going to be discharged from [facility] soon. Advised her that we require a referral from an [facility] provider. Explained that we have an extensive wait list and it may be several months to be scheduled for the initial visit. She verbalized understanding.

## 2024-03-20 RX ORDER — LANREOTIDE ACETATE 120 MG/.5ML
120 INJECTION SUBCUTANEOUS
Status: CANCELLED | OUTPATIENT
Start: 2024-03-21 | End: 2024-03-21

## 2024-03-21 ENCOUNTER — INFUSION (OUTPATIENT)
Dept: INFUSION THERAPY | Facility: HOSPITAL | Age: 79
End: 2024-03-21
Attending: INTERNAL MEDICINE
Payer: MEDICARE

## 2024-03-21 VITALS
SYSTOLIC BLOOD PRESSURE: 130 MMHG | HEART RATE: 80 BPM | RESPIRATION RATE: 18 BRPM | OXYGEN SATURATION: 97 % | HEIGHT: 74 IN | WEIGHT: 184.81 LBS | TEMPERATURE: 97 F | BODY MASS INDEX: 23.72 KG/M2 | DIASTOLIC BLOOD PRESSURE: 68 MMHG

## 2024-03-21 DIAGNOSIS — C7B.8 NEUROENDOCRINE CARCINOMA METASTATIC TO INTRA-ABDOMINAL LYMPH NODE: Primary | ICD-10-CM

## 2024-03-21 DIAGNOSIS — C7A.8 NEUROENDOCRINE CARCINOMA METASTATIC TO INTRA-ABDOMINAL LYMPH NODE: Primary | ICD-10-CM

## 2024-03-21 PROCEDURE — 96372 THER/PROPH/DIAG INJ SC/IM: CPT

## 2024-03-21 PROCEDURE — 63600175 PHARM REV CODE 636 W HCPCS: Mod: JZ,JG | Performed by: NURSE PRACTITIONER

## 2024-03-21 PROCEDURE — 96401 CHEMO ANTI-NEOPL SQ/IM: CPT

## 2024-03-21 RX ORDER — LANREOTIDE ACETATE 120 MG/.5ML
120 INJECTION SUBCUTANEOUS
Status: COMPLETED | OUTPATIENT
Start: 2024-03-21 | End: 2024-03-21

## 2024-03-21 RX ADMIN — LANREOTIDE ACETATE 120 MG: 120 INJECTION SUBCUTANEOUS at 03:03

## 2024-04-17 RX ORDER — LANREOTIDE ACETATE 120 MG/.5ML
120 INJECTION SUBCUTANEOUS
Status: CANCELLED | OUTPATIENT
Start: 2024-04-18 | End: 2024-04-18

## 2024-04-18 ENCOUNTER — INFUSION (OUTPATIENT)
Dept: INFUSION THERAPY | Facility: HOSPITAL | Age: 79
End: 2024-04-18
Attending: INTERNAL MEDICINE
Payer: MEDICARE

## 2024-04-18 VITALS
BODY MASS INDEX: 23.25 KG/M2 | DIASTOLIC BLOOD PRESSURE: 67 MMHG | RESPIRATION RATE: 18 BRPM | WEIGHT: 181.19 LBS | TEMPERATURE: 98 F | OXYGEN SATURATION: 96 % | SYSTOLIC BLOOD PRESSURE: 135 MMHG | HEIGHT: 74 IN | HEART RATE: 75 BPM

## 2024-04-18 DIAGNOSIS — C7A.8 NEUROENDOCRINE CARCINOMA METASTATIC TO INTRA-ABDOMINAL LYMPH NODE: Primary | ICD-10-CM

## 2024-04-18 DIAGNOSIS — C7B.8 NEUROENDOCRINE CARCINOMA METASTATIC TO INTRA-ABDOMINAL LYMPH NODE: Primary | ICD-10-CM

## 2024-04-18 PROCEDURE — 96372 THER/PROPH/DIAG INJ SC/IM: CPT

## 2024-04-18 PROCEDURE — 63600175 PHARM REV CODE 636 W HCPCS: Mod: JZ,JG | Performed by: NURSE PRACTITIONER

## 2024-04-18 RX ORDER — LANREOTIDE ACETATE 120 MG/.5ML
120 INJECTION SUBCUTANEOUS
Status: COMPLETED | OUTPATIENT
Start: 2024-04-18 | End: 2024-04-18

## 2024-04-18 RX ADMIN — LANREOTIDE ACETATE 120 MG: 120 INJECTION SUBCUTANEOUS at 03:04

## 2024-04-18 NOTE — PLAN OF CARE
Problem: Fall Injury Risk  Goal: Absence of Fall and Fall-Related Injury  Outcome: Ongoing, Progressing  Intervention: Identify and Manage Contributors  Flowsheets (Taken 4/18/2024 1431)  Self-Care Promotion: independence encouraged  Medication Review/Management: medications reviewed  Intervention: Promote Injury-Free Environment  Flowsheets (Taken 4/18/2024 0574)  Safety Promotion/Fall Prevention: medications reviewed

## 2024-04-23 ENCOUNTER — TELEPHONE (OUTPATIENT)
Dept: HEMATOLOGY/ONCOLOGY | Facility: CLINIC | Age: 79
End: 2024-04-23

## 2024-04-23 NOTE — TELEPHONE ENCOUNTER
----- Message from Karlee Foreman RN sent at 4/18/2024  8:12 AM CDT -----  This pt needs a f/u appt or he will not receive his next injection. Please inform pt if he does not keep appt with provider he will no longer receive injections.

## 2024-04-29 DIAGNOSIS — R00.2 HEART PALPITATIONS: ICD-10-CM

## 2024-04-29 DIAGNOSIS — E78.5 HYPERLIPIDEMIA, UNSPECIFIED HYPERLIPIDEMIA TYPE: ICD-10-CM

## 2024-04-29 DIAGNOSIS — I10 ESSENTIAL HYPERTENSION: ICD-10-CM

## 2024-04-29 RX ORDER — NIFEDIPINE 30 MG/1
30 TABLET, EXTENDED RELEASE ORAL NIGHTLY
Qty: 90 TABLET | Refills: 3 | Status: SHIPPED | OUTPATIENT
Start: 2024-04-29

## 2024-04-29 RX ORDER — METOPROLOL SUCCINATE 25 MG/1
TABLET, EXTENDED RELEASE ORAL
Qty: 90 TABLET | Refills: 3 | Status: SHIPPED | OUTPATIENT
Start: 2024-04-29

## 2024-04-29 RX ORDER — ATORVASTATIN CALCIUM 20 MG/1
TABLET, FILM COATED ORAL
Qty: 90 TABLET | Refills: 3 | Status: SHIPPED | OUTPATIENT
Start: 2024-04-29

## 2024-04-29 NOTE — TELEPHONE ENCOUNTER
No care due was identified.  [facility] Embedded Care Due Messages. Reference number: [id number].   4/29/2024 2:21:46 AM CDT

## 2024-05-22 ENCOUNTER — OFFICE VISIT (OUTPATIENT)
Facility: CLINIC | Age: 79
End: 2024-05-22
Payer: MEDICARE

## 2024-05-22 VITALS
WEIGHT: 181.38 LBS | HEART RATE: 65 BPM | SYSTOLIC BLOOD PRESSURE: 134 MMHG | DIASTOLIC BLOOD PRESSURE: 63 MMHG | OXYGEN SATURATION: 94 % | HEIGHT: 74 IN | TEMPERATURE: 98 F | RESPIRATION RATE: 12 BRPM | BODY MASS INDEX: 23.28 KG/M2

## 2024-05-22 DIAGNOSIS — C7A.8 NEUROENDOCRINE CARCINOMA METASTATIC TO INTRA-ABDOMINAL LYMPH NODE: Primary | ICD-10-CM

## 2024-05-22 DIAGNOSIS — C7B.8 NEUROENDOCRINE CARCINOMA METASTATIC TO INTRA-ABDOMINAL LYMPH NODE: Primary | ICD-10-CM

## 2024-05-22 DIAGNOSIS — F02.B2 MODERATE LATE ONSET ALZHEIMER'S DEMENTIA WITH PSYCHOTIC DISTURBANCE: ICD-10-CM

## 2024-05-22 DIAGNOSIS — G30.1 MODERATE LATE ONSET ALZHEIMER'S DEMENTIA WITH PSYCHOTIC DISTURBANCE: ICD-10-CM

## 2024-05-22 PROCEDURE — 3078F DIAST BP <80 MM HG: CPT | Mod: CPTII,S$GLB,, | Performed by: INTERNAL MEDICINE

## 2024-05-22 PROCEDURE — 99999 PR PBB SHADOW E&M-EST. PATIENT-LVL III: CPT | Mod: PBBFAC,,, | Performed by: INTERNAL MEDICINE

## 2024-05-22 PROCEDURE — 99214 OFFICE O/P EST MOD 30 MIN: CPT | Mod: S$GLB,,, | Performed by: INTERNAL MEDICINE

## 2024-05-22 PROCEDURE — 1101F PT FALLS ASSESS-DOCD LE1/YR: CPT | Mod: CPTII,S$GLB,, | Performed by: INTERNAL MEDICINE

## 2024-05-22 PROCEDURE — 1126F AMNT PAIN NOTED NONE PRSNT: CPT | Mod: CPTII,S$GLB,, | Performed by: INTERNAL MEDICINE

## 2024-05-22 PROCEDURE — 3075F SYST BP GE 130 - 139MM HG: CPT | Mod: CPTII,S$GLB,, | Performed by: INTERNAL MEDICINE

## 2024-05-22 PROCEDURE — 3288F FALL RISK ASSESSMENT DOCD: CPT | Mod: CPTII,S$GLB,, | Performed by: INTERNAL MEDICINE

## 2024-05-22 NOTE — ASSESSMENT & PLAN NOTE
This is a new Dx since I last saw him.  He is being treated for this and he feels this is getting better.  He did actually require admission to behavioral health recently.  Seems to be under ok control at this time.

## 2024-05-22 NOTE — PROGRESS NOTES
Subjective:       Patient ID: Roosevelt Alejandro is a 78 y.o. male.    Chief Complaint: leading to consultation is: neuroendocrine carcinoma follow up.     HPI:  Patient returns today for a regularly scheduled follow-up visit.      Mr. Denise returns after not being seen in 2 years.  He has been found to have dementia and has been seeing psych for this.  He is on medications.          CT, MRI of abd/p and dotatate PET all stable 7/6/2021 1/5/2021 Dotatate PET:  Stable size and distribution of somatostatin receptor avid disease in the mediastinum and abdomen, multiple index lesions demonstrate increased avidity as detailed above.  No new lesions identified.         Patient underwent surgery 11/4/2019 with Dr. Khalil.  Partial path report:     FINAL PATHOLOGIC DIAGNOSIS     SYNOPTIC REPORT  Procedure: Segmental resection, small intestine  Tumor Site: Small intestine, terminal ileum  Tumor Size: Greatest dimension (centimeters): 1.8 cm  Tumor Focality: Unifocal  Histologic Type and Grade G1: Well-differentiated neuroendocrine tumor  Mitotic Rate: 1.5 mitoses / 10 HPF  Ki-67 Labeling Index: Specify Ki-67 percentage: about 1.1 %  Tumor Extension: Tumor at least invades through the muscularis propria into subserosal tissue  All margins are uninvolved by tumor: Margins examined: proximal, distal, and radial.  Lymphovascular Invasion: Present  Perineural Invasion: Present  Large Mesenteric Masses (>2 cm), not identified  Regional Lymph Nodes Examination (counting lymph nodes from all the 38 parts)  Number of Lymph Nodes Involved: 41  Number of Lymph Nodes Examined: 46  Pathologic Stage Classification (pTNM, AJCC 8th Edition)  Primary Tumor (pT) at least pT3: Invades through the muscularis propria into subserosal tissue  pN2: Extensive rigoberto deposits (12 or greater),  Distant Metastasis (pM) pM1b: Metastasis in at least one extrahepatic site (nonregional lymph node: Aortocaval  node. right inferior aorta node)  NET  panel  Primary small bowel tumor (Block 18D)  Ki-67: positive, approximately 1.1% cells staining  Chromogranin: positive, 3+, 100%  Synaptophysin: positive, 3+, 100%  CD31: 12 / HPF  TTF: not applicable  Lymph node metastasis (Block 38A)  Ki-67: positive, approximately 6.5 % cells staining        CT abdomen 7/15/2019:  Mesenteric and retroperitoneal lymphadenopathy, with numerous necrotic appearing lymph node is within the central mesentery.  Some of these nodes have mildly increased in size, while the largest previously present node has significantly decreased in size as above.  Further workup for neoplastic process such as metastatic disease or lymphoma is recommended.    Normal appendix.  Moderate diverticulosis.  Small hiatal hernia.  Hepatic and renal cysts.  Atherosclerosis.  Evidence for prior asbestos exposure.    All medications and past medical and surgical history have been reviewed.         Review of patient's allergies indicates:   Allergen Reactions    Epinephrine      Patient on Sandostatin and Epinephrine contraindicated    Aspirin Other (See Comments)     Internal bleeding    Lisinopril (bulk) Rash    Sulfa (sulfonamide antibiotics) Swelling and Rash       ROS  GEN:   normal without any fever, night sweats or weight loss  HEENT:  normal with no HA's,  changes in vision  CV:   normal with no CP, SOB  PULM:  normal with no SOB, cough  GI:   normal with no abdominal pain, nausea, vomiting  :   normal with no hematuria, dysuria  SKIN:   normal with no rash      Previous FAMHX and SOCHX information reviewed and remains unchanged         Objective:        GEN: no apparent distress, comfortable; AAOx3  HEAD: atraumatic and normocephalic  EYES: no pallor, no icterus, PERRLA  ENT: external ears WNL; no nasal discharge  NECK: no visible masses, trachea midline  CHEST: Normal respiratory effort  ABDOM: Visibly Flat  SKIN: no visible rashes  NEURO: grossly intact; motor/sensory WNL; AAOx3; no tremors  PSYCH:  normal mood, affect and behavior                All lab results and imaging results have been reviewed and discussed with the patient  No results found for this or any previous visit (from the past 336 hour(s)).  CMP  Sodium   Date Value Ref Range Status   03/04/2024 139 136 - 145 mmol/L Final     Potassium   Date Value Ref Range Status   03/04/2024 4.4 3.5 - 5.1 mmol/L Final     Chloride   Date Value Ref Range Status   03/04/2024 106 95 - 110 mmol/L Final     CO2   Date Value Ref Range Status   03/04/2024 26 23 - 29 mmol/L Final     Glucose   Date Value Ref Range Status   03/04/2024 117 (H) 70 - 110 mg/dL Final     BUN   Date Value Ref Range Status   03/04/2024 40 (H) 8 - 23 mg/dL Final     Creatinine   Date Value Ref Range Status   03/04/2024 1.6 (H) 0.5 - 1.4 mg/dL Final     Calcium   Date Value Ref Range Status   03/04/2024 9.4 8.7 - 10.5 mg/dL Final     Total Protein   Date Value Ref Range Status   03/04/2024 7.2 6.0 - 8.4 g/dL Final     Albumin   Date Value Ref Range Status   03/04/2024 4.1 3.5 - 5.2 g/dL Final     Total Bilirubin   Date Value Ref Range Status   03/04/2024 0.8 0.1 - 1.0 mg/dL Final     Comment:     For infants and newborns, interpretation of results should be based  on gestational age, weight and in agreement with clinical  observations.    Premature Infant recommended reference ranges:  Up to 24 hours.............<8.0 mg/dL  Up to 48 hours............<12.0 mg/dL  3-5 days..................<15.0 mg/dL  6-29 days.................<15.0 mg/dL       Alkaline Phosphatase   Date Value Ref Range Status   03/04/2024 54 (L) 55 - 135 U/L Final     AST   Date Value Ref Range Status   03/04/2024 15 10 - 40 U/L Final     ALT   Date Value Ref Range Status   03/04/2024 11 10 - 44 U/L Final     Anion Gap   Date Value Ref Range Status   03/04/2024 7 (L) 8 - 16 mmol/L Final     eGFR if    Date Value Ref Range Status   05/27/2022 >60.0 >60 mL/min/1.73 m^2 Final     eGFR if non African American    Date Value Ref Range Status   05/27/2022 52.6 (A) >60 mL/min/1.73 m^2 Final     Comment:     Calculation used to obtain the estimated glomerular filtration  rate (eGFR) is the CKD-EPI equation.            Assessment:      1. 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  Need new imaging and discussed this today as it has been a year since any studies have been done.              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  He is being treated for this and he feels this is getting better.  He did actually require admission to behavioral health recently.  Seems to be under ok control at this time.                Plan:   As Above in Assessment      The plan was discussed with the patient and all questions/concerns have been answered to the patient's satisfaction.

## 2024-05-22 NOTE — ASSESSMENT & PLAN NOTE
Patient has done ok from this standpoint and has continued the lanreotide therapy.  Need new imaging and discussed this today as it has been a year since any studies have been done.

## 2024-05-23 ENCOUNTER — INFUSION (OUTPATIENT)
Dept: INFUSION THERAPY | Facility: HOSPITAL | Age: 79
End: 2024-05-23
Attending: INTERNAL MEDICINE
Payer: MEDICARE

## 2024-05-23 VITALS
HEART RATE: 71 BPM | TEMPERATURE: 98 F | BODY MASS INDEX: 23.53 KG/M2 | DIASTOLIC BLOOD PRESSURE: 60 MMHG | RESPIRATION RATE: 16 BRPM | OXYGEN SATURATION: 94 % | SYSTOLIC BLOOD PRESSURE: 120 MMHG | WEIGHT: 183.31 LBS | HEIGHT: 74 IN

## 2024-05-23 DIAGNOSIS — C7B.8 NEUROENDOCRINE CARCINOMA METASTATIC TO INTRA-ABDOMINAL LYMPH NODE: Primary | ICD-10-CM

## 2024-05-23 DIAGNOSIS — C7A.8 NEUROENDOCRINE CARCINOMA METASTATIC TO INTRA-ABDOMINAL LYMPH NODE: Primary | ICD-10-CM

## 2024-05-23 PROCEDURE — 96372 THER/PROPH/DIAG INJ SC/IM: CPT

## 2024-05-23 PROCEDURE — 63600175 PHARM REV CODE 636 W HCPCS: Mod: JZ,JG | Performed by: INTERNAL MEDICINE

## 2024-05-23 RX ORDER — LANREOTIDE ACETATE 120 MG/.5ML
120 INJECTION SUBCUTANEOUS
Status: COMPLETED | OUTPATIENT
Start: 2024-05-23 | End: 2024-05-23

## 2024-05-23 RX ADMIN — LANREOTIDE ACETATE 120 MG: 120 INJECTION SUBCUTANEOUS at 03:05

## 2024-05-23 NOTE — PLAN OF CARE
Problem: Fall Injury Risk  Goal: Absence of Fall and Fall-Related Injury  Outcome: Progressing  Intervention: Promote Injury-Free Environment  Flowsheets (Taken 5/23/2024 8018)  Safety Promotion/Fall Prevention:   Fall Risk reviewed with patient/family   in recliner, wheels locked   supervised activity   instructed to call staff for mobility

## 2024-06-01 ENCOUNTER — HOSPITAL ENCOUNTER (OUTPATIENT)
Dept: RADIOLOGY | Facility: HOSPITAL | Age: 79
Discharge: HOME | End: 2024-06-01
Attending: INTERNAL MEDICINE
Payer: MEDICARE

## 2024-06-01 DIAGNOSIS — C7B.8 NEUROENDOCRINE CARCINOMA METASTATIC TO INTRA-ABDOMINAL LYMPH NODE: ICD-10-CM

## 2024-06-01 DIAGNOSIS — C7A.8 NEUROENDOCRINE CARCINOMA METASTATIC TO INTRA-ABDOMINAL LYMPH NODE: ICD-10-CM

## 2024-06-01 PROCEDURE — 74183 MRI ABD W/O CNTR FLWD CNTR: CPT | Mod: 26,,, | Performed by: RADIOLOGY

## 2024-06-01 PROCEDURE — 74183 MRI ABD W/O CNTR FLWD CNTR: CPT | Mod: TC

## 2024-06-01 PROCEDURE — 25500020 PHARM REV CODE 255

## 2024-06-01 PROCEDURE — A9585 GADOBUTROL INJECTION: HCPCS

## 2024-06-01 RX ORDER — GADOBUTROL 604.72 MG/ML
INJECTION INTRAVENOUS
Status: COMPLETED
Start: 2024-06-01 | End: 2024-06-01

## 2024-06-01 RX ADMIN — GADOBUTROL 8 ML: 604.72 INJECTION INTRAVENOUS at 11:06

## 2024-06-19 RX ORDER — LANREOTIDE ACETATE 120 MG/.5ML
120 INJECTION SUBCUTANEOUS
Status: CANCELLED | OUTPATIENT
Start: 2024-06-19 | End: 2024-06-19

## 2024-06-20 ENCOUNTER — INFUSION (OUTPATIENT)
Dept: INFUSION THERAPY | Facility: HOSPITAL | Age: 79
End: 2024-06-20
Attending: INTERNAL MEDICINE
Payer: MEDICARE

## 2024-06-20 VITALS
TEMPERATURE: 98 F | WEIGHT: 174 LBS | OXYGEN SATURATION: 95 % | HEART RATE: 67 BPM | SYSTOLIC BLOOD PRESSURE: 129 MMHG | RESPIRATION RATE: 16 BRPM | HEIGHT: 74 IN | BODY MASS INDEX: 22.33 KG/M2 | DIASTOLIC BLOOD PRESSURE: 65 MMHG

## 2024-06-20 DIAGNOSIS — C7B.8 NEUROENDOCRINE CARCINOMA METASTATIC TO INTRA-ABDOMINAL LYMPH NODE: Primary | ICD-10-CM

## 2024-06-20 DIAGNOSIS — C7A.8 NEUROENDOCRINE CARCINOMA METASTATIC TO INTRA-ABDOMINAL LYMPH NODE: Primary | ICD-10-CM

## 2024-06-20 PROCEDURE — 63600175 PHARM REV CODE 636 W HCPCS: Mod: JZ,JG | Performed by: INTERNAL MEDICINE

## 2024-06-20 PROCEDURE — 96372 THER/PROPH/DIAG INJ SC/IM: CPT

## 2024-06-20 RX ORDER — LANREOTIDE ACETATE 120 MG/.5ML
120 INJECTION SUBCUTANEOUS
Status: COMPLETED | OUTPATIENT
Start: 2024-06-20 | End: 2024-06-20

## 2024-06-20 RX ADMIN — LANREOTIDE ACETATE 120 MG: 120 INJECTION SUBCUTANEOUS at 02:06

## 2024-06-20 NOTE — PLAN OF CARE
Problem: Activity Intolerance  Goal: Enhanced Capacity and Energy  Outcome: Progressing  Intervention: Optimize Activity Tolerance  Flowsheets (Taken 6/20/2024 1348)  Self-Care Promotion: independence encouraged  Activity Management: Ambulated -L4  Environmental Support: calm environment promoted

## 2024-07-18 ENCOUNTER — INFUSION (OUTPATIENT)
Dept: INFUSION THERAPY | Facility: HOSPITAL | Age: 79
End: 2024-07-18
Attending: INTERNAL MEDICINE
Payer: MEDICARE

## 2024-07-18 VITALS
WEIGHT: 172.19 LBS | SYSTOLIC BLOOD PRESSURE: 116 MMHG | RESPIRATION RATE: 16 BRPM | HEART RATE: 70 BPM | BODY MASS INDEX: 22.1 KG/M2 | TEMPERATURE: 97 F | DIASTOLIC BLOOD PRESSURE: 56 MMHG | OXYGEN SATURATION: 96 % | HEIGHT: 74 IN

## 2024-07-18 DIAGNOSIS — C7B.8 NEUROENDOCRINE CARCINOMA METASTATIC TO INTRA-ABDOMINAL LYMPH NODE: Primary | ICD-10-CM

## 2024-07-18 DIAGNOSIS — C7A.8 NEUROENDOCRINE CARCINOMA METASTATIC TO INTRA-ABDOMINAL LYMPH NODE: Primary | ICD-10-CM

## 2024-07-18 PROCEDURE — 63600175 PHARM REV CODE 636 W HCPCS: Mod: JZ,JG | Performed by: NURSE PRACTITIONER

## 2024-07-18 PROCEDURE — 96372 THER/PROPH/DIAG INJ SC/IM: CPT

## 2024-07-18 RX ORDER — LANREOTIDE ACETATE 120 MG/.5ML
120 INJECTION SUBCUTANEOUS
Status: COMPLETED | OUTPATIENT
Start: 2024-07-18 | End: 2024-07-18

## 2024-07-18 RX ADMIN — LANREOTIDE ACETATE 120 MG: 120 INJECTION SUBCUTANEOUS at 02:07

## 2024-07-18 NOTE — PLAN OF CARE
Problem: Fall Injury Risk  Goal: Absence of Fall and Fall-Related Injury  Outcome: Progressing  Intervention: Promote Injury-Free Environment  Flowsheets (Taken 7/18/2024 7252)  Safety Promotion/Fall Prevention:   assistive device/personal item within reach   instructed to call staff for mobility   in recliner, wheels locked   Fall Risk reviewed with patient/family

## 2024-07-24 ENCOUNTER — HOSPITAL ENCOUNTER (OUTPATIENT)
Dept: RADIOLOGY | Facility: HOSPITAL | Age: 79
Discharge: HOME | End: 2024-07-24
Attending: ORTHOPAEDIC SURGERY
Payer: MEDICARE

## 2024-07-24 ENCOUNTER — OFFICE VISIT (OUTPATIENT)
Dept: ORTHOPEDICS | Facility: CLINIC | Age: 79
End: 2024-07-24
Payer: MEDICARE

## 2024-07-24 VITALS
HEIGHT: 74 IN | DIASTOLIC BLOOD PRESSURE: 60 MMHG | SYSTOLIC BLOOD PRESSURE: 120 MMHG | BODY MASS INDEX: 22.1 KG/M2 | WEIGHT: 172.19 LBS

## 2024-07-24 DIAGNOSIS — M50.30 DDD (DEGENERATIVE DISC DISEASE), CERVICAL: ICD-10-CM

## 2024-07-24 DIAGNOSIS — M47.816 LUMBAR FACET ARTHROPATHY: ICD-10-CM

## 2024-07-24 DIAGNOSIS — M47.812 FACET ARTHRITIS OF CERVICAL REGION: Primary | ICD-10-CM

## 2024-07-24 DIAGNOSIS — M51.36 DDD (DEGENERATIVE DISC DISEASE), LUMBAR: ICD-10-CM

## 2024-07-24 PROCEDURE — 72040 X-RAY EXAM NECK SPINE 2-3 VW: CPT | Mod: TC,PN

## 2024-07-24 PROCEDURE — 99999 PR PBB SHADOW E&M-EST. PATIENT-LVL IV: CPT | Mod: PBBFAC,,, | Performed by: ORTHOPAEDIC SURGERY

## 2024-07-24 PROCEDURE — 72040 X-RAY EXAM NECK SPINE 2-3 VW: CPT | Mod: 26,,, | Performed by: RADIOLOGY

## 2024-07-24 RX ORDER — QUETIAPINE FUMARATE 50 MG/1
TABLET, FILM COATED ORAL
COMMUNITY
Start: 2024-07-07

## 2024-07-24 RX ORDER — ESCITALOPRAM OXALATE 10 MG/1
10 TABLET ORAL
COMMUNITY
Start: 2024-07-07

## 2024-07-24 RX ORDER — MEMANTINE HYDROCHLORIDE 5 MG/1
5 TABLET ORAL 2 TIMES DAILY
COMMUNITY
Start: 2024-07-07

## 2024-07-24 RX ORDER — ARIPIPRAZOLE 5 MG/1
5 TABLET ORAL
COMMUNITY
Start: 2024-07-07

## 2024-07-24 NOTE — PROGRESS NOTES
Subjective:       Patient ID: Roosevelt Alejandro is a 78 y.o. male.    Chief Complaint: Pain of the Lumbar Spine (Patient is here for a f/up on Lumbar pain, states his pain is a little better. Interested in some PT. /Neck pain off/on since February Ibuprofen seems to help with his pain) and Pain of the Neck      History of Present Illness    Prior to meeting with the patient I reviewed the medical chart in [facility]. This included reviewing the previous progress notes from our office, review of the patient's last appointment with their primary care provider, review of any visits to the emergency room, and review of any pain management appointments or procedures.   For of low back pain and new evaluation for complaints of neck pain both symptoms started in February appear to be responding to ibuprofen    Current Medications  Current Outpatient Medications   Medication Sig Dispense Refill    ARIPiprazole (ABILIFY) 5 MG Tab Take 5 mg by mouth.      atorvastatin (LIPITOR) 20 MG tablet TAKE 1 TABLET EVERY DAY (NEED MD APPOINTMENT) 90 tablet 3    cyanocobalamin 1,000 mcg/mL injection Inject 1 mL (1,000 mcg total) into the muscle every 28 days. 1 mL 11    donepeziL (ARICEPT) 5 MG tablet Take 1 tablet (5 mg total) by mouth every evening. 30 tablet 4    empagliflozin (JARDIANCE) 10 mg tablet TAKE 1 TABLET (10 MG TOTAL) BY MOUTH ONCE DAILY. 90 tablet 2    EScitalopram oxalate (LEXAPRO) 10 MG tablet Take 10 mg by mouth.      FOLIC ACID/MULTIVIT-MIN/LUTEIN (CENTRUM SILVER ORAL) Take 1 tablet by mouth once daily.       memantine (NAMENDA) 5 MG Tab Take 5 mg by mouth 2 (two) times daily.      metoprolol succinate (TOPROL-XL) 25 MG 24 hr tablet TAKE 1 TABLET EVERY DAY (NEED MD APPOINTMENT) 90 tablet 3    NIFEdipine (PROCARDIA-XL) 30 MG (OSM) 24 hr tablet TAKE 1 TABLET EVERY EVENING 90 tablet 3    pantoprazole (PROTONIX) 40 MG tablet Take 1 tablet (40 mg total) by mouth once daily. 30 tablet 11    QUEtiapine (SEROQUEL) 50 MG tablet TAKE 1  "TABLET BY MOUTH EVERY DAY AT BEDTIME FOR 30 DAYS      syringe with needle 1 mL 25 gauge x 1" Syrg 1 each by Misc.(Non-Drug; Combo Route) route every 28 days. 12 each 1     No current facility-administered medications for this visit.       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    Secondary neuroendocrine tumor of liver     Vertigo, aural, unspecified laterality 03/06/2019       Surgical History  Past Surgical History:   Procedure Laterality Date    CHOLECYSTECTOMY  11/4/2019    Procedure: CHOLECYSTECTOMY;  Surgeon: SP Khalil MD;  Location: [facility];  Service: General;;    COLONOSCOPY      2009    COLONOSCOPY N/A 1/21/2016    Procedure: COLONOSCOPY;  Surgeon: Toby Maciel MD;  Location: [facility];  Service: Endoscopy;  Laterality: N/A;    COLONOSCOPY N/A 1/25/2019    Procedure: COLONOSCOPY;  Surgeon: Toby Maciel MD;  Location: [facility];  Service: Endoscopy;  Laterality: N/A;    COLONOSCOPY N/A 5/17/2023    Procedure: COLONOSCOPY;  Surgeon: Toby Maciel MD;  Location: [facility];  Service: Endoscopy;  Laterality: N/A;  lm wife/ppm    ENDOSCOPIC ULTRASOUND OF UPPER GASTROINTESTINAL TRACT N/A 8/20/2019    Procedure: ULTRASOUND, UPPER GI TRACT, ENDOSCOPIC;  Surgeon: Forest Lucio III, MD;  Location: [facility];  Service: Endoscopy;  Laterality: N/A;    EYE SURGERY Bilateral     cataracts, retinal detachment    HYPERTHERMIC INTRAPERITONEAL CHEMOTHERAPY (HIPEC)  11/4/2019    Procedure: " CHEMOTHERAPY, INTRAPERITONEAL, HYPERTHERMIC;  Surgeon: SP Khalil MD;  Location: [facility] OR;  Service: General;;    LIVER BIOPSY  2019    Procedure: BIOPSY, LIVER;  Surgeon: SP Khalil MD;  Location: [facility] OR;  Service: General;;  BIOPSY LIVER WEDGE X3    PROSTATE BIOPSY      PROSTATECTOMY  2017    RENAL EXPLORATION  2019    Procedure: EXPLORATION, KIDNEY;  Surgeon: SP Khalil MD;  Location: [facility] OR;  Service: General;;    ROTATOR CUFF REPAIR      left    SURGICAL REMOVAL OF LYMPH NODE  2019    Procedure: EXCISION, LYMPH NODE;  Surgeon: SP Khalil MD;  Location: [facility] OR;  Service: General;;  EXTENSIVE RETROPERITONEAL NODE DISSECTION  EXPLORATION OF MESENTERIC ARTERY/VEIN       Family History:   Family History   Problem Relation Name Age of Onset    Cancer Father          lung/ asbestos    Asbestos Father      Lung cancer Father      No Known Problems Daughter      Melanoma Neg Hx      Psoriasis Neg Hx      Lupus Neg Hx      Eczema Neg Hx         Social History:   Social History     Socioeconomic History    Marital status:    Tobacco Use    Smoking status: Former     Current packs/day: 0.00     Types: Cigarettes     Quit date: 3/30/2000     Years since quittin.3     Passive exposure: Past    Smokeless tobacco: Never   Substance and Sexual Activity    Alcohol use: Not Currently     Alcohol/week: 1.0 standard drink of alcohol     Types: 1 Cans of beer per week     Comment: rare    Drug use: No    Sexual activity: Yes     Partners: Female       Hospitalization/Major Diagnostic Procedure:     Review of Systems     General/Constitutional:  Chills denies. Fatigue denies. Fever denies. Weight gain denies. Weight loss denies.    Respiratory:  Shortness of breath denies.    Cardiovascular:  Chest pain denies.    Gastrointestinal:  Constipation denies. Diarrhea denies. Nausea denies. Vomiting denies.     Hematology:  Easy bruising denies. Prolonged bleeding  denies.     Genitourinary:  Frequent urination denies. Pain in lower back denies. Painful urination denies.     Musculoskeletal:  See HPI for details    Skin:  Rash denies.    Neurologic:  Dizziness denies. Gait abnormalities denies. Seizures denies. Tingling/Numbess denies.    Psychiatric:  Anxiety denies. Depressed mood denies.     Objective:   Vital Signs:   Vitals:    07/24/24 1252   BP: 120/60        Physical Exam      General Examination:     Constitutional: The patient is alert and oriented to lace person and time. Mood is pleasant.     Head/Face: Normal facial features normal eyebrows    Eyes: Normal extraocular motion bilaterally    Lungs: Respirations are equal and unlabored    Gait is coordinated.    Cardiovascular: There are no swelling or varicosities present.    Lymphatic: Negative for adenopathy    Skin: Normal    Neurological: Level of consciousness normal. Oriented to place person and time and situation    Psychiatric: Oriented to time place person and situation    Patient ambulates with a very show key slow gait but no instability noted lumbar exam shows some mild tenderness without spasm at the lumbosacral junction straight leg raising maneuvers hip knee range of motion normal cervical exam nontender mild restriction motion Spurling's maneuver causes neck pain no  XRAY Report/ Interpretation:  AP and lateral x-rays of the cervical spine were performed today. Indications neck pain. Findings:  Multilevel facet joint degenerative changes no acute changes  Prior lumbar x-rays with performed July were reviewed spondylosis degenerative changes lower lumbar spine no acute changes      Assessment:       1. Facet arthritis of cervical region    2. DDD (degenerative disc disease), cervical    3. Lumbar facet arthropathy    4. DDD (degenerative disc disease), lumbar        Plan:       Max was seen today for pain and pain.    Diagnoses and all orders for this visit:    Facet arthritis of cervical region  -      Ambulatory referral/consult to Physical/Occupational Therapy; Future    DDD (degenerative disc disease), cervical  -     X-Ray Cervical Spine AP And Lateral    Lumbar facet arthropathy  -     Ambulatory referral/consult to Physical/Occupational Therapy; Future    DDD (degenerative disc disease), lumbar         Follow up for 6 week f/u - lumbar & cervical pain, PT f/u.    Patient referred for outpatient physical therapy for rehabilitation exercise program return 6 we  Treatment options were discussed with regards to the nature of the medical condition. Conservative pain intervention and surgical options were discussed in detail. The probability of success of each separate treatment option was discussed. The patient expressed a clear understanding of the treatment options. With regards to surgery, the procedure risk, benefits, complications, and outcomes were discussed. No guarantees were given with regards to surgical outcome.   The risk of complications, morbidity, and mortality of patient management decisions have been made at the time of this visit. These are associated with the patient's problems, diagnostic procedures and treatment options. This includes the possible management options selected and those considered but not selected by the patient after shared medical decision making we discussed with the patient.     This note was created using Dragon voice recognition software that occasionally misinterpreted phrases or words.

## 2024-08-06 ENCOUNTER — CLINICAL SUPPORT (OUTPATIENT)
Dept: REHABILITATION | Facility: HOSPITAL | Age: 79
End: 2024-08-06
Payer: MEDICARE

## 2024-08-06 DIAGNOSIS — M47.812 FACET ARTHRITIS OF CERVICAL REGION: ICD-10-CM

## 2024-08-06 DIAGNOSIS — M47.816 LUMBAR FACET ARTHROPATHY: ICD-10-CM

## 2024-08-06 PROCEDURE — 97162 PT EVAL MOD COMPLEX 30 MIN: CPT | Mod: PO

## 2024-08-06 PROCEDURE — 97112 NEUROMUSCULAR REEDUCATION: CPT | Mod: PO

## 2024-08-13 ENCOUNTER — CLINICAL SUPPORT (OUTPATIENT)
Dept: REHABILITATION | Facility: HOSPITAL | Age: 79
End: 2024-08-13
Payer: MEDICARE

## 2024-08-13 DIAGNOSIS — M51.36 DDD (DEGENERATIVE DISC DISEASE), LUMBAR: Primary | ICD-10-CM

## 2024-08-13 PROCEDURE — 97112 NEUROMUSCULAR REEDUCATION: CPT | Mod: PO

## 2024-08-13 PROCEDURE — 97012 MECHANICAL TRACTION THERAPY: CPT | Mod: PO

## 2024-08-13 NOTE — PROGRESS NOTES
[facility]   Physical Therapy Treatment Note      Name: Roosevelt Alejandro  Clinic Number: [id number]    Therapy Diagnosis:   Encounter Diagnosis   Name Primary?    DDD (degenerative disc disease), lumbar Yes     Physician: Angelo Alcazar MD    Visit Date: 8/13/2024    Physician Orders: PT Eval and Treat   Medical Diagnosis from Referral: Facet arthritis of cervical region.  Lumbar facet arthropathy.  Evaluation Date: 8/6/2024  Authorization Period Expiration: 7/24/24  Plan of Care Expiration: 10/11/24  Progress Note Due: 9/6/24  Date of Surgery: NA  Visit # / Visits authorized: 1/ 1   FOTO: 1/ 3 Not performed due to cognitive deficits.     Precautions: Standard, Fall, and cancer, dementia.      Time In: 0955  Time Out: 1040  Total Billable Time: 40 minutes     PTA Visit #: 0/5       Subjective     Patient reports: No new c/o..  He was compliant with home exercise program.  Response to previous treatment: first visit following eval.  Functional change: no    Pain: 5/10  Location: bilateral upper back      Objective      Objective Measures updated at progress report unless specified.     Treatment     Max received the treatments listed below:      therapeutic exercises to develop strength, endurance, ROM, flexibility, posture, and core stabilization for 0 minutes including:      manual therapy techniques: dry needling were applied to the:  for 0 minutes, including:      neuromuscular re-education activities to improve: Balance, Coordination, Kinesthetic, Sense, Proprioception, and Posture for 25 minutes. The following activities were included:  Nustep 11' L4, UBE 5/6'  ROM c/spine   LR/RR 10  SBL/RT 10  Chin tucke 10    therapeutic activities to improve functional performance for 0  minutes, including:      gait training to improve functional mobility and safety for 0  minutes, including:      direct contact modalities after being cleared for contraindications:     supervised modalities after  being cleared for contradictions: TENS:  Max received TENS electrical stimulation for pain to the . Pt received continuous mode at a rate of 2 pps for 0 minutes. Max tolerated treatment well without any adverse effects.   Mechanical Traction:  Max received intermittent mechanical traction to the cervical spine at a force of 17/12 pounds for a total of 15 minutes. Hold time of 40s and rest time for 10s. Patient tolerated treatment well without any adverse effects.      Patient Education and Home Exercises       Education provided:   - Posture ed.    Written Home Exercises Provided:  to be provided .     Assessment     Relief following TX.    Max Is progressing well towards his goals.   Patient prognosis is Good.     Patient will continue to benefit from skilled outpatient physical therapy to address the deficits listed in the problem list box on initial evaluation, provide pt/family education and to maximize pt's level of independence in the home and community environment.     Patient's spiritual, cultural and educational needs considered and pt agreeable to plan of care and goals.     Anticipated barriers to physical therapy: NO    Goals:   SHORT TERM GOALS:  3 weeks  Progress Date met   Recent signs and systems trend is improving in order to progress towards Long term goals.  [] Met  [] Not Met  [] Progressing     Patient will be independent with Home Exercise Program  in order to further progress and return to maximal function. [] Met  [] Not Met  [] Progressing     Pain rating at Worst: 5 /10 in order to progress towards increased independence with activity. [] Met  [] Not Met  [] Progressing     Patient will be able to correct postural deviations in sitting and standing, to decrease pain and promote postural awareness for injury prevention.  [] Met  [] Not Met  [] Progressing     Patient will improve functional outcome (FOTO) score: by 5% to increase self-worth & perceived functional ability towards long term  goals [] Met  [] Not Met  [] Progressing        LONG TERM GOALS: 6 weeks  Progress Date met   Patient will return to normal activites of daily living, recreational, and work related activities with less pain and limitation.  [] Met  [] Not Met  [] Progressing     Patient will improve range of motion  to stated goals in order to return to maximal functional potential. ROM of c/spine and l/spine WNL/WFL in all planes. [] Met  [] Not Met  [] Progressing     Patient will improve Strength to stated goals of appropriate musculature in order to improve functional independence. Strength of c/s and l/s > 4/5 in all planes. [] Met  [] Not Met  [] Progressing     Pain Rating at Best: 2/10 to improve Quality of Life.  [] Met  [] Not Met  [] Progressing     Patient will meet predicted functional outcome (FOTO) score: NA% to increase self-worth & perceived functional ability. [] Met  [] Not Met  [] Progressing     Patient will have met/partially met personal goal of: reduce pain, improve function. [] Met  [] Not Met  [] Progressing           Plan     Progress as able.    Khadar Valdivia, PT

## 2024-08-14 ENCOUNTER — HOSPITAL ENCOUNTER (EMERGENCY)
Facility: HOSPITAL | Age: 79
Discharge: HOME | End: 2024-08-14
Attending: STUDENT IN AN ORGANIZED HEALTH CARE EDUCATION/TRAINING PROGRAM
Payer: MEDICARE

## 2024-08-14 VITALS
HEART RATE: 57 BPM | SYSTOLIC BLOOD PRESSURE: 118 MMHG | RESPIRATION RATE: 18 BRPM | BODY MASS INDEX: 21.83 KG/M2 | WEIGHT: 170 LBS | DIASTOLIC BLOOD PRESSURE: 57 MMHG | TEMPERATURE: 98 F | OXYGEN SATURATION: 94 %

## 2024-08-14 DIAGNOSIS — R33.8 ACUTE URINARY RETENTION: ICD-10-CM

## 2024-08-14 DIAGNOSIS — R10.31 RIGHT GROIN PAIN: Primary | ICD-10-CM

## 2024-08-14 LAB
ALBUMIN SERPL BCP-MCNC: 3.8 G/DL (ref 3.5–5.2)
ALP SERPL-CCNC: 50 U/L (ref 55–135)
ALT SERPL W/O P-5'-P-CCNC: 16 U/L (ref 10–44)
ANION GAP SERPL CALC-SCNC: 6 MMOL/L (ref 8–16)
AST SERPL-CCNC: 14 U/L (ref 10–40)
BACTERIA #/AREA URNS HPF: NORMAL /HPF
BASOPHILS # BLD AUTO: 0.08 K/UL (ref 0–0.2)
BASOPHILS NFR BLD: 1.7 % (ref 0–1.9)
BILIRUB SERPL-MCNC: 0.5 MG/DL (ref 0.1–1)
BILIRUB UR QL STRIP: NEGATIVE
BUN SERPL-MCNC: 19 MG/DL (ref 8–23)
CALCIUM SERPL-MCNC: 9 MG/DL (ref 8.7–10.5)
CHLORIDE SERPL-SCNC: 106 MMOL/L (ref 95–110)
CLARITY UR: CLEAR
CO2 SERPL-SCNC: 27 MMOL/L (ref 23–29)
COLOR UR: YELLOW
CREAT SERPL-MCNC: 1.2 MG/DL (ref 0.5–1.4)
DIFFERENTIAL METHOD BLD: ABNORMAL
EOSINOPHIL # BLD AUTO: 0.2 K/UL (ref 0–0.5)
EOSINOPHIL NFR BLD: 4.6 % (ref 0–8)
ERYTHROCYTE [DISTWIDTH] IN BLOOD BY AUTOMATED COUNT: 14 % (ref 11.5–14.5)
EST. GFR  (NO RACE VARIABLE): >60 ML/MIN/1.73 M^2
GLUCOSE SERPL-MCNC: 172 MG/DL (ref 70–110)
GLUCOSE UR QL STRIP: ABNORMAL
HCT VFR BLD AUTO: 30.2 % (ref 40–54)
HGB BLD-MCNC: 10 G/DL (ref 14–18)
HGB UR QL STRIP: NEGATIVE
IMM GRANULOCYTES # BLD AUTO: 0.15 K/UL (ref 0–0.04)
IMM GRANULOCYTES NFR BLD AUTO: 3.1 % (ref 0–0.5)
KETONES UR QL STRIP: NEGATIVE
LEUKOCYTE ESTERASE UR QL STRIP: NEGATIVE
LIPASE SERPL-CCNC: 39 U/L (ref 4–60)
LYMPHOCYTES # BLD AUTO: 0.8 K/UL (ref 1–4.8)
LYMPHOCYTES NFR BLD: 16.8 % (ref 18–48)
MCH RBC QN AUTO: 36.8 PG (ref 27–31)
MCHC RBC AUTO-ENTMCNC: 33.1 G/DL (ref 32–36)
MCV RBC AUTO: 111 FL (ref 82–98)
MICROSCOPIC COMMENT: NORMAL
MONOCYTES # BLD AUTO: 0.4 K/UL (ref 0.3–1)
MONOCYTES NFR BLD: 7.9 % (ref 4–15)
NEUTROPHILS # BLD AUTO: 3.2 K/UL (ref 1.8–7.7)
NEUTROPHILS NFR BLD: 65.9 % (ref 38–73)
NITRITE UR QL STRIP: NEGATIVE
NRBC BLD-RTO: 0 /100 WBC
PH UR STRIP: 6 [PH] (ref 5–8)
PLATELET # BLD AUTO: 238 K/UL (ref 150–450)
PMV BLD AUTO: 13.5 FL (ref 9.2–12.9)
POTASSIUM SERPL-SCNC: 3.3 MMOL/L (ref 3.5–5.1)
PROT SERPL-MCNC: 6.4 G/DL (ref 6–8.4)
PROT UR QL STRIP: NEGATIVE
RBC # BLD AUTO: 2.72 M/UL (ref 4.6–6.2)
SODIUM SERPL-SCNC: 139 MMOL/L (ref 136–145)
SP GR UR STRIP: >1.03 (ref 1–1.03)
URN SPEC COLLECT METH UR: ABNORMAL
UROBILINOGEN UR STRIP-ACNC: NEGATIVE EU/DL
WBC # BLD AUTO: 4.82 K/UL (ref 3.9–12.7)
YEAST URNS QL MICRO: NORMAL

## 2024-08-14 PROCEDURE — 85025 COMPLETE CBC W/AUTO DIFF WBC: CPT

## 2024-08-14 PROCEDURE — 99285 EMERGENCY DEPT VISIT HI MDM: CPT | Mod: 25

## 2024-08-14 PROCEDURE — 25500020 PHARM REV CODE 255

## 2024-08-14 PROCEDURE — 83690 ASSAY OF LIPASE: CPT

## 2024-08-14 PROCEDURE — 25000003 PHARM REV CODE 250

## 2024-08-14 PROCEDURE — 80053 COMPREHEN METABOLIC PANEL: CPT

## 2024-08-14 PROCEDURE — 81001 URINALYSIS AUTO W/SCOPE: CPT

## 2024-08-14 RX ORDER — POTASSIUM CHLORIDE 20 MEQ/1
20 TABLET, EXTENDED RELEASE ORAL ONCE
Status: COMPLETED | OUTPATIENT
Start: 2024-08-14 | End: 2024-08-14

## 2024-08-14 RX ORDER — ACETAMINOPHEN 325 MG/1
650 TABLET ORAL
Status: COMPLETED | OUTPATIENT
Start: 2024-08-14 | End: 2024-08-14

## 2024-08-14 RX ORDER — TAMSULOSIN HYDROCHLORIDE 0.4 MG/1
0.4 CAPSULE ORAL DAILY
Qty: 10 CAPSULE | Refills: 0 | Status: SHIPPED | OUTPATIENT
Start: 2024-08-14 | End: 2024-08-24

## 2024-08-14 RX ORDER — EMPAGLIFLOZIN 10 MG/1
10 TABLET, FILM COATED ORAL
Qty: 90 TABLET | Refills: 3 | Status: SHIPPED | OUTPATIENT
Start: 2024-08-14

## 2024-08-14 RX ADMIN — POTASSIUM CHLORIDE 20 MEQ: 1500 TABLET, EXTENDED RELEASE ORAL at 01:08

## 2024-08-14 RX ADMIN — IOHEXOL 100 ML: 350 INJECTION, SOLUTION INTRAVENOUS at 11:08

## 2024-08-14 RX ADMIN — ACETAMINOPHEN 650 MG: 325 TABLET ORAL at 11:08

## 2024-08-14 NOTE — TELEPHONE ENCOUNTER
No care due was identified.  [facility] Embedded Care Due Messages. Reference number: [id number].   8/14/2024 12:39:52 PM CDT

## 2024-08-14 NOTE — ED NOTES
PT was brought to help with urination. Glendy is still at bedside. Bed in lowest and locked position with rails up x2. Pt denies needs at this time.

## 2024-08-14 NOTE — ED NOTES
Pt resting comfortably in bed, bed in lowest and locked position with rails up x2. Pt  daughter at bedside. Pt denies any needs at this time.

## 2024-08-14 NOTE — DISCHARGE INSTRUCTIONS
Patient can take Tylenol as needed for pain.  Please follow up with Urology and GI as soon as possible for further evaluation rechecked.  You can give patient Flomax once a day to help him empty his bladder.    Please return to the ED for worsening abdominal pain, swelling in his groin, fever, testicular pain or swelling, or any new or worsening concerns.

## 2024-08-14 NOTE — ED PROVIDER NOTES
Encounter Date: 8/14/2024       History     Chief Complaint   Patient presents with    Abdominal Pain     Right groin/testicular edema x 2 days. Hx bowel resection.     Patient is a 78 y.o. male with PMH dementia, neuroendocrine tumor, hypertension who presents to ED via family for concern for right sided groin pain which began 2 day(s) ago.  Patient reports right groin pain for the last 2 days.  Patient denies testicular pain or swelling.  Patient reports he thinks his right groin has been swollen.  Patient's daughter reports that she just found out about the pain today and brought patient to the ED. Patient denies chest pain or shortness breath.  Patient was denies cough, fever, vomiting, or diarrhea.  Patient denies dysuria or difficulty urinating.  Patient recently diagnosed with dementia and placed on new medication he was seeing Neurology.  Patient was daughter reports that recently patient has a sensation of seeing somebody in the house that was not there..  Patient is awake and alert and oriented for self.  Patient is in no acute distress.         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    Secondary neuroendocrine tumor of liver     Vertigo, aural, unspecified laterality 03/06/2019     Past Surgical History:   Procedure Laterality Date    CHOLECYSTECTOMY  11/4/2019    Procedure: CHOLECYSTECTOMY;  Surgeon: SP Khalil MD;  Location: [facility] OR;  Service: General;;    COLONOSCOPY      2009    COLONOSCOPY N/A 1/21/2016    Procedure:  COLONOSCOPY;  Surgeon: Toby Maciel MD;  Location: [facility] ENDO;  Service: Endoscopy;  Laterality: N/A;    COLONOSCOPY N/A 2019    Procedure: COLONOSCOPY;  Surgeon: Toby Maciel MD;  Location: [facility] ENDO;  Service: Endoscopy;  Laterality: N/A;    COLONOSCOPY N/A 2023    Procedure: COLONOSCOPY;  Surgeon: Toby Maciel MD;  Location: [facility] ENDO;  Service: Endoscopy;  Laterality: N/A;  lm wife/ppm    ENDOSCOPIC ULTRASOUND OF UPPER GASTROINTESTINAL TRACT N/A 2019    Procedure: ULTRASOUND, UPPER GI TRACT, ENDOSCOPIC;  Surgeon: Forest Lucio III, MD;  Location: [facility] ENDO;  Service: Endoscopy;  Laterality: N/A;    EYE SURGERY Bilateral     cataracts, retinal detachment    HYPERTHERMIC INTRAPERITONEAL CHEMOTHERAPY (HIPEC)  2019    Procedure: CHEMOTHERAPY, INTRAPERITONEAL, HYPERTHERMIC;  Surgeon: SP Khalil MD;  Location: [facility];  Service: General;;    LIVER BIOPSY  2019    Procedure: BIOPSY, LIVER;  Surgeon: SP Khalil MD;  Location: [facility] OR;  Service: General;;  BIOPSY LIVER WEDGE X3    PROSTATE BIOPSY      PROSTATECTOMY  2017    RENAL EXPLORATION  2019    Procedure: EXPLORATION, KIDNEY;  Surgeon: SP Khalil MD;  Location: [facility] OR;  Service: General;;    ROTATOR CUFF REPAIR      left    SURGICAL REMOVAL OF LYMPH NODE  2019    Procedure: EXCISION, LYMPH NODE;  Surgeon: SP Khalil MD;  Location: [facility] OR;  Service: General;;  EXTENSIVE RETROPERITONEAL NODE DISSECTION  EXPLORATION OF MESENTERIC ARTERY/VEIN     Family History   Problem Relation Name Age of Onset    Cancer Father          lung/ asbestos    Asbestos Father      Lung cancer Father      No Known Problems Daughter      Melanoma Neg Hx      Psoriasis Neg Hx      Lupus Neg Hx      Eczema Neg Hx       Social History     Tobacco Use    Smoking status: Former     Current packs/day: 0.00     Types: Cigarettes     Quit date: 3/30/2000     Years since quittin.3     Passive  exposure: Past    Smokeless tobacco: Never   Substance Use Topics    Alcohol use: Not Currently     Alcohol/week: 1.0 standard drink of alcohol     Types: 1 Cans of beer per week     Comment: rare    Drug use: No     Review of Systems   Constitutional: Negative.  Negative for fever.   HENT: Negative.  Negative for sore throat, trouble swallowing and voice change.    Respiratory: Negative.  Negative for shortness of breath.    Cardiovascular: Negative.  Negative for chest pain.   Gastrointestinal:  Positive for abdominal pain. Negative for abdominal distention, blood in stool, diarrhea, nausea and vomiting.   Genitourinary: Negative.  Negative for decreased urine volume, dysuria, penile pain, penile swelling, scrotal swelling and testicular pain.   Musculoskeletal:  Negative for back pain.   Skin:  Negative for color change, pallor and rash.   Neurological: Negative.  Negative for weakness.   Hematological:  Does not bruise/bleed easily.   Psychiatric/Behavioral:  Positive for hallucinations. Negative for agitation, behavioral problems and confusion.        Physical Exam     Initial Vitals [08/14/24 0933]   BP Pulse Resp Temp SpO2   116/63 78 18 98 °F (36.7 °C) 97 %      MAP       --         Physical Exam    Nursing note and vitals reviewed.  Constitutional: He appears well-developed and well-nourished. He is not diaphoretic. No distress.   HENT:   Head: Normocephalic and atraumatic.   Right Ear: External ear normal.   Left Ear: External ear normal.   Eyes: EOM are normal.   Neck:   Normal range of motion.  Cardiovascular:  Normal rate, regular rhythm, normal heart sounds and intact distal pulses.     Exam reveals no gallop and no friction rub.       No murmur heard.  Pulmonary/Chest: Breath sounds normal. No respiratory distress. He has no wheezes. He has no rhonchi. He has no rales. He exhibits no tenderness.   Abdominal: Abdomen is soft and protuberant. Bowel sounds are normal. He exhibits no distension. There is  abdominal tenderness in the right lower quadrant. A hernia is present. Hernia confirmed positive in the right inguinal area. There is no rebound and no guarding.   Genitourinary:    Testes and penis normal.   Right testis shows no mass, no swelling and no tenderness. Left testis shows no mass, no swelling and no tenderness.    Genitourinary Comments:  exam performed with DAVID Benedict at bedside     Musculoskeletal:         General: Normal range of motion.      Cervical back: Normal range of motion.     Neurological: He is alert and oriented to person, place, and time. He has normal strength. GCS score is 15. GCS eye subscore is 4. GCS verbal subscore is 5. GCS motor subscore is 6.   Skin: Skin is warm and dry. Capillary refill takes less than 2 seconds.   Psychiatric: He has a normal mood and affect. His behavior is normal. Judgment and thought content normal.         ED Course   Procedures  Labs Reviewed   CBC W/ AUTO DIFFERENTIAL - Abnormal       Result Value    WBC 4.82      RBC 2.72 (*)     Hemoglobin 10.0 (*)     Hematocrit 30.2 (*)      (*)     MCH 36.8 (*)     MCHC 33.1      RDW 14.0      Platelets 238      MPV 13.5 (*)     Immature Granulocytes 3.1 (*)     Gran # (ANC) 3.2      Immature Grans (Abs) 0.15 (*)     Lymph # 0.8 (*)     Mono # 0.4      Eos # 0.2      Baso # 0.08      nRBC 0      Gran % 65.9      Lymph % 16.8 (*)     Mono % 7.9      Eosinophil % 4.6      Basophil % 1.7      Differential Method Automated     COMPREHENSIVE METABOLIC PANEL - Abnormal    Sodium 139      Potassium 3.3 (*)     Chloride 106      CO2 27      Glucose 172 (*)     BUN 19      Creatinine 1.2      Calcium 9.0      Total Protein 6.4      Albumin 3.8      Total Bilirubin 0.5      Alkaline Phosphatase 50 (*)     AST 14      ALT 16      eGFR >60.0      Anion Gap 6 (*)    URINALYSIS, REFLEX TO URINE CULTURE - Abnormal    Specimen UA Urine, Clean Catch      Color, UA Yellow      Appearance, UA Clear      pH, UA 6.0      Specific  Gravity, UA >1.030 (*)     Protein, UA Negative      Glucose, UA 4+ (*)     Ketones, UA Negative      Bilirubin (UA) Negative      Occult Blood UA Negative      Nitrite, UA Negative      Urobilinogen, UA Negative      Leukocytes, UA Negative      Narrative:     Specimen Source->Urine   LIPASE    Lipase 39     URINALYSIS MICROSCOPIC    Bacteria None      Yeast, UA None      Microscopic Comment SEE COMMENT      Narrative:     Specimen Source->Urine          Imaging Results              US Scrotum And Testicles (Final result)  Result time 08/14/24 12:05:16      Final result by Geraldo Tello MD (08/14/24 12:05:16)                   Impression:      Moderate to large bilateral hydroceles, with septations within the left hydrocele.    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      Electronically signed by: Geraldo Tello  Date:    08/14/2024  Time:    12:05               Narrative:    EXAMINATION:  US SCROTUM AND TESTICLES    CLINICAL HISTORY:  Other specified disorders of the male genital organs    COMPARISON:  None    FINDINGS:  The right testicle measures 4.1 x 2.3 x 3.2 cm.  The left testicle measures 4.8 x 2.3 x 2.8 cm.  Appropriate Doppler flow to both testicles was documented.  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  Normal appearance of right epididymis.  Left epididymis was not documented.    Moderate to large hydroceles are present bilaterally.  On the left, septations are present within the hydrocele.                                       CT Abdomen Pelvis With IV Contrast NO Oral Contrast (Final result)  Result time 08/14/24 11:36:35      Final result by Geraldo Tello MD (08/14/24 11:36:35)                   Impression:      Intrahepatic and extrahepatic bile duct dilation.  Findings could be on the basis of age and/or prior cholecystectomy.  Please correlate with bilirubin levels to determine significance.    Perceived urinary bladder wall thickening and urothelial enhancement.   Please correlate with urinalysis and for signs/symptoms of urinary tract infection.    Persistent retroperitoneal lymphadenopathy.    Trace pleural fluid bilaterally, right greater than left.    Diverticulosis coli.    Aortoiliac atherosclerosis.    Fat containing right inguinal hernia.  Bilateral hydroceles.      Electronically signed by: Geraldo Tello  Date:    08/14/2024  Time:    11:36               Narrative:    EXAMINATION:  CT ABDOMEN PELVIS WITH IV CONTRAST    CLINICAL HISTORY:  RLQ abdominal pain (Age >= 14y);    TECHNIQUE:  Axial CT imaging obtained through the abdomen and pelvis after 100 mL Omnipaque 350.  Coronal and sagittal reformatted images.    CMS Mandated Quality Data-CT Radiation Dose-436    All CT scans at this facility dose modulation, iterative reconstruction, and or weight-based dosing when appropriate to reduce radiation dose to as low as reasonably achievable.    COMPARISON:  CT abdomen and pelvis 04/03/2023    FINDINGS:  Imaging through the lower thorax shows trace pleural fluid bilaterally, right greater than left.  Dependent atelectasis of the lower lobes bilaterally.  Bone window images show degenerative changes of the spine.  No acute or aggressive osseous abnormality.    Multiple hepatic cysts within both lobes are again evident.  Portal vein is patent.  Intrahepatic bile duct dilation.  The extrahepatic bile duct is dilated measuring up to 14 mm in diameter.  No obstructing lesion is evident.  Gallbladder is absent.    No focal splenic lesion.  Numerous renal cysts are present bilaterally, the largest arises from the lower pole of the right kidney and measures greater than 11 mm.  No renal calculi.  No hydronephrosis.  Ureters are normal in caliber.  Perceived mild urothelial enhancement and wall thickening of the urinary bladder.    Stomach is unremarkable.  No evidence of small-bowel obstruction.  Previous partial small bowel resection with anastomosis.  Normal appendix.   Diverticulosis of the distal colon.  No evidence of diverticulitis.    Aortoiliac atherosclerotic calcification.  Enlarged retroperitoneal lymph node measuring 17 mm in short axis on image 100 is stable compared to prior.    Fat containing right-sided inguinal hernia.  Bilateral hydroceles.                                       Medications   iohexoL (OMNIPAQUE 350) injection 100 mL (100 mLs Intravenous Given 8/14/24 1104)   acetaminophen tablet 650 mg (650 mg Oral Given 8/14/24 1123)   potassium chloride SA CR tablet 20 mEq (20 mEq Oral Given 8/14/24 1332)     Medical Decision Making  MDM    Patient presents for emergent evaluation of acute abdominal pain that poses a possible threat to life and/or bodily function.    Differential diagnosis included but not limited to incarcerated hernia, urinary tract infection, urinary retention, cancer, electrolyte abnormality.  In the ED patient found to have acute clear lung sounds bilaterally with no increased work of breathing.  Patient has a soft abdomen with pain to palpation of the right lower quadrant/groin.  Patient has a small right inguinal hernia felt on palpation it was easily reducible.  Patient has no pain or swelling noted to bilateral testicles.  No rashes or wounds noted in his genital area.  Patient is awake and alert in no acute distress.  Patient is oriented x3.  Patient acting appropriately for self.  Patient's daughter is at bedside.    CBC significant for hemoglobin 10.0, hematocrit 30.2, CMP significant for potassium 3.3, glucose 172, alkaline phosphatase 50, anion gap 6, lipase 39, UA significant for 4+ glucose, no signs of urinary tract infection.  Total bilirubin within normal limits.  Ultrasound testicles significant for moderate to large bilateral hydroceles without evidence of testicular torsion.  CT abdomen pelvis significant for Impression: Intrahepatic and extrahepatic bile duct dilation.  Findings could be on the basis of age and/or prior  cholecystectomy.  Please correlate with bilirubin levels to determine significance. Perceived urinary bladder wall thickening and urothelial enhancement.  Please correlate with urinalysis and for signs/symptoms of urinary tract infection.  Persistent retroperitoneal lymphadenopathy. Trace pleural fluid bilaterally, right greater than left. Diverticulosis coli. Aortoiliac atherosclerosis. Fat containing right inguinal hernia.  Bilateral hydroceles.    Patient was bladder scan revealed he was he was stay mL in his bladder and after urination revealed 150 mL.  We will prescribe patient Flomax and have close follow up with Urology for possible urinary retention symptoms.    No signs of incarcerated hernia CT.  Patient safe for discharge home with close follow up with the Urology and GI.    Discharge MDM  I discussed the patient presentation labs, US, CT findings with ED attending Dr. Estrada.    Patient was managed in the ED with Tylenol and potassium.    The response to treatment was good.    Patient was discharged in stable condition with close follow up.  Detailed return precautions discussed to return to the ED for any new or worsening concerns.  Patient's daughter verbalizes understanding.    NP uses Epic and voice recognition software prone to occasional and minor errors that may persist in the medical record.     Amount and/or Complexity of Data Reviewed  Independent Historian: caregiver     Details: Patient's daughter  Labs: ordered. Decision-making details documented in ED Course.  Radiology: ordered. Decision-making details documented in ED Course.    Risk  OTC drugs.  Prescription drug management.               ED Course as of 08/14/24 1813   Wed Aug 14, 2024   1029 Potassium(!): 3.3 [MP]   1029 Glucose(!): 172 [MP]   1029 ALP(!): 50 [MP]   1029 Anion Gap(!): 6 [MP]   1054 RBC(!): 2.72 [MP]   1054 Hemoglobin(!): 10.0 [MP]   1054 Hematocrit(!): 30.2 [MP]   1054 MCV(!): 111 [MP]   1054 MCH(!): 36.8 [MP]    1054 MPV(!): 13.5 [MP]   1054 Immature Granulocytes(!): 3.1 [MP]   1054 Immature Grans (Abs)(!): 0.15 [MP]   1054 Lymph #(!): 0.8 [MP]   1054 Lymph %(!): 16.8 [MP]   1143 CT Abdomen Pelvis With IV Contrast NO Oral Contrast  Impression:  Intrahepatic and extrahepatic bile duct dilation.  Findings could be on the basis of age and/or prior cholecystectomy.  Please correlate with bilirubin levels to determine significance.     Perceived urinary bladder wall thickening and urothelial enhancement.  Please correlate with urinalysis and for signs/symptoms of urinary tract infection.  Persistent retroperitoneal lymphadenopathy.  Trace pleural fluid bilaterally, right greater than left.  Diverticulosis coli.  Aortoiliac atherosclerosis.  Fat containing right inguinal hernia.  Bilateral hydroceles. [MP]   1216 US Scrotum And Testicles  Impression:     Moderate to large bilateral hydroceles, with septations within the left hydrocele.     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   [MP]   1251 Leukocyte Esterase, UA: Negative [MP]   1251 NITRITE UA: Negative [MP]   1251 Bacteria, UA: None [MP]   1251 Glucose, UA(!): 4+ [MP]   1251 Spec Grav UA(!): >1.030 [MP]      ED Course User Index  [MP] Noris Tyson NP                           Clinical Impression:  Final diagnoses:  [R10.31] Right groin pain (Primary)  [R33.8] Acute urinary retention          ED Disposition Condition    Discharge Stable          ED Prescriptions       Medication Sig Dispense Start Date End Date Auth. Provider    tamsulosin (FLOMAX) 0.4 mg Cap Take 1 capsule (0.4 mg total) by mouth once daily. for 10 days 10 capsule 8/14/2024 8/24/2024 Noris Tyson NP          Follow-up Information       Follow up With Specialties Details Why Contact Info Additional Information    Roque Guillaume MD Family Medicine Schedule an appointment as soon as possible for a visit  For recheck/continuing care [address]  [place]  [address]  [phone]       Vicki Maldonado MD Urology Schedule an appointment as soon as possible for a visit  For recheck/continuing care [address] [place] [address] [address]  [address]  [facility] [address]  [phone]       Isamar Ochoa MD Gastroenterology Schedule an appointment as soon as possible for a visit  For recheck/continuing care [address] [facility] [address]  [phone]       [facility] - Emergency Dept Emergency Medicine  If symptoms worsen [address] [facility] [address]  [phone] 1st floor             Jah, Noris JORGE NP  08/14/24 0183

## 2024-08-15 ENCOUNTER — INFUSION (OUTPATIENT)
Dept: INFUSION THERAPY | Facility: HOSPITAL | Age: 79
End: 2024-08-15
Attending: INTERNAL MEDICINE
Payer: MEDICARE

## 2024-08-15 VITALS
TEMPERATURE: 98 F | RESPIRATION RATE: 17 BRPM | BODY MASS INDEX: 21.78 KG/M2 | OXYGEN SATURATION: 95 % | HEIGHT: 74 IN | DIASTOLIC BLOOD PRESSURE: 52 MMHG | WEIGHT: 169.69 LBS | SYSTOLIC BLOOD PRESSURE: 100 MMHG | HEART RATE: 71 BPM

## 2024-08-15 DIAGNOSIS — C7B.8 NEUROENDOCRINE CARCINOMA METASTATIC TO INTRA-ABDOMINAL LYMPH NODE: Primary | ICD-10-CM

## 2024-08-15 DIAGNOSIS — C7A.8 NEUROENDOCRINE CARCINOMA METASTATIC TO INTRA-ABDOMINAL LYMPH NODE: Primary | ICD-10-CM

## 2024-08-15 PROCEDURE — 96402 CHEMO HORMON ANTINEOPL SQ/IM: CPT

## 2024-08-15 PROCEDURE — 96372 THER/PROPH/DIAG INJ SC/IM: CPT

## 2024-08-15 PROCEDURE — 63600175 PHARM REV CODE 636 W HCPCS: Mod: JZ,JG | Performed by: INTERNAL MEDICINE

## 2024-08-15 RX ORDER — LANREOTIDE ACETATE 120 MG/.5ML
120 INJECTION SUBCUTANEOUS
Status: COMPLETED | OUTPATIENT
Start: 2024-08-15 | End: 2024-08-15

## 2024-08-15 RX ORDER — LANREOTIDE ACETATE 60 MG/.2ML
120 INJECTION SUBCUTANEOUS
OUTPATIENT
Start: 2024-09-05 | End: 2024-09-05

## 2024-08-15 RX ORDER — LANREOTIDE ACETATE 60 MG/.2ML
120 INJECTION SUBCUTANEOUS
Status: CANCELLED | OUTPATIENT
Start: 2024-08-15 | End: 2024-08-15

## 2024-08-15 RX ORDER — LANREOTIDE ACETATE 60 MG/.2ML
120 INJECTION SUBCUTANEOUS
OUTPATIENT
Start: 2024-11-28 | End: 2024-11-28

## 2024-08-15 RX ORDER — LANREOTIDE ACETATE 60 MG/.2ML
120 INJECTION SUBCUTANEOUS
OUTPATIENT
Start: 2024-10-03 | End: 2024-10-03

## 2024-08-15 RX ORDER — LANREOTIDE ACETATE 60 MG/.2ML
120 INJECTION SUBCUTANEOUS
OUTPATIENT
Start: 2024-10-31 | End: 2024-10-31

## 2024-08-15 RX ADMIN — LANREOTIDE ACETATE 120 MG: 120 INJECTION SUBCUTANEOUS at 02:08

## 2024-08-15 NOTE — PLAN OF CARE
Problem: Fatigue  Goal: Improved Activity Tolerance  Intervention: Promote Improved Energy  Flowsheets (Taken 8/15/2024 1407)  Fatigue Management: fatigue-related activity identified  Activity Management: Ambulated -L4

## 2024-08-16 ENCOUNTER — TELEPHONE (OUTPATIENT)
Dept: GASTROENTEROLOGY | Facility: CLINIC | Age: 79
End: 2024-08-16
Payer: MEDICARE

## 2024-08-16 ENCOUNTER — PATIENT MESSAGE (OUTPATIENT)
Dept: FAMILY MEDICINE | Facility: CLINIC | Age: 79
End: 2024-08-16
Payer: MEDICARE

## 2024-08-16 ENCOUNTER — CLINICAL SUPPORT (OUTPATIENT)
Dept: REHABILITATION | Facility: HOSPITAL | Age: 79
End: 2024-08-16
Payer: MEDICARE

## 2024-08-16 DIAGNOSIS — M51.36 DDD (DEGENERATIVE DISC DISEASE), LUMBAR: Primary | ICD-10-CM

## 2024-08-16 NOTE — PROGRESS NOTES
Pt arrived to PT with daughter.She stated that his BP was very low and he reported dizziness following chemo yesterday.  Pt requested to hold PT today.  Will cont with next visit following regular schedule.

## 2024-08-19 ENCOUNTER — TELEPHONE (OUTPATIENT)
Dept: FAMILY MEDICINE | Facility: CLINIC | Age: 79
End: 2024-08-19

## 2024-08-19 ENCOUNTER — OFFICE VISIT (OUTPATIENT)
Dept: FAMILY MEDICINE | Facility: CLINIC | Age: 79
End: 2024-08-19
Payer: MEDICARE

## 2024-08-19 DIAGNOSIS — E87.6 HYPOKALEMIA: ICD-10-CM

## 2024-08-19 DIAGNOSIS — D64.9 ANEMIA, UNSPECIFIED TYPE: ICD-10-CM

## 2024-08-19 DIAGNOSIS — F02.C11 DEMENTIA IN OTHER DISEASES CLASSIFIED ELSEWHERE, SEVERE, WITH AGITATION: ICD-10-CM

## 2024-08-19 DIAGNOSIS — I95.9 HYPOTENSION, UNSPECIFIED HYPOTENSION TYPE: ICD-10-CM

## 2024-08-19 DIAGNOSIS — G30.1 MODERATE LATE ONSET ALZHEIMER'S DEMENTIA WITH PSYCHOTIC DISTURBANCE: ICD-10-CM

## 2024-08-19 DIAGNOSIS — E11.9 TYPE 2 DIABETES MELLITUS WITHOUT COMPLICATION, WITHOUT LONG-TERM CURRENT USE OF INSULIN: ICD-10-CM

## 2024-08-19 DIAGNOSIS — C7A.8 NEUROENDOCRINE CARCINOMA METASTATIC TO INTRA-ABDOMINAL LYMPH NODE: ICD-10-CM

## 2024-08-19 DIAGNOSIS — F02.B2 MODERATE LATE ONSET ALZHEIMER'S DEMENTIA WITH PSYCHOTIC DISTURBANCE: ICD-10-CM

## 2024-08-19 DIAGNOSIS — C7B.8 NEUROENDOCRINE CARCINOMA METASTATIC TO INTRA-ABDOMINAL LYMPH NODE: ICD-10-CM

## 2024-08-19 DIAGNOSIS — I10 ESSENTIAL HYPERTENSION: Primary | ICD-10-CM

## 2024-08-19 PROCEDURE — 1160F RVW MEDS BY RX/DR IN RCRD: CPT | Mod: CPTII,95,, | Performed by: PHYSICIAN ASSISTANT

## 2024-08-19 PROCEDURE — 1159F MED LIST DOCD IN RCRD: CPT | Mod: CPTII,95,, | Performed by: PHYSICIAN ASSISTANT

## 2024-08-19 PROCEDURE — 99214 OFFICE O/P EST MOD 30 MIN: CPT | Mod: 95,,, | Performed by: PHYSICIAN ASSISTANT

## 2024-08-19 NOTE — TELEPHONE ENCOUNTER
Patient seen on virtual visit today. I would like patient to have repeat labs this week. Please contact patient's daughter to see when we can arrange this on a day he is already going to PT.

## 2024-08-19 NOTE — PROGRESS NOTES
Subjective:       Patient ID: Roosevelt Alejandro is a 78 y.o. male.    Chief Complaint: No chief complaint on file.    The patient location is: [place]  The chief complaint leading to consultation is: low blood pressure    Visit type: audiovisual    Face to Face time with patient: 10 min   20 minutes of total time spent on the encounter, which includes face to face time and non-face to face time preparing to see the patient (eg, review of tests), Obtaining and/or reviewing separately obtained history, Documenting clinical information in the electronic or other health record, Independently interpreting results (not separately reported) and communicating results to the patient/family/caregiver, or Care coordination (not separately reported).         Each patient to whom he or she provides medical services by telemedicine is:  (1) informed of the relationship between the physician and patient and the respective role of any other health care provider with respect to management of the patient; and (2) notified that he or she may decline to receive medical services by telemedicine and may withdraw from such care at any time.    Notes:     Patient presents via telemedicine with his daughter. He has had low/ normal blood pressures over the last week with complaints of dizziness. History comes from daughter as patient has dementia. Recent ED visit revealed anemia and hypokalemia.       Review of patient's allergies indicates:   Allergen Reactions    Epinephrine      Patient on Sandostatin and Epinephrine contraindicated    Aspirin Other (See Comments)     Internal bleeding    Lisinopril (bulk) Rash    Sulfa (sulfonamide antibiotics) Swelling and Rash         Current Outpatient Medications:     ARIPiprazole (ABILIFY) 5 MG Tab, Take 5 mg by mouth., Disp: , Rfl:     atorvastatin (LIPITOR) 20 MG tablet, TAKE 1 TABLET EVERY DAY (NEED MD APPOINTMENT), Disp: 90 tablet, Rfl: 3    cyanocobalamin 1,000 mcg/mL injection, Inject 1 mL  "(1,000 mcg total) into the muscle every 28 days., Disp: 1 mL, Rfl: 11    donepeziL (ARICEPT) 5 MG tablet, Take 1 tablet (5 mg total) by mouth every evening., Disp: 30 tablet, Rfl: 4    EScitalopram oxalate (LEXAPRO) 10 MG tablet, Take 10 mg by mouth., Disp: , Rfl:     FOLIC ACID/MULTIVIT-MIN/LUTEIN (CENTRUM SILVER ORAL), Take 1 tablet by mouth once daily. , Disp: , Rfl:     memantine (NAMENDA) 5 MG Tab, Take 5 mg by mouth 2 (two) times daily., Disp: , Rfl:     metoprolol succinate (TOPROL-XL) 25 MG 24 hr tablet, TAKE 1 TABLET EVERY DAY (NEED MD APPOINTMENT), Disp: 90 tablet, Rfl: 3    NIFEdipine (PROCARDIA-XL) 30 MG (OSM) 24 hr tablet, TAKE 1 TABLET EVERY EVENING, Disp: 90 tablet, Rfl: 3    pantoprazole (PROTONIX) 40 MG tablet, Take 1 tablet (40 mg total) by mouth once daily., Disp: 30 tablet, Rfl: 11    QUEtiapine (SEROQUEL) 50 MG tablet, TAKE 1 TABLET BY MOUTH EVERY DAY AT BEDTIME FOR 30 DAYS, Disp: , Rfl:     syringe with needle 1 mL 25 gauge x 1" Syrg, 1 each by Misc.(Non-Drug; Combo Route) route every 28 days., Disp: 12 each, Rfl: 1    tamsulosin (FLOMAX) 0.4 mg Cap, Take 1 capsule (0.4 mg total) by mouth once daily. for 10 days, Disp: 10 capsule, Rfl: 0    Lab Results   Component Value Date    WBC 4.82 08/14/2024    HGB 10.0 (L) 08/14/2024    HCT 30.2 (L) 08/14/2024     08/14/2024    CHOL 131 03/01/2024    TRIG 75 03/01/2024    HDL 33 (L) 03/01/2024    ALT 16 08/14/2024    AST 14 08/14/2024     08/14/2024    K 3.3 (L) 08/14/2024     08/14/2024    CREATININE 1.2 08/14/2024    BUN 19 08/14/2024    CO2 27 08/14/2024    TSH 2.313 03/04/2024    PSA 28.6 (H) 12/20/2017    INR 1.1 02/24/2019    GLUF 109 07/22/2004    HGBA1C 5.7 (H) 03/01/2024       Review of Systems   Constitutional:  Negative for activity change, appetite change and fever.   HENT:  Negative for postnasal drip, rhinorrhea and sinus pressure.    Eyes:  Negative for visual disturbance.   Respiratory:  Negative for cough and shortness " of breath.    Cardiovascular:  Negative for chest pain.   Gastrointestinal:  Negative for abdominal distention and abdominal pain.   Genitourinary:  Negative for difficulty urinating and dysuria.   Musculoskeletal:  Negative for arthralgias and myalgias.   Neurological:  Positive for dizziness. Negative for headaches.   Hematological:  Negative for adenopathy.   Psychiatric/Behavioral:  Positive for confusion and decreased concentration. The patient is not nervous/anxious.        Objective:      Physical Exam  Constitutional:       General: He is not in acute distress.     Appearance: Normal appearance.   HENT:      Head: Normocephalic and atraumatic.   Pulmonary:      Effort: Pulmonary effort is normal. No respiratory distress.   Neurological:      Mental Status: He is alert and oriented to person, place, and time.      Cranial Nerves: No cranial nerve deficit.   Psychiatric:         Behavior: Behavior normal.         Assessment:       1. Essential hypertension    2. 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 Type 2 diabetes mellitus without complication, without long-term current use of insulin    5. Dementia in other diseases classified elsewhere, severe, with agitation    6. Hypokalemia    7. Anemia, unspecified type    8. 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 Monitor blood pressure and blood sugar  Follow up via VV in 1 week

## 2024-08-20 ENCOUNTER — LAB VISIT (OUTPATIENT)
Dept: LAB | Facility: HOSPITAL | Age: 79
End: 2024-08-20
Attending: PHYSICIAN ASSISTANT
Payer: MEDICARE

## 2024-08-20 ENCOUNTER — CLINICAL SUPPORT (OUTPATIENT)
Dept: REHABILITATION | Facility: HOSPITAL | Age: 79
End: 2024-08-20
Payer: MEDICARE

## 2024-08-20 DIAGNOSIS — D64.9 ANEMIA, UNSPECIFIED TYPE: ICD-10-CM

## 2024-08-20 DIAGNOSIS — E87.6 HYPOKALEMIA: ICD-10-CM

## 2024-08-20 DIAGNOSIS — I95.9 HYPOTENSION, UNSPECIFIED HYPOTENSION TYPE: ICD-10-CM

## 2024-08-20 DIAGNOSIS — M51.36 DDD (DEGENERATIVE DISC DISEASE), LUMBAR: Primary | ICD-10-CM

## 2024-08-20 PROCEDURE — 85025 COMPLETE CBC W/AUTO DIFF WBC: CPT | Performed by: PHYSICIAN ASSISTANT

## 2024-08-20 PROCEDURE — 97112 NEUROMUSCULAR REEDUCATION: CPT | Mod: PO

## 2024-08-20 PROCEDURE — 36415 COLL VENOUS BLD VENIPUNCTURE: CPT | Mod: PO | Performed by: PHYSICIAN ASSISTANT

## 2024-08-20 PROCEDURE — 82728 ASSAY OF FERRITIN: CPT | Performed by: PHYSICIAN ASSISTANT

## 2024-08-20 PROCEDURE — 80048 BASIC METABOLIC PNL TOTAL CA: CPT | Performed by: PHYSICIAN ASSISTANT

## 2024-08-20 PROCEDURE — 84466 ASSAY OF TRANSFERRIN: CPT | Performed by: PHYSICIAN ASSISTANT

## 2024-08-20 NOTE — PROGRESS NOTES
[facility]   Physical Therapy Treatment Note      Name: Roosevelt Alejandro  Clinic Number: [id number]    Therapy Diagnosis:   Encounter Diagnosis   Name Primary?    DDD (degenerative disc disease), lumbar Yes     Physician: Angelo Alcazar MD    Visit Date: 8/20/2024    Physician Orders: PT Eval and Treat   Medical Diagnosis from Referral: Facet arthritis of cervical region.  Lumbar facet arthropathy.  Evaluation Date: 8/6/2024  Authorization Period Expiration: 7/24/24  Plan of Care Expiration: 10/11/24  Progress Note Due: 9/6/24  Date of Surgery: NA  Visit # / Visits authorized: 1/ 1   FOTO: 1/ 3 Not performed due to cognitive deficits.     Precautions: Standard, Fall, and cancer, dementia.      Time In: 1515  Time Out: 1540  Total Billable Time: 25 minutes     PTA Visit #: 0/5       Subjective     Patient reports: No new c/o..  He was compliant with home exercise program.  Response to previous treatment: first visit following eval.  Functional change: no    Pain: 5/10  Location: bilateral upper back      Objective      Objective Measures updated at progress report unless specified.     Treatment     Max received the treatments listed below:      therapeutic exercises to develop strength, endurance, ROM, flexibility, posture, and core stabilization for 0 minutes including:      manual therapy techniques: dry needling were applied to the:  for 0 minutes, including:      neuromuscular re-education activities to improve: Balance, Coordination, Kinesthetic, Sense, Proprioception, and Posture for 25 minutes. The following activities were included:  Nustep 11' L4, UBE 5/6'  Gait with cane 2x120'.  Not performed below.  ROM c/spine   LR/RR 10  SBL/RT 10  Chin tucks 10    therapeutic activities to improve functional performance for 0  minutes, including:      gait training to improve functional mobility and safety for 0  minutes, including:      direct contact modalities after being cleared for  contraindications:     supervised modalities after being cleared for contradictions: TENS:  Max received TENS electrical stimulation for pain to the . Pt received continuous mode at a rate of 2 pps for 0 minutes. Max tolerated treatment well without any adverse effects.   Mechanical Traction:  Max received intermittent mechanical traction to the cervical spine at a force of 17/12 pounds for a total of 15 minutes. Hold time of 40s and rest time for 10s. Patient tolerated treatment well without any adverse effects.      Patient Education and Home Exercises       Education provided:   - Posture ed.    Written Home Exercises Provided:  to be provided .     Assessment     Short session due to poor endurance. Pt SOBOE.    Max Is progressing well towards his goals.   Patient prognosis is Good.     Patient will continue to benefit from skilled outpatient physical therapy to address the deficits listed in the problem list box on initial evaluation, provide pt/family education and to maximize pt's level of independence in the home and community environment.     Patient's spiritual, cultural and educational needs considered and pt agreeable to plan of care and goals.     Anticipated barriers to physical therapy: NO    Goals:   SHORT TERM GOALS:  3 weeks  Progress Date met   Recent signs and systems trend is improving in order to progress towards Long term goals.  [] Met  [] Not Met  [] Progressing     Patient will be independent with Home Exercise Program  in order to further progress and return to maximal function. [] Met  [] Not Met  [] Progressing     Pain rating at Worst: 5 /10 in order to progress towards increased independence with activity. [] Met  [] Not Met  [] Progressing     Patient will be able to correct postural deviations in sitting and standing, to decrease pain and promote postural awareness for injury prevention.  [] Met  [] Not Met  [] Progressing     Patient will improve functional outcome (FOTO) score: by  5% to increase self-worth & perceived functional ability towards long term goals [] Met  [] Not Met  [] Progressing        LONG TERM GOALS: 6 weeks  Progress Date met   Patient will return to normal activites of daily living, recreational, and work related activities with less pain and limitation.  [] Met  [] Not Met  [] Progressing     Patient will improve range of motion  to stated goals in order to return to maximal functional potential. ROM of c/spine and l/spine WNL/WFL in all planes. [] Met  [] Not Met  [] Progressing     Patient will improve Strength to stated goals of appropriate musculature in order to improve functional independence. Strength of c/s and l/s > 4/5 in all planes. [] Met  [] Not Met  [] Progressing     Pain Rating at Best: 2/10 to improve Quality of Life.  [] Met  [] Not Met  [] Progressing     Patient will meet predicted functional outcome (FOTO) score: NA% to increase self-worth & perceived functional ability. [] Met  [] Not Met  [] Progressing     Patient will have met/partially met personal goal of: reduce pain, improve function. [] Met  [] Not Met  [] Progressing           Plan     Progress as able.    Khadar Valdivia, PT

## 2024-08-21 ENCOUNTER — TELEPHONE (OUTPATIENT)
Dept: FAMILY MEDICINE | Facility: CLINIC | Age: 79
End: 2024-08-21
Payer: MEDICARE

## 2024-08-21 LAB
ANION GAP SERPL CALC-SCNC: 7 MMOL/L (ref 8–16)
BASOPHILS # BLD AUTO: 0.1 K/UL (ref 0–0.2)
BASOPHILS NFR BLD: 2.2 % (ref 0–1.9)
BUN SERPL-MCNC: 16 MG/DL (ref 8–23)
CALCIUM SERPL-MCNC: 9.6 MG/DL (ref 8.7–10.5)
CHLORIDE SERPL-SCNC: 103 MMOL/L (ref 95–110)
CO2 SERPL-SCNC: 30 MMOL/L (ref 23–29)
CREAT SERPL-MCNC: 1.3 MG/DL (ref 0.5–1.4)
DIFFERENTIAL METHOD BLD: ABNORMAL
EOSINOPHIL # BLD AUTO: 0.2 K/UL (ref 0–0.5)
EOSINOPHIL NFR BLD: 4.9 % (ref 0–8)
ERYTHROCYTE [DISTWIDTH] IN BLOOD BY AUTOMATED COUNT: 14.1 % (ref 11.5–14.5)
EST. GFR  (NO RACE VARIABLE): 55.9 ML/MIN/1.73 M^2
FERRITIN SERPL-MCNC: 492 NG/ML (ref 20–300)
GLUCOSE SERPL-MCNC: 83 MG/DL (ref 70–110)
HCT VFR BLD AUTO: 31.8 % (ref 40–54)
HGB BLD-MCNC: 10.1 G/DL (ref 14–18)
IMM GRANULOCYTES # BLD AUTO: 0.07 K/UL (ref 0–0.04)
IMM GRANULOCYTES NFR BLD AUTO: 1.6 % (ref 0–0.5)
IRON SERPL-MCNC: 76 UG/DL (ref 45–160)
LYMPHOCYTES # BLD AUTO: 1 K/UL (ref 1–4.8)
LYMPHOCYTES NFR BLD: 22.5 % (ref 18–48)
MCH RBC QN AUTO: 36.1 PG (ref 27–31)
MCHC RBC AUTO-ENTMCNC: 31.8 G/DL (ref 32–36)
MCV RBC AUTO: 114 FL (ref 82–98)
MONOCYTES # BLD AUTO: 0.4 K/UL (ref 0.3–1)
MONOCYTES NFR BLD: 7.8 % (ref 4–15)
NEUTROPHILS # BLD AUTO: 2.7 K/UL (ref 1.8–7.7)
NEUTROPHILS NFR BLD: 61 % (ref 38–73)
NRBC BLD-RTO: 0 /100 WBC
PLATELET # BLD AUTO: 323 K/UL (ref 150–450)
PMV BLD AUTO: 13.5 FL (ref 9.2–12.9)
POTASSIUM SERPL-SCNC: 4 MMOL/L (ref 3.5–5.1)
RBC # BLD AUTO: 2.8 M/UL (ref 4.6–6.2)
SATURATED IRON: 33 % (ref 20–50)
SODIUM SERPL-SCNC: 140 MMOL/L (ref 136–145)
TOTAL IRON BINDING CAPACITY: 229 UG/DL (ref 250–450)
TRANSFERRIN SERPL-MCNC: 155 MG/DL (ref 200–375)
WBC # BLD AUTO: 4.49 K/UL (ref 3.9–12.7)

## 2024-08-21 NOTE — TELEPHONE ENCOUNTER
Spoken with patient's spouse (petty). Pt's spouse was notified of patient's lab results. Spouse verbalized understanding.    Pt's spouse stated that since patient stopped jardiance 2 days ago, he has been sleeping more. Pt's spouse explained that patient would take a nap right after  breakfast. Pt took a very long nap yesterday but only an half hour today. Spouse has noticed that patient has been eating less during meal times. Patient has been snoring a lot more lately. Pt's spouse stated that he wasn't dizziness today.

## 2024-08-21 NOTE — TELEPHONE ENCOUNTER
----- Message from Ruth Rich PA-C sent at 8/21/2024 12:20 PM CDT -----  Blood work shows anemia stable. Follow up with hematology as scheduled. Otherwise, labs are stable. How is patient feeling? Since stopping jardiance, any improvement in dizziness?

## 2024-08-21 NOTE — TELEPHONE ENCOUNTER
Does patient have any updated blood pressure readings? Please schedule follow up virtual visit in 1 week

## 2024-08-21 NOTE — TELEPHONE ENCOUNTER
Spoken with both patient's spouse and daughter (Cyndy).     Patient's daughter was informed of lab results below. Verbalized understanding.    Pt's spouse stated that patient's blood pressure was 102/48 at 12:30pm today. Spouse asked me to call patient's daughter Cyndy. Pt's daughter stated patient's blood pressure on yesterday was 124/60. On the 19th- 126/73, 18th- 102/56. Also stated that patient's glucose on yesterday was in the 190s after eating breakfast. No complaints of dizziness on yesterday. Patient's daughter pointed out that from the blood work that was done yesterday, pt glucose was 83. Asking if his H & H was okay since it was on the lower side?

## 2024-08-22 ENCOUNTER — PATIENT MESSAGE (OUTPATIENT)
Dept: FAMILY MEDICINE | Facility: CLINIC | Age: 79
End: 2024-08-22
Payer: MEDICARE

## 2024-08-27 ENCOUNTER — CLINICAL SUPPORT (OUTPATIENT)
Dept: REHABILITATION | Facility: HOSPITAL | Age: 79
End: 2024-08-27
Payer: MEDICARE

## 2024-08-27 DIAGNOSIS — M51.36 DDD (DEGENERATIVE DISC DISEASE), LUMBAR: Primary | ICD-10-CM

## 2024-08-27 PROCEDURE — 97110 THERAPEUTIC EXERCISES: CPT | Mod: PO,CQ

## 2024-08-27 PROCEDURE — 97112 NEUROMUSCULAR REEDUCATION: CPT | Mod: PO,CQ

## 2024-08-27 NOTE — PROGRESS NOTES
[facility]   Physical Therapy Treatment Note      Name: Roosevelt Alejandro  Clinic Number: [id number]    Therapy Diagnosis:   Encounter Diagnosis   Name Primary?    DDD (degenerative disc disease), lumbar Yes       Physician: Angelo Alcazar MD    Visit Date: 8/27/2024    Physician Orders: PT Eval and Treat   Medical Diagnosis from Referral: Facet arthritis of cervical region.  Lumbar facet arthropathy.  Evaluation Date: 8/6/2024  Authorization Period Expiration: 10/11/2024  Plan of Care Expiration: 10/11/24  Progress Note Due: 9/6/24  Date of Surgery: NA  Visit # / Visits authorized: 4/20 +eval  FOTO: 1/ 3 Not performed due to cognitive deficits.     Precautions: Standard, Fall, and cancer, dementia.      Time In: 1610   Time Out: 1653   Total Time: 43 minutes  Total Billable Time: 43 minutes     PTA Visit #: 1/5       Subjective     Patient reports: no complaints. Limited recall of last session. Brought to clinic per girlfriend.     He was compliant with home exercise program.  Response to previous treatment: limited recall, improved pain rated today  Functional change: no    Pain: 0/10  Location: bilateral upper back      Objective      BP after exercise 107/60, HR 66    Treatment   Bold = progressions and newly added exercises    CHARGES BASED ON 1-1 TX:  Roosevelt received the treatments listed below:      therapeutic exercises to develop strength, endurance, ROM, flexibility, posture, and core stabilization for 18 minutes including:  Nustep 11' L4, with UE's and LE's -cues for posture and pacing for tolerance  ROM c/spine   LR/RR 10  SBL/RT 10  Chin tucks 10      neuromuscular re-education activities to improve: Balance, Coordination, Kinesthetic, Sense, Proprioception, and Posture for 25 minutes. The following activities were included:  NP-UBE 5/6'  Gait with cane 2x120' SBA to CGA    TA sets x 10  LTR x 10  Bridges x 10    therapeutic activities to improve functional performance for 00   minutes, including:      gait training to improve functional mobility and safety for 0  minutes, including:      direct contact modalities after being cleared for contraindications:     supervised modalities after being cleared for contradictions: TENS:  Max received TENS electrical stimulation for pain to the . Pt received continuous mode at a rate of 2 pps for 0 minutes. Max tolerated treatment well without any adverse effects.       NP-Mechanical Traction:  Max received intermittent mechanical traction to the cervical spine at a force of 17/12 pounds for a total of 00 minutes. Hold time of 40s and rest time for 10s. Patient tolerated treatment well without any adverse effects.      Patient Education and Home Exercises       Education provided:   - Posture ed.    Written Home Exercises Provided: Yes, added to current home exercise program.   Pt demonstrated limited ability to perform without cues, so discussed with girlfriend, who will assist with home exercise program. Discussed pacing for tolerance.   Home exercise program may be found in Patient Instructions in EPIC.     Assessment     Slow gait with limited step lengths and occasional off sequencing with spc. Limited attention to task, requiring cues for completion of exercises. Paced for improved tolerance. Flat affect. Denied pain after session. Educated pt that he/she may feel soreness after session. Will benefit from continued physical therapy intervention to progress toward goals set forth in plan of care to improve functional mobility and quality of life.     Max Is progressing well towards his goals.   Patient prognosis is Good.     Patient will continue to benefit from skilled outpatient physical therapy to address the deficits listed in the problem list box on initial evaluation, provide pt/family education and to maximize pt's level of independence in the home and community environment.     Patient's spiritual, cultural and educational needs  considered and pt agreeable to plan of care and goals.     Anticipated barriers to physical therapy: NO    Goals:   SHORT TERM GOALS:  3 weeks  Progress Date met   Recent signs and systems trend is improving in order to progress towards Long term goals.  [] Met  [] Not Met  [] Progressing     Patient will be independent with Home Exercise Program  in order to further progress and return to maximal function. [] Met  [] Not Met  [] Progressing     Pain rating at Worst: 5 /10 in order to progress towards increased independence with activity. [] Met  [] Not Met  [] Progressing     Patient will be able to correct postural deviations in sitting and standing, to decrease pain and promote postural awareness for injury prevention.  [] Met  [] Not Met  [] Progressing     Patient will improve functional outcome (FOTO) score: by 5% to increase self-worth & perceived functional ability towards long term goals [] Met  [] Not Met  [] Progressing        LONG TERM GOALS: 6 weeks  Progress Date met   Patient will return to normal activites of daily living, recreational, and work related activities with less pain and limitation.  [] Met  [] Not Met  [] Progressing     Patient will improve range of motion  to stated goals in order to return to maximal functional potential. ROM of c/spine and l/spine WNL/WFL in all planes. [] Met  [] Not Met  [] Progressing     Patient will improve Strength to stated goals of appropriate musculature in order to improve functional independence. Strength of c/s and l/s > 4/5 in all planes. [] Met  [] Not Met  [] Progressing     Pain Rating at Best: 2/10 to improve Quality of Life.  [] Met  [] Not Met  [] Progressing     Patient will meet predicted functional outcome (FOTO) score: NA% to increase self-worth & perceived functional ability. [] Met  [] Not Met  [] Progressing     Patient will have met/partially met personal goal of: reduce pain, improve function. [] Met  [] Not Met  [] Progressing            Plan     Progress as able.    Maxine Gardner, PTA

## 2024-08-28 ENCOUNTER — OFFICE VISIT (OUTPATIENT)
Facility: CLINIC | Age: 79
End: 2024-08-28
Payer: MEDICARE

## 2024-08-28 ENCOUNTER — OFFICE VISIT (OUTPATIENT)
Dept: FAMILY MEDICINE | Facility: CLINIC | Age: 79
End: 2024-08-28
Payer: MEDICARE

## 2024-08-28 VITALS
WEIGHT: 166.31 LBS | DIASTOLIC BLOOD PRESSURE: 57 MMHG | RESPIRATION RATE: 17 BRPM | BODY MASS INDEX: 21.35 KG/M2 | TEMPERATURE: 98 F | SYSTOLIC BLOOD PRESSURE: 116 MMHG | HEART RATE: 61 BPM

## 2024-08-28 DIAGNOSIS — D53.9 MACROCYTIC ANEMIA: ICD-10-CM

## 2024-08-28 DIAGNOSIS — C7A.8 NEUROENDOCRINE CARCINOMA METASTATIC TO INTRA-ABDOMINAL LYMPH NODE: Primary | ICD-10-CM

## 2024-08-28 DIAGNOSIS — F02.C11 DEMENTIA IN OTHER DISEASES CLASSIFIED ELSEWHERE, SEVERE, WITH AGITATION: ICD-10-CM

## 2024-08-28 DIAGNOSIS — R42 DIZZINESS: Primary | ICD-10-CM

## 2024-08-28 DIAGNOSIS — E11.9 TYPE 2 DIABETES MELLITUS WITHOUT COMPLICATION, WITHOUT LONG-TERM CURRENT USE OF INSULIN: ICD-10-CM

## 2024-08-28 DIAGNOSIS — C7B.8 NEUROENDOCRINE CARCINOMA METASTATIC TO INTRA-ABDOMINAL LYMPH NODE: Primary | ICD-10-CM

## 2024-08-28 DIAGNOSIS — I10 ESSENTIAL HYPERTENSION: ICD-10-CM

## 2024-08-28 PROCEDURE — 1101F PT FALLS ASSESS-DOCD LE1/YR: CPT | Mod: CPTII,S$GLB,, | Performed by: INTERNAL MEDICINE

## 2024-08-28 PROCEDURE — 99214 OFFICE O/P EST MOD 30 MIN: CPT | Mod: S$GLB,,, | Performed by: INTERNAL MEDICINE

## 2024-08-28 PROCEDURE — 99213 OFFICE O/P EST LOW 20 MIN: CPT | Mod: 95,,, | Performed by: PHYSICIAN ASSISTANT

## 2024-08-28 PROCEDURE — 1159F MED LIST DOCD IN RCRD: CPT | Mod: CPTII,95,, | Performed by: PHYSICIAN ASSISTANT

## 2024-08-28 PROCEDURE — G2211 COMPLEX E/M VISIT ADD ON: HCPCS | Mod: S$GLB,,, | Performed by: INTERNAL MEDICINE

## 2024-08-28 PROCEDURE — 3288F FALL RISK ASSESSMENT DOCD: CPT | Mod: CPTII,S$GLB,, | Performed by: INTERNAL MEDICINE

## 2024-08-28 PROCEDURE — 1159F MED LIST DOCD IN RCRD: CPT | Mod: CPTII,S$GLB,, | Performed by: INTERNAL MEDICINE

## 2024-08-28 PROCEDURE — 1160F RVW MEDS BY RX/DR IN RCRD: CPT | Mod: CPTII,95,, | Performed by: PHYSICIAN ASSISTANT

## 2024-08-28 PROCEDURE — 3078F DIAST BP <80 MM HG: CPT | Mod: CPTII,S$GLB,, | Performed by: INTERNAL MEDICINE

## 2024-08-28 PROCEDURE — 99999 PR PBB SHADOW E&M-EST. PATIENT-LVL III: CPT | Mod: PBBFAC,,, | Performed by: INTERNAL MEDICINE

## 2024-08-28 PROCEDURE — 1126F AMNT PAIN NOTED NONE PRSNT: CPT | Mod: CPTII,S$GLB,, | Performed by: INTERNAL MEDICINE

## 2024-08-28 PROCEDURE — 3074F SYST BP LT 130 MM HG: CPT | Mod: CPTII,S$GLB,, | Performed by: INTERNAL MEDICINE

## 2024-08-28 NOTE — PROGRESS NOTES
Subjective:       Patient ID: Roosevelt Alejandro is a 78 y.o. male.    Chief Complaint: leading to consultation is: neuroendocrine carcinoma follow up.     HPI:  Patient returns today for a regularly scheduled follow-up visit.      Mr. Denise returns after not being seen in 2 years.  He has been found to have dementia and has been seeing psych for this.  He is on medications.          CT, MRI of abd/p and dotatate PET all stable 7/6/2021 1/5/2021 Dotatate PET:  Stable size and distribution of somatostatin receptor avid disease in the mediastinum and abdomen, multiple index lesions demonstrate increased avidity as detailed above.  No new lesions identified.         Patient underwent surgery 11/4/2019 with Dr. Khalil.  Partial path report:     FINAL PATHOLOGIC DIAGNOSIS     SYNOPTIC REPORT  Procedure: Segmental resection, small intestine  Tumor Site: Small intestine, terminal ileum  Tumor Size: Greatest dimension (centimeters): 1.8 cm  Tumor Focality: Unifocal  Histologic Type and Grade G1: Well-differentiated neuroendocrine tumor  Mitotic Rate: 1.5 mitoses / 10 HPF  Ki-67 Labeling Index: Specify Ki-67 percentage: about 1.1 %  Tumor Extension: Tumor at least invades through the muscularis propria into subserosal tissue  All margins are uninvolved by tumor: Margins examined: proximal, distal, and radial.  Lymphovascular Invasion: Present  Perineural Invasion: Present  Large Mesenteric Masses (>2 cm), not identified  Regional Lymph Nodes Examination (counting lymph nodes from all the 38 parts)  Number of Lymph Nodes Involved: 41  Number of Lymph Nodes Examined: 46  Pathologic Stage Classification (pTNM, AJCC 8th Edition)  Primary Tumor (pT) at least pT3: Invades through the muscularis propria into subserosal tissue  pN2: Extensive rigoberto deposits (12 or greater),  Distant Metastasis (pM) pM1b: Metastasis in at least one extrahepatic site (nonregional lymph node: Aortocaval  node. right inferior aorta node)  NET  panel  Primary small bowel tumor (Block 18D)  Ki-67: positive, approximately 1.1% cells staining  Chromogranin: positive, 3+, 100%  Synaptophysin: positive, 3+, 100%  CD31: 12 / HPF  TTF: not applicable  Lymph node metastasis (Block 38A)  Ki-67: positive, approximately 6.5 % cells staining        CT abdomen 7/15/2019:  Mesenteric and retroperitoneal lymphadenopathy, with numerous necrotic appearing lymph node is within the central mesentery.  Some of these nodes have mildly increased in size, while the largest previously present node has significantly decreased in size as above.  Further workup for neoplastic process such as metastatic disease or lymphoma is recommended.    Normal appendix.  Moderate diverticulosis.  Small hiatal hernia.  Hepatic and renal cysts.  Atherosclerosis.  Evidence for prior asbestos exposure.    All medications and past medical and surgical history have been reviewed.         Review of patient's allergies indicates:   Allergen Reactions    Epinephrine      Patient on Sandostatin and Epinephrine contraindicated    Aspirin Other (See Comments)     Internal bleeding    Lisinopril (bulk) Rash    Sulfa (sulfonamide antibiotics) Swelling and Rash       ROS  GEN:   normal without any fever, night sweats or weight loss  HEENT:  normal with no HA's,  changes in vision  CV:   normal with no CP, SOB  PULM:  normal with no SOB, cough  GI:   normal with no abdominal pain, nausea, vomiting  :   normal with no hematuria, dysuria  SKIN:   normal with no rash      Previous FAMHX and SOCHX information reviewed and remains unchanged         Objective:        GEN: no apparent distress, comfortable; AAOx3  HEAD: atraumatic and normocephalic  EYES: no pallor, no icterus, PERRLA  ENT: external ears WNL; no nasal discharge  NECK: no visible masses, trachea midline  CHEST: Normal respiratory effort  ABDOM: Visibly Flat  SKIN: no visible rashes  NEURO: grossly intact; motor/sensory WNL; AAOx3; no tremors  PSYCH:  normal mood, affect and behavior                All lab results and imaging results have been reviewed and discussed with the patient  Recent Results (from the past 336 hour(s))   CBC Auto Differential    Collection Time: 08/20/24  3:58 PM   Result Value Ref Range    WBC 4.49 3.90 - 12.70 K/uL    Hemoglobin 10.1 (L) 14.0 - 18.0 g/dL    Hematocrit 31.8 (L) 40.0 - 54.0 %    Platelets 323 150 - 450 K/uL     CMP  Sodium   Date Value Ref Range Status   08/20/2024 140 136 - 145 mmol/L Final     Potassium   Date Value Ref Range Status   08/20/2024 4.0 3.5 - 5.1 mmol/L Final     Chloride   Date Value Ref Range Status   08/20/2024 103 95 - 110 mmol/L Final     CO2   Date Value Ref Range Status   08/20/2024 30 (H) 23 - 29 mmol/L Final     Glucose   Date Value Ref Range Status   08/20/2024 83 70 - 110 mg/dL Final     BUN   Date Value Ref Range Status   08/20/2024 16 8 - 23 mg/dL Final     Creatinine   Date Value Ref Range Status   08/20/2024 1.3 0.5 - 1.4 mg/dL Final     Calcium   Date Value Ref Range Status   08/20/2024 9.6 8.7 - 10.5 mg/dL Final     Total Protein   Date Value Ref Range Status   08/14/2024 6.4 6.0 - 8.4 g/dL Final     Albumin   Date Value Ref Range Status   08/14/2024 3.8 3.5 - 5.2 g/dL Final     Total Bilirubin   Date Value Ref Range Status   08/14/2024 0.5 0.1 - 1.0 mg/dL Final     Comment:     For infants and newborns, interpretation of results should be based  on gestational age, weight and in agreement with clinical  observations.    Premature Infant recommended reference ranges:  Up to 24 hours.............<8.0 mg/dL  Up to 48 hours............<12.0 mg/dL  3-5 days..................<15.0 mg/dL  6-29 days.................<15.0 mg/dL       Alkaline Phosphatase   Date Value Ref Range Status   08/14/2024 50 (L) 55 - 135 U/L Final     AST   Date Value Ref Range Status   08/14/2024 14 10 - 40 U/L Final     ALT   Date Value Ref Range Status   08/14/2024 16 10 - 44 U/L Final     Anion Gap   Date Value Ref Range  Status   08/20/2024 7 (L) 8 - 16 mmol/L Final     eGFR if    Date Value Ref Range Status   05/27/2022 >60.0 >60 mL/min/1.73 m^2 Final     eGFR if non    Date Value Ref Range Status   05/27/2022 52.6 (A) >60 mL/min/1.73 m^2 Final     Comment:     Calculation used to obtain the estimated glomerular filtration  rate (eGFR) is the CKD-EPI equation.            Assessment:      1. 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  He has had MRI and abdominal CT scans and this disease has been stable.  Will continue lanreotide as it has been very well tolerated for him.  Discussed with him and daughter today.          Macrocytic anemia     He has been B12 deficient and is on monthly injections.  Will continue to watch this and check again with next labs.  Consider increasing the B12 to weekly x 4 weeks if this is stil low.  Check folate as well.          Relevant Orders    CBC Auto Differential    Vitamin B12    Folate            Plan:   As Above in Assessment      The plan was discussed with the patient and all questions/concerns have been answered to the patient's satisfaction.

## 2024-08-28 NOTE — ASSESSMENT & PLAN NOTE
Patient continues on lanreotide and continues to tolerate this well.  He has had MRI and abdominal CT scans and this disease has been stable.  Will continue lanreotide as it has been very well tolerated for him.  Discussed with him and daughter today.

## 2024-08-28 NOTE — PROGRESS NOTES
Subjective:       Patient ID: Roosevelt Alejandro is a 78 y.o. male.    Chief Complaint: No chief complaint on file.    The patient location is: [place]  The chief complaint leading to consultation is: follow up med changes    Visit type: audiovisual    Face to Face time with patient: 10 min  15 minutes of total time spent on the encounter, which includes face to face time and non-face to face time preparing to see the patient (eg, review of tests), Obtaining and/or reviewing separately obtained history, Documenting clinical information in the electronic or other health record, Independently interpreting results (not separately reported) and communicating results to the patient/family/caregiver, or Care coordination (not separately reported).         Each patient to whom he or she provides medical services by telemedicine is:  (1) informed of the relationship between the physician and patient and the respective role of any other health care provider with respect to management of the patient; and (2) notified that he or she may decline to receive medical services by telemedicine and may withdraw from such care at any time.    Notes:         Mr. Alejandro is a 78 year old male with dementia, HTN , and DM. The patient presents via telemedicine for follow up after stopping jardiance. Patient is accompanied by his daughter. Blood pressure levels have improved and blood sugar has remained stable. The dizziness is also resolved at this time. There are no additional complaints and concerns at this time.       Review of patient's allergies indicates:   Allergen Reactions    Epinephrine      Patient on Sandostatin and Epinephrine contraindicated    Aspirin Other (See Comments)     Internal bleeding    Lisinopril (bulk) Rash    Sulfa (sulfonamide antibiotics) Swelling and Rash         Current Outpatient Medications:     ARIPiprazole (ABILIFY) 5 MG Tab, Take 5 mg by mouth., Disp: , Rfl:     atorvastatin (LIPITOR) 20 MG tablet, TAKE 1  "TABLET EVERY DAY (NEED MD APPOINTMENT), Disp: 90 tablet, Rfl: 3    cyanocobalamin 1,000 mcg/mL injection, Inject 1 mL (1,000 mcg total) into the muscle every 28 days., Disp: 1 mL, Rfl: 11    donepeziL (ARICEPT) 5 MG tablet, Take 1 tablet (5 mg total) by mouth every evening., Disp: 30 tablet, Rfl: 4    EScitalopram oxalate (LEXAPRO) 10 MG tablet, Take 10 mg by mouth., Disp: , Rfl:     FOLIC ACID/MULTIVIT-MIN/LUTEIN (CENTRUM SILVER ORAL), Take 1 tablet by mouth once daily. , Disp: , Rfl:     memantine (NAMENDA) 5 MG Tab, Take 5 mg by mouth 2 (two) times daily., Disp: , Rfl:     metoprolol succinate (TOPROL-XL) 25 MG 24 hr tablet, TAKE 1 TABLET EVERY DAY (NEED MD APPOINTMENT), Disp: 90 tablet, Rfl: 3    NIFEdipine (PROCARDIA-XL) 30 MG (OSM) 24 hr tablet, TAKE 1 TABLET EVERY EVENING, Disp: 90 tablet, Rfl: 3    pantoprazole (PROTONIX) 40 MG tablet, Take 1 tablet (40 mg total) by mouth once daily., Disp: 30 tablet, Rfl: 11    QUEtiapine (SEROQUEL) 50 MG tablet, TAKE 1 TABLET BY MOUTH EVERY DAY AT BEDTIME FOR 30 DAYS, Disp: , Rfl:     syringe with needle 1 mL 25 gauge x 1" Syrg, 1 each by Misc.(Non-Drug; Combo Route) route every 28 days., Disp: 12 each, Rfl: 1    Lab Results   Component Value Date    WBC 4.49 08/20/2024    HGB 10.1 (L) 08/20/2024    HCT 31.8 (L) 08/20/2024     08/20/2024    CHOL 131 03/01/2024    TRIG 75 03/01/2024    HDL 33 (L) 03/01/2024    ALT 16 08/14/2024    AST 14 08/14/2024     08/20/2024    K 4.0 08/20/2024     08/20/2024    CREATININE 1.3 08/20/2024    BUN 16 08/20/2024    CO2 30 (H) 08/20/2024    TSH 2.313 03/04/2024    PSA 28.6 (H) 12/20/2017    INR 1.1 02/24/2019    GLUF 109 07/22/2004    HGBA1C 5.7 (H) 03/01/2024       Review of Systems   Constitutional:  Negative for activity change, appetite change and fever.   HENT:  Negative for postnasal drip, rhinorrhea and sinus pressure.    Eyes:  Negative for visual disturbance.   Respiratory:  Negative for cough and shortness of " breath.    Cardiovascular:  Negative for chest pain.   Gastrointestinal:  Negative for abdominal distention and abdominal pain.   Genitourinary:  Negative for difficulty urinating and dysuria.   Musculoskeletal:  Negative for arthralgias and myalgias.   Neurological:  Negative for headaches.   Hematological:  Negative for adenopathy.   Psychiatric/Behavioral:  Positive for confusion and decreased concentration. The patient is not nervous/anxious.        Objective:      Physical Exam  Constitutional:       General: He is not in acute distress.     Appearance: Normal appearance.   HENT:      Head: Normocephalic and atraumatic.   Pulmonary:      Effort: Pulmonary effort is normal. No respiratory distress.   Neurological:      Mental Status: He is alert and oriented to person, place, and time.      Cranial Nerves: No cranial nerve deficit.   Psychiatric:         Behavior: Behavior normal.         Assessment:       1. Dizziness    2. Essential hypertension    3. Type 2 diabetes mellitus without complication, without long-term current use of insulin    4. 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

## 2024-08-28 NOTE — ASSESSMENT & PLAN NOTE
He has been B12 deficient and is on monthly injections.  Will continue to watch this and check again with next labs.  Consider increasing the B12 to weekly x 4 weeks if this is stil low.  Check folate as well.

## 2024-09-12 ENCOUNTER — INFUSION (OUTPATIENT)
Dept: INFUSION THERAPY | Facility: HOSPITAL | Age: 79
End: 2024-09-12
Attending: INTERNAL MEDICINE
Payer: MEDICARE

## 2024-09-12 VITALS
WEIGHT: 167.5 LBS | SYSTOLIC BLOOD PRESSURE: 101 MMHG | OXYGEN SATURATION: 96 % | TEMPERATURE: 97 F | HEART RATE: 67 BPM | RESPIRATION RATE: 16 BRPM | DIASTOLIC BLOOD PRESSURE: 60 MMHG | HEIGHT: 74 IN | BODY MASS INDEX: 21.5 KG/M2

## 2024-09-12 DIAGNOSIS — C7A.8 NEUROENDOCRINE CARCINOMA METASTATIC TO INTRA-ABDOMINAL LYMPH NODE: Primary | ICD-10-CM

## 2024-09-12 DIAGNOSIS — C7B.8 NEUROENDOCRINE CARCINOMA METASTATIC TO INTRA-ABDOMINAL LYMPH NODE: Primary | ICD-10-CM

## 2024-09-12 PROCEDURE — 63600175 PHARM REV CODE 636 W HCPCS: Mod: JZ,JG | Performed by: INTERNAL MEDICINE

## 2024-09-12 PROCEDURE — 96372 THER/PROPH/DIAG INJ SC/IM: CPT

## 2024-09-12 RX ORDER — LANREOTIDE ACETATE 120 MG/.5ML
120 INJECTION SUBCUTANEOUS
Status: COMPLETED | OUTPATIENT
Start: 2024-09-12 | End: 2024-09-12

## 2024-09-12 RX ADMIN — LANREOTIDE ACETATE 120 MG: 120 INJECTION SUBCUTANEOUS at 03:09

## 2024-09-12 NOTE — PLAN OF CARE
Problem: Fall Injury Risk  Goal: Absence of Fall and Fall-Related Injury  Outcome: Progressing  Intervention: Promote Injury-Free Environment  Flowsheets (Taken 9/12/2024 6206)  Safety Promotion/Fall Prevention:   assistive device/personal item within reach   Fall Risk reviewed with patient/family   supervised activity   in recliner, wheels locked

## 2024-09-13 ENCOUNTER — LAB VISIT (OUTPATIENT)
Dept: LAB | Facility: HOSPITAL | Age: 79
End: 2024-09-13
Attending: INTERNAL MEDICINE
Payer: MEDICARE

## 2024-09-13 ENCOUNTER — OFFICE VISIT (OUTPATIENT)
Dept: FAMILY MEDICINE | Facility: CLINIC | Age: 79
End: 2024-09-13
Payer: MEDICARE

## 2024-09-13 VITALS
BODY MASS INDEX: 21.5 KG/M2 | TEMPERATURE: 98 F | RESPIRATION RATE: 16 BRPM | DIASTOLIC BLOOD PRESSURE: 58 MMHG | OXYGEN SATURATION: 93 % | SYSTOLIC BLOOD PRESSURE: 100 MMHG | HEART RATE: 69 BPM | WEIGHT: 167.56 LBS | HEIGHT: 74 IN

## 2024-09-13 DIAGNOSIS — E11.9 TYPE 2 DIABETES MELLITUS WITHOUT COMPLICATION, WITHOUT LONG-TERM CURRENT USE OF INSULIN: ICD-10-CM

## 2024-09-13 DIAGNOSIS — Z00.00 HEALTHCARE MAINTENANCE: Primary | ICD-10-CM

## 2024-09-13 DIAGNOSIS — N18.31 CHRONIC KIDNEY DISEASE, STAGE 3A: ICD-10-CM

## 2024-09-13 DIAGNOSIS — I10 ESSENTIAL HYPERTENSION: ICD-10-CM

## 2024-09-13 DIAGNOSIS — F02.C11 DEMENTIA IN OTHER DISEASES CLASSIFIED ELSEWHERE, SEVERE, WITH AGITATION: ICD-10-CM

## 2024-09-13 DIAGNOSIS — M51.36 DDD (DEGENERATIVE DISC DISEASE), LUMBAR: ICD-10-CM

## 2024-09-13 DIAGNOSIS — R73.02 GLUCOSE INTOLERANCE (IMPAIRED GLUCOSE TOLERANCE): ICD-10-CM

## 2024-09-13 DIAGNOSIS — D53.9 MACROCYTIC ANEMIA: ICD-10-CM

## 2024-09-13 LAB
BASOPHILS # BLD AUTO: 0.12 K/UL (ref 0–0.2)
BASOPHILS NFR BLD: 2.8 % (ref 0–1.9)
DIFFERENTIAL METHOD BLD: ABNORMAL
EOSINOPHIL # BLD AUTO: 0.3 K/UL (ref 0–0.5)
EOSINOPHIL NFR BLD: 6.8 % (ref 0–8)
ERYTHROCYTE [DISTWIDTH] IN BLOOD BY AUTOMATED COUNT: 14.6 % (ref 11.5–14.5)
FOLATE SERPL-MCNC: 12.3 NG/ML (ref 4–24)
HCT VFR BLD AUTO: 31.6 % (ref 40–54)
HGB BLD-MCNC: 10.2 G/DL (ref 14–18)
IMM GRANULOCYTES # BLD AUTO: 0.07 K/UL (ref 0–0.04)
IMM GRANULOCYTES NFR BLD AUTO: 1.6 % (ref 0–0.5)
LYMPHOCYTES # BLD AUTO: 0.7 K/UL (ref 1–4.8)
LYMPHOCYTES NFR BLD: 17 % (ref 18–48)
MCH RBC QN AUTO: 37.2 PG (ref 27–31)
MCHC RBC AUTO-ENTMCNC: 32.3 G/DL (ref 32–36)
MCV RBC AUTO: 115 FL (ref 82–98)
MONOCYTES # BLD AUTO: 0.3 K/UL (ref 0.3–1)
MONOCYTES NFR BLD: 6.8 % (ref 4–15)
NEUTROPHILS # BLD AUTO: 2.8 K/UL (ref 1.8–7.7)
NEUTROPHILS NFR BLD: 65 % (ref 38–73)
NRBC BLD-RTO: 0 /100 WBC
PLATELET # BLD AUTO: 268 K/UL (ref 150–450)
PMV BLD AUTO: 13.5 FL (ref 9.2–12.9)
RBC # BLD AUTO: 2.74 M/UL (ref 4.6–6.2)
VIT B12 SERPL-MCNC: 743 PG/ML (ref 210–950)
WBC # BLD AUTO: 4.29 K/UL (ref 3.9–12.7)

## 2024-09-13 PROCEDURE — 82607 VITAMIN B-12: CPT | Performed by: INTERNAL MEDICINE

## 2024-09-13 PROCEDURE — 99999 PR PBB SHADOW E&M-EST. PATIENT-LVL IV: CPT | Mod: PBBFAC,,, | Performed by: STUDENT IN AN ORGANIZED HEALTH CARE EDUCATION/TRAINING PROGRAM

## 2024-09-13 PROCEDURE — 82746 ASSAY OF FOLIC ACID SERUM: CPT | Performed by: INTERNAL MEDICINE

## 2024-09-13 PROCEDURE — 85025 COMPLETE CBC W/AUTO DIFF WBC: CPT | Performed by: INTERNAL MEDICINE

## 2024-09-13 RX ORDER — PANTOPRAZOLE SODIUM 40 MG/1
40 TABLET, DELAYED RELEASE ORAL DAILY
Qty: 90 TABLET | Refills: 3 | Status: SHIPPED | OUTPATIENT
Start: 2024-09-13 | End: 2025-09-13

## 2024-09-13 NOTE — PROGRESS NOTES
[facility] Note    Subjective:    The HPI and pertinent ROS is included in the Diagnostic Impression Remarks section at the end of the note. Please see below for further details. Chief complaint is at end of note.     Roosevelt is a pleasant intelligent patient who is here for evaluation.     Modified Medications    Modified Medication Previous Medication    PANTOPRAZOLE (PROTONIX) 40 MG TABLET pantoprazole (PROTONIX) 40 MG tablet       Take 1 tablet (40 mg total) by mouth once daily.    Take 1 tablet (40 mg total) by mouth once daily.       Data reviewed 274}  Previous medical records reviewed and summarized in plan section at end of note.      If you are due for any health screening(s) below please notify me so we can arrange them to be ordered and scheduled. Most healthy patients at your age complete them, but you are free to accept or refuse. If you can't do it, I'll definitely understand. If you can, I'd certainly appreciate it!     Tests to Keep You Healthy    Eye Exam: DUE  Colon Cancer Screening: Met on 5/17/2023  Last Blood Pressure <= 139/89 (9/13/2024): Yes      The following portions of the patient's history were reviewed and updated as appropriate: allergies, current medications, past family history, past medical history, past social history, past surgical history and problem list.    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  He  has a past surgical history that includes Eye surgery (Bilateral); Rotator cuff repair; Colonoscopy; Prostate biopsy; Colonoscopy (N/A, 1/21/2016); Prostatectomy (12/2017); Colonoscopy (N/A, 1/25/2019); Endoscopic ultrasound of upper gastrointestinal tract (N/A, 8/20/2019); Renal exploration (11/4/2019);  Cholecystectomy (11/4/2019); Liver biopsy (11/4/2019); Hyperthermic intraperitoneal chemotherapy (HIPEC) (11/4/2019); Surgical removal of lymph node (11/4/2019); and Colonoscopy (N/A, 5/17/2023).    He  reports that he quit smoking about 24 years ago. His smoking use included cigarettes. He has been exposed to tobacco smoke. He has never used smokeless tobacco. He reports that he does not currently use alcohol after a past usage of about 1.0 standard drink of alcohol per week. He reports that he does not use drugs.  He family history includes Asbestos in his father; Cancer in his father; Lung cancer in his father; No Known Problems in his daughter.    Review of patient's allergies indicates:   Allergen Reactions    Epinephrine      Patient on Sandostatin and Epinephrine contraindicated    Aspirin Other (See Comments)     Internal bleeding    Lisinopril (bulk) Rash    Sulfa (sulfonamide antibiotics) Swelling and Rash       Tobacco Use: Medium Risk (9/13/2024)    Patient History     Smoking Tobacco Use: Former     Smokeless Tobacco Use: Never     Passive Exposure: Past     Physical Examination  Physical Exam       General appearance: alert, cooperative, no distress  Neck: no thyromegaly, no neck stiffness  Lungs: clear to auscultation, no wheezes, rales or rhonchi, symmetric air entry  Heart: normal rate, regular rhythm, normal S1, S2, no murmurs, rubs, clicks or gallops  Abdomen: soft, nontender, nondistended, no rigidity, rebound, or guarding.   Back: no point tenderness over spine  Extremities: peripheral pulses normal, no unilateral leg swelling or calf tenderness   Neurological:alert, oriented, normal speech, no new focal findings or movement disorder noted from baseline      BP Readings from Last 3 Encounters:   09/13/24 (!) 100/58   09/12/24 101/60   08/28/24 (!) 116/57     Wt Readings from Last 3 Encounters:   09/13/24 76 kg (167 lb 8.8 oz)   09/12/24 76 kg (167 lb 8 oz)   08/28/24 75.4 kg (166 lb 4.8 oz)  "    BP (!) 100/58 (BP Location: Right arm, Patient Position: Sitting, BP Method: Large (Manual))   Pulse 69   Temp 97.8 °F (36.6 °C) (Oral)   Resp 16   Ht 6' 2" (1.88 m)   Wt 76 kg (167 lb 8.8 oz)   SpO2 (!) 93%   BMI 21.51 kg/m²    274}  Laboratory: I have reviewed old labs below:    274}    Lab Results   Component Value Date    WBC 4.49 08/20/2024    HGB 10.1 (L) 08/20/2024    HCT 31.8 (L) 08/20/2024     (H) 08/20/2024     08/20/2024     08/20/2024    K 4.0 08/20/2024     08/20/2024    CALCIUM 9.6 08/20/2024    PHOS 2.6 (L) 11/08/2019    CO2 30 (H) 08/20/2024    GLU 83 08/20/2024    BUN 16 08/20/2024    CREATININE 1.3 08/20/2024    EGFRNORACEVR 55.9 (A) 08/20/2024    ANIONGAP 7 (L) 08/20/2024    PROT 6.4 08/14/2024    ALBUMIN 3.8 08/14/2024    BILITOT 0.5 08/14/2024    ALKPHOS 50 (L) 08/14/2024    ALT 16 08/14/2024    AST 14 08/14/2024    INR 1.1 02/24/2019    CHOL 131 03/01/2024    TRIG 75 03/01/2024    HDL 33 (L) 03/01/2024    LDLCALC 83.0 03/01/2024    TSH 2.313 03/04/2024    PSA 28.6 (H) 12/20/2017    GLUF 109 07/22/2004    HGBA1C 5.7 (H) 03/01/2024      Lab reviewed by me: Particular labs of significance that I will monitor, workup, or treat to improve are mentioned below in diagnostic impression remarks.    Results  Laboratory Studies  Blood sugar stable, ranging from 100s to one teens. A1c was 5.7 in March.       Imaging/EKG: I have reviewed the pertinent results and my findings are noted in remarks.  274}    CC:   Chief Complaint   Patient presents with    Follow-up    Hypertension        274}    History of Present Illness  The patient is a 78-year-old male who presents for a follow-up visit. He is accompanied by his daughter.    Since his last visit, he has consulted with Dr. Davis, a neurologist at [facility]. He was prescribed Namenda and four other medications. His hallucinations have ceased, and he appears to be more aware of his daily " activities.    Approximately six weeks ago, he experienced dizziness. His blood pressure and sugar levels were monitored at home. He was taking Jardiance, which was suspected to cause dizziness due to its diuretic effects. After discontinuing Jardiance, his sugar levels remained stable in the 100s to 110s range, and his dizziness subsided. His blood pressure readings were 100/65 yesterday and 100/70 today. His weight has decreased from 212 to 167 pounds over the past month. He has been consuming protein drinks and increased his water intake due to suspected dehydration. He has not experienced dizziness recently. He is sleeping well and continues to take B12 supplements.     He visited the ER about a month ago where a urinalysis was performed. He was prescribed Flomax to aid in bladder emptying. He has undergone a radical prostatectomy and regularly uses the bathroom without any issues. He was given a potassium pill during his ER visit.    He started physical therapy because he was having some issues with his back and balance. He was having trouble getting out of the tub, so they installed a device to help him pull himself up, which resulted in a small right inguinal hernia. He went to the ER one day when he was complaining of pain, which was attributed to pulling himself up in the tub. He started physical therapy to strengthen his back and improve his balance. He went a few times, but all they had him do was ride a bike. A couple of times, his sessions were canceled because he was dizzy. T       Assessment/Plan  Roosevelt Alejandro is a 78 y.o. male who presents to clinic with:  1. Healthcare maintenance    2. Glucose intolerance (impaired glucose tolerance)    3. Essential hypertension    4. 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 Type 2 diabetes mellitus without complication, without long-term current use of insulin    6. Chronic kidney disease, stage 3a    7. DDD (degenerative disc  disease), lumbar       274}    Assessment & Plan  1. Hypertension.  He has had lower blood pressures than normal, with recent readings of 100/65 and 100/70. There has been significant weight loss, and he is currently stable at 167 lbs. It is recommended to stop nifedipine (Procardia) 30 mg and monitor blood pressure at home. If there are any concerns, he can send blood pressure readings via message.    2. Dementia.  His condition is stable, and he is no longer experiencing hallucinations. Continue current medications as prescribed by neurology. He is advised to follow up with Dr. Davis as needed.    3. Diabetes Mellitus.  His blood sugar levels have been stable, ranging from 100 to the low teens. He has been off Jardiance, which was suspected to cause dizziness. Continue monitoring blood sugar levels at home.    4. Right Inguinal Hernia.  The hernia is small and not currently causing pain. No surgical intervention is planned unless it becomes painful or bothersome.    5. Urinary Retention.  He was previously prescribed Flomax, which can lower blood pressure and cause dizziness. He is currently urinating regularly without issues. Discontinue Flomax if not needed.    6. Health Maintenance.  An A1c test will be added to the upcoming blood work ordered by Dr. Lloyd, which includes B12, folate, and CBC. Ensure he gets an eye exam this year.           This note was generated with the assistance of ambient listening technology. Verbal consent was obtained by the patient and accompanying visitor(s) for the recording of patient appointment to facilitate this note. I attest to having reviewed and edited the generated note for accuracy, though some syntax or spelling errors may persist. Please contact the author of this note for any clarification.      1. Glucose intolerance (impaired glucose tolerance)  - pantoprazole (PROTONIX) 40 MG tablet; Take 1 tablet (40 mg total) by mouth once daily.  Dispense: 90 tablet; Refill:  3    2. Healthcare maintenance    3. Essential hypertension    4. 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 Type 2 diabetes mellitus without complication, without long-term current use of insulin  - Hemoglobin A1C; Future  - Microalbumin/Creatinine Ratio, Urine; Future  - Ambulatory referral/consult to Optometry; Future    6. Chronic kidney disease, stage 3a    7. DDD (degenerative disc disease), lumbar      Roque Guillaume MD   274}    If you are due for any health screening(s) below please notify me so we can arrange them to be ordered and scheduled. Most healthy patients at your age complete them, but you are free to accept or refuse.     If you can't do it, I'll definitely understand. If you can, I'd certainly appreciate it!   Tests to Keep You Healthy    Eye Exam: DUE  Colon Cancer Screening: Met on 5/17/2023  Last Blood Pressure <= 139/89 (9/13/2024): Yes

## 2024-09-17 ENCOUNTER — PATIENT MESSAGE (OUTPATIENT)
Dept: OPHTHALMOLOGY | Facility: CLINIC | Age: 79
End: 2024-09-17
Payer: MEDICARE

## 2024-09-24 ENCOUNTER — PATIENT MESSAGE (OUTPATIENT)
Dept: FAMILY MEDICINE | Facility: CLINIC | Age: 79
End: 2024-09-24
Payer: MEDICARE

## 2024-10-05 ENCOUNTER — PATIENT MESSAGE (OUTPATIENT)
Dept: FAMILY MEDICINE | Facility: CLINIC | Age: 79
End: 2024-10-05
Payer: MEDICARE

## 2024-10-10 ENCOUNTER — INFUSION (OUTPATIENT)
Dept: INFUSION THERAPY | Facility: HOSPITAL | Age: 79
End: 2024-10-10
Attending: INTERNAL MEDICINE
Payer: MEDICARE

## 2024-10-10 VITALS
DIASTOLIC BLOOD PRESSURE: 61 MMHG | TEMPERATURE: 98 F | RESPIRATION RATE: 18 BRPM | SYSTOLIC BLOOD PRESSURE: 123 MMHG | BODY MASS INDEX: 21.19 KG/M2 | WEIGHT: 165.13 LBS | OXYGEN SATURATION: 97 % | HEART RATE: 64 BPM | HEIGHT: 74 IN

## 2024-10-10 DIAGNOSIS — C7A.8 NEUROENDOCRINE CARCINOMA METASTATIC TO INTRA-ABDOMINAL LYMPH NODE: Primary | ICD-10-CM

## 2024-10-10 DIAGNOSIS — C7B.8 NEUROENDOCRINE CARCINOMA METASTATIC TO INTRA-ABDOMINAL LYMPH NODE: Primary | ICD-10-CM

## 2024-10-10 PROCEDURE — 96372 THER/PROPH/DIAG INJ SC/IM: CPT

## 2024-10-10 PROCEDURE — 63600175 PHARM REV CODE 636 W HCPCS: Mod: JZ,JG | Performed by: INTERNAL MEDICINE

## 2024-10-10 RX ORDER — LANREOTIDE ACETATE 120 MG/.5ML
120 INJECTION SUBCUTANEOUS
Status: COMPLETED | OUTPATIENT
Start: 2024-10-10 | End: 2024-10-10

## 2024-10-10 RX ADMIN — LANREOTIDE ACETATE 120 MG: 120 INJECTION SUBCUTANEOUS at 03:10

## 2024-10-10 NOTE — PLAN OF CARE
Problem: Fall Injury Risk  Goal: Absence of Fall and Fall-Related Injury  Outcome: Progressing  Intervention: Identify and Manage Contributors  Flowsheets (Taken 10/10/2024 1517)  Medication Review/Management: medications reviewed  Intervention: Promote Injury-Free Environment  Flowsheets (Taken 10/10/2024 1517)  Safety Promotion/Fall Prevention: assistive device/personal item within reach

## 2024-10-25 ENCOUNTER — HOSPITAL ENCOUNTER (INPATIENT)
Facility: HOSPITAL | Age: 79
LOS: 1 days | Discharge: HOME HEALTH | DRG: 071 | End: 2024-10-28
Attending: EMERGENCY MEDICINE | Admitting: FAMILY MEDICINE
Payer: MEDICARE

## 2024-10-25 DIAGNOSIS — R06.02 SHORTNESS OF BREATH: ICD-10-CM

## 2024-10-25 DIAGNOSIS — R55 SYNCOPE AND COLLAPSE: ICD-10-CM

## 2024-10-25 DIAGNOSIS — J61 PULMONARY ASBESTOSIS: ICD-10-CM

## 2024-10-25 DIAGNOSIS — I49.3 FREQUENT UNIFOCAL PVCS: Chronic | ICD-10-CM

## 2024-10-25 DIAGNOSIS — R07.9 CHEST PAIN: ICD-10-CM

## 2024-10-25 DIAGNOSIS — R41.82 ALTERED MENTAL STATUS: Primary | ICD-10-CM

## 2024-10-25 LAB
ALBUMIN SERPL BCP-MCNC: 3.6 G/DL (ref 3.5–5.2)
ALP SERPL-CCNC: 63 U/L (ref 55–135)
ALT SERPL W/O P-5'-P-CCNC: 10 U/L (ref 10–44)
ANION GAP SERPL CALC-SCNC: 7 MMOL/L (ref 8–16)
AST SERPL-CCNC: 11 U/L (ref 10–40)
BASOPHILS # BLD AUTO: 0.11 K/UL (ref 0–0.2)
BASOPHILS NFR BLD: 2.5 % (ref 0–1.9)
BILIRUB SERPL-MCNC: 0.6 MG/DL (ref 0.1–1)
BNP SERPL-MCNC: 166 PG/ML (ref 0–99)
BUN SERPL-MCNC: 22 MG/DL (ref 8–23)
CALCIUM SERPL-MCNC: 9.1 MG/DL (ref 8.7–10.5)
CHLORIDE SERPL-SCNC: 103 MMOL/L (ref 95–110)
CO2 SERPL-SCNC: 29 MMOL/L (ref 23–29)
CREAT SERPL-MCNC: 1.2 MG/DL (ref 0.5–1.4)
D DIMER PPP IA.FEU-MCNC: 0.91 MG/L FEU (ref 0–0.49)
DIFFERENTIAL METHOD BLD: ABNORMAL
EOSINOPHIL # BLD AUTO: 0.2 K/UL (ref 0–0.5)
EOSINOPHIL NFR BLD: 4.5 % (ref 0–8)
ERYTHROCYTE [DISTWIDTH] IN BLOOD BY AUTOMATED COUNT: 14.6 % (ref 11.5–14.5)
EST. GFR  (NO RACE VARIABLE): >60 ML/MIN/1.73 M^2
GLUCOSE SERPL-MCNC: 157 MG/DL (ref 70–110)
HCT VFR BLD AUTO: 25.7 % (ref 40–54)
HGB BLD-MCNC: 8.7 G/DL (ref 14–18)
IMM GRANULOCYTES # BLD AUTO: 0.15 K/UL (ref 0–0.04)
IMM GRANULOCYTES NFR BLD AUTO: 3.4 % (ref 0–0.5)
LDH SERPL L TO P-CCNC: 1.98 MMOL/L (ref 0.5–2.2)
LYMPHOCYTES # BLD AUTO: 0.5 K/UL (ref 1–4.8)
LYMPHOCYTES NFR BLD: 12.2 % (ref 18–48)
MCH RBC QN AUTO: 39 PG (ref 27–31)
MCHC RBC AUTO-ENTMCNC: 33.9 G/DL (ref 32–36)
MCV RBC AUTO: 115 FL (ref 82–98)
MONOCYTES # BLD AUTO: 0.3 K/UL (ref 0.3–1)
MONOCYTES NFR BLD: 7 % (ref 4–15)
NEUTROPHILS # BLD AUTO: 3.1 K/UL (ref 1.8–7.7)
NEUTROPHILS NFR BLD: 70.4 % (ref 38–73)
NRBC BLD-RTO: 0 /100 WBC
PLATELET # BLD AUTO: 270 K/UL (ref 150–450)
PMV BLD AUTO: 12.7 FL (ref 9.2–12.9)
POCT GLUCOSE: 171 MG/DL (ref 70–110)
POTASSIUM SERPL-SCNC: 3.4 MMOL/L (ref 3.5–5.1)
PROT SERPL-MCNC: 5.8 G/DL (ref 6–8.4)
RBC # BLD AUTO: 2.23 M/UL (ref 4.6–6.2)
SAMPLE: NORMAL
SODIUM SERPL-SCNC: 139 MMOL/L (ref 136–145)
TROPONIN I SERPL HS-MCNC: 13.1 PG/ML (ref 0–14.9)
TROPONIN I SERPL HS-MCNC: 15.2 PG/ML (ref 0–14.9)
WBC # BLD AUTO: 4.41 K/UL (ref 3.9–12.7)

## 2024-10-25 PROCEDURE — 96365 THER/PROPH/DIAG IV INF INIT: CPT | Mod: 59

## 2024-10-25 PROCEDURE — 25000003 PHARM REV CODE 250: Performed by: INTERNAL MEDICINE

## 2024-10-25 PROCEDURE — 96372 THER/PROPH/DIAG INJ SC/IM: CPT | Performed by: INTERNAL MEDICINE

## 2024-10-25 PROCEDURE — 63600175 PHARM REV CODE 636 W HCPCS: Performed by: INTERNAL MEDICINE

## 2024-10-25 PROCEDURE — 82962 GLUCOSE BLOOD TEST: CPT

## 2024-10-25 PROCEDURE — 99285 EMERGENCY DEPT VISIT HI MDM: CPT | Mod: 25

## 2024-10-25 PROCEDURE — 85025 COMPLETE CBC W/AUTO DIFF WBC: CPT | Performed by: EMERGENCY MEDICINE

## 2024-10-25 PROCEDURE — 63600175 PHARM REV CODE 636 W HCPCS: Performed by: EMERGENCY MEDICINE

## 2024-10-25 PROCEDURE — 94761 N-INVAS EAR/PLS OXIMETRY MLT: CPT

## 2024-10-25 PROCEDURE — 81003 URINALYSIS AUTO W/O SCOPE: CPT | Mod: 59 | Performed by: EMERGENCY MEDICINE

## 2024-10-25 PROCEDURE — 80307 DRUG TEST PRSMV CHEM ANLYZR: CPT | Performed by: EMERGENCY MEDICINE

## 2024-10-25 PROCEDURE — G0378 HOSPITAL OBSERVATION PER HR: HCPCS

## 2024-10-25 PROCEDURE — 25000003 PHARM REV CODE 250: Performed by: EMERGENCY MEDICINE

## 2024-10-25 PROCEDURE — 83880 ASSAY OF NATRIURETIC PEPTIDE: CPT | Performed by: EMERGENCY MEDICINE

## 2024-10-25 PROCEDURE — 85379 FIBRIN DEGRADATION QUANT: CPT | Performed by: EMERGENCY MEDICINE

## 2024-10-25 PROCEDURE — 93010 ELECTROCARDIOGRAM REPORT: CPT | Mod: ,,, | Performed by: INTERNAL MEDICINE

## 2024-10-25 PROCEDURE — 36415 COLL VENOUS BLD VENIPUNCTURE: CPT | Performed by: INTERNAL MEDICINE

## 2024-10-25 PROCEDURE — 96361 HYDRATE IV INFUSION ADD-ON: CPT

## 2024-10-25 PROCEDURE — 84484 ASSAY OF TROPONIN QUANT: CPT | Performed by: EMERGENCY MEDICINE

## 2024-10-25 PROCEDURE — 96374 THER/PROPH/DIAG INJ IV PUSH: CPT | Mod: 59

## 2024-10-25 PROCEDURE — 80053 COMPREHEN METABOLIC PANEL: CPT | Performed by: EMERGENCY MEDICINE

## 2024-10-25 PROCEDURE — 93005 ELECTROCARDIOGRAM TRACING: CPT | Performed by: INTERNAL MEDICINE

## 2024-10-25 PROCEDURE — 87040 BLOOD CULTURE FOR BACTERIA: CPT | Performed by: INTERNAL MEDICINE

## 2024-10-25 PROCEDURE — 96365 THER/PROPH/DIAG IV INF INIT: CPT

## 2024-10-25 PROCEDURE — 25500020 PHARM REV CODE 255: Performed by: INTERNAL MEDICINE

## 2024-10-25 PROCEDURE — 84484 ASSAY OF TROPONIN QUANT: CPT | Mod: 91 | Performed by: INTERNAL MEDICINE

## 2024-10-25 RX ORDER — SODIUM,POTASSIUM PHOSPHATES 280-250MG
2 POWDER IN PACKET (EA) ORAL
Status: DISCONTINUED | OUTPATIENT
Start: 2024-10-25 | End: 2024-10-28 | Stop reason: HOSPADM

## 2024-10-25 RX ORDER — NALOXONE HCL 0.4 MG/ML
0.02 VIAL (ML) INJECTION
Status: DISCONTINUED | OUTPATIENT
Start: 2024-10-25 | End: 2024-10-28 | Stop reason: HOSPADM

## 2024-10-25 RX ORDER — FUROSEMIDE 10 MG/ML
20 INJECTION INTRAMUSCULAR; INTRAVENOUS ONCE
Status: COMPLETED | OUTPATIENT
Start: 2024-10-25 | End: 2024-10-25

## 2024-10-25 RX ORDER — IBUPROFEN 200 MG
24 TABLET ORAL
Status: DISCONTINUED | OUTPATIENT
Start: 2024-10-25 | End: 2024-10-28 | Stop reason: HOSPADM

## 2024-10-25 RX ORDER — ENOXAPARIN SODIUM 100 MG/ML
40 INJECTION SUBCUTANEOUS EVERY 24 HOURS
Status: DISCONTINUED | OUTPATIENT
Start: 2024-10-25 | End: 2024-10-28 | Stop reason: HOSPADM

## 2024-10-25 RX ORDER — INSULIN ASPART 100 [IU]/ML
0-10 INJECTION, SOLUTION INTRAVENOUS; SUBCUTANEOUS
Status: DISCONTINUED | OUTPATIENT
Start: 2024-10-25 | End: 2024-10-28 | Stop reason: HOSPADM

## 2024-10-25 RX ORDER — FAMOTIDINE 20 MG/1
20 TABLET, FILM COATED ORAL 2 TIMES DAILY
Status: DISCONTINUED | OUTPATIENT
Start: 2024-10-25 | End: 2024-10-28 | Stop reason: HOSPADM

## 2024-10-25 RX ORDER — TALC
6 POWDER (GRAM) TOPICAL NIGHTLY PRN
Status: DISCONTINUED | OUTPATIENT
Start: 2024-10-25 | End: 2024-10-28 | Stop reason: HOSPADM

## 2024-10-25 RX ORDER — LANOLIN ALCOHOL/MO/W.PET/CERES
800 CREAM (GRAM) TOPICAL
Status: DISCONTINUED | OUTPATIENT
Start: 2024-10-25 | End: 2024-10-28 | Stop reason: HOSPADM

## 2024-10-25 RX ORDER — ACETAMINOPHEN 325 MG/1
650 TABLET ORAL EVERY 8 HOURS PRN
Status: DISCONTINUED | OUTPATIENT
Start: 2024-10-25 | End: 2024-10-28 | Stop reason: HOSPADM

## 2024-10-25 RX ORDER — POTASSIUM CHLORIDE 20 MEQ/1
40 TABLET, EXTENDED RELEASE ORAL ONCE
Status: COMPLETED | OUTPATIENT
Start: 2024-10-25 | End: 2024-10-25

## 2024-10-25 RX ORDER — LEVOFLOXACIN 5 MG/ML
500 INJECTION, SOLUTION INTRAVENOUS
Status: DISCONTINUED | OUTPATIENT
Start: 2024-10-25 | End: 2024-10-25

## 2024-10-25 RX ORDER — GLUCAGON 1 MG
1 KIT INJECTION
Status: DISCONTINUED | OUTPATIENT
Start: 2024-10-25 | End: 2024-10-28 | Stop reason: HOSPADM

## 2024-10-25 RX ORDER — VANCOMYCIN HCL IN 5 % DEXTROSE 1G/250ML
15 PLASTIC BAG, INJECTION (ML) INTRAVENOUS ONCE
Status: DISCONTINUED | OUTPATIENT
Start: 2024-10-25 | End: 2024-10-25

## 2024-10-25 RX ORDER — LEVOFLOXACIN 5 MG/ML
500 INJECTION, SOLUTION INTRAVENOUS
Status: DISCONTINUED | OUTPATIENT
Start: 2024-10-26 | End: 2024-10-26

## 2024-10-25 RX ORDER — IBUPROFEN 200 MG
16 TABLET ORAL
Status: DISCONTINUED | OUTPATIENT
Start: 2024-10-25 | End: 2024-10-28 | Stop reason: HOSPADM

## 2024-10-25 RX ORDER — ONDANSETRON HYDROCHLORIDE 2 MG/ML
4 INJECTION, SOLUTION INTRAVENOUS EVERY 6 HOURS PRN
Status: DISCONTINUED | OUTPATIENT
Start: 2024-10-25 | End: 2024-10-28 | Stop reason: HOSPADM

## 2024-10-25 RX ORDER — LEVOFLOXACIN 5 MG/ML
750 INJECTION, SOLUTION INTRAVENOUS
Status: COMPLETED | OUTPATIENT
Start: 2024-10-25 | End: 2024-10-25

## 2024-10-25 RX ORDER — ACETAMINOPHEN 325 MG/1
650 TABLET ORAL EVERY 4 HOURS PRN
Status: DISCONTINUED | OUTPATIENT
Start: 2024-10-25 | End: 2024-10-28 | Stop reason: HOSPADM

## 2024-10-25 RX ORDER — METOPROLOL SUCCINATE 25 MG/1
25 TABLET, EXTENDED RELEASE ORAL DAILY
Status: DISCONTINUED | OUTPATIENT
Start: 2024-10-25 | End: 2024-10-28 | Stop reason: HOSPADM

## 2024-10-25 RX ORDER — ALUMINUM HYDROXIDE, MAGNESIUM HYDROXIDE, AND SIMETHICONE 1200; 120; 1200 MG/30ML; MG/30ML; MG/30ML
30 SUSPENSION ORAL 4 TIMES DAILY PRN
Status: DISCONTINUED | OUTPATIENT
Start: 2024-10-25 | End: 2024-10-28 | Stop reason: HOSPADM

## 2024-10-25 RX ADMIN — POTASSIUM CHLORIDE 40 MEQ: 1500 TABLET, EXTENDED RELEASE ORAL at 04:10

## 2024-10-25 RX ADMIN — METOPROLOL SUCCINATE 25 MG: 25 TABLET, FILM COATED, EXTENDED RELEASE ORAL at 04:10

## 2024-10-25 RX ADMIN — ENOXAPARIN SODIUM 40 MG: 40 INJECTION SUBCUTANEOUS at 04:10

## 2024-10-25 RX ADMIN — IOHEXOL 100 ML: 350 INJECTION, SOLUTION INTRAVENOUS at 03:10

## 2024-10-25 RX ADMIN — FUROSEMIDE 20 MG: 10 INJECTION, SOLUTION INTRAMUSCULAR; INTRAVENOUS at 04:10

## 2024-10-25 RX ADMIN — FAMOTIDINE 20 MG: 20 TABLET ORAL at 08:10

## 2024-10-25 RX ADMIN — LEVOFLOXACIN 750 MG: 750 INJECTION, SOLUTION INTRAVENOUS at 04:10

## 2024-10-25 RX ADMIN — SODIUM CHLORIDE, POTASSIUM CHLORIDE, SODIUM LACTATE AND CALCIUM CHLORIDE 1000 ML: 600; 310; 30; 20 INJECTION, SOLUTION INTRAVENOUS at 01:10

## 2024-10-25 RX ADMIN — VANCOMYCIN HYDROCHLORIDE 1500 MG: 1.5 INJECTION, POWDER, LYOPHILIZED, FOR SOLUTION INTRAVENOUS at 08:10

## 2024-10-25 NOTE — ASSESSMENT & PLAN NOTE
Patient's FSGs are controlled on current medication regimen.  Last A1c reviewed-   Lab Results   Component Value Date    HGBA1C 5.7 (H) 09/13/2024     Most recent fingerstick glucose reviewed-   Recent Labs   Lab 10/25/24  1341   POCTGLUCOSE 171*     Current correctional scale  Medium  Maintain anti-hyperglycemic dose as follows-   Antihyperglycemics (From admission, onward)      Start     Stop Route Frequency Ordered    10/25/24 1624  insulin aspart U-100 pen 0-10 Units         -- SubQ Before meals & nightly PRN 10/25/24 1524          Hold Oral hypoglycemics while patient is in the hospital.

## 2024-10-25 NOTE — ASSESSMENT & PLAN NOTE
Creatine stable for now. BMP reviewed- noted Estimated Creatinine Clearance: 52.8 mL/min (based on SCr of 1.2 mg/dL). according to latest data. Based on current GFR, CKD stage is stage 3 - GFR 30-59.  Monitor UOP and serial BMP and adjust therapy as needed. Renally dose meds. Avoid nephrotoxic medications and procedures.

## 2024-10-25 NOTE — H&P
[facility] - Emergency Dept  Hospital Medicine  History & Physical    Patient Name: Roosevelt Alejandro  MRN: [id number]  Patient Class: OP- Observation  Admission Date: 10/25/2024  Attending Physician: Good Anand MD   Primary Care Provider: Roque Guillaume MD         Patient information was obtained from relative(s), past medical records, ER records, and ER MD .     Subjective:     Principal Problem:Altered mental status    Chief Complaint:   Chief Complaint   Patient presents with    Altered Mental Status     Pt was eating breakfast with his wife at 11:00, started of SOB then she help to the couch and went unresponsive. When EMS arrived on scene they noticed that [facility] medics were bagging the pt. Pt had pinpoint pupils so pt was given 2mg Narcan IN and after IV was established pt was given additional 1mg narcan IVP. Pt is easily arousible  by verbal commands.        HPI: 78 year old pt getting admitted with syncope/collapse and AMS  Pt was eating breakfast and suddenly he collapsed and was witnessed by pts wife   \he was c/o shortness of breath  EMS were called and was given narcan because of pinpoint pupils  Pt was escorted to ER  Pt was confused when saw in ER  Sleepy but was easily arousable     Past Medical History:   Diagnosis Date    Arteriovenous malformation (AVM)     Arthritis     back pain    Asbestosis     BPH (benign prostatic hyperplasia)     Hypertension     Primary malignant neuroendocrine neoplasm of duodenum 08/2019    Secondary neuroendocrine tumor of liver     Vertigo, aural, unspecified laterality 03/06/2019       Past Surgical History:   Procedure Laterality Date    CHOLECYSTECTOMY  11/4/2019    Procedure: CHOLECYSTECTOMY;  Surgeon: SP Khalil MD;  Location: [facility] OR;  Service: General;;    COLONOSCOPY      2009    COLONOSCOPY N/A 1/21/2016    Procedure: COLONOSCOPY;  Surgeon: Toby Maciel MD;  Location: [facility];  Service: Endoscopy;  Laterality: N/A;     COLONOSCOPY N/A 1/25/2019    Procedure: COLONOSCOPY;  Surgeon: Toby Maciel MD;  Location: [facility] ENDO;  Service: Endoscopy;  Laterality: N/A;    COLONOSCOPY N/A 5/17/2023    Procedure: COLONOSCOPY;  Surgeon: Toby Maciel MD;  Location: [facility] ENDO;  Service: Endoscopy;  Laterality: N/A;  lm wife/ppm    ENDOSCOPIC ULTRASOUND OF UPPER GASTROINTESTINAL TRACT N/A 8/20/2019    Procedure: ULTRASOUND, UPPER GI TRACT, ENDOSCOPIC;  Surgeon: Forest Lucio III, MD;  Location: [facility] ENDO;  Service: Endoscopy;  Laterality: N/A;    EYE SURGERY Bilateral     cataracts, retinal detachment    HYPERTHERMIC INTRAPERITONEAL CHEMOTHERAPY (HIPEC)  11/4/2019    Procedure: CHEMOTHERAPY, INTRAPERITONEAL, HYPERTHERMIC;  Surgeon: SP Khalil MD;  Location: [facility];  Service: General;;    LIVER BIOPSY  11/4/2019    Procedure: BIOPSY, LIVER;  Surgeon: SP Khalil MD;  Location: [facility] OR;  Service: General;;  BIOPSY LIVER WEDGE X3    PROSTATE BIOPSY      PROSTATECTOMY  12/2017    RENAL EXPLORATION  11/4/2019    Procedure: EXPLORATION, KIDNEY;  Surgeon: SP Khalil MD;  Location: [facility] OR;  Service: General;;    ROTATOR CUFF REPAIR      left    SURGICAL REMOVAL OF LYMPH NODE  11/4/2019    Procedure: EXCISION, LYMPH NODE;  Surgeon: SP Khalil MD;  Location: [facility] OR;  Service: General;;  EXTENSIVE RETROPERITONEAL NODE DISSECTION  EXPLORATION OF MESENTERIC ARTERY/VEIN       Review of patient's allergies indicates:   Allergen Reactions    Epinephrine      Patient on Sandostatin and Epinephrine contraindicated    Aspirin Other (See Comments)     Internal bleeding    Lisinopril (bulk) Rash    Sulfa (sulfonamide antibiotics) Swelling and Rash       No current facility-administered medications on file prior to encounter.     Current Outpatient Medications on File Prior to Encounter   Medication Sig    ARIPiprazole (ABILIFY) 5 MG Tab Take 5 mg by mouth once daily.    atorvastatin (LIPITOR) 20 MG tablet TAKE 1  "TABLET EVERY DAY (NEED MD APPOINTMENT) (Patient taking differently: Take 20 mg by mouth once daily.)    cyanocobalamin 1,000 mcg/mL injection Inject 1 mL (1,000 mcg total) into the muscle every 28 days.    donepeziL (ARICEPT) 5 MG tablet Take 1 tablet (5 mg total) by mouth every evening.    EScitalopram oxalate (LEXAPRO) 10 MG tablet Take 10 mg by mouth once daily.    FOLIC ACID/MULTIVIT-MIN/LUTEIN (CENTRUM SILVER ORAL) Take 1 tablet by mouth once daily.     memantine (NAMENDA) 5 MG Tab Take 5 mg by mouth 2 (two) times daily.    metoprolol succinate (TOPROL-XL) 25 MG 24 hr tablet TAKE 1 TABLET EVERY DAY (NEED MD APPOINTMENT) (Patient taking differently: Take 25 mg by mouth once daily.)    QUEtiapine (SEROQUEL) 50 MG tablet Take 50 mg by mouth nightly.    syringe with needle 1 mL 25 gauge x 1" Syrg 1 each by Misc.(Non-Drug; Combo Route) route every 28 days.     Family History       Problem Relation (Age of Onset)    Asbestos Father    Cancer Father    Lung cancer Father    No Known Problems Daughter          Tobacco Use    Smoking status: Former     Current packs/day: 0.00     Types: Cigarettes     Quit date: 3/30/2000     Years since quittin.5     Passive exposure: Past    Smokeless tobacco: Never   Substance and Sexual Activity    Alcohol use: Not Currently     Alcohol/week: 1.0 standard drink of alcohol     Types: 1 Cans of beer per week     Comment: rare    Drug use: No    Sexual activity: Yes     Partners: Female     Review of Systems   Unable to perform ROS: Mental status change     Objective:     Vital Signs (Most Recent):  Temp: 97.9 °F (36.6 °C) (10/25/24 1354)  Pulse: 65 (10/25/24 1320)  Resp: 16 (10/25/24 1320)  BP: (!) 156/74 (10/25/24 1320)  SpO2: 97 % (10/25/24 1320) Vital Signs (24h Range):  Temp:  [97.9 °F (36.6 °C)] 97.9 °F (36.6 °C)  Pulse:  [65] 65  Resp:  [16] 16  SpO2:  [97 %] 97 %  BP: (156)/(74) 156/74     Weight: 74.8 kg (165 lb)  Body mass index is 21.18 kg/m².     Physical Exam  Vitals " and nursing note reviewed.   Constitutional:       Appearance: Normal appearance. He is well-developed.   HENT:      Head: Atraumatic.      Right Ear: External ear normal.      Left Ear: External ear normal.      Nose: Nose normal.      Mouth/Throat:      Mouth: Mucous membranes are moist.   Eyes:      Extraocular Movements: Extraocular movements intact.   Cardiovascular:      Rate and Rhythm: Normal rate.   Pulmonary:      Effort: Pulmonary effort is normal.   Abdominal:      Palpations: Abdomen is soft.   Musculoskeletal:         General: Normal range of motion.      Cervical back: Full passive range of motion without pain and normal range of motion.   Skin:     General: Skin is warm.   Neurological:      General: No focal deficit present.      Mental Status: He is disoriented.                Significant Labs: All pertinent labs within the past 24 hours have been reviewed.  CBC:   Recent Labs   Lab 10/25/24  1340   WBC 4.41   HGB 8.7*   HCT 25.7*        CMP:   Recent Labs   Lab 10/25/24  1340      K 3.4*      CO2 29   *   BUN 22   CREATININE 1.2   CALCIUM 9.1   PROT 5.8*   ALBUMIN 3.6   BILITOT 0.6   ALKPHOS 63   AST 11   ALT 10   ANIONGAP 7*       Significant Imaging: I have reviewed all pertinent imaging results/findings within the past 24 hours.    Assessment/Plan:     * Altered mental status  Getting admitted with AMS  CT Brain  Rapid flu/COVID/strep A/Blood and urine culture   Stop all sleepy medications contributing to polypharmacy  Start on iv levofloxacin  Will give one dose of iv vancomycin     Syncope and collapse  Pt was SOB before syncope collapse  CXR showing atypical pneumonia vs CHF picture  D DIMER elevated  Check CT brain/ECHO/USS carotids and CTA Chest       Type 2 diabetes mellitus without complication, without long-term current use of insulin  Patient's FSGs are controlled on current medication regimen.  Last A1c reviewed-   Lab Results   Component Value Date    HGBA1C  5.7 (H) 09/13/2024     Most recent fingerstick glucose reviewed-   Recent Labs   Lab 10/25/24  1341   POCTGLUCOSE 171*     Current correctional scale  Medium  Maintain anti-hyperglycemic dose as follows-   Antihyperglycemics (From admission, onward)      Start     Stop Route Frequency Ordered    10/25/24 1624  insulin aspart U-100 pen 0-10 Units         -- SubQ Before meals & nightly PRN 10/25/24 1524          Hold Oral hypoglycemics while patient is in the hospital.    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 BMP reviewed- noted Estimated Creatinine Clearance: 52.8 mL/min (based on SCr of 1.2 mg/dL). according to latest data. Based on current GFR, CKD stage is stage 3 - GFR 30-59.  Monitor UOP and serial BMP and adjust therapy as needed. Renally dose meds. Avoid nephrotoxic medications and procedures.    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  Aware         H/O arteriovenous malformation (AVM)  Aware       Essential hypertension  Patients blood pressure range in the last 24 hours was: BP  Min: 156/74  Max: 156/74.The patient's inpatient anti-hypertensive regimen is listed below:  Current Antihypertensives  furosemide injection 20 mg, Once, Intravenous  metoprolol succinate (TOPROL-XL) 24 hr tablet 25 mg, Daily, Oral    Plan  - BP is controlled, no changes needed to their regimen        VTE Risk Mitigation (From admission, onward)           Ordered     enoxaparin injection 40 mg  Daily         10/25/24 1524     IP VTE HIGH RISK PATIENT  Once         10/25/24 1524     Place sequential compression device  Until discontinued         10/25/24 1524                       On 10/25/2024, patient should be placed in hospital observation services under my care.             Good Anand MD  Department of Hospital  Medicine  [facility] - Emergency Dept

## 2024-10-25 NOTE — ASSESSMENT & PLAN NOTE
Pt was SOB before syncope collapse  CXR showing atypical pneumonia vs CHF picture  D DIMER elevated  Check CT brain/ECHO/USS carotids and CTA Chest

## 2024-10-25 NOTE — ASSESSMENT & PLAN NOTE
Getting admitted with AMS  CT Brain  Rapid flu/COVID/strep A/Blood and urine culture   Stop all sleepy medications contributing to polypharmacy  Start on iv levofloxacin  Will give one dose of iv vancomycin

## 2024-10-25 NOTE — ASSESSMENT & PLAN NOTE
Patients blood pressure range in the last 24 hours was: BP  Min: 156/74  Max: 156/74.The patient's inpatient anti-hypertensive regimen is listed below:  Current Antihypertensives  furosemide injection 20 mg, Once, Intravenous  metoprolol succinate (TOPROL-XL) 24 hr tablet 25 mg, Daily, Oral    Plan  - BP is controlled, no changes needed to their regimen

## 2024-10-25 NOTE — SUBJECTIVE & OBJECTIVE
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 ENDO;  Service: Endoscopy;  Laterality: N/A;    COLONOSCOPY N/A 1/25/2019    Procedure: COLONOSCOPY;  Surgeon: Toby Maciel MD;  Location: [facility] ENDO;  Service: Endoscopy;  Laterality: N/A;    COLONOSCOPY N/A 5/17/2023    Procedure: COLONOSCOPY;  Surgeon: Toby Maciel MD;  Location: [facility] ENDO;  Service: Endoscopy;  Laterality: N/A;  lm wife/ppm    ENDOSCOPIC ULTRASOUND OF UPPER GASTROINTESTINAL TRACT N/A 8/20/2019    Procedure: ULTRASOUND, UPPER GI TRACT, ENDOSCOPIC;  Surgeon: Forest Lucio III, MD;  Location: [facility];  Service: Endoscopy;  Laterality: N/A;    EYE SURGERY Bilateral     cataracts, retinal detachment    HYPERTHERMIC INTRAPERITONEAL CHEMOTHERAPY (HIPEC)  11/4/2019    Procedure: CHEMOTHERAPY, INTRAPERITONEAL, HYPERTHERMIC;  Surgeon: SP Khalil MD;  Location: [facility] OR;  Service: General;;    LIVER BIOPSY  11/4/2019    Procedure: BIOPSY, LIVER;  Surgeon: SP Khalil MD;  Location: [facility] OR;  Service: General;;  BIOPSY LIVER WEDGE X3    PROSTATE BIOPSY      PROSTATECTOMY  12/2017    RENAL EXPLORATION  11/4/2019    Procedure: EXPLORATION, KIDNEY;  Surgeon: SP Khalil MD;  Location: [facility] OR;  Service: General;;    ROTATOR CUFF REPAIR      left    SURGICAL REMOVAL OF LYMPH NODE  11/4/2019    Procedure: EXCISION, LYMPH  "NODE;  Surgeon: SP Khalil MD;  Location: [facility] OR;  Service: General;;  EXTENSIVE RETROPERITONEAL NODE DISSECTION  EXPLORATION OF MESENTERIC ARTERY/VEIN       Review of patient's allergies indicates:   Allergen Reactions    Epinephrine      Patient on Sandostatin and Epinephrine contraindicated    Aspirin Other (See Comments)     Internal bleeding    Lisinopril (bulk) Rash    Sulfa (sulfonamide antibiotics) Swelling and Rash       No current facility-administered medications on file prior to encounter.     Current Outpatient Medications on File Prior to Encounter   Medication Sig    ARIPiprazole (ABILIFY) 5 MG Tab Take 5 mg by mouth once daily.    atorvastatin (LIPITOR) 20 MG tablet TAKE 1 TABLET EVERY DAY (NEED MD APPOINTMENT) (Patient taking differently: Take 20 mg by mouth once daily.)    cyanocobalamin 1,000 mcg/mL injection Inject 1 mL (1,000 mcg total) into the muscle every 28 days.    donepeziL (ARICEPT) 5 MG tablet Take 1 tablet (5 mg total) by mouth every evening.    EScitalopram oxalate (LEXAPRO) 10 MG tablet Take 10 mg by mouth once daily.    FOLIC ACID/MULTIVIT-MIN/LUTEIN (CENTRUM SILVER ORAL) Take 1 tablet by mouth once daily.     memantine (NAMENDA) 5 MG Tab Take 5 mg by mouth 2 (two) times daily.    metoprolol succinate (TOPROL-XL) 25 MG 24 hr tablet TAKE 1 TABLET EVERY DAY (NEED MD APPOINTMENT) (Patient taking differently: Take 25 mg by mouth once daily.)    QUEtiapine (SEROQUEL) 50 MG tablet Take 50 mg by mouth nightly.    syringe with needle 1 mL 25 gauge x 1" Syrg 1 each by Misc.(Non-Drug; Combo Route) route every 28 days.     Family History       Problem Relation (Age of Onset)    Asbestos Father    Cancer Father    Lung cancer Father    No Known Problems Daughter          Tobacco Use    Smoking status: Former     Current packs/day: 0.00     Types: Cigarettes     Quit date: 3/30/2000     Years since quittin.5     Passive exposure: Past    Smokeless tobacco: Never   Substance and " Sexual Activity    Alcohol use: Not Currently     Alcohol/week: 1.0 standard drink of alcohol     Types: 1 Cans of beer per week     Comment: rare    Drug use: No    Sexual activity: Yes     Partners: Female     Review of Systems   Unable to perform ROS: Mental status change     Objective:     Vital Signs (Most Recent):  Temp: 97.9 °F (36.6 °C) (10/25/24 1354)  Pulse: 65 (10/25/24 1320)  Resp: 16 (10/25/24 1320)  BP: (!) 156/74 (10/25/24 1320)  SpO2: 97 % (10/25/24 1320) Vital Signs (24h Range):  Temp:  [97.9 °F (36.6 °C)] 97.9 °F (36.6 °C)  Pulse:  [65] 65  Resp:  [16] 16  SpO2:  [97 %] 97 %  BP: (156)/(74) 156/74     Weight: 74.8 kg (165 lb)  Body mass index is 21.18 kg/m².     Physical Exam  Vitals and nursing note reviewed.   Constitutional:       Appearance: Normal appearance. He is well-developed.   HENT:      Head: Atraumatic.      Right Ear: External ear normal.      Left Ear: External ear normal.      Nose: Nose normal.      Mouth/Throat:      Mouth: Mucous membranes are moist.   Eyes:      Extraocular Movements: Extraocular movements intact.   Cardiovascular:      Rate and Rhythm: Normal rate.   Pulmonary:      Effort: Pulmonary effort is normal.   Abdominal:      Palpations: Abdomen is soft.   Musculoskeletal:         General: Normal range of motion.      Cervical back: Full passive range of motion without pain and normal range of motion.   Skin:     General: Skin is warm.   Neurological:      General: No focal deficit present.      Mental Status: He is disoriented.                Significant Labs: All pertinent labs within the past 24 hours have been reviewed.  CBC:   Recent Labs   Lab 10/25/24  1340   WBC 4.41   HGB 8.7*   HCT 25.7*        CMP:   Recent Labs   Lab 10/25/24  1340      K 3.4*      CO2 29   *   BUN 22   CREATININE 1.2   CALCIUM 9.1   PROT 5.8*   ALBUMIN 3.6   BILITOT 0.6   ALKPHOS 63   AST 11   ALT 10   ANIONGAP 7*       Significant Imaging: I have reviewed all  pertinent imaging results/findings within the past 24 hours.

## 2024-10-25 NOTE — ED PROVIDER NOTES
Encounter Date: 10/25/2024       History     Chief Complaint   Patient presents with    Altered Mental Status     Pt was eating breakfast with his wife at 11:00, started of SOB then she help to the couch and went unresponsive. When EMS arrived on scene they noticed that [facility] medics were bagging the pt. Pt had pinpoint pupils so pt was given 2mg Narcan IN and after IV was established pt was given additional 1mg narcan IVP. Pt is easily arousible  by verbal commands.     78-year-old male presented emergency department brought in by EMS.  EMS was called as patient was complaining of shortness of breath.  Per EMS patient was in the process of eating breakfast and told his wife that he was getting short of breath and became unresponsive after that.  Patient was being bagged by fire department when EMS got there.  Patient had pinpoint pupils was given Narcan and patient did wake up more after getting Narcan and now is talking and is following commands.  Patient received 2 doses of Narcan.  Patient not on any narcotic medication per history given by EMS.  Patient currently denies any complaints.  Denies any shortness of breath.  Patient was never hypoxic.  Patient denies chest pain or shortness of breath or abdominal pain or any focal weakness or numbness however appears to be very sleepy.  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    Secondary neuroendocrine tumor of liver      Vertigo, aural, unspecified laterality 03/06/2019     Past Surgical History:   Procedure Laterality Date    CHOLECYSTECTOMY  11/4/2019    Procedure: CHOLECYSTECTOMY;  Surgeon: SP Khalil MD;  Location: [facility] OR;  Service: General;;    COLONOSCOPY      2009    COLONOSCOPY N/A 1/21/2016    Procedure: COLONOSCOPY;  Surgeon: Toby Maciel MD;  Location: [facility] ENDO;  Service: Endoscopy;  Laterality: N/A;    COLONOSCOPY N/A 1/25/2019    Procedure: COLONOSCOPY;  Surgeon: Toby Maciel MD;  Location: [facility] ENDO;  Service: Endoscopy;  Laterality: N/A;    COLONOSCOPY N/A 5/17/2023    Procedure: COLONOSCOPY;  Surgeon: Toby Maciel MD;  Location: [facility] ENDO;  Service: Endoscopy;  Laterality: N/A;  lm wife/ppm    ENDOSCOPIC ULTRASOUND OF UPPER GASTROINTESTINAL TRACT N/A 8/20/2019    Procedure: ULTRASOUND, UPPER GI TRACT, ENDOSCOPIC;  Surgeon: Forest Lucio III, MD;  Location: [facility];  Service: Endoscopy;  Laterality: N/A;    EYE SURGERY Bilateral     cataracts, retinal detachment    HYPERTHERMIC INTRAPERITONEAL CHEMOTHERAPY (HIPEC)  11/4/2019    Procedure: CHEMOTHERAPY, INTRAPERITONEAL, HYPERTHERMIC;  Surgeon: SP Khalil MD;  Location: [facility] OR;  Service: General;;    LIVER BIOPSY  11/4/2019    Procedure: BIOPSY, LIVER;  Surgeon: SP Khalil MD;  Location: [facility] OR;  Service: General;;  BIOPSY LIVER WEDGE X3    PROSTATE BIOPSY      PROSTATECTOMY  12/2017    RENAL EXPLORATION  11/4/2019    Procedure: EXPLORATION, KIDNEY;  Surgeon: SP Khalil MD;  Location: [facility] OR;  Service: General;;    ROTATOR CUFF REPAIR      left    SURGICAL REMOVAL OF LYMPH NODE  11/4/2019    Procedure: EXCISION, LYMPH NODE;  Surgeon: SP Khalil MD;  Location: [facility] OR;  Service: General;;  EXTENSIVE RETROPERITONEAL NODE DISSECTION  EXPLORATION OF MESENTERIC ARTERY/VEIN     Family History   Problem Relation Name Age of Onset    Cancer Father          lung/ asbestos    Asbestos Father      Lung  cancer Father      No Known Problems Daughter      Melanoma Neg Hx      Psoriasis Neg Hx      Lupus Neg Hx      Eczema Neg Hx       Social History     Tobacco Use    Smoking status: Former     Current packs/day: 0.00     Types: Cigarettes     Quit date: 3/30/2000     Years since quittin.5     Passive exposure: Past    Smokeless tobacco: Never   Substance Use Topics    Alcohol use: Not Currently     Alcohol/week: 1.0 standard drink of alcohol     Types: 1 Cans of beer per week     Comment: rare    Drug use: No     Review of Systems   Unable to perform ROS: Mental status change   Constitutional: Negative.    HENT: Negative.     Eyes: Negative.    Respiratory: Negative.     Cardiovascular: Negative.    Gastrointestinal: Negative.    Endocrine: Negative.    Genitourinary: Negative.    Musculoskeletal: Negative.    Skin: Negative.    Allergic/Immunologic: Negative.    Neurological: Negative.    Hematological: Negative.    Psychiatric/Behavioral: Negative.     All other systems reviewed and are negative.      Physical Exam     Initial Vitals   BP Pulse Resp Temp SpO2   10/25/24 1320 10/25/24 1320 10/25/24 1320 10/25/24 1354 10/25/24 1320   (!) 156/74 65 16 97.9 °F (36.6 °C) 97 %      MAP       --                Physical Exam    Nursing note and vitals reviewed.  Constitutional: He appears well-developed and well-nourished.   HENT:   Head: Normocephalic and atraumatic.   Nose: Nose normal.   Eyes: Conjunctivae and EOM are normal.   Neck: No tracheal deviation present.   Normal range of motion.  Cardiovascular:  Normal rate, regular rhythm, normal heart sounds and intact distal pulses.     Exam reveals no friction rub.       No murmur heard.  Pulmonary/Chest: Breath sounds normal. No respiratory distress. He has no wheezes. He has no rales.   Abdominal: Abdomen is soft. He exhibits no distension. There is no abdominal tenderness.   Musculoskeletal:         General: Normal range of motion.      Cervical back: Normal  range of motion.     Neurological: He has normal strength. No cranial nerve deficit or sensory deficit.   Oriented to place and person however very slow to respond but able to move all extremities and able to follow commands without difficulty.   Skin: Skin is warm and dry. Capillary refill takes less than 2 seconds.   Psychiatric: He has a normal mood and affect. Thought content normal.         ED Course   Procedures  Labs Reviewed   CBC W/ AUTO DIFFERENTIAL - Abnormal       Result Value    WBC 4.41      RBC 2.23 (*)     Hemoglobin 8.7 (*)     Hematocrit 25.7 (*)      (*)     MCH 39.0 (*)     MCHC 33.9      RDW 14.6 (*)     Platelets 270      MPV 12.7      Immature Granulocytes 3.4 (*)     Gran # (ANC) 3.1      Immature Grans (Abs) 0.15 (*)     Lymph # 0.5 (*)     Mono # 0.3      Eos # 0.2      Baso # 0.11      nRBC 0      Gran % 70.4      Lymph % 12.2 (*)     Mono % 7.0      Eosinophil % 4.5      Basophil % 2.5 (*)     Differential Method Automated     COMPREHENSIVE METABOLIC PANEL - Abnormal    Sodium 139      Potassium 3.4 (*)     Chloride 103      CO2 29      Glucose 157 (*)     BUN 22      Creatinine 1.2      Calcium 9.1      Total Protein 5.8 (*)     Albumin 3.6      Total Bilirubin 0.6      Alkaline Phosphatase 63      AST 11      ALT 10      eGFR >60.0      Anion Gap 7 (*)    B-TYPE NATRIURETIC PEPTIDE - Abnormal     (*)    D DIMER, QUANTITATIVE - Abnormal    D-Dimer 0.91 (*)     Narrative:     D dimer critical result(s) repeated. Called and verbal readback   obtained from [facility], RN.  by KIMBERLEE 10/25/2024 14:50   POCT GLUCOSE - Abnormal    POCT Glucose 171 (*)    CULTURE, BLOOD   CULTURE, URINE   GROUP A STREP, MOLECULAR   TROPONIN I HIGH SENSITIVITY    Troponin I High Sensitivity 13.1     DRUG SCREEN PANEL, URINE EMERGENCY   URINALYSIS, REFLEX TO URINE CULTURE   INFLUENZA A AND B ANTIGEN   SARS-COV-2 RNA AMPLIFICATION, QUAL   TROPONIN I HIGH SENSITIVITY   ISTAT LACTATE    POC  Lactate 1.98      Sample VENOUS     POCT GLUCOSE MONITORING CONTINUOUS   POCT LACTATE     EKG Readings: (Independently Interpreted)   Initial Reading: No STEMI. Rhythm: Normal Sinus Rhythm. Ectopy: No Ectopy. Conduction: Normal.     ECG Results              EKG 12-lead (In process)        Collection Time Result Time QRS Duration OHS QTC Calculation    10/25/24 15:06:13 10/25/24 15:46:42 74 428                     In process by Interface, Lab In [facility] (10/25/24 16:05:59)                   Narrative:    Test Reason : R06.02,    Vent. Rate : 057 BPM     Atrial Rate : 057 BPM     P-R Int : 158 ms          QRS Dur : 074 ms      QT Int : 440 ms       P-R-T Axes : 063 059 061 degrees     QTc Int : 428 ms    Sinus bradycardia  Low voltage QRS  Borderline Abnormal ECG  When compared with ECG of 24-FEB-2019 12:25,  No significant change was found    Referred By: [email or web address]   SELF           Confirmed By:                       In process by Interface, Lab In [facility] (10/25/24 16:03:54)                   Narrative:    Test Reason : R06.02,    Vent. Rate : 057 BPM     Atrial Rate : 057 BPM     P-R Int : 158 ms          QRS Dur : 074 ms      QT Int : 440 ms       P-R-T Axes : 063 059 061 degrees     QTc Int : 428 ms    Sinus bradycardia  Low voltage QRS  Borderline Abnormal ECG  When compared with ECG of 24-FEB-2019 12:25,  No significant change was found    Referred By: [email or web address]   SELF           Confirmed By:                                   Imaging Results              US Carotid Bilateral (In process)                      CTA Chest Non-Coronary (PE Studies) (In process)  Result time 10/25/24 16:07:38                     CT Head Without Contrast (Final result)  Result time 10/25/24 15:22:43      Final result by Pranav Levy IV, MD (10/25/24 15:22:43)                   Impression:      Chronic small vessel ischemic changes and age-related involutional changes.    Arteriosclerosis.    No acute intracranial  abnormality.      Electronically signed by: Pranav Levy  Date:    10/25/2024  Time:    15:22               Narrative:    EXAMINATION:  CMS MANDATED QUALITY DATA - CT RADIATION - 436    All CT scans at this facility utilize dose modulation, iterative reconstruction, and/or weight based dosing when appropriate to reduce radiation dose to as low as reasonably achievable.    CT HEAD WITHOUT CONTRAST    CLINICAL HISTORY:  Mental status change, unknown cause;    COMPARISON:  03/04/2024.    FINDINGS:  There are changes of decrease in the attenuation of the periventricular and subcortical white matter which represents a nonspecific finding most likely representative of areas of demyelination on the basis of chronic small vessel ischemia. There are no findings of acute intracranial hemorrhage or infarction. There is no intra-axial mass, mass effect or shift of the midline. Prominence of the ventricular system and sulci are a reflection of cortical atrophy. Arteriosclerotic calcification is observed within the intracranial segments of the carotid and vertebral arteries.    Viewed at bone windows, no acute osseous abnormality is identified.  The visualized portions of the paranasal sinuses and mastoid air cells are appropriately aerated.                                       X-Ray Chest AP Portable (Final result)  Result time 10/25/24 14:00:17      Final result by Charles Hagan MD (10/25/24 14:00:17)                   Impression:      Lower lung zone pulmonary interstitial opacities suggest interstitial edema perhaps related to heart failure or hypervolemia.  In the appropriate clinical setting, atypical infection could give a similar appearance.      Electronically signed by: Charles Hagan  Date:    10/25/2024  Time:    14:00               Narrative:    EXAMINATION:  XR CHEST AP PORTABLE    CLINICAL HISTORY:  CHF;    FINDINGS:  Portable chest at 1346 compared with 11/04/2019 shows normal cardiomediastinal silhouette.    Lungs  contain interstitial opacities in bilateral lower lung zones, new.  Calcified pleural plaque along the left diaphragm unchanged.  No new confluent alveolar consolidation, pleural effusion, or pneumothorax.  Central pulmonary vasculature is normal.                                       Medications   levoFLOXacin 500 mg/100 mL IVPB 500 mg (has no administration in time range)   furosemide injection 20 mg (has no administration in time range)   metoprolol succinate (TOPROL-XL) 24 hr tablet 25 mg (has no administration in time range)   melatonin tablet 6 mg (has no administration in time range)   ondansetron injection 4 mg (has no administration in time range)   acetaminophen tablet 650 mg (has no administration in time range)   aluminum-magnesium hydroxide-simethicone 200-200-20 mg/5 mL suspension 30 mL (has no administration in time range)   acetaminophen tablet 650 mg (has no administration in time range)   naloxone 0.4 mg/mL injection 0.02 mg (has no administration in time range)   potassium bicarbonate disintegrating tablet 50 mEq (has no administration in time range)   potassium bicarbonate disintegrating tablet 35 mEq (has no administration in time range)   potassium bicarbonate disintegrating tablet 60 mEq (has no administration in time range)   magnesium oxide tablet 800 mg (has no administration in time range)   magnesium oxide tablet 800 mg (has no administration in time range)   potassium, sodium phosphates 280-160-250 mg packet 2 packet (has no administration in time range)   potassium, sodium phosphates 280-160-250 mg packet 2 packet (has no administration in time range)   potassium, sodium phosphates 280-160-250 mg packet 2 packet (has no administration in time range)   glucose chewable tablet 16 g (has no administration in time range)   glucose chewable tablet 24 g (has no administration in time range)   dextrose 50% injection 12.5 g (has no administration in time range)   dextrose 50% injection 25 g  (has no administration in time range)   glucagon (human recombinant) injection 1 mg (has no administration in time range)   enoxaparin injection 40 mg (has no administration in time range)   insulin aspart U-100 pen 0-10 Units (has no administration in time range)   famotidine tablet 20 mg (has no administration in time range)   vancomycin 1.5 g in dextrose 5 % 250 mL IVPB (ready to mix) (has no administration in time range)   potassium chloride SA CR tablet 40 mEq (has no administration in time range)   levoFLOXacin 750 mg/150 mL IVPB 750 mg (has no administration in time range)   lactated ringers bolus 1,000 mL (0 mLs Intravenous Stopped 10/25/24 1449)   lactated ringers bolus 1,000 mL ( Intravenous Restarted 10/25/24 1347)   iohexoL (OMNIPAQUE 350) injection 100 mL (100 mLs Intravenous Given 10/25/24 1550)     Medical Decision Making  78 year old male presented emergency department after an episode when he got short of breath and became unresponsive.  Responded to Narcan.  Patient since then has been awake but slow to answer questions but is nonfocal neurologically.  Screening labs and cardiac workup done.  D-dimer slightly elevated so will further evaluate with CT scan.  Patient has slight cough and will empirically treat with antibiotics for possible infection while waiting for rest of the tests to return.  Hospital Medicine consulted for evaluation for further management and treatment.  Patient is awake and oriented at this time and had significant improvement from the episode this morning.  Hospital Medicine to evaluate for further management.    Amount and/or Complexity of Data Reviewed  Labs: ordered. Decision-making details documented in ED Course.  Radiology: ordered. Decision-making details documented in ED Course.  ECG/medicine tests: ordered. Decision-making details documented in ED Course.    Risk  Prescription drug management.                                      Clinical Impression:  Final  diagnoses:  [R06.02] Shortness of breath  [R55] Syncope and collapse          ED Disposition Condition    Observation                 Aide Liriano MD  10/25/24 0670

## 2024-10-25 NOTE — HPI
78 year old pt getting admitted with syncope/collapse and AMS  Pt was eating breakfast and suddenly he collapsed and was witnessed by pts wife   \he was c/o shortness of breath  EMS were called and was given narcan because of pinpoint pupils  Pt was escorted to ER  Pt was confused when saw in ER  Sleepy but was easily arousable

## 2024-10-26 ENCOUNTER — CLINICAL SUPPORT (OUTPATIENT)
Dept: CARDIOLOGY | Facility: HOSPITAL | Age: 79
End: 2024-10-26
Attending: INTERNAL MEDICINE
Payer: MEDICARE

## 2024-10-26 VITALS — BODY MASS INDEX: 22.07 KG/M2 | WEIGHT: 172 LBS | HEIGHT: 74 IN

## 2024-10-26 PROBLEM — E87.6 HYPOKALEMIA: Status: ACTIVE | Noted: 2024-10-26

## 2024-10-26 PROBLEM — E83.42 HYPOMAGNESEMIA: Status: ACTIVE | Noted: 2024-10-26

## 2024-10-26 PROBLEM — G93.41 ENCEPHALOPATHY, METABOLIC: Status: ACTIVE | Noted: 2024-10-25

## 2024-10-26 LAB
ALBUMIN SERPL BCP-MCNC: 3.4 G/DL (ref 3.5–5.2)
ALP SERPL-CCNC: 59 U/L (ref 55–135)
ALT SERPL W/O P-5'-P-CCNC: 8 U/L (ref 10–44)
AMPHET+METHAMPHET UR QL: NEGATIVE
ANION GAP SERPL CALC-SCNC: 6 MMOL/L (ref 8–16)
AORTIC ROOT ANNULUS: 3.9 CM
AORTIC VALVE CUSP SEPERATION: 2.3 CM
APICAL FOUR CHAMBER EJECTION FRACTION: 57 %
APICAL TWO CHAMBER EJECTION FRACTION: 47 %
AST SERPL-CCNC: 11 U/L (ref 10–40)
AV INDEX (PROSTH): 0.91
AV MEAN GRADIENT: 2 MMHG
AV PEAK GRADIENT: 3.2 MMHG
AV VALVE AREA BY VELOCITY RATIO: 3.1 CM²
AV VALVE AREA: 2.9 CM²
AV VELOCITY RATIO: 1
BARBITURATES UR QL SCN>200 NG/ML: NEGATIVE
BASOPHILS # BLD AUTO: 0.09 K/UL (ref 0–0.2)
BASOPHILS NFR BLD: 2.2 % (ref 0–1.9)
BENZODIAZ UR QL SCN>200 NG/ML: NEGATIVE
BILIRUB SERPL-MCNC: 0.7 MG/DL (ref 0.1–1)
BILIRUB UR QL STRIP: NEGATIVE
BSA FOR ECHO PROCEDURE: 2.02 M2
BUN SERPL-MCNC: 20 MG/DL (ref 8–23)
BZE UR QL SCN: NEGATIVE
CALCIUM SERPL-MCNC: 9.4 MG/DL (ref 8.7–10.5)
CANNABINOIDS UR QL SCN: NEGATIVE
CHLORIDE SERPL-SCNC: 100 MMOL/L (ref 95–110)
CLARITY UR: CLEAR
CO2 SERPL-SCNC: 33 MMOL/L (ref 23–29)
COLOR UR: YELLOW
CREAT SERPL-MCNC: 1.2 MG/DL (ref 0.5–1.4)
CREAT UR-MCNC: 110.7 MG/DL (ref 23–375)
CV ECHO LV RWT: 0.51 CM
DIFFERENTIAL METHOD BLD: ABNORMAL
DOP CALC AO PEAK VEL: 0.9 M/S
DOP CALC AO VTI: 22.1 CM
DOP CALC LVOT AREA: 3.1 CM2
DOP CALC LVOT DIAMETER: 2 CM
DOP CALC LVOT PEAK VEL: 0.9 M/S
DOP CALC LVOT STROKE VOLUME: 63.1 CM3
DOP CALC MV VTI: 35 CM
DOP CALCLVOT PEAK VEL VTI: 20.1 CM
E WAVE DECELERATION TIME: 201 MSEC
E/A RATIO: 1.06
E/E' RATIO: 10.29 M/S
ECHO LV POSTERIOR WALL: 1.1 CM (ref 0.6–1.1)
EOSINOPHIL # BLD AUTO: 0.2 K/UL (ref 0–0.5)
EOSINOPHIL NFR BLD: 5.9 % (ref 0–8)
ERYTHROCYTE [DISTWIDTH] IN BLOOD BY AUTOMATED COUNT: 14.3 % (ref 11.5–14.5)
EST. GFR  (NO RACE VARIABLE): >60 ML/MIN/1.73 M^2
FRACTIONAL SHORTENING: 27.9 % (ref 28–44)
GLUCOSE SERPL-MCNC: 109 MG/DL (ref 70–110)
GLUCOSE UR QL STRIP: NEGATIVE
HCT VFR BLD AUTO: 23.3 % (ref 40–54)
HGB BLD-MCNC: 7.9 G/DL (ref 14–18)
HGB UR QL STRIP: NEGATIVE
HR MV ECHO: 53 BPM
IMM GRANULOCYTES # BLD AUTO: 0.07 K/UL (ref 0–0.04)
IMM GRANULOCYTES NFR BLD AUTO: 1.7 % (ref 0–0.5)
INTERVENTRICULAR SEPTUM: 0.9 CM (ref 0.6–1.1)
KETONES UR QL STRIP: NEGATIVE
LEFT INTERNAL DIMENSION IN SYSTOLE: 3.1 CM (ref 2.1–4)
LEFT VENTRICLE DIASTOLIC VOLUME INDEX: 39.61 ML/M2
LEFT VENTRICLE DIASTOLIC VOLUME: 80.81 ML
LEFT VENTRICLE END DIASTOLIC VOLUME APICAL 2 CHAMBER: 61.9 ML
LEFT VENTRICLE END DIASTOLIC VOLUME APICAL 4 CHAMBER: 94.7 ML
LEFT VENTRICLE MASS INDEX: 69.8 G/M2
LEFT VENTRICLE SYSTOLIC VOLUME INDEX: 17.9 ML/M2
LEFT VENTRICLE SYSTOLIC VOLUME: 36.44 ML
LEFT VENTRICULAR INTERNAL DIMENSION IN DIASTOLE: 4.3 CM (ref 3.5–6)
LEFT VENTRICULAR MASS: 142.5 G
LEUKOCYTE ESTERASE UR QL STRIP: NEGATIVE
LV LATERAL E/E' RATIO: 10.29 M/S
LV SEPTAL E/E' RATIO: 10.29 M/S
LVED V (TEICH): 80.81 ML
LVES V (TEICH): 36.44 ML
LVOT MG: 1 MMHG
LVOT MV: 0.53 CM/S
LYMPHOCYTES # BLD AUTO: 1 K/UL (ref 1–4.8)
LYMPHOCYTES NFR BLD: 23.2 % (ref 18–48)
MAGNESIUM SERPL-MCNC: 1.5 MG/DL (ref 1.6–2.6)
MCH RBC QN AUTO: 38.5 PG (ref 27–31)
MCHC RBC AUTO-ENTMCNC: 33.9 G/DL (ref 32–36)
MCV RBC AUTO: 114 FL (ref 82–98)
MONOCYTES # BLD AUTO: 0.4 K/UL (ref 0.3–1)
MONOCYTES NFR BLD: 8.6 % (ref 4–15)
MV MEAN GRADIENT: 1 MMHG
MV PEAK A VEL: 0.68 M/S
MV PEAK E VEL: 0.72 M/S
MV PEAK GRADIENT: 2 MMHG
MV STENOSIS PRESSURE HALF TIME: 148 MS
MV VALVE AREA BY CONTINUITY EQUATION: 1.8 CM2
MV VALVE AREA P 1/2 METHOD: 1.49 CM2
NEUTROPHILS # BLD AUTO: 2.4 K/UL (ref 1.8–7.7)
NEUTROPHILS NFR BLD: 58.4 % (ref 38–73)
NITRITE UR QL STRIP: NEGATIVE
NRBC BLD-RTO: 0 /100 WBC
OHS LV EJECTION FRACTION SIMPSONS BIPLANE MOD: 53 %
OPIATES UR QL SCN: NEGATIVE
PCP UR QL SCN>25 NG/ML: NEGATIVE
PH UR STRIP: 6 [PH] (ref 5–8)
PLATELET # BLD AUTO: 230 K/UL (ref 150–450)
PMV BLD AUTO: 12.7 FL (ref 9.2–12.9)
POTASSIUM SERPL-SCNC: 4.3 MMOL/L (ref 3.5–5.1)
PROT SERPL-MCNC: 5.6 G/DL (ref 6–8.4)
PROT UR QL STRIP: NEGATIVE
PV MV: 0.43 M/S
PV PEAK GRADIENT: 1 MMHG
PV PEAK VELOCITY: 0.61 M/S
RA PRESSURE ESTIMATED: 3 MMHG
RBC # BLD AUTO: 2.05 M/UL (ref 4.6–6.2)
SODIUM SERPL-SCNC: 139 MMOL/L (ref 136–145)
SP GR UR STRIP: 1.02 (ref 1–1.03)
TDI LATERAL: 0.07 M/S
TDI SEPTAL: 0.07 M/S
TDI: 0.07 M/S
TOXICOLOGY INFORMATION: NORMAL
TRICUSPID ANNULAR PLANE SYSTOLIC EXCURSION: 2.14 CM
TROPONIN I SERPL HS-MCNC: 21.3 PG/ML (ref 0–14.9)
URN SPEC COLLECT METH UR: NORMAL
UROBILINOGEN UR STRIP-ACNC: NEGATIVE EU/DL
VANCOMYCIN TROUGH SERPL-MCNC: 5.7 UG/ML
WBC # BLD AUTO: 4.09 K/UL (ref 3.9–12.7)
Z-SCORE OF LEFT VENTRICULAR DIMENSION IN END DIASTOLE: -3.47
Z-SCORE OF LEFT VENTRICULAR DIMENSION IN END SYSTOLE: -1.46

## 2024-10-26 PROCEDURE — 85025 COMPLETE CBC W/AUTO DIFF WBC: CPT | Performed by: INTERNAL MEDICINE

## 2024-10-26 PROCEDURE — 93306 TTE W/DOPPLER COMPLETE: CPT | Mod: 26,,, | Performed by: STUDENT IN AN ORGANIZED HEALTH CARE EDUCATION/TRAINING PROGRAM

## 2024-10-26 PROCEDURE — 96372 THER/PROPH/DIAG INJ SC/IM: CPT | Performed by: INTERNAL MEDICINE

## 2024-10-26 PROCEDURE — 93306 TTE W/DOPPLER COMPLETE: CPT

## 2024-10-26 PROCEDURE — 95819 EEG AWAKE AND ASLEEP: CPT

## 2024-10-26 PROCEDURE — 25000003 PHARM REV CODE 250: Performed by: INTERNAL MEDICINE

## 2024-10-26 PROCEDURE — 94760 N-INVAS EAR/PLS OXIMETRY 1: CPT

## 2024-10-26 PROCEDURE — 83735 ASSAY OF MAGNESIUM: CPT | Performed by: INTERNAL MEDICINE

## 2024-10-26 PROCEDURE — 92610 EVALUATE SWALLOWING FUNCTION: CPT

## 2024-10-26 PROCEDURE — 63600175 PHARM REV CODE 636 W HCPCS: Performed by: INTERNAL MEDICINE

## 2024-10-26 PROCEDURE — 63600175 PHARM REV CODE 636 W HCPCS: Performed by: HOSPITALIST

## 2024-10-26 PROCEDURE — 96375 TX/PRO/DX INJ NEW DRUG ADDON: CPT

## 2024-10-26 PROCEDURE — 99900031 HC PATIENT EDUCATION (STAT)

## 2024-10-26 PROCEDURE — G0378 HOSPITAL OBSERVATION PER HR: HCPCS

## 2024-10-26 PROCEDURE — 80202 ASSAY OF VANCOMYCIN: CPT | Performed by: INTERNAL MEDICINE

## 2024-10-26 PROCEDURE — 87086 URINE CULTURE/COLONY COUNT: CPT | Performed by: INTERNAL MEDICINE

## 2024-10-26 PROCEDURE — 36415 COLL VENOUS BLD VENIPUNCTURE: CPT | Performed by: INTERNAL MEDICINE

## 2024-10-26 PROCEDURE — 80053 COMPREHEN METABOLIC PANEL: CPT | Performed by: INTERNAL MEDICINE

## 2024-10-26 PROCEDURE — 25000003 PHARM REV CODE 250: Performed by: HOSPITALIST

## 2024-10-26 RX ORDER — DONEPEZIL HYDROCHLORIDE 5 MG/1
5 TABLET, FILM COATED ORAL NIGHTLY
Status: DISCONTINUED | OUTPATIENT
Start: 2024-10-26 | End: 2024-10-28 | Stop reason: HOSPADM

## 2024-10-26 RX ORDER — MEMANTINE HYDROCHLORIDE 5 MG/1
5 TABLET ORAL 2 TIMES DAILY
Status: DISCONTINUED | OUTPATIENT
Start: 2024-10-26 | End: 2024-10-28 | Stop reason: HOSPADM

## 2024-10-26 RX ORDER — CEFTRIAXONE 1 G/1
1 INJECTION, POWDER, FOR SOLUTION INTRAMUSCULAR; INTRAVENOUS
Status: DISCONTINUED | OUTPATIENT
Start: 2024-10-26 | End: 2024-10-28 | Stop reason: HOSPADM

## 2024-10-26 RX ADMIN — ENOXAPARIN SODIUM 40 MG: 40 INJECTION SUBCUTANEOUS at 04:10

## 2024-10-26 RX ADMIN — FAMOTIDINE 20 MG: 20 TABLET ORAL at 10:10

## 2024-10-26 RX ADMIN — METOPROLOL SUCCINATE 25 MG: 25 TABLET, FILM COATED, EXTENDED RELEASE ORAL at 10:10

## 2024-10-26 RX ADMIN — FAMOTIDINE 20 MG: 20 TABLET ORAL at 08:10

## 2024-10-26 RX ADMIN — CEFTRIAXONE SODIUM 1 G: 1 INJECTION, POWDER, FOR SOLUTION INTRAMUSCULAR; INTRAVENOUS at 11:10

## 2024-10-26 RX ADMIN — DONEPEZIL HYDROCHLORIDE 5 MG: 5 TABLET, FILM COATED ORAL at 08:10

## 2024-10-26 RX ADMIN — MEMANTINE HYDROCHLORIDE 5 MG: 5 TABLET ORAL at 04:10

## 2024-10-26 NOTE — CARE UPDATE
10/26/24 0702   Patient Assessment/Suction   Level of Consciousness (AVPU) alert   Respiratory Effort Normal;Unlabored   Expansion/Accessory Muscles/Retractions no use of accessory muscles;no retractions;expansion symmetric   All Lung Fields Breath Sounds diminished;clear   Rhythm/Pattern, Respiratory unlabored   Cough Frequency no cough   PRE-TX-O2   Device (Oxygen Therapy) room air   SpO2 98 %   Pulse Oximetry Type Intermittent   $ Pulse Oximetry - Single Charge Pulse Oximetry - Single   Pulse (!) 58   Resp 16   Education   $ Education 15 min;Oxygen

## 2024-10-26 NOTE — PLAN OF CARE
[facility]  Initial Discharge Assessment       Primary Care Provider: Roque Guillaume MD    Admission Diagnosis: Syncope and collapse [R55]    Admission Date: 10/25/2024  Expected Discharge Date: Assessment completed at bedside with pt and pt's daughter Jsutyna Carvalho, and all information on FaceSheet confirmed, including demographics, PCP, pharmacy and insurance.   did not  address advance directives. He currently lives with his wife in a one story home.  His PCP is Dr. Guillaume.  He denies any HH/HD/Blood Thinners.  For transportation to appointments, he usually has his daughter's provide transportation  .  For transportation at discharge,  his daughters will provide transportation.  Plan for discharge is home with no needs at this time.  Case Management to continue to follow for discharge planning needs.     Transition of Care Barriers: (P) None    Payor: [employer] MANAGED MEDICARE / Plan: [employer] MEDICARE PPO / Product Type: Medicare Advantage /     Extended Emergency Contact Information  Primary Emergency Contact: Edith Alejandro  Address: [address], [place] [address] [place] [address]  Home Phone: [phone]  Mobile Phone: [phone]  Relation: Spouse  Preferred language: English   needed? No  Secondary Emergency Contact: jovita alejandro  Address: [address] [place] [address] [facility]  Home Phone: [phone]  Work Phone: [phone]  Mobile Phone: [phone]  Relation: Daughter    Discharge Plan A: (P) Home  Discharge Plan B: (P) Home      [facility] [facility] - [address] [place] [address] [facility] [address]  Phone: [phone] Fax: [phone]    [facility] - [facility] [address] [facility]  [address] [facility] [address]  Phone: [phone] Fax: [phone]    [facility]  - [place], [place] - [address]  [address] [place]  [place] [address]  Phone: [phone] Fax: [phone]    [facility] - [place], [place] - [address] [facility]  [address] [facility] [address]  Phone: [phone] Fax: [phone]    [facility] - [place], [place] - [address]  [address] [place]  [facility] [address]  Phone: [phone] Fax: [phone]    [facility]  [address]  [place] [address]  Phone: [phone] Fax: [phone]    [id number] - [place], [place] - [address]  [address]  [place] [address]  Phone: [phone] Fax: [phone]      Initial Assessment (most recent)       Adult Discharge Assessment - 10/26/24 0936          Discharge Assessment    Assessment Type Discharge Planning Assessment (P)      Confirmed/corrected address, phone number and insurance Yes (P)      Confirmed Demographics Correct on Facesheet (P)      Source of Information patient;family (P)      When was your last doctors appointment? -- (P)    Per pt last appointment was    Does patient/caregiver understand observation status Yes (P)      People in Home spouse (P)      Do you expect to return to your current living situation? Yes (P)      Do you have help at home or someone to help you manage your care at home? Yes (P)      Who are your caregiver(s) and their phone number(s)? DaughterAlin Alejandro [phone] (P)      Prior to hospitilization cognitive status: Alert/Oriented (P)      Current cognitive status: Alert/Oriented (P)      Walking or Climbing Stairs Difficulty yes (P)      Walking or Climbing Stairs ambulation difficulty, requires equipment (P)      Dressing/Bathing Difficulty no (P)      Do you have any problems with: -- (P)    Per pt's daughter she helps assist with ADLs.    Equipment Currently Used at Home cane, straight (P)      Readmission within 30 days? No (P)      Patient currently being followed by  outpatient case management? No (P)      Do you currently have service(s) that help you manage your care at home? No (P)      Do you take prescription medications? Yes (P)      Do you have prescription coverage? Yes (P)      Coverage [employer] Managed Medicare (P)      Do you have any problems affording any of your prescribed medications? No (P)      Is the patient taking medications as prescribed? yes (P)      Who is going to help you get home at discharge? Daughters Justyna Carvalho [phone] and Cyndy Alejandro [phone] (P)      Are you on dialysis? No (P)      Do you take coumadin? No (P)      Discharge Plan A Home (P)      Discharge Plan B Home (P)      DME Needed Upon Discharge  none (P)      Discharge Plan discussed with: Patient;Adult children (P)    xochitl Carvalho [phone]    Transition of Care Barriers None (P)         Physical Activity    On average, how many days per week do you engage in moderate to strenuous exercise (like a brisk walk)? 0 days (P)      On average, how many minutes do you engage in exercise at this level? 0 min (P)         Financial Resource Strain    How hard is it for you to pay for the very basics like food, housing, medical care, and heating? Not hard at all (P)         Housing Stability    In the last 12 months, was there a time when you were not able to pay the mortgage or rent on time? No (P)      At any time in the past 12 months, were you homeless or living in a shelter (including now)? No (P)         Transportation Needs    Has the lack of transportation kept you from medical appointments, meetings, work or from getting things needed for daily living? No (P)         Food Insecurity    Within the past 12 months, you worried that your food would run out before you got the money to buy more. Never true (P)      Within the past 12 months, the food you bought just didn't last and you didn't have money to get more. Never true (P)         Stress    Do you feel stress - tense,  restless, nervous, or anxious, or unable to sleep at night because your mind is troubled all the time - these days? Not at all (P)         Social Isolation    How often do you feel lonely or isolated from those around you?  Never (P)         Alcohol Use    Q1: How often do you have a drink containing alcohol? Never (P)      Q2: How many drinks containing alcohol do you have on a typical day when you are drinking? Patient does not drink (P)      Q3: How often do you have six or more drinks on one occasion? Never (P)         Utilities    In the past 12 months has the electric, gas, oil, or water company threatened to shut off services in your home? No (P)         Health Literacy    How often do you need to have someone help you when you read instructions, pamphlets, or other written material from your doctor or pharmacy? Always (P)         OTHER    Name(s) of People in Home Urvashi- Eidth Alejandro- [phone] (P)

## 2024-10-26 NOTE — PROCEDURES
EEG REPORT    NAME: Roosevelt Alejandro  : 1945  MRN: [id number]    DATE of EEG: 10/26/2024    CLINICAL INDICATION: This is a 78 y.o. male being evaluated for encephalopathy.    MEDICATIONS:    enoxparin  40 mg Subcutaneous Daily    famotidine  20 mg Oral BID    levoFLOXacin  500 mg Intravenous Q24H    metoprolol succinate  25 mg Oral Daily         EEG DESCRIPTION:  A 16-channel EEG was performed with electrode placement in 10/20 International System. Longitudinal bipolar and referential montages were utilized in analysis.    This is a technically adequate study with minor muscle and motion artifacts.    The dominant posterior background rhythm was theta frequency rhythm.    The record was reactive to eye opening and closure. Anterior derivations showed the expected admixture of low-voltage beta and theta frequencies as well as intermittent eye blink artifact.    Sleep architecture was not seen    Photic stimulation, performed elicited no driving response. Hyperventilation  was not performed.    No epileptiform discharges were recorded. No electrographic or electroclinical seizures were recorded.    DIAGNOSIS: This is a abnormal EEG due to the presence of mild-to-moderate generalised slowing. No lateralizing or epileptiform features are noted. No electrographic or electroclinical seizures are recorded.      CLINICAL INTERPRETATION:  This is an abnormal EEG indicating mild-to-moderate encephalopathy.  No seizures or epileptiform activity noted.    Findings of this study indicate a generalized encephalopathic process that is nonspecific in type. Toxic, metabolic, or structural abnormalities may be considered.  Further clinical correlation is advised .      Hernando Candelaria MD  Neurology

## 2024-10-26 NOTE — PLAN OF CARE
Medicare Outpatient Observation Notice  Medicare Outpatient and Observation Notification regarding financial responsibility: (P) Given to patient/caregiver, Explained to patient/caregiver, Signed/date by patient/caregiver     Date PICKARD was signed: (P) 10/26/24      10/26/24 0953   PICKARD Message   Medicare Outpatient and Observation Notification regarding financial responsibility Given to patient/caregiver;Explained to patient/caregiver;Signed/date by patient/caregiver   Date PICKARD was signed 10/26/24

## 2024-10-26 NOTE — PT/OT/SLP EVAL
Speech Language Pathology Evaluation  Bedside Swallow    Patient Name:  Roosevelt Alejandro   MRN:  [id number]  Admitting Diagnosis: Altered mental status    Recommendations:                 General Recommendations:  Follow-up not indicated  Diet recommendations:   , Thin   Aspiration Precautions: Standard aspiration precautions   General Precautions: Standard,  (IDDSI 6)  Communication strategies:  none    Assessment:     Roosevelt Alejandro is a 78 y.o. male with an SLP diagnosis of Dysphagia.  He presents with mild oral prep dysphagia due to edentulous.  Recommend IDDSI 6 textures with thin liquids.  No tx. .    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    Secondary neuroendocrine tumor of liver     Vertigo, aural, unspecified laterality 03/06/2019       Past Surgical History:   Procedure Laterality Date    CHOLECYSTECTOMY  11/4/2019    Procedure: CHOLECYSTECTOMY;  Surgeon: SP Khalil MD;  Location: [facility];  Service: General;;    COLONOSCOPY      2009    COLONOSCOPY N/A 1/21/2016    Procedure: COLONOSCOPY;  Surgeon: Toby Maciel MD;  Location: [facility];  Service: Endoscopy;  Laterality: N/A;    COLONOSCOPY N/A 1/25/2019    Procedure: COLONOSCOPY;  Surgeon: Toby Maciel MD;  Location: [facility];  Service: Endoscopy;  Laterality: N/A;    COLONOSCOPY N/A 5/17/2023    Procedure: COLONOSCOPY;  Surgeon: Toby Maciel MD;  Location: [facility];  Service: Endoscopy;  Laterality: N/A;  lm wife/ppm    ENDOSCOPIC ULTRASOUND OF UPPER GASTROINTESTINAL TRACT N/A 8/20/2019    Procedure: ULTRASOUND, UPPER GI TRACT, ENDOSCOPIC;  Surgeon: Forest Lucio III, MD;  Location: [facility];  Service: Endoscopy;  Laterality: N/A;    EYE SURGERY Bilateral     cataracts, retinal detachment    HYPERTHERMIC INTRAPERITONEAL  CHEMOTHERAPY (HIPEC)  11/4/2019    Procedure: CHEMOTHERAPY, INTRAPERITONEAL, HYPERTHERMIC;  Surgeon: SP Khalil MD;  Location: [facility] OR;  Service: General;;    LIVER BIOPSY  11/4/2019    Procedure: BIOPSY, LIVER;  Surgeon: SP Khalil MD;  Location: [facility] OR;  Service: General;;  BIOPSY LIVER WEDGE X3    PROSTATE BIOPSY      PROSTATECTOMY  12/2017    RENAL EXPLORATION  11/4/2019    Procedure: EXPLORATION, KIDNEY;  Surgeon: SP Khalil MD;  Location: [facility] OR;  Service: General;;    ROTATOR CUFF REPAIR      left    SURGICAL REMOVAL OF LYMPH NODE  11/4/2019    Procedure: EXCISION, LYMPH NODE;  Surgeon: SP Khalil MD;  Location: [facility] OR;  Service: General;;  EXTENSIVE RETROPERITONEAL NODE DISSECTION  EXPLORATION OF MESENTERIC ARTERY/VEIN       Social History: Patient lives with spouse.    Prior Intubation HX:  none this admission    Modified Barium Swallow: none in Epic    Chest X-Rays: Lower lung zone pulmonary interstitial opacities suggest interstitial edema perhaps related to heart failure or hypervolemia.  In the appropriate clinical setting, atypical infection could give a similar appearance.     CT Head - no acute abnormality    Prior diet: modified regular (wife cuts up food)..    Subjective     Okay  Patient goals: not stated     Objective:     Oral Musculature Evaluation  Oral Musculature: WNL  Dentition: edentulous, rarely or never uses dentures to eat  Secretion Management: adequate  Mucosal Quality: good  Mandibular Strength and Mobility: WNL  Oral Labial Strength and Mobility: WNL  Lingual Strength and Mobility: WNL  Velar Elevation: WNL  Volitional Swallow: rise and tilt palpated  Voice Prior to PO Intake: clear, no dysarthria    Bedside Swallow Eval:   Consistencies Assessed:  Thin liquids via cup nad straw  Mixed consistencies peaches in thin juice  Solids cracker      Oral Phase:   Prolonged mastication    Pharyngeal Phase:   no overt clinical signs/symptoms of  aspiration  no overt clinical signs/symptoms of pharyngeal dysphagia    Compensatory Strategies  None    Treatment: Education to pt and daughters re impressions and recommendations.    Goals:   Multidisciplinary Problems       SLP Goals       Not on file                    Plan:     Patient to be seen:      Plan of Care expires:     Plan of Care reviewed with:  patient, daughter   SLP Follow-Up:  No       Discharge recommendations:      Barriers to Discharge:  None    Time Tracking:     SLP Treatment Date:   10/26/24  Speech Start Time:  0958  Speech Stop Time:  1224     Speech Total Time (min):  146 min actual tx time 23 minutes    Billable Minutes: Eval Swallow and Oral Function 23    10/26/2024

## 2024-10-27 LAB
ALBUMIN SERPL BCP-MCNC: 3.8 G/DL (ref 3.5–5.2)
ALP SERPL-CCNC: 63 U/L (ref 55–135)
ALT SERPL W/O P-5'-P-CCNC: 10 U/L (ref 10–44)
AMMONIA PLAS-SCNC: 14 UMOL/L (ref 10–50)
ANION GAP SERPL CALC-SCNC: 5 MMOL/L (ref 8–16)
AST SERPL-CCNC: 13 U/L (ref 10–40)
BASOPHILS # BLD AUTO: 0.1 K/UL (ref 0–0.2)
BASOPHILS NFR BLD: 1.9 % (ref 0–1.9)
BILIRUB SERPL-MCNC: 0.7 MG/DL (ref 0.1–1)
BUN SERPL-MCNC: 17 MG/DL (ref 8–23)
CALCIUM SERPL-MCNC: 9.6 MG/DL (ref 8.7–10.5)
CHLORIDE SERPL-SCNC: 98 MMOL/L (ref 95–110)
CO2 SERPL-SCNC: 32 MMOL/L (ref 23–29)
CREAT SERPL-MCNC: 1.3 MG/DL (ref 0.5–1.4)
DIFFERENTIAL METHOD BLD: ABNORMAL
EOSINOPHIL # BLD AUTO: 0.3 K/UL (ref 0–0.5)
EOSINOPHIL NFR BLD: 5.2 % (ref 0–8)
ERYTHROCYTE [DISTWIDTH] IN BLOOD BY AUTOMATED COUNT: 14.6 % (ref 11.5–14.5)
EST. GFR  (NO RACE VARIABLE): 55.9 ML/MIN/1.73 M^2
FOLATE SERPL-MCNC: 15.7 NG/ML (ref 4–24)
GLUCOSE SERPL-MCNC: 113 MG/DL (ref 70–110)
HCT VFR BLD AUTO: 26.2 % (ref 40–54)
HGB BLD-MCNC: 8.9 G/DL (ref 14–18)
IMM GRANULOCYTES # BLD AUTO: 0.07 K/UL (ref 0–0.04)
IMM GRANULOCYTES NFR BLD AUTO: 1.4 % (ref 0–0.5)
LYMPHOCYTES # BLD AUTO: 1.2 K/UL (ref 1–4.8)
LYMPHOCYTES NFR BLD: 23.1 % (ref 18–48)
MAGNESIUM SERPL-MCNC: 1.4 MG/DL (ref 1.6–2.6)
MCH RBC QN AUTO: 38 PG (ref 27–31)
MCHC RBC AUTO-ENTMCNC: 34 G/DL (ref 32–36)
MCV RBC AUTO: 112 FL (ref 82–98)
MONOCYTES # BLD AUTO: 0.4 K/UL (ref 0.3–1)
MONOCYTES NFR BLD: 7.6 % (ref 4–15)
NEUTROPHILS # BLD AUTO: 3.1 K/UL (ref 1.8–7.7)
NEUTROPHILS NFR BLD: 60.8 % (ref 38–73)
NRBC BLD-RTO: 0 /100 WBC
PLATELET # BLD AUTO: 264 K/UL (ref 150–450)
PMV BLD AUTO: 12.6 FL (ref 9.2–12.9)
POCT GLUCOSE: 132 MG/DL (ref 70–110)
POTASSIUM SERPL-SCNC: 3.6 MMOL/L (ref 3.5–5.1)
PROT SERPL-MCNC: 6.4 G/DL (ref 6–8.4)
RBC # BLD AUTO: 2.34 M/UL (ref 4.6–6.2)
SODIUM SERPL-SCNC: 135 MMOL/L (ref 136–145)
T4 FREE SERPL-MCNC: 0.91 NG/DL (ref 0.71–1.51)
T4 FREE SERPL-MCNC: 0.92 NG/DL (ref 0.71–1.51)
TSH SERPL DL<=0.005 MIU/L-ACNC: 6.05 UIU/ML (ref 0.34–5.6)
VIT B12 SERPL-MCNC: 527 PG/ML (ref 210–950)
WBC # BLD AUTO: 5.15 K/UL (ref 3.9–12.7)

## 2024-10-27 PROCEDURE — 82746 ASSAY OF FOLIC ACID SERUM: CPT | Performed by: INTERNAL MEDICINE

## 2024-10-27 PROCEDURE — 82607 VITAMIN B-12: CPT | Performed by: INTERNAL MEDICINE

## 2024-10-27 PROCEDURE — 36415 COLL VENOUS BLD VENIPUNCTURE: CPT | Performed by: INTERNAL MEDICINE

## 2024-10-27 PROCEDURE — 25000003 PHARM REV CODE 250: Performed by: INTERNAL MEDICINE

## 2024-10-27 PROCEDURE — 82140 ASSAY OF AMMONIA: CPT | Performed by: INTERNAL MEDICINE

## 2024-10-27 PROCEDURE — 63600175 PHARM REV CODE 636 W HCPCS: Performed by: FAMILY MEDICINE

## 2024-10-27 PROCEDURE — 97161 PT EVAL LOW COMPLEX 20 MIN: CPT

## 2024-10-27 PROCEDURE — 84439 ASSAY OF FREE THYROXINE: CPT | Performed by: FAMILY MEDICINE

## 2024-10-27 PROCEDURE — 84439 ASSAY OF FREE THYROXINE: CPT | Mod: 91 | Performed by: INTERNAL MEDICINE

## 2024-10-27 PROCEDURE — 85025 COMPLETE CBC W/AUTO DIFF WBC: CPT | Performed by: INTERNAL MEDICINE

## 2024-10-27 PROCEDURE — 84443 ASSAY THYROID STIM HORMONE: CPT | Performed by: FAMILY MEDICINE

## 2024-10-27 PROCEDURE — 12000002 HC ACUTE/MED SURGE SEMI-PRIVATE ROOM

## 2024-10-27 PROCEDURE — 63600175 PHARM REV CODE 636 W HCPCS: Performed by: INTERNAL MEDICINE

## 2024-10-27 PROCEDURE — 63600175 PHARM REV CODE 636 W HCPCS: Performed by: HOSPITALIST

## 2024-10-27 PROCEDURE — 83735 ASSAY OF MAGNESIUM: CPT | Performed by: INTERNAL MEDICINE

## 2024-10-27 PROCEDURE — 25000003 PHARM REV CODE 250: Performed by: FAMILY MEDICINE

## 2024-10-27 PROCEDURE — 96366 THER/PROPH/DIAG IV INF ADDON: CPT

## 2024-10-27 PROCEDURE — 96361 HYDRATE IV INFUSION ADD-ON: CPT

## 2024-10-27 PROCEDURE — 84481 FREE ASSAY (FT-3): CPT | Performed by: INTERNAL MEDICINE

## 2024-10-27 PROCEDURE — 25500020 PHARM REV CODE 255: Performed by: FAMILY MEDICINE

## 2024-10-27 PROCEDURE — 96367 TX/PROPH/DG ADDL SEQ IV INF: CPT

## 2024-10-27 PROCEDURE — 97116 GAIT TRAINING THERAPY: CPT

## 2024-10-27 PROCEDURE — 36415 COLL VENOUS BLD VENIPUNCTURE: CPT | Performed by: FAMILY MEDICINE

## 2024-10-27 PROCEDURE — 80053 COMPREHEN METABOLIC PANEL: CPT | Performed by: INTERNAL MEDICINE

## 2024-10-27 PROCEDURE — 25000003 PHARM REV CODE 250: Performed by: HOSPITALIST

## 2024-10-27 RX ORDER — HALOPERIDOL 5 MG/ML
5 INJECTION INTRAMUSCULAR ONCE
Status: DISCONTINUED | OUTPATIENT
Start: 2024-10-27 | End: 2024-10-27

## 2024-10-27 RX ORDER — SODIUM CHLORIDE 9 MG/ML
INJECTION, SOLUTION INTRAVENOUS CONTINUOUS
Status: ACTIVE | OUTPATIENT
Start: 2024-10-27 | End: 2024-10-28

## 2024-10-27 RX ORDER — MAGNESIUM SULFATE HEPTAHYDRATE 40 MG/ML
2 INJECTION, SOLUTION INTRAVENOUS ONCE
Status: COMPLETED | OUTPATIENT
Start: 2024-10-27 | End: 2024-10-27

## 2024-10-27 RX ORDER — QUETIAPINE FUMARATE 25 MG/1
50 TABLET, FILM COATED ORAL NIGHTLY
Status: DISCONTINUED | OUTPATIENT
Start: 2024-10-27 | End: 2024-10-28 | Stop reason: HOSPADM

## 2024-10-27 RX ORDER — ESCITALOPRAM OXALATE 10 MG/1
10 TABLET ORAL DAILY
Status: DISCONTINUED | OUTPATIENT
Start: 2024-10-27 | End: 2024-10-27

## 2024-10-27 RX ORDER — ARIPIPRAZOLE 5 MG/1
5 TABLET ORAL DAILY
Status: DISCONTINUED | OUTPATIENT
Start: 2024-10-27 | End: 2024-10-27

## 2024-10-27 RX ORDER — ARIPIPRAZOLE 2 MG/1
2 TABLET ORAL DAILY
Status: DISCONTINUED | OUTPATIENT
Start: 2024-10-28 | End: 2024-10-28 | Stop reason: HOSPADM

## 2024-10-27 RX ORDER — MUPIROCIN 20 MG/G
OINTMENT TOPICAL 2 TIMES DAILY
Status: DISCONTINUED | OUTPATIENT
Start: 2024-10-27 | End: 2024-10-28 | Stop reason: HOSPADM

## 2024-10-27 RX ORDER — SODIUM CHLORIDE 9 MG/ML
INJECTION, SOLUTION INTRAVENOUS CONTINUOUS
Status: DISCONTINUED | OUTPATIENT
Start: 2024-10-27 | End: 2024-10-27

## 2024-10-27 RX ORDER — ESCITALOPRAM OXALATE 5 MG/1
5 TABLET ORAL DAILY
Status: DISCONTINUED | OUTPATIENT
Start: 2024-10-28 | End: 2024-10-28 | Stop reason: HOSPADM

## 2024-10-27 RX ADMIN — POTASSIUM BICARBONATE 50 MEQ: 978 TABLET, EFFERVESCENT ORAL at 08:10

## 2024-10-27 RX ADMIN — FAMOTIDINE 20 MG: 20 TABLET ORAL at 09:10

## 2024-10-27 RX ADMIN — SODIUM CHLORIDE: 9 INJECTION, SOLUTION INTRAVENOUS at 10:10

## 2024-10-27 RX ADMIN — DONEPEZIL HYDROCHLORIDE 5 MG: 5 TABLET, FILM COATED ORAL at 09:10

## 2024-10-27 RX ADMIN — METOPROLOL SUCCINATE 25 MG: 25 TABLET, FILM COATED, EXTENDED RELEASE ORAL at 08:10

## 2024-10-27 RX ADMIN — SODIUM CHLORIDE: 9 INJECTION, SOLUTION INTRAVENOUS at 11:10

## 2024-10-27 RX ADMIN — QUETIAPINE 50 MG: 25 TABLET ORAL at 09:10

## 2024-10-27 RX ADMIN — IOHEXOL 100 ML: 350 INJECTION, SOLUTION INTRAVENOUS at 10:10

## 2024-10-27 RX ADMIN — MEMANTINE HYDROCHLORIDE 5 MG: 5 TABLET ORAL at 08:10

## 2024-10-27 RX ADMIN — CEFTRIAXONE SODIUM 1 G: 1 INJECTION, POWDER, FOR SOLUTION INTRAMUSCULAR; INTRAVENOUS at 09:10

## 2024-10-27 RX ADMIN — FAMOTIDINE 20 MG: 20 TABLET ORAL at 12:10

## 2024-10-27 RX ADMIN — MAGNESIUM SULFATE HEPTAHYDRATE 2 G: 40 INJECTION, SOLUTION INTRAVENOUS at 09:10

## 2024-10-27 RX ADMIN — ESCITALOPRAM OXALATE 10 MG: 10 TABLET ORAL at 11:10

## 2024-10-27 RX ADMIN — ENOXAPARIN SODIUM 40 MG: 40 INJECTION SUBCUTANEOUS at 05:10

## 2024-10-27 RX ADMIN — MEMANTINE HYDROCHLORIDE 5 MG: 5 TABLET ORAL at 09:10

## 2024-10-27 RX ADMIN — ARIPIPRAZOLE 5 MG: 5 TABLET ORAL at 11:10

## 2024-10-27 RX ADMIN — MUPIROCIN 1 G: 20 OINTMENT TOPICAL at 09:10

## 2024-10-27 NOTE — ASSESSMENT & PLAN NOTE
Patients blood pressure range in the last 24 hours was: BP  Min: 134/72  Max: 162/72.The patient's inpatient anti-hypertensive regimen is listed below:  Current Antihypertensives  metoprolol succinate (TOPROL-XL) 24 hr tablet 25 mg, Daily, Oral    Plan  - BP is controlled, no changes needed to their regimen

## 2024-10-27 NOTE — ASSESSMENT & PLAN NOTE
Patient's most recent potassium results are listed below.   Recent Labs     10/25/24  1340 10/26/24  0454   K 3.4* 4.3     Plan  - Replete potassium per protocol  - Monitor potassium Daily  - Patient's hypokalemia is improving  -

## 2024-10-27 NOTE — ASSESSMENT & PLAN NOTE
Getting admitted with AMS  CT Brain unremarkable for acute disease.  Order EEG.  Consult Neurology.  Hold anti-psychotic drugs.  Received empiric levofloxacin, in case there is an infection.  CT lungs did not show any evidence of pneumonia.  Urinalysis collected yesterday is still in process.  I will order another urine sample.  I await urine culture results.  Meanwhile, order ceftriaxone.

## 2024-10-27 NOTE — HOSPITAL COURSE
78 year old pt admitted with AMS .  Orthostatic blood pressure check was done.  Neurology was consulted.Encephalopathy likely secondary to metabolic causes with underlying dementia. Hospital-acquired delirium couldn't be ruled out.  Cardiac workup was done.  Ultrasound of carotid artery showed significant left ICA stenosis.  Follow-up on CT angiogram on 10/27.  Spoke with daughter LAUREANO Carvalho and she confirmed that patient is DNR/DNI so code status was changed from to DNR/DNI from full code. Restarted home psych meds on 10/27 to prevent withdraw.

## 2024-10-27 NOTE — SUBJECTIVE & OBJECTIVE
Interval History:  admitted yesterday.  Today, according to his daughters, he is more disoriented and is having visual hallucinations.  He's getting empiric antibiotics.  The chart shows urine was collected yesterday, but there are no urinalysis results.    Review of Systems   Unable to perform ROS: Mental status change     Objective:     Vital Signs (Most Recent):  Temp: 97.9 °F (36.6 °C) (10/26/24 1645)  Pulse: 61 (10/26/24 1645)  Resp: 16 (10/26/24 1645)  BP: (!) 140/70 (10/26/24 1645)  SpO2: (!) 93 % (10/26/24 1645) Vital Signs (24h Range):  Temp:  [97.5 °F (36.4 °C)-98.9 °F (37.2 °C)] 97.9 °F (36.6 °C)  Pulse:  [57-69] 61  Resp:  [16-18] 16  SpO2:  [93 %-98 %] 93 %  BP: (134-145)/(63-72) 140/70     Weight: 77.8 kg (171 lb 8.3 oz)  Body mass index is 22.02 kg/m².    Intake/Output Summary (Last 24 hours) at 10/26/2024 2010  Last data filed at 10/26/2024 1412  Gross per 24 hour   Intake 840 ml   Output 800 ml   Net 40 ml         Physical Exam  Vitals reviewed.   Constitutional:       General: He is not in acute distress.     Appearance: He is not diaphoretic.   HENT:      Mouth/Throat:      Mouth: Mucous membranes are moist.   Eyes:      General: No scleral icterus.        Right eye: No discharge.         Left eye: No discharge.   Neck:      Vascular: No JVD.   Cardiovascular:      Rate and Rhythm: Normal rate and regular rhythm.   Pulmonary:      Effort: Pulmonary effort is normal.      Breath sounds: Normal breath sounds.   Abdominal:      General: Bowel sounds are normal. There is no distension.      Palpations: Abdomen is soft.      Tenderness: There is no abdominal tenderness.   Skin:     General: Skin is warm.      Findings: No rash.   Neurological:      Mental Status: He is alert. He is disoriented and confused.   Psychiatric:         Attention and Perception: Attention normal. He perceives visual hallucinations.         Speech: Speech is delayed.         Behavior: Behavior is cooperative.         Cognition  and Memory: Memory is impaired.             Significant Labs: All pertinent labs within the past 24 hours have been reviewed.    Significant Imaging: I have reviewed all pertinent imaging results/findings within the past 24 hours.

## 2024-10-27 NOTE — CONSULTS
[facility]  Department of Neurology  Neurology Consultation Note        PATIENT NAME: Roosevelt Alejandro  MRN: [id number]  CSN: [id number]      TODAY'S DATE: 10/27/2024  ADMIT DATE: 10/25/2024                            CONSULTING PROVIDER: Hernando Candelaria MD  CONSULT REQUESTED BY: Robyn Fisher DO      Reason for consult:  Encephalopathy      History obtained from chart review.    HPI per EMR: Roosevelt Alejandro is a 78 y.o. male with a history of  admitted with syncope/collapse and AMS  Pt was eating breakfast and suddenly he collapsed and was witnessed by pts wife   \he was c/o shortness of breath  EMS were called and was given narcan because of pinpoint pupils  Pt was escorted to ER  Pt was confused when saw in ER  Sleepy but was easily arousable      Overview/Hospital Course:  No notes on file     Interval History: according to his daughters, he is more disoriented and is having visual hallucinations.  He's getting empiric antibiotics.  The chart shows urine was collected yesterday, but there are no urinalysis results.    Neurology consult:  Patient was seen and examined by me.  History is obtained from chart review as patient is not able to provide much history.  He is alert, oriented to self but not to place or time.  Follows commands appropriately and examined nonfocal.  Per chart review, he presented after an episode of syncope/encephalopathy.  Apparently had episode of loss of consciousness while having breakfast which was witnessed by his wife there was no reports of seizure-like activity at that time.  He was brought to the ER for these symptoms and since been in the hospital, has been confused intermittently complaining of hallucinations.    Neurology consulted for encephalopathy.  On exam, he denies any headache, focal weakness or sensory changes.  Denies any hallucinations at this time.    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    Secondary neuroendocrine tumor of liver     Vertigo, aural, unspecified laterality 03/06/2019     PREVIOUS SURGICAL HISTORY:  Past Surgical History:   Procedure Laterality Date    CHOLECYSTECTOMY  11/4/2019    Procedure: CHOLECYSTECTOMY;  Surgeon: SP Khalil MD;  Location: [facility];  Service: General;;    COLONOSCOPY      2009    COLONOSCOPY N/A 1/21/2016    Procedure: COLONOSCOPY;  Surgeon: Toby Maciel MD;  Location: [facility];  Service: Endoscopy;  Laterality: N/A;    COLONOSCOPY N/A 1/25/2019    Procedure: COLONOSCOPY;  Surgeon: Toyb Maciel MD;  Location: [facility] ENDO;  Service: Endoscopy;  Laterality: N/A;    COLONOSCOPY N/A 5/17/2023    Procedure: COLONOSCOPY;  Surgeon: Toby Maciel MD;  Location: [facility] ENDO;  Service: Endoscopy;  Laterality: N/A;  lm wife/ppm    ENDOSCOPIC ULTRASOUND OF UPPER GASTROINTESTINAL TRACT N/A 8/20/2019    Procedure: ULTRASOUND, UPPER GI TRACT, ENDOSCOPIC;  Surgeon: Forest Lucio III, MD;  Location: [facility];  Service: Endoscopy;  Laterality: N/A;    EYE SURGERY Bilateral     cataracts, retinal detachment    HYPERTHERMIC INTRAPERITONEAL CHEMOTHERAPY (HIPEC)  11/4/2019    Procedure: CHEMOTHERAPY, INTRAPERITONEAL, HYPERTHERMIC;  Surgeon: SP Khalil MD;  Location: [facility] OR;  Service: General;;    LIVER BIOPSY  11/4/2019    Procedure: BIOPSY, LIVER;  Surgeon: SP Khalil MD;  Location: [facility] OR;  Service: General;;  BIOPSY LIVER WEDGE X3    PROSTATE BIOPSY      PROSTATECTOMY  12/2017    RENAL EXPLORATION  11/4/2019    Procedure: EXPLORATION, KIDNEY;  Surgeon: SP Khalil MD;  Location: [facility] OR;  Service: General;;    ROTATOR CUFF REPAIR      left    SURGICAL REMOVAL OF LYMPH NODE  11/4/2019    Procedure: EXCISION, LYMPH NODE;  Surgeon: SP Khalil MD;  Location:  [facility] OR;  Service: General;;  EXTENSIVE RETROPERITONEAL NODE DISSECTION  EXPLORATION OF MESENTERIC ARTERY/VEIN     FAMILY MEDICAL HISTORY:  Family History   Problem Relation Name Age of Onset    Cancer Father          lung/ asbestos    Asbestos Father      Lung cancer Father      No Known Problems Daughter      Melanoma Neg Hx      Psoriasis Neg Hx      Lupus Neg Hx      Eczema Neg Hx       ALLERGIES:  Review of patient's allergies indicates:   Allergen Reactions    Epinephrine      Patient on Sandostatin and Epinephrine contraindicated    Aspirin Other (See Comments)     Internal bleeding    Lisinopril (bulk) Rash    Sulfa (sulfonamide antibiotics) Swelling and Rash     HOME MEDICATIONS:  Prior to Admission medications    Medication Sig Start Date End Date Taking? Authorizing Provider   ARIPiprazole (ABILIFY) 5 MG Tab Take 5 mg by mouth once daily. 7/7/24  Yes Provider, Historical   atorvastatin (LIPITOR) 20 MG tablet TAKE 1 TABLET EVERY DAY (NEED MD APPOINTMENT)  Patient taking differently: Take 20 mg by mouth once daily. 4/29/24  Yes Roque Guillaume MD   cyanocobalamin 1,000 mcg/mL injection Inject 1 mL (1,000 mcg total) into the muscle every 28 days. 3/1/24 3/1/25 Yes Roque Guillaume MD   donepeziL (ARICEPT) 5 MG tablet Take 1 tablet (5 mg total) by mouth every evening. 3/1/24 3/1/25 Yes Roque Guillaume MD   EScitalopram oxalate (LEXAPRO) 10 MG tablet Take 10 mg by mouth once daily. 7/7/24  Yes Provider, Historical   FOLIC ACID/MULTIVIT-MIN/LUTEIN (CENTRUM SILVER ORAL) Take 1 tablet by mouth once daily.    Yes Provider, Historical   memantine (NAMENDA) 5 MG Tab Take 5 mg by mouth 2 (two) times daily. 7/7/24  Yes Provider, Historical   metoprolol succinate (TOPROL-XL) 25 MG 24 hr tablet TAKE 1 TABLET EVERY DAY (NEED MD APPOINTMENT)  Patient taking differently: Take 25 mg by mouth once daily. 4/29/24  Yes Roque Guillaume MD   QUEtiapine (SEROQUEL) 50 MG tablet Take 50 mg by mouth nightly.  "7/7/24  Yes Provider, Historical   syringe with needle 1 mL 25 gauge x 1" Syrg 1 each by Misc.(Non-Drug; Combo Route) route every 28 days. 3/1/24   Roque Guillaume MD     CURRENT SCHEDULED MEDICATIONS:   ARIPiprazole  5 mg Oral Daily    cefTRIAXone (Rocephin) IV (PEDS and ADULTS)  1 g Intravenous Q24H    donepeziL  5 mg Oral QHS    enoxparin  40 mg Subcutaneous Daily    EScitalopram oxalate  10 mg Oral Daily    famotidine  20 mg Oral BID    haloperidol lactate  5 mg Intramuscular Once    magnesium sulfate IVPB  2 g Intravenous Once    memantine  5 mg Oral BID    metoprolol succinate  25 mg Oral Daily    QUEtiapine  50 mg Oral Nightly     CURRENT INFUSIONS:    CURRENT PRN MEDICATIONS:    Current Facility-Administered Medications:     acetaminophen, 650 mg, Oral, Q8H PRN    acetaminophen, 650 mg, Oral, Q4H PRN    aluminum-magnesium hydroxide-simethicone, 30 mL, Oral, QID PRN    dextrose 50%, 12.5 g, Intravenous, PRN    dextrose 50%, 25 g, Intravenous, PRN    glucagon (human recombinant), 1 mg, Intramuscular, PRN    glucose, 16 g, Oral, PRN    glucose, 24 g, Oral, PRN    insulin aspart U-100, 0-10 Units, Subcutaneous, QID (AC + HS) PRN    magnesium oxide, 800 mg, Oral, PRN    magnesium oxide, 800 mg, Oral, PRN    melatonin, 6 mg, Oral, Nightly PRN    naloxone, 0.02 mg, Intravenous, PRN    ondansetron, 4 mg, Intravenous, Q6H PRN    potassium bicarbonate, 35 mEq, Oral, PRN    potassium bicarbonate, 50 mEq, Oral, PRN    potassium bicarbonate, 60 mEq, Oral, PRN    potassium, sodium phosphates, 2 packet, Oral, PRN    potassium, sodium phosphates, 2 packet, Oral, PRN    potassium, sodium phosphates, 2 packet, Oral, PRN    REVIEW OF SYSTEMS:  Please refer to the HPI for all pertinent positive and negative findings. A comprehensive review of all other systems was negative.    PHYSICAL EXAM:  Patient Vitals for the past 24 hrs:   BP Temp Temp src Pulse Resp SpO2   10/27/24 0729 (!) 154/70 98.9 °F (37.2 °C) Oral 68 18 (!) " 93 %   10/27/24 0330 (!) 162/77 97.9 °F (36.6 °C) Oral 60 18 (!) 94 %   10/26/24 2345 (!) 148/75 98.7 °F (37.1 °C) Oral 63 18 (!) 92 %   10/26/24 2018 (!) 149/76 97.4 °F (36.3 °C) Oral 65 -- 98 %   10/26/24 1645 (!) 140/70 97.9 °F (36.6 °C) Oral 61 16 (!) 93 %   10/26/24 1153 134/72 98.8 °F (37.1 °C) Oral 69 18 (!) 94 %       GENERAL APPEARANCE: Alert, well-developed, well-nourished male in no acute distress.  HEENT: Normocephalic and atraumatic. PERRL. Oropharynx unremarkable.  PULM: Normal respiratory effort. No accessory muscle use.  CV: RRR.  ABDOMEN: Soft, nontender.  EXTREMITIES: No obvious signs of vascular compromise. Pulses present. No cyanosis, clubbing or edema.  SKIN: Clear; no rashes, lesions or skin breaks in exposed areas.    NEURO:  MENTAL STATUS:  Alert, oriented to self but not to place or time.  Able to follow commands appropriately.  He knows his date birth  CRANIAL NERVES:  CN I: Not tested.  CN II: Fundoscopic exam deferred.  CN III, IV, VI: Pupils equal, round and reactive to light.  Extraocular movements full and intact.  CN V: Facial sensation normal.  CN VII: Facial asymmetry absent.  CN VIII: Hearing grossly normal and equal bilaterally.  No skew deviation or pathologic nystagmus.  CN IX, X: Palate elevates symmetrically. Speech/articulation is clear without dysarthria.  CN XI: Shoulder shrug and chin rotation equal with good strength.  CN XII: Tongue protrusion midline.    MOTOR:  Bulk normal. Tone normal and symmetric throughout.  Abnormal movements absent.  Tremor: none present.  Strength  5/5 bilateral upper extremities and 4/5 bilateral lower extremities .    REFLEXES:  DTRs 1+ throughout.  Plantar response equivocal bilaterally.  SENSATION: grossly intact throughout.  COORDINATION: normal finger-to-nose.  STATION: not tested.  GAIT: not tested.        Labs:  Recent Labs   Lab 10/25/24  1340 10/26/24  0454 10/27/24  0537    139 135*   K 3.4* 4.3 3.6    100 98   CO2 29 33*  "32*   BUN 22 20 17   CREATININE 1.2 1.2 1.3   * 109 113*   CALCIUM 9.1 9.4 9.6   MG  --  1.5* 1.4*     Recent Labs   Lab 10/25/24  1340 10/26/24  0453 10/27/24  0537   WBC 4.41 4.09 5.15   HGB 8.7* 7.9* 8.9*   HCT 25.7* 23.3* 26.2*    230 264     Recent Labs   Lab 10/25/24  1340 10/26/24  0454 10/27/24  0537   ALBUMIN 3.6 3.4* 3.8   PROT 5.8* 5.6* 6.4   BILITOT 0.6 0.7 0.7   ALKPHOS 63 59 63   ALT 10 8* 10   AST 11 11 13     Lab Results   Component Value Date    INR 1.1 02/24/2019     Lab Results   Component Value Date    TRIG 75 03/01/2024    HDL 33 (L) 03/01/2024    CHOLHDL 25.2 03/01/2024     Lab Results   Component Value Date    HGBA1C 5.7 (H) 09/13/2024     No results found for: "PROTEINCSF", "GLUCCSF", "WBCCSF"    Imaging:  I have reviewed and interpreted the pertinent imaging and lab results.      Echo  Addendum:   Left Ventricle: The left ventricle is normal in size. Normal wall  thickness. Normal wall motion. There is normal systolic function with a  visually estimated ejection fraction of 55 - 60%. There is indeterminate  diastolic function.     Right Ventricle: Normal right ventricular cavity size. Systolic  function is normal.     Left Atrium: Left atrium is mildly dilated.     Right Atrium: Right atrium is mildly dilated.     IVC/SVC: Normal venous pressure at 3 mmHg.     Technically difficult study.  Narrative:   Left Ventricle: The left ventricle is normal in size. Normal wall   thickness. Normal wall motion. There is normal systolic function with a   visually estimated ejection fraction of 55 - 60%. There is indeterminate   diastolic function.    Right Ventricle: Normal right ventricular cavity size. Systolic   function is normal.    Left Atrium: Left atrium is mildly dilated.    Right Atrium: Right atrium is mildly dilated.    IVC/SVC: Normal venous pressure at 3 mmHg.    Technically difficult study.         ASSESSMENT & PLAN:      Encephalopathy   Dementia   Delirium       Plan: "   Encephalopathy likely secondary to metabolic causes in a patient with underlying dementia.  Hospital-acquired delirium can not be ruled out.  Denies any hallucinations at this time.  EEG was done showed no seizures or epileptiform activity.    Recommend Workup and management for metabolic derangements and infectious abnormalities that could contribute to his delirium  Check vitamin B12, folate, TSH and ammonia for reversible cause of encephalopathy.    Avoid sedating medications anticholinergics.    Continue with Aricept and memantine.    CT head was negative for acute pathology.  Exam is nonfocal, can hold off on MRI brain at this time.  If no continue improvement is seen, consider MRI brain.  Will follow           Thank you kindly for including us in the care of this patient. Please do not hesitate to contact us with any questions.         Hernando Candelaria MD  Neurology/vascular Neurology  Date of Service: 10/27/2024  10:45 AM    --------------------------------------------------------------------------------------------------------------------------------------------------------------------------------------------------------------------------------------------------------------  Please note: This note was transcribed using voice recognition software. Because of this technology there are often uinintended grammatical, spelling, and other transcription errors. Please disregard these errors.

## 2024-10-27 NOTE — ASSESSMENT & PLAN NOTE
Unsure of cause.  No major abnormalities on CT head.  Carotid US shows normal velocities but there is abnormality at origin of left ICA.  Possibly significant stenosis there.  Radiologist recommended CTA.  Get EEG.  Keep on tele monitor.

## 2024-10-27 NOTE — ASSESSMENT & PLAN NOTE
Patient has Abnormal Magnesium: hypomagnesemia. Will continue to monitor electrolytes closely. Will replace the affected electrolytes and repeat labs to be done after interventions completed. The patient's magnesium results have been reviewed and are listed below.  Recent Labs   Lab 10/26/24  0454   MG 1.5*

## 2024-10-27 NOTE — NURSING
Nurse called to room to assess patient. Patient eyes correction open, sternal rub patient and patient moan and mumbled but then fell back asleep. /72 HR 65 T 98.0, O2 93% on room air. . Patient just restarted some home medications, Abilify and lexapro. Patient aroused to patient daughter and noded when asked a question. Notified Dr Fisher via phone and will come assess. Dr Braun at bedside assessing patient.

## 2024-10-27 NOTE — PROGRESS NOTES
[facility] Medicine  Progress Note    Patient Name: Roosevelt Alejandro  MRN: [id number]  Patient Class: OP- Observation   Admission Date: 10/25/2024  Length of Stay: 0 days  Attending Physician: Nahum Chavez MD  Primary Care Provider: Roque Guillaume MD        Subjective:     Principal Problem:Encephalopathy, metabolic        HPI:  78 year old pt getting admitted with syncope/collapse and AMS  Pt was eating breakfast and suddenly he collapsed and was witnessed by pts wife   \he was c/o shortness of breath  EMS were called and was given narcan because of pinpoint pupils  Pt was escorted to ER  Pt was confused when saw in ER  Sleepy but was easily arousable     Overview/Hospital Course:  No notes on file    Interval History:  admitted yesterday.  Today, according to his daughters, he is more disoriented and is having visual hallucinations.  He's getting empiric antibiotics.  The chart shows urine was collected yesterday, but there are no urinalysis results.    Review of Systems   Unable to perform ROS: Mental status change     Objective:     Vital Signs (Most Recent):  Temp: 97.9 °F (36.6 °C) (10/26/24 1645)  Pulse: 61 (10/26/24 1645)  Resp: 16 (10/26/24 1645)  BP: (!) 140/70 (10/26/24 1645)  SpO2: (!) 93 % (10/26/24 1645) Vital Signs (24h Range):  Temp:  [97.5 °F (36.4 °C)-98.9 °F (37.2 °C)] 97.9 °F (36.6 °C)  Pulse:  [57-69] 61  Resp:  [16-18] 16  SpO2:  [93 %-98 %] 93 %  BP: (134-145)/(63-72) 140/70     Weight: 77.8 kg (171 lb 8.3 oz)  Body mass index is 22.02 kg/m².    Intake/Output Summary (Last 24 hours) at 10/26/2024 2010  Last data filed at 10/26/2024 1412  Gross per 24 hour   Intake 840 ml   Output 800 ml   Net 40 ml         Physical Exam  Vitals reviewed.   Constitutional:       General: He is not in acute distress.     Appearance: He is not diaphoretic.   HENT:      Mouth/Throat:      Mouth: Mucous membranes are moist.   Eyes:      General: No scleral icterus.        Right eye:  No discharge.         Left eye: No discharge.   Neck:      Vascular: No JVD.   Cardiovascular:      Rate and Rhythm: Normal rate and regular rhythm.   Pulmonary:      Effort: Pulmonary effort is normal.      Breath sounds: Normal breath sounds.   Abdominal:      General: Bowel sounds are normal. There is no distension.      Palpations: Abdomen is soft.      Tenderness: There is no abdominal tenderness.   Skin:     General: Skin is warm.      Findings: No rash.   Neurological:      Mental Status: He is alert. He is disoriented and confused.   Psychiatric:         Attention and Perception: Attention normal. He perceives visual hallucinations.         Speech: Speech is delayed.         Behavior: Behavior is cooperative.         Cognition and Memory: Memory is impaired.             Significant Labs: All pertinent labs within the past 24 hours have been reviewed.    Significant Imaging: I have reviewed all pertinent imaging results/findings within the past 24 hours.    Assessment/Plan:      * Encephalopathy, metabolic  Getting admitted with AMS  CT Brain unremarkable for acute disease.  Order EEG.  Consult Neurology.  Hold anti-psychotic drugs.  Received empiric levofloxacin, in case there is an infection.  CT lungs did not show any evidence of pneumonia.  Urinalysis collected yesterday is still in process.  I will order another urine sample.  I await urine culture results.  Meanwhile, order ceftriaxone.    Hypomagnesemia  Patient has Abnormal Magnesium: hypomagnesemia. Will continue to monitor electrolytes closely. Will replace the affected electrolytes and repeat labs to be done after interventions completed. The patient's magnesium results have been reviewed and are listed below.  Recent Labs   Lab 10/26/24  0454   MG 1.5*        Hypokalemia  Patient's most recent potassium results are listed below.   Recent Labs     10/25/24  1340 10/26/24  0454   K 3.4* 4.3     Plan  - Replete potassium per protocol  - Monitor  potassium Daily  - Patient's hypokalemia is improving  -     Syncope and collapse  Unsure of cause.  No major abnormalities on CT head.  Carotid US shows normal velocities but there is abnormality at origin of left ICA.  Possibly significant stenosis there.  Radiologist recommended CTA.  Get EEG.  Keep on tele monitor.    Type 2 diabetes mellitus without complication, without long-term current use of insulin  Last A1c reviewed-   Lab Results   Component Value Date    HGBA1C 5.7 (H) 09/13/2024     No need to get FSG's.  He has very well controlled diabetes.    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  Will continue his memantine and donepezil.    Chronic kidney disease, stage 3a  Creatine stable for now. BMP reviewed- noted Estimated Creatinine Clearance: 54.9 mL/min (based on SCr of 1.2 mg/dL). according to latest data. Based on current GFR, CKD stage is stage 3 - GFR 30-59.  Monitor UOP and serial BMP and adjust therapy as needed. Renally dose meds. Avoid nephrotoxic medications and procedures.    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  Has been taking lanreotide, and disease is stable.        H/O arteriovenous malformation (AVM)  Aware       Essential hypertension  Patients blood pressure range in the last 24 hours was: BP  Min: 134/72  Max: 162/72.The patient's inpatient anti-hypertensive regimen is listed below:  Current Antihypertensives  metoprolol succinate (TOPROL-XL) 24 hr tablet 25 mg, Daily, Oral    Plan  - BP is controlled, no changes needed to their regimen        VTE Risk Mitigation (From admission, onward)           Ordered     enoxaparin injection 40 mg  Daily         10/25/24 1524     IP VTE HIGH RISK PATIENT  Once         10/25/24 1524     Place sequential compression device  Until discontinued         10/25/24 1524                     Discharge Planning   ARCHANA:      Code Status: Full Code   Is the patient medically ready for discharge?:     Reason for patient still in hospital:  monitoring; treatment; consult recommendations.  Discharge Plan A: Home                  Nahum Chavez MD  Department of Hospital Medicine   [facility]

## 2024-10-27 NOTE — ASSESSMENT & PLAN NOTE
Unsure of cause.  No major abnormalities on CT head.  Carotid US shows normal velocities but there is abnormality at origin of left ICA.  Possibly significant stenosis there.  Radiologist recommended CTA.  Get EEG.  Keep on tele monitor.  Follow up on orthostatic BP

## 2024-10-27 NOTE — PROGRESS NOTES
[facility] Medicine  Progress Note    Patient Name: Roosevelt Alejandro  MRN: [id number]  Patient Class: IP- Inpatient   Admission Date: 10/25/2024  Length of Stay: 0 days  Attending Physician: Robyn Fisher DO  Primary Care Provider: Roque Guillaume MD        Subjective:     Principal Problem:Encephalopathy, metabolic        HPI:  78 year old pt getting admitted with syncope/collapse and AMS  Pt was eating breakfast and suddenly he collapsed and was witnessed by pts wife   \he was c/o shortness of breath  EMS were called and was given narcan because of pinpoint pupils  Pt was escorted to ER  Pt was confused when saw in ER  Sleepy but was easily arousable     Overview/Hospital Course:  78 year old pt admitted with AMS .  Orthostatic blood pressure check was done.  Neurology was consulted.Encephalopathy likely secondary to metabolic causes with underlying dementia. Hospital-acquired delirium couldn't be ruled out.  Cardiac workup was done.  Ultrasound of carotid artery showed significant left ICA stenosis.  Follow-up on CT angiogram on 10/27.  Spoke with daughter LAUREANO Carvalho and she confirmed that patient is DNR/DNI so code status was changed from to DNR/DNI from full code. Restarted home psych meds on 10/27 to prevent withdrawal.     Interval History: Patient said he feels ok and he is not in pain.    Review of Systems   All other systems reviewed and are negative.    Objective:     Vital Signs (Most Recent):  Temp: 97.5 °F (36.4 °C) (10/27/24 1221)  Pulse: 66 (10/27/24 1221)  Resp: 18 (10/27/24 1221)  BP: 117/75 (10/27/24 1221)  SpO2: 96 % (10/27/24 1221) Vital Signs (24h Range):  Temp:  [97.4 °F (36.3 °C)-98.9 °F (37.2 °C)] 97.5 °F (36.4 °C)  Pulse:  [60-68] 66  Resp:  [16-18] 18  SpO2:  [92 %-98 %] 96 %  BP: (117-162)/(70-77) 117/75     Weight: 77.8 kg (171 lb 8.3 oz)  Body mass index is 22.02 kg/m².    Intake/Output Summary (Last 24 hours) at 10/27/2024 1340  Last data filed at 10/27/2024  0832  Gross per 24 hour   Intake 480 ml   Output 200 ml   Net 280 ml         Physical Exam  Cardiovascular:      Rate and Rhythm: Normal rate.      Pulses: Normal pulses.   Pulmonary:      Effort: Pulmonary effort is normal.   Abdominal:      General: Abdomen is flat. Bowel sounds are normal.   Neurological:      Mental Status: He is alert. Mental status is at baseline.             Significant Labs: All pertinent labs within the past 24 hours have been reviewed.    Significant Imaging: I have reviewed all pertinent imaging results/findings within the past 24 hours.    Assessment/Plan:      * Encephalopathy, metabolic  Back to baseline  CT Brain unremarkable for acute disease.  EEG no seizure  Consult Neurology.  Received empiric levofloxacin, in case there is an infection.  CT lungs did not show any evidence of pneumonia.  Urinalysis collected yesterday is still in process.  I will order another urine sample.  I await urine culture results.  Meanwhile, order ceftriaxone.    Hypomagnesemia  Patient has Abnormal Magnesium: hypomagnesemia. Will continue to monitor electrolytes closely. Will replace the affected electrolytes and repeat labs to be done after interventions completed. The patient's magnesium results have been reviewed and are listed below.  Recent Labs   Lab 10/26/24  0454   MG 1.5*        Hypokalemia  Patient's most recent potassium results are listed below.   Recent Labs     10/25/24  1340 10/26/24  0454   K 3.4* 4.3     Plan  - Replete potassium per protocol  - Monitor potassium Daily  - Patient's hypokalemia is improving  -     Syncope and collapse  Unsure of cause.  No major abnormalities on CT head.  Carotid US shows normal velocities but there is abnormality at origin of left ICA.  Possibly significant stenosis there.  Radiologist recommended CTA.  Get EEG.  Keep on tele monitor.  Follow up on orthostatic BP    Type 2 diabetes mellitus without complication, without long-term current use of  insulin  Last A1c reviewed-   Lab Results   Component Value Date    HGBA1C 5.7 (H) 09/13/2024     No need to get FSG's.  He has very well controlled diabetes.    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  Will continue his memantine and donepezil.    Chronic kidney disease, stage 3a  Creatine stable for now. BMP reviewed- noted Estimated Creatinine Clearance: 54.9 mL/min (based on SCr of 1.2 mg/dL). according to latest data. Based on current GFR, CKD stage is stage 3 - GFR 30-59.  Monitor UOP and serial BMP and adjust therapy as needed. Renally dose meds. Avoid nephrotoxic medications and procedures.    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  Has been taking lanreotide, and disease is stable.        H/O arteriovenous malformation (AVM)  Aware       Essential hypertension  Patients blood pressure range in the last 24 hours was: BP  Min: 134/72  Max: 162/72.The patient's inpatient anti-hypertensive regimen is listed below:  Current Antihypertensives  metoprolol succinate (TOPROL-XL) 24 hr tablet 25 mg, Daily, Oral    Plan  - BP is controlled, no changes needed to their regimen        VTE Risk Mitigation (From admission, onward)           Ordered     enoxaparin injection 40 mg  Daily         10/25/24 1524     IP VTE HIGH RISK PATIENT  Once         10/25/24 1524     Place sequential compression device  Until discontinued         10/25/24 1524                    Discharge Planning   ARCHANA: 10/29/2024     Code Status: DNR   Is the patient medically ready for discharge?:   Discharge Plan A: Home                  Robyn Fisher DO  Department of Hospital Medicine   [facility]

## 2024-10-27 NOTE — NURSING
Patient combative pulling off heart monitor and attempting to get OOB. Patient believes he is at home and nurse is in his home with a gun. Attempts at reorienting patient unsuccessful. All safety measures in place. Will monitor patient more frequently.

## 2024-10-27 NOTE — ASSESSMENT & PLAN NOTE
Creatine stable for now. BMP reviewed- noted Estimated Creatinine Clearance: 54.9 mL/min (based on SCr of 1.2 mg/dL). according to latest data. Based on current GFR, CKD stage is stage 3 - GFR 30-59.  Monitor UOP and serial BMP and adjust therapy as needed. Renally dose meds. Avoid nephrotoxic medications and procedures.

## 2024-10-27 NOTE — ASSESSMENT & PLAN NOTE
Back to baseline  CT Brain unremarkable for acute disease.  EEG no seizure  Consult Neurology.  Received empiric levofloxacin, in case there is an infection.  CT lungs did not show any evidence of pneumonia.  Urinalysis collected yesterday is still in process.  I will order another urine sample.  I await urine culture results.  Meanwhile, order ceftriaxone.

## 2024-10-27 NOTE — PT/OT/SLP EVAL
Physical Therapy Evaluation    Patient Name:  Roosevelt Alejandro   MRN:  [id number]    Recommendations:     Discharge Recommendations: Low Intensity Therapy   Discharge Equipment Recommendations: walker, rolling   Barriers to discharge: Decreased caregiver support    Assessment:     Roosevelt Alejandro is a 78 y.o. male admitted with a medical diagnosis of Encephalopathy, metabolic.  He presents with the following impairments/functional limitations: weakness, impaired endurance, impaired functional mobility, gait instability, impaired balance, impaired cognition, decreased safety awareness. Pt was agreeable to PT at this visit. Found sitting up in chair with daughter present. Pt showed increased confusion to the year, president, and his age. Daughter present in room to be able to confirm all answers to living situation. Pt with functional strength seen in the LE at this time. SBA performed with sit<>stand transfers. Verbal and tactile cues used with stand<>sit transfers to improve ability to safely descend into chair. Pt with improved descent following. Pt able to ambulate 80 ft with RW and CGA. Pt ambulate with a single point cane at home, but does get unsteady with use. Pt will take his wife's RW and ambulate with it at home when he starts to feel unsteady.     Roosevelt's mobility limitation cannot be sufficiently resolved by the use of a cane. His functional mobility deficit can be sufficiently resolved with the use of a Rolling Walker. Patient's mobility limitation significantly impairs their ability to participate in one of more activities of daily living.  The use of a RW will significantly improve the patient's ability to participate in MRADLS and the patient will use it on regular basis in the home.      Rehab Prognosis: Fair; patient would benefit from acute skilled PT services to address these deficits and reach maximum level of function.    Recent Surgery: * No surgery found *      Plan:     During this hospitalization,  patient to be seen 3 x/week to address the identified rehab impairments via gait training, therapeutic activities, therapeutic exercises, neuromuscular re-education and progress toward the following goals:    Plan of Care Expires:  11/27/24    Subjective     Chief Complaint: weakness and confusion  Patient/Family Comments/goals: return to home  Pain/Comfort:  Pain Rating 1: 0/10    Patients cultural, spiritual, Mandaeism conflicts given the current situation:      Living Environment:  Lives with wife in single story home with 1 step to get in. Pt ambulates with single point cane at home. Wife uses a rolling walker that he uses from time to time when he feels unsteady on his cane.  Prior to admission, patients level of function was Modified [place] with single point cane.  Equipment used at home: cane, straight.  DME owned (not currently used): none.  Upon discharge, patient will have assistance from wife and daughters.    Objective:     Communicated with RN, Karen, prior to session.  Patient found up in chair with telemetry  upon PT entry to room.    General Precautions: Standard, Falls   Orthopedic Precautions:N/A   Braces: N/A  Respiratory Status: Room air    Exams:  Cognitive Exam:  Patient is oriented to Person and Place  RLE Strength: WFL  LLE Strength: WFL    Functional Mobility:  Transfers:     Sit to Stand:  stand by assistance with rolling walker  Gait: Pt able to ambulate 80ft with RW and CGA      AM-PAC 6 CLICK MOBILITY  Total Score:16       Treatment & Education:  Educated pt and daughter about movement to help maintain strength. Spoke with daughter about obtaining RW for pt so he can have his own and not borrow his wife's.    Patient left up in chair with call button in reach and daughter present.    GOALS:   Multidisciplinary Problems       Physical Therapy Goals          Problem: Physical Therapy    Goal Priority Disciplines Outcome Interventions   Physical Therapy Goal     PT, PT/OT      Description: Goals to be met by: 2024     Patient will increase functional independence with mobility by performin) Supine to sit with Modified [place]  2) Sit to supine with Modified [place]  3) Sit to stand transfer with Supervision  4) Gait  x 150 feet with Supervision using Rolling Walker.                          History:     Past Medical History:   Diagnosis Date    Arteriovenous malformation (AVM)     Arthritis     back pain    Asbestosis     BPH (benign prostatic hyperplasia)     Hypertension     Primary malignant neuroendocrine neoplasm of duodenum 2019    Secondary neuroendocrine tumor of liver     Vertigo, aural, unspecified laterality 2019       Past Surgical History:   Procedure Laterality Date    CHOLECYSTECTOMY  2019    Procedure: CHOLECYSTECTOMY;  Surgeon: SP Khalil MD;  Location: [facility];  Service: General;;    COLONOSCOPY          COLONOSCOPY N/A 2016    Procedure: COLONOSCOPY;  Surgeon: Toby Maciel MD;  Location: [facility];  Service: Endoscopy;  Laterality: N/A;    COLONOSCOPY N/A 2019    Procedure: COLONOSCOPY;  Surgeon: Toby Maciel MD;  Location: [facility];  Service: Endoscopy;  Laterality: N/A;    COLONOSCOPY N/A 2023    Procedure: COLONOSCOPY;  Surgeon: Toby Maciel MD;  Location: [facility];  Service: Endoscopy;  Laterality: N/A;  lm wife/ppm    ENDOSCOPIC ULTRASOUND OF UPPER GASTROINTESTINAL TRACT N/A 2019    Procedure: ULTRASOUND, UPPER GI TRACT, ENDOSCOPIC;  Surgeon: Forest Luico III, MD;  Location: [facility] ENDO;  Service: Endoscopy;  Laterality: N/A;    EYE SURGERY Bilateral     cataracts, retinal detachment    HYPERTHERMIC INTRAPERITONEAL CHEMOTHERAPY (HIPEC)  2019    Procedure: CHEMOTHERAPY, INTRAPERITONEAL, HYPERTHERMIC;  Surgeon: SP Khalil MD;  Location: [facility] OR;  Service: General;;    LIVER BIOPSY  2019    Procedure: BIOPSY, LIVER;  Surgeon: SP Khalil MD;   Location: [facility] OR;  Service: General;;  BIOPSY LIVER WEDGE X3    PROSTATE BIOPSY      PROSTATECTOMY  12/2017    RENAL EXPLORATION  11/4/2019    Procedure: EXPLORATION, KIDNEY;  Surgeon: SP Khalil MD;  Location: [facility] OR;  Service: General;;    ROTATOR CUFF REPAIR      left    SURGICAL REMOVAL OF LYMPH NODE  11/4/2019    Procedure: EXCISION, LYMPH NODE;  Surgeon: SP Khalil MD;  Location: [facility] OR;  Service: General;;  EXTENSIVE RETROPERITONEAL NODE DISSECTION  EXPLORATION OF MESENTERIC ARTERY/VEIN       Time Tracking:     PT Received On: 10/27/24  PT Start Time: 1111     PT Stop Time: 1132  PT Total Time (min): 21 min     Billable Minutes: Evaluation 11 and Gait Training 10      10/27/2024

## 2024-10-27 NOTE — SUBJECTIVE & OBJECTIVE
Interval History: Patient said he feels ok and he is not in pain.    Review of Systems   All other systems reviewed and are negative.    Objective:     Vital Signs (Most Recent):  Temp: 97.5 °F (36.4 °C) (10/27/24 1221)  Pulse: 66 (10/27/24 1221)  Resp: 18 (10/27/24 1221)  BP: 117/75 (10/27/24 1221)  SpO2: 96 % (10/27/24 1221) Vital Signs (24h Range):  Temp:  [97.4 °F (36.3 °C)-98.9 °F (37.2 °C)] 97.5 °F (36.4 °C)  Pulse:  [60-68] 66  Resp:  [16-18] 18  SpO2:  [92 %-98 %] 96 %  BP: (117-162)/(70-77) 117/75     Weight: 77.8 kg (171 lb 8.3 oz)  Body mass index is 22.02 kg/m².    Intake/Output Summary (Last 24 hours) at 10/27/2024 1340  Last data filed at 10/27/2024 0832  Gross per 24 hour   Intake 480 ml   Output 200 ml   Net 280 ml         Physical Exam  Cardiovascular:      Rate and Rhythm: Normal rate.      Pulses: Normal pulses.   Pulmonary:      Effort: Pulmonary effort is normal.   Abdominal:      General: Abdomen is flat. Bowel sounds are normal.   Neurological:      Mental Status: He is alert. Mental status is at baseline.             Significant Labs: All pertinent labs within the past 24 hours have been reviewed.    Significant Imaging: I have reviewed all pertinent imaging results/findings within the past 24 hours.

## 2024-10-27 NOTE — ASSESSMENT & PLAN NOTE
Last A1c reviewed-   Lab Results   Component Value Date    HGBA1C 5.7 (H) 09/13/2024     No need to get FSG's.  He has very well controlled diabetes.

## 2024-10-28 VITALS
HEART RATE: 61 BPM | BODY MASS INDEX: 22.01 KG/M2 | WEIGHT: 171.5 LBS | TEMPERATURE: 98 F | HEIGHT: 74 IN | DIASTOLIC BLOOD PRESSURE: 72 MMHG | RESPIRATION RATE: 18 BRPM | OXYGEN SATURATION: 94 % | SYSTOLIC BLOOD PRESSURE: 149 MMHG

## 2024-10-28 LAB
ALBUMIN SERPL BCP-MCNC: 3.4 G/DL (ref 3.5–5.2)
ALP SERPL-CCNC: 55 U/L (ref 55–135)
ALT SERPL W/O P-5'-P-CCNC: 9 U/L (ref 10–44)
ANION GAP SERPL CALC-SCNC: 2 MMOL/L (ref 8–16)
AST SERPL-CCNC: 12 U/L (ref 10–40)
BACTERIA UR CULT: NO GROWTH
BASOPHILS # BLD AUTO: 0.07 K/UL (ref 0–0.2)
BASOPHILS NFR BLD: 2.1 % (ref 0–1.9)
BILIRUB SERPL-MCNC: 0.6 MG/DL (ref 0.1–1)
BUN SERPL-MCNC: 17 MG/DL (ref 8–23)
CALCIUM SERPL-MCNC: 8.9 MG/DL (ref 8.7–10.5)
CHLORIDE SERPL-SCNC: 103 MMOL/L (ref 95–110)
CO2 SERPL-SCNC: 31 MMOL/L (ref 23–29)
CREAT SERPL-MCNC: 1.2 MG/DL (ref 0.5–1.4)
DIFFERENTIAL METHOD BLD: ABNORMAL
EOSINOPHIL # BLD AUTO: 0.2 K/UL (ref 0–0.5)
EOSINOPHIL NFR BLD: 6.5 % (ref 0–8)
ERYTHROCYTE [DISTWIDTH] IN BLOOD BY AUTOMATED COUNT: 14.6 % (ref 11.5–14.5)
EST. GFR  (NO RACE VARIABLE): >60 ML/MIN/1.73 M^2
GLUCOSE SERPL-MCNC: 107 MG/DL (ref 70–110)
HCT VFR BLD AUTO: 23.1 % (ref 40–54)
HGB BLD-MCNC: 7.7 G/DL (ref 14–18)
IMM GRANULOCYTES # BLD AUTO: 0.17 K/UL (ref 0–0.04)
IMM GRANULOCYTES NFR BLD AUTO: 5 % (ref 0–0.5)
LYMPHOCYTES # BLD AUTO: 0.8 K/UL (ref 1–4.8)
LYMPHOCYTES NFR BLD: 23.4 % (ref 18–48)
MAGNESIUM SERPL-MCNC: 1.7 MG/DL (ref 1.6–2.6)
MCH RBC QN AUTO: 37.6 PG (ref 27–31)
MCHC RBC AUTO-ENTMCNC: 33.3 G/DL (ref 32–36)
MCV RBC AUTO: 113 FL (ref 82–98)
MONOCYTES # BLD AUTO: 0.3 K/UL (ref 0.3–1)
MONOCYTES NFR BLD: 8.3 % (ref 4–15)
NEUTROPHILS # BLD AUTO: 1.9 K/UL (ref 1.8–7.7)
NEUTROPHILS NFR BLD: 54.7 % (ref 38–73)
NRBC BLD-RTO: 0 /100 WBC
PLATELET # BLD AUTO: 227 K/UL (ref 150–450)
PMV BLD AUTO: 12.7 FL (ref 9.2–12.9)
POTASSIUM SERPL-SCNC: 3.7 MMOL/L (ref 3.5–5.1)
PROT SERPL-MCNC: 5.7 G/DL (ref 6–8.4)
RBC # BLD AUTO: 2.05 M/UL (ref 4.6–6.2)
SODIUM SERPL-SCNC: 136 MMOL/L (ref 136–145)
WBC # BLD AUTO: 3.38 K/UL (ref 3.9–12.7)

## 2024-10-28 PROCEDURE — 83735 ASSAY OF MAGNESIUM: CPT | Performed by: INTERNAL MEDICINE

## 2024-10-28 PROCEDURE — 25000003 PHARM REV CODE 250: Performed by: FAMILY MEDICINE

## 2024-10-28 PROCEDURE — 97165 OT EVAL LOW COMPLEX 30 MIN: CPT

## 2024-10-28 PROCEDURE — 85007 BL SMEAR W/DIFF WBC COUNT: CPT | Performed by: INTERNAL MEDICINE

## 2024-10-28 PROCEDURE — 97530 THERAPEUTIC ACTIVITIES: CPT

## 2024-10-28 PROCEDURE — 25000003 PHARM REV CODE 250: Performed by: HOSPITALIST

## 2024-10-28 PROCEDURE — 85027 COMPLETE CBC AUTOMATED: CPT | Performed by: INTERNAL MEDICINE

## 2024-10-28 PROCEDURE — 80053 COMPREHEN METABOLIC PANEL: CPT | Performed by: INTERNAL MEDICINE

## 2024-10-28 PROCEDURE — 36415 COLL VENOUS BLD VENIPUNCTURE: CPT | Performed by: INTERNAL MEDICINE

## 2024-10-28 PROCEDURE — 25000003 PHARM REV CODE 250: Performed by: INTERNAL MEDICINE

## 2024-10-28 RX ADMIN — MEMANTINE HYDROCHLORIDE 5 MG: 5 TABLET ORAL at 12:10

## 2024-10-28 RX ADMIN — METOPROLOL SUCCINATE 25 MG: 25 TABLET, FILM COATED, EXTENDED RELEASE ORAL at 12:10

## 2024-10-28 RX ADMIN — ARIPIPRAZOLE 2 MG: 2 TABLET ORAL at 12:10

## 2024-10-28 RX ADMIN — FAMOTIDINE 20 MG: 20 TABLET ORAL at 12:10

## 2024-10-28 RX ADMIN — ESCITALOPRAM 5 MG: 5 TABLET, FILM COATED ORAL at 12:10

## 2024-10-28 RX ADMIN — MUPIROCIN 1 G: 20 OINTMENT TOPICAL at 12:10

## 2024-10-28 NOTE — DISCHARGE SUMMARY
[facility]  Discharge Summary      Patient Name: Roosevelt Alejandro  MRN: [id number]  HUA: [id number]  Patient Class: IP- Inpatient  Admission Date: 10/25/2024  Hospital Length of Stay: 1 days  Discharge Date and Time:  10/28/2024 4:08 PM  Attending Physician: Dada Diaz MD   Discharging Provider: Dada Diaz MD  Primary Care Provider: Roque Guillaume MD    Primary Care Team: Networked reference to record PCT     HPI:   78 year old pt getting admitted with syncope/collapse and AMS  Pt was eating breakfast and suddenly he collapsed and was witnessed by pts wife   \he was c/o shortness of breath  EMS were called and was given narcan because of pinpoint pupils  Pt was escorted to ER  Pt was confused when saw in ER  Sleepy but was easily arousable     * No surgery found *      Hospital Course:     The patient was admitted after loss of consciousness.  Workup was unrevealing.  CT head was unremarkable.  EEG did not demonstrate epileptiform like activity.  Unilateral carotid artery disease was identified but not believed to be a expository.      Throughout the hospitalization telemetry has not identified a critical arrhythmia.  The patient will be discharged with request for a Holter monitor for continued monitoring.    Daughter at bedside feels the patient is essentially back to his baseline functional status but that he remains mildly confused.  It is believed this is related to hospital-induced delirium.    Daughter is okay with her dad going home.  At the time of discharge the patient was seen sitting in the chair and as no complaints.    Daughter notified of mediastinal and hilar adenopathy and pulmonary nodules.  Referral to pulmonology made.      Instructions were given that the PCP should be notified of the carotid artery stenosis for further monitoring/intervention if appropriate.      Iron deficiency anemia is present.  There is no evidence of an active rapid bleed.   Follow with PCP in 1 week for repeat labs.  Fall in  hemoglobin on the day of discharge noted but similar to the level 2 days prior.    Coding clarification: Acute infection ruled out.    Patient Instructions:     You were discharged after losing consciousness.  No clear answer as to why the loss of consciousness occurred was identified.  Given that heart arrhythmias can lead to the loss of consciousness, it is being set up for you to have continued outpatient cardiac monitoring. Cardiology clinic will be calling you to schedule your cardiac monitor placement.     You are being referred to pulmonology as you have lymph nodes that are larger than normal in your chest region and spots in your lungs that are too small to determine what they are.  The pulmonologist will assist in following these over time and determining if they have any impact or potentially will have an impact on your health.    You have a narrowing of your left carotid artery as well.  This is not believed to be causing symptoms currently.  Discuss this with your PCP however for further monitoring.    Goals of Care Treatment Preferences:  Code Status: DNR      SDOH Screening:  The patient was screened for utility difficulties, food insecurity, transport difficulties, housing insecurity, and interpersonal safety and there were no concerns identified this admission.     Consults:   Consults (From admission, onward)          Status Ordering Provider     Inpatient consult to Neurology  Once        Provider:  Hernando Candelaria MD    Completed MARI JENSEN            Neuro  * Encephalopathy, metabolic  See hospital course.     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    Cardiac/Vascular  H/O arteriovenous malformation (AVM)  Aware       Essential hypertension  Patients blood pressure range in the last 24 hours was: BP  Min: 117/75  Max: 162/77.The patient's inpatient  anti-hypertensive regimen is listed below:  Current Antihypertensives  metoprolol succinate (TOPROL-XL) 24 hr tablet 25 mg, Daily, Oral    Plan  - BP is controlled, no changes needed to their regimen      Renal/  Hypomagnesemia  Patient has Abnormal Magnesium: hypomagnesemia. Will continue to monitor electrolytes closely. Will replace the affected electrolytes and repeat labs to be done after interventions completed. The patient's magnesium results have been reviewed and are listed below.  Recent Labs   Lab 10/28/24  0455   MG 1.7          Hypokalemia  Patient's most recent potassium results are listed below.   Recent Labs     10/26/24  0454 10/27/24  0537 10/28/24  0455   K 4.3 3.6 3.7       Plan  - Replete potassium per protocol  - Monitor potassium Daily  - Patient's hypokalemia is improving  -     Chronic kidney disease, stage 3a  Creatine stable for now. BMP reviewed- noted Estimated Creatinine Clearance: 54.9 mL/min (based on SCr of 1.2 mg/dL). according to latest data. Based on current GFR, CKD stage is stage 3 - GFR 30-59.  Monitor UOP and serial BMP and adjust therapy as needed. Renally dose meds. Avoid nephrotoxic medications and procedures.    Endocrine  Type 2 diabetes mellitus without complication, without long-term current use of insulin  Last A1c reviewed-   Lab Results   Component Value Date    HGBA1C 5.7 (H) 09/13/2024         Other  Syncope and collapse  See hospital course     Chronic allergic rhinitis          Final Active Diagnoses:    Diagnosis Date Noted POA    PRINCIPAL PROBLEM:  Encephalopathy, metabolic [G93.41] 10/25/2024 Yes    Hypokalemia [E87.6] 10/26/2024 Yes    Hypomagnesemia [E83.42] 10/26/2024 Yes    Syncope and collapse [R55] 10/25/2024 Yes    Type 2 diabetes mellitus without complication, without long-term current use of insulin [E11.9] 08/19/2024 Yes    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 Go on 11/4/2024.    Specialty: Family Medicine  Why: @0830 for hospital follow up  Contact information:  [address]  [place] [address]  [phone]               [place] Follow up.    Specialty: Home Health Services  Why: Service will call you to schedule your start of care visit on Wednesday 10/30.  Contact information:  [address]  [address]  [place] [address]  [phone]               Apolonia Palma MD Follow up on 11/7/2024.    Specialties: Pulmonary Disease, Sleep Medicine  Why: @2pm for pulmonology  Contact information:  [address]  [address]  [place] [address]  [phone]                           Patient Instructions:      Ambulatory referral/consult to Home Health   Standing Status: Future   Referral Priority: Routine Referral Type: Home Health   Referral Reason: Specialty Services Required   Requested Specialty: Home Health Services   Number of Visits Requested: 1     Ambulatory referral/consult to Pulmonology   Standing Status: Future   Referral Priority: Routine Referral Type: Consultation   Referral Reason: Specialty Services Required   Requested Specialty: Pulmonary Disease   Number of Visits Requested: 1     Cardiac Monitor - 3-15 Day Adult (Cupid Only)   Standing Status: Future Standing Exp. Date: 10/28/25     Order Specific Question Answer Comments   Duration: 14 days    Release to patient Immediate        Significant Diagnostic Studies: N/A    Pending Diagnostic Studies:       Procedure Component Value Units  "Date/Time    T3, free [[id number]] Collected: 10/27/24 1106    Order Status: Sent Lab Status: In process Updated: 10/27/24 1110    Specimen: Blood            Medications:  Reconciled Home Medications:      Medication List        CHANGE how you take these medications      atorvastatin 20 MG tablet  Commonly known as: LIPITOR  TAKE 1 TABLET EVERY DAY (NEED MD APPOINTMENT)  What changed: See the new instructions.     metoprolol succinate 25 MG 24 hr tablet  Commonly known as: TOPROL-XL  TAKE 1 TABLET EVERY DAY (NEED MD APPOINTMENT)  What changed: See the new instructions.            CONTINUE taking these medications      ARIPiprazole 5 MG Tab  Commonly known as: ABILIFY  Take 5 mg by mouth once daily.     CENTRUM SILVER ORAL  Take 1 tablet by mouth once daily.     cyanocobalamin 1,000 mcg/mL injection  Inject 1 mL (1,000 mcg total) into the muscle every 28 days.     donepeziL 5 MG tablet  Commonly known as: ARICEPT  Take 1 tablet (5 mg total) by mouth every evening.     EScitalopram oxalate 10 MG tablet  Commonly known as: LEXAPRO  Take 10 mg by mouth once daily.     memantine 5 MG Tab  Commonly known as: NAMENDA  Take 5 mg by mouth 2 (two) times daily.     QUEtiapine 50 MG tablet  Commonly known as: SEROQUEL  Take 50 mg by mouth nightly.     syringe with needle 1 mL 25 gauge x 1" Syrg  1 each by Misc.(Non-Drug; Combo Route) route every 28 days.              Indwelling Lines/Drains at time of discharge:   Lines/Drains/Airways       None                   Time spent on the discharge of patient: 35 minutes         Dada Diaz MD  Department of Hospital Medicine  [facility]  "

## 2024-10-28 NOTE — PROGRESS NOTES
Critical access hospital  Department of Neurology  Progress Note  Date: 10/28/2024 11:28 AM          Patient Name: Roosevelt Alejandro   MRN: [id number]   : 1945    AGE: 78 y.o.    LOS: 1 days Hospital Day: 4  Admit date: 10/25/2024  1:19 PM       HPI per EMR: Roosevelt Alejandro is a 78 y.o. male with a history of  admitted with syncope/collapse and AMS  Pt was eating breakfast and suddenly he collapsed and was witnessed by pts wife   \he was c/o shortness of breath  EMS were called and was given narcan because of pinpoint pupils  Pt was escorted to ER  Pt was confused when saw in ER  Sleepy but was easily arousable      Overview/Hospital Course:  No notes on file     Interval History: according to his daughters, he is more disoriented and is having visual hallucinations.  He's getting empiric antibiotics.  The chart shows urine was collected yesterday, but there are no urinalysis results.     Neurology consult:  Patient was seen and examined by me.  History is obtained from chart review as patient is not able to provide much history.  He is alert, oriented to self but not to place or time.  Follows commands appropriately and examined nonfocal.  Per chart review, he presented after an episode of syncope/encephalopathy.  Apparently had episode of loss of consciousness while having breakfast which was witnessed by his wife there was no reports of seizure-like activity at that time.  He was brought to the ER for these symptoms and since been in the hospital, has been confused intermittently complaining of hallucinations.     Neurology consulted for encephalopathy.  On exam, he denies any headache, focal weakness or sensory changes.  Denies any hallucinations at this time.    10/28/2024: No acute events overnight. Patient was seen and examined by me this morning. Neuro exam is improving.  He is alert, oriented to self and person and knows that he is in the hospital.  Not oriented to time but able to tell me it was Monday.   Daughter at bedside and feels that he is close to baseline.    Vitals:  Patient Vitals for the past 24 hrs:   BP Temp Temp src Pulse Resp SpO2   10/28/24 1123 131/71 97.5 °F (36.4 °C) Oral 60 18 (!) 94 %   10/28/24 0728 (!) 145/56 98.2 °F (36.8 °C) Oral 64 18 96 %   10/28/24 0725 (!) 159/79 98.4 °F (36.9 °C) Oral (!) 59 18 96 %   10/28/24 0421 118/68 98.5 °F (36.9 °C) -- 65 17 95 %   10/27/24 2326 128/71 98 °F (36.7 °C) -- 62 18 96 %   10/27/24 1940 123/64 98.2 °F (36.8 °C) -- 60 17 95 %   10/27/24 1620 139/72 98 °F (36.7 °C) Oral 65 16 (!) 93 %   10/27/24 1221 117/75 97.5 °F (36.4 °C) Oral 66 18 96 %     PHYSICAL EXAM:     GENERAL APPEARANCE: Well-developed, well-nourished male in no acute distress.  HEENT: Normocephalic and atraumatic. PERRL. Oropharynx unremarkable.  PULM: Comfortable on room air.  CV: RRR.  ABDOMEN: Soft, nontender.  EXTREMITIES: No signs of vascular compromise. Pulses present. No cyanosis, clubbing or edema.  SKIN: Clear; no rashes, lesions or skin breaks in exposed areas.      NEURO:   MENTAL STATUS: Patient awake and oriented to self/person and knows that he is in the hospital.  Able to follow commands appropriately.  CRANIAL NERVES II-XII: Pupils equal, round and reactive to light. Extraocular movements full and intact. No facial asymmetry.  MOTOR: Normal bulk. Tone normal and symmetrical throughout.  No abnormal movements. No tremor.   Strength 4/5 throughout unless specified below.  REFLEXES: DTRs 1+; normal and symmetric throughout.   SENSATION: Sensation grossly intact to fine touch.  COORDINATION: Finger-to-nose normal for age and symmetric.  STATION: Romberg deferred.  GAIT: Deferred.    CURRENT SCHEDULED MEDICATIONS:   ARIPiprazole  2 mg Oral Daily    cefTRIAXone (Rocephin) IV (PEDS and ADULTS)  1 g Intravenous Q24H    donepeziL  5 mg Oral QHS    enoxparin  40 mg Subcutaneous Daily    EScitalopram oxalate  5 mg Oral Daily    famotidine  20 mg Oral BID    memantine  5 mg Oral BID     "metoprolol succinate  25 mg Oral Daily    mupirocin   Nasal BID    QUEtiapine  50 mg Oral Nightly     CURRENT INFUSIONS:   0.9% NaCl   Intravenous Continuous 100 mL/hr at 10/27/24 2226 New Bag at 10/27/24 2226     DATA:  Recent Labs   Lab 10/25/24  1340 10/26/24  0454 10/27/24  0537 10/27/24  1106 10/28/24  0455    139 135*  --  136   K 3.4* 4.3 3.6  --  3.7    100 98  --  103   CO2 29 33* 32*  --  31*   BUN 22 20 17  --  17   CREATININE 1.2 1.2 1.3  --  1.2   * 109 113*  --  107   CALCIUM 9.1 9.4 9.6  --  8.9   MG  --  1.5* 1.4*  --  1.7   AST 11 11 13  --  12   ALT 10 8* 10  --  9*   AMMONIA  --   --   --  14  --      Recent Labs   Lab 10/25/24  1340 10/26/24  0453 10/27/24  0537 10/28/24  0455   WBC 4.41 4.09 5.15 3.38*   HGB 8.7* 7.9* 8.9* 7.7*   HCT 25.7* 23.3* 26.2* 23.1*    230 264 227     No results found for: "PROTEINCSF", "GLUCCSF", "WBCCSF", "RBCCSF", "LYMPHCSF", "PMNCSF"  Hemoglobin A1C   Date Value Ref Range Status   09/13/2024 5.7 (H) 4.0 - 5.6 % Final     Comment:     ADA Screening Guidelines:  5.7-6.4%  Consistent with prediabetes  >or=6.5%  Consistent with diabetes    High levels of fetal hemoglobin interfere with the HbA1C  assay. Heterozygous hemoglobin variants (HbS, HgC, etc)do  not significantly interfere with this assay.   However, presence of multiple variants may affect accuracy.     03/01/2024 5.7 (H) 4.0 - 5.6 % Final     Comment:     ADA Screening Guidelines:  5.7-6.4%  Consistent with prediabetes  >or=6.5%  Consistent with diabetes    High levels of fetal hemoglobin interfere with the HbA1C  assay. Heterozygous hemoglobin variants (HbS, HgC, etc)do  not significantly interfere with this assay.   However, presence of multiple variants may affect accuracy.     09/25/2023 5.6 4.0 - 5.6 % Final     Comment:     ADA Screening Guidelines:  5.7-6.4%  Consistent with prediabetes  >or=6.5%  Consistent with diabetes    High levels of fetal hemoglobin interfere with the " HbA1C  assay. Heterozygous hemoglobin variants (HbS, HgC, etc)do  not significantly interfere with this assay.   However, presence of multiple variants may affect accuracy.              I have personally reviewed and interpreted the pertinent imaging and lab results.  Imaging Results              US Carotid Bilateral (Final result)  Result time 10/25/24 16:56:37      Final result by Mikael Monterroso MD (10/25/24 16:56:37)                   Impression:      1. No hemodynamically significant stenosis on the right.  2. While velocity values on the left are within normal limits, grayscale images raise the question of hemodynamically significant left ICA stenosis.  Follow-up CT angiography would be helpful for more detailed assessment.  3. Antegrade flow in both vertebral arteries.      Electronically signed by: Mikael Monterroso  Date:    10/25/2024  Time:    16:56               Narrative:      CMS MANDATED QUALITY DATA - CAROTID - 195    All measurements and percent stenosis described below were determined using NASCET criteria or criteria similar to NASCET, as defined by the Society of Radiologists in Ultrasound Consensus Conference, Radiology, 2003    EXAMINATION:  US CAROTID BILATERAL    CLINICAL HISTORY:  syncope/collapse;.    TECHNIQUE:  Grayscale, color and spectral Doppler analysis was performed.    COMPARISON:  None.    FINDINGS:  Mild atherosclerotic plaque formation is noted in both carotid systems, most pronounced at the carotid bulb on the left.    Peak systolic velocity in the right ICA is 100 cm/sec, with ICA/CCA ratio of 1.0.    Peak systolic velocity in the  left ICA is 102 cm/sec, with ICA/CCA ratio of 1.2.  While these values are within normal limits, grayscale images demonstrate mild to moderate narrowing of the left ICA at its origin.    Antegrade flow is noted in both vertebral arteries.                                       CTA Chest Non-Coronary (PE Studies) (Final result)  Result time  10/25/24 16:18:48      Final result by Pranav Levy IV, MD (10/25/24 16:18:48)                   Impression:      No findings of acute pulmonary thromboembolism.    Mediastinal and hilar lymphadenopathy and scattered subcentimeter noncalcified pulmonary nodules.  Metastatic disease is not excluded.    Small dependent bilateral pleural effusions as well as dependent interstitial and airspace opacities.    Emphysema.    Additional observations as above.      Electronically signed by: Pranav Levy  Date:    10/25/2024  Time:    16:18               Narrative:      CMS MANDATED QUALITY DATA - CT RADIATION - 436    All CT scans at this facility utilize dose modulation, iterative reconstruction, and/or weight based dosing when appropriate to reduce radiation dose to as low as reasonably achievable.    The study was performed with thin sections with subsequent 3-D reformations  and reconstructions at multiple planes with images permanently stored in the PACS system.    EXAMINATION:  CTA CHEST NON CORONARY (PE STUDIES)    CLINICAL HISTORY:  Pulmonary embolism (PE) suspected, positive D-dimer;    TECHNIQUE:  The study was performed with thin sections with subsequent 3-D reformations  and reconstructions at multiple planes with images permanently stored in the PACS system.    The study utilizes 100 cc of intravenous nonionic contrast material.    COMPARISON:  None.    FINDINGS:  There is appropriate enhancement of the major pulmonary arteries and branch vessels.  There are no findings of acute pulmonary thromboembolism.    The heart is normal in size.  There is no significant pericardial fluid.  There is scattered calcified plaque formation within the aorta and branches including the coronary arteries.    There are enlarged mediastinal and bilateral hilar lymph nodes.    There are trace amounts of dependent pleural fluid bilaterally.  There is pleural calcification along the left hemidiaphragm.  There are bilateral dependent  interstitial and airspace opacities, greater on the right.  Emphysematous changes are again noted.  There are several scattered subcentimeter noncalcified pulmonary nodules.  The possibility of pulmonary metastatic disease is not excluded.    Within the upper abdomen, there are known hepatic and bilateral renal cysts.  The stomach is mildly distended and contains an air-fluid level.                                       CT Head Without Contrast (Final result)  Result time 10/25/24 15:22:43      Final result by Pranav Levy IV, MD (10/25/24 15:22:43)                   Impression:      Chronic small vessel ischemic changes and age-related involutional changes.    Arteriosclerosis.    No acute intracranial abnormality.      Electronically signed by: Pranav Levy  Date:    10/25/2024  Time:    15:22               Narrative:    EXAMINATION:  CMS MANDATED QUALITY DATA - CT RADIATION - 436    All CT scans at this facility utilize dose modulation, iterative reconstruction, and/or weight based dosing when appropriate to reduce radiation dose to as low as reasonably achievable.    CT HEAD WITHOUT CONTRAST    CLINICAL HISTORY:  Mental status change, unknown cause;    COMPARISON:  03/04/2024.    FINDINGS:  There are changes of decrease in the attenuation of the periventricular and subcortical white matter which represents a nonspecific finding most likely representative of areas of demyelination on the basis of chronic small vessel ischemia. There are no findings of acute intracranial hemorrhage or infarction. There is no intra-axial mass, mass effect or shift of the midline. Prominence of the ventricular system and sulci are a reflection of cortical atrophy. Arteriosclerotic calcification is observed within the intracranial segments of the carotid and vertebral arteries.    Viewed at bone windows, no acute osseous abnormality is identified.  The visualized portions of the paranasal sinuses and mastoid air cells are  appropriately aerated.                                       X-Ray Chest AP Portable (Final result)  Result time 10/25/24 14:00:17      Final result by hCarles Hagan MD (10/25/24 14:00:17)                   Impression:      Lower lung zone pulmonary interstitial opacities suggest interstitial edema perhaps related to heart failure or hypervolemia.  In the appropriate clinical setting, atypical infection could give a similar appearance.      Electronically signed by: Charles Hagan  Date:    10/25/2024  Time:    14:00               Narrative:    EXAMINATION:  XR CHEST AP PORTABLE    CLINICAL HISTORY:  CHF;    FINDINGS:  Portable chest at 1346 compared with 11/04/2019 shows normal cardiomediastinal silhouette.    Lungs contain interstitial opacities in bilateral lower lung zones, new.  Calcified pleural plaque along the left diaphragm unchanged.  No new confluent alveolar consolidation, pleural effusion, or pneumothorax.  Central pulmonary vasculature is normal.                                           ASSESSMENT AND PLAN:       Encephalopathy   Dementia   Delirium         Plan:   Encephalopathy likely secondary to metabolic causes in a patient with underlying dementia.  Hospital-acquired delirium can not be ruled out.  Denies any hallucinations at this time.  EEG was done showed no seizures or epileptiform activity.    Recommend Workup and management for metabolic derangements and infectious abnormalities that could contribute to his delirium  Checked vitamin B12, folate, TSH and ammonia for reversible cause of encephalopathy.    Avoid sedating medications anticholinergics.    Continue with Aricept and memantine.    CT head was negative for acute pathology.  Exam is nonfocal, can hold off on MRI brain at this time.    Mental status improving.  No further neuro workup needed at this time.  Outpatient neurology follow-up               Hernando Candelaria MD  Neurology/vascular Neurology  Date of Service: 10/28/2024   11:28 AM    Please note: This note was transcribed using voice recognition software. Because of this technology there are often uinintended grammatical, spelling, and other transcription errors. Please disregard these errors.

## 2024-10-28 NOTE — NURSING
Per Dr Diaz new orders received for placement of midline for IV antibiotic use. Dr Hubbard in agreement as well.

## 2024-10-28 NOTE — PLAN OF CARE
Pt daughter called [facility] to inform that they preferred [facility] ; SW sent referral via [employer]; [facility]JO scanned signed pt choice form into media; per [facility] GRACE, SOC/gayla 10/30/24     10/28/24 1004   Post-Acute Status   Post-Acute Authorization Home Health   Home Health Status Referrals Sent

## 2024-10-28 NOTE — ASSESSMENT & PLAN NOTE
Patient's most recent potassium results are listed below.   Recent Labs     10/26/24  0454 10/27/24  0537 10/28/24  0455   K 4.3 3.6 3.7       Plan  - Replete potassium per protocol  - Monitor potassium Daily  - Patient's hypokalemia is improving  -

## 2024-10-28 NOTE — NURSING
Patient cleared for discharge from hospital medicine and case management. Telemetry and PIV removed. Discharge instructions reviewed and all questions/concerns addressed. Patient to d/c home

## 2024-10-28 NOTE — ASSESSMENT & PLAN NOTE
Patients blood pressure range in the last 24 hours was: BP  Min: 117/75  Max: 162/77.The patient's inpatient anti-hypertensive regimen is listed below:  Current Antihypertensives  metoprolol succinate (TOPROL-XL) 24 hr tablet 25 mg, Daily, Oral    Plan  - BP is controlled, no changes needed to their regimen

## 2024-10-28 NOTE — ASSESSMENT & PLAN NOTE
Patient has Abnormal Magnesium: hypomagnesemia. Will continue to monitor electrolytes closely. Will replace the affected electrolytes and repeat labs to be done after interventions completed. The patient's magnesium results have been reviewed and are listed below.  Recent Labs   Lab 10/28/24  0455   MG 1.7

## 2024-10-28 NOTE — PLAN OF CARE
[facility]  Discharge Reassessment    Primary Care Provider: Roque Guillaume MD    Expected Discharge Date: 10/29/2024    SW met pt at bedside to discuss HH agencies and pt choice; pt was sleeping but adult daughter was present; SW provided daughter with list of agencies near home area; pt daughter will discuss with sister and f/u with SW    Reassessment (most recent)       Discharge Reassessment - 10/28/24 0958          Discharge Reassessment    Assessment Type Discharge Planning Reassessment     Discharge Plan discussed with: Adult children     Discharge Plan A Home Health     Discharge Plan B Home with family        Post-Acute Status    Post-Acute Authorization Home Health     Patient choice form signed by patient/caregiver List with quality metrics by geographic area provided

## 2024-10-28 NOTE — PT/OT/SLP EVAL
"Occupational Therapy   Evaluation    Name: Roosevelt Alejandro  MRN: [id number]  Admitting Diagnosis: Encephalopathy, metabolic  Recent Surgery: * No surgery found *      Recommendations:     Discharge Recommendations: Low Intensity Therapy  Discharge Equipment Recommendations:  walker, rolling  Barriers to discharge:   Medical condition     Assessment:     Roosevelt Alejandro is a 78 y.o. male with a medical diagnosis of Encephalopathy, metabolic.  He participated in bed mobility, STS transfer, ambulation in the hospital room and LE dressing . Performance deficits affecting function: weakness, impaired endurance, impaired self care skills, impaired functional mobility, decreased coordination, decreased upper extremity function, decreased lower extremity function, decreased safety awareness, pain.       Roosevelt's mobility limitation cannot be sufficiently resolved by the use of a cane. His functional mobility deficit can be sufficiently resolved with the use of a Rolling Walker. Patient's mobility limitation significantly impairs their ability to participate in one of more activities of daily living.  The use of a RW will significantly improve the patient's ability to participate in MRADLS and the patient will use it on regular basis in the home.       Rehab Prognosis: Good; patient would benefit from acute skilled OT services to address these deficits and reach maximum level of function.       Plan:     Patient to be seen 3 x/week to address the above listed problems via self-care/home management, therapeutic activities, therapeutic exercises  Plan of Care Expires: 11/28/24  Plan of Care Reviewed with: patient, daughter    Subjective     Chief Complaint: " I'm sleepy"   Patient/Family Comments/goals: Increase strength and endurance     Occupational Profile:  Living Environment: Pt lives in a [facility] with [address] with his wife  Previous level of function: MI with ADLs. He uses a SPC for ambulation, but would also use his wife's RW when " needed   Roles and Routines:    Equipment Used at Home: cane, straight  Assistance upon Discharge: Family     Pain/Comfort:  Pain Rating 1: 0/10  Pain Rating Post-Intervention 1: 0/10    Patients cultural, spiritual, Yazdanism conflicts given the current situation: no    Objective:     Communicated with: Nurse prior to session.  Patient found HOB elevated with bed alarm, telemetry upon OT entry to room.    General Precautions: Standard, fall  Orthopedic Precautions: N/A  Braces: N/A  Respiratory Status: Room air    Occupational Performance:    Bed Mobility:    Patient completed Rolling/Turning to Left with  minimum assistance  Patient completed Rolling/Turning to Right with minimum assistance  Patient completed Scooting/Bridging with minimum assistance  Patient completed Supine to Sit with minimum assistance  Patient completed Sit to Supine with minimum assistance    Functional Mobility/Transfers:  Patient completed Sit <> Stand Transfer with minimum assistance  with  hand-held assist   Functional Mobility: Pt ambulated in hospital room with MIN A and HHA     Activities of Daily Living:  Lower Body Dressing: supervision to put his socks on while seated     Cognitive/Visual Perceptual:  Cognitive/Psychosocial Skills:     -       Oriented to: Person  -       Follows Commands/attention:Follows one-step commands  -       Communication: clear/fluent  -       Memory: Impaired  -       Safety awareness/insight to disability: Impaired    Physical Exam:  Balance: -       Seated: G-, Standing: F+  Upper Extremity Range of Motion:     -       Right Upper Extremity: WNL  -       Left Upper Extremity: WNL  Upper Extremity Strength:    -       Right Upper Extremity: WNL  -       Left Upper Extremity: WNL   Strength:    -       Right Upper Extremity: WNL  -       Left Upper Extremity: WNL    AMPAC 6 Click ADL:  AMPAC Total Score: 18    Treatment & Education:  Pt was educated on the role of occupational therapy and the  importance of completing ADLs and functional mobility.     Patient left HOB elevated with all lines intact, call button in reach, and chair alarm on    GOALS:   Multidisciplinary Problems       Occupational Therapy Goals          Problem: Occupational Therapy    Goal Priority Disciplines Outcome Interventions   Occupational Therapy Goal     OT, PT/OT     Description: Goals to be met by: 11/28/24     Patient will increase functional independence with ADLs by performing:    UE Dressing with Modified [place].  LE Dressing with Modified Tom Green.  Grooming while seated with Modified Tom Green.  Toileting from toilet with Modified Tom Green for hygiene and clothing management.   Toilet transfer to toilet with Modified Tom Green using RW.                         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    Secondary neuroendocrine tumor of liver     Vertigo, aural, unspecified laterality 03/06/2019         Past Surgical History:   Procedure Laterality Date    CHOLECYSTECTOMY  11/4/2019    Procedure: CHOLECYSTECTOMY;  Surgeon: SP Khalil MD;  Location: [facility] OR;  Service: General;;    COLONOSCOPY      2009    COLONOSCOPY N/A 1/21/2016    Procedure: COLONOSCOPY;  Surgeon: Toby Maciel MD;  Location: [facility];  Service: Endoscopy;  Laterality: N/A;    COLONOSCOPY N/A 1/25/2019    Procedure: COLONOSCOPY;  Surgeon: Toby Maciel MD;  Location: [facility];  Service: Endoscopy;  Laterality: N/A;    COLONOSCOPY N/A 5/17/2023    Procedure: COLONOSCOPY;  Surgeon: Toby Maciel MD;  Location: [facility];  Service: Endoscopy;  Laterality: N/A;  lm wife/ppm    ENDOSCOPIC ULTRASOUND OF UPPER GASTROINTESTINAL TRACT N/A 8/20/2019    Procedure: ULTRASOUND, UPPER GI TRACT, ENDOSCOPIC;  Surgeon:  Forest Lucio III, MD;  Location: [facility];  Service: Endoscopy;  Laterality: N/A;    EYE SURGERY Bilateral     cataracts, retinal detachment    HYPERTHERMIC INTRAPERITONEAL CHEMOTHERAPY (HIPEC)  11/4/2019    Procedure: CHEMOTHERAPY, INTRAPERITONEAL, HYPERTHERMIC;  Surgeon: SP Khalil MD;  Location: [facility];  Service: General;;    LIVER BIOPSY  11/4/2019    Procedure: BIOPSY, LIVER;  Surgeon: SP Khalil MD;  Location: [facility];  Service: General;;  BIOPSY LIVER WEDGE X3    PROSTATE BIOPSY      PROSTATECTOMY  12/2017    RENAL EXPLORATION  11/4/2019    Procedure: EXPLORATION, KIDNEY;  Surgeon: SP Khalil MD;  Location: [facility];  Service: General;;    ROTATOR CUFF REPAIR      left    SURGICAL REMOVAL OF LYMPH NODE  11/4/2019    Procedure: EXCISION, LYMPH NODE;  Surgeon: SP Khalil MD;  Location: [facility];  Service: General;;  EXTENSIVE RETROPERITONEAL NODE DISSECTION  EXPLORATION OF MESENTERIC ARTERY/VEIN       Time Tracking:     OT Date of Treatment: 10/28/24  OT Start Time: 1021  OT Stop Time: 1039  OT Total Time (min): 18 min    Billable Minutes:Evaluation 8  Therapeutic Activity 10    10/28/2024

## 2024-10-28 NOTE — PT/OT/SLP PROGRESS
Physical Therapy      Patient Name:  Roosevelt Alejandro   MRN:  [id number]    Patient not seen today secondary to Pt sleeping heavily and difficult to rouse. Refused participation. Daughter present in room and states anticipated discharge today. Will follow-up 10/29/24 if discharge does not proceed as planned.

## 2024-10-28 NOTE — PLAN OF CARE
Pt clear for DC from case management standpoint. Discharging to home with daughter to transport patient. Patient set up with [facility] with SOC on 10/30       10/28/24 1306   Final Note   Assessment Type Final Discharge Note   Anticipated Discharge Disposition Home-Health   Hospital Resources/Appts/Education Provided Appointments scheduled and added to AVS

## 2024-10-28 NOTE — DISCHARGE INSTRUCTIONS
You were discharged after losing consciousness.  No clear answer as to why the loss of consciousness occurred was identified.  Given that heart arrhythmias can lead to the loss of consciousness, it is being set up for you to have continued outpatient cardiac monitoring. Cardiology clinic will be calling you to schedule your cardiac monitor placement.     You are being referred to pulmonology as you have lymph nodes that are larger than normal in your chest region and spots in your lungs that are too small to determine what they are.  The pulmonologist will assist in following these over time and determining if they have any impact or potentially will have an impact on your health.    You have a narrowing of your left carotid artery as well.  This is not believed to be causing symptoms currently.  Discuss this with your PCP however for further monitoring.

## 2024-10-29 LAB — T3FREE SERPL-MCNC: 1.7 PG/ML (ref 2–4.4)

## 2024-10-29 PROCEDURE — G0180 MD CERTIFICATION HHA PATIENT: HCPCS | Mod: ,,, | Performed by: STUDENT IN AN ORGANIZED HEALTH CARE EDUCATION/TRAINING PROGRAM

## 2024-10-30 LAB — BACTERIA BLD CULT: NORMAL

## 2024-10-31 ENCOUNTER — HOSPITAL ENCOUNTER (OUTPATIENT)
Dept: CARDIOLOGY | Facility: CLINIC | Age: 79
Discharge: HOME | End: 2024-10-31
Attending: INTERNAL MEDICINE
Payer: MEDICARE

## 2024-10-31 DIAGNOSIS — I49.3 FREQUENT UNIFOCAL PVCS: Chronic | ICD-10-CM

## 2024-10-31 DIAGNOSIS — R55 SYNCOPE AND COLLAPSE: ICD-10-CM

## 2024-10-31 DIAGNOSIS — R06.02 SHORTNESS OF BREATH: ICD-10-CM

## 2024-11-02 DIAGNOSIS — R41.3 MEMORY LOSS: ICD-10-CM

## 2024-11-02 LAB
OHS QRS DURATION: 74 MS
OHS QTC CALCULATION: 428 MS

## 2024-11-03 RX ORDER — DONEPEZIL HYDROCHLORIDE 5 MG/1
5 TABLET, FILM COATED ORAL NIGHTLY
Qty: 90 TABLET | Refills: 3 | Status: SHIPPED | OUTPATIENT
Start: 2024-11-03

## 2024-11-04 ENCOUNTER — OFFICE VISIT (OUTPATIENT)
Dept: FAMILY MEDICINE | Facility: CLINIC | Age: 79
End: 2024-11-04
Payer: MEDICARE

## 2024-11-04 ENCOUNTER — LAB VISIT (OUTPATIENT)
Dept: LAB | Facility: HOSPITAL | Age: 79
End: 2024-11-04
Attending: PHYSICIAN ASSISTANT
Payer: MEDICARE

## 2024-11-04 VITALS
SYSTOLIC BLOOD PRESSURE: 130 MMHG | WEIGHT: 164 LBS | DIASTOLIC BLOOD PRESSURE: 62 MMHG | HEART RATE: 62 BPM | RESPIRATION RATE: 16 BRPM | OXYGEN SATURATION: 98 % | HEIGHT: 74 IN | BODY MASS INDEX: 21.05 KG/M2 | TEMPERATURE: 98 F

## 2024-11-04 DIAGNOSIS — K52.9 CHRONIC DIARRHEA: ICD-10-CM

## 2024-11-04 DIAGNOSIS — G30.1 MODERATE LATE ONSET ALZHEIMER'S DEMENTIA WITH PSYCHOTIC DISTURBANCE: ICD-10-CM

## 2024-11-04 DIAGNOSIS — N18.31 CHRONIC KIDNEY DISEASE, STAGE 3A: ICD-10-CM

## 2024-11-04 DIAGNOSIS — D64.9 ANEMIA, UNSPECIFIED TYPE: ICD-10-CM

## 2024-11-04 DIAGNOSIS — F02.B2 MODERATE LATE ONSET ALZHEIMER'S DEMENTIA WITH PSYCHOTIC DISTURBANCE: ICD-10-CM

## 2024-11-04 DIAGNOSIS — Z23 NEED FOR VACCINATION: ICD-10-CM

## 2024-11-04 DIAGNOSIS — Z09 HOSPITAL DISCHARGE FOLLOW-UP: ICD-10-CM

## 2024-11-04 DIAGNOSIS — Z09 HOSPITAL DISCHARGE FOLLOW-UP: Primary | ICD-10-CM

## 2024-11-04 DIAGNOSIS — R41.82 ALTERED MENTAL STATUS, UNSPECIFIED ALTERED MENTAL STATUS TYPE: ICD-10-CM

## 2024-11-04 DIAGNOSIS — I10 ESSENTIAL HYPERTENSION: ICD-10-CM

## 2024-11-04 DIAGNOSIS — E11.9 TYPE 2 DIABETES MELLITUS WITHOUT COMPLICATION, WITHOUT LONG-TERM CURRENT USE OF INSULIN: ICD-10-CM

## 2024-11-04 LAB
ALBUMIN SERPL BCP-MCNC: 3.6 G/DL (ref 3.5–5.2)
ALP SERPL-CCNC: 65 U/L (ref 40–150)
ALT SERPL W/O P-5'-P-CCNC: 23 U/L (ref 10–44)
ANION GAP SERPL CALC-SCNC: 12 MMOL/L (ref 8–16)
AST SERPL-CCNC: 22 U/L (ref 10–40)
BASOPHILS # BLD AUTO: 0.16 K/UL (ref 0–0.2)
BASOPHILS NFR BLD: 3.4 % (ref 0–1.9)
BILIRUB SERPL-MCNC: 0.6 MG/DL (ref 0.1–1)
BUN SERPL-MCNC: 22 MG/DL (ref 8–23)
CALCIUM SERPL-MCNC: 10.2 MG/DL (ref 8.7–10.5)
CHLORIDE SERPL-SCNC: 101 MMOL/L (ref 95–110)
CO2 SERPL-SCNC: 27 MMOL/L (ref 23–29)
CREAT SERPL-MCNC: 1.3 MG/DL (ref 0.5–1.4)
DIFFERENTIAL METHOD BLD: ABNORMAL
EOSINOPHIL # BLD AUTO: 0.3 K/UL (ref 0–0.5)
EOSINOPHIL NFR BLD: 5.8 % (ref 0–8)
ERYTHROCYTE [DISTWIDTH] IN BLOOD BY AUTOMATED COUNT: 14.5 % (ref 11.5–14.5)
EST. GFR  (NO RACE VARIABLE): 55.9 ML/MIN/1.73 M^2
FERRITIN SERPL-MCNC: 505 NG/ML (ref 20–300)
GLUCOSE SERPL-MCNC: 138 MG/DL (ref 70–110)
HCT VFR BLD AUTO: 29.2 % (ref 40–54)
HGB BLD-MCNC: 9.6 G/DL (ref 14–18)
IMM GRANULOCYTES # BLD AUTO: 0.1 K/UL (ref 0–0.04)
IMM GRANULOCYTES NFR BLD AUTO: 2.1 % (ref 0–0.5)
IRON SERPL-MCNC: 101 UG/DL (ref 45–160)
LYMPHOCYTES # BLD AUTO: 0.9 K/UL (ref 1–4.8)
LYMPHOCYTES NFR BLD: 18.6 % (ref 18–48)
MAGNESIUM SERPL-MCNC: 1.7 MG/DL (ref 1.6–2.6)
MCH RBC QN AUTO: 38.1 PG (ref 27–31)
MCHC RBC AUTO-ENTMCNC: 32.9 G/DL (ref 32–36)
MCV RBC AUTO: 116 FL (ref 82–98)
MONOCYTES # BLD AUTO: 0.4 K/UL (ref 0.3–1)
MONOCYTES NFR BLD: 8.1 % (ref 4–15)
NEUTROPHILS # BLD AUTO: 2.9 K/UL (ref 1.8–7.7)
NEUTROPHILS NFR BLD: 62 % (ref 38–73)
NRBC BLD-RTO: 0 /100 WBC
PLATELET # BLD AUTO: 384 K/UL (ref 150–450)
PMV BLD AUTO: 12.8 FL (ref 9.2–12.9)
POTASSIUM SERPL-SCNC: 4 MMOL/L (ref 3.5–5.1)
PROT SERPL-MCNC: 6.6 G/DL (ref 6–8.4)
RBC # BLD AUTO: 2.52 M/UL (ref 4.6–6.2)
SATURATED IRON: 43 % (ref 20–50)
SODIUM SERPL-SCNC: 140 MMOL/L (ref 136–145)
TOTAL IRON BINDING CAPACITY: 237 UG/DL (ref 250–450)
TRANSFERRIN SERPL-MCNC: 160 MG/DL (ref 200–375)
WBC # BLD AUTO: 4.69 K/UL (ref 3.9–12.7)

## 2024-11-04 PROCEDURE — 1101F PT FALLS ASSESS-DOCD LE1/YR: CPT | Mod: CPTII,S$GLB,, | Performed by: PHYSICIAN ASSISTANT

## 2024-11-04 PROCEDURE — 85025 COMPLETE CBC W/AUTO DIFF WBC: CPT | Performed by: PHYSICIAN ASSISTANT

## 2024-11-04 PROCEDURE — 80053 COMPREHEN METABOLIC PANEL: CPT | Performed by: PHYSICIAN ASSISTANT

## 2024-11-04 PROCEDURE — 36415 COLL VENOUS BLD VENIPUNCTURE: CPT | Mod: PO | Performed by: PHYSICIAN ASSISTANT

## 2024-11-04 PROCEDURE — 99999 PR PBB SHADOW E&M-EST. PATIENT-LVL IV: CPT | Mod: PBBFAC,,, | Performed by: PHYSICIAN ASSISTANT

## 2024-11-04 PROCEDURE — 83540 ASSAY OF IRON: CPT | Performed by: PHYSICIAN ASSISTANT

## 2024-11-04 PROCEDURE — 1126F AMNT PAIN NOTED NONE PRSNT: CPT | Mod: CPTII,S$GLB,, | Performed by: PHYSICIAN ASSISTANT

## 2024-11-04 PROCEDURE — 99214 OFFICE O/P EST MOD 30 MIN: CPT | Mod: S$GLB,,, | Performed by: PHYSICIAN ASSISTANT

## 2024-11-04 PROCEDURE — 1160F RVW MEDS BY RX/DR IN RCRD: CPT | Mod: CPTII,S$GLB,, | Performed by: PHYSICIAN ASSISTANT

## 2024-11-04 PROCEDURE — G0008 ADMIN INFLUENZA VIRUS VAC: HCPCS | Mod: S$GLB,,, | Performed by: PHYSICIAN ASSISTANT

## 2024-11-04 PROCEDURE — 90653 IIV ADJUVANT VACCINE IM: CPT | Mod: S$GLB,,, | Performed by: PHYSICIAN ASSISTANT

## 2024-11-04 PROCEDURE — 3075F SYST BP GE 130 - 139MM HG: CPT | Mod: CPTII,S$GLB,, | Performed by: PHYSICIAN ASSISTANT

## 2024-11-04 PROCEDURE — 3288F FALL RISK ASSESSMENT DOCD: CPT | Mod: CPTII,S$GLB,, | Performed by: PHYSICIAN ASSISTANT

## 2024-11-04 PROCEDURE — 83735 ASSAY OF MAGNESIUM: CPT | Performed by: PHYSICIAN ASSISTANT

## 2024-11-04 PROCEDURE — 1159F MED LIST DOCD IN RCRD: CPT | Mod: CPTII,S$GLB,, | Performed by: PHYSICIAN ASSISTANT

## 2024-11-04 PROCEDURE — 82728 ASSAY OF FERRITIN: CPT | Performed by: PHYSICIAN ASSISTANT

## 2024-11-04 PROCEDURE — 3078F DIAST BP <80 MM HG: CPT | Mod: CPTII,S$GLB,, | Performed by: PHYSICIAN ASSISTANT

## 2024-11-04 PROCEDURE — 1111F DSCHRG MED/CURRENT MED MERGE: CPT | Mod: CPTII,S$GLB,, | Performed by: PHYSICIAN ASSISTANT

## 2024-11-04 RX ORDER — CHOLESTYRAMINE 4 G/9G
4 POWDER, FOR SUSPENSION ORAL DAILY
Qty: 90 PACKET | Refills: 1 | Status: SHIPPED | OUTPATIENT
Start: 2024-11-04 | End: 2025-11-04

## 2024-11-04 NOTE — PROGRESS NOTES
Subjective:       Patient ID: Roosevelt Alejandro is a 78 y.o. male.    Chief Complaint: No chief complaint on file.    Transitional Care Note  Admit date: 10/25/24  Discharge date: 10/28/24  Date of interactive contact (2 business days post D/C): NA  Hospitalized at: [facility]  Discharge diagnoses: AMS    Family and/or Caretaker present at visit?  Yes.  Diagnostic tests reviewed/disposition: I have reviewed all completed as well as pending diagnostic tests at the time of discharge.  Disease/illness education: possibly polypharmacy. Meds reduced/ adjusted  Medication changes: see below  Home health/community services discussion/referrals: Patient does not have home health established from hospital visit.  They do not need home health.  If needed, we will set up home health for the patient.   Establishment or re-establishment of referral orders for community resources: No other necessary community resources.   Discussion with other health care providers: No discussion with other health care providers necessary.     Mr. Alejandro is a 78 year old male with DM, HTN, and Alzheimer's dementia. He is here today with his daughter. She is the primary historian. She reports her father is doing better since hospital discharge for AMS. No acute reasons for symptoms was identified. The patient has been having chronic watery diarrhea which daughter reports is possibly related to dementia medications. Denies blood or mucus in stool, fever, chills, abdominal pain.The patient did have some anemia on CBC.    [facility]  Discharge Summary        Patient Name: Roosevelt Alejandro  MRN: [id number]  HUA: [id number]  Patient Class: IP- Inpatient  Admission Date: 10/25/2024  Hospital Length of Stay: 1 days  Discharge Date and Time:  10/28/2024 4:08 PM  Attending Physician: Dada Diaz MD   Discharging Provider: Dada Diaz MD  Primary Care Provider: Roque Guillaume MD     Primary Care Team: Networked reference  to record PCT      HPI:   78 year old pt getting admitted with syncope/collapse and AMS  Pt was eating breakfast and suddenly he collapsed and was witnessed by pts wife   \he was c/o shortness of breath  EMS were called and was given narcan because of pinpoint pupils  Pt was escorted to ER  Pt was confused when saw in ER  Sleepy but was easily arousable      * No surgery found *       Hospital Course:      The patient was admitted after loss of consciousness.  Workup was unrevealing.  CT head was unremarkable.  EEG did not demonstrate epileptiform like activity.  Unilateral carotid artery disease was identified but not believed to be a expository.       Throughout the hospitalization telemetry has not identified a critical arrhythmia.  The patient will be discharged with request for a Holter monitor for continued monitoring.     Daughter at bedside feels the patient is essentially back to his baseline functional status but that he remains mildly confused.  It is believed this is related to hospital-induced delirium.     Daughter is okay with her dad going home.  At the time of discharge the patient was seen sitting in the chair and as no complaints.     Daughter notified of mediastinal and hilar adenopathy and pulmonary nodules.  Referral to pulmonology made.       Instructions were given that the PCP should be notified of the carotid artery stenosis for further monitoring/intervention if appropriate.       Iron deficiency anemia is present.  There is no evidence of an active rapid bleed.  Follow with PCP in 1 week for repeat labs.  Fall in  hemoglobin on the day of discharge noted but similar to the level 2 days prior.     Coding clarification: Acute infection ruled out.     Patient Instructions:      You were discharged after losing consciousness.  No clear answer as to why the loss of consciousness occurred was identified.  Given that heart arrhythmias can lead to the loss of consciousness, it is being set up for  you to have continued outpatient cardiac monitoring. Cardiology clinic will be calling you to schedule your cardiac monitor placement.      You are being referred to pulmonology as you have lymph nodes that are larger than normal in your chest region and spots in your lungs that are too small to determine what they are.  The pulmonologist will assist in following these over time and determining if they have any impact or potentially will have an impact on your health.     You have a narrowing of your left carotid artery as well.  This is not believed to be causing symptoms currently.  Discuss this with your PCP however for further monitoring.     Goals of Care Treatment Preferences:  Code Status: DNR        SDOH Screening:  The patient was screened for utility difficulties, food insecurity, transport difficulties, housing insecurity, and interpersonal safety and there were no concerns identified this admission.     Consults:     Consults (From admission, onward)             Status Ordering Provider        Inpatient consult to Neurology  Once       Provider:  Hernando Candelaria MD   Completed MARI JENSEN                Neuro  * Encephalopathy, metabolic  See hospital course.      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     Cardiac/Vascular  H/O arteriovenous malformation (AVM)  Aware         Essential hypertension  Patients blood pressure range in the last 24 hours was: BP  Min: 117/75  Max: 162/77.The patient's inpatient anti-hypertensive regimen is listed below:  Current Antihypertensives  metoprolol succinate (TOPROL-XL) 24 hr tablet 25 mg, Daily, Oral     Plan  - BP is controlled, no changes needed to their regimen        Renal/  Hypomagnesemia  Patient has Abnormal Magnesium: hypomagnesemia. Will continue to monitor electrolytes closely. Will replace the affected electrolytes and repeat labs to be done after interventions  completed. The patient's magnesium results have been reviewed and are listed below.    Recent Labs  Lab 10/28/24  0455  MG 1.7           Hypokalemia  Patient's most recent potassium results are listed below.     Recent Labs    10/26/24  0454 10/27/24  0537 10/28/24  0455  K 4.3 3.6 3.7        Plan  - Replete potassium per protocol  - Monitor potassium Daily  - Patient's hypokalemia is improving  -      Chronic kidney disease, stage 3a  Creatine stable for now. BMP reviewed- noted Estimated Creatinine Clearance: 54.9 mL/min (based on SCr of 1.2 mg/dL). according to latest data. Based on current GFR, CKD stage is stage 3 - GFR 30-59.  Monitor UOP and serial BMP and adjust therapy as needed. Renally dose meds. Avoid nephrotoxic medications and procedures.     Endocrine  Type 2 diabetes mellitus without complication, without long-term current use of insulin  Last A1c reviewed-     Lab Results  Component Value Date    HGBA1C 5.7 (H) 09/13/2024           Other  Syncope and collapse  See hospital course      Chronic allergic rhinitis                Final Active Diagnoses:    Diagnosis Date Noted POA  · PRINCIPAL PROBLEM:  Encephalopathy, metabolic [G93.41] 10/25/2024 Yes  · Hypokalemia [E87.6] 10/26/2024 Yes  · Hypomagnesemia [E83.42] 10/26/2024 Yes  · Syncope and collapse [R55] 10/25/2024 Yes  · Type 2 diabetes mellitus without complication, without long-term current use of insulin [E11.9] 08/19/2024 Yes  · 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 Go on 11/4/2024.   Specialty: Family Medicine  Why: @0830 for hospital follow up  Contact information:  [address] [address]  [phone]                        [facility] Follow up.   Specialty: Home Health Services  Why: Service will call you to schedule your start of care visit on Wednesday 10/30.  Contact information:  [address]  [address]  [place] [address]  [phone]                        Apolonia Palma MD Follow up on 11/7/2024.   Specialties: Pulmonary Disease, Sleep Medicine  Why: @2pm for pulmonology  Contact information:  [address]  [address]  [place] [id number] [phone]                                    Patient Instructions:         Ambulatory referral/consult to Home Health   Standing Status: Future  Referral Priority: Routine Referral Type: Home Health   Referral Reason: Specialty Services Required   Requested Specialty: Home Health Services   Number of Visits Requested: 1        Ambulatory referral/consult to Pulmonology   Standing Status: Future  Referral Priority: Routine Referral Type: Consultation   Referral Reason: Specialty Services Required   Requested Specialty: Pulmonary Disease   Number of Visits Requested: 1        Cardiac Monitor - 3-15 Day Adult (Cupid Only)  Standing Status: Future Standing Exp. Date: 10/28/25       Order Specific Question Answer Comments  Duration: 14 days    Release to patient Immediate          Significant Diagnostic Studies: N/A       Pending Diagnostic Studies:          Procedure Component Value Units Date/Time    T3, free [[id number]] Collected: 10/27/24 1106    Order Status: Sent Lab Status: In process Updated: 10/27/24 1110    Specimen: Blood               Medications:  Reconciled Home Medications:        Medication List          CHANGE how you take these medications        atorvastatin 20 MG tablet  Commonly known as:  "LIPITOR  TAKE 1 TABLET EVERY DAY (NEED MD APPOINTMENT)  What changed: See the new instructions.     metoprolol succinate 25 MG 24 hr tablet  Commonly known as: TOPROL-XL  TAKE 1 TABLET EVERY DAY (NEED MD APPOINTMENT)  What changed: See the new instructions.                  CONTINUE taking these medications        ARIPiprazole 5 MG Tab  Commonly known as: ABILIFY  Take 5 mg by mouth once daily.     CENTRUM SILVER ORAL  Take 1 tablet by mouth once daily.     cyanocobalamin 1,000 mcg/mL injection  Inject 1 mL (1,000 mcg total) into the muscle every 28 days.     donepeziL 5 MG tablet  Commonly known as: ARICEPT  Take 1 tablet (5 mg total) by mouth every evening.     EScitalopram oxalate 10 MG tablet  Commonly known as: LEXAPRO  Take 10 mg by mouth once daily.     memantine 5 MG Tab  Commonly known as: NAMENDA  Take 5 mg by mouth 2 (two) times daily.     QUEtiapine 50 MG tablet  Commonly known as: SEROQUEL  Take 50 mg by mouth nightly.     syringe with needle 1 mL 25 gauge x 1" Syrg  1 each by Misc.(Non-Drug; Combo Route) route every 28 days.                   Indwelling Lines/Drains at time of discharge:     Lines/Drains/Airways          None                          Time spent on the discharge of patient: 35 minutes           Dada Diaz MD  Department of Hospital Medicine  [facility]      Electronically signed by Dada Diaz MD at 10/28/2024  4:17 PM        Allergen Reactions    Epinephrine      Patient on Sandostatin and Epinephrine contraindicated    Aspirin Other (See Comments)     Internal bleeding    Lisinopril (bulk) Rash    Sulfa (sulfonamide antibiotics) Swelling and Rash         Current Outpatient Medications:     ARIPiprazole (ABILIFY) 5 MG Tab, Take 5 mg by mouth once daily., Disp: , Rfl:     atorvastatin (LIPITOR) 20 MG tablet, TAKE 1 TABLET EVERY DAY (NEED MD APPOINTMENT) (Patient taking differently: Take 20 mg by mouth once daily.), Disp: 90 tablet, Rfl: 3    cyanocobalamin " "1,000 mcg/mL injection, Inject 1 mL (1,000 mcg total) into the muscle every 28 days., Disp: 1 mL, Rfl: 11    donepeziL (ARICEPT) 5 MG tablet, TAKE 1 TABLET BY MOUTH EVERY DAY IN THE EVENING, Disp: 90 tablet, Rfl: 3    EScitalopram oxalate (LEXAPRO) 10 MG tablet, Take 10 mg by mouth once daily., Disp: , Rfl:     FOLIC ACID/MULTIVIT-MIN/LUTEIN (CENTRUM SILVER ORAL), Take 1 tablet by mouth once daily. , Disp: , Rfl:     memantine (NAMENDA) 5 MG Tab, Take 5 mg by mouth 2 (two) times daily., Disp: , Rfl:     metoprolol succinate (TOPROL-XL) 25 MG 24 hr tablet, TAKE 1 TABLET EVERY DAY (NEED MD APPOINTMENT) (Patient taking differently: Take 25 mg by mouth once daily.), Disp: 90 tablet, Rfl: 3    QUEtiapine (SEROQUEL) 50 MG tablet, Take 50 mg by mouth nightly., Disp: , Rfl:     syringe with needle 1 mL 25 gauge x 1" Syrg, 1 each by Misc.(Non-Drug; Combo Route) route every 28 days., Disp: 12 each, Rfl: 1    Lab Results   Component Value Date    WBC 3.38 (L) 10/28/2024    HGB 7.7 (L) 10/28/2024    HCT 23.1 (L) 10/28/2024     10/28/2024    CHOL 131 03/01/2024    TRIG 75 03/01/2024    HDL 33 (L) 03/01/2024    ALT 9 (L) 10/28/2024    AST 12 10/28/2024     10/28/2024    K 3.7 10/28/2024     10/28/2024    CREATININE 1.2 10/28/2024    BUN 17 10/28/2024    CO2 31 (H) 10/28/2024    TSH 6.046 (H) 10/27/2024    PSA 28.6 (H) 12/20/2017    INR 1.1 02/24/2019    GLUF 109 07/22/2004    HGBA1C 5.7 (H) 09/13/2024       Review of Systems   Constitutional:  Negative for activity change, appetite change and fever.   HENT:  Negative for postnasal drip, rhinorrhea and sinus pressure.    Eyes:  Negative for visual disturbance.   Respiratory:  Negative for cough and shortness of breath.    Cardiovascular:  Negative for chest pain.   Gastrointestinal:  Negative for abdominal distention and abdominal pain.   Genitourinary:  Negative for difficulty urinating and dysuria.   Musculoskeletal:  Negative for arthralgias and myalgias. "   Neurological:  Negative for headaches.   Hematological:  Negative for adenopathy.   Psychiatric/Behavioral:  The patient is not nervous/anxious.        Objective:      Physical Exam  Constitutional:       Appearance: Normal appearance.   HENT:      Head: Normocephalic and atraumatic.   Eyes:      Conjunctiva/sclera: Conjunctivae normal.   Cardiovascular:      Rate and Rhythm: Normal rate and regular rhythm.   Pulmonary:      Effort: Pulmonary effort is normal. No respiratory distress.      Breath sounds: Normal breath sounds. No wheezing.   Abdominal:      General: There is no distension.      Palpations: There is no mass.      Tenderness: There is no abdominal tenderness.   Musculoskeletal:      Right lower leg: No edema.      Left lower leg: No edema.      Comments: Ambulating with cane     Lymphadenopathy:      Cervical: No cervical adenopathy.   Skin:     Findings: No erythema.   Neurological:      Mental Status: He is alert. Mental status is at baseline.   Psychiatric:         Behavior: Behavior normal.         Assessment:       1. Hospital discharge follow-up    2. Altered mental status, unspecified altered mental status type    3. Essential hypertension    4. Chronic kidney disease, stage 3a    5. 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 Type 2 diabetes mellitus without complication, without long-term current use of insulin    7. Chronic diarrhea    8. Anemia, unspecified type    9. Need for vaccination        Plan:       Max was seen today for hospital follow up.    Diagnoses and all orders for this visit:    Hospital discharge follow-up  -     Magnesium; Future  Labs updated  Follow up with cardiology   Altered mental status, unspecified altered mental status type  resolved  Essential hypertension  Controlled  Low salt diet  Continue current medication  Chronic kidney disease, stage 3a  -     Comprehensive Metabolic Panel; Future    Moderate late onset Alzheimer's dementia  with psychotic disturbance  stable  Type 2 diabetes mellitus without complication, without long-term current use of insulin  stable  Chronic diarrhea  -     H. pylori antigen, stool; Future  -     Pancreatic elastase, fecal; Future  -     Fecal fat, qualitative; Future  -     Occult blood x 1, stool; Future  -     WBC, Stool; Future  -     Rotavirus antigen, stool; Future  -     Adenovirus Molecular Detection, PCR, Non-Blood Stool; Future  -     Calprotectin, Stool; Future  -     Giardia / Cryptosporidum, EIA; Future  -     Stool Exam-Ova,Cysts,Parasites; Future  -     Clostridium difficile EIA; Future  -     Stool culture; Future  -     CBC Auto Differential; Future  -     Comprehensive Metabolic Panel; Future  -     FERRITIN; Future  -     cholestyramine (QUESTRAN) 4 gram packet; Take 1 packet (4 g total) by mouth once daily.    Anemia, unspecified type  -     CBC Auto Differential; Future  -     FERRITIN; Future  -     IRON AND TIBC; Future    Need for vaccination  -     Influenza - Trivalent (Adjuvanted)      Patient will be notified when labs return and further recommendations will be given at that time.

## 2024-11-05 ENCOUNTER — DOCUMENT SCAN (OUTPATIENT)
Dept: HOME HEALTH SERVICES | Facility: HOSPITAL | Age: 79
End: 2024-11-05
Payer: MEDICARE

## 2024-11-05 DIAGNOSIS — E83.42 HYPOMAGNESEMIA: Primary | ICD-10-CM

## 2024-11-05 RX ORDER — LANOLIN ALCOHOL/MO/W.PET/CERES
400 CREAM (GRAM) TOPICAL DAILY
Qty: 90 TABLET | Refills: 0 | Status: SHIPPED | OUTPATIENT
Start: 2024-11-05

## 2024-11-06 ENCOUNTER — PATIENT MESSAGE (OUTPATIENT)
Facility: CLINIC | Age: 79
End: 2024-11-06
Payer: MEDICARE

## 2024-11-07 ENCOUNTER — EXTERNAL HOME HEALTH (OUTPATIENT)
Dept: HOME HEALTH SERVICES | Facility: HOSPITAL | Age: 79
End: 2024-11-07
Payer: MEDICARE

## 2024-11-08 ENCOUNTER — DOCUMENT SCAN (OUTPATIENT)
Dept: HOME HEALTH SERVICES | Facility: HOSPITAL | Age: 79
End: 2024-11-08
Payer: MEDICARE

## 2024-11-13 ENCOUNTER — DOCUMENT SCAN (OUTPATIENT)
Dept: HOME HEALTH SERVICES | Facility: HOSPITAL | Age: 79
End: 2024-11-13
Payer: MEDICARE

## 2024-11-18 ENCOUNTER — TELEPHONE (OUTPATIENT)
Dept: FAMILY MEDICINE | Facility: CLINIC | Age: 79
End: 2024-11-18
Payer: MEDICARE

## 2024-11-18 NOTE — TELEPHONE ENCOUNTER
"HH faxed over a VS report; Pt's BP was 110/50    Per pt's wife: "His BP was never that low. He is fine. My daughter is a nurse." Mrs. Alejandro was asked what reading she got on their machine and she stated " Oh I got 128. I'm getting him dressed. I will have my daughter call when she gets here." She proceeded to end the call.  "

## 2024-12-10 ENCOUNTER — PATIENT MESSAGE (OUTPATIENT)
Dept: FAMILY MEDICINE | Facility: CLINIC | Age: 79
End: 2024-12-10
Payer: MEDICARE

## 2024-12-10 DIAGNOSIS — E78.5 HYPERLIPIDEMIA, UNSPECIFIED HYPERLIPIDEMIA TYPE: ICD-10-CM

## 2024-12-10 DIAGNOSIS — R00.2 HEART PALPITATIONS: ICD-10-CM

## 2024-12-10 DIAGNOSIS — E83.42 HYPOMAGNESEMIA: ICD-10-CM

## 2024-12-10 DIAGNOSIS — E53.8 B12 DEFICIENCY: ICD-10-CM

## 2024-12-10 RX ORDER — METOPROLOL SUCCINATE 25 MG/1
25 TABLET, EXTENDED RELEASE ORAL DAILY
Qty: 90 TABLET | Refills: 3 | Status: SHIPPED | OUTPATIENT
Start: 2024-12-10 | End: 2025-12-05

## 2024-12-10 RX ORDER — SYRINGE WITH NEEDLE, 1 ML 27GX1/2"
1 SYRINGE, EMPTY DISPOSABLE MISCELLANEOUS
Qty: 12 EACH | Refills: 1 | Status: SHIPPED | OUTPATIENT
Start: 2024-12-10

## 2024-12-10 RX ORDER — CYANOCOBALAMIN 1000 UG/ML
1000 INJECTION, SOLUTION INTRAMUSCULAR; SUBCUTANEOUS
Qty: 1 ML | Refills: 11 | Status: SHIPPED | OUTPATIENT
Start: 2024-12-10 | End: 2025-12-10

## 2024-12-10 RX ORDER — METOPROLOL SUCCINATE 25 MG/1
25 TABLET, EXTENDED RELEASE ORAL DAILY
Qty: 90 TABLET | Refills: 3 | Status: SHIPPED | OUTPATIENT
Start: 2024-12-10

## 2024-12-10 RX ORDER — ATORVASTATIN CALCIUM 20 MG/1
20 TABLET, FILM COATED ORAL DAILY
Qty: 90 TABLET | Refills: 3 | Status: SHIPPED | OUTPATIENT
Start: 2024-12-10 | End: 2025-12-05

## 2024-12-10 RX ORDER — LANOLIN ALCOHOL/MO/W.PET/CERES
400 CREAM (GRAM) TOPICAL DAILY
Qty: 90 TABLET | Refills: 0 | Status: SHIPPED | OUTPATIENT
Start: 2024-12-10

## 2024-12-10 RX ORDER — ATORVASTATIN CALCIUM 20 MG/1
20 TABLET, FILM COATED ORAL DAILY
Qty: 90 TABLET | Refills: 3 | Status: SHIPPED | OUTPATIENT
Start: 2024-12-10

## 2024-12-10 NOTE — TELEPHONE ENCOUNTER
Care Due:                  Date            Visit Type   Department     Provider  --------------------------------------------------------------------------------                                EP -                              PRIMARY      [facility]  Last Visit: 09-      CARE ([facility])   MEDICINE       Roque Guillaume  Next Visit: None Scheduled  None         None Found                                                            Last  Test          Frequency    Reason                     Performed    Due Date  --------------------------------------------------------------------------------    Lipid Panel.  12 months..  atorvastatin.............  03- 02-    [facility] Embedded Care Due Messages. Reference number: [id number].   12/10/2024 2:48:54 PM CST

## 2024-12-10 NOTE — TELEPHONE ENCOUNTER
No care due was identified.  [facility] Embedded Care Due Messages. Reference number: [id number].   12/10/2024 3:02:28 PM CST

## 2024-12-16 ENCOUNTER — HOSPITAL ENCOUNTER (EMERGENCY)
Facility: HOSPITAL | Age: 79
Discharge: HOME | End: 2024-12-17
Attending: EMERGENCY MEDICINE
Payer: MEDICARE

## 2024-12-16 DIAGNOSIS — M25.522 LEFT ELBOW PAIN: Primary | ICD-10-CM
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
## 2024-12-16 PROCEDURE — 99285 EMERGENCY DEPT VISIT HI MDM: CPT | Mod: 25

## 2024-12-17 VITALS
HEART RATE: 60 BPM | RESPIRATION RATE: 18 BRPM | OXYGEN SATURATION: 100 % | HEIGHT: 74 IN | TEMPERATURE: 99 F | WEIGHT: 164 LBS | SYSTOLIC BLOOD PRESSURE: 146 MMHG | DIASTOLIC BLOOD PRESSURE: 66 MMHG | BODY MASS INDEX: 21.05 KG/M2

## 2024-12-17 NOTE — ED PROVIDER NOTES
Encounter Date: 12/16/2024       History     Chief Complaint   Patient presents with    fall     Witnessed fall, wife states the patient hit his head, patient states he did not.  Patient complains of left elbow pain.  Denies loss of consciousness, denies blood thinners.     Chief complaint is fall this patient has a history of dementia and a according to his wife he slipped and fell in the bathroom and may have hit his head.  The patient only complains of left elbow pain to me.  He denies any headache or neck pain.  It appears he was not knocked out.  He is not on blood thinners.  He has no complaints except for left elbow pain at the present time.        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    Secondary neuroendocrine tumor of liver     Vertigo, aural, unspecified laterality 03/06/2019     Past Surgical History:   Procedure Laterality Date    CHOLECYSTECTOMY  11/4/2019    Procedure: CHOLECYSTECTOMY;  Surgeon: SP Khalil MD;  Location: [facility] OR;  Service: General;;    COLONOSCOPY      2009    COLONOSCOPY N/A 1/21/2016    Procedure: COLONOSCOPY;  Surgeon: Toby Maciel MD;  Location: [facility];  Service: Endoscopy;  Laterality: N/A;    COLONOSCOPY N/A 1/25/2019    Procedure: COLONOSCOPY;  Surgeon: Toby Maciel MD;  Location: [facility];  Service: Endoscopy;  Laterality: N/A;    COLONOSCOPY N/A 5/17/2023    Procedure: COLONOSCOPY;  Surgeon: Toby Maciel MD;  Location: [facility];  Service: Endoscopy;  Laterality: N/A;  lm wife/ppm     ENDOSCOPIC ULTRASOUND OF UPPER GASTROINTESTINAL TRACT N/A 2019    Procedure: ULTRASOUND, UPPER GI TRACT, ENDOSCOPIC;  Surgeon: Forest Lucio III, MD;  Location: [facility];  Service: Endoscopy;  Laterality: N/A;    EYE SURGERY Bilateral     cataracts, retinal detachment    HYPERTHERMIC INTRAPERITONEAL CHEMOTHERAPY (HIPEC)  2019    Procedure: CHEMOTHERAPY, INTRAPERITONEAL, HYPERTHERMIC;  Surgeon: SP Khalil MD;  Location: [facility];  Service: General;;    LIVER BIOPSY  2019    Procedure: BIOPSY, LIVER;  Surgeon: SP Khalil MD;  Location: [facility];  Service: General;;  BIOPSY LIVER WEDGE X3    PROSTATE BIOPSY      PROSTATECTOMY  2017    RENAL EXPLORATION  2019    Procedure: EXPLORATION, KIDNEY;  Surgeon: SP Khalil MD;  Location: [facility];  Service: General;;    ROTATOR CUFF REPAIR      left    SURGICAL REMOVAL OF LYMPH NODE  2019    Procedure: EXCISION, LYMPH NODE;  Surgeon: SP Khalil MD;  Location: [facility];  Service: General;;  EXTENSIVE RETROPERITONEAL NODE DISSECTION  EXPLORATION OF MESENTERIC ARTERY/VEIN     Family History   Problem Relation Name Age of Onset    Cancer Father          lung/ asbestos    Asbestos Father      Lung cancer Father      No Known Problems Daughter      Melanoma Neg Hx      Psoriasis Neg Hx      Lupus Neg Hx      Eczema Neg Hx       Social History     Tobacco Use    Smoking status: Former     Current packs/day: 0.00     Types: Cigarettes     Quit date: 3/30/2000     Years since quittin.7     Passive exposure: Past    Smokeless tobacco: Never   Substance Use Topics    Alcohol use: Not Currently     Alcohol/week: 1.0 standard drink of alcohol     Types: 1 Cans of beer per week     Comment: rare    Drug use: No     Review of Systems   Constitutional:  Negative for chills and fever.   HENT:  Negative for ear pain, rhinorrhea and sore throat.    Eyes:  Negative for pain and visual disturbance.   Respiratory:   Negative for cough and shortness of breath.    Cardiovascular:  Negative for chest pain and palpitations.   Gastrointestinal:  Negative for abdominal pain, constipation, diarrhea, nausea and vomiting.   Genitourinary:  Negative for dysuria, frequency, hematuria and urgency.   Musculoskeletal:  Negative for back pain, joint swelling and myalgias.        Left elbow pain   Skin:  Negative for rash.   Neurological:  Negative for dizziness, seizures, weakness and headaches.   Psychiatric/Behavioral:  Negative for dysphoric mood. The patient is not nervous/anxious.        Physical Exam     Initial Vitals [12/16/24 2245]   BP Pulse Resp Temp SpO2   (!) 155/68 68 18 98.7 °F (37.1 °C) 98 %      MAP       --         Physical Exam    Nursing note and vitals reviewed.  Constitutional: He appears well-developed and well-nourished.   HENT:   Head: Normocephalic and atraumatic.   Eyes: Conjunctivae, EOM and lids are normal. Pupils are equal, round, and reactive to light.   Neck: Trachea normal. Neck supple. No thyroid mass present.   Cardiovascular:  Normal rate, regular rhythm and normal heart sounds.           Pulmonary/Chest: Breath sounds normal. No respiratory distress.   Abdominal: Abdomen is soft. There is no abdominal tenderness.   Musculoskeletal:         General: Normal range of motion.      Cervical back: Neck supple.      Comments: Patient with slight pain to the left elbow no signs of trauma whatsoever full range of motion     Neurological: He is alert and oriented to person, place, and time. He has normal strength and normal reflexes. No cranial nerve deficit or sensory deficit.   Skin: Skin is warm and dry.   Psychiatric: He has a normal mood and affect. His speech is normal and behavior is normal. Judgment and thought content normal.         ED Course   Procedures  Labs Reviewed - No data to display       Imaging Results              CT Cervical Spine Without Contrast (Final result)  Result time 12/17/24 01:00:12       Final result by Gaston Woods MD (12/17/24 01:00:12)                   Impression:      No CT evidence of acute fracture or traumatic subluxation of the cervical spine.  Multilevel degenerative changes as above.      Electronically signed by: Gaston Woods MD  Date:    12/17/2024  Time:    01:00               Narrative:    EXAMINATION:  CT CERVICAL SPINE WITHOUT CONTRAST    CLINICAL HISTORY:  fall;    TECHNIQUE:  Low dose axial images, sagittal and coronal reformations were performed though the cervical spine.  Contrast was not administered.    COMPARISON:  Cervical spine radiograph series 07/24/2024    FINDINGS:  There is straightening of the normal cervical lordosis.  There is mild diffuse osteopenia.  Cervical vertebral body heights appear adequately maintained.  No definite acute displaced fracture identified.  Intervertebral disc spaces are relatively well maintained with mild degenerative discogenic change.  At the levels of C3-C4 and C5-C6, there is a broad-based posterior disc osteophyte complex with mild spinal canal stenosis.  There are varying degrees of multilevel bilateral neural foraminal narrowing throughout the cervical spine secondary to facet arthropathy and uncovertebral spurring.    The prevertebral soft tissues are not significantly thickened.  There is atherosclerotic calcification of the carotid vasculature.  Trachea is midline and patent.  Lung apices demonstrate advanced emphysematous change.                                       CT Head Without Contrast (Final result)  Result time 12/17/24 01:01:10      Final result by Gaston Woods MD (12/17/24 01:01:10)                   Impression:      No CT evidence of acute intracranial abnormality. Clinical correlation and further evaluation as warranted.    Chronic senescent and microvascular ischemic changes.      Electronically signed by: Gaston Woods MD  Date:    12/17/2024  Time:    01:01               Narrative:     EXAMINATION:  CT HEAD WITHOUT CONTRAST    CLINICAL HISTORY:  fall;    TECHNIQUE:  Low dose axial images were obtained through the head.  Coronal and sagittal reformations were also performed. Contrast was not administered.    COMPARISON:  CT head 10/25/2024    FINDINGS:  There is generalized cerebral volume loss with compensatory sulcal widening and ventricular enlargement.  There is patchy periventricular white matter hypoattenuation suggesting sequelae of chronic microvascular ischemic change.  No CT evidence of acute intracranial hemorrhage.  The basal cisterns are patent. The mastoid air cells and paranasal sinuses are clear of acute process. No acute displaced calvarial fracture identified.                                       X-Ray Elbow Complete Left (Final result)  Result time 12/17/24 00:15:57      Final result by Gaston Woods MD (12/17/24 00:15:57)                   Impression:      Diffuse osteopenia.  No definite radiographic evidence of acute displaced fracture or dislocation of the left elbow.      Electronically signed by: Gaston Woods MD  Date:    12/17/2024  Time:    00:15               Narrative:    EXAMINATION:  XR ELBOW COMPLETE 3 VIEW LEFT    CLINICAL HISTORY:  Unspecified fall, initial encounter    TECHNIQUE:  AP, lateral, and oblique views of the left elbow were performed.    COMPARISON:  None    FINDINGS:  There is diffuse osteopenia.  No definite radiographic evidence of acute displaced fracture of the left elbow.  Alignment appears within normal limits without evidence of dislocation.  No large joint effusion appreciated radiographically.                                       Medications - No data to display  Medical Decision Making  The patient is here for fall with left elbow pain.  Workup negative patient discharged home in good condition. Kourtney Melendez MD  8:45 PM 12/18/2024          Amount and/or Complexity of Data Reviewed  Radiology: ordered.                                       Clinical Impression:  Final diagnoses:  [W19.XXXA] Fall  [M25.522] Left elbow pain (Primary)          ED Disposition Condition    Discharge Stable          ED Prescriptions    None       Follow-up Information    None          Kourtney Melendez MD  12/18/24 [id number]

## 2025-01-06 ENCOUNTER — PATIENT MESSAGE (OUTPATIENT)
Dept: FAMILY MEDICINE | Facility: CLINIC | Age: 80
End: 2025-01-06
Payer: MEDICARE

## 2025-01-14 ENCOUNTER — PATIENT MESSAGE (OUTPATIENT)
Dept: FAMILY MEDICINE | Facility: CLINIC | Age: 80
End: 2025-01-14
Payer: MEDICARE

## (undated) DEVICE — LOOP CUTTING RESECT 12 D 24F

## (undated) DEVICE — GAUZE SPONGE BULKEE 6X6.75IN

## (undated) DEVICE — SEE MEDLINE ITEM 152651

## (undated) DEVICE — ELECTRODE RESECTION BUTTON LRG

## (undated) DEVICE — DRESSING TELFA N ADH 3X8

## (undated) DEVICE — CATH POLLACK OPEN-END FLEXI-TI

## (undated) DEVICE — ADHESIVE DERMABOND ADVANCED

## (undated) DEVICE — TUBING ARTHRO IRR 4-LEAD

## (undated) DEVICE — SOL 9P NACL IRR PIC IL

## (undated) DEVICE — Device

## (undated) DEVICE — LUBRICANT SURGILUBE 2 OZ

## (undated) DEVICE — SCRUB 10% POVIDONE IODINE 4OZ

## (undated) DEVICE — SEE MEDLINE ITEM 157117

## (undated) DEVICE — DRAPE MINOR PROCEDURE

## (undated) DEVICE — SECUREMENT DEVICE FOLEY

## (undated) DEVICE — SPONGE GAUZE 16PLY 4X4

## (undated) DEVICE — SUT 2 60IN PROLENE BL MONO

## (undated) DEVICE — SLEEVE SCD EXPRESS CALF MEDIUM

## (undated) DEVICE — SYR ONLY LUER LOCK 20CC

## (undated) DEVICE — SUT 1 36IN PDS II VIO MONO

## (undated) DEVICE — APPLIER LIGACLIP LG 13IN

## (undated) DEVICE — PLEDGET SUT SOFT 3/8X3/16X1/16

## (undated) DEVICE — SET CYSTO IRRIGATION UNIV SPIK

## (undated) DEVICE — HEMOSTAT SURGICEL 4X8IN

## (undated) DEVICE — GLOVE SURG ULTRA TOUCH 7

## (undated) DEVICE — TRAY FOLEY 16FR INFECTION CONT

## (undated) DEVICE — RELOAD PROXIMATE CUT BLUE 75MM

## (undated) DEVICE — PLUG CATHETER STERILE FOLEY

## (undated) DEVICE — SOL PVP-I SCRUB 7.5% 4OZ

## (undated) DEVICE — SYR 50ML CATH TIP

## (undated) DEVICE — SUT PROLENE 5-0 36IN C-1

## (undated) DEVICE — SHEET DRAPE MEDIUM

## (undated) DEVICE — ELECTRODE REM PLYHSV RETURN 9

## (undated) DEVICE — SYR 30CC LUER LOCK

## (undated) DEVICE — CUTTER PROXIMATE BLUE 75MM

## (undated) DEVICE — SEE L#120831

## (undated) DEVICE — DRAPE INCISE IOBAN 2 23X23IN

## (undated) DEVICE — CATH ALL PUR URTHL 20FR

## (undated) DEVICE — SOL WATER STRL IRR 1000ML

## (undated) DEVICE — SOL IRR NACL .9% 3000ML

## (undated) DEVICE — SPONGE LAP 18X18 PREWASHED

## (undated) DEVICE — SEE L#95700

## (undated) DEVICE — SUT SILK 3-0 STRANDS 30IN

## (undated) DEVICE — SEE MEDLINE ITEM 157185

## (undated) DEVICE — KIT SURGIFLO HEMOSTATIC MATRIX

## (undated) DEVICE — GLOVE SURG BIOGEL LATEX SZ 7.5

## (undated) DEVICE — KIT POWDER ABSORBABLE GELATIN

## (undated) DEVICE — WATER STERILE INJ 500ML BAG

## (undated) DEVICE — GUIDE WIRE MOTION .035 X 150CM

## (undated) DEVICE — SUT 4/0 27IN PDS II VIO MON

## (undated) DEVICE — SEE MEDLINE ITEM 157116

## (undated) DEVICE — SUT PROLENE 2-0 SH 36IN BLU

## (undated) DEVICE — TRAY DRY SPONGE SCRUB W FOAM

## (undated) DEVICE — SUT PROLENE 6-0 C-1 30IN BL

## (undated) DEVICE — DRESSING AQUACEL SACRAL 9 X 9

## (undated) DEVICE — SUT PROLENE 2-0 36IN MH BLU

## (undated) DEVICE — ADHESIVE MASTISOL VIAL 48/BX

## (undated) DEVICE — SEALER LIGASURE OPEN 5MM 23CM

## (undated) DEVICE — NDL HYPDRM 23X15

## (undated) DEVICE — SEE MEDLINE ITEM 152487

## (undated) DEVICE — TAPE SILK 3IN

## (undated) DEVICE — SUT MONOCRYL 3-0 PS-1

## (undated) DEVICE — BAG URINARY LEG COMBO PACK STA

## (undated) DEVICE — CONTAINER SPECIMEN STRL 4OZ

## (undated) DEVICE — SEE MEDLINE ITEM 157125

## (undated) DEVICE — MANIFOLD 4 PORT

## (undated) DEVICE — SET DECANTER MEDICHOICE

## (undated) DEVICE — JELLY LUBRICATING STERILE 5 GR

## (undated) DEVICE — SUT PROLENE 3-0 30SH

## (undated) DEVICE — SUPPORT ULNA NERVE PROTECTOR

## (undated) DEVICE — SPRAY MASTISOL

## (undated) DEVICE — SET IRR URLGY 2LINE UNIV SPIKE

## (undated) DEVICE — PENCIL ROCKER SWITCH 10FT CORD